# Patient Record
Sex: MALE | Race: WHITE | NOT HISPANIC OR LATINO | Employment: OTHER | URBAN - METROPOLITAN AREA
[De-identification: names, ages, dates, MRNs, and addresses within clinical notes are randomized per-mention and may not be internally consistent; named-entity substitution may affect disease eponyms.]

---

## 2017-02-22 ENCOUNTER — COMMUNICATION - HEALTHEAST (OUTPATIENT)
Dept: FAMILY MEDICINE | Facility: CLINIC | Age: 82
End: 2017-02-22

## 2017-02-22 DIAGNOSIS — L71.9 ROSACEA: ICD-10-CM

## 2017-03-06 ENCOUNTER — COMMUNICATION - HEALTHEAST (OUTPATIENT)
Dept: FAMILY MEDICINE | Facility: CLINIC | Age: 82
End: 2017-03-06

## 2017-03-06 DIAGNOSIS — L71.9 ROSACEA: ICD-10-CM

## 2017-03-09 ENCOUNTER — COMMUNICATION - HEALTHEAST (OUTPATIENT)
Dept: FAMILY MEDICINE | Facility: CLINIC | Age: 82
End: 2017-03-09

## 2017-03-10 ENCOUNTER — RECORDS - HEALTHEAST (OUTPATIENT)
Dept: ADMINISTRATIVE | Facility: OTHER | Age: 82
End: 2017-03-10

## 2017-04-25 ENCOUNTER — COMMUNICATION - HEALTHEAST (OUTPATIENT)
Dept: SCHEDULING | Facility: CLINIC | Age: 82
End: 2017-04-25

## 2017-04-25 DIAGNOSIS — I10 UNSPECIFIED ESSENTIAL HYPERTENSION: ICD-10-CM

## 2017-05-02 ENCOUNTER — AMBULATORY - HEALTHEAST (OUTPATIENT)
Dept: LAB | Facility: CLINIC | Age: 82
End: 2017-05-02

## 2017-05-02 DIAGNOSIS — R97.20 ELEVATED PROSTATE SPECIFIC ANTIGEN (PSA): ICD-10-CM

## 2017-05-02 LAB — PSA SERPL-MCNC: 7.7 NG/ML (ref 0–6.5)

## 2017-05-03 ENCOUNTER — COMMUNICATION - HEALTHEAST (OUTPATIENT)
Dept: FAMILY MEDICINE | Facility: CLINIC | Age: 82
End: 2017-05-03

## 2017-05-21 ENCOUNTER — COMMUNICATION - HEALTHEAST (OUTPATIENT)
Dept: FAMILY MEDICINE | Facility: CLINIC | Age: 82
End: 2017-05-21

## 2017-05-21 DIAGNOSIS — F52.8 PSYCHOSEXUAL DYSFUNCTION WITH INHIBITED SEXUAL EXCITEMENT: ICD-10-CM

## 2017-07-26 ENCOUNTER — COMMUNICATION - HEALTHEAST (OUTPATIENT)
Dept: FAMILY MEDICINE | Facility: CLINIC | Age: 82
End: 2017-07-26

## 2017-07-26 DIAGNOSIS — N40.0 BPH (BENIGN PROSTATIC HYPERPLASIA): ICD-10-CM

## 2017-08-04 ENCOUNTER — COMMUNICATION - HEALTHEAST (OUTPATIENT)
Dept: FAMILY MEDICINE | Facility: CLINIC | Age: 82
End: 2017-08-04

## 2017-08-04 DIAGNOSIS — L71.9 ROSACEA: ICD-10-CM

## 2017-10-24 ENCOUNTER — COMMUNICATION - HEALTHEAST (OUTPATIENT)
Dept: FAMILY MEDICINE | Facility: CLINIC | Age: 82
End: 2017-10-24

## 2017-10-24 DIAGNOSIS — N40.0 BPH (BENIGN PROSTATIC HYPERPLASIA): ICD-10-CM

## 2017-11-03 ENCOUNTER — COMMUNICATION - HEALTHEAST (OUTPATIENT)
Dept: FAMILY MEDICINE | Facility: CLINIC | Age: 82
End: 2017-11-03

## 2017-11-03 DIAGNOSIS — L71.9 ROSACEA: ICD-10-CM

## 2017-11-28 ENCOUNTER — OFFICE VISIT - HEALTHEAST (OUTPATIENT)
Dept: FAMILY MEDICINE | Facility: CLINIC | Age: 82
End: 2017-11-28

## 2017-11-28 DIAGNOSIS — L71.9 ROSACEA: ICD-10-CM

## 2017-11-28 DIAGNOSIS — C44.90 MALIGNANT NEOPLASM OF SKIN: ICD-10-CM

## 2017-11-28 DIAGNOSIS — Z00.00 PREVENTATIVE HEALTH CARE: ICD-10-CM

## 2017-11-28 DIAGNOSIS — H40.10X1 OPEN-ANGLE GLAUCOMA OF BOTH EYES, MILD STAGE, UNSPECIFIED OPEN-ANGLE GLAUCOMA TYPE: ICD-10-CM

## 2017-11-28 DIAGNOSIS — E78.00 PURE HYPERCHOLESTEROLEMIA: ICD-10-CM

## 2017-11-28 DIAGNOSIS — N40.1 BPH WITH URINARY OBSTRUCTION: ICD-10-CM

## 2017-11-28 DIAGNOSIS — K40.90 RIGHT INGUINAL HERNIA: ICD-10-CM

## 2017-11-28 DIAGNOSIS — I10 ESSENTIAL HYPERTENSION WITH GOAL BLOOD PRESSURE LESS THAN 130/80: ICD-10-CM

## 2017-11-28 DIAGNOSIS — Z00.01 ENCOUNTER FOR GENERAL ADULT MEDICAL EXAMINATION WITH ABNORMAL FINDINGS: ICD-10-CM

## 2017-11-28 DIAGNOSIS — F52.8 PSYCHOSEXUAL DYSFUNCTION WITH INHIBITED SEXUAL EXCITEMENT: ICD-10-CM

## 2017-11-28 DIAGNOSIS — R97.20 ELEVATED PROSTATE SPECIFIC ANTIGEN (PSA): ICD-10-CM

## 2017-11-28 DIAGNOSIS — H91.93 BILATERAL HEARING LOSS, UNSPECIFIED HEARING LOSS TYPE: ICD-10-CM

## 2017-11-28 DIAGNOSIS — R73.01 IMPAIRED FASTING GLUCOSE: ICD-10-CM

## 2017-11-28 DIAGNOSIS — N13.8 BPH WITH URINARY OBSTRUCTION: ICD-10-CM

## 2017-11-28 LAB
CHOLEST SERPL-MCNC: 169 MG/DL
FASTING STATUS PATIENT QL REPORTED: YES
HBA1C MFR BLD: 6.3 % (ref 3.5–6)
HDLC SERPL-MCNC: 36 MG/DL
LDLC SERPL CALC-MCNC: 121 MG/DL
PSA SERPL-MCNC: 7.5 NG/ML (ref 0–6.5)
TRIGL SERPL-MCNC: 62 MG/DL

## 2017-11-28 ASSESSMENT — MIFFLIN-ST. JEOR: SCORE: 1583.04

## 2017-11-29 ENCOUNTER — COMMUNICATION - HEALTHEAST (OUTPATIENT)
Dept: FAMILY MEDICINE | Facility: CLINIC | Age: 82
End: 2017-11-29

## 2017-12-04 ENCOUNTER — RECORDS - HEALTHEAST (OUTPATIENT)
Dept: ADMINISTRATIVE | Facility: OTHER | Age: 82
End: 2017-12-04

## 2018-01-22 ENCOUNTER — COMMUNICATION - HEALTHEAST (OUTPATIENT)
Dept: FAMILY MEDICINE | Facility: CLINIC | Age: 83
End: 2018-01-22

## 2018-01-22 DIAGNOSIS — L71.9 ROSACEA: ICD-10-CM

## 2018-01-22 DIAGNOSIS — N40.0 BPH (BENIGN PROSTATIC HYPERPLASIA): ICD-10-CM

## 2018-01-23 ENCOUNTER — COMMUNICATION - HEALTHEAST (OUTPATIENT)
Dept: FAMILY MEDICINE | Facility: CLINIC | Age: 83
End: 2018-01-23

## 2018-01-23 DIAGNOSIS — I10 ESSENTIAL HYPERTENSION: ICD-10-CM

## 2018-04-21 ENCOUNTER — COMMUNICATION - HEALTHEAST (OUTPATIENT)
Dept: FAMILY MEDICINE | Facility: CLINIC | Age: 83
End: 2018-04-21

## 2018-04-21 DIAGNOSIS — F52.8 PSYCHOSEXUAL DYSFUNCTION WITH INHIBITED SEXUAL EXCITEMENT: ICD-10-CM

## 2018-05-03 ENCOUNTER — COMMUNICATION - HEALTHEAST (OUTPATIENT)
Dept: FAMILY MEDICINE | Facility: CLINIC | Age: 83
End: 2018-05-03

## 2018-05-03 ENCOUNTER — AMBULATORY - HEALTHEAST (OUTPATIENT)
Dept: FAMILY MEDICINE | Facility: CLINIC | Age: 83
End: 2018-05-03

## 2018-05-03 DIAGNOSIS — L71.9 ROSACEA: ICD-10-CM

## 2018-05-29 ENCOUNTER — AMBULATORY - HEALTHEAST (OUTPATIENT)
Dept: LAB | Facility: CLINIC | Age: 83
End: 2018-05-29

## 2018-05-29 DIAGNOSIS — N13.8 BPH WITH URINARY OBSTRUCTION: ICD-10-CM

## 2018-05-29 DIAGNOSIS — R97.20 ELEVATED PROSTATE SPECIFIC ANTIGEN (PSA): ICD-10-CM

## 2018-05-29 DIAGNOSIS — N40.1 BPH WITH URINARY OBSTRUCTION: ICD-10-CM

## 2018-05-30 ENCOUNTER — AMBULATORY - HEALTHEAST (OUTPATIENT)
Dept: LAB | Facility: CLINIC | Age: 83
End: 2018-05-30

## 2018-05-30 DIAGNOSIS — R97.20 ELEVATED PROSTATE SPECIFIC ANTIGEN (PSA): ICD-10-CM

## 2018-05-30 DIAGNOSIS — N40.1 BPH WITH URINARY OBSTRUCTION: ICD-10-CM

## 2018-05-30 DIAGNOSIS — N13.8 BPH WITH URINARY OBSTRUCTION: ICD-10-CM

## 2018-05-30 LAB — PSA SERPL-MCNC: 9.3 NG/ML (ref 0–6.5)

## 2018-06-04 ENCOUNTER — COMMUNICATION - HEALTHEAST (OUTPATIENT)
Dept: FAMILY MEDICINE | Facility: CLINIC | Age: 83
End: 2018-06-04

## 2018-06-04 ENCOUNTER — RECORDS - HEALTHEAST (OUTPATIENT)
Dept: ADMINISTRATIVE | Facility: OTHER | Age: 83
End: 2018-06-04

## 2018-06-08 ENCOUNTER — AMBULATORY - HEALTHEAST (OUTPATIENT)
Dept: LAB | Facility: CLINIC | Age: 83
End: 2018-06-08

## 2018-06-08 DIAGNOSIS — F52.8 PSYCHOSEXUAL DYSFUNCTION WITH INHIBITED SEXUAL EXCITEMENT: ICD-10-CM

## 2018-06-08 DIAGNOSIS — R97.20 ELEVATED PROSTATE SPECIFIC ANTIGEN (PSA): ICD-10-CM

## 2018-06-08 DIAGNOSIS — Z00.00 ROUTINE HEALTH MAINTENANCE: ICD-10-CM

## 2018-06-08 DIAGNOSIS — N20.0 URIC ACID NEPHROLITHIASIS: ICD-10-CM

## 2018-07-20 ENCOUNTER — COMMUNICATION - HEALTHEAST (OUTPATIENT)
Dept: LAB | Facility: CLINIC | Age: 83
End: 2018-07-20

## 2018-07-20 DIAGNOSIS — N40.0 BPH (BENIGN PROSTATIC HYPERPLASIA): ICD-10-CM

## 2018-07-23 ENCOUNTER — COMMUNICATION - HEALTHEAST (OUTPATIENT)
Dept: LAB | Facility: CLINIC | Age: 83
End: 2018-07-23

## 2018-07-23 DIAGNOSIS — L71.9 ROSACEA: ICD-10-CM

## 2018-08-21 ENCOUNTER — RECORDS - HEALTHEAST (OUTPATIENT)
Dept: ADMINISTRATIVE | Facility: OTHER | Age: 83
End: 2018-08-21

## 2018-09-14 ENCOUNTER — COMMUNICATION - HEALTHEAST (OUTPATIENT)
Dept: LAB | Facility: CLINIC | Age: 83
End: 2018-09-14

## 2018-09-14 DIAGNOSIS — I10 ESSENTIAL HYPERTENSION: ICD-10-CM

## 2018-09-25 ENCOUNTER — AMBULATORY - HEALTHEAST (OUTPATIENT)
Dept: NURSING | Facility: CLINIC | Age: 83
End: 2018-09-25

## 2018-09-25 DIAGNOSIS — Z23 NEED FOR VACCINATION: ICD-10-CM

## 2018-12-04 ENCOUNTER — RECORDS - HEALTHEAST (OUTPATIENT)
Dept: ADMINISTRATIVE | Facility: OTHER | Age: 83
End: 2018-12-04

## 2018-12-12 ENCOUNTER — COMMUNICATION - HEALTHEAST (OUTPATIENT)
Dept: FAMILY MEDICINE | Facility: CLINIC | Age: 83
End: 2018-12-12

## 2018-12-12 DIAGNOSIS — F52.8 PSYCHOSEXUAL DYSFUNCTION WITH INHIBITED SEXUAL EXCITEMENT: ICD-10-CM

## 2018-12-14 ENCOUNTER — OFFICE VISIT - HEALTHEAST (OUTPATIENT)
Dept: FAMILY MEDICINE | Facility: CLINIC | Age: 83
End: 2018-12-14

## 2018-12-14 DIAGNOSIS — N13.8 BPH WITH URINARY OBSTRUCTION: ICD-10-CM

## 2018-12-14 DIAGNOSIS — K40.90 RIGHT INGUINAL HERNIA: ICD-10-CM

## 2018-12-14 DIAGNOSIS — N40.1 BPH WITH URINARY OBSTRUCTION: ICD-10-CM

## 2018-12-14 DIAGNOSIS — L71.9 ROSACEA: ICD-10-CM

## 2018-12-14 DIAGNOSIS — E78.00 PURE HYPERCHOLESTEROLEMIA: ICD-10-CM

## 2018-12-14 DIAGNOSIS — I10 ESSENTIAL HYPERTENSION WITH GOAL BLOOD PRESSURE LESS THAN 130/80: ICD-10-CM

## 2018-12-14 DIAGNOSIS — C44.90 MALIGNANT NEOPLASM OF SKIN: ICD-10-CM

## 2018-12-14 DIAGNOSIS — R73.01 IMPAIRED FASTING GLUCOSE: ICD-10-CM

## 2018-12-14 DIAGNOSIS — Z00.01 ENCOUNTER FOR GENERAL ADULT MEDICAL EXAMINATION WITH ABNORMAL FINDINGS: ICD-10-CM

## 2018-12-14 DIAGNOSIS — R97.20 ELEVATED PROSTATE SPECIFIC ANTIGEN (PSA): ICD-10-CM

## 2018-12-14 DIAGNOSIS — F52.8 PSYCHOSEXUAL DYSFUNCTION WITH INHIBITED SEXUAL EXCITEMENT: ICD-10-CM

## 2018-12-14 DIAGNOSIS — E66.3 OVERWEIGHT (BMI 25.0-29.9): ICD-10-CM

## 2018-12-14 DIAGNOSIS — H91.93 BILATERAL HEARING LOSS, UNSPECIFIED HEARING LOSS TYPE: ICD-10-CM

## 2018-12-14 LAB
ANION GAP SERPL CALCULATED.3IONS-SCNC: 7 MMOL/L (ref 5–18)
BUN SERPL-MCNC: 19 MG/DL (ref 8–28)
CALCIUM SERPL-MCNC: 9.5 MG/DL (ref 8.5–10.5)
CHLORIDE BLD-SCNC: 104 MMOL/L (ref 98–107)
CHOLEST SERPL-MCNC: 174 MG/DL
CO2 SERPL-SCNC: 31 MMOL/L (ref 22–31)
CREAT SERPL-MCNC: 0.84 MG/DL (ref 0.7–1.3)
FASTING STATUS PATIENT QL REPORTED: YES
GFR SERPL CREATININE-BSD FRML MDRD: >60 ML/MIN/1.73M2
GLUCOSE BLD-MCNC: 109 MG/DL (ref 70–125)
HBA1C MFR BLD: 6.1 % (ref 3.5–6)
HDLC SERPL-MCNC: 44 MG/DL
LDLC SERPL CALC-MCNC: 117 MG/DL
POTASSIUM BLD-SCNC: 4.4 MMOL/L (ref 3.5–5)
PSA SERPL-MCNC: 6.7 NG/ML (ref 0–6.5)
SODIUM SERPL-SCNC: 142 MMOL/L (ref 136–145)
TRIGL SERPL-MCNC: 63 MG/DL

## 2018-12-14 ASSESSMENT — MIFFLIN-ST. JEOR: SCORE: 1555.82

## 2018-12-17 ENCOUNTER — COMMUNICATION - HEALTHEAST (OUTPATIENT)
Dept: FAMILY MEDICINE | Facility: CLINIC | Age: 83
End: 2018-12-17

## 2019-01-01 ENCOUNTER — EXTERNAL RECORD (OUTPATIENT)
Dept: OTHER | Age: 84
End: 2019-01-01

## 2019-05-30 ENCOUNTER — COMMUNICATION - HEALTHEAST (OUTPATIENT)
Dept: FAMILY MEDICINE | Facility: CLINIC | Age: 84
End: 2019-05-30

## 2019-05-30 ENCOUNTER — AMBULATORY - HEALTHEAST (OUTPATIENT)
Dept: LAB | Facility: CLINIC | Age: 84
End: 2019-05-30

## 2019-05-30 DIAGNOSIS — F52.8 PSYCHOSEXUAL DYSFUNCTION WITH INHIBITED SEXUAL EXCITEMENT: ICD-10-CM

## 2019-05-30 DIAGNOSIS — Z00.00 ROUTINE HEALTH MAINTENANCE: ICD-10-CM

## 2019-05-30 DIAGNOSIS — N20.0 URIC ACID NEPHROLITHIASIS: ICD-10-CM

## 2019-05-30 DIAGNOSIS — R97.20 ELEVATED PROSTATE SPECIFIC ANTIGEN (PSA): ICD-10-CM

## 2019-05-30 LAB — PSA SERPL-MCNC: 9.3 NG/ML (ref 0–6.5)

## 2019-06-04 ENCOUNTER — RECORDS - HEALTHEAST (OUTPATIENT)
Dept: ADMINISTRATIVE | Facility: OTHER | Age: 84
End: 2019-06-04

## 2019-06-06 ENCOUNTER — RECORDS - HEALTHEAST (OUTPATIENT)
Dept: ADMINISTRATIVE | Facility: OTHER | Age: 84
End: 2019-06-06

## 2019-06-10 ENCOUNTER — RECORDS - HEALTHEAST (OUTPATIENT)
Dept: ADMINISTRATIVE | Facility: OTHER | Age: 84
End: 2019-06-10

## 2019-06-10 ENCOUNTER — AMBULATORY - HEALTHEAST (OUTPATIENT)
Dept: LAB | Facility: CLINIC | Age: 84
End: 2019-06-10

## 2019-06-20 ENCOUNTER — AMBULATORY - HEALTHEAST (OUTPATIENT)
Dept: LAB | Facility: CLINIC | Age: 84
End: 2019-06-20

## 2019-06-20 DIAGNOSIS — R97.20 ELEVATED PROSTATE SPECIFIC ANTIGEN (PSA): ICD-10-CM

## 2019-07-15 ENCOUNTER — COMMUNICATION - HEALTHEAST (OUTPATIENT)
Dept: FAMILY MEDICINE | Facility: CLINIC | Age: 84
End: 2019-07-15

## 2019-07-15 DIAGNOSIS — N40.0 BPH (BENIGN PROSTATIC HYPERPLASIA): ICD-10-CM

## 2019-07-18 ENCOUNTER — COMMUNICATION - HEALTHEAST (OUTPATIENT)
Dept: FAMILY MEDICINE | Facility: CLINIC | Age: 84
End: 2019-07-18

## 2019-07-18 DIAGNOSIS — L71.9 ROSACEA: ICD-10-CM

## 2019-08-26 ENCOUNTER — HOSPITAL ENCOUNTER (INPATIENT)
Age: 84
LOS: 2 days | Discharge: HOME | DRG: 244 | End: 2019-08-29
Attending: EMERGENCY MEDICINE | Admitting: HOSPITALIST

## 2019-08-26 ENCOUNTER — APPOINTMENT (OUTPATIENT)
Dept: GENERAL RADIOLOGY | Age: 84
DRG: 244 | End: 2019-08-26
Attending: EMERGENCY MEDICINE

## 2019-08-26 DIAGNOSIS — R55 SYNCOPE: ICD-10-CM

## 2019-08-26 DIAGNOSIS — R55 SYNCOPE, UNSPECIFIED SYNCOPE TYPE: Primary | ICD-10-CM

## 2019-08-26 DIAGNOSIS — I45.2 BIFASCICULAR BLOCK: ICD-10-CM

## 2019-08-26 DIAGNOSIS — I10 ESSENTIAL HYPERTENSION: ICD-10-CM

## 2019-08-26 LAB
ALBUMIN SERPL-MCNC: 3.3 G/DL (ref 3.6–5.1)
ALP SERPL-CCNC: 106 UNITS/L (ref 45–117)
ALT SERPL-CCNC: 18 UNITS/L
ANION GAP BLD CALC-SCNC: 17 MMOL/L
APPEARANCE UR: CLEAR
AST SERPL-CCNC: 21 UNITS/L
ATRIAL RATE (BPM): 75
BASOPHILS # BLD AUTO: 0.1 K/MCL (ref 0–0.3)
BASOPHILS NFR BLD AUTO: 1 %
BILIRUB CONJ SERPL-MCNC: <0.1 MG/DL (ref 0–0.2)
BILIRUB SERPL-MCNC: 0.3 MG/DL (ref 0.2–1)
BILIRUB UR QL STRIP: NEGATIVE
BUN BLD-MCNC: 15 MG/DL (ref 6–20)
CA-I BLD ISE-SCNC: 1.16 MMOL/L (ref 1.15–1.29)
CHLORIDE BLD-SCNC: 103 MMOL/L (ref 98–107)
CO2 BLD-SCNC: 25 MMOL/L (ref 19–24)
COLOR UR: YELLOW
CREAT BLD-MCNC: 0.8 MG/DL (ref 0.67–1.17)
CREAT BLD-MCNC: >90 MG/DL
DIASTOLIC BLOOD PRESSURE: 84
DIFFERENTIAL METHOD BLD: ABNORMAL
EOSINOPHIL # BLD AUTO: 0.2 K/MCL (ref 0.1–0.5)
EOSINOPHIL NFR SPEC: 2 %
ERYTHROCYTE [DISTWIDTH] IN BLOOD: 14.3 % (ref 11–15)
GLUCOSE BLD-MCNC: 114 MG/DL (ref 65–99)
GLUCOSE UR STRIP-MCNC: NEGATIVE MG/DL
HCT VFR BLD CALC: 37.3 % (ref 39–51)
HCT VFR BLD CALC: 39 % (ref 39–51)
HGB BLD-MCNC: 13.3 G/DL (ref 13–17)
HGB BLD-MCNC: 13.7 G/DL (ref 13–17)
HGB UR QL STRIP: NEGATIVE
IMM GRANULOCYTES # BLD AUTO: 0 K/MCL (ref 0–0.2)
IMM GRANULOCYTES NFR BLD: 0 %
KETONES UR STRIP-MCNC: NEGATIVE MG/DL
LEUKOCYTE ESTERASE UR QL STRIP: NEGATIVE
LYMPHOCYTES # BLD MANUAL: 1.9 K/MCL (ref 1–4)
LYMPHOCYTES NFR BLD MANUAL: 22 %
MCH RBC QN AUTO: 35.7 PG (ref 26–34)
MCHC RBC AUTO-ENTMCNC: 36.7 G/DL (ref 32–36.5)
MCV RBC AUTO: 97.1 FL (ref 78–100)
MONOCYTES # BLD MANUAL: 1.3 K/MCL (ref 0.3–0.9)
MONOCYTES NFR BLD MANUAL: 16 %
NEUTROPHILS # BLD: 5.1 K/MCL (ref 1.8–7.7)
NEUTROPHILS NFR BLD AUTO: 59 %
NITRITE UR QL STRIP: NEGATIVE
NRBC BLD MANUAL-RTO: 0 /100 WBC
P AXIS (DEGREES): 43
PH UR STRIP: 5 UNITS (ref 5–7)
PLATELET # BLD: 174 K/MCL (ref 140–450)
POTASSIUM BLD-SCNC: 3.4 MMOL/L (ref 3.4–5.1)
PR-INTERVAL (MSEC): 244
PROT SERPL-MCNC: 6.7 G/DL (ref 6.4–8.2)
PROT UR STRIP-MCNC: NEGATIVE MG/DL
QRS-INTERVAL (MSEC): 126
QT-INTERVAL (MSEC): 430
QTC: 480
R AXIS (DEGREES): -67
RBC # BLD: 3.84 MIL/MCL (ref 4.5–5.9)
REPORT TEXT: NORMAL
SODIUM BLD-SCNC: 142 MMOL/L (ref 135–145)
SP GR UR STRIP: 1.01 (ref 1–1.03)
SPECIMEN SOURCE: NORMAL
SYSTOLIC BLOOD PRESSURE: 173
T AXIS (DEGREES): 52
TROPONIN I SERPL-MCNC: <0.02 NG/ML
UROBILINOGEN UR STRIP-MCNC: 0.2 MG/DL (ref 0–1)
VENTRICULAR RATE EKG/MIN (BPM): 75
WBC # BLD: 8.5 K/MCL (ref 4.2–11)

## 2019-08-26 PROCEDURE — 96372 THER/PROPH/DIAG INJ SC/IM: CPT | Performed by: HOSPITALIST

## 2019-08-26 PROCEDURE — 84484 ASSAY OF TROPONIN QUANT: CPT

## 2019-08-26 PROCEDURE — G0378 HOSPITAL OBSERVATION PER HR: HCPCS

## 2019-08-26 PROCEDURE — 80076 HEPATIC FUNCTION PANEL: CPT

## 2019-08-26 PROCEDURE — 96361 HYDRATE IV INFUSION ADD-ON: CPT

## 2019-08-26 PROCEDURE — 99285 EMERGENCY DEPT VISIT HI MDM: CPT

## 2019-08-26 PROCEDURE — 10002807 HB RX 258: Performed by: EMERGENCY MEDICINE

## 2019-08-26 PROCEDURE — 10004651 HB RX, NO CHARGE ITEM: Performed by: HOSPITALIST

## 2019-08-26 PROCEDURE — 81003 URINALYSIS AUTO W/O SCOPE: CPT

## 2019-08-26 PROCEDURE — 96374 THER/PROPH/DIAG INJ IV PUSH: CPT

## 2019-08-26 PROCEDURE — 93005 ELECTROCARDIOGRAM TRACING: CPT | Performed by: EMERGENCY MEDICINE

## 2019-08-26 PROCEDURE — 99218 INITIAL OBSERVATION CARE,LEVL I: CPT | Performed by: HOSPITALIST

## 2019-08-26 PROCEDURE — 10002803 HB RX 637: Performed by: HOSPITALIST

## 2019-08-26 PROCEDURE — 85025 COMPLETE CBC W/AUTO DIFF WBC: CPT

## 2019-08-26 PROCEDURE — 10002800 HB RX 250 W HCPCS: Performed by: EMERGENCY MEDICINE

## 2019-08-26 PROCEDURE — 10004157 XR CHEST AP OR PA

## 2019-08-26 PROCEDURE — 10002807 HB RX 258: Performed by: HOSPITALIST

## 2019-08-26 PROCEDURE — 85014 HEMATOCRIT: CPT

## 2019-08-26 PROCEDURE — 10002800 HB RX 250 W HCPCS: Performed by: HOSPITALIST

## 2019-08-26 RX ORDER — VARDENAFIL HYDROCHLORIDE 20 MG/1
20 TABLET ORAL PRN
COMMUNITY

## 2019-08-26 RX ORDER — SODIUM CHLORIDE 9 MG/ML
INJECTION, SOLUTION INTRAVENOUS CONTINUOUS
Status: ACTIVE | OUTPATIENT
Start: 2019-08-26 | End: 2019-08-27

## 2019-08-26 RX ORDER — DOXYCYCLINE HYCLATE 100 MG/1
50 CAPSULE ORAL DAILY
Status: DISCONTINUED | OUTPATIENT
Start: 2019-08-27 | End: 2019-08-26 | Stop reason: RX

## 2019-08-26 RX ORDER — POTASSIUM CHLORIDE 20 MEQ/1
40 TABLET, EXTENDED RELEASE ORAL ONCE
Status: COMPLETED | OUTPATIENT
Start: 2019-08-26 | End: 2019-08-26

## 2019-08-26 RX ORDER — 0.9 % SODIUM CHLORIDE 0.9 %
2 VIAL (ML) INJECTION EVERY 12 HOURS SCHEDULED
Status: DISCONTINUED | OUTPATIENT
Start: 2019-08-26 | End: 2019-08-29 | Stop reason: HOSPADM

## 2019-08-26 RX ORDER — MULTIVITAMIN
1 TABLET ORAL DAILY
COMMUNITY

## 2019-08-26 RX ORDER — LATANOPROST 50 UG/ML
1 SOLUTION/ DROPS OPHTHALMIC NIGHTLY
COMMUNITY

## 2019-08-26 RX ORDER — HEPARIN SODIUM 5000 [USP'U]/ML
5000 INJECTION, SOLUTION INTRAVENOUS; SUBCUTANEOUS EVERY 8 HOURS SCHEDULED
Status: DISCONTINUED | OUTPATIENT
Start: 2019-08-26 | End: 2019-08-29 | Stop reason: HOSPADM

## 2019-08-26 RX ORDER — LISINOPRIL 10 MG/1
10 TABLET ORAL DAILY
COMMUNITY

## 2019-08-26 RX ORDER — LATANOPROST 50 UG/ML
1 SOLUTION/ DROPS OPHTHALMIC NIGHTLY
Status: DISCONTINUED | OUTPATIENT
Start: 2019-08-27 | End: 2019-08-29 | Stop reason: HOSPADM

## 2019-08-26 RX ORDER — TAMSULOSIN HYDROCHLORIDE 0.4 MG/1
0.4 CAPSULE ORAL
COMMUNITY

## 2019-08-26 RX ORDER — PROCHLORPERAZINE EDISYLATE 5 MG/ML
5 INJECTION INTRAMUSCULAR; INTRAVENOUS ONCE
Status: COMPLETED | OUTPATIENT
Start: 2019-08-26 | End: 2019-08-26

## 2019-08-26 RX ORDER — DOXYCYCLINE HYCLATE 50 MG/1
1 TABLET, FILM COATED ORAL DAILY
COMMUNITY

## 2019-08-26 RX ORDER — DOXYCYCLINE 25 MG/5ML
50 POWDER, FOR SUSPENSION ORAL DAILY
Status: DISCONTINUED | OUTPATIENT
Start: 2019-08-27 | End: 2019-08-29 | Stop reason: HOSPADM

## 2019-08-26 RX ADMIN — SODIUM CHLORIDE: 9 INJECTION, SOLUTION INTRAVENOUS at 23:06

## 2019-08-26 RX ADMIN — SODIUM CHLORIDE 500 ML: 9 INJECTION, SOLUTION INTRAVENOUS at 19:08

## 2019-08-26 RX ADMIN — SODIUM CHLORIDE, PRESERVATIVE FREE 2 ML: 5 INJECTION INTRAVENOUS at 23:09

## 2019-08-26 RX ADMIN — POTASSIUM CHLORIDE 40 MEQ: 20 TABLET, EXTENDED RELEASE ORAL at 23:04

## 2019-08-26 RX ADMIN — PROCHLORPERAZINE EDISYLATE 5 MG: 5 INJECTION INTRAMUSCULAR; INTRAVENOUS at 19:13

## 2019-08-26 RX ADMIN — HEPARIN SODIUM 5000 UNITS: 5000 INJECTION INTRAVENOUS; SUBCUTANEOUS at 23:07

## 2019-08-26 SDOH — HEALTH STABILITY: MENTAL HEALTH: HOW OFTEN DO YOU HAVE A DRINK CONTAINING ALCOHOL?: NEVER

## 2019-08-26 ASSESSMENT — LIFESTYLE VARIABLES
AUDIT-C TOTAL SCORE: 0
HOW OFTEN DO YOU HAVE A DRINK CONTAINING ALCOHOL: NEVER
HOW MANY STANDARD DRINKS CONTAINING ALCOHOL DO YOU HAVE ON A TYPICAL DAY: 0,1 OR 2
ALCOHOL_USE_STATUS: NO OR LOW RISK WITH VALIDATED TOOL
HOW OFTEN DO YOU HAVE 6 OR MORE DRINKS ON ONE OCCASION: NEVER

## 2019-08-26 ASSESSMENT — COGNITIVE AND FUNCTIONAL STATUS - GENERAL
ARE YOU BLIND OR DO YOU HAVE SERIOUS DIFFICULTY SEEING, EVEN WHEN WEARING GLASSES: NO
ARE YOU DEAF OR DO YOU HAVE SERIOUS DIFFICULTY  HEARING: NO

## 2019-08-26 ASSESSMENT — ACTIVITIES OF DAILY LIVING (ADL)
ADL_SHORT_OF_BREATH: NO
CHRONIC_PAIN_PRESENT: NO
ADL_BEFORE_ADMISSION: INDEPENDENT
ADL_SCORE: 12
RECENT_DECLINE_ADL: NO

## 2019-08-26 ASSESSMENT — PAIN SCALES - GENERAL
PAINLEVEL_OUTOF10: 5
PAIN_LEVEL_AT_REST: 0
PAINLEVEL_OUTOF10: 5

## 2019-08-27 ENCOUNTER — ANESTHESIA EVENT (OUTPATIENT)
Dept: CARDIOLOGY | Age: 84
DRG: 244 | End: 2019-08-27

## 2019-08-27 ENCOUNTER — APPOINTMENT (OUTPATIENT)
Dept: CV DIAGNOSTICS | Age: 84
DRG: 244 | End: 2019-08-27
Attending: HOSPITALIST

## 2019-08-27 ENCOUNTER — TELEPHONE (OUTPATIENT)
Dept: ELECTROPHYSIOLOGY | Age: 84
End: 2019-08-27

## 2019-08-27 DIAGNOSIS — R55 SYNCOPE: Primary | ICD-10-CM

## 2019-08-27 PROBLEM — I10 BENIGN ESSENTIAL HTN: Status: ACTIVE | Noted: 2019-08-27

## 2019-08-27 PROBLEM — N40.0 BPH (BENIGN PROSTATIC HYPERPLASIA): Status: ACTIVE | Noted: 2019-08-27

## 2019-08-27 PROBLEM — I44.1 MOBITZ TYPE 1 SECOND DEGREE ATRIOVENTRICULAR BLOCK: Status: ACTIVE | Noted: 2019-08-27

## 2019-08-27 LAB
ALBUMIN SERPL-MCNC: 3.1 G/DL (ref 3.6–5.1)
ALBUMIN/GLOB SERPL: 1 {RATIO} (ref 1–2.4)
ALP SERPL-CCNC: 87 UNITS/L (ref 45–117)
ALT SERPL-CCNC: 16 UNITS/L
ANION GAP SERPL CALC-SCNC: 7 MMOL/L (ref 10–20)
AST SERPL-CCNC: 17 UNITS/L
ATRIAL RATE (BPM): 69
AV PEAK GRADIENT (AVPG): 5.2 MMHG
AV PEAK VELOCITY (AVPV): 1.1 M/S
AVI LVOT PEAK GRADIENT (LVOTMG): 1.6 MMHG
BILIRUB SERPL-MCNC: 0.5 MG/DL (ref 0.2–1)
BUN SERPL-MCNC: 13 MG/DL (ref 6–20)
BUN/CREAT SERPL: 16 (ref 7–25)
CALCIUM SERPL-MCNC: 8.9 MG/DL (ref 8.4–10.2)
CHLORIDE SERPL-SCNC: 110 MMOL/L (ref 98–107)
CO2 SERPL-SCNC: 29 MMOL/L (ref 21–32)
CREAT SERPL-MCNC: 0.82 MG/DL (ref 0.67–1.17)
DOP CALC LVOT PEAK VEL (LVOTPV): 0.9 M/S
E WAVE DECELARATION TIME (MDT): 237.2 MS
EST RIGHT VENT SYSTOLIC PRESSURE BY TRICUSPID REGURGITATION JET (RVSP): 34.8 MMHG
GLOBULIN SER-MCNC: 3.1 G/DL (ref 2–4)
GLUCOSE SERPL-MCNC: 107 MG/DL (ref 65–99)
INTERVENTRICULAR SEPTUM IN END DIASTOLE (IVSD): 1.1 CM
LEFT VENTRICULAR INTERNAL DIMENSION IN DIASTOLE (LVDD): 4.7 CM
LEFT VENTRICULAR POSTERIOR WALL IN END DIASTOLE (LVPW): 1 CM
LV EF: 60 %
LVOT 2D (LVOTD): 2.1 CM
LVOT VTI (LVOTVTI): 18.8 CM
MV E TISSUE VEL LAT (MELV): 6.7 CM/S
MV E TISSUE VEL MED (MESV): 6.5 CM/S
MV E WAVE VEL/E TISSUE VEL MED(MSR): 11.3
MV PEAK A VELOCITY (MVPAV): 1 M/S
MV PEAK E VELOCITY (MVPEV): 0.7 M/S
P AXIS (DEGREES): 46
POTASSIUM SERPL-SCNC: 4.4 MMOL/L (ref 3.4–5.1)
PR-INTERVAL (MSEC): 302
PROT SERPL-MCNC: 6.2 G/DL (ref 6.4–8.2)
QRS-INTERVAL (MSEC): 126
QT-INTERVAL (MSEC): 424
QTC: 454
R AXIS (DEGREES): -69
REPORT TEXT: NORMAL
RV TISSUE DOPPLER FREE WALL HEART RATE (RVSTV): 0.1 M/S
SODIUM SERPL-SCNC: 142 MMOL/L (ref 135–145)
T AXIS (DEGREES): 46
TRICUSPID VALVE PEAK REGURGITATION VELOCITY (TRPV): 2.6 M/S
TROPONIN I SERPL-MCNC: <0.02 NG/ML
TSH SERPL-ACNC: 1.04 MCUNITS/ML (ref 0.35–5)
TV ESTIMATED RIGHT ARTERIAL PRESSURE (RAP): 8 MMHG
VENTRICULAR RATE EKG/MIN (BPM): 69

## 2019-08-27 PROCEDURE — 93005 ELECTROCARDIOGRAM TRACING: CPT | Performed by: HOSPITALIST

## 2019-08-27 PROCEDURE — G0378 HOSPITAL OBSERVATION PER HR: HCPCS

## 2019-08-27 PROCEDURE — 10004173 HB COUNTER-THERAPY VISIT PT

## 2019-08-27 PROCEDURE — 97535 SELF CARE MNGMENT TRAINING: CPT

## 2019-08-27 PROCEDURE — 10004172 HB COUNTER-THERAPY VISIT OT

## 2019-08-27 PROCEDURE — 10004651 HB RX, NO CHARGE ITEM: Performed by: HOSPITALIST

## 2019-08-27 PROCEDURE — 99226 SUBSEQUENT OBSERVATION CARE III: CPT | Performed by: INTERNAL MEDICINE

## 2019-08-27 PROCEDURE — 10000002 HB ROOM CHARGE MED SURG

## 2019-08-27 PROCEDURE — 93010 ELECTROCARDIOGRAM REPORT: CPT | Performed by: INTERNAL MEDICINE

## 2019-08-27 PROCEDURE — 10003445 HB TELEMETRY PER DAY

## 2019-08-27 PROCEDURE — 84443 ASSAY THYROID STIM HORMONE: CPT

## 2019-08-27 PROCEDURE — 36415 COLL VENOUS BLD VENIPUNCTURE: CPT

## 2019-08-27 PROCEDURE — 10002800 HB RX 250 W HCPCS: Performed by: HOSPITALIST

## 2019-08-27 PROCEDURE — 80053 COMPREHEN METABOLIC PANEL: CPT

## 2019-08-27 PROCEDURE — 84484 ASSAY OF TROPONIN QUANT: CPT

## 2019-08-27 PROCEDURE — 93306 TTE W/DOPPLER COMPLETE: CPT | Performed by: INTERNAL MEDICINE

## 2019-08-27 PROCEDURE — 97161 PT EVAL LOW COMPLEX 20 MIN: CPT

## 2019-08-27 PROCEDURE — 96372 THER/PROPH/DIAG INJ SC/IM: CPT | Performed by: HOSPITALIST

## 2019-08-27 PROCEDURE — 10002803 HB RX 637: Performed by: HOSPITALIST

## 2019-08-27 PROCEDURE — 97165 OT EVAL LOW COMPLEX 30 MIN: CPT

## 2019-08-27 PROCEDURE — 99223 1ST HOSP IP/OBS HIGH 75: CPT | Performed by: INTERNAL MEDICINE

## 2019-08-27 RX ORDER — CEFAZOLIN SODIUM/WATER 2 G/20 ML
2000 SYRINGE (ML) INTRAVENOUS
Status: DISCONTINUED | OUTPATIENT
Start: 2019-08-28 | End: 2019-08-28

## 2019-08-27 RX ORDER — TAMSULOSIN HYDROCHLORIDE 0.4 MG/1
0.4 CAPSULE ORAL
Status: DISCONTINUED | OUTPATIENT
Start: 2019-08-27 | End: 2019-08-27

## 2019-08-27 RX ADMIN — LATANOPROST 1 DROP: 50 SOLUTION OPHTHALMIC at 21:19

## 2019-08-27 RX ADMIN — SODIUM CHLORIDE, PRESERVATIVE FREE 2 ML: 5 INJECTION INTRAVENOUS at 08:54

## 2019-08-27 RX ADMIN — HEPARIN SODIUM 5000 UNITS: 5000 INJECTION INTRAVENOUS; SUBCUTANEOUS at 05:14

## 2019-08-27 RX ADMIN — SODIUM CHLORIDE, PRESERVATIVE FREE 2 ML: 5 INJECTION INTRAVENOUS at 21:08

## 2019-08-27 RX ADMIN — DOXYCYCLINE 50 MG: 25 POWDER, FOR SUSPENSION ORAL at 09:28

## 2019-08-27 RX ADMIN — HEPARIN SODIUM 5000 UNITS: 5000 INJECTION INTRAVENOUS; SUBCUTANEOUS at 13:39

## 2019-08-27 RX ADMIN — HEPARIN SODIUM 5000 UNITS: 5000 INJECTION INTRAVENOUS; SUBCUTANEOUS at 21:08

## 2019-08-27 RX ADMIN — ASPIRIN 81 MG: 81 TABLET ORAL at 08:48

## 2019-08-27 SDOH — HEALTH STABILITY: MENTAL HEALTH: HOW OFTEN DO YOU HAVE A DRINK CONTAINING ALCOHOL?: NEVER

## 2019-08-27 ASSESSMENT — PATIENT HEALTH QUESTIONNAIRE - PHQ9
IS PATIENT ABLE TO COMPLETE PHQ2 OR PHQ9: NO, DEFER TO LATER TIME

## 2019-08-27 ASSESSMENT — COGNITIVE AND FUNCTIONAL STATUS - GENERAL
BECAUSE OF A PHYSICAL, MENTAL, OR EMOTIONAL CONDITION, DO YOU HAVE DIFFICULTY DOING ERRANDS ALONE: NO
DO YOU HAVE SERIOUS DIFFICULTY WALKING OR CLIMBING STAIRS: NO
BECAUSE OF A PHYSICAL, MENTAL, OR EMOTIONAL CONDITION, DO YOU HAVE SERIOUS DIFFICULTY CONCENTRATING, REMEMBERING OR MAKING DECISIONS: NO
DAILY_ACTIVITY_CONVERTED_SCORE: 57.54
DO YOU HAVE DIFFICULTY DRESSING OR BATHING: NO
DAILY_ACTIVITY_RAW_SCORE: 24
BASIC_MOBILITY_RAW_SCORE: 24
BASIC_MOBILITY_CONVERTED_SCORE: 57.68

## 2019-08-27 ASSESSMENT — PAIN SCALES - GENERAL
PAIN_LEVEL_AT_REST: 0

## 2019-08-27 ASSESSMENT — ACTIVITIES OF DAILY LIVING (ADL): PRIOR_ADL: INDEPENDENT

## 2019-08-27 ASSESSMENT — LIFESTYLE VARIABLES: SMOKING_STATUS: FORMER

## 2019-08-28 ENCOUNTER — ANESTHESIA (OUTPATIENT)
Dept: CARDIOLOGY | Age: 84
DRG: 244 | End: 2019-08-28

## 2019-08-28 ENCOUNTER — APPOINTMENT (OUTPATIENT)
Dept: GENERAL RADIOLOGY | Age: 84
DRG: 244 | End: 2019-08-28
Attending: INTERNAL MEDICINE

## 2019-08-28 PROCEDURE — 10000002 HB ROOM CHARGE MED SURG

## 2019-08-28 PROCEDURE — 10002800 HB RX 250 W HCPCS: Performed by: INTERNAL MEDICINE

## 2019-08-28 PROCEDURE — 93010 ELECTROCARDIOGRAM REPORT: CPT | Performed by: INTERNAL MEDICINE

## 2019-08-28 PROCEDURE — C1785 PMKR, DUAL, RATE-RESP: HCPCS | Performed by: INTERNAL MEDICINE

## 2019-08-28 PROCEDURE — 92950 HEART/LUNG RESUSCITATION CPR: CPT

## 2019-08-28 PROCEDURE — 10002807 HB RX 258: Performed by: NURSE ANESTHETIST, CERTIFIED REGISTERED

## 2019-08-28 PROCEDURE — 10002800 HB RX 250 W HCPCS: Performed by: NURSE ANESTHETIST, CERTIFIED REGISTERED

## 2019-08-28 PROCEDURE — 10002801 HB RX 250 W/O HCPCS: Performed by: PHYSICIAN ASSISTANT

## 2019-08-28 PROCEDURE — 10002362 HB ANESTH MAC IV 1ST 1/2 HR: Performed by: INTERNAL MEDICINE

## 2019-08-28 PROCEDURE — 10002801 HB RX 250 W/O HCPCS: Performed by: INTERNAL MEDICINE

## 2019-08-28 PROCEDURE — 10002801 HB RX 250 W/O HCPCS: Performed by: NURSE ANESTHETIST, CERTIFIED REGISTERED

## 2019-08-28 PROCEDURE — 10004372 HB INJECTION VENOGRAM EXTREMITY: Performed by: INTERNAL MEDICINE

## 2019-08-28 PROCEDURE — 33208 INSRT HEART PM ATRIAL & VENT: CPT | Performed by: INTERNAL MEDICINE

## 2019-08-28 PROCEDURE — 10004390: Performed by: INTERNAL MEDICINE

## 2019-08-28 PROCEDURE — 0JH606Z INSERTION OF PACEMAKER, DUAL CHAMBER INTO CHEST SUBCUTANEOUS TISSUE AND FASCIA, OPEN APPROACH: ICD-10-PCS | Performed by: INTERNAL MEDICINE

## 2019-08-28 PROCEDURE — 10003445 HB TELEMETRY PER DAY

## 2019-08-28 PROCEDURE — C1898 LEAD, PMKR, OTHER THAN TRANS: HCPCS | Performed by: INTERNAL MEDICINE

## 2019-08-28 PROCEDURE — 10004651 HB RX, NO CHARGE ITEM: Performed by: HOSPITALIST

## 2019-08-28 PROCEDURE — 10002800 HB RX 250 W HCPCS: Performed by: PHYSICIAN ASSISTANT

## 2019-08-28 PROCEDURE — 10004316 HB COUNTER-PREP

## 2019-08-28 PROCEDURE — 93005 ELECTROCARDIOGRAM TRACING: CPT | Performed by: INTERNAL MEDICINE

## 2019-08-28 PROCEDURE — 10004157 XR CHEST AP OR PA

## 2019-08-28 PROCEDURE — 10002803 HB RX 637: Performed by: HOSPITALIST

## 2019-08-28 PROCEDURE — 10002807 HB RX 258: Performed by: PHYSICIAN ASSISTANT

## 2019-08-28 PROCEDURE — 10004160 HB COUNTER-PRE PROC ENCOUNTER: Performed by: INTERNAL MEDICINE

## 2019-08-28 PROCEDURE — 10004352 HB COUNTER-EP CASE: Performed by: INTERNAL MEDICINE

## 2019-08-28 PROCEDURE — 10001568 HB ANESTH MAC IV ADD'L 1/2 HR: Performed by: INTERNAL MEDICINE

## 2019-08-28 PROCEDURE — C1892 INTRO/SHEATH,FIXED,PEEL-AWAY: HCPCS | Performed by: INTERNAL MEDICINE

## 2019-08-28 PROCEDURE — 99232 SBSQ HOSP IP/OBS MODERATE 35: CPT | Performed by: INTERNAL MEDICINE

## 2019-08-28 PROCEDURE — 02HK3JZ INSERTION OF PACEMAKER LEAD INTO RIGHT VENTRICLE, PERCUTANEOUS APPROACH: ICD-10-PCS | Performed by: INTERNAL MEDICINE

## 2019-08-28 PROCEDURE — 33208 INSRT HEART PM ATRIAL & VENT: CPT | Performed by: NURSE ANESTHETIST, CERTIFIED REGISTERED

## 2019-08-28 PROCEDURE — 10004451 HB PACU RECOVERY 1ST 30 MINUTES

## 2019-08-28 PROCEDURE — 02H63JZ INSERTION OF PACEMAKER LEAD INTO RIGHT ATRIUM, PERCUTANEOUS APPROACH: ICD-10-PCS | Performed by: INTERNAL MEDICINE

## 2019-08-28 DEVICE — IPG W1DR01 AZURE XT DR MRI WL USA
Type: IMPLANTABLE DEVICE | Site: CHEST | Status: FUNCTIONAL
Brand: AZURE™ XT DR MRI SURESCAN™

## 2019-08-28 DEVICE — LEAD 5076-58 MRI US RCMCRD
Type: IMPLANTABLE DEVICE | Site: VENTRICLE | Status: FUNCTIONAL
Brand: CAPSUREFIX NOVUS MRI™ SURESCAN®

## 2019-08-28 DEVICE — LEAD 5076-52 MRI US RCMCRD
Type: IMPLANTABLE DEVICE | Site: ATRIUM | Status: FUNCTIONAL
Brand: CAPSUREFIX NOVUS MRI™ SURESCAN®

## 2019-08-28 RX ORDER — GLYCOPYRROLATE 0.2 MG/ML
INJECTION, SOLUTION INTRAMUSCULAR; INTRAVENOUS PRN
Status: DISCONTINUED | OUTPATIENT
Start: 2019-08-28 | End: 2019-08-28

## 2019-08-28 RX ORDER — SODIUM CHLORIDE, SODIUM LACTATE, POTASSIUM CHLORIDE, CALCIUM CHLORIDE 600; 310; 30; 20 MG/100ML; MG/100ML; MG/100ML; MG/100ML
INJECTION, SOLUTION INTRAVENOUS CONTINUOUS
Status: DISCONTINUED | OUTPATIENT
Start: 2019-08-28 | End: 2019-08-28

## 2019-08-28 RX ORDER — HUMAN INSULIN 100 [IU]/ML
INJECTION, SOLUTION SUBCUTANEOUS
Status: DISCONTINUED | OUTPATIENT
Start: 2019-08-28 | End: 2019-08-28

## 2019-08-28 RX ORDER — NALOXONE HCL 0.4 MG/ML
0.2 VIAL (ML) INJECTION EVERY 5 MIN PRN
Status: DISCONTINUED | OUTPATIENT
Start: 2019-08-28 | End: 2019-08-28

## 2019-08-28 RX ORDER — SODIUM CHLORIDE 9 MG/ML
INJECTION, SOLUTION INTRAVENOUS CONTINUOUS
Status: DISCONTINUED | OUTPATIENT
Start: 2019-08-28 | End: 2019-08-29 | Stop reason: HOSPADM

## 2019-08-28 RX ORDER — LIDOCAINE HYDROCHLORIDE 10 MG/ML
.5-1 INJECTION, SOLUTION INFILTRATION; PERINEURAL PRN
Status: DISCONTINUED | OUTPATIENT
Start: 2019-08-28 | End: 2019-08-28

## 2019-08-28 RX ORDER — LIDOCAINE AND PRILOCAINE 25; 25 MG/G; MG/G
1 CREAM TOPICAL PRN
Status: DISCONTINUED | OUTPATIENT
Start: 2019-08-28 | End: 2019-08-28

## 2019-08-28 RX ORDER — ACETAMINOPHEN 325 MG/1
650 TABLET ORAL EVERY 6 HOURS PRN
Status: DISCONTINUED | OUTPATIENT
Start: 2019-08-28 | End: 2019-08-29 | Stop reason: HOSPADM

## 2019-08-28 RX ORDER — PROCHLORPERAZINE EDISYLATE 5 MG/ML
5 INJECTION INTRAMUSCULAR; INTRAVENOUS
Status: DISCONTINUED | OUTPATIENT
Start: 2019-08-28 | End: 2019-08-28

## 2019-08-28 RX ORDER — SODIUM CHLORIDE 9 MG/ML
INJECTION, SOLUTION INTRAVENOUS CONTINUOUS
Status: DISCONTINUED | OUTPATIENT
Start: 2019-08-28 | End: 2019-08-28

## 2019-08-28 RX ORDER — DEXAMETHASONE SODIUM PHOSPHATE 4 MG/ML
INJECTION, SOLUTION INTRA-ARTICULAR; INTRALESIONAL; INTRAMUSCULAR; INTRAVENOUS; SOFT TISSUE PRN
Status: DISCONTINUED | OUTPATIENT
Start: 2019-08-28 | End: 2019-08-28

## 2019-08-28 RX ORDER — DIPHENHYDRAMINE HYDROCHLORIDE 50 MG/ML
6.25-25 INJECTION INTRAMUSCULAR; INTRAVENOUS
Status: DISCONTINUED | OUTPATIENT
Start: 2019-08-28 | End: 2019-08-28

## 2019-08-28 RX ORDER — PROCHLORPERAZINE MALEATE 5 MG/1
5 TABLET ORAL
Status: DISCONTINUED | OUTPATIENT
Start: 2019-08-28 | End: 2019-08-28

## 2019-08-28 RX ORDER — ACETAMINOPHEN 650 MG/1
650 SUPPOSITORY RECTAL EVERY 6 HOURS PRN
Status: DISCONTINUED | OUTPATIENT
Start: 2019-08-28 | End: 2019-08-29 | Stop reason: HOSPADM

## 2019-08-28 RX ORDER — SODIUM CHLORIDE 9 MG/ML
INJECTION, SOLUTION INTRAVENOUS CONTINUOUS PRN
Status: DISCONTINUED | OUTPATIENT
Start: 2019-08-28 | End: 2019-08-28

## 2019-08-28 RX ORDER — ONDANSETRON 2 MG/ML
INJECTION INTRAMUSCULAR; INTRAVENOUS PRN
Status: DISCONTINUED | OUTPATIENT
Start: 2019-08-28 | End: 2019-08-28

## 2019-08-28 RX ORDER — ONDANSETRON 2 MG/ML
4 INJECTION INTRAMUSCULAR; INTRAVENOUS 2 TIMES DAILY PRN
Status: DISCONTINUED | OUTPATIENT
Start: 2019-08-28 | End: 2019-08-28

## 2019-08-28 RX ORDER — LIDOCAINE HYDROCHLORIDE 10 MG/ML
INJECTION, SOLUTION EPIDURAL; INFILTRATION; INTRACAUDAL; PERINEURAL PRN
Status: DISCONTINUED | OUTPATIENT
Start: 2019-08-28 | End: 2019-08-28 | Stop reason: HOSPADM

## 2019-08-28 RX ORDER — CEFAZOLIN SODIUM 1 G/3ML
INJECTION, POWDER, FOR SOLUTION INTRAMUSCULAR; INTRAVENOUS PRN
Status: DISCONTINUED | OUTPATIENT
Start: 2019-08-28 | End: 2019-08-28 | Stop reason: HOSPADM

## 2019-08-28 RX ORDER — HYDRALAZINE HYDROCHLORIDE 20 MG/ML
5 INJECTION INTRAMUSCULAR; INTRAVENOUS EVERY 10 MIN PRN
Status: DISCONTINUED | OUTPATIENT
Start: 2019-08-28 | End: 2019-08-28

## 2019-08-28 RX ADMIN — SODIUM CHLORIDE: 9 INJECTION, SOLUTION INTRAVENOUS at 15:19

## 2019-08-28 RX ADMIN — SODIUM CHLORIDE, PRESERVATIVE FREE 2 ML: 5 INJECTION INTRAVENOUS at 20:11

## 2019-08-28 RX ADMIN — GLYCOPYRROLATE 0.2 MG: 0.2 INJECTION, SOLUTION INTRAMUSCULAR; INTRAVENOUS at 15:44

## 2019-08-28 RX ADMIN — PROPOFOL 50 MCG/KG/MIN: 10 INJECTION, EMULSION INTRAVENOUS at 15:44

## 2019-08-28 RX ADMIN — DEXAMETHASONE SODIUM PHOSPHATE 4 MG: 4 INJECTION, SOLUTION INTRAMUSCULAR; INTRAVENOUS at 16:21

## 2019-08-28 RX ADMIN — LATANOPROST 1 DROP: 50 SOLUTION OPHTHALMIC at 20:11

## 2019-08-28 RX ADMIN — ONDANSETRON 4 MG: 2 INJECTION INTRAMUSCULAR; INTRAVENOUS at 16:21

## 2019-08-28 RX ADMIN — SODIUM CHLORIDE: 9 INJECTION, SOLUTION INTRAVENOUS at 15:44

## 2019-08-28 RX ADMIN — VANCOMYCIN HYDROCHLORIDE 1500 MG: 10 INJECTION, POWDER, LYOPHILIZED, FOR SOLUTION INTRAVENOUS at 15:28

## 2019-08-28 RX ADMIN — DOXYCYCLINE 50 MG: 25 POWDER, FOR SUSPENSION ORAL at 08:48

## 2019-08-28 RX ADMIN — KETAMINE HYDROCHLORIDE 25.5 MG/HR: 50 INJECTION INTRAMUSCULAR; INTRAVENOUS at 15:44

## 2019-08-28 SDOH — HEALTH STABILITY: MENTAL HEALTH: HOW OFTEN DO YOU HAVE A DRINK CONTAINING ALCOHOL?: NEVER

## 2019-08-28 ASSESSMENT — PATIENT HEALTH QUESTIONNAIRE - PHQ9
IS PATIENT ABLE TO COMPLETE PHQ2 OR PHQ9: YES
SUM OF ALL RESPONSES TO PHQ9 QUESTIONS 1 AND 2: 0

## 2019-08-28 ASSESSMENT — PAIN SCALES - GENERAL
PAIN_LEVEL_AT_REST: 0

## 2019-08-28 ASSESSMENT — ACTIVITIES OF DAILY LIVING (ADL)
RECENT_DECLINE_ADL: NO
NEEDS_ASSIST: NO
ADL_SCORE: 12
SENSORY_SUPPORT_DEVICES: HEARING AIDS;EYEGLASSES
ADL_SHORT_OF_BREATH: NO
ADL_BEFORE_ADMISSION: INDEPENDENT
HISTORY OF FALLING IN THE LAST YEAR (PRIOR TO ADMISSION): YES
CHRONIC_PAIN_PRESENT: NO

## 2019-08-28 ASSESSMENT — COGNITIVE AND FUNCTIONAL STATUS - GENERAL
ARE YOU BLIND OR DO YOU HAVE SERIOUS DIFFICULTY SEEING, EVEN WHEN WEARING GLASSES: NO
ARE YOU DEAF OR DO YOU HAVE SERIOUS DIFFICULTY  HEARING: YES

## 2019-08-29 ENCOUNTER — APPOINTMENT (OUTPATIENT)
Dept: GENERAL RADIOLOGY | Age: 84
DRG: 244 | End: 2019-08-29
Attending: INTERNAL MEDICINE

## 2019-08-29 VITALS
HEART RATE: 67 BPM | BODY MASS INDEX: 26.2 KG/M2 | OXYGEN SATURATION: 95 % | WEIGHT: 187.17 LBS | SYSTOLIC BLOOD PRESSURE: 156 MMHG | RESPIRATION RATE: 16 BRPM | HEIGHT: 71 IN | DIASTOLIC BLOOD PRESSURE: 77 MMHG | TEMPERATURE: 98.1 F

## 2019-08-29 PROCEDURE — 10004651 HB RX, NO CHARGE ITEM: Performed by: HOSPITALIST

## 2019-08-29 PROCEDURE — 10002803 HB RX 637: Performed by: INTERNAL MEDICINE

## 2019-08-29 PROCEDURE — 71046 X-RAY EXAM CHEST 2 VIEWS: CPT

## 2019-08-29 PROCEDURE — 99024 POSTOP FOLLOW-UP VISIT: CPT | Performed by: INTERNAL MEDICINE

## 2019-08-29 PROCEDURE — 10002803 HB RX 637: Performed by: HOSPITALIST

## 2019-08-29 PROCEDURE — 10003445 HB TELEMETRY PER DAY

## 2019-08-29 PROCEDURE — 99239 HOSP IP/OBS DSCHRG MGMT >30: CPT | Performed by: INTERNAL MEDICINE

## 2019-08-29 RX ORDER — TAMSULOSIN HYDROCHLORIDE 0.4 MG/1
0.4 CAPSULE ORAL
Status: DISCONTINUED | OUTPATIENT
Start: 2019-08-29 | End: 2019-08-29 | Stop reason: HOSPADM

## 2019-08-29 RX ADMIN — TAMSULOSIN HYDROCHLORIDE 0.4 MG: 0.4 CAPSULE ORAL at 09:11

## 2019-08-29 RX ADMIN — DOXYCYCLINE 50 MG: 25 POWDER, FOR SUSPENSION ORAL at 09:11

## 2019-08-29 RX ADMIN — ASPIRIN 81 MG: 81 TABLET ORAL at 09:11

## 2019-08-29 RX ADMIN — SODIUM CHLORIDE, PRESERVATIVE FREE 2 ML: 5 INJECTION INTRAVENOUS at 09:11

## 2019-08-30 LAB
ATRIAL RATE (BPM): 73
P AXIS (DEGREES): 40
PR-INTERVAL (MSEC): 270
QRS-INTERVAL (MSEC): 120
QT-INTERVAL (MSEC): 420
QTC: 463
R AXIS (DEGREES): -71
REPORT TEXT: NORMAL
T AXIS (DEGREES): 41
VENTRICULAR RATE EKG/MIN (BPM): 73

## 2019-09-03 ENCOUNTER — OFFICE VISIT - HEALTHEAST (OUTPATIENT)
Dept: FAMILY MEDICINE | Facility: CLINIC | Age: 84
End: 2019-09-03

## 2019-09-03 DIAGNOSIS — Z95.0 HISTORY OF CARDIAC PACEMAKER: ICD-10-CM

## 2019-09-03 DIAGNOSIS — I49.5 SICK SINUS SYNDROME (H): ICD-10-CM

## 2019-09-03 DIAGNOSIS — I10 ESSENTIAL HYPERTENSION WITH GOAL BLOOD PRESSURE LESS THAN 130/80: ICD-10-CM

## 2019-09-03 DIAGNOSIS — J01.90 ACUTE SINUSITIS WITH SYMPTOMS > 10 DAYS: ICD-10-CM

## 2019-09-03 DIAGNOSIS — R91.1 LEFT UPPER LOBE PULMONARY NODULE: ICD-10-CM

## 2019-09-03 ASSESSMENT — MIFFLIN-ST. JEOR: SCORE: 1515

## 2019-09-04 ENCOUNTER — TELEPHONE (OUTPATIENT)
Dept: ELECTROPHYSIOLOGY | Age: 84
End: 2019-09-04

## 2019-09-12 ENCOUNTER — COMMUNICATION - HEALTHEAST (OUTPATIENT)
Dept: FAMILY MEDICINE | Facility: CLINIC | Age: 84
End: 2019-09-12

## 2019-09-12 DIAGNOSIS — I10 ESSENTIAL HYPERTENSION: ICD-10-CM

## 2019-10-02 ENCOUNTER — OFFICE VISIT - HEALTHEAST (OUTPATIENT)
Dept: CARDIOLOGY | Facility: CLINIC | Age: 84
End: 2019-10-02

## 2019-10-02 ENCOUNTER — AMBULATORY - HEALTHEAST (OUTPATIENT)
Dept: CARDIOLOGY | Facility: CLINIC | Age: 84
End: 2019-10-02

## 2019-10-02 DIAGNOSIS — Z95.0 CARDIAC PACEMAKER IN SITU: ICD-10-CM

## 2019-10-02 DIAGNOSIS — R55 SYNCOPE: ICD-10-CM

## 2019-10-02 LAB
HCC DEVICE COMMENTS: NORMAL
HCC DEVICE IMPLANTING PROVIDER: NORMAL
HCC DEVICE MANUFACTURE: NORMAL
HCC DEVICE MODEL: NORMAL
HCC DEVICE SERIAL NUMBER: NORMAL
HCC DEVICE TYPE: NORMAL

## 2019-10-02 ASSESSMENT — MIFFLIN-ST. JEOR: SCORE: 1537.68

## 2019-12-06 ENCOUNTER — APPOINTMENT (OUTPATIENT)
Dept: ELECTROPHYSIOLOGY | Age: 84
End: 2019-12-06

## 2019-12-10 ENCOUNTER — RECORDS - HEALTHEAST (OUTPATIENT)
Dept: ADMINISTRATIVE | Facility: OTHER | Age: 84
End: 2019-12-10

## 2019-12-11 ENCOUNTER — AMBULATORY - HEALTHEAST (OUTPATIENT)
Dept: CARDIOLOGY | Facility: CLINIC | Age: 84
End: 2019-12-11

## 2019-12-11 ENCOUNTER — COMMUNICATION - HEALTHEAST (OUTPATIENT)
Dept: FAMILY MEDICINE | Facility: CLINIC | Age: 84
End: 2019-12-11

## 2019-12-11 DIAGNOSIS — Z95.0 CARDIAC PACEMAKER IN SITU: ICD-10-CM

## 2019-12-11 DIAGNOSIS — R55 SYNCOPE, UNSPECIFIED SYNCOPE TYPE: ICD-10-CM

## 2019-12-11 DIAGNOSIS — I10 ESSENTIAL HYPERTENSION: ICD-10-CM

## 2019-12-11 ASSESSMENT — MIFFLIN-ST. JEOR: SCORE: 1537.68

## 2019-12-17 ENCOUNTER — COMMUNICATION - HEALTHEAST (OUTPATIENT)
Dept: FAMILY MEDICINE | Facility: CLINIC | Age: 84
End: 2019-12-17

## 2019-12-17 ENCOUNTER — RECORDS - HEALTHEAST (OUTPATIENT)
Dept: ADMINISTRATIVE | Facility: OTHER | Age: 84
End: 2019-12-17

## 2019-12-17 ENCOUNTER — OFFICE VISIT - HEALTHEAST (OUTPATIENT)
Dept: FAMILY MEDICINE | Facility: CLINIC | Age: 84
End: 2019-12-17

## 2019-12-17 DIAGNOSIS — R91.1 LEFT UPPER LOBE PULMONARY NODULE: ICD-10-CM

## 2019-12-17 DIAGNOSIS — Z95.0 HISTORY OF CARDIAC PACEMAKER: ICD-10-CM

## 2019-12-17 DIAGNOSIS — L71.9 ROSACEA: ICD-10-CM

## 2019-12-17 DIAGNOSIS — R73.01 IMPAIRED FASTING GLUCOSE: ICD-10-CM

## 2019-12-17 DIAGNOSIS — F52.8 PSYCHOSEXUAL DYSFUNCTION WITH INHIBITED SEXUAL EXCITEMENT: ICD-10-CM

## 2019-12-17 DIAGNOSIS — I10 ESSENTIAL HYPERTENSION: ICD-10-CM

## 2019-12-17 DIAGNOSIS — H91.93 BILATERAL HEARING LOSS, UNSPECIFIED HEARING LOSS TYPE: ICD-10-CM

## 2019-12-17 DIAGNOSIS — R97.20 ELEVATED PROSTATE SPECIFIC ANTIGEN (PSA): ICD-10-CM

## 2019-12-17 DIAGNOSIS — N40.0 BPH (BENIGN PROSTATIC HYPERPLASIA): ICD-10-CM

## 2019-12-17 DIAGNOSIS — C44.90 MALIGNANT NEOPLASM OF SKIN: ICD-10-CM

## 2019-12-17 DIAGNOSIS — E66.3 OVERWEIGHT (BMI 25.0-29.9): ICD-10-CM

## 2019-12-17 DIAGNOSIS — Z00.01 ENCOUNTER FOR GENERAL ADULT MEDICAL EXAMINATION WITH ABNORMAL FINDINGS: ICD-10-CM

## 2019-12-17 LAB
ANION GAP SERPL CALCULATED.3IONS-SCNC: 6 MMOL/L (ref 5–18)
BUN SERPL-MCNC: 15 MG/DL (ref 8–28)
CALCIUM SERPL-MCNC: 9.4 MG/DL (ref 8.5–10.5)
CHLORIDE BLD-SCNC: 105 MMOL/L (ref 98–107)
CHOLEST SERPL-MCNC: 152 MG/DL
CO2 SERPL-SCNC: 32 MMOL/L (ref 22–31)
CREAT SERPL-MCNC: 0.94 MG/DL (ref 0.7–1.3)
FASTING STATUS PATIENT QL REPORTED: YES
GFR SERPL CREATININE-BSD FRML MDRD: >60 ML/MIN/1.73M2
GLUCOSE BLD-MCNC: 104 MG/DL (ref 70–125)
HBA1C MFR BLD: 5.9 % (ref 3.5–6)
HDLC SERPL-MCNC: 37 MG/DL
LDLC SERPL CALC-MCNC: 103 MG/DL
POTASSIUM BLD-SCNC: 4.7 MMOL/L (ref 3.5–5)
PSA SERPL-MCNC: 7.5 NG/ML (ref 0–6.5)
SODIUM SERPL-SCNC: 143 MMOL/L (ref 136–145)
TRIGL SERPL-MCNC: 61 MG/DL

## 2019-12-17 ASSESSMENT — ANXIETY QUESTIONNAIRES
2. NOT BEING ABLE TO STOP OR CONTROL WORRYING: NOT AT ALL
1. FEELING NERVOUS, ANXIOUS, OR ON EDGE: NOT AT ALL

## 2019-12-17 ASSESSMENT — MIFFLIN-ST. JEOR: SCORE: 1515.56

## 2019-12-20 ENCOUNTER — AMBULATORY - HEALTHEAST (OUTPATIENT)
Dept: OTHER | Facility: CLINIC | Age: 84
End: 2019-12-20

## 2020-03-11 ENCOUNTER — AMBULATORY - HEALTHEAST (OUTPATIENT)
Dept: CARDIOLOGY | Facility: CLINIC | Age: 85
End: 2020-03-11

## 2020-03-11 DIAGNOSIS — Z95.0 CARDIAC PACEMAKER IN SITU: ICD-10-CM

## 2020-03-11 DIAGNOSIS — R55 SYNCOPE, UNSPECIFIED SYNCOPE TYPE: ICD-10-CM

## 2020-03-25 ENCOUNTER — AMBULATORY - HEALTHEAST (OUTPATIENT)
Dept: OTHER | Facility: CLINIC | Age: 85
End: 2020-03-25

## 2020-05-12 ENCOUNTER — COMMUNICATION - HEALTHEAST (OUTPATIENT)
Dept: FAMILY MEDICINE | Facility: CLINIC | Age: 85
End: 2020-05-12

## 2020-05-12 DIAGNOSIS — F52.8 PSYCHOSEXUAL DYSFUNCTION WITH INHIBITED SEXUAL EXCITEMENT: ICD-10-CM

## 2020-06-09 ENCOUNTER — AMBULATORY - HEALTHEAST (OUTPATIENT)
Dept: LAB | Facility: CLINIC | Age: 85
End: 2020-06-09

## 2020-06-09 DIAGNOSIS — R97.20 ELEVATED PSA: ICD-10-CM

## 2020-06-11 ENCOUNTER — AMBULATORY - HEALTHEAST (OUTPATIENT)
Dept: CARDIOLOGY | Facility: CLINIC | Age: 85
End: 2020-06-11

## 2020-06-11 ENCOUNTER — COMMUNICATION - HEALTHEAST (OUTPATIENT)
Dept: FAMILY MEDICINE | Facility: CLINIC | Age: 85
End: 2020-06-11

## 2020-06-11 ENCOUNTER — AMBULATORY - HEALTHEAST (OUTPATIENT)
Dept: LAB | Facility: CLINIC | Age: 85
End: 2020-06-11

## 2020-06-11 DIAGNOSIS — R55 SYNCOPE, UNSPECIFIED SYNCOPE TYPE: ICD-10-CM

## 2020-06-11 DIAGNOSIS — R97.20 ELEVATED PSA: ICD-10-CM

## 2020-06-11 DIAGNOSIS — Z95.0 CARDIAC PACEMAKER IN SITU: ICD-10-CM

## 2020-06-11 LAB
HCC DEVICE COMMENTS: NORMAL
HCC DEVICE IMPLANTING PROVIDER: NORMAL
HCC DEVICE MANUFACTURE: NORMAL
HCC DEVICE MODEL: NORMAL
HCC DEVICE SERIAL NUMBER: NORMAL
HCC DEVICE TYPE: NORMAL
PSA SERPL-MCNC: 7.9 NG/ML (ref 0–6.5)

## 2020-07-17 ENCOUNTER — RECORDS - HEALTHEAST (OUTPATIENT)
Dept: ADMINISTRATIVE | Facility: OTHER | Age: 85
End: 2020-07-17

## 2020-09-14 ENCOUNTER — AMBULATORY - HEALTHEAST (OUTPATIENT)
Dept: CARDIOLOGY | Facility: CLINIC | Age: 85
End: 2020-09-14

## 2020-09-14 DIAGNOSIS — R55 SYNCOPE, UNSPECIFIED SYNCOPE TYPE: ICD-10-CM

## 2020-09-14 DIAGNOSIS — Z95.0 CARDIAC PACEMAKER IN SITU: ICD-10-CM

## 2020-12-05 ENCOUNTER — COMMUNICATION - HEALTHEAST (OUTPATIENT)
Dept: FAMILY MEDICINE | Facility: CLINIC | Age: 85
End: 2020-12-05

## 2020-12-05 DIAGNOSIS — N40.0 BPH (BENIGN PROSTATIC HYPERPLASIA): ICD-10-CM

## 2020-12-11 ENCOUNTER — COMMUNICATION - HEALTHEAST (OUTPATIENT)
Dept: FAMILY MEDICINE | Facility: CLINIC | Age: 85
End: 2020-12-11

## 2020-12-11 DIAGNOSIS — N40.0 BPH (BENIGN PROSTATIC HYPERPLASIA): ICD-10-CM

## 2020-12-14 ENCOUNTER — AMBULATORY - HEALTHEAST (OUTPATIENT)
Dept: CARDIOLOGY | Facility: CLINIC | Age: 85
End: 2020-12-14

## 2020-12-14 DIAGNOSIS — R55 SYNCOPE, UNSPECIFIED SYNCOPE TYPE: ICD-10-CM

## 2020-12-14 DIAGNOSIS — Z95.0 CARDIAC PACEMAKER IN SITU: ICD-10-CM

## 2020-12-22 ENCOUNTER — OFFICE VISIT - HEALTHEAST (OUTPATIENT)
Dept: FAMILY MEDICINE | Facility: CLINIC | Age: 85
End: 2020-12-22

## 2020-12-22 DIAGNOSIS — Z95.0 CARDIAC PACEMAKER IN SITU: ICD-10-CM

## 2020-12-22 DIAGNOSIS — N40.1 BPH WITH URINARY OBSTRUCTION: ICD-10-CM

## 2020-12-22 DIAGNOSIS — R97.20 ELEVATED PROSTATE SPECIFIC ANTIGEN (PSA): ICD-10-CM

## 2020-12-22 DIAGNOSIS — I10 ESSENTIAL HYPERTENSION: ICD-10-CM

## 2020-12-22 DIAGNOSIS — K57.30 DIVERTICULOSIS OF LARGE INTESTINE: ICD-10-CM

## 2020-12-22 DIAGNOSIS — H40.10X1 OPEN-ANGLE GLAUCOMA OF BOTH EYES, MILD STAGE, UNSPECIFIED OPEN-ANGLE GLAUCOMA TYPE: ICD-10-CM

## 2020-12-22 DIAGNOSIS — C44.90 MALIGNANT NEOPLASM OF SKIN: ICD-10-CM

## 2020-12-22 DIAGNOSIS — E78.00 PURE HYPERCHOLESTEROLEMIA: ICD-10-CM

## 2020-12-22 DIAGNOSIS — Z23 ENCOUNTER FOR IMMUNIZATION: ICD-10-CM

## 2020-12-22 DIAGNOSIS — L71.9 ROSACEA: ICD-10-CM

## 2020-12-22 DIAGNOSIS — Z00.01 ENCOUNTER FOR GENERAL ADULT MEDICAL EXAMINATION WITH ABNORMAL FINDINGS: ICD-10-CM

## 2020-12-22 DIAGNOSIS — F52.8 PSYCHOSEXUAL DYSFUNCTION WITH INHIBITED SEXUAL EXCITEMENT: ICD-10-CM

## 2020-12-22 DIAGNOSIS — R73.01 IMPAIRED FASTING GLUCOSE: ICD-10-CM

## 2020-12-22 DIAGNOSIS — N13.8 BPH WITH URINARY OBSTRUCTION: ICD-10-CM

## 2020-12-22 DIAGNOSIS — R91.1 LEFT UPPER LOBE PULMONARY NODULE: ICD-10-CM

## 2020-12-22 LAB
ANION GAP SERPL CALCULATED.3IONS-SCNC: 8 MMOL/L (ref 5–18)
BUN SERPL-MCNC: 18 MG/DL (ref 8–28)
CALCIUM SERPL-MCNC: 9.4 MG/DL (ref 8.5–10.5)
CHLORIDE BLD-SCNC: 104 MMOL/L (ref 98–107)
CHOLEST SERPL-MCNC: 160 MG/DL
CO2 SERPL-SCNC: 29 MMOL/L (ref 22–31)
CREAT SERPL-MCNC: 1 MG/DL (ref 0.7–1.3)
FASTING STATUS PATIENT QL REPORTED: YES
GFR SERPL CREATININE-BSD FRML MDRD: >60 ML/MIN/1.73M2
GLUCOSE BLD-MCNC: 101 MG/DL (ref 70–125)
HBA1C MFR BLD: 5.9 %
HDLC SERPL-MCNC: 42 MG/DL
LDLC SERPL CALC-MCNC: 104 MG/DL
POTASSIUM BLD-SCNC: 4.2 MMOL/L (ref 3.5–5)
PSA SERPL-MCNC: 7.6 NG/ML (ref 0–6.5)
SODIUM SERPL-SCNC: 141 MMOL/L (ref 136–145)
TRIGL SERPL-MCNC: 70 MG/DL

## 2020-12-22 RX ORDER — DORZOLAMIDE HCL 20 MG/ML
1 SOLUTION/ DROPS OPHTHALMIC 2 TIMES DAILY
Status: SHIPPED | COMMUNITY
Start: 2020-12-22 | End: 2023-12-11

## 2020-12-22 ASSESSMENT — MIFFLIN-ST. JEOR: SCORE: 1524.64

## 2020-12-23 ENCOUNTER — COMMUNICATION - HEALTHEAST (OUTPATIENT)
Dept: FAMILY MEDICINE | Facility: CLINIC | Age: 85
End: 2020-12-23

## 2021-01-22 ENCOUNTER — COMMUNICATION - HEALTHEAST (OUTPATIENT)
Dept: FAMILY MEDICINE | Facility: CLINIC | Age: 86
End: 2021-01-22

## 2021-01-22 DIAGNOSIS — L71.9 ROSACEA: ICD-10-CM

## 2021-01-22 DIAGNOSIS — I10 ESSENTIAL HYPERTENSION: ICD-10-CM

## 2021-01-22 RX ORDER — LISINOPRIL 10 MG/1
TABLET ORAL
Qty: 90 TABLET | Refills: 3 | Status: SHIPPED | OUTPATIENT
Start: 2021-01-22 | End: 2022-01-19

## 2021-01-23 RX ORDER — METRONIDAZOLE 10 MG/G
GEL TOPICAL
Qty: 60 G | Refills: 11 | Status: SHIPPED | OUTPATIENT
Start: 2021-01-23 | End: 2022-05-16

## 2021-03-09 ENCOUNTER — COMMUNICATION - HEALTHEAST (OUTPATIENT)
Dept: FAMILY MEDICINE | Facility: CLINIC | Age: 86
End: 2021-03-09

## 2021-03-09 DIAGNOSIS — L71.9 ROSACEA: ICD-10-CM

## 2021-03-10 RX ORDER — DOXYCYCLINE HYCLATE 100 MG
TABLET ORAL
Qty: 90 TABLET | Refills: 3 | Status: SHIPPED | OUTPATIENT
Start: 2021-03-10 | End: 2022-01-27

## 2021-03-13 ENCOUNTER — COMMUNICATION - HEALTHEAST (OUTPATIENT)
Dept: FAMILY MEDICINE | Facility: CLINIC | Age: 86
End: 2021-03-13

## 2021-03-13 DIAGNOSIS — N40.0 BPH (BENIGN PROSTATIC HYPERPLASIA): ICD-10-CM

## 2021-03-13 RX ORDER — TAMSULOSIN HYDROCHLORIDE 0.4 MG/1
0.4 CAPSULE ORAL AT BEDTIME
Qty: 90 CAPSULE | Refills: 2 | Status: SHIPPED | OUTPATIENT
Start: 2021-03-13 | End: 2021-12-09

## 2021-03-15 ENCOUNTER — AMBULATORY - HEALTHEAST (OUTPATIENT)
Dept: CARDIOLOGY | Facility: CLINIC | Age: 86
End: 2021-03-15

## 2021-03-15 DIAGNOSIS — R55 SYNCOPE, UNSPECIFIED SYNCOPE TYPE: ICD-10-CM

## 2021-03-15 DIAGNOSIS — Z95.0 CARDIAC PACEMAKER IN SITU: ICD-10-CM

## 2021-03-16 ENCOUNTER — RECORDS - HEALTHEAST (OUTPATIENT)
Dept: ADMINISTRATIVE | Facility: OTHER | Age: 86
End: 2021-03-16

## 2021-05-24 ENCOUNTER — RECORDS - HEALTHEAST (OUTPATIENT)
Dept: SCHEDULING | Facility: CLINIC | Age: 86
End: 2021-05-24

## 2021-05-25 ENCOUNTER — OFFICE VISIT - HEALTHEAST (OUTPATIENT)
Dept: FAMILY MEDICINE | Facility: CLINIC | Age: 86
End: 2021-05-25

## 2021-05-25 DIAGNOSIS — I11.9 HYPERTENSIVE HEART DISEASE WITHOUT HEART FAILURE: ICD-10-CM

## 2021-05-25 DIAGNOSIS — R04.0 EPISTAXIS: ICD-10-CM

## 2021-05-30 NOTE — TELEPHONE ENCOUNTER
Refill Approved    Rx renewed per Medication Renewal Policy. Medication was last renewed on 7/23/2018.  Last OV 12/14/2018.    Virginie Aguilar, [facility] Triage/Med Refill 7/15/2019     Requested Prescriptions   Pending Prescriptions Disp Refills     tamsulosin (FLOMAX) 0.4 mg cap [Pharmacy Med Name: TAMSULOSIN HCL CAPS 0.4MG] 90 capsule 3     Sig: TAKE 1 CAPSULE AT BEDTIME       Alfuzosin/Tamsulosin/Silodosin Refill Protocol  Passed - 7/15/2019  2:42 AM        Passed - PCP or prescribing provider visit in past 12 months       Last office visit with prescriber/PCP: 11/29/2016 Derek Kumar MD OR same dept: Visit date not found OR same specialty: 11/29/2016 Derek Kumar MD  Last physical: 12/14/2018 Last MTM visit: Visit date not found   Next visit within 3 mo: Visit date not found  Next physical within 3 mo: Visit date not found  Prescriber OR PCP: Derek Kumar MD  Last diagnosis associated with med order: 1. BPH (benign prostatic hyperplasia)  - tamsulosin (FLOMAX) 0.4 mg cap [Pharmacy Med Name: TAMSULOSIN HCL CAPS 0.4MG]; TAKE 1 CAPSULE AT BEDTIME  Dispense: 90 capsule; Refill: 3    If protocol passes may refill for 12 months if within 3 months of last provider visit (or a total of 15 months).

## 2021-05-30 NOTE — TELEPHONE ENCOUNTER
Refill Approved    Rx renewed per Medication Renewal Policy. Medication was last renewed on 5/3/18.    Valorie Rojas, [facility] Triage/Med Refill 7/18/2019     Requested Prescriptions   Pending Prescriptions Disp Refills     metroNIDAZOLE (METROGEL) 1 % gel [Pharmacy Med Name: METRONIDAZOLE GEL 60GM 1%] 60 g 5     Sig: APPLY A SMALL AMOUNT TOPICALLY TO AFFECTED AREA(S) ONCE DAILY       Topical Dermatology Medications Refill Protocol Passed - 7/18/2019  3:29 PM        Passed - Patient has had office visit/physical in last 1 year     Last office visit with prescriber/PCP: 11/29/2016 Derek Kumar MD OR same dept: Visit date not found OR same specialty: 11/29/2016 Derek Kumar MD  Last physical: 12/14/2018 Last MTM visit: Visit date not found   Next visit within 3 mo: Visit date not found  Next physical within 3 mo: Visit date not found  Prescriber OR PCP: Derek Kumar MD  Last diagnosis associated with med order: 1. Rosacea  - metroNIDAZOLE (METROGEL) 1 % gel [Pharmacy Med Name: METRONIDAZOLE GEL 60GM 1%]; APPLY A SMALL AMOUNT TOPICALLY TO AFFECTED AREA(S) ONCE DAILY  Dispense: 60 g; Refill: 5    If protocol passes may refill for 12 months if within 3 months of last provider visit (or a total of 15 months).

## 2021-05-31 VITALS — BODY MASS INDEX: 29.47 KG/M2 | WEIGHT: 199 LBS | HEIGHT: 69 IN

## 2021-05-31 NOTE — PROGRESS NOTES
"Assessment/Plan:    1. Sick sinus syndrome (H)  Sick sinus syndrome with syncopal episode x2 described.  Pacemaker placement August 28, 2019 at [facility] in [facility].  Continues to do well.  Referral was placed to pacemaker clinic for ongoing management.  Lifting and activity restrictions reviewed.  Labs reviewed from August 26/August 27, 2019 otherwise appearing unremarkable.  - Ambulatory referral to Cardiology    2. Acute sinusitis with symptoms > 10 days  Sinusitis described.  Amoxicillin 875 mg twice daily x10 days.  - amoxicillin (AMOXIL) 875 MG tablet; Take 1 tablet (875 mg total) by mouth 2 (two) times a day for 10 days.  Dispense: 20 tablet; Refill: 0    3. History of cardiac pacemaker  As above.  - Ambulatory referral to Cardiology    4. Essential hypertension with goal blood pressure less than 130/80  Hypertension stable.  Continues lisinopril 10 mg daily.  Reassess at annual wellness visit in December 17, 2019.    5. Left upper lobe pulmonary nodule  Noted left upper lobe pulmonary nodule consistent with granuloma however recommendation for 3-month follow-up to ensure stability.  Will repeat chest x-ray at annual wellness visit December 17, 2019 to confirm stability.    40 minutes total time with patient, > 50% with counseling and coordination of cares.     Post Discharge Medication Reconciliation Status: discharge medications reconciled, continue medications without change           Subjective:    Mikaela HSU Henry is seen today for hospital follow-up.  Recent admission August 26, 2019 through August 29, 2019 through [facility] in [facility].  Noted fainting spell Sunday, August 25, 2019.  Unclear etiology.  No presyncopal symptoms described.  Travel to [place] the following day and was in a glider rocker chair when he \"flopped over\".  911 was called.  Taken to hospital for further evaluation.  Work-up appeared consistent with sick sinus " "syndrome with bradycardia.  Pacemaker placed August 28, 2019.  No complications.  Hospital discharge August 29, 2019.  Noted left upper lobe nodule with recommendation for repeat chest x-ray in 3 months.  Patient describes right sided nasal drainage from both nares as well as postnasal drainage.  Also had tooth #2 extraction around July 1, 2019 and wondering if this is correlated all to need for pacemaker.  No fevers.  Comprehensive review of systems as above otherwise all negative.    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 Quick, [facility])   TRUS with bx 10/7/11 biopsies negative (followed by Dr. Colindres)   Eye exam with Dr. Duarte in this building (monitoring for +/- glaucoma) - followed q year   Dr. Colindres, [facility] every year for elevated PSA, etc. (h/o TRUS with bx negative)  Dr. Beavers, dermatology for q 6 month checks re: \"precancerous\" lesions and rosacea  Bilateral hearing aids with h/o hearing loss (began using March, 2015); Mohs procedure left preauricular location 3/17/16  Attends [employer] in [place], [place]    Past Surgical History:   Procedure Laterality Date     KNEE ARTHROSCOPY Right         Family History   Problem Relation Age of Onset     Dementia Mother      Heart disease Father      Diabetes Father         Past Medical History:   Diagnosis Date     Hypertension         Social History     Tobacco Use     Smoking status: Former Smoker     Smokeless tobacco: Never Used   Substance Use Topics     Alcohol use: No     Drug use: No        Current Outpatient Medications   Medication Sig Dispense Refill     aspirin 81 mg chewable tablet Chew " "81 mg daily.       azelaic acid (FINACEA) 15 % gel Apply sparingly and massage in well to affected areas 50 g 5     doxycycline (VIBRA-TABS) 100 MG tablet TAKE 1 TABLET BY MOUTH DAILY. 90 tablet 3     GLUCOSAMINE/CHONDROITIN SULF A (GLUCOSAMINE-CHONDROITIN ORAL) 1500 complex capsules       latanoprost (XALATAN) 0.005 % ophthalmic solution 1 drop bedtime.       LEVITRA 20 mg tablet TAKE 1 TABLET DAILY AS NEEDED FOR ERECTILE DYSFUNCTION 10 tablet 5     lisinopril (PRINIVIL,ZESTRIL) 10 MG tablet TAKE 1 TABLET DAILY 90 tablet 3     metroNIDAZOLE (METROGEL) 1 % gel APPLY A SMALL AMOUNT TOPICALLY TO AFFECTED AREA(S) ONCE DAILY 60 g 1     MULTIVITAMIN ORAL        tamsulosin (FLOMAX) 0.4 mg cap TAKE 1 CAPSULE AT BEDTIME 90 capsule 1     amoxicillin (AMOXIL) 875 MG tablet Take 1 tablet (875 mg total) by mouth 2 (two) times a day for 10 days. 20 tablet 0     No current facility-administered medications for this visit.           Objective:    Vitals:    09/03/19 1225   BP: 122/70   Pulse: 75   Resp: 20   Temp: 97.9  F (36.6  C)   TempSrc: Oral   SpO2: 96%   Weight: 184 lb (83.5 kg)   Height: 5' 9\" (1.753 m)      Body mass index is 27.17 kg/m .    Alert.  No apparent distress.  Scant discharge right nasopharynx more so than left.  Oropharynx with moist mucous membranes.  Neck supple.  Chest clear.  Cardiac exam regular.  Pacemaker in place.  Incision site appears to be healing well without wound infection or wound dehiscence etc.  Left anterior chest ecchymoses resolving with yellowish coloration primarily.  Extremities warm and dry.        Pacemaker   Pacemaker Moi Xt Dr Colette Perry - [id number] -   Implanted  Chest   Inventory item: PACEMAKER MOI XT DR COLETTE PERRY   Model/Cat number: W1DR01   Serial number: [id number] : [employer]   Device identifier: [id number] Device identifier type: GS1   Implant Tracking Number: [id number] Site: Chest   Implanted by: Douglas Mensah" MD Time of implant:  4:57 PM   Date of implant: 8/28/2019     GUDID Information    Request status Successful     Brand name: Sandra GONZALEZ DR MRI SureScan  Version/Model: W1DR01   Company name: [employer] MRI safety info as of 8/28/19: MR   Conditional   Contains dry or latex rubber: No     GMDN P.T. name: Dual-chamber implantable pacemaker, rate-responsive           XR CHEST PA AND LATERAL        HISTORY: Pacemaker/Defibrillator Implanted        COMPARISON: 8/28/2019        FINDINGS:          The left chest cardiac pulse generator device is in stable position. Leads    are noted in the right atrium and right ventricle. No pneumothorax.        Stable appearance of the heart and mediastinal contour. The aorta is    tortuous. Pulmonary vessels are normally distributed, demonstrating    interval decrease compared to the prior study. No new consolidation or    focal infiltrate. The lungs are hyperinflated, suggesting underlying COPD.        There is minor blunting of the costophrenic sulci bilaterally, new from the    previous study. This may indicate the presence of trace pleural effusions.        The bones are osteopenic. There is degenerative osteophyte formation    throughout the thoracic vertebral levels. There is a probable age    indeterminate upper thoracic compression fracture, possibly T5 however    dedicated thoracic imaging would be needed for further evaluation.        Stable appearance of a probable calcified granuloma in the left upper lobe.    Follow-up recommended.          XR CHEST PA AND LATERAL    HISTORY: Pacemaker/Defibrillator Implanted    COMPARISON: 8/28/2019    FINDINGS:     The left chest cardiac pulse generator device is in stable position. Leads  are noted in the right atrium and right ventricle. No pneumothorax.    Stable appearance of the heart and mediastinal contour. The aorta is  tortuous. Pulmonary vessels are normally distributed, demonstrating  interval decrease compared to the  prior study. No new consolidation or  focal infiltrate. The lungs are hyperinflated, suggesting underlying COPD.    There is minor blunting of the costophrenic sulci bilaterally, new from the  previous study. This may indicate the presence of trace pleural effusions.    The bones are osteopenic. There is degenerative osteophyte formation  throughout the thoracic vertebral levels. There is a probable age  indeterminate upper thoracic compression fracture, possibly T5 however  dedicated thoracic imaging would be needed for further evaluation.    Stable appearance of a probable calcified granuloma in the left upper lobe.  Follow-up recommended.          IMPRESSION:   1. Left chest cardiac pulse generator device in stable position. Leads in  the right atrium and right ventricle.  2. Left upper lobe nodule, likely calcified granuloma. Follow-up chest  radiograph or CT scan recommended to document stability.   3. Improved vascular congestion.  4. Minor blunting of the costophrenic sulci, new from the prior study may  reflect trace pleural fluid.  5. Osteopenia with suspected 8 indeterminate upper thoracic compression  fracture. Dedicated thoracic spinal imaging could further evaluate.      This note has been dictated using voice recognition software and as a result may contain minor grammatical errors and unintended word substitutions.

## 2021-06-01 NOTE — TELEPHONE ENCOUNTER
Refill Approved    Rx renewed per Medication Renewal Policy. Medication was last renewed on 9/17/18.    Valorie Rojas, [facility] Triage/Med Refill 9/12/2019     Requested Prescriptions   Pending Prescriptions Disp Refills     lisinopril (PRINIVIL,ZESTRIL) 10 MG tablet [Pharmacy Med Name: LISINOPRIL TABS 10MG] 90 tablet 4     Sig: TAKE 1 TABLET DAILY       Ace Inhibitors Refill Protocol Passed - 9/12/2019  2:15 AM        Passed - PCP or prescribing provider visit in past 12 months       Last office visit with prescriber/PCP: 9/3/2019 Derek Kumar MD OR same dept: 9/3/2019 Derek Kumar MD OR same specialty: 9/3/2019 Derek Kumar MD  Last physical: 12/14/2018 Last MTM visit: Visit date not found   Next visit within 3 mo: Visit date not found  Next physical within 3 mo: Visit date not found  Prescriber OR PCP: Derek Kumar MD  Last diagnosis associated with med order: 1. Essential hypertension  - lisinopril (PRINIVIL,ZESTRIL) 10 MG tablet [Pharmacy Med Name: LISINOPRIL TABS 10MG]; TAKE 1 TABLET DAILY  Dispense: 90 tablet; Refill: 4    If protocol passes may refill for 12 months if within 3 months of last provider visit (or a total of 15 months).             Passed - Serum Potassium in past 12 months     Lab Results   Component Value Date    Potassium 4.4 12/14/2018             Passed - Blood pressure filed in past 12 months     BP Readings from Last 1 Encounters:   09/03/19 122/70             Passed - Serum Creatinine in past 12 months     Creatinine   Date Value Ref Range Status   12/14/2018 0.84 0.70 - 1.30 mg/dL Final

## 2021-06-01 NOTE — PROGRESS NOTES
In clinic device check with Device RN and appointment with Dr. Sorin Gaston to establish Device and Heart Care.  Please see link for full device report.  Patient was informed of results and next follow up via mail.     As Mr. Figueroa is >4 weeks from DOI our [facility]'s Partnership Agreement for Device Patients was presented and reviewed with Mr. Figueroa, his wife, Shanell and their daughter, Kalyani  at today's appointment.  The implant incision is clean, dry and well-healed.  There are no signs of infection present.  He is >4 weeks out from implant; therefore, he has no further arm and lifting restrictions.  We did discuss he is to wait a full 6 weeks post implant to have dental work or an MRI if ordered.  Our Device Specialists will enroll Mr. Figueroa in the [employer] Care[employer] web site and order a home monitor for him.  I reviewed with Mr. Figueroa and his family that a [employer] consultant will call them to discuss the set-up of his home monitor.  Contact information for our clinic and [employer] were provided to Mr. Figueroa at today's visit.  He is scheduled for his 3 month post-op check at our [facility] location on 12/11/2019 and will then be scheduled for q3 month remote checks and an annual in-clinic device check.  Patient verbalizes understanding of these instructions and is agreeable to the plan.

## 2021-06-02 VITALS — BODY MASS INDEX: 28.58 KG/M2 | WEIGHT: 193 LBS | HEIGHT: 69 IN

## 2021-06-03 VITALS
BODY MASS INDEX: 27.25 KG/M2 | WEIGHT: 184 LBS | HEIGHT: 69 IN | TEMPERATURE: 97.9 F | OXYGEN SATURATION: 96 % | HEART RATE: 75 BPM | RESPIRATION RATE: 20 BRPM | DIASTOLIC BLOOD PRESSURE: 70 MMHG | SYSTOLIC BLOOD PRESSURE: 122 MMHG

## 2021-06-03 VITALS
BODY MASS INDEX: 27.99 KG/M2 | RESPIRATION RATE: 16 BRPM | WEIGHT: 189 LBS | HEIGHT: 69 IN | DIASTOLIC BLOOD PRESSURE: 60 MMHG | SYSTOLIC BLOOD PRESSURE: 130 MMHG | HEART RATE: 80 BPM

## 2021-06-04 VITALS
BODY MASS INDEX: 27.99 KG/M2 | RESPIRATION RATE: 16 BRPM | HEART RATE: 76 BPM | SYSTOLIC BLOOD PRESSURE: 128 MMHG | DIASTOLIC BLOOD PRESSURE: 72 MMHG | HEIGHT: 69 IN | WEIGHT: 189 LBS

## 2021-06-04 VITALS
HEIGHT: 69 IN | HEART RATE: 89 BPM | OXYGEN SATURATION: 97 % | WEIGHT: 185 LBS | DIASTOLIC BLOOD PRESSURE: 60 MMHG | BODY MASS INDEX: 27.4 KG/M2 | SYSTOLIC BLOOD PRESSURE: 110 MMHG

## 2021-06-04 NOTE — TELEPHONE ENCOUNTER
Refill Approved    Rx renewed per Medication Renewal Policy. Medication was last renewed on 9/12/19.    Valorie Rojas, [facility] Triage/Med Refill 12/12/2019     Requested Prescriptions   Pending Prescriptions Disp Refills     lisinopril (PRINIVIL,ZESTRIL) 10 MG tablet [Pharmacy Med Name: LISINOPRIL TABS 10MG] 90 tablet 4     Sig: TAKE 1 TABLET DAILY       Ace Inhibitors Refill Protocol Passed - 12/11/2019  1:12 AM        Passed - PCP or prescribing provider visit in past 12 months       Last office visit with prescriber/PCP: 9/3/2019 Derek Kumar MD OR same dept: 9/3/2019 Derek Kumar MD OR same specialty: 9/3/2019 Derek Kumar MD  Last physical: 12/14/2018 Last MTM visit: Visit date not found   Next visit within 3 mo: Visit date not found  Next physical within 3 mo: Visit date not found  Prescriber OR PCP: Derek Kumar MD  Last diagnosis associated with med order: 1. Essential hypertension  - lisinopril (PRINIVIL,ZESTRIL) 10 MG tablet [Pharmacy Med Name: LISINOPRIL TABS 10MG]; TAKE 1 TABLET DAILY  Dispense: 90 tablet; Refill: 4    If protocol passes may refill for 12 months if within 3 months of last provider visit (or a total of 15 months).             Passed - Serum Potassium in past 12 months     Lab Results   Component Value Date    Potassium 4.4 12/14/2018             Passed - Blood pressure filed in past 12 months     BP Readings from Last 1 Encounters:   12/11/19 128/72             Passed - Serum Creatinine in past 12 months     Creatinine   Date Value Ref Range Status   12/14/2018 0.84 0.70 - 1.30 mg/dL Final

## 2021-06-04 NOTE — PROGRESS NOTES
Assessment and Plan:       1. Encounter for general adult medical examination with abnormal findings  Routine healthcare maintenance.  Preventative cares reviewed.  Risks associated with hearing loss and occasional urine leakage.  Prior fall secondary to syncopal episode associated with sick sinus syndrome reviewed without recurrent concerns following prior pacemaker placement.  Immunizations reviewed and up-to-date.  Annual wellness visits to continue.    2. Rosacea  History of acne rosacea.  Continues doxycycline 100 mg daily and metronidazole topically daily as directed.  - doxycycline (VIBRA-TABS) 100 MG tablet; TAKE 1 TABLET BY MOUTH DAILY  Dispense: 90 tablet; Refill: 3  - metroNIDAZOLE (METROGEL) 1 % gel; APPLY A SMALL AMOUNT TOPICALLY TO AFFECTED AREA(S) ONCE DAILY  Dispense: 60 g; Refill: 5    3. Essential hypertension  Hypertension stable on lisinopril 10 mg daily.  Continues aspirin 81 mg daily.  Base metabolic panel obtained for med monitoring as well as lipid cascade for cardiovascular risk assessment.  - lisinopril (PRINIVIL,ZESTRIL) 10 MG tablet; TAKE 1 TABLET BY MOUTH DAILY  Dispense: 90 tablet; Refill: 3  - Basic Metabolic Panel  - Lipid Cascade    4. BPH (benign prostatic hyperplasia)  BPH with urinary obstruction improved with tamsulosin 0.4 mg daily with nocturia 1-2 times per night described.  - tamsulosin (FLOMAX) 0.4 mg cap; Take 1 capsule (0.4 mg total) by mouth at bedtime.  Dispense: 90 capsule; Refill: 3    5. Male Erectile Disorder  Refill provided on Levitra for as needed use.  - vardenafil (LEVITRA) 20 MG tablet; TAKE 1 TABLET DAILY AS NEEDED FOR ERECTILE DYSFUNCTION  Dispense: 10 tablet; Refill: 5    6. Squamous Cell Carcinoma Of The Skin  Schedule Mohs procedure right cheek for squamous cell carcinoma described.  Procedure January 27, 2020 scheduled.    7. History of cardiac pacemaker  Status post pacemaker placement secondary to syncopal episode associated with described sick sinus  syndrome.  Pacemaker placed August 28, 2019.    8. Left upper lobe pulmonary nodule  Left upper lobe pulmonary nodule on prior x-ray at [facility] in [facility] August 2019 with recommendation for 3-month follow-up.  Will have radiologist for review.  - XR Chest 2 Views    9. Serology Prostate-specific Antigen (PSA) Elevated  History of PSA elevation noted.  Is scheduled to follow-up with Dr. Colindres June 2020 and needs PSA prior to this.  Check diagnostic PSA today as well.  - PSA, Diagnostic (Prostatic-Specific Antigen)    10. Bilateral hearing loss, unspecified hearing loss type  Bilateral hearing loss stable.    11. Overweight (BMI 25.0-29.9)  Overweight status with weight goal less than 180 pounds initially, less than 170 pounds ideally.    12. Impaired fasting glucose  Impaired fasting glucose history.  Check A1c and fasting glucose today.  Therapeutic lifestyle changes reviewed as noted above.  - Glycosylated Hemoglobin A1c        The patient's current medical problems were reviewed.    I have had an Advance Directives discussion with the patient.  The following high BMI interventions were performed this visit: encouragement to exercise, weight monitoring, weight loss from baseline weight and lifestyle education regarding diet  The following health maintenance schedule was reviewed with the patient and provided in printed form in the after visit summary:   Health Maintenance   Topic Date Due     ZOSTER VACCINES (2 of 3) 12/27/2008     MEDICARE ANNUAL WELLNESS VISIT  12/14/2019     TD 18+ HE  12/15/2020     COLONOSCOPY  01/17/2020 (Originally 6/12/2018)     FALL RISK ASSESSMENT  12/17/2020     ADVANCE CARE PLANNING  12/14/2023     PNEUMOCOCCAL IMMUNIZATION 65+ LOW/MEDIUM RISK  Completed     INFLUENZA VACCINE RULE BASED  Completed        Subjective:     Chief Complaint: Mikaela Figueroa is an 84 y.o. male here for an Annual Wellness visit.     HPI: In general doing well.  No further  "concerns since prior syncopal episode leading to hospital admission.  Recent admission August 26, 2019 through August 29, 2019 through [facility] in [facility].  Noted fainting spell Sunday, August 25, 2019.  Unclear etiology.  No presyncopal symptoms described.  Travel to [place] the following day and was in a glider rocker chair when he \"flopped over\".  911 was called.  Taken to hospital for further evaluation.  Work-up appeared consistent with sick sinus syndrome with bradycardia.  Pacemaker placed August 28, 2019.  No complications.  Hospital discharge August 29, 2019.  Noted left upper lobe nodule with recommendation for repeat chest x-ray in 3 months.  No cough.  No shortness of breath.  No hemoptysis.  No orthopnea or PND.  Noted squamous cell carcinoma described right cheek with scheduled Mohs procedure January 27, 2020 with dermatologist.  Follows up with Dr. Hernandez June 2020 and continues tamsulosin 0.4 mg daily for BPH management as well as history of PSA elevation.  Doxycycline and MetroGel for acne rosacea.  Levitra as needed for male erectile disorder.    Review of Systems:  Please see above.  The rest of the review of systems are negative for all systems.    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 Quick, [facility])   TRUS with bx 10/7/11 biopsies negative (followed by Dr. Colindres)   Eye exam with Dr. Duarte in this building (monitoring for +/- glaucoma) - followed q year   Dr. Colindres, [facility] every year for elevated PSA, etc. (h/o TRUS with bx negative)  Dr. Beavers, dermatology for q 6 month checks re: " "\"precancerous\" lesions and rosacea; Mohs procedure left preauricular location 3/17/16  Bilateral hearing aids with h/o hearing loss (began using March, 2015)  Pacemaker 8/28/19 (PACEMAKER MOI XT DR COLETTE PERRY)  Attends [employer] in [place], [place]    Patient Care Team:  Derek Kumar MD as PCP - General  Derek Kumar MD as Assigned PCP     Patient Active Problem List   Diagnosis     Male Erectile Disorder     Rosacea     Diverticulosis     BPH with urinary obstruction     Nephrolithiasis     Hypercholesterolemia     Hypertension     Impaired Fasting Glucose     Serology Prostate-specific Antigen (PSA) Elevated     Squamous Cell Carcinoma Of The Skin     Hearing loss     Glaucoma     Right inguinal hernia     Essential hypertension with goal blood pressure less than 130/80     Open-angle glaucoma of both eyes, mild stage, unspecified open-angle glaucoma type     Left upper lobe pulmonary nodule     Cardiac pacemaker in situ, dual chamber     Past Medical History:   Diagnosis Date     Hypertension       Past Surgical History:   Procedure Laterality Date     KNEE ARTHROSCOPY Right       Family History   Problem Relation Age of Onset     Dementia Mother      Heart disease Father      Diabetes Father       Social History     Socioeconomic History     Marital status:      Spouse name: Not on file     Number of children: Not on file     Years of education: Not on file     Highest education level: Not on file   Occupational History     Not on file   Social Needs     Financial resource strain: Not on file     Food insecurity:     Worry: Not on file     Inability: Not on file     Transportation needs:     Medical: Not on file     Non-medical: Not on file   Tobacco Use     Smoking status: Former Smoker     Smokeless tobacco: Never Used   Substance and Sexual Activity     Alcohol use: No     Drug use: No     Sexual activity: Not on file   Lifestyle     Physical activity:     Days per " "week: Not on file     Minutes per session: Not on file     Stress: Not on file   Relationships     Social connections:     Talks on phone: Not on file     Gets together: Not on file     Attends Yarsanism service: Not on file     Active member of club or organization: Not on file     Attends meetings of clubs or organizations: Not on file     Relationship status: Not on file     Intimate partner violence:     Fear of current or ex partner: Not on file     Emotionally abused: Not on file     Physically abused: Not on file     Forced sexual activity: Not on file   Other Topics Concern     Not on file   Social History Narrative     Not on file      Current Outpatient Medications   Medication Sig Dispense Refill     aspirin 81 mg chewable tablet Chew 81 mg daily.       doxycycline (VIBRA-TABS) 100 MG tablet TAKE 1 TABLET BY MOUTH DAILY 90 tablet 3     GLUCOSAMINE/CHONDROITIN SULF A (GLUCOSAMINE-CHONDROITIN ORAL) 1500 complex capsules       latanoprost (XALATAN) 0.005 % ophthalmic solution 1 drop bedtime.       lisinopril (PRINIVIL,ZESTRIL) 10 MG tablet TAKE 1 TABLET BY MOUTH DAILY 90 tablet 3     metroNIDAZOLE (METROGEL) 1 % gel APPLY A SMALL AMOUNT TOPICALLY TO AFFECTED AREA(S) ONCE DAILY 60 g 5     MULTIVITAMIN ORAL        tamsulosin (FLOMAX) 0.4 mg cap Take 1 capsule (0.4 mg total) by mouth at bedtime. 90 capsule 3     vardenafil (LEVITRA) 20 MG tablet TAKE 1 TABLET DAILY AS NEEDED FOR ERECTILE DYSFUNCTION 10 tablet 5     No current facility-administered medications for this visit.       Objective:   Vital Signs:   Visit Vitals  /60   Pulse 89   Ht 5' 8.75\" (1.746 m)   Wt 185 lb (83.9 kg)   SpO2 97%   BMI 27.52 kg/m         VisionScreening:  No exam data present     PHYSICAL EXAM    General Appearance:    Alert, cooperative, no distress, appears stated age.  Overweight.   Head:    Normocephalic, without obvious abnormality, atraumatic   Eyes:    PERRL, conjunctiva/corneas clear, EOM's intact, fundi     benign, " both eyes.  Glasses.        Ears:    Normal TM's and external ear canals, both ears   Nose:   Nares normal, septum midline, mucosa normal, no drainage    or sinus tenderness   Throat:   Lips, mucosa, and tongue normal; teeth and gums normal   Neck:   Supple, symmetrical, trachea midline, no adenopathy;        thyroid:  No enlargement/tenderness/nodules; no carotid    bruit or JVD   Back:     Symmetric, no curvature, ROM normal, no CVA tenderness   Lungs:     Clear to auscultation bilaterally, respirations unlabored   Chest wall:    No tenderness or deformity   Heart:    Regular rate and rhythm, S1 and S2 normal, no murmur, rub   or gallop   Abdomen:     Soft, non-tender, bowel sounds active all four quadrants,     no masses, no organomegaly.     Genitalia:    Normal male without lesion, discharge or tenderness.  No inguinal hernia noted.     Rectal:    Normal tone.  Prostate enlarged/symmetric, no masses or tenderness.   Extremities:   Extremities normal, atraumatic, no cyanosis or edema   Pulses:   2+ and symmetric all extremities   Skin:   Skin color, texture, turgor normal, no rashes.  Right cheek lesion measuring approximately 0.75 cm diameter with central ulceration.   Lymph nodes:   Cervical, supraclavicular, and axillary nodes normal   Neurologic:   CNII-XII intact. Normal strength, sensation and reflexes       throughout        Assessment Results 12/17/2019   Activities of Daily Living No help needed   Instrumental Activities of Daily Living No help needed   Get Up and Go Score Less than 12 seconds   Mini Cog Total Score 5   Some recent data might be hidden     A Mini-Cog score of 0-2 suggests the possibility of dementia, score of 3-5 suggests no dementia    Identified Health Risks:     The patient was provided with written information regarding signs of hearing loss.  Information on urinary incontinence and treatment options given to patient.  He is at risk for falling and has been provided with information  to reduce the risk of falling at home.  Patient's advanced directive was discussed and I am comfortable with the patient's wishes.

## 2021-06-05 VITALS
OXYGEN SATURATION: 96 % | SYSTOLIC BLOOD PRESSURE: 110 MMHG | BODY MASS INDEX: 27.7 KG/M2 | HEART RATE: 73 BPM | DIASTOLIC BLOOD PRESSURE: 60 MMHG | HEIGHT: 69 IN | WEIGHT: 187 LBS

## 2021-06-07 ENCOUNTER — COMMUNICATION - HEALTHEAST (OUTPATIENT)
Dept: FAMILY MEDICINE | Facility: CLINIC | Age: 86
End: 2021-06-07

## 2021-06-07 DIAGNOSIS — F52.8 PSYCHOSEXUAL DYSFUNCTION WITH INHIBITED SEXUAL EXCITEMENT: ICD-10-CM

## 2021-06-08 NOTE — TELEPHONE ENCOUNTER
Patient dropped off a note to dr barakat to look at on changing his rx to sildenafil that is covered by his insurance.

## 2021-06-13 ENCOUNTER — HOSPITAL ENCOUNTER (EMERGENCY)
Dept: EMERGENCY MEDICINE | Facility: CLINIC | Age: 86
Discharge: HOME | End: 2021-06-13
Payer: MEDICARE

## 2021-06-13 DIAGNOSIS — R04.0 EPISTAXIS: ICD-10-CM

## 2021-06-13 LAB
APTT PPP: 37 SECONDS (ref 24–37)
ERYTHROCYTE [DISTWIDTH] IN BLOOD BY AUTOMATED COUNT: 12.6 % (ref 11–14.5)
HCT VFR BLD AUTO: 39.2 % (ref 40–54)
HGB BLD-MCNC: 13.1 G/DL (ref 14–18)
INR PPP: 1.22 (ref 0.9–1.1)
MCH RBC QN AUTO: 31 PG (ref 27–34)
MCHC RBC AUTO-ENTMCNC: 33.4 G/DL (ref 32–36)
MCV RBC AUTO: 93 FL (ref 80–100)
PLATELET # BLD AUTO: 165 THOU/UL (ref 140–440)
PMV BLD AUTO: 10.2 FL (ref 8.5–12.5)
RBC # BLD AUTO: 4.23 MILL/UL (ref 4.4–6.2)
WBC: 8.3 THOU/UL (ref 4–11)

## 2021-06-13 NOTE — PROGRESS NOTES
Assessment and Plan:     Patient has been advised of split billing requirements and indicates understanding: No     1. Encounter for general adult medical examination with abnormal findings  Please see labs below. I would recommend that the patient follow up in 1 year for next well adult visit.   -Immunizations: The patient's influenza vaccination is up to date. He states that he would be interested in the COVID-19 vaccination when available.  He will have his TD 18+ updated today.     2. Hypertension  The patient's blood pressure is well-controlled on 10 mg lisinopril every day.   -Will check a BMP     3. Cardiac pacemaker in situ, dual chamber  The patient states that he had a syncopal episode (without prodromal symptoms) once in 2015 and again 08/2019 during which time he was seen by cardiology, diagnosed with heart arrhythmia, and had a pacemaker placement (monitored cardiac activity every 3 months).     4. Hypercholesterolemia  Stable. The patient maintains a healthy diet and regular exercise.   -Will check an FLP    5. Impaired fasting glucose  Stable. Last A1C was 5.9 12/17/2019.  Therapeutic lifestyle changes for weight goal remaining less than 180 pounds initially, less than 170 pounds ideally.  -Will check an A1C today    6. Serology Prostate-specific Antigen (PSA) Elevated  Monitoring elevated PSA. This has remained stable. He is followed by Urologist Dr. Colindres as an outpatient.  Scheduled follow-up Saniya 3, 2021 and will have diagnostic PSA completed May 27, 2021  -Will check a PSA today    7. BPH with urinary obstruction  On Tamsulosin. Denies any urinary complaints. Followed by Urologist Dr. Colindres as an outpatient. States he's up once per night with nocturia.     8. Rosacea  On doxycycline.     9. Male Erectile Disorder  On sildenafil.     10. Glaucoma (Open-angle glaucoma of both eyes, mild stage, unspecified open-angle glaucoma type)  He is seen regularly by ophthalmology who manages  medications.     11. Squamous Cell Carcinoma Of The Skin  Status-post MOHS procedure. He is seen by dermatology twice each year.     12. Diverticulosis  Stable    14. Right inguinal hernia  Stable, asymptomatic, reducible.     15. Left upper lobe pulmonary nodule  See XR below. No concerns. The patient denies cough, hemoptysis, shortness of breath, or other associated symptoms.     The patient's current medical problems were reviewed.    I have had an Advance Directives discussion with the patient.  Ensure ongoing efforts to achieve weight goal < 180 pounds initially, < 170 pounds ideally.     The following health maintenance schedule was reviewed with the patient and provided in printed form in the after visit summary:   Health Maintenance   Topic Date Due     ZOSTER VACCINES (2 of 3) 12/27/2008     MEDICARE ANNUAL WELLNESS VISIT  12/17/2020     FALL RISK ASSESSMENT  12/22/2021     COLORECTAL CANCER SCREENING  06/12/2023     ADVANCE CARE PLANNING  12/22/2025     TD 18+ HE  12/22/2030     Pneumococcal Vaccine: Pediatrics (0 to 5 Years) and At-Risk Patients (6 to 64 Years)  Completed     Pneumococcal Vaccine: 65+ Years  Completed     INFLUENZA VACCINE RULE BASED  Completed     HEPATITIS B VACCINES  Aged Out        Subjective:     Chief Complaint: Mikaela Figueroa is an 85 y.o. male here for an Annual Wellness visit.     HPI:  The patient is doing well overall. He has no health concerns today. He maintains a healthy lifestyle including healthy diet and regular exercise. He denies cough, shortness of breath, hemoptysis, or other new or concerning symptoms. He states that he has otherwise been in his usual state of good health.  Continues doxycycline and metronidazole gel for rosacea management.  Most recent Mohs procedure right facial region.  With prior left facial Mohs procedure as well with history of squamous cell carcinoma followed by dermatologist on a consistent basis.  Nocturia perhaps once per night continues  "tamsulosin 0.4 mg daily.  Will follow up with Dr. Colindres Saniya 3, 2020 with history of PSA elevation which is remained stable around 7 or 8.  Continues ophthalmic drops for glaucoma management.  Denies recent illness.  Needs tetanus booster.  Has received Zostavax in the past but not Shingrix immunization series.    Review of Systems:  Please see above.  The rest of the review of systems are negative for all systems.    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 Quick, [facility])   TRUS with bx 10/7/11 biopsies negative (followed by Dr. Colindres)   Eye exam with Dr. Duarte in this building (monitoring for +/- glaucoma) - followed q year   Dr. Colindres, [facility] every year for elevated PSA, etc. (h/o TRUS with bx negative)  Dr. Beavers, dermatology for q 6 month checks re: \"precancerous\" lesions and rosacea; Mohs procedure left preauricular location 3/17/16  Bilateral hearing aids with h/o hearing loss (began using March, 2015)  Pacemaker 8/28/19 (PACEMAKER MOI XT DR COLETTE PERRY)  Attends [employer] in [place], [place]    Patient Care Team:  Derek Kumar MD as PCP - General  Derek Kumar MD as Assigned PCP  Sorin Gaston MD as Assigned Heart and Vascular Provider     Patient Active Problem List   Diagnosis     Male Erectile Disorder     Rosacea     Diverticulosis     BPH with urinary obstruction     Nephrolithiasis     Hypercholesterolemia     Hypertension     Impaired Fasting Glucose     Serology Prostate-specific Antigen (PSA) Elevated     Squamous Cell Carcinoma Of The Skin     Hearing loss     Glaucoma     Right inguinal hernia     " Essential hypertension with goal blood pressure less than 130/80     Open-angle glaucoma of both eyes, mild stage, unspecified open-angle glaucoma type     Left upper lobe pulmonary nodule     Cardiac pacemaker in situ, dual chamber     Syncope, unspecified syncope type     Past Medical History:   Diagnosis Date     Hypertension       Past Surgical History:   Procedure Laterality Date     KNEE ARTHROSCOPY Right       Family History   Problem Relation Age of Onset     Dementia Mother      Heart disease Father      Diabetes Father       Social History     Socioeconomic History     Marital status:      Spouse name: Not on file     Number of children: Not on file     Years of education: Not on file     Highest education level: Not on file   Occupational History     Not on file   Social Needs     Financial resource strain: Not on file     Food insecurity     Worry: Not on file     Inability: Not on file     Transportation needs     Medical: Not on file     Non-medical: Not on file   Tobacco Use     Smoking status: Former Smoker     Smokeless tobacco: Never Used   Substance and Sexual Activity     Alcohol use: No     Drug use: No     Sexual activity: Not on file   Lifestyle     Physical activity     Days per week: Not on file     Minutes per session: Not on file     Stress: Not on file   Relationships     Social connections     Talks on phone: Not on file     Gets together: Not on file     Attends Caodaism service: Not on file     Active member of club or organization: Not on file     Attends meetings of clubs or organizations: Not on file     Relationship status: Not on file     Intimate partner violence     Fear of current or ex partner: Not on file     Emotionally abused: Not on file     Physically abused: Not on file     Forced sexual activity: Not on file   Other Topics Concern     Not on file   Social History Narrative     Not on file      Current Outpatient Medications   Medication Sig Dispense Refill      "aspirin 81 mg chewable tablet Chew 81 mg daily.       dorzolamide (TRUSOPT) 2 % ophthalmic solution 1 drop 3 (three) times a day.       doxycycline (VIBRA-TABS) 100 MG tablet TAKE 1 TABLET BY MOUTH DAILY 90 tablet 3     GLUCOSAMINE/CHONDROITIN SULF A (GLUCOSAMINE-CHONDROITIN ORAL) 1500 complex capsules       latanoprost (XALATAN) 0.005 % ophthalmic solution 1 drop bedtime.       lisinopril (PRINIVIL,ZESTRIL) 10 MG tablet TAKE 1 TABLET BY MOUTH DAILY 90 tablet 3     metroNIDAZOLE (METROGEL) 1 % gel APPLY A SMALL AMOUNT TOPICALLY TO AFFECTED AREA(S) ONCE DAILY 60 g 5     MULTIVITAMIN ORAL        sildenafil (REVATIO) 20 mg tablet Take 2-3 tablets (40-60 mg total) by mouth daily as needed. 30 tablet 5     tamsulosin (FLOMAX) 0.4 mg cap Take 1 capsule (0.4 mg total) by mouth at bedtime. 90 capsule 0     No current facility-administered medications for this visit.       Objective:   Vital Signs:   Visit Vitals  /60   Pulse 73   Ht 5' 8.75\" (1.746 m)   Wt 187 lb (84.8 kg)   SpO2 96%   BMI 27.82 kg/m           VisionScreening:  No exam data present     PHYSICAL EXAM    General Appearance:    Alert, cooperative, no distress, appears stated age.  BMI = 27.82.   Head:    Normocephalic, without obvious abnormality, atraumatic   Eyes:    PERRL, conjunctiva/corneas clear, EOM's intact, fundi     benign, both eyes.  Glasses.       Ears:    Normal TM's and external ear canals, both ears   Nose:   Nares normal, septum midline, mucosa normal, no drainage    or sinus tenderness   Throat:   Lips, mucosa, and tongue normal; teeth and gums normal   Neck:   Supple, symmetrical, trachea midline, no adenopathy;        thyroid:  No enlargement/tenderness/nodules; no carotid    bruit or JVD   Back:     Symmetric, no curvature, ROM normal, no CVA tenderness   Lungs:     Clear to auscultation bilaterally, respirations unlabored   Chest wall:    No tenderness or deformity   Heart:    Regular rate and rhythm, S1 and S2 normal, no murmur, " rub   or gallop. Pacemaker noted.    Abdomen:     Soft, non-tender, bowel sounds active all four quadrants,     no masses, no organomegaly.     Genitalia:    Normal male without lesion, discharge or tenderness.  Reducible small right inguinal hernia.    Rectal:    Normal tone.  Prostate enlarged and relatively symmetric, no masses or tenderness.   Extremities:   Extremities normal, atraumatic, no cyanosis or edema   Pulses:   2+ and symmetric all extremities   Skin:   Skin color, texture, turgor normal, no rashes or lesions   Lymph nodes:   Cervical, supraclavicular, and axillary nodes normal   Neurologic:   CNII-XII intact. Normal strength, sensation and reflexes       throughout         Assessment Results 12/22/2020   Activities of Daily Living No help needed   Instrumental Activities of Daily Living No help needed   Get Up and Go Score Less than 12 seconds   Mini Cog Total Score 5   Some recent data might be hidden     A Mini-Cog score of 0-2 suggests the possibility of dementia, score of 3-5 suggests no dementia    Identified Health Risks:       EXAM: XR CHEST 2 VIEWS  LOCATION: [facility]  DATE/TIME: 12/17/2019 8:54 AM     INDICATION: Solitary pulmonary nodule  COMPARISON: None.     IMPRESSION:   Mild fibrosis in both lung bases. There is a small dense nodule projected over the anterior left second rib, likely a granuloma. Dual-lead cardiac pacemaker.      The patient was provided with written information regarding signs of hearing loss.  Information on urinary incontinence and treatment options given to patient.  Patient's advanced directive was discussed and I am comfortable with the patient's wishes.

## 2021-06-13 NOTE — TELEPHONE ENCOUNTER
Refill Approved    Rx renewed per Medication Renewal Policy. Medication was last renewed on 12/7/20, last OV 12/17/19.    Sandra Azar, [facility] Triage/Med Refill 12/13/2020     Requested Prescriptions   Pending Prescriptions Disp Refills     tamsulosin (FLOMAX) 0.4 mg cap [Pharmacy Med Name: TAMSULOSIN HCL CAPS 0.4MG] 90 capsule 3     Sig: TAKE 1 CAPSULE AT BEDTIME       Alfuzosin/Tamsulosin/Silodosin Refill Protocol  Passed - 12/11/2020  1:48 AM        Passed - PCP or prescribing provider visit in past 12 months       Last office visit with prescriber/PCP: 9/3/2019 Derek Kumar MD OR same dept: Visit date not found OR same specialty: 9/3/2019 Derek Kumar MD  Last physical: 12/17/2019 Last MTM visit: Visit date not found   Next visit within 3 mo: Visit date not found  Next physical within 3 mo: Visit date not found  Prescriber OR PCP: Derek Kumar MD  Last diagnosis associated with med order: 1. BPH (benign prostatic hyperplasia)  - tamsulosin (FLOMAX) 0.4 mg cap [Pharmacy Med Name: TAMSULOSIN HCL CAPS 0.4MG]; TAKE 1 CAPSULE AT BEDTIME  Dispense: 90 capsule; Refill: 3    If protocol passes may refill for 12 months if within 3 months of last provider visit (or a total of 15 months).

## 2021-06-13 NOTE — TELEPHONE ENCOUNTER
Refill Approved    Rx renewed per Medication Renewal Policy. Medication was last renewed on 12/17/19.    Valorie Rojas, [facility] Triage/Med Refill 12/7/2020     Requested Prescriptions   Pending Prescriptions Disp Refills     tamsulosin (FLOMAX) 0.4 mg cap [Pharmacy Med Name: TAMSULOSIN HCL CAPS 0.4MG] 90 capsule 3     Sig: TAKE 1 CAPSULE AT BEDTIME       Alfuzosin/Tamsulosin/Silodosin Refill Protocol  Passed - 12/5/2020  1:52 PM        Passed - PCP or prescribing provider visit in past 12 months       Last office visit with prescriber/PCP: 9/3/2019 Derek Kumar MD OR same dept: Visit date not found OR same specialty: 9/3/2019 Derek Kumar MD  Last physical: 12/17/2019 Last MTM visit: Visit date not found   Next visit within 3 mo: Visit date not found  Next physical within 3 mo: Visit date not found  Prescriber OR PCP: Derek Kumar MD  Last diagnosis associated with med order: 1. BPH (benign prostatic hyperplasia)  - tamsulosin (FLOMAX) 0.4 mg cap [Pharmacy Med Name: TAMSULOSIN HCL CAPS 0.4MG]; TAKE 1 CAPSULE AT BEDTIME  Dispense: 90 capsule; Refill: 3    If protocol passes may refill for 12 months if within 3 months of last provider visit (or a total of 15 months).

## 2021-06-14 ENCOUNTER — COMMUNICATION - HEALTHEAST (OUTPATIENT)
Dept: ADMINISTRATIVE | Facility: CLINIC | Age: 86
End: 2021-06-14

## 2021-06-14 ENCOUNTER — AMBULATORY - HEALTHEAST (OUTPATIENT)
Dept: CARDIOLOGY | Facility: CLINIC | Age: 86
End: 2021-06-14

## 2021-06-14 DIAGNOSIS — Z95.0 CARDIAC PACEMAKER IN SITU: ICD-10-CM

## 2021-06-14 DIAGNOSIS — R55 SYNCOPE, UNSPECIFIED SYNCOPE TYPE: ICD-10-CM

## 2021-06-14 NOTE — PROGRESS NOTES
Assessment and Plan:     1. Encounter for general adult medical examination with abnormal findings  Annual wellness visit completed.  Immunizations reviewed and up-to-date.  Prior colonoscopy June 12, 2013 noted.  Risks associated with hearing loss for which patient uses bilateral hearing aids.    2. Preventative health care  As above.    3. Essential hypertension with goal blood pressure less than 130/80  Continue lisinopril 10 mg daily for hypertension management.  Check basic metabolic panel for medication monitoring.  - Basic Metabolic Panel    4. Hypercholesterolemia  Check lipid cascade today with history of hypercholesterolemia, diet controlled.  - Lipid [place]als    5. BPH with urinary obstruction  Continue tamsulosin 0.4 mg daily for BPH management.  Follow with Dr. Hernandez as noted.    6. Impaired fasting glucose  Check fasting glucose and A1c today regarding impaired fasting glucose.  Weight goal remains less than 190 pounds initially, less than 185 pounds ideally.  - Basic Metabolic Panel  - Glycosylated Hemoglobin A1c    7. Male Erectile Disorder  Continued use of Levitra as directed as needed.    8. Serology Prostate-specific Antigen (PSA) Elevated  Repeat diagnostic PSA.  Follow-up with Dr. Hernandez as scheduled with [facility] urology.  - PSA, Diagnostic (Prostatic-Specific Antigen)    9. Bilateral hearing loss, unspecified hearing loss type  Continued use of bilateral hearing aids noted.    10. Rosacea  Continued use of doxycycline and Finacea for rosacea management.    11. Right inguinal hernia  Small reducible right inguinal hernia, asymptomatic.  Will continue to monitor.    12. Squamous Cell Carcinoma Of The Skin  Follow with Dr. de la torre, dermatologist, with history of squamous cell carcinoma of skin.    13. Open-angle glaucoma of both eyes, mild stage, unspecified open-angle glaucoma type  Continue to follow with Dr. Fernandes, optometrist, with history of bilateral glaucoma, mild with ongoing use of  "Xalatan 0.005% ophthalmic solution 1 drop both eyes at bedtime.     The patient's current medical problems were reviewed.    I have had an Advance Directives discussion with the patient.  The following high BMI interventions were performed this visit: encouragement to exercise, weight monitoring, weight loss from baseline weight and lifestyle education regarding diet     The following health maintenance schedule was reviewed with the patient and provided in printed form in the after visit summary:   Health Maintenance   Topic Date Due     COLONOSCOPY  06/12/2018     FALL RISK ASSESSMENT  11/28/2018     TD 18+ HE  12/15/2020     ADVANCE DIRECTIVES DISCUSSED WITH PATIENT  05/27/2021     PNEUMOCOCCAL POLYSACCHARIDE VACCINE AGE 65 AND OVER  Completed     INFLUENZA VACCINE RULE BASED  Completed     PNEUMOCOCCAL CONJUGATE VACCINE FOR ADULTS (PCV13 OR PREVNAR)  Completed     ZOSTER VACCINE  Completed        Subjective:     Chief Complaint: Mikaela Figueroa is an 82 y.o. male here for an Annual Wellness visit.     HPI: Mr. Zhao is an 82-year-old gentleman seen today for annual wellness visit.  Doing well and feels good for his age relative to classmates from childhood etc.  Continues lisinopril 10 mg daily for hypertension.  Tamsulosin 0.4 mg daily for BPH with urinary obstruction with history of elevated PSA most recently 7.7 on May 2, 2017 followed by Dr. Hernandez with prior prostate biopsy described.  Small right inguinal hernia has remained asymptomatic.  History of mild cholesterol elevation only.  Impaired fasting glucose noted with prior A1c of 5.6% May 27, 2016.  No chest pain or shortness of breath.  He uses bilateral hearing aids.  Eyedrops for glaucoma management.  Continue rosacea management.  Comprehensive review of systems as above otherwise all negative.    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 Quick, . [facility])   TRUS with bx 10/7/11 biopsies negative (followed by Dr. Colindres)   Eye exam with Dr. Duarte in this building (monitoring for +/- glaucoma) - followed q year   Dr. Colindres, [facility] every year for elevated PSA, etc. (h/o TRUS with bx negative)  Dr. Beavers, dermatology for q 6 month checks re: \"precancerous\" lesions and rosacea  Bilateral hearing aids with h/o hearing loss (began using March, 2015); Mohs procedure left preauricular location 3/17/16  Attends [employer] in [place], [place]    Review of Systems:  Please see above.  The rest of the review of systems are negative for all systems.    Patient Care Team:  Derek Kumar MD as PCP - General     Patient Active Problem List   Diagnosis     Male Erectile Disorder     Rosacea     Diverticulosis     BPH with urinary obstruction     Nephrolithiasis     Hypercholesterolemia     Hypertension     Impaired Fasting Glucose     Serology Prostate-specific Antigen (PSA) Elevated     Squamous Cell Carcinoma Of The Skin     Hearing loss     Glaucoma     Right inguinal hernia     Essential hypertension with goal blood pressure less than 130/80     Open-angle glaucoma of both eyes, mild stage, unspecified open-angle glaucoma type     Past Medical History:   Diagnosis Date     Hypertension       Past Surgical History:   Procedure Laterality Date     KNEE ARTHROSCOPY Right       Family History   Problem Relation Age of Onset     Dementia Mother      Heart disease Father      Diabetes Father       Social History     Social History     Marital status:      Spouse name: N/A     Number of children: N/A     Years of education: N/A     Occupational History     Not on file.     Social History Main Topics     " "Smoking status: Former Smoker     Smokeless tobacco: Never Used     Alcohol use No     Drug use: No     Sexual activity: Not on file     Other Topics Concern     Not on file     Social History Narrative      Current Outpatient Prescriptions   Medication Sig Dispense Refill     aspirin 81 mg chewable tablet Chew 81 mg daily.       azelaic acid (FINACEA) 15 % gel Apply sparingly and massage in well to affected areas 50 g 5     doxycycline (VIBRA-TABS) 100 MG tablet TAKE 1 TABLET DAILY 90 tablet 1     FINACEA 15 % gel APPLY SPARINGLY AND MASSAGE IN WELL TO AFFECTED AREAS 50 g 0     GLUCOSAMINE/CHONDROITIN SULF A (GLUCOSAMINE-CHONDROITIN ORAL) 1500 complex capsules       latanoprost (XALATAN) 0.005 % ophthalmic solution 1 drop bedtime.       LEVITRA 20 mg tablet TAKE 1 TABLET DAILY AS NEEDED FOR ERECTILE DYSFUNCTION 10 tablet 2     lisinopril (PRINIVIL,ZESTRIL) 10 MG tablet TAKE 1 TABLET DAILY 90 tablet 2     MULTIVITAMIN ORAL        tamsulosin (FLOMAX) 0.4 mg Cp24 TAKE 1 CAPSULE AT BEDTIME 90 capsule 0     No current facility-administered medications for this visit.       Objective:   Vital Signs:   Visit Vitals     /70     Pulse 60     Ht 5' 9\" (1.753 m)     Wt 199 lb (90.3 kg)     BMI 29.39 kg/m2        VisionScreening:  No exam data present     PHYSICAL EXAM    General Appearance:    Alert, cooperative, no distress, appears stated age.  Overweight.   Head:    Normocephalic, without obvious abnormality, atraumatic   Eyes:    PERRL, conjunctiva/corneas clear, EOM's intact, fundi     benign, both eyes.  H/O glaucoma        Ears:    Normal TM's and external ear canals, both ears.  Bilateral hearing aids used.   Nose:   Nares normal, septum midline, mucosa normal, no drainage    or sinus tenderness   Throat:   Lips, mucosa, and tongue normal; teeth and gums normal   Neck:   Supple, symmetrical, trachea midline, no adenopathy;        thyroid:  No enlargement/tenderness/nodules; no carotid    bruit or JVD   Back:     " Symmetric, no curvature, ROM normal, no CVA tenderness   Lungs:     Clear to auscultation bilaterally, respirations unlabored   Chest wall:    No tenderness or deformity   Heart:    Regular rate and rhythm, S1 and S2 normal, no murmur, rub   or gallop   Abdomen:     Soft, non-tender, bowel sounds active all four quadrants,     no masses, no organomegaly.  Diastasis recti.   Genitalia:    Normal male without lesion, discharge or tenderness.  Small right inguinal hernia noted.     Rectal:    Normal tone.  Prostate enlarged/symmetric, no masses or tenderness.   Extremities:   Extremities normal, atraumatic, no cyanosis or edema   Pulses:   2+ and symmetric all extremities   Skin:   Skin color, texture, turgor normal, no rashes or lesions   Lymph nodes:   Cervical, supraclavicular, and axillary nodes normal   Neurologic:   CNII-XII intact. Normal strength, sensation and reflexes       throughout        Assessment Results 11/28/2017   Activities of Daily Living No help needed   Instrumental Activities of Daily Living No help needed   Get Up and Go Score Less than 12 seconds   Mini Cog Total Score 5   Some recent data might be hidden     A Mini-Cog score of 0-2 suggests the possibility of dementia, score of 3-5 suggests no dementia    Identified Health Risks:     The patient was provided with written information regarding signs of hearing loss.  Patient's advanced directive was discussed and I am comfortable with the patient's wishes.

## 2021-06-14 NOTE — TELEPHONE ENCOUNTER
Refill Approved    Rx renewed per Medication Renewal Policy. Medication was last renewed on 12/17/19, last OV 12/22/20.    Sandra Azar, [facility] Triage/Med Refill 1/23/2021     Requested Prescriptions   Pending Prescriptions Disp Refills     metroNIDAZOLE (METROGEL) 1 % gel [Pharmacy Med Name: METRONIDAZOLE GEL 60GM 1%] 60 g 11     Sig: APPLY A SMALL AMOUNT TO AFFECTED AREA(S) TOPICALLY ONCE DAILY       Topical Dermatology Medications Refill Protocol Passed - 1/22/2021  6:33 PM        Passed - Patient has had office visit/physical in last 1 year     Last office visit with prescriber/PCP: 9/3/2019 Derek Kumar MD OR same dept: Visit date not found OR same specialty: 9/3/2019 Derek Kumar MD  Last physical: 12/22/2020 Last MTM visit: Visit date not found   Next visit within 3 mo: Visit date not found  Next physical within 3 mo: Visit date not found  Prescriber OR PCP: Derek Kumar MD  Last diagnosis associated with med order: 1. Rosacea  - metroNIDAZOLE (METROGEL) 1 % gel [Pharmacy Med Name: METRONIDAZOLE GEL 60GM 1%]; APPLY A SMALL AMOUNT TO AFFECTED AREA(S) TOPICALLY ONCE DAILY  Dispense: 60 g; Refill: 11    If protocol passes may refill for 12 months if within 3 months of last provider visit (or a total of 15 months).

## 2021-06-14 NOTE — TELEPHONE ENCOUNTER
Refill Approved    Rx renewed per Medication Renewal Policy. Medication was last renewed on 12/17/19.    Valorie Rojas, [facility] Triage/Med Refill 1/22/2021     Requested Prescriptions   Pending Prescriptions Disp Refills     lisinopriL (PRINIVIL,ZESTRIL) 10 MG tablet [Pharmacy Med Name: LISINOPRIL TABS 10MG] 90 tablet 3     Sig: TAKE 1 TABLET DAILY       Ace Inhibitors Refill Protocol Passed - 1/22/2021  1:24 AM        Passed - PCP or prescribing provider visit in past 12 months       Last office visit with prescriber/PCP: 9/3/2019 Derek Kumar MD OR same dept: Visit date not found OR same specialty: 9/3/2019 Derek Kumar MD  Last physical: 12/22/2020 Last MTM visit: Visit date not found   Next visit within 3 mo: Visit date not found  Next physical within 3 mo: Visit date not found  Prescriber OR PCP: Derek Kmuar MD  Last diagnosis associated with med order: 1. Essential hypertension  - lisinopriL (PRINIVIL,ZESTRIL) 10 MG tablet [Pharmacy Med Name: LISINOPRIL TABS 10MG]; TAKE 1 TABLET DAILY  Dispense: 90 tablet; Refill: 3    If protocol passes may refill for 12 months if within 3 months of last provider visit (or a total of 15 months).             Passed - Serum Potassium in past 12 months     Lab Results   Component Value Date    Potassium 4.2 12/22/2020             Passed - Blood pressure filed in past 12 months     BP Readings from Last 1 Encounters:   12/22/20 110/60             Passed - Serum Creatinine in past 12 months     Creatinine   Date Value Ref Range Status   12/22/2020 1.00 0.70 - 1.30 mg/dL Final

## 2021-06-15 NOTE — TELEPHONE ENCOUNTER
RN cannot approve Refill Request    RN can NOT refill this medication med is not covered by policy/route to provider. Last office visit: 9/3/2019 Derek Kumar MD Last Physical: 12/22/2020 Last MTM visit: Visit date not found Last visit same specialty: 9/3/2019 Derek Kumar MD.  Next visit within 3 mo: Visit date not found  Next physical within 3 mo: Visit date not found      Nikia Briones, [facility] Triage/Med Refill 3/9/2021    Requested Prescriptions   Pending Prescriptions Disp Refills     doxycycline (VIBRA-TABS) 100 MG tablet [Pharmacy Med Name: DOXYCYCLINE HYCLATE TABS 100MG] 90 tablet 3     Sig: TAKE 1 TABLET DAILY       There is no refill protocol information for this order

## 2021-06-15 NOTE — TELEPHONE ENCOUNTER
Refill Approved    Rx renewed per Medication Renewal Policy. Medication was last renewed on 12/13/20, last OV 12/22/20.    Sandra Azar, [facility] Triage/Med Refill 3/13/2021     Requested Prescriptions   Pending Prescriptions Disp Refills     tamsulosin (FLOMAX) 0.4 mg cap [Pharmacy Med Name: TAMSULOSIN HCL CAPS 0.4MG] 90 capsule 3     Sig: TAKE 1 CAPSULE AT BEDTIME       Alfuzosin/Tamsulosin/Silodosin Refill Protocol  Passed - 3/13/2021  1:26 AM        Passed - PCP or prescribing provider visit in past 12 months       Last office visit with prescriber/PCP: 9/3/2019 Derek Kumar MD OR same dept: Visit date not found OR same specialty: 9/3/2019 Derek Kumar MD  Last physical: 12/22/2020 Last MTM visit: Visit date not found   Next visit within 3 mo: Visit date not found  Next physical within 3 mo: Visit date not found  Prescriber OR PCP: Derek Kumar MD  Last diagnosis associated with med order: 1. BPH (benign prostatic hyperplasia)  - tamsulosin (FLOMAX) 0.4 mg cap [Pharmacy Med Name: TAMSULOSIN HCL CAPS 0.4MG]; TAKE 1 CAPSULE AT BEDTIME  Dispense: 90 capsule; Refill: 3    If protocol passes may refill for 12 months if within 3 months of last provider visit (or a total of 15 months).

## 2021-06-16 ENCOUNTER — OFFICE VISIT - HEALTHEAST (OUTPATIENT)
Dept: OTOLARYNGOLOGY | Facility: CLINIC | Age: 86
End: 2021-06-16

## 2021-06-16 DIAGNOSIS — H61.23 BILATERAL IMPACTED CERUMEN: ICD-10-CM

## 2021-06-16 DIAGNOSIS — R04.0 EPISTAXIS: ICD-10-CM

## 2021-06-16 PROBLEM — H40.10X1 OPEN-ANGLE GLAUCOMA OF BOTH EYES, MILD STAGE, UNSPECIFIED OPEN-ANGLE GLAUCOMA TYPE: Status: ACTIVE | Noted: 2017-11-28

## 2021-06-16 PROBLEM — I10 ESSENTIAL HYPERTENSION WITH GOAL BLOOD PRESSURE LESS THAN 130/80: Status: ACTIVE | Noted: 2017-11-28

## 2021-06-16 PROBLEM — R91.1 LEFT UPPER LOBE PULMONARY NODULE: Status: ACTIVE | Noted: 2019-09-03

## 2021-06-16 PROBLEM — R55 SYNCOPE, UNSPECIFIED SYNCOPE TYPE: Status: ACTIVE | Noted: 2020-03-11

## 2021-06-17 NOTE — PROGRESS NOTES
"    Assessment & Plan     Epistaxis  No issues with recurrent nosebleeds since he has been home from the hospital.  Advised him to hold off on using Mason-Synephrine at this point.  He will notify us of any recurrent nosebleeds.    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  He is advised to continue current dose of lisinopril at 10 mg daily.     BMI:   Estimated body mass index is 28.14 kg/m  as calculated from the following:    Height as of 12/22/20: 5' 8.75\" (1.746 m).    Weight as of this encounter: 189 lb 3.2 oz (85.8 kg).       No follow-ups on file.    Taina Hernandez, CNP  [facility]   Mikaela Figueroa is 85 y.o. and presents today for the following health issues   HPI   Patient is here today for follow-up from recent ER visit on 5/22/2021.  He presented to the ER with concerns of nosebleed.  He developed nosebleed after blowing his nose before bed the night prior.  Bleed came from the left nare and he was able to stop the bleeding after plugging his nose with Kleenex.  The next morning he blew his nose again which restarted the bleeding.  This time, he was unable to stop it.  Per discharge note, no evidence of a posterior bleed.  Bleeding stopped with nasal clip and Mason-Synephrine.  He has been using this sparingly at home.  His medical history is significant for hypertension, is on lisinopril for management.  Blood pressure was elevated in the ER but is within adequate range today.  He is not on blood thinners other than baby aspirin daily.      Review of Systems  Review of Systems - pertinent positives noted in HPI, otherwise ROS is negative.        Objective    /60 (Patient Site: Right Arm, Patient Position: Sitting, Cuff Size: Adult Regular)   Pulse 80   Wt 189 lb 3.2 oz (85.8 kg)   BMI 28.14 kg/m    Body mass index is 28.14 kg/m .  Physical Exam    General Appearance:  Alert, cooperative, no " distress, appears stated age   HEENT:   Small amount of scabbing noted inside of anterior left nare.  No active bleeding or drainage noted.  HEENT exam is otherwise normal.   Heart:  Regular rate and rhythm, S1, S2 normal, no murmur, rub or gallop   Extremities: Extremities normal.  No edema   Skin: Warm, dry.  Skin color, texture, turgor normal.

## 2021-06-17 NOTE — TELEPHONE ENCOUNTER
New Appointment Needed  What is the reason for the visit:    Inpatient/ED Follow Up Appt Request  At what hospital or facility were you seen?: [facility]  What is the reason you were seen?: nose bleed  What date were you admitted?: date: 05/22  What date were you discharged?: date: 05/22  What was the recommended timeframe for your follow up appointment?: 3 days   Provider Preference: PCP only  How soon do you need to be seen?: tomorrow  Waitlist offered?: No  Okay to leave a detailed message:  Yes    Please call pt as soon as able to go over options.

## 2021-06-17 NOTE — PATIENT INSTRUCTIONS - HE
Patient Instructions by Derek Kumar MD at 12/17/2019  8:00 AM     Author: Derek Kumar MD Service: -- Author Type: Physician    Filed: 12/17/2019  8:25 AM Encounter Date: 12/17/2019 Status: Signed    : Derek Kumar MD (Physician)         Patient Education   Signs of Hearing Loss  Hearing loss is a problem shared by many people. In fact, it is one of the most common health conditions, particularly as people age. Most people over age 65 have some hearing loss, and by age 80, almost everyone does. Because hearing loss usually occurs slowly over the years, you may not realize your hearing ability has gotten worse.       Have your hearing checked  Contact your [facility] care provider if you:    Have to strain to hear normal conversation.    Have to watch other peoples faces very carefully to follow what theyre saying.    Need to ask people to repeat what theyve said.    Often misunderstand what people are saying.    Turn the volume of the television or radio up so high that others complain.    Feel that people are mumbling when theyre talking to you.    Find that the effort to hear leaves you feeling tired and irritated.    Notice, when using the phone, that you hear better with 1 ear than the other.    7744-6617 The [employer]. [address] [place] [address], [place] [address]. All rights reserved. This information is not intended as a substitute for professional medical care. Always follow your healthcare professional's instructions.         Patient Education   Urinary Incontinence (Male)    Urinary incontinence means not being able to control the release of urine from the bladder.  Causes  Common causes of urinary incontinence in men include:    Infection    Certain medicines    Aging    Poor pelvic muscle tone    Bladder spasms    Obesity    Urinary retention  Nervous system diseases, diabetes, sleep apnea, urinary tract infections, prostate surgery, and pelvic trauma can also cause incontinence.  Constipation and smoking have also been identified as risk factors.  Symptoms    Urge incontinence (also called overactive bladder) is a sudden urge to urinate even though there may not be much urine in the bladder. The need to urinate often during the night is common. It is due to bladder spasms.    Stress incontinence is involuntary urine leakage that can occur with sneezing, coughing, and other actions that put stress on the bladder.  Treatment  Treatment of urinary incontinence depends on the cause. Infections of the bladder are treated with antibiotics. Urinary retention is treated with a bladder catheter.  Home care  Follow these guidelines when caring for yourself at home:    Don't consume foods and drinks that may irritate the bladder. These include drinks containing alcohol, caffeinate, or carbonation; chocolate; and acidic fruits and juices.    Limit fluid intake to 6 to 8 cups a day.    Lose weight if you are overweight. This will reduce your symptoms.    If needed, wear absorbent pads to catch urine. Change pads frequently to maintain hygiene and prevent skin and bladder infections.    Bathe daily to maintain good hygiene.    If an antibiotic was prescribed to treat a bladder infection, be sure to take it until finished, even if you are feeling better before then. This is to make sure your infection has cleared.    If a catheter was left in place, it is important to keep bacteria from getting into the collection bag. Don't disconnect the catheter from the collection bag.    Use a leg band to secure the catheter drainage tube, so it does not pull on the catheter. Drain the collection bag when it becomes full using the drain spout at the bottom of the bag. Don't disconnect the bag from the catheter.    Don't pull on or try to remove a catheter. The catheter must be removed by a healthcare provider.  Follow-up care  Follow up with your healthcare provider, or as advised.  When to seek medical advice  Call  your healthcare provider right away if any of these occur:    Fever over 100.4 F (38 C), or as directed by your healthcare provider    Bladder pain or fullness    Abdominal swelling, nausea, or vomiting    Back pain    Weakness, dizziness, or fainting    If a catheter was left in place, return if:  ? Catheter falls out  ? Catheter stops draining for 6 hours  Date Last Reviewed: 10/1/2017    3873-5609 [employer]. [address] [place] [address]. All rights reserved. This information is not intended as a substitute for professional medical care. Always follow your healthcare professional's instructions.     Patient Education   Preventing Falls in the Home  As you get older, falls are more likely. Thats because your reaction time slows. Your muscles and joints may also get stiffer, making them less flexible. Illness, medications, and vision changes can also affect your balance. A fall could leave you unable to live on your own. To make your home safer, follow these tips:    Floors    Put nonskid pads under area rugs.    Remove throw rugs.    Replace worn floor coverings.    Tack carpets firmly to each step on carpeted stairs. Put nonskid strips on the edges of uncarpeted stairs.    Keep floors and stairs free of clutter and cords.    Arrange furniture so there are clear pathways.    Clean up any spills right away.    Bathrooms    Install grab bars in the tub or shower.    Apply nonskid strips or put a nonskid rubber mat in the tub or shower.    Sit on a bath chair to bathe.    Use bathmats with nonskid backing.    Lighting    Keep a flashlight in each room.    Put a nightlight along the pathway between the bedroom and the bathroom.    6905-8232 [employer]. [address] [place] [address]. All rights reserved. This information is not intended as a substitute for professional medical care. Always follow your healthcare professional's instructions.           Advance  Directive  Patients advance directive was discussed and I am comfortable with the patients wishes.  Patient Education   Personalized Prevention Plan  You are due for the preventive services outlined below.  Your care team is available to assist you in scheduling these services.  If you have already completed any of these items, please share that information with your care team to update in your medical record.  Health Maintenance   Topic Date Due   ? ZOSTER VACCINES (2 of 3) 12/27/2008   ? COLONOSCOPY  06/12/2018   ? MEDICARE ANNUAL WELLNESS VISIT  12/14/2019   ? TD 18+ HE  12/15/2020   ? FALL RISK ASSESSMENT  12/17/2020   ? ADVANCE CARE PLANNING  12/14/2023   ? PNEUMOCOCCAL IMMUNIZATION 65+ LOW/MEDIUM RISK  Completed   ? INFLUENZA VACCINE RULE BASED  Completed

## 2021-06-18 ENCOUNTER — COMMUNICATION - HEALTHEAST (OUTPATIENT)
Dept: OTOLARYNGOLOGY | Facility: CLINIC | Age: 86
End: 2021-06-18

## 2021-06-18 ENCOUNTER — OFFICE VISIT - HEALTHEAST (OUTPATIENT)
Dept: OTOLARYNGOLOGY | Facility: CLINIC | Age: 86
End: 2021-06-18

## 2021-06-18 DIAGNOSIS — R04.0 EPISTAXIS: ICD-10-CM

## 2021-06-18 NOTE — PATIENT INSTRUCTIONS - HE
Patient Instructions by Fredy Garza at 12/22/2020  7:00 AM     Author: Fredy Garza Service: -- Author Type: Medical Student    Filed: 12/22/2020  9:02 AM Encounter Date: 12/22/2020 Status: Addendum    : Derek Kumar MD (Physician)    Related Notes: Original Note by Fredy Garza (Medical Student) filed at 12/22/2020  7:27 AM         Patient Education   Signs of Hearing Loss  Hearing loss is a problem shared by many people. In fact, it is one of the most common health conditions, particularly as people age. Most people over age 65 have some hearing loss, and by age 80, almost everyone does. Because hearing loss usually occurs slowly over the years, you may not realize your hearing ability has gotten worse.       Have your hearing checked  Contact your [facility] care provider if you:    Have to strain to hear normal conversation.    Have to watch other peoples faces very carefully to follow what theyre saying.    Need to ask people to repeat what theyve said.    Often misunderstand what people are saying.    Turn the volume of the television or radio up so high that others complain.    Feel that people are mumbling when theyre talking to you.    Find that the effort to hear leaves you feeling tired and irritated.    Notice, when using the phone, that you hear better with 1 ear than the other.    4205-0323 [employer]. [address] [place] [address]. All rights reserved. This information is not intended as a substitute for professional medical care. Always follow your healthcare professional's instructions.         Patient Education   Urinary Incontinence (Male)    Urinary incontinence means not being able to control the release of urine from the bladder.  Causes  Common causes of urinary incontinence in men include:    Infection    Certain medicines    Aging    Poor pelvic muscle tone    Bladder spasms    Obesity    Urinary retention  Nervous system diseases, diabetes,  sleep apnea, urinary tract infections, prostate surgery, and pelvic trauma can also cause incontinence. Constipation and smoking have also been identified as risk factors.  Symptoms    Urge incontinence (also called overactive bladder) is a sudden urge to urinate even though there may not be much urine in the bladder. The need to urinate often during the night is common. It is due to bladder spasms.    Stress incontinence is involuntary urine leakage that can occur with sneezing, coughing, and other actions that put stress on the bladder.  Treatment  Treatment of urinary incontinence depends on the cause. Infections of the bladder are treated with antibiotics. Urinary retention is treated with a bladder catheter.  Home care  Follow these guidelines when caring for yourself at home:    Don't consume foods and drinks that may irritate the bladder. These include drinks containing alcohol, caffeinate, or carbonation; chocolate; and acidic fruits and juices.    Limit fluid intake to 6 to 8 cups a day.    Lose weight if you are overweight. This will reduce your symptoms.    If needed, wear absorbent pads to catch urine. Change pads frequently to maintain hygiene and prevent skin and bladder infections.    Bathe daily to maintain good hygiene.    If an antibiotic was prescribed to treat a bladder infection, be sure to take it until finished, even if you are feeling better before then. This is to make sure your infection has cleared.    If a catheter was left in place, it is important to keep bacteria from getting into the collection bag. Don't disconnect the catheter from the collection bag.    Use a leg band to secure the catheter drainage tube, so it does not pull on the catheter. Drain the collection bag when it becomes full using the drain spout at the bottom of the bag. Don't disconnect the bag from the catheter.    Don't pull on or try to remove a catheter. The catheter must be removed by a healthcare  provider.  Follow-up care  Follow up with your healthcare provider, or as advised.  When to seek medical advice  Call your healthcare provider right away if any of these occur:    Fever over 100.4 F (38 C), or as directed by your healthcare provider    Bladder pain or fullness    Abdominal swelling, nausea, or vomiting    Back pain    Weakness, dizziness, or fainting    If a catheter was left in place, return if:  ? Catheter falls out  ? Catheter stops draining for 6 hours  Date Last Reviewed: 10/1/2017    9065-4646 [employer]. [address] [place] [address]. All rights reserved. This information is not intended as a substitute for professional medical care. Always follow your healthcare professional's instructions.       Advance Directive  Patients advance directive was discussed and I am comfortable with the patients wishes.  Patient Education   Personalized Prevention Plan  You are due for the preventive services outlined below.  Your care team is available to assist you in scheduling these services.  If you have already completed any of these items, please share that information with your care team to update in your medical record.  Health Maintenance   Topic Date Due   ? ZOSTER VACCINES (2 of 3) 12/27/2008   ? INFLUENZA VACCINE RULE BASED (1) 08/01/2020   ? TD 18+ HE  12/15/2020   ? MEDICARE ANNUAL WELLNESS VISIT  12/17/2020   ? FALL RISK ASSESSMENT  12/22/2021   ? COLORECTAL CANCER SCREENING  06/12/2023   ? ADVANCE CARE PLANNING  12/22/2025   ? Pneumococcal Vaccine: 65+ Years  Completed   ? Pneumococcal Vaccine: Pediatrics (0 to 5 Years) and At-Risk Patients (6 to 64 Years)  Aged Out   ? HEPATITIS B VACCINES  Aged Out        Patient Education   Signs of Hearing Loss  Hearing loss is a problem shared by many people. In fact, it is one of the most common health conditions, particularly as people age. Most people over age 65 have some hearing loss, and by age 80, almost everyone does.  Because hearing loss usually occurs slowly over the years, you may not realize your hearing ability has gotten worse.       Have your hearing checked  Contact your [facility] care provider if you:    Have to strain to hear normal conversation.    Have to watch other peoples faces very carefully to follow what theyre saying.    Need to ask people to repeat what theyve said.    Often misunderstand what people are saying.    Turn the volume of the television or radio up so high that others complain.    Feel that people are mumbling when theyre talking to you.    Find that the effort to hear leaves you feeling tired and irritated.    Notice, when using the phone, that you hear better with 1 ear than the other.    9432-8780 [employer]. [address] [place] [address], [place] [address]. All rights reserved. This information is not intended as a substitute for professional medical care. Always follow your healthcare professional's instructions.         Patient Education   Urinary Incontinence (Male)    Urinary incontinence means not being able to control the release of urine from the bladder.  Causes  Common causes of urinary incontinence in men include:    Infection    Certain medicines    Aging    Poor pelvic muscle tone    Bladder spasms    Obesity    Urinary retention  Nervous system diseases, diabetes, sleep apnea, urinary tract infections, prostate surgery, and pelvic trauma can also cause incontinence. Constipation and smoking have also been identified as risk factors.  Symptoms    Urge incontinence (also called overactive bladder) is a sudden urge to urinate even though there may not be much urine in the bladder. The need to urinate often during the night is common. It is due to bladder spasms.    Stress incontinence is involuntary urine leakage that can occur with sneezing, coughing, and other actions that put stress on the bladder.  Treatment  Treatment of urinary incontinence depends on the cause. Infections  of the bladder are treated with antibiotics. Urinary retention is treated with a bladder catheter.  Home care  Follow these guidelines when caring for yourself at home:    Don't consume foods and drinks that may irritate the bladder. These include drinks containing alcohol, caffeinate, or carbonation; chocolate; and acidic fruits and juices.    Limit fluid intake to 6 to 8 cups a day.    Lose weight if you are overweight. This will reduce your symptoms.    If needed, wear absorbent pads to catch urine. Change pads frequently to maintain hygiene and prevent skin and bladder infections.    Bathe daily to maintain good hygiene.    If an antibiotic was prescribed to treat a bladder infection, be sure to take it until finished, even if you are feeling better before then. This is to make sure your infection has cleared.    If a catheter was left in place, it is important to keep bacteria from getting into the collection bag. Don't disconnect the catheter from the collection bag.    Use a leg band to secure the catheter drainage tube, so it does not pull on the catheter. Drain the collection bag when it becomes full using the drain spout at the bottom of the bag. Don't disconnect the bag from the catheter.    Don't pull on or try to remove a catheter. The catheter must be removed by a healthcare provider.  Follow-up care  Follow up with your healthcare provider, or as advised.  When to seek medical advice  Call your healthcare provider right away if any of these occur:    Fever over 100.4 F (38 C), or as directed by your healthcare provider    Bladder pain or fullness    Abdominal swelling, nausea, or vomiting    Back pain    Weakness, dizziness, or fainting    If a catheter was left in place, return if:  ? Catheter falls out  ? Catheter stops draining for 6 hours  Date Last Reviewed: 10/1/2017    3656-2749 [employer]. [address] [place] [address], [place] [address]. All rights reserved. This information is not  intended as a substitute for professional medical care. Always follow your healthcare professional's instructions.       Advance Directive  Patients advance directive was discussed and I am comfortable with the patients wishes.  Patient Education   Personalized Prevention Plan  You are due for the preventive services outlined below.  Your care team is available to assist you in scheduling these services.  If you have already completed any of these items, please share that information with your care team to update in your medical record.  Health Maintenance   Topic Date Due   ? ZOSTER VACCINES (2 of 3) 12/27/2008   ? MEDICARE ANNUAL WELLNESS VISIT  12/22/2021   ? FALL RISK ASSESSMENT  12/22/2021   ? COLORECTAL CANCER SCREENING  06/12/2023   ? ADVANCE CARE PLANNING  12/22/2025   ? TD 18+ HE  12/22/2030   ? Pneumococcal Vaccine: Pediatrics (0 to 5 Years) and At-Risk Patients (6 to 64 Years)  Completed   ? Pneumococcal Vaccine: 65+ Years  Completed   ? INFLUENZA VACCINE RULE BASED  Completed   ? HEPATITIS B VACCINES  Aged Out

## 2021-06-19 NOTE — LETTER
Letter by Derek Kumar MD at      Author: Derek Kumar MD Service: -- Author Type: --    Filed:  Encounter Date: 5/30/2019 Status: (Other)         Mikaela Figueroa  [address] [address]             May 30, 2019         Dear Mr. Figueroa,    Below are the results from your recent visit:    Resulted Orders   PSA, Diagnostic (Prostatic-Specific Antigen)   Result Value Ref Range    PSA 9.3 (H) 0.0 - 6.5 ng/mL    Narrative    Method is Abbott Prostate-Specific Antigen (PSA)  Standard-WHO 1st International (90:10)       Your prostate cancer screening test (i.e. PSA) remains elevated.  It has increased slightly.  Ensure follow-up with Dr. Colindres as scheduled.    Please call with questions or contact us using [employer].    Sincerely,        Electronically signed by Derek Kumar MD

## 2021-06-20 NOTE — LETTER
Letter by Derek Kumar MD at      Author: Derek Kumar MD Service: -- Author Type: --    Filed:  Encounter Date: 6/11/2020 Status: (Other)         Mikaela Figueroa  [address] [facility] [address]             June 11, 2020         Dear Mr. Figueroa,    Below are the results from your recent visit:    Resulted Orders   PSA, Diagnostic (Prostatic-Specific Antigen)   Result Value Ref Range    PSA 7.9 (H) 0.0 - 6.5 ng/mL    Narrative    Method is Abbott Prostate-Specific Antigen (PSA)  Standard-WHO 1st International (90:10)       Your prostate cancer screening test (i.e. PSA) remains mildly elevated.  It appears stable.  Please follow-up with your urologist as scheduled.    Please call with questions or contact us using [employer].    Sincerely,        Electronically signed by Derek Kumar MD

## 2021-06-20 NOTE — LETTER
"Letter by Derek Kumar MD at      Author: Derek Kumar MD Service: -- Author Type: --    Filed:  Encounter Date: 12/17/2019 Status: Signed         Mikaela Figueroa  [address] [place] [address]             December 17, 2019         Dear Mr. Figueroa,    Below are the results from your recent visit:    Resulted Orders   Basic Metabolic Panel   Result Value Ref Range    Sodium 143 136 - 145 mmol/L    Potassium 4.7 3.5 - 5.0 mmol/L    Chloride 105 98 - 107 mmol/L    CO2 32 (H) 22 - 31 mmol/L    Anion Gap, Calculation 6 5 - 18 mmol/L    Glucose 104 70 - 125 mg/dL    Calcium 9.4 8.5 - 10.5 mg/dL    BUN 15 8 - 28 mg/dL    Creatinine 0.94 0.70 - 1.30 mg/dL    GFR MDRD Af Amer >60 >60 mL/min/1.73m2    GFR MDRD Non Af Amer >60 >60 mL/min/1.73m2    Narrative    Fasting Glucose reference range is 70-99 mg/dL per  American Diabetes Association (ADA) guidelines.   Lipid Cascade   Result Value Ref Range    Cholesterol 152 <=199 mg/dL    Triglycerides 61 <=149 mg/dL    HDL Cholesterol 37 (L) >=40 mg/dL    LDL Calculated 103 <=129 mg/dL    Patient Fasting > 8hrs? Yes    PSA, Diagnostic (Prostatic-Specific Antigen)   Result Value Ref Range    PSA 7.5 (H) 0.0 - 6.5 ng/mL    Narrative    Method is Abbott Prostate-Specific Antigen (PSA)  Standard-WHO 1st International (90:10)   Glycosylated Hemoglobin A1c   Result Value Ref Range    Hemoglobin A1c 5.9 3.5 - 6.0 %       Your labs suggest \"pre-diabetes\".  Goal fasting glucose is < 100.  Your fasting glucose was 104.  Goal \"average blood sugar\" (i.e. A1c) is < 5.7%.  Your A1c was 5.9%. Ensure regular exercise, healthy diet, and weight loss modifications in order to further improve.  Weight goal < 180 pounds initially, < 170 pounds ideally.  We will continue to follow closely.      Your kidney function tests (i.e. Basic Metabolic Panel) were otherwise normal.     Your cholesterol results were near goal.  Ensure regular exercise, healthy diet, and weight loss modifications in " order to further improve.  Weight goal < 180 pounds initially, < 170 pounds ideally.  We will plan to recheck your labs while fasting in the next 6-12 months to ensure desired improvement.       Your prostate cancer screening test (i.e. PSA) remains mildly elevated.  It has decreased slightly since your prior test.  We will continue to follow closely.  Follow-up with your urologist as scheduled.    Please call with questions or contact us using [employer].    Sincerely,        Electronically signed by Derek Kumar MD

## 2021-06-20 NOTE — LETTER
Letter by Renetta Duarte RN at      Author: Renetta Duarte RN Service: -- Author Type: --    Filed:  Encounter Date: 12/20/2019 Status: Signed       [facility] [employer] [facility]  [facility] [place] [id number]        Mikaela [facility]  [address] [address]    Dear Mikaela    We have reviewed the Health Care Directive dated 7-23-19 which you presented for addition to   your medical record. Unfortunately, our review indicates the document is not legally valid for   the following reason(s): Invalid HCD Missing pages 1 through 12 is an addendum.  Do not have original document.  In order to create a legal document you will need to send us another copy of the entire document including the original that this is an addendum to. Our apologies if we made an error in copying the document.     We greatly value the opportunity to assist you in documenting your choices and to honor your   wishes. We apologize for any inconvenience. We have several options to assist you in updating   your document so it meets legal requirements.         COPIES of completed Health Care Directives can be brought or mailed to any of our   locations, including the address listed below. You can also email a copy to [email or web address] .       For additional assistance or questions you can email us at [email or web address] or call [phone]      Sincerely,     [facility] [employer] [facility]  [facility] and [address] [address]   Email us: [email or web address] Call us: [phone]  Visit at: [email or web address]

## 2021-06-20 NOTE — LETTER
Letter by Aisha Moore at      Author: Aisha Moore Service: -- Author Type: --    Filed:  Encounter Date: 3/11/2020 Status: (Other)         Mikaela Figueroa  [address] [facility] [address]      March 11, 2020      Dear Mr. Figueroa    RE: Remote Results    We are writing to you regarding your recent Remote Pacemaker check from home. Your transmission was received successfully. Battery status is satisfactory at this time.     Your results are showing no significant changes.    Your next device appointment will be a remote check on June 11, 2020; this will occur automatically.    To schedule or reschedule, please call [phone] and press 1.    NOTE: If you would like to do an extra transmission, please call [phone] and press 3 to speak to a nurse BEFORE transmitting. This ensures that the Device Clinic staff is aware of the reason you are sending a transmission, and can follow-up with you after it has been reviewed.    We will be checking your implanted device from home (remotely) every three months unless otherwise instructed. We will need to see you in the clinic at least once a year. You may need to be seen in the clinic sooner depending on the results of your check.    Please be aware:    The follow-up schedule is like a Physician prescription.    Your remote monitor is paired to your specific implanted device.      Sincerely,    [facility]

## 2021-06-20 NOTE — LETTER
Letter by Maria Del Carmen Olson RN at      Author: Maria Del Carmen Olson RN Service: -- Author Type: --    Filed:  Encounter Date: 6/11/2020 Status: (Other)         Mikaela Figueroa  [address] [facility] [address]      June 11, 2020      Dear Mr. Figueroa,    RE: Remote Results    We are writing to you regarding your recent Remote Pacemaker check from home. Your transmission was received successfully. Battery status is satisfactory at this time.     Your results are within normal limits.    Your next device appointment will be a remote check on 09/14/2020; this will occur automatically.    To schedule or reschedule, please call [phone] and press 1.    NOTE: If you would like to do an extra transmission, please call [phone] and press 3 to speak to a nurse BEFORE transmitting. This ensures that the Device Clinic staff is aware of the reason you are sending a transmission, and can follow-up with you after it has been reviewed.    We will be checking your implanted device from home (remotely) every three months unless otherwise instructed. We will need to see you in the clinic at least once a year. You may need to be seen in the clinic sooner depending on the results of your check.    Please be aware:    The follow-up schedule is like a Physician prescription.    Your remote monitor is paired to your specific implanted device.      Sincerely,    [facility]

## 2021-06-20 NOTE — LETTER
Letter by Aisha Moore at      Author: Aisha Moore Service: -- Author Type: --    Filed:  Encounter Date: 9/14/2020 Status: (Other)         Mikaela Figueroa  [address] [facility] [address]      September 14, 2020      Dear Mr. Figueroa    RE: Remote Results    We are writing to you regarding your recent Remote Pacemaker check from home. Your transmission was received successfully. Battery status is satisfactory at this time.     Your results are showing no significant changes.    Your next device appointment will be a remote check on December 14, 2020; this will occur automatically.    To schedule or reschedule, please call [phone] and press 1.    NOTE: If you would like to do an extra transmission, please call [phone] and press 3 to speak to a nurse BEFORE transmitting. This ensures that the Device Clinic staff is aware of the reason you are sending a transmission, and can follow-up with you after it has been reviewed.    We will be checking your implanted device from home (remotely) every three months unless otherwise instructed. We will need to see you in the clinic at least once a year. You may need to be seen in the clinic sooner depending on the results of your check.    Please be aware:    The follow-up schedule is like a Physician prescription.    Your remote monitor is paired to your specific implanted device.      Sincerely,    [facility]

## 2021-06-21 NOTE — LETTER
"Letter by Derek Kumar MD at      Author: Derek Kumar MD Service: -- Author Type: --    Filed:  Encounter Date: 12/23/2020 Status: (Other)         Mikaela Figueroa  [address] [place] [id number]             December 23, 2020         Dear Mr. Figueroa,    Below are the results from your recent visit:    Resulted Orders   Basic Metabolic Panel   Result Value Ref Range    Sodium 141 136 - 145 mmol/L    Potassium 4.2 3.5 - 5.0 mmol/L    Chloride 104 98 - 107 mmol/L    CO2 29 22 - 31 mmol/L    Anion Gap, Calculation 8 5 - 18 mmol/L    Glucose 101 70 - 125 mg/dL    Calcium 9.4 8.5 - 10.5 mg/dL    BUN 18 8 - 28 mg/dL    Creatinine 1.00 0.70 - 1.30 mg/dL    GFR MDRD Af Amer >60 >60 mL/min/1.73m2    GFR MDRD Non Af Amer >60 >60 mL/min/1.73m2    Narrative    Fasting Glucose reference range is 70-99 mg/dL per  American Diabetes Association (ADA) guidelines.   Lipid Profile   Result Value Ref Range    Triglycerides 70 <=149 mg/dL    Cholesterol 160 <=199 mg/dL    LDL Calculated 104 <=129 mg/dL    HDL Cholesterol 42 >=40 mg/dL    Patient Fasting > 8hrs? Yes    Glycosylated Hemoglobin A1c   Result Value Ref Range    Hemoglobin A1c 5.9 (H) <=5.6 %   PSA, Diagnostic (Prostatic-Specific Antigen)   Result Value Ref Range    PSA 7.6 (H) 0.0 - 6.5 ng/mL    Narrative    Method is Abbott Prostate-Specific Antigen (PSA)  Standard-WHO 1st International (90:10)       Your labs suggest mild \"pre-diabetes\".  Goal fasting glucose is < 100.  Your fasting glucose was 101.  Goal \"average blood sugar\" (i.e. A1c) is < 5.7%.  Your A1c was 5.9%. Ensure regular exercise, healthy diet, and weight loss modifications in order to further improve.  Weight goal < 185 pounds initially, < 180 pounds ideally.  We will continue to follow closely.      Your kidney function tests (i.e. Basic Metabolic Panel) were normal.     Your cholesterol results are near goal.  Ensure regular exercise, healthy diet, and weight loss modifications in order to " further improve.  Weight goal < 185 pounds initially, < 180 pounds ideally.     Your prostate cancer screening test (i.e. PSA) remains mildly elevated.  It remains stable.  Please ensure ongoing follow-up with your urologist in order to ensure ongoing monitoring as discussed.    Please call with questions or contact us using [employer].    Sincerely,        Electronically signed by Derek Kumar MD

## 2021-06-21 NOTE — LETTER
Letter by Honey Hinton RDCS at      Author: Honey Hinton RDCS Service: -- Author Type: --    Filed:  Encounter Date: 12/14/2020 Status: (Other)         Mikaela Figueroa  [address] [facility] [address]      December 14, 2020      Dear Mr. Figueroa,    RE: Remote Results    We are writing to you regarding your recent Remote Pacemaker check from home. Your transmission was received successfully. Battery status is satisfactory at this time.     Your results are showing no significant changes.    Your next device appointment will be a remote check on March 15, 2021; this will occur automatically.    To schedule or reschedule, please call [phone] and press 1.    NOTE: If you would like to do an extra transmission, please call [phone] and press 3 to speak to a nurse BEFORE transmitting. This ensures that the Device Clinic staff is aware of the reason you are sending a transmission, and can follow-up with you after it has been reviewed.    We will be checking your implanted device from home (remotely) every three months unless otherwise instructed.     Please be aware:    The follow-up schedule is like a Physician prescription.    Your remote monitor is paired to your specific implanted device.      Sincerely,    [facility]

## 2021-06-21 NOTE — LETTER
Letter by Aisha Moore at      Author: Aisha Moore Service: -- Author Type: --    Filed:  Encounter Date: 3/15/2021 Status: (Other)         Mikaela Figueroa  [address] [facility] [address]      March 15, 2021      Dear Mr. Figueroa    RE: Remote Results    We are writing to you regarding your recent Remote Pacemaker check from home. Your transmission was received successfully. Battery status is satisfactory at this time.     Your results are showing no significant changes.    Your next device appointment will be a remote check on Saniya 15, 2021; this will occur automatically.    To schedule or reschedule, please call [phone] and press 1.    NOTE: If you would like to do an extra transmission, please call [phone] and press 3 to speak to a nurse BEFORE transmitting. This ensures that the Device Clinic staff is aware of the reason you are sending a transmission, and can follow-up with you after it has been reviewed.    We will be checking your implanted device from home (remotely) every three months unless otherwise instructed. We will need to see you in the clinic at least once a year. You may need to be seen in the clinic sooner depending on the results of your check.    Please be aware:    The follow-up schedule is like a Physician prescription.    Your remote monitor is paired to your specific implanted device.      Sincerely,    [facility]

## 2021-06-22 NOTE — PROGRESS NOTES
Assessment and Plan:       1. Encounter for general adult medical examination with abnormal findings  Annual wellness visit completed.  Risks associated with known hearing loss requiring bilateral hearing aid use.  Occasional urine leakage otherwise dependent more on fluid intake.  Immunizations reviewed and up-to-date other than receiving Shingrix immunization through local pharmacy per recommendation.    2. Rosacea  Did provide refill on doxycycline 100 mg daily for rosacea management.  Followed by dermatologist every 6 months with history of squamous cell carcinoma.  - doxycycline (VIBRA-TABS) 100 MG tablet; TAKE 1 TABLET BY MOUTH DAILY.  Dispense: 90 tablet; Refill: 3    3. Overweight (BMI 25.0-29.9)  Therapeutic lifestyle changes for weight goal less than 185 pounds initially, less than 180 pounds ideally.    4. Essential hypertension with goal blood pressure less than 130/80  Continues lisinopril 10 mg daily for hypertension management.  Ensure stable renal function.  - Basic Metabolic Panel    5. Hypercholesterolemia  Hypercholesterolemia history.  Diet controlled.  Therapeutic lifestyle changes reviewed as noted above.  - Lipid Cascade    6. Impaired fasting glucose  Check A1c and fasting glucose today.  Weight goal less than 185 pounds initially, less than 180 pounds ideally.  - Glycosylated Hemoglobin A1c    7. BPH with urinary obstruction  Continues tamsulosin 0.4 mg daily for BPH.    8. Serology Prostate-specific Antigen (PSA) Elevated  Diagnostic PSA to be obtained with PSA elevation followed by Dr. Colindres.  Is scheduled to follow-up with Dr. Colindres June 6, 2019 with diagnostic PSA May 30, 2019 also anticipated.  - PSA, Diagnostic (Prostatic-Specific Antigen)    9. Bilateral hearing loss, unspecified hearing loss type  Bilateral hearing aids utilized.    10. Right inguinal hernia  Small reducible right inguinal hernia, stable.    11. 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  Followed by dermatologist every 6 months.    12. Male Erectile Disorder  Levitra as needed.        The patient's current medical problems were reviewed.    I have had an Advance Directives discussion with the patient.  The following high BMI interventions were performed this visit: encouragement to exercise, weight monitoring, weight loss from baseline weight and lifestyle education regarding diet.  Ensure ongoing efforts to achieve weight goal < 185 pounds initially, < 180 pounds ideally.     The following health maintenance schedule was reviewed with the patient and provided in printed form in the after visit summary:   Health Maintenance   Topic Date Due     ZOSTER VACCINES (2 of 3) 12/27/2008     COLONOSCOPY  06/12/2018     FALL RISK ASSESSMENT  12/14/2019     TD 18+ HE  12/15/2020     ADVANCE DIRECTIVES DISCUSSED WITH PATIENT  11/28/2022     PNEUMOCOCCAL POLYSACCHARIDE VACCINE AGE 65 AND OVER  Completed     INFLUENZA VACCINE RULE BASED  Completed     PNEUMOCOCCAL CONJUGATE VACCINE FOR ADULTS (PCV13 OR PREVNAR)  Completed        Subjective:     Chief Complaint: Mikaela Figueroa is an 83 y.o. male here for an Annual Wellness visit.     HPI: Patient seen today for annual wellness visit.  In general doing well.  Was down to about 185 pounds with activity change while visiting [place].  Mild weight gain since this time.  History of hypertension.  Lisinopril 10 mg daily.  Cluster elevation, diet controlled.  History of rosacea.  Continues doxycycline 100 mg daily [facility].  Follows by dermatologist every 6 months as well for squamous cell carcinoma left preauricular location.  Bilateral hearing aids utilized.  Tamsulosin 0.4 mg for BPH management.  Has had PSA elevations.  Followed by Dr. Colindres with follow-up June 6, 2019 scheduled.  Has not had Shingrix immunization.  Denies chest pain or shortness of breath.  No recent illness.  Prior  "fasting glucose 109 November 28, 2017 with A1c of 6.3% at that time.    Review of Systems:  Please see above.  The rest of the review of systems are negative for all systems.    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 Quick, [facility])   TRUS with bx 10/7/11 biopsies negative (followed by Dr. Colindres)   Eye exam with Dr. Duarte in this building (monitoring for +/- glaucoma) - followed q year   Dr. Colindres, [facility] every year for elevated PSA, etc. (h/o TRUS with bx negative)  Dr. Beavers, dermatology for q 6 month checks re: \"precancerous\" lesions and rosacea  Bilateral hearing aids with h/o hearing loss (began using March, 2015); Mohs procedure left preauricular location 3/17/16  Attends [employer] in [place], [place]    Patient Care Team:  Derek Kumar MD as PCP - General     Patient Active Problem List   Diagnosis     Male Erectile Disorder     Rosacea     Diverticulosis     BPH with urinary obstruction     Nephrolithiasis     Hypercholesterolemia     Hypertension     Impaired Fasting Glucose     Serology Prostate-specific Antigen (PSA) Elevated     Squamous Cell Carcinoma Of The Skin     Hearing loss     Glaucoma     Right inguinal hernia     Essential hypertension with goal blood pressure less than 130/80     Open-angle glaucoma of both eyes, mild stage, unspecified open-angle glaucoma type     Past Medical History:   Diagnosis Date     Hypertension       Past Surgical History:   Procedure Laterality Date     KNEE ARTHROSCOPY Right       Family History   Problem Relation Age of Onset     Dementia Mother      Heart disease Father      Diabetes Father  " "     Social History     Socioeconomic History     Marital status:      Spouse name: Not on file     Number of children: Not on file     Years of education: Not on file     Highest education level: Not on file   Social Needs     Financial resource strain: Not on file     Food insecurity - worry: Not on file     Food insecurity - inability: Not on file     Transportation needs - medical: Not on file     Transportation needs - non-medical: Not on file   Occupational History     Not on file   Tobacco Use     Smoking status: Former Smoker     Smokeless tobacco: Never Used   Substance and Sexual Activity     Alcohol use: No     Drug use: No     Sexual activity: Not on file   Other Topics Concern     Not on file   Social History Narrative     Not on file      Current Outpatient Medications   Medication Sig Dispense Refill     aspirin 81 mg chewable tablet Chew 81 mg daily.       doxycycline (VIBRA-TABS) 100 MG tablet TAKE 1 TABLET BY MOUTH DAILY. 90 tablet 3     GLUCOSAMINE/CHONDROITIN SULF A (GLUCOSAMINE-CHONDROITIN ORAL) 1500 complex capsules       latanoprost (XALATAN) 0.005 % ophthalmic solution 1 drop bedtime.       LEVITRA 20 mg tablet TAKE 1 TABLET DAILY AS NEEDED FOR ERECTILE DYSFUNCTION 10 tablet 5     lisinopril (PRINIVIL,ZESTRIL) 10 MG tablet TAKE 1 TABLET DAILY 90 tablet 3     metroNIDAZOLE (METROGEL) 1 % gel Apply a small amount to affected area(s) once daily - topical 60 g 5     MULTIVITAMIN ORAL        tamsulosin (FLOMAX) 0.4 mg cap TAKE 1 CAPSULE AT BEDTIME 90 capsule 3     azelaic acid (FINACEA) 15 % gel Apply sparingly and massage in well to affected areas 50 g 5     No current facility-administered medications for this visit.       Objective:   Vital Signs:   Visit Vitals  /62   Pulse 76   Ht 5' 9\" (1.753 m)   Wt 193 lb (87.5 kg)   SpO2 97%   BMI 28.50 kg/m         VisionScreening:  No exam data present     PHYSICAL EXAM    General Appearance:    Alert, cooperative, no distress, appears " stated age.  Overweight.   Head:    Normocephalic, without obvious abnormality, atraumatic   Eyes:    PERRL, conjunctiva/corneas clear, EOM's intact, fundi     benign, both eyes.  Glasses.        Ears:    Normal TM's and external ear canals, both ears.  Bilateral hearing aids utilized.   Nose:   Nares normal, septum midline, mucosa normal, no drainage    or sinus tenderness   Throat:   Lips, mucosa, and tongue normal; teeth and gums normal   Neck:   Supple, symmetrical, trachea midline, no adenopathy;        thyroid:  No enlargement/tenderness/nodules; no carotid    bruit or JVD   Back:     Symmetric, no curvature, ROM normal, no CVA tenderness   Lungs:     Clear to auscultation bilaterally, respirations unlabored   Chest wall:    No tenderness or deformity   Heart:    Regular rate and rhythm, S1 and S2 normal, no murmur, rub   or gallop   Abdomen:     Soft, non-tender, bowel sounds active all four quadrants,     no masses, no organomegaly.     Genitalia:    Normal male without lesion, discharge or tenderness.  No inguinal hernia noted.     Rectal:    Normal tone.  Prostate moderately enlarged right > left otherwise no tenderness.   Extremities:   Extremities normal, atraumatic, no cyanosis or edema   Pulses:   2+ and symmetric all extremities   Skin:   Skin color, texture, turgor normal, no rashes or lesions   Lymph nodes:   Cervical, supraclavicular, and axillary nodes normal   Neurologic:   CNII-XII intact. Normal strength, sensation and reflexes       throughout        Assessment Results 12/14/2018   Activities of Daily Living No help needed   Instrumental Activities of Daily Living No help needed   Get Up and Go Score Less than 12 seconds   Mini Cog Total Score 4   Some recent data might be hidden     A Mini-Cog score of 0-2 suggests the possibility of dementia, score of 3-5 suggests no dementia    Identified Health Risks:     The patient was provided with written information regarding signs of hearing  loss.  Information on urinary incontinence and treatment options given to patient.  Patient's advanced directive was discussed and I am comfortable with the patient's wishes.

## 2021-06-25 NOTE — TELEPHONE ENCOUNTER
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

## 2021-06-25 NOTE — ED TRIAGE NOTES
Pt states about 1 hour ago left nare started bleeding, denies trauma. Pt Hx of same he states. Pt denies blood thinners, nasal clamp placed in triage and bleeding controlled.

## 2021-06-25 NOTE — TELEPHONE ENCOUNTER
"Pt was at  ED on 6/13 for nosebleeds. He was told to f/u as soon as possible. He said that there is still drainage and some \"pink\" blood today. Please call pt to see if he can get in or if it's ok for him to wait. He can be reached at [phone].   "

## 2021-06-26 NOTE — ED PROVIDER NOTES
EMERGENCY DEPARTMENT ENCOUNTER      NAME: Mikaela Figueroa  AGE: 85 y.o. male  YOB: 1935  MRN: [id number]  EVALUATION DATE & TIME: 2021  7:36 PM    PCP: Derek Kumar MD    ED PROVIDER: Grace Boyer PA-C      Chief Complaint   Patient presents with     Epistaxis         FINAL IMPRESSION:  1. Epistaxis          MEDICAL DECISION MAKIN y.o. male presents to the Emergency Department for evaluation of epistaxis which began this afternoon.  Patient unable to stop with Mason-Synephrine.  Seen in this emergency department less than 1 month ago for the same.    Vital signs within normal range. On exam, patient appears well, no distress.  He does have moderate bleeding from the left nare.  No visible site of bleeding noted.  No septal hematoma.  No post nasal bleeding.  No intraoral lesions.  No bleeding from the right nare.  Exam is consistent with an anterior source of bleeding, no sign of posterior bleeding.  TXA soaked gauze and Mason-Synephrine instilled in the bilateral nares, nasal clamp applied.  Will leave in place for at least 30 minutes prior to reassessment.  Patient did not have any labs drawn when he was seen previously for epistaxis.  He is not anticoagulated.  Will check for thrombocytopenia, anemia, coag abnormalities here today.    Labs are notable for hemoglobin 13.1, normal platelets at 165, INR 1.22, normal APTT at 37.  After removing the gauze and nasal clamp, patient had no recurrence of epistaxis.  He was monitored for quite some time here.  No further emergent intervention is indicated.  Patient feels comfortable with discharge and ENT follow-up.  He will continue using Afrin or Mason-Synephrine at home for the recurrent bleeding.  I encouraged use of a humidifier and Vaseline to keep the nasal mucosa moist.  Patient will avoid blowing his nose as well.  I discussed strict return precautions, including recurrence of bleeding.  Patient is agreeable and discharged in a  comfortable, ambulatory state.    At the conclusion of the encounter I discussed the results of all of the tests and the disposition. The questions were answered. The patient or family acknowledged understanding and was agreeable with the care plan.     ED COURSE:  7:41 PM I met with the patient, obtained history, performed an initial exam, and discussed options and plan for diagnostics and treatment here in the ED. PPE: Provider wore surgical mask   8:02 PM Rechecked patient.   8:59 PM Reassessed the patient. He has had no further bleeding. We discussed plans for discharge including supportive cares, symptomatic treatment, outpatient follow up, and reasons to return to the emergency department.      MEDICATIONS GIVEN IN THE EMERGENCY:  Medications   phenylephrine 0.5 % nasal spray 2 spray (THU-SYNEPHRINE) (2 sprays Each Nare Given by Other 6/13/21 1940)   tranexamic acid intranasal solution 500 mg (LYSTEDA) (500 mg Nasal Given by Other 6/13/21 1940)       NEW PRESCRIPTIONS STARTED AT TODAY'S ER VISIT  Current Discharge Medication List      CONTINUE these medications which have NOT CHANGED    Details   aspirin 81 mg chewable tablet Chew 81 mg daily.      dorzolamide (TRUSOPT) 2 % ophthalmic solution 1 drop 3 (three) times a day.      doxycycline (VIBRA-TABS) 100 MG tablet TAKE 1 TABLET DAILY  Qty: 90 tablet, Refills: 3    Associated Diagnoses: Rosacea      GLUCOSAMINE/CHONDROITIN SULF A (GLUCOSAMINE-CHONDROITIN ORAL) 1500 complex capsules      latanoprost (XALATAN) 0.005 % ophthalmic solution 1 drop bedtime.      lisinopriL (PRINIVIL,ZESTRIL) 10 MG tablet TAKE 1 TABLET DAILY  Qty: 90 tablet, Refills: 3    Associated Diagnoses: Essential hypertension      metroNIDAZOLE (METROGEL) 1 % gel APPLY A SMALL AMOUNT TO AFFECTED AREA(S) TOPICALLY ONCE DAILY  Qty: 60 g, Refills: 11    Associated Diagnoses: Rosacea      MULTIVITAMIN ORAL       tamsulosin (FLOMAX) 0.4 mg cap Take 1 capsule (0.4 mg total) by mouth at  bedtime.  Qty: 90 capsule, Refills: 2    Associated Diagnoses: BPH (benign prostatic hyperplasia)                =================================================================    HPI    Patient information was obtained from: Patient     Use of : N/A         Mikaela iFgueroa is a 85 y.o. male with a pertinent history of hypertension who presents to this ED by walk in for evaluation of nosebleed.    Per chart review, patient was seen in this ED on 5/22/2021 for evaluation of nosebleed. Bleeding stopped with nasal slip, TXA and Mason-Synephrine.     Patient reports that since being seen in the ER on 5/22 he had some recurrent bleeding on 2 occasions, but he was able to stop the bleeding. Today around 1500 while sitting at his desk, the patient again developed a nosebleed from his left nostril and he has been unable to get it to stop. He states that when the blood accumulates it gushes out both nostrils. He is on baby aspirin but no other blood thinners. No other symptoms or complaints at this time.         REVIEW OF SYSTEMS   Review of Systems   HENT: Positive for nosebleeds.    All other systems reviewed and are negative.       PAST MEDICAL HISTORY:  Past Medical History:   Diagnosis Date     Hypertension        PAST SURGICAL HISTORY:  Past Surgical History:   Procedure Laterality Date     KNEE ARTHROSCOPY Right            CURRENT MEDICATIONS:    No current facility-administered medications on file prior to encounter.      Current Outpatient Medications on File Prior to Encounter   Medication Sig     aspirin 81 mg chewable tablet Chew 81 mg daily.     dorzolamide (TRUSOPT) 2 % ophthalmic solution 1 drop 3 (three) times a day.     doxycycline (VIBRA-TABS) 100 MG tablet TAKE 1 TABLET DAILY     GLUCOSAMINE/CHONDROITIN SULF A (GLUCOSAMINE-CHONDROITIN ORAL) 1500 complex capsules     latanoprost (XALATAN) 0.005 % ophthalmic solution 1 drop bedtime.     lisinopriL (PRINIVIL,ZESTRIL) 10 MG tablet TAKE 1 TABLET  DAILY     metroNIDAZOLE (METROGEL) 1 % gel APPLY A SMALL AMOUNT TO AFFECTED AREA(S) TOPICALLY ONCE DAILY     MULTIVITAMIN ORAL      tamsulosin (FLOMAX) 0.4 mg cap Take 1 capsule (0.4 mg total) by mouth at bedtime.       ALLERGIES:  No Known Allergies    FAMILY HISTORY:  Family History   Problem Relation Age of Onset     Dementia Mother      Heart disease Father      Diabetes Father        SOCIAL HISTORY:   Social History     Socioeconomic History     Marital status:      Spouse name: None     Number of children: None     Years of education: None     Highest education level: None   Occupational History     None   Social Needs     Financial resource strain: None     Food insecurity     Worry: None     Inability: None     Transportation needs     Medical: None     Non-medical: None   Tobacco Use     Smoking status: Former Smoker     Quit date:      Years since quittin.4     Smokeless tobacco: Never Used   Substance and Sexual Activity     Alcohol use: No     Drug use: No     Sexual activity: None   Lifestyle     Physical activity     Days per week: None     Minutes per session: None     Stress: None   Relationships     Social connections     Talks on phone: None     Gets together: None     Attends Sabianism service: None     Active member of club or organization: None     Attends meetings of clubs or organizations: None     Relationship status: None     Intimate partner violence     Fear of current or ex partner: None     Emotionally abused: None     Physically abused: None     Forced sexual activity: None   Other Topics Concern     None   Social History Narrative     None       VITALS:  Patient Vitals for the past 24 hrs:   BP Temp src Pulse Resp SpO2 Weight   21 2050 140/75 -- 64 16 98 % --   21 1827 171/82 Oral 77 14 98 % 188 lb (85.3 kg)       PHYSICAL EXAM    General Appearance:  Well developed, well nourished. Alert, cooperative, no acute distress, appears stated age.   HENT:  Normocephalic without obvious deformity, atraumatic. Moderate bleeding from the left nare.  No visible site of bleeding noted.  No septal hematoma.  No post nasal bleeding.  No intraoral lesions.  No bleeding from the right nare. Mucous membranes moist. Oropharynx without erythema, exudate, uvular deviation, or soft palate edema. Neck is supple, no cervical lymphadenopathy, no nuchal rigidity. Normal left and right TM.   Eyes: Conjunctiva clear. Lids normal. No discharge.   Respiratory: No distress. Lungs clear to ausculation bilaterally. No wheezes, rhonchi, rales, or stridor.  Cardiovascular: Regular rate and rhythm. No gallops, rubs, or murmurs.  Musculoskeletal: Moving all extremities. No swelling. No deformity. Able to ambulate independently.   Integument: Warm, dry, no rashes, petechiae, or lesions.   Neurologic: Alert and orientated x3. No focal deficits. Normal motor function. Normal sensory function. GCS 15.   Psych: Normal mood and affect. Judgement normal.      LAB:  All pertinent labs reviewed and interpreted.  Results for orders placed or performed during the hospital encounter of 06/13/21   HM2(CBC w/o Differential)   Result Value Ref Range    WBC 8.3 4.0 - 11.0 thou/uL    RBC 4.23 (L) 4.40 - 6.20 mill/uL    Hemoglobin 13.1 (L) 14.0 - 18.0 g/dL    Hematocrit 39.2 (L) 40.0 - 54.0 %    MCV 93 80 - 100 fL    MCH 31.0 27.0 - 34.0 pg    MCHC 33.4 32.0 - 36.0 g/dL    RDW 12.6 11.0 - 14.5 %    Platelets 165 140 - 440 thou/uL    MPV 10.2 8.5 - 12.5 fL   INR   Result Value Ref Range    INR 1.22 (H) 0.90 - 1.10   APTT(PTT)   Result Value Ref Range    PTT 37 24 - 37 seconds       RADIOLOGY:  Reviewed all pertinent imaging. Please see official radiology report.  No results found.      Carmina REEVES, am serving as a scribe to document services personally performed by Grace Boyer PA-C based on my observation and the provider's statements to me. IGrace PA-C, attest that Carmina Croft is acting in a  veronique whiting, has observed my performance of the services and has documented them in accordance with my direction.      This note has been dictated using voice recognition software. Any grammatical or context distortions are unintentional and inherent to the software.      Grace Boyer PA-C  Emergency Medicine  [facility] EMERGENCY ROOM  [facility] [id number]  Dept: [phone]  Loc: [phone]     Grace Dixon PA-C  06/13/21 5417

## 2021-06-26 NOTE — PROGRESS NOTES
Remote device check.  Please see link for full device report.      SERENA DarnellN, RN, CV-BC  Device Nurse  [facility]

## 2021-06-26 NOTE — PROGRESS NOTES
REASON FOR VISIT: Recheck      HISTORY OF PRESENT ILLNESS    Mikaela was seen in follow up for recheck for epistaxis.  Patient was seen earlier this week and had his left anterior septum cauterized.  He has some scant he felt like he had some scant discharge this morning and comes in with the nose packed with Kleenex.  No other ear nose or throat related concerns today         REVIEW OF SYSTEMS    Review of Systems: a 10-system review is reviewed at this encounter.  See scanned document.     Patient has no known allergies.     PHYSICAL EXAM:        HEAD: Normal appearance and symmetry:  No cutaneous lesions.      EARS:   Auricles normal         NOSE:    Dorsum:   Straight    Examination of the left nasal cavity shows evidence of prior silver nitrate cautery the septum is clean and dry there is no evidence of acute or chronic blood.  Per the patient's request the nasal cavity is packed with Gelfoam coated in bacitracin       ORAL CAVITY/OROPHARYNX:    Lips:  Normal.     NECK:  Trachea:  midline       NEURO:   Alert and Oriented    GAIT AND STATION:  normal     RESPIRATORY:   Symmetry and Respiratory effort    PSYCH:   normal mood and affect    SKIN:  warm and dry         IMPRESSION:    Encounter Diagnosis   Name Primary?     Epistaxis Yes          RECOMMENDATIONS:  Patient is status post placement of left-sided packing today.  He will be sent home on mupirocin ointment 3 times a day for the next 10 days return as previously scheduled    No orders of the defined types were placed in this encounter.

## 2021-06-26 NOTE — PROGRESS NOTES
CHIEF COMPLAINT:    Epistaxis        HISTORY OF PRESENT ILLNESS    Mikaela Figueroa   was seen at the behest of Derek Kumar MD  for Nosebleeds.     Patient says he has had recurrent nosebleeds on the left-hand side for the last several months.  They can recur spontaneously without any trigger.  Usually they can stop on their own although I think he has been to the ER twice for this in the past.  He is very anxious about getting another nosebleed.  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  He does use a bilateral hearing aids             REVIEW OF SYSTEMS    Review of Systems: a 10-system review is reviewed at this encounter.  See scanned document.         PHYSICAL EXAM:        HEAD: Normal appearance and symmetry:  No cutaneous lesions.      EARS:    Normal TM's bilaterally. Normal auditory canals and external ears. Non-tender.  Dry cerumen impactions in the anterior sulcus of each auditory canal that are removed with right angled hook under the binocular microscope         NOSE:    Dorsum:   straight  Septum: normal  Mucosa: Prominent vessels left anterior septum these are cauterized with silver nitrate with good effect today  Inferior turbinates:  normal       ORAL CAVITY/OROPHARYNX:    Lips:  Normal.  Tongue: normal, midline  Mucosa:   no lesions  Tonsils:  1+      NECK:  Trachea:  midline.   Thyroid:  normal   Adenopathy:  none       NEURO:   Alert and Oriented       RESPIRATORY:   Symmetry and Respiratory effort    PSYCH:   normal mood and affect    SKIN:  warm and dry         IMPRESSION:    Encounter Diagnoses   Name Primary?     Epistaxis Yes     Bilateral impacted cerumen      Patient status post cautery of the left nasal septum with silver nitrate and removal of cerumen impactions bilaterally.  Since he is a hearing aid ear I where I would review ask him to come back in every 6 months for ear cleaning.  A wire nasal saline gel to keep the nose moist.   All questions were answered he is agreeable this plan of care  RECOMMENDATIONS:    No orders of the defined types were placed in this encounter.     No medications were ordered this encounter     AYR nasal saline daily as needed for nasal dryness  Return 6 months for ear cleaning

## 2021-06-28 NOTE — PROGRESS NOTES
Progress Notes by Sorin Gaston MD at 10/2/2019  8:50 AM     Author: Sorin Gaston MD Service: -- Author Type: Physician    Filed: 10/2/2019  9:36 AM Encounter Date: 10/2/2019 Status: Signed    : Sorin Gaston MD (Physician)           Click to link to [facility] HEART CARE NOTE    Thank you, Derek Delgado MD, for asking the [facility] team to see Mr. Mikaela Figueroa for assessment of recently implanted dual-chamber pacemaker system.      Assessment/Recommendations   Assessment and recommendations:    1. Recurrent syncope, presumed secondary to advanced degree of heart block, managed with implantation of a dual-chamber pacemaker system.  No recurrence of loss of consciousness.  2. Normal LV systolic function by echocardiogram in August.      Agree with current management.    In the absence of recurrent syncope following pacemaker implantation, no further cardiac work-up is indicated at this time.      Continue current medications, interrogate pacemaker today, enroll in remote monitoring via our device clinic.    Left infraclavicular incision appears to be healing well, no signs of infection or complications.    Follow-up in device clinic, follow-up as needed in EP MD clinic.    Thank you for allowing us to participate in the care of Mr. Figueroa       History of Present Illness/Subjective    Mr. Mikaela Figueroa is a 84 y.o. male with past medical history significant for hypertension, who was visiting [facility] with his family when he asked sudden loss of consciousness while walking with his wife.  According to the patient, he had no significant prodromes, loss of conscious lasted for seconds with full recovery and no neurological deficit or bowel/bladder incontinence.  He did not seek medical attention immediately, but unfortunately experienced similar symptoms the following day while sitting on his recliner, prompted a visit to a local emergency  room.  A cardiac work-up that included ECG cardiac biomarkers, and echocardiogram was performed.  Decision to proceed with implantation of dual-chamber pacemaker based on his recurrent syncope and the underlying AV tanner dysfunction reflected by a significantly prolonged ME interval.   This was completed on August 29, dual-chamber pacemaker, [employer] system without complications.   Shortly after, he returned to [place], [place] and was referred by his primary MD Dr. Kumar for follow-up and cardiac EP clinic for device interrogation and follow-up.  Mr. Zhao tries to maintain active lifestyle he denies any recurrence of lightheadedness, dizziness near syncope or syncope.  He denies any chest pain, shortness of breath or palpitation associated with the moderate level of activities he performs.  He is currently on aspirin and lisinopril for hypertension.       Outside the cardiac data including ECG, echo, cardiac EP procedure notes reviewed and discussed with the patient and his family.     Physical Examination Review of Systems   Vitals:    10/02/19 0842   BP: 130/60   Pulse: 80   Resp: 16     Body mass index is 27.91 kg/m .  Wt Readings from Last 3 Encounters:   10/02/19 189 lb (85.7 kg)   09/03/19 184 lb (83.5 kg)   12/14/18 193 lb (87.5 kg)     [unfilled]    General     Appearance:   The patient is alert oriented to person place and situation.    The patient is in no acute distress at the time of my evaluation.   HEENT:  Pupils are equal, round, and reactive to light.  Conjunctiva and sclera are clear.  ENT: Oral mucosa is moist and without redness. No evident nasal discharge.   Neck: Without palpable thyroid or appreciable lymph nodes.   Chest: Symmetric, without deformity.   Lungs:   Clear bilaterally with no rales, rhonchi, or wheezes.     Cardiovascular:   Rhythm is regular. S1 and S2 are normal.No significant murmur is present. JVP is normal. Lower extremities demonstrate no significant edema. Distal  pulses are intact bilaterally.   Abdomen:  Abdomen is flat, soft, and nontender.   Extremities: Without deformity.   Skin: Skin is warm, dry, and otherwise intact.   Neurologic: Gait is normal.           A 12 point comprehensive review of systems was  negative except as noted.      Medical History  Surgical History Family History Social History   Past Medical History:   Diagnosis Date   ? Hypertension     Past Surgical History:   Procedure Laterality Date   ? KNEE ARTHROSCOPY Right     Family History   Problem Relation Age of Onset   ? Dementia Mother    ? Heart disease Father    ? Diabetes Father     Social History     Socioeconomic History   ? Marital status:      Spouse name: Not on file   ? Number of children: Not on file   ? Years of education: Not on file   ? Highest education level: Not on file   Occupational History   ? Not on file   Social Needs   ? Financial resource strain: Not on file   ? Food insecurity:     Worry: Not on file     Inability: Not on file   ? Transportation needs:     Medical: Not on file     Non-medical: Not on file   Tobacco Use   ? Smoking status: Former Smoker   ? Smokeless tobacco: Never Used   Substance and Sexual Activity   ? Alcohol use: No   ? Drug use: No   ? Sexual activity: Not on file   Lifestyle   ? Physical activity:     Days per week: Not on file     Minutes per session: Not on file   ? Stress: Not on file   Relationships   ? Social connections:     Talks on phone: Not on file     Gets together: Not on file     Attends Zoroastrianism service: Not on file     Active member of club or organization: Not on file     Attends meetings of clubs or organizations: Not on file     Relationship status: Not on file   ? Intimate partner violence:     Fear of current or ex partner: Not on file     Emotionally abused: Not on file     Physically abused: Not on file     Forced sexual activity: Not on file   Other Topics Concern   ? Not on file   Social History Narrative   ? Not on file           Medications  Allergies     Current Outpatient Medications:   ?  aspirin 81 mg chewable tablet, Chew 81 mg daily., Disp: , Rfl:   ?  azelaic acid (FINACEA) 15 % gel, Apply sparingly and massage in well to affected areas, Disp: 50 g, Rfl: 5  ?  doxycycline (VIBRA-TABS) 100 MG tablet, TAKE 1 TABLET BY MOUTH DAILY., Disp: 90 tablet, Rfl: 3  ?  GLUCOSAMINE/CHONDROITIN SULF A (GLUCOSAMINE-CHONDROITIN ORAL), 1500 complex capsules, Disp: , Rfl:   ?  latanoprost (XALATAN) 0.005 % ophthalmic solution, 1 drop bedtime., Disp: , Rfl:   ?  LEVITRA 20 mg tablet, TAKE 1 TABLET DAILY AS NEEDED FOR ERECTILE DYSFUNCTION, Disp: 10 tablet, Rfl: 5  ?  lisinopril (PRINIVIL,ZESTRIL) 10 MG tablet, TAKE 1 TABLET DAILY, Disp: 90 tablet, Rfl: 0  ?  metroNIDAZOLE (METROGEL) 1 % gel, APPLY A SMALL AMOUNT TOPICALLY TO AFFECTED AREA(S) ONCE DAILY, Disp: 60 g, Rfl: 1  ?  MULTIVITAMIN ORAL, , Disp: , Rfl:   ?  tamsulosin (FLOMAX) 0.4 mg cap, TAKE 1 CAPSULE AT BEDTIME, Disp: 90 capsule, Rfl: 1   No Known Allergies      Lab Results    Chemistry/lipid CBC Cardiac Enzymes/BNP/TSH/INR   Lab Results   Component Value Date    CHOL 174 12/14/2018    HDL 44 12/14/2018    LDLCALC 117 12/14/2018    TRIG 63 12/14/2018    CREATININE 0.84 12/14/2018    BUN 19 12/14/2018    K 4.4 12/14/2018     12/14/2018     12/14/2018    CO2 31 12/14/2018    Lab Results   Component Value Date    WBC 8.8 06/15/2012    HGB 13.9 (L) 07/17/2012    HCT 44.2 06/15/2012    MCV 92 06/15/2012     06/15/2012    No results found for: CKTOTAL, CKMB, CKMBINDEX, TROPONINI, BNP, TSH, INR       Sorin Gaston MD, [facility]  Cardiac Electrophysiology

## 2021-07-04 NOTE — LETTER
Letter by Kalyani Cook RN at      Author: Kalyani Cook RN Service: -- Author Type: --    Filed:  Encounter Date: 6/14/2021 Status: (Other)         Mikaela Figueroa  [address] [facility] [address]      Saniya 15, 2021      Dear Mr. Figueroa,    RE: Remote Results    We are writing to you regarding your recent Remote Pacemaker check from home. Your transmission was received successfully. Battery status is satisfactory at this time.     Your results are within normal limits.    Your next device appointment will be a remote check on 9/22/21; this will occur automatically.    To schedule or reschedule, please call [phone] and press 1.    NOTE: If you would like to do an extra transmission, please call [phone] and press 3 to speak to a nurse BEFORE transmitting. This ensures that the Device Clinic staff is aware of the reason you are sending a transmission, and can follow-up with you after it has been reviewed.    We will be checking your implanted device from home (remotely) every three months unless otherwise instructed. We will need to see you in the clinic at least once a year. You may need to be seen in the clinic sooner depending on the results of your check.    Please be aware:    The follow-up schedule is like a Physician prescription.    Your remote monitor is paired to your specific implanted device.      Sincerely,    [facility]

## 2021-07-06 ENCOUNTER — MEDICAL CORRESPONDENCE (OUTPATIENT)
Dept: HEALTH INFORMATION MANAGEMENT | Facility: CLINIC | Age: 86
End: 2021-07-06

## 2021-07-06 VITALS — WEIGHT: 188 LBS | BODY MASS INDEX: 27.97 KG/M2

## 2021-07-06 VITALS
HEART RATE: 80 BPM | DIASTOLIC BLOOD PRESSURE: 60 MMHG | SYSTOLIC BLOOD PRESSURE: 138 MMHG | WEIGHT: 189.2 LBS | BODY MASS INDEX: 28.14 KG/M2

## 2021-07-09 ENCOUNTER — COMMUNICATION - HEALTHEAST (OUTPATIENT)
Dept: LAB | Facility: CLINIC | Age: 86
End: 2021-07-09

## 2021-07-09 DIAGNOSIS — R97.20 ELEVATED PROSTATE SPECIFIC ANTIGEN (PSA): ICD-10-CM

## 2021-07-09 NOTE — TELEPHONE ENCOUNTER
Telephone Encounter by Joya Ferraro at 7/9/2021  9:01 AM     Author: Joya Ferraro Service: -- Author Type:     Filed: 7/9/2021  9:01 AM Encounter Date: 7/9/2021 Status: Signed    : Joya Ferraro ()       Dr. Kumar, your patient Mikaela comes in next week for a PSA test. Please place orders in the chart. Thank you.

## 2021-07-10 NOTE — ADDENDUM NOTE
Addendum Note by Jose De Jesus S[place] MD at 7/9/2021 10:19 PM     Author: Jose De Jesus [place] MD Service: -- Author Type: Physician    Filed: 7/9/2021 10:19 PM Encounter Date: 7/9/2021 Status: Signed    : Jose De Jesus [place] MD (Physician)    Addended by: JOSE DE JESUS SERNA on: 7/9/2021 10:19 PM        Modules accepted: Orders

## 2021-07-15 ENCOUNTER — LAB (OUTPATIENT)
Dept: LAB | Facility: CLINIC | Age: 86
End: 2021-07-15
Payer: MEDICARE

## 2021-07-15 DIAGNOSIS — R97.20 ELEVATED PROSTATE SPECIFIC ANTIGEN (PSA): ICD-10-CM

## 2021-07-15 LAB — PSA SERPL-MCNC: 6.86 UG/L (ref 0–6.5)

## 2021-07-15 PROCEDURE — 84153 ASSAY OF PSA TOTAL: CPT

## 2021-07-15 PROCEDURE — 36415 COLL VENOUS BLD VENIPUNCTURE: CPT

## 2021-07-28 ENCOUNTER — TRANSFERRED RECORDS (OUTPATIENT)
Dept: HEALTH INFORMATION MANAGEMENT | Facility: CLINIC | Age: 86
End: 2021-07-28

## 2021-08-11 PROBLEM — Z23 NEED FOR PROPHYLACTIC VACCINATION AND INOCULATION AGAINST SINGLE DISEASE: Status: ACTIVE | Noted: 2021-08-11

## 2021-08-11 PROBLEM — H52.10 MYOPIA: Status: ACTIVE | Noted: 2021-08-11

## 2021-08-11 PROBLEM — Z00.00 ROUTINE GENERAL MEDICAL EXAMINATION AT A HEALTH CARE FACILITY: Status: ACTIVE | Noted: 2021-08-11

## 2021-08-11 PROBLEM — H52.4 PRESBYOPIA: Status: ACTIVE | Noted: 2021-08-11

## 2021-08-11 PROBLEM — H47.099 DISORDER OF OPTIC NERVE: Status: ACTIVE | Noted: 2021-08-11

## 2021-08-11 PROBLEM — H44.20 PROGRESSIVE HIGH MYOPIA: Status: ACTIVE | Noted: 2021-08-11

## 2021-08-11 PROBLEM — I44.1 MOBITZ TYPE 1 SECOND DEGREE ATRIOVENTRICULAR BLOCK: Status: ACTIVE | Noted: 2019-08-27

## 2021-08-11 PROBLEM — L30.9 DERMATITIS: Status: ACTIVE | Noted: 2021-08-11

## 2021-08-11 PROBLEM — H16.229 KERATOCONJUNCTIVITIS SICCA: Status: ACTIVE | Noted: 2021-08-11

## 2021-08-11 PROBLEM — H18.599 DYSTROPHY OF ANTERIOR CORNEA: Status: ACTIVE | Noted: 2021-08-11

## 2021-08-11 PROBLEM — Z76.0 ENCOUNTER FOR MEDICATION REFILL: Status: ACTIVE | Noted: 2021-08-11

## 2021-09-22 ENCOUNTER — ANCILLARY PROCEDURE (OUTPATIENT)
Dept: CARDIOLOGY | Facility: CLINIC | Age: 86
End: 2021-09-22
Attending: INTERNAL MEDICINE
Payer: MEDICARE

## 2021-09-22 ENCOUNTER — TRANSFERRED RECORDS (OUTPATIENT)
Dept: HEALTH INFORMATION MANAGEMENT | Facility: CLINIC | Age: 86
End: 2021-09-22

## 2021-09-22 DIAGNOSIS — Z95.0 CARDIAC PACEMAKER IN SITU: ICD-10-CM

## 2021-09-22 LAB
MDC_IDC_EPISODE_DTM: NORMAL
MDC_IDC_EPISODE_DTM: NORMAL
MDC_IDC_EPISODE_DURATION: 1 S
MDC_IDC_EPISODE_DURATION: 3 S
MDC_IDC_EPISODE_ID: 5
MDC_IDC_EPISODE_ID: 6
MDC_IDC_EPISODE_TYPE: NORMAL
MDC_IDC_EPISODE_TYPE: NORMAL
MDC_IDC_LEAD_IMPLANT_DT: NORMAL
MDC_IDC_LEAD_IMPLANT_DT: NORMAL
MDC_IDC_LEAD_LOCATION: NORMAL
MDC_IDC_LEAD_LOCATION: NORMAL
MDC_IDC_LEAD_LOCATION_DETAIL_1: NORMAL
MDC_IDC_LEAD_LOCATION_DETAIL_1: NORMAL
MDC_IDC_LEAD_MFG: NORMAL
MDC_IDC_LEAD_MFG: NORMAL
MDC_IDC_LEAD_MODEL: NORMAL
MDC_IDC_LEAD_MODEL: NORMAL
MDC_IDC_LEAD_POLARITY_TYPE: NORMAL
MDC_IDC_LEAD_POLARITY_TYPE: NORMAL
MDC_IDC_LEAD_SERIAL: NORMAL
MDC_IDC_LEAD_SERIAL: NORMAL
MDC_IDC_LEAD_SPECIAL_FUNCTION: NORMAL
MDC_IDC_LEAD_SPECIAL_FUNCTION: NORMAL
MDC_IDC_MSMT_BATTERY_DTM: NORMAL
MDC_IDC_MSMT_BATTERY_REMAINING_LONGEVITY: 153 MO
MDC_IDC_MSMT_BATTERY_RRT_TRIGGER: 2.62
MDC_IDC_MSMT_BATTERY_STATUS: NORMAL
MDC_IDC_MSMT_BATTERY_VOLTAGE: 3.03 V
MDC_IDC_MSMT_LEADCHNL_RA_IMPEDANCE_VALUE: 342 OHM
MDC_IDC_MSMT_LEADCHNL_RA_IMPEDANCE_VALUE: 380 OHM
MDC_IDC_MSMT_LEADCHNL_RA_PACING_THRESHOLD_AMPLITUDE: 0.38 V
MDC_IDC_MSMT_LEADCHNL_RA_PACING_THRESHOLD_PULSEWIDTH: 0.4 MS
MDC_IDC_MSMT_LEADCHNL_RA_SENSING_INTR_AMPL: 0.88 MV
MDC_IDC_MSMT_LEADCHNL_RA_SENSING_INTR_AMPL: 0.88 MV
MDC_IDC_MSMT_LEADCHNL_RV_IMPEDANCE_VALUE: 532 OHM
MDC_IDC_MSMT_LEADCHNL_RV_IMPEDANCE_VALUE: 589 OHM
MDC_IDC_MSMT_LEADCHNL_RV_PACING_THRESHOLD_AMPLITUDE: 0.5 V
MDC_IDC_MSMT_LEADCHNL_RV_PACING_THRESHOLD_PULSEWIDTH: 0.4 MS
MDC_IDC_MSMT_LEADCHNL_RV_SENSING_INTR_AMPL: 9.5 MV
MDC_IDC_MSMT_LEADCHNL_RV_SENSING_INTR_AMPL: 9.5 MV
MDC_IDC_PG_IMPLANT_DTM: NORMAL
MDC_IDC_PG_MFG: NORMAL
MDC_IDC_PG_MODEL: NORMAL
MDC_IDC_PG_SERIAL: NORMAL
MDC_IDC_PG_TYPE: NORMAL
MDC_IDC_SESS_CLINIC_NAME: NORMAL
MDC_IDC_SESS_DTM: NORMAL
MDC_IDC_SESS_TYPE: NORMAL
MDC_IDC_SET_BRADY_AT_MODE_SWITCH_RATE: 171 {BEATS}/MIN
MDC_IDC_SET_BRADY_HYSTRATE: NORMAL
MDC_IDC_SET_BRADY_LOWRATE: 50 {BEATS}/MIN
MDC_IDC_SET_BRADY_MAX_SENSOR_RATE: 120 {BEATS}/MIN
MDC_IDC_SET_BRADY_MAX_TRACKING_RATE: 120 {BEATS}/MIN
MDC_IDC_SET_BRADY_MODE: NORMAL
MDC_IDC_SET_BRADY_PAV_DELAY_LOW: 180 MS
MDC_IDC_SET_BRADY_SAV_DELAY_LOW: 150 MS
MDC_IDC_SET_LEADCHNL_RA_PACING_AMPLITUDE: 1.5 V
MDC_IDC_SET_LEADCHNL_RA_PACING_ANODE_ELECTRODE_1: NORMAL
MDC_IDC_SET_LEADCHNL_RA_PACING_ANODE_LOCATION_1: NORMAL
MDC_IDC_SET_LEADCHNL_RA_PACING_CAPTURE_MODE: NORMAL
MDC_IDC_SET_LEADCHNL_RA_PACING_CATHODE_ELECTRODE_1: NORMAL
MDC_IDC_SET_LEADCHNL_RA_PACING_CATHODE_LOCATION_1: NORMAL
MDC_IDC_SET_LEADCHNL_RA_PACING_POLARITY: NORMAL
MDC_IDC_SET_LEADCHNL_RA_PACING_PULSEWIDTH: 0.4 MS
MDC_IDC_SET_LEADCHNL_RA_SENSING_ANODE_ELECTRODE_1: NORMAL
MDC_IDC_SET_LEADCHNL_RA_SENSING_ANODE_LOCATION_1: NORMAL
MDC_IDC_SET_LEADCHNL_RA_SENSING_CATHODE_ELECTRODE_1: NORMAL
MDC_IDC_SET_LEADCHNL_RA_SENSING_CATHODE_LOCATION_1: NORMAL
MDC_IDC_SET_LEADCHNL_RA_SENSING_POLARITY: NORMAL
MDC_IDC_SET_LEADCHNL_RA_SENSING_SENSITIVITY: 0.3 MV
MDC_IDC_SET_LEADCHNL_RV_PACING_AMPLITUDE: 1.5 V
MDC_IDC_SET_LEADCHNL_RV_PACING_ANODE_ELECTRODE_1: NORMAL
MDC_IDC_SET_LEADCHNL_RV_PACING_ANODE_LOCATION_1: NORMAL
MDC_IDC_SET_LEADCHNL_RV_PACING_CAPTURE_MODE: NORMAL
MDC_IDC_SET_LEADCHNL_RV_PACING_CATHODE_ELECTRODE_1: NORMAL
MDC_IDC_SET_LEADCHNL_RV_PACING_CATHODE_LOCATION_1: NORMAL
MDC_IDC_SET_LEADCHNL_RV_PACING_POLARITY: NORMAL
MDC_IDC_SET_LEADCHNL_RV_PACING_PULSEWIDTH: 0.4 MS
MDC_IDC_SET_LEADCHNL_RV_SENSING_ANODE_ELECTRODE_1: NORMAL
MDC_IDC_SET_LEADCHNL_RV_SENSING_ANODE_LOCATION_1: NORMAL
MDC_IDC_SET_LEADCHNL_RV_SENSING_CATHODE_ELECTRODE_1: NORMAL
MDC_IDC_SET_LEADCHNL_RV_SENSING_CATHODE_LOCATION_1: NORMAL
MDC_IDC_SET_LEADCHNL_RV_SENSING_POLARITY: NORMAL
MDC_IDC_SET_LEADCHNL_RV_SENSING_SENSITIVITY: 0.9 MV
MDC_IDC_SET_ZONE_DETECTION_INTERVAL: 350 MS
MDC_IDC_SET_ZONE_DETECTION_INTERVAL: 400 MS
MDC_IDC_SET_ZONE_TYPE: NORMAL
MDC_IDC_STAT_AT_BURDEN_PERCENT: 0 %
MDC_IDC_STAT_AT_DTM_END: NORMAL
MDC_IDC_STAT_AT_DTM_START: NORMAL
MDC_IDC_STAT_BRADY_AP_VP_PERCENT: 1.04 %
MDC_IDC_STAT_BRADY_AP_VS_PERCENT: 25.53 %
MDC_IDC_STAT_BRADY_AS_VP_PERCENT: 0.48 %
MDC_IDC_STAT_BRADY_AS_VS_PERCENT: 72.95 %
MDC_IDC_STAT_BRADY_DTM_END: NORMAL
MDC_IDC_STAT_BRADY_DTM_START: NORMAL
MDC_IDC_STAT_BRADY_RA_PERCENT_PACED: 26.67 %
MDC_IDC_STAT_BRADY_RV_PERCENT_PACED: 1.52 %
MDC_IDC_STAT_EPISODE_RECENT_COUNT: 0
MDC_IDC_STAT_EPISODE_RECENT_COUNT: 2
MDC_IDC_STAT_EPISODE_RECENT_COUNT_DTM_END: NORMAL
MDC_IDC_STAT_EPISODE_RECENT_COUNT_DTM_START: NORMAL
MDC_IDC_STAT_EPISODE_TOTAL_COUNT: 0
MDC_IDC_STAT_EPISODE_TOTAL_COUNT: 6
MDC_IDC_STAT_EPISODE_TOTAL_COUNT_DTM_END: NORMAL
MDC_IDC_STAT_EPISODE_TOTAL_COUNT_DTM_START: NORMAL
MDC_IDC_STAT_EPISODE_TYPE: NORMAL

## 2021-09-22 PROCEDURE — 93294 REM INTERROG EVL PM/LDLS PM: CPT | Performed by: INTERNAL MEDICINE

## 2021-09-22 PROCEDURE — 93296 REM INTERROG EVL PM/IDS: CPT | Performed by: INTERNAL MEDICINE

## 2021-10-20 ENCOUNTER — ANCILLARY PROCEDURE (OUTPATIENT)
Dept: CARDIOLOGY | Facility: CLINIC | Age: 86
End: 2021-10-20
Attending: INTERNAL MEDICINE
Payer: MEDICARE

## 2021-10-20 DIAGNOSIS — Z95.0 PACEMAKER: ICD-10-CM

## 2021-10-20 LAB
MDC_IDC_LEAD_IMPLANT_DT: NORMAL
MDC_IDC_LEAD_IMPLANT_DT: NORMAL
MDC_IDC_LEAD_LOCATION: NORMAL
MDC_IDC_LEAD_LOCATION: NORMAL
MDC_IDC_LEAD_LOCATION_DETAIL_1: NORMAL
MDC_IDC_LEAD_LOCATION_DETAIL_1: NORMAL
MDC_IDC_LEAD_MFG: NORMAL
MDC_IDC_LEAD_MFG: NORMAL
MDC_IDC_LEAD_MODEL: NORMAL
MDC_IDC_LEAD_MODEL: NORMAL
MDC_IDC_LEAD_POLARITY_TYPE: NORMAL
MDC_IDC_LEAD_POLARITY_TYPE: NORMAL
MDC_IDC_LEAD_SERIAL: NORMAL
MDC_IDC_LEAD_SERIAL: NORMAL
MDC_IDC_LEAD_SPECIAL_FUNCTION: NORMAL
MDC_IDC_LEAD_SPECIAL_FUNCTION: NORMAL
MDC_IDC_MSMT_BATTERY_DTM: NORMAL
MDC_IDC_MSMT_BATTERY_REMAINING_LONGEVITY: 153 MO
MDC_IDC_MSMT_BATTERY_RRT_TRIGGER: 2.62
MDC_IDC_MSMT_BATTERY_STATUS: NORMAL
MDC_IDC_MSMT_BATTERY_VOLTAGE: 3.03 V
MDC_IDC_MSMT_LEADCHNL_RA_IMPEDANCE_VALUE: 361 OHM
MDC_IDC_MSMT_LEADCHNL_RA_IMPEDANCE_VALUE: 399 OHM
MDC_IDC_MSMT_LEADCHNL_RA_PACING_THRESHOLD_AMPLITUDE: 0.38 V
MDC_IDC_MSMT_LEADCHNL_RA_PACING_THRESHOLD_AMPLITUDE: 0.5 V
MDC_IDC_MSMT_LEADCHNL_RA_PACING_THRESHOLD_PULSEWIDTH: 0.4 MS
MDC_IDC_MSMT_LEADCHNL_RA_PACING_THRESHOLD_PULSEWIDTH: 0.4 MS
MDC_IDC_MSMT_LEADCHNL_RA_SENSING_INTR_AMPL: 0.88 MV
MDC_IDC_MSMT_LEADCHNL_RA_SENSING_INTR_AMPL: 1.62 MV
MDC_IDC_MSMT_LEADCHNL_RV_IMPEDANCE_VALUE: 513 OHM
MDC_IDC_MSMT_LEADCHNL_RV_IMPEDANCE_VALUE: 589 OHM
MDC_IDC_MSMT_LEADCHNL_RV_PACING_THRESHOLD_AMPLITUDE: 0.5 V
MDC_IDC_MSMT_LEADCHNL_RV_PACING_THRESHOLD_AMPLITUDE: 0.75 V
MDC_IDC_MSMT_LEADCHNL_RV_PACING_THRESHOLD_PULSEWIDTH: 0.4 MS
MDC_IDC_MSMT_LEADCHNL_RV_PACING_THRESHOLD_PULSEWIDTH: 0.4 MS
MDC_IDC_MSMT_LEADCHNL_RV_SENSING_INTR_AMPL: 12.62 MV
MDC_IDC_MSMT_LEADCHNL_RV_SENSING_INTR_AMPL: 9.62 MV
MDC_IDC_PG_IMPLANT_DTM: NORMAL
MDC_IDC_PG_MFG: NORMAL
MDC_IDC_PG_MODEL: NORMAL
MDC_IDC_PG_SERIAL: NORMAL
MDC_IDC_PG_TYPE: NORMAL
MDC_IDC_SESS_CLINIC_NAME: NORMAL
MDC_IDC_SESS_DTM: NORMAL
MDC_IDC_SESS_TYPE: NORMAL
MDC_IDC_SET_BRADY_AT_MODE_SWITCH_RATE: 171 {BEATS}/MIN
MDC_IDC_SET_BRADY_HYSTRATE: NORMAL
MDC_IDC_SET_BRADY_LOWRATE: 50 {BEATS}/MIN
MDC_IDC_SET_BRADY_MAX_SENSOR_RATE: 120 {BEATS}/MIN
MDC_IDC_SET_BRADY_MAX_TRACKING_RATE: 120 {BEATS}/MIN
MDC_IDC_SET_BRADY_MODE: NORMAL
MDC_IDC_SET_BRADY_PAV_DELAY_LOW: 180 MS
MDC_IDC_SET_BRADY_SAV_DELAY_LOW: 150 MS
MDC_IDC_SET_LEADCHNL_RA_PACING_AMPLITUDE: 1.5 V
MDC_IDC_SET_LEADCHNL_RA_PACING_ANODE_ELECTRODE_1: NORMAL
MDC_IDC_SET_LEADCHNL_RA_PACING_ANODE_LOCATION_1: NORMAL
MDC_IDC_SET_LEADCHNL_RA_PACING_CAPTURE_MODE: NORMAL
MDC_IDC_SET_LEADCHNL_RA_PACING_CATHODE_ELECTRODE_1: NORMAL
MDC_IDC_SET_LEADCHNL_RA_PACING_CATHODE_LOCATION_1: NORMAL
MDC_IDC_SET_LEADCHNL_RA_PACING_POLARITY: NORMAL
MDC_IDC_SET_LEADCHNL_RA_PACING_PULSEWIDTH: 0.4 MS
MDC_IDC_SET_LEADCHNL_RA_SENSING_ANODE_ELECTRODE_1: NORMAL
MDC_IDC_SET_LEADCHNL_RA_SENSING_ANODE_LOCATION_1: NORMAL
MDC_IDC_SET_LEADCHNL_RA_SENSING_CATHODE_ELECTRODE_1: NORMAL
MDC_IDC_SET_LEADCHNL_RA_SENSING_CATHODE_LOCATION_1: NORMAL
MDC_IDC_SET_LEADCHNL_RA_SENSING_POLARITY: NORMAL
MDC_IDC_SET_LEADCHNL_RA_SENSING_SENSITIVITY: 0.3 MV
MDC_IDC_SET_LEADCHNL_RV_PACING_AMPLITUDE: 1.5 V
MDC_IDC_SET_LEADCHNL_RV_PACING_ANODE_ELECTRODE_1: NORMAL
MDC_IDC_SET_LEADCHNL_RV_PACING_ANODE_LOCATION_1: NORMAL
MDC_IDC_SET_LEADCHNL_RV_PACING_CAPTURE_MODE: NORMAL
MDC_IDC_SET_LEADCHNL_RV_PACING_CATHODE_ELECTRODE_1: NORMAL
MDC_IDC_SET_LEADCHNL_RV_PACING_CATHODE_LOCATION_1: NORMAL
MDC_IDC_SET_LEADCHNL_RV_PACING_POLARITY: NORMAL
MDC_IDC_SET_LEADCHNL_RV_PACING_PULSEWIDTH: 0.4 MS
MDC_IDC_SET_LEADCHNL_RV_SENSING_ANODE_ELECTRODE_1: NORMAL
MDC_IDC_SET_LEADCHNL_RV_SENSING_ANODE_LOCATION_1: NORMAL
MDC_IDC_SET_LEADCHNL_RV_SENSING_CATHODE_ELECTRODE_1: NORMAL
MDC_IDC_SET_LEADCHNL_RV_SENSING_CATHODE_LOCATION_1: NORMAL
MDC_IDC_SET_LEADCHNL_RV_SENSING_POLARITY: NORMAL
MDC_IDC_SET_LEADCHNL_RV_SENSING_SENSITIVITY: 0.9 MV
MDC_IDC_SET_ZONE_DETECTION_INTERVAL: 350 MS
MDC_IDC_SET_ZONE_DETECTION_INTERVAL: 400 MS
MDC_IDC_SET_ZONE_TYPE: NORMAL
MDC_IDC_STAT_AT_BURDEN_PERCENT: 0 %
MDC_IDC_STAT_AT_DTM_END: NORMAL
MDC_IDC_STAT_AT_DTM_START: NORMAL
MDC_IDC_STAT_BRADY_AP_VP_PERCENT: 0.5 %
MDC_IDC_STAT_BRADY_AP_VS_PERCENT: 21.59 %
MDC_IDC_STAT_BRADY_AS_VP_PERCENT: 0.45 %
MDC_IDC_STAT_BRADY_AS_VS_PERCENT: 77.46 %
MDC_IDC_STAT_BRADY_DTM_END: NORMAL
MDC_IDC_STAT_BRADY_DTM_START: NORMAL
MDC_IDC_STAT_BRADY_RA_PERCENT_PACED: 22.16 %
MDC_IDC_STAT_BRADY_RV_PERCENT_PACED: 0.95 %
MDC_IDC_STAT_EPISODE_RECENT_COUNT: 0
MDC_IDC_STAT_EPISODE_RECENT_COUNT: 0
MDC_IDC_STAT_EPISODE_RECENT_COUNT: 5
MDC_IDC_STAT_EPISODE_RECENT_COUNT_DTM_END: NORMAL
MDC_IDC_STAT_EPISODE_RECENT_COUNT_DTM_START: NORMAL
MDC_IDC_STAT_EPISODE_TOTAL_COUNT: 0
MDC_IDC_STAT_EPISODE_TOTAL_COUNT: 0
MDC_IDC_STAT_EPISODE_TOTAL_COUNT: 6
MDC_IDC_STAT_EPISODE_TOTAL_COUNT_DTM_END: NORMAL
MDC_IDC_STAT_EPISODE_TOTAL_COUNT_DTM_START: NORMAL
MDC_IDC_STAT_EPISODE_TYPE: NORMAL

## 2021-10-20 PROCEDURE — 93280 PM DEVICE PROGR EVAL DUAL: CPT | Performed by: INTERNAL MEDICINE

## 2021-12-08 DIAGNOSIS — N40.0 BPH (BENIGN PROSTATIC HYPERPLASIA): ICD-10-CM

## 2021-12-09 RX ORDER — TAMSULOSIN HYDROCHLORIDE 0.4 MG/1
CAPSULE ORAL
Qty: 90 CAPSULE | Refills: 3 | Status: SHIPPED | OUTPATIENT
Start: 2021-12-09 | End: 2023-03-06

## 2021-12-09 NOTE — TELEPHONE ENCOUNTER
"Routing refill request to provider for review/approval because:  Drug interaction warning    Last Written Prescription Date:  3/13/21  Last Fill Quantity: 90,  # refills: 2   Last office visit provider:  5/25/21     Requested Prescriptions   Pending Prescriptions Disp Refills     tamsulosin (FLOMAX) 0.4 MG capsule [Pharmacy Med Name: TAMSULOSIN HCL CAPS 0.4MG] 90 capsule 3     Sig: TAKE 1 CAPSULE AT BEDTIME       Alpha Blockers Failed - 12/8/2021  1:26 AM        Failed - Patient does not have Tadalafil, Vardenafil, or Sildenafil on their medication list        Passed - Blood pressure under 140/90 in past 12 months     BP Readings from Last 3 Encounters:   05/25/21 138/60   12/22/20 110/60   12/17/19 110/60                 Passed - Recent (12 mo) or future (30 days) visit within the authorizing provider's specialty     Patient has had an office visit with the authorizing provider or a provider within the authorizing providers department within the previous 12 mos or has a future within next 30 days. See \"Patient Info\" tab in inbasket, or \"Choose Columns\" in Meds & Orders section of the refill encounter.              Passed - Medication is active on med list        Passed - Patient is 18 years of age or older             Derrick Henry RN 12/09/21 9:08 AM  "

## 2021-12-15 ENCOUNTER — OFFICE VISIT (OUTPATIENT)
Dept: OTOLARYNGOLOGY | Facility: CLINIC | Age: 86
End: 2021-12-15
Payer: MEDICARE

## 2021-12-15 DIAGNOSIS — R04.0 EPISTAXIS: Primary | ICD-10-CM

## 2021-12-15 PROCEDURE — 99213 OFFICE O/P EST LOW 20 MIN: CPT | Performed by: OTOLARYNGOLOGY

## 2021-12-15 NOTE — LETTER
12/15/2021         RE: Mikaela Figueroa  [address] [facility] [address]        Dear Colleague,    Thank you for referring your patient, Mikaela Figueroa, to the [facility]. Please see a copy of my visit note below.    CHIEF COMPLAINT:  Recheck      HISTORY OF PRESENT ILLNESS    Mikaela was seen in follow up for recheck of nose.  Using AYR nasal saline get as needed.   No new concerns.  Hearing aids through [facility].  No dizziness or vertigo.  Hearing is stable.           REVIEW OF SYSTEMS    Review of Systems: a 10-system review is reviewed at this encounter.  See scanned document.     Patient has no known allergies.     PHYSICAL EXAM:        HEAD: Normal appearance and symmetry:  No cutaneous lesions.      EARS:   Auricles normal      NOSE:    Dorsum:   Straight  Septum: midline with no prominent vessels       ORAL CAVITY/OROPHARYNX:    Lips:  Normal.     NECK:  Trachea:  midline       NEURO:   Alert and Oriented    GAIT AND STATION:  normal     RESPIRATORY:   Symmetry and Respiratory effort    PSYCH:   normal mood and affect    SKIN:  warm and dry         IMPRESSION:   Encounter Diagnosis   Name Primary?     Epistaxis Yes       RECOMMENDATIONS:    AYR nasal gel as needed    All questions were answered.   The patient is agreeable with this plan of care.            Again, thank you for allowing me to participate in the care of your patient.        Sincerely,        Dominick Martinez MD

## 2021-12-15 NOTE — PROGRESS NOTES
CHIEF COMPLAINT:  Recheck      HISTORY OF PRESENT ILLNESS    Mikaela was seen in follow up for recheck of nose.  Using AYR nasal saline get as needed.   No new concerns.  Hearing aids through [facility].  No dizziness or vertigo.  Hearing is stable.           REVIEW OF SYSTEMS    Review of Systems: a 10-system review is reviewed at this encounter.  See scanned document.     Patient has no known allergies.     PHYSICAL EXAM:        HEAD: Normal appearance and symmetry:  No cutaneous lesions.      EARS:   Auricles normal      NOSE:    Dorsum:   Straight  Septum: midline with no prominent vessels       ORAL CAVITY/OROPHARYNX:    Lips:  Normal.     NECK:  Trachea:  midline       NEURO:   Alert and Oriented    GAIT AND STATION:  normal     RESPIRATORY:   Symmetry and Respiratory effort    PSYCH:   normal mood and affect    SKIN:  warm and dry         IMPRESSION:   Encounter Diagnosis   Name Primary?     Epistaxis Yes       RECOMMENDATIONS:    AYR nasal gel as needed    All questions were answered.   The patient is agreeable with this plan of care.

## 2022-01-14 ENCOUNTER — OFFICE VISIT (OUTPATIENT)
Dept: FAMILY MEDICINE | Facility: CLINIC | Age: 87
End: 2022-01-14
Payer: MEDICARE

## 2022-01-14 VITALS
WEIGHT: 183.6 LBS | OXYGEN SATURATION: 98 % | SYSTOLIC BLOOD PRESSURE: 142 MMHG | DIASTOLIC BLOOD PRESSURE: 62 MMHG | HEART RATE: 73 BPM | BODY MASS INDEX: 27.31 KG/M2

## 2022-01-14 DIAGNOSIS — R60.0 BILATERAL LOWER EXTREMITY EDEMA: Primary | ICD-10-CM

## 2022-01-14 LAB
ALBUMIN SERPL-MCNC: 3.7 G/DL (ref 3.5–5)
ALBUMIN UR-MCNC: NEGATIVE MG/DL
ALP SERPL-CCNC: 86 U/L (ref 45–120)
ALT SERPL W P-5'-P-CCNC: 11 U/L (ref 0–45)
ANION GAP SERPL CALCULATED.3IONS-SCNC: 12 MMOL/L (ref 5–18)
APPEARANCE UR: CLEAR
AST SERPL W P-5'-P-CCNC: 17 U/L (ref 0–40)
BILIRUB SERPL-MCNC: 0.5 MG/DL (ref 0–1)
BILIRUB UR QL STRIP: NEGATIVE
BUN SERPL-MCNC: 17 MG/DL (ref 8–28)
CALCIUM SERPL-MCNC: 9.1 MG/DL (ref 8.5–10.5)
CHLORIDE BLD-SCNC: 106 MMOL/L (ref 98–107)
CO2 SERPL-SCNC: 26 MMOL/L (ref 22–31)
COLOR UR AUTO: YELLOW
CREAT SERPL-MCNC: 1.14 MG/DL (ref 0.7–1.3)
GFR SERPL CREATININE-BSD FRML MDRD: 63 ML/MIN/1.73M2
GLUCOSE BLD-MCNC: 108 MG/DL (ref 70–125)
GLUCOSE UR STRIP-MCNC: NEGATIVE MG/DL
HGB UR QL STRIP: NEGATIVE
KETONES UR STRIP-MCNC: NEGATIVE MG/DL
LEUKOCYTE ESTERASE UR QL STRIP: NEGATIVE
NITRATE UR QL: NEGATIVE
PH UR STRIP: 7 [PH] (ref 5–8)
POTASSIUM BLD-SCNC: 4.1 MMOL/L (ref 3.5–5)
PROT SERPL-MCNC: 6.6 G/DL (ref 6–8)
SODIUM SERPL-SCNC: 144 MMOL/L (ref 136–145)
SP GR UR STRIP: 1.01 (ref 1–1.03)
UROBILINOGEN UR STRIP-ACNC: 0.2 E.U./DL

## 2022-01-14 PROCEDURE — 83880 ASSAY OF NATRIURETIC PEPTIDE: CPT | Performed by: STUDENT IN AN ORGANIZED HEALTH CARE EDUCATION/TRAINING PROGRAM

## 2022-01-14 PROCEDURE — 81003 URINALYSIS AUTO W/O SCOPE: CPT | Performed by: STUDENT IN AN ORGANIZED HEALTH CARE EDUCATION/TRAINING PROGRAM

## 2022-01-14 PROCEDURE — 99214 OFFICE O/P EST MOD 30 MIN: CPT | Performed by: STUDENT IN AN ORGANIZED HEALTH CARE EDUCATION/TRAINING PROGRAM

## 2022-01-14 PROCEDURE — 36415 COLL VENOUS BLD VENIPUNCTURE: CPT | Performed by: STUDENT IN AN ORGANIZED HEALTH CARE EDUCATION/TRAINING PROGRAM

## 2022-01-14 PROCEDURE — 80053 COMPREHEN METABOLIC PANEL: CPT | Performed by: STUDENT IN AN ORGANIZED HEALTH CARE EDUCATION/TRAINING PROGRAM

## 2022-01-14 RX ORDER — FUROSEMIDE 20 MG
20 TABLET ORAL DAILY
Qty: 5 TABLET | Refills: 0 | Status: SHIPPED | OUTPATIENT
Start: 2022-01-14 | End: 2022-01-27

## 2022-01-14 NOTE — PROGRESS NOTES
Assessment and Plan     86-year-old male who presents with bilateral lower extremity edema going on for the last 2 months.  History seems most consistent with venous insufficiency as the cause.  However potential etiologies include SUJATHA, heart failure, nephrotic syndrome.  We will test for these with blood test as below.  Given my low suspicion though I recommended starting compression stockings daily place order and also recommended a 5-day course of Lasix to immediately improve legs.  Has a follow-up with PCP in approximately 2 weeks.    1. Bilateral lower extremity edema  - B-Type Natriuretic Peptide ([facility] Only); Future  - Comprehensive metabolic panel (BMP + Alb, Alk Phos, ALT, AST, Total. Bili, TP); Future  - UA Macro with Reflex to Micro and Culture - lab collect; Future  - furosemide (LASIX) 20 MG tablet; Take 1 tablet (20 mg) by mouth daily for 5 days  Dispense: 5 tablet; Refill: 0  - Compression Sleeve/Stocking Order for DME - ONLY FOR DME    Follow up: PCP 2 weeks  Options for treatment and follow-up care were reviewed with the patient and/or guardian. Mikaela Figueroa and/or guardian engaged in the decision making process and verbalized understanding of the options discussed and agreed with the final plan.    Dr. Panda Granados         HPI:   Mikaela Figueroa is a 86 year old  male who presents for:    Chief Complaint   Patient presents with     Edema     left leg swelling started a few months ago. walks fine. lower leg/calf into thigh. had a fracture back in 2007 and swelling is where fracture was.     Patient tells me that for the last 2 months or so he has had swelling in both of his feet.  Seems to be worse on the left than the right with skin breakdown on the left.  He has not noticed the redness or pus.  No significant pain.  He denies ever having this problem before.  He does note a history of a spiral fracture in his lower leg in 2007.  On further questioning he does tell me he has a pacemaker  but has never had a heart attack.  No known heart failure.  He does not feel short of breath.          PMHX:     Patient Active Problem List   Diagnosis     Male Erectile Disorder     Rosacea     Diverticulosis     BPH with urinary obstruction     Nephrolithiasis     Hypercholesterolemia     Hypertension     Impaired Fasting Glucose     Serology Prostate-specific Antigen (PSA) Elevated     Squamous Cell Carcinoma Of The Skin     Hearing loss     Glaucoma     Right inguinal hernia     Essential hypertension with goal blood pressure less than 130/80     Open-angle glaucoma of both eyes, mild stage, unspecified open-angle glaucoma type     Left upper lobe pulmonary nodule     Cardiac pacemaker in situ, dual chamber     Syncope, unspecified syncope type     Myopia     Dermatitis     Disorder of optic nerve     Dystrophy of anterior cornea     Encounter for medication refill     Keratoconjunctivitis sicca (H)     Mobitz type 1 second degree atrioventricular block     Need for prophylactic vaccination and inoculation against single disease     Presbyopia     Progressive high myopia     Routine general medical examination at a health care facility     History of hepatitis B       Current Outpatient Medications   Medication Sig Dispense Refill     aspirin 81 mg chewable tablet [ASPIRIN 81 MG CHEWABLE TABLET] Chew 81 mg daily.       dorzolamide (TRUSOPT) 2 % ophthalmic solution [DORZOLAMIDE (TRUSOPT) 2 % OPHTHALMIC SOLUTION] 1 drop 3 (three) times a day.       doxycycline (VIBRA-TABS) 100 MG tablet [DOXYCYCLINE (VIBRA-TABS) 100 MG TABLET] TAKE 1 TABLET DAILY 90 tablet 3     GLUCOSAMINE/CHONDROITIN SULF A (GLUCOSAMINE-CHONDROITIN ORAL) [GLUCOSAMINE/CHONDROITIN SULF A (GLUCOSAMINE-CHONDROITIN ORAL)] 1500 complex capsules       latanoprost (XALATAN) 0.005 % ophthalmic solution [LATANOPROST (XALATAN) 0.005 % OPHTHALMIC SOLUTION] 1 drop bedtime.       lisinopriL (PRINIVIL,ZESTRIL) 10 MG tablet [LISINOPRIL (PRINIVIL,ZESTRIL) 10 MG  TABLET] TAKE 1 TABLET DAILY 90 tablet 3     metroNIDAZOLE (METROGEL) 1 % gel [METRONIDAZOLE (METROGEL) 1 % GEL] APPLY A SMALL AMOUNT TO AFFECTED AREA(S) TOPICALLY ONCE DAILY 60 g 11     Multiple Vitamin (MULTI-DAY VITAMIN  S) TABS Take 1 tablet by mouth        triamcinolone (KENALOG) 0.1 % external cream        Aspirin Buf,CaCarb-MgCarb-MgO, 81 MG TABS Take 81 mg by mouth (Patient not taking: Reported on 12/15/2021)       MULTIVITAMIN ORAL [MULTIVITAMIN ORAL]  (Patient not taking: Reported on 2022)       mupirocin (BACTROBAN) 2 % external ointment  (Patient not taking: Reported on 12/15/2021)       sildenafil (REVATIO) 20 MG tablet  (Patient not taking: Reported on 12/15/2021)       tamsulosin (FLOMAX) 0.4 MG capsule TAKE 1 CAPSULE AT BEDTIME (Patient not taking: Reported on 12/15/2021) 90 capsule 3       Social History     Tobacco Use     Smoking status: Former Smoker     Quit date: 1998     Years since quittin.0     Smokeless tobacco: Never Used   Substance Use Topics     Alcohol use: No     Drug use: No       Social History     Social History Narrative     Not on file       No Known Allergies    No results found for this or any previous visit (from the past 24 hour(s)).         Review of Systems:    ROS: 10 point ROS neg other than the symptoms noted above in the HPI.         Physical Exam:     Vitals:    22 1348   BP: (!) 142/62   BP Location: Right arm   Patient Position: Sitting   Cuff Size: Adult Regular   Pulse: 73   SpO2: 98%   Weight: 83.3 kg (183 lb 9.6 oz)     Body mass index is 27.31 kg/m .    General appearance: Alert, cooperative, no distress, appears stated age  Head: Normocephalic, atraumatic, without obvious abnormality  Eyes: Pupils equal round, reactive.  Conjunctiva clear.  Nose: Nares normal, no drainage.  Throat: Lips, mucosa, tongue normal mucosa pink and moist  Neck: Supple, symmetric, trachea midline  Lungs: mild wheezing, no crackles.  Respirations unlabored  Heart:  Regular rate and rhythm, normal S1 and S2, no murmur, rub or gallop.  Extremities: 2+ lower extremity edema to thighs bilaterally, skin breakdown over ankle on left

## 2022-01-17 DIAGNOSIS — I10 ESSENTIAL HYPERTENSION: ICD-10-CM

## 2022-01-17 LAB — BNP SERPL-MCNC: 35 PG/ML (ref 0–93)

## 2022-01-18 NOTE — RESULT ENCOUNTER NOTE
I called and spoke with the patient's wife about his recent clinic visit results. I answered any questions she had.      Dr. Panda Granados

## 2022-01-19 RX ORDER — LISINOPRIL 10 MG/1
TABLET ORAL
Qty: 90 TABLET | Refills: 3 | Status: SHIPPED | OUTPATIENT
Start: 2022-01-19 | End: 2023-01-13

## 2022-01-19 NOTE — TELEPHONE ENCOUNTER
"Routing refill request to provider for review/approval because:  BP not in range.    Last Written Prescription Date:  1/22/21  Last Fill Quantity: 90,  # refills: 3   Last office visit provider:  1/14/22     Requested Prescriptions   Pending Prescriptions Disp Refills     lisinopril (ZESTRIL) 10 MG tablet [Pharmacy Med Name: LISINOPRIL TABS 10MG] 90 tablet 3     Sig: TAKE 1 TABLET DAILY       ACE Inhibitors (Including Combos) Protocol Failed - 1/17/2022  1:32 AM        Failed - Blood pressure under 140/90 in past 12 months     BP Readings from Last 3 Encounters:   01/14/22 (!) 142/62   05/25/21 138/60   12/22/20 110/60                 Passed - Recent (12 mo) or future (30 days) visit within the authorizing provider's specialty     Patient has had an office visit with the authorizing provider or a provider within the authorizing providers department within the previous 12 mos or has a future within next 30 days. See \"Patient Info\" tab in inbasket, or \"Choose Columns\" in Meds & Orders section of the refill encounter.              Passed - Medication is active on med list        Passed - Patient is age 18 or older        Passed - Normal serum creatinine on file in past 12 months     Recent Labs   Lab Test 01/14/22  1423   CR 1.14       Ok to refill medication if creatinine is low          Passed - Normal serum potassium on file in past 12 months     Recent Labs   Lab Test 01/14/22  1423   POTASSIUM 4.1                  Derrick Henry RN 01/19/22 7:17 AM  "

## 2022-01-26 ENCOUNTER — ANCILLARY PROCEDURE (OUTPATIENT)
Dept: CARDIOLOGY | Facility: CLINIC | Age: 87
End: 2022-01-26
Attending: INTERNAL MEDICINE
Payer: MEDICARE

## 2022-01-26 DIAGNOSIS — Z95.0 PACEMAKER: ICD-10-CM

## 2022-01-26 DIAGNOSIS — I44.1 SECOND DEGREE MOBITZ I AV BLOCK: ICD-10-CM

## 2022-01-26 PROCEDURE — 93294 REM INTERROG EVL PM/LDLS PM: CPT | Performed by: INTERNAL MEDICINE

## 2022-01-26 PROCEDURE — 93296 REM INTERROG EVL PM/IDS: CPT | Performed by: INTERNAL MEDICINE

## 2022-01-27 ENCOUNTER — OFFICE VISIT (OUTPATIENT)
Dept: FAMILY MEDICINE | Facility: CLINIC | Age: 87
End: 2022-01-27
Payer: MEDICARE

## 2022-01-27 ENCOUNTER — MEDICAL CORRESPONDENCE (OUTPATIENT)
Dept: HEALTH INFORMATION MANAGEMENT | Facility: CLINIC | Age: 87
End: 2022-01-27

## 2022-01-27 VITALS
HEART RATE: 82 BPM | DIASTOLIC BLOOD PRESSURE: 60 MMHG | SYSTOLIC BLOOD PRESSURE: 138 MMHG | HEIGHT: 69 IN | WEIGHT: 173 LBS | BODY MASS INDEX: 25.62 KG/M2 | OXYGEN SATURATION: 97 %

## 2022-01-27 DIAGNOSIS — H16.229 KERATOCONJUNCTIVITIS SICCA: ICD-10-CM

## 2022-01-27 DIAGNOSIS — I10 ESSENTIAL HYPERTENSION: ICD-10-CM

## 2022-01-27 DIAGNOSIS — E78.00 PURE HYPERCHOLESTEROLEMIA: ICD-10-CM

## 2022-01-27 DIAGNOSIS — N13.8 BPH WITH URINARY OBSTRUCTION: ICD-10-CM

## 2022-01-27 DIAGNOSIS — R97.20 ELEVATED PROSTATE SPECIFIC ANTIGEN (PSA): ICD-10-CM

## 2022-01-27 DIAGNOSIS — D64.9 NORMOCHROMIC NORMOCYTIC ANEMIA: ICD-10-CM

## 2022-01-27 DIAGNOSIS — R73.01 IMPAIRED FASTING GLUCOSE: ICD-10-CM

## 2022-01-27 DIAGNOSIS — Z00.00 ENCOUNTER FOR MEDICARE ANNUAL WELLNESS EXAM: Primary | ICD-10-CM

## 2022-01-27 DIAGNOSIS — N40.1 BPH WITH URINARY OBSTRUCTION: ICD-10-CM

## 2022-01-27 DIAGNOSIS — Z95.0 CARDIAC PACEMAKER IN SITU: ICD-10-CM

## 2022-01-27 DIAGNOSIS — H40.10X1 OPEN-ANGLE GLAUCOMA OF BOTH EYES, MILD STAGE, UNSPECIFIED OPEN-ANGLE GLAUCOMA TYPE: ICD-10-CM

## 2022-01-27 DIAGNOSIS — L71.9 ROSACEA: ICD-10-CM

## 2022-01-27 LAB
ANION GAP SERPL CALCULATED.3IONS-SCNC: 12 MMOL/L (ref 5–18)
BUN SERPL-MCNC: 21 MG/DL (ref 8–28)
CALCIUM SERPL-MCNC: 9.6 MG/DL (ref 8.5–10.5)
CHLORIDE BLD-SCNC: 105 MMOL/L (ref 98–107)
CHOLEST SERPL-MCNC: 138 MG/DL
CO2 SERPL-SCNC: 27 MMOL/L (ref 22–31)
CREAT SERPL-MCNC: 1.19 MG/DL (ref 0.7–1.3)
ERYTHROCYTE [DISTWIDTH] IN BLOOD BY AUTOMATED COUNT: 12 % (ref 10–15)
FASTING STATUS PATIENT QL REPORTED: YES
GFR SERPL CREATININE-BSD FRML MDRD: 60 ML/MIN/1.73M2
GLUCOSE BLD-MCNC: 95 MG/DL (ref 70–125)
HBA1C MFR BLD: 5.8 % (ref 0–5.6)
HCT VFR BLD AUTO: 38.8 % (ref 40–53)
HDLC SERPL-MCNC: 41 MG/DL
HGB BLD-MCNC: 12.9 G/DL (ref 13.3–17.7)
LDLC SERPL CALC-MCNC: 86 MG/DL
MCH RBC QN AUTO: 31.3 PG (ref 26.5–33)
MCHC RBC AUTO-ENTMCNC: 33.2 G/DL (ref 31.5–36.5)
MCV RBC AUTO: 94 FL (ref 78–100)
MDC_IDC_LEAD_IMPLANT_DT: NORMAL
MDC_IDC_LEAD_IMPLANT_DT: NORMAL
MDC_IDC_LEAD_LOCATION: NORMAL
MDC_IDC_LEAD_LOCATION: NORMAL
MDC_IDC_LEAD_LOCATION_DETAIL_1: NORMAL
MDC_IDC_LEAD_LOCATION_DETAIL_1: NORMAL
MDC_IDC_LEAD_MFG: NORMAL
MDC_IDC_LEAD_MFG: NORMAL
MDC_IDC_LEAD_MODEL: NORMAL
MDC_IDC_LEAD_MODEL: NORMAL
MDC_IDC_LEAD_POLARITY_TYPE: NORMAL
MDC_IDC_LEAD_POLARITY_TYPE: NORMAL
MDC_IDC_LEAD_SERIAL: NORMAL
MDC_IDC_LEAD_SERIAL: NORMAL
MDC_IDC_LEAD_SPECIAL_FUNCTION: NORMAL
MDC_IDC_LEAD_SPECIAL_FUNCTION: NORMAL
MDC_IDC_MSMT_BATTERY_DTM: NORMAL
MDC_IDC_MSMT_BATTERY_REMAINING_LONGEVITY: 149 MO
MDC_IDC_MSMT_BATTERY_RRT_TRIGGER: 2.62
MDC_IDC_MSMT_BATTERY_STATUS: NORMAL
MDC_IDC_MSMT_BATTERY_VOLTAGE: 3.03 V
MDC_IDC_MSMT_LEADCHNL_RA_IMPEDANCE_VALUE: 342 OHM
MDC_IDC_MSMT_LEADCHNL_RA_IMPEDANCE_VALUE: 380 OHM
MDC_IDC_MSMT_LEADCHNL_RA_PACING_THRESHOLD_AMPLITUDE: 0.38 V
MDC_IDC_MSMT_LEADCHNL_RA_PACING_THRESHOLD_PULSEWIDTH: 0.4 MS
MDC_IDC_MSMT_LEADCHNL_RA_SENSING_INTR_AMPL: 1 MV
MDC_IDC_MSMT_LEADCHNL_RA_SENSING_INTR_AMPL: 1 MV
MDC_IDC_MSMT_LEADCHNL_RV_IMPEDANCE_VALUE: 456 OHM
MDC_IDC_MSMT_LEADCHNL_RV_IMPEDANCE_VALUE: 513 OHM
MDC_IDC_MSMT_LEADCHNL_RV_PACING_THRESHOLD_AMPLITUDE: 0.5 V
MDC_IDC_MSMT_LEADCHNL_RV_PACING_THRESHOLD_PULSEWIDTH: 0.4 MS
MDC_IDC_MSMT_LEADCHNL_RV_SENSING_INTR_AMPL: 9.38 MV
MDC_IDC_MSMT_LEADCHNL_RV_SENSING_INTR_AMPL: 9.38 MV
MDC_IDC_PG_IMPLANT_DTM: NORMAL
MDC_IDC_PG_MFG: NORMAL
MDC_IDC_PG_MODEL: NORMAL
MDC_IDC_PG_SERIAL: NORMAL
MDC_IDC_PG_TYPE: NORMAL
MDC_IDC_SESS_CLINIC_NAME: NORMAL
MDC_IDC_SESS_DTM: NORMAL
MDC_IDC_SESS_TYPE: NORMAL
MDC_IDC_SET_BRADY_AT_MODE_SWITCH_RATE: 171 {BEATS}/MIN
MDC_IDC_SET_BRADY_HYSTRATE: NORMAL
MDC_IDC_SET_BRADY_LOWRATE: 50 {BEATS}/MIN
MDC_IDC_SET_BRADY_MAX_SENSOR_RATE: 120 {BEATS}/MIN
MDC_IDC_SET_BRADY_MAX_TRACKING_RATE: 120 {BEATS}/MIN
MDC_IDC_SET_BRADY_MODE: NORMAL
MDC_IDC_SET_BRADY_PAV_DELAY_LOW: 180 MS
MDC_IDC_SET_BRADY_SAV_DELAY_LOW: 150 MS
MDC_IDC_SET_LEADCHNL_RA_PACING_AMPLITUDE: 1.5 V
MDC_IDC_SET_LEADCHNL_RA_PACING_ANODE_ELECTRODE_1: NORMAL
MDC_IDC_SET_LEADCHNL_RA_PACING_ANODE_LOCATION_1: NORMAL
MDC_IDC_SET_LEADCHNL_RA_PACING_CAPTURE_MODE: NORMAL
MDC_IDC_SET_LEADCHNL_RA_PACING_CATHODE_ELECTRODE_1: NORMAL
MDC_IDC_SET_LEADCHNL_RA_PACING_CATHODE_LOCATION_1: NORMAL
MDC_IDC_SET_LEADCHNL_RA_PACING_POLARITY: NORMAL
MDC_IDC_SET_LEADCHNL_RA_PACING_PULSEWIDTH: 0.4 MS
MDC_IDC_SET_LEADCHNL_RA_SENSING_ANODE_ELECTRODE_1: NORMAL
MDC_IDC_SET_LEADCHNL_RA_SENSING_ANODE_LOCATION_1: NORMAL
MDC_IDC_SET_LEADCHNL_RA_SENSING_CATHODE_ELECTRODE_1: NORMAL
MDC_IDC_SET_LEADCHNL_RA_SENSING_CATHODE_LOCATION_1: NORMAL
MDC_IDC_SET_LEADCHNL_RA_SENSING_POLARITY: NORMAL
MDC_IDC_SET_LEADCHNL_RA_SENSING_SENSITIVITY: 0.3 MV
MDC_IDC_SET_LEADCHNL_RV_PACING_AMPLITUDE: 1.5 V
MDC_IDC_SET_LEADCHNL_RV_PACING_ANODE_ELECTRODE_1: NORMAL
MDC_IDC_SET_LEADCHNL_RV_PACING_ANODE_LOCATION_1: NORMAL
MDC_IDC_SET_LEADCHNL_RV_PACING_CAPTURE_MODE: NORMAL
MDC_IDC_SET_LEADCHNL_RV_PACING_CATHODE_ELECTRODE_1: NORMAL
MDC_IDC_SET_LEADCHNL_RV_PACING_CATHODE_LOCATION_1: NORMAL
MDC_IDC_SET_LEADCHNL_RV_PACING_POLARITY: NORMAL
MDC_IDC_SET_LEADCHNL_RV_PACING_PULSEWIDTH: 0.4 MS
MDC_IDC_SET_LEADCHNL_RV_SENSING_ANODE_ELECTRODE_1: NORMAL
MDC_IDC_SET_LEADCHNL_RV_SENSING_ANODE_LOCATION_1: NORMAL
MDC_IDC_SET_LEADCHNL_RV_SENSING_CATHODE_ELECTRODE_1: NORMAL
MDC_IDC_SET_LEADCHNL_RV_SENSING_CATHODE_LOCATION_1: NORMAL
MDC_IDC_SET_LEADCHNL_RV_SENSING_POLARITY: NORMAL
MDC_IDC_SET_LEADCHNL_RV_SENSING_SENSITIVITY: 0.9 MV
MDC_IDC_SET_ZONE_DETECTION_INTERVAL: 350 MS
MDC_IDC_SET_ZONE_DETECTION_INTERVAL: 400 MS
MDC_IDC_SET_ZONE_TYPE: NORMAL
MDC_IDC_STAT_AT_BURDEN_PERCENT: 0 %
MDC_IDC_STAT_AT_DTM_END: NORMAL
MDC_IDC_STAT_AT_DTM_START: NORMAL
MDC_IDC_STAT_BRADY_AP_VP_PERCENT: 0.99 %
MDC_IDC_STAT_BRADY_AP_VS_PERCENT: 25.25 %
MDC_IDC_STAT_BRADY_AS_VP_PERCENT: 0.34 %
MDC_IDC_STAT_BRADY_AS_VS_PERCENT: 73.42 %
MDC_IDC_STAT_BRADY_DTM_END: NORMAL
MDC_IDC_STAT_BRADY_DTM_START: NORMAL
MDC_IDC_STAT_BRADY_RA_PERCENT_PACED: 26.57 %
MDC_IDC_STAT_BRADY_RV_PERCENT_PACED: 1.33 %
MDC_IDC_STAT_EPISODE_RECENT_COUNT: 0
MDC_IDC_STAT_EPISODE_RECENT_COUNT_DTM_END: NORMAL
MDC_IDC_STAT_EPISODE_RECENT_COUNT_DTM_START: NORMAL
MDC_IDC_STAT_EPISODE_TOTAL_COUNT: 0
MDC_IDC_STAT_EPISODE_TOTAL_COUNT: 6
MDC_IDC_STAT_EPISODE_TOTAL_COUNT_DTM_END: NORMAL
MDC_IDC_STAT_EPISODE_TOTAL_COUNT_DTM_START: NORMAL
MDC_IDC_STAT_EPISODE_TYPE: NORMAL
PLATELET # BLD AUTO: 145 10E3/UL (ref 150–450)
POTASSIUM BLD-SCNC: 4.1 MMOL/L (ref 3.5–5)
PSA SERPL-MCNC: 7.76 UG/L (ref 0–6.5)
RBC # BLD AUTO: 4.12 10E6/UL (ref 4.4–5.9)
SODIUM SERPL-SCNC: 144 MMOL/L (ref 136–145)
TRIGL SERPL-MCNC: 55 MG/DL
WBC # BLD AUTO: 6.4 10E3/UL (ref 4–11)

## 2022-01-27 PROCEDURE — 99214 OFFICE O/P EST MOD 30 MIN: CPT | Mod: 25 | Performed by: FAMILY MEDICINE

## 2022-01-27 PROCEDURE — G0439 PPPS, SUBSEQ VISIT: HCPCS | Performed by: FAMILY MEDICINE

## 2022-01-27 PROCEDURE — 85027 COMPLETE CBC AUTOMATED: CPT | Performed by: FAMILY MEDICINE

## 2022-01-27 PROCEDURE — 80048 BASIC METABOLIC PNL TOTAL CA: CPT | Performed by: FAMILY MEDICINE

## 2022-01-27 PROCEDURE — 36415 COLL VENOUS BLD VENIPUNCTURE: CPT | Performed by: FAMILY MEDICINE

## 2022-01-27 PROCEDURE — 80061 LIPID PANEL: CPT | Performed by: FAMILY MEDICINE

## 2022-01-27 PROCEDURE — 83036 HEMOGLOBIN GLYCOSYLATED A1C: CPT | Performed by: FAMILY MEDICINE

## 2022-01-27 PROCEDURE — 84153 ASSAY OF PSA TOTAL: CPT | Performed by: FAMILY MEDICINE

## 2022-01-27 RX ORDER — DOXYCYCLINE HYCLATE 100 MG
TABLET ORAL
Qty: 90 TABLET | Refills: 3 | Status: ON HOLD | OUTPATIENT
Start: 2022-01-27 | End: 2022-10-27

## 2022-01-27 ASSESSMENT — MIFFLIN-ST. JEOR: SCORE: 1452.16

## 2022-01-27 ASSESSMENT — ACTIVITIES OF DAILY LIVING (ADL): CURRENT_FUNCTION: NO ASSISTANCE NEEDED

## 2022-01-27 NOTE — PROGRESS NOTES
"SUBJECTIVE:     Mikaela Figueroa is a 86 year old male who presents for Preventive Visit.      Annual wellness visit completed.  Risk questionnaire reviewed.  Decreased hearing recurrent bilateral hearing aids.  Urine leakage history with BPH with urinary obstruction.  Continues lisinopril 10 mg daily for hypertension.  Dietary management for cholesterol elevation historically.  Has had mild normochromic normocytic anemia in the past as well.  Denies recent illness.  Immunizations reviewed and up-to-date.  Scheduled follow-up with his dermatologist in March Dr. Beavers.  Comprehensive review of systems as above otherwise all negative.      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 Quick, [facility])   TRUS with bx 10/7/11 biopsies negative (followed by Dr. Colindres)   Eye exam with Dr. Duarte in this building (monitoring for +/- glaucoma) - followed q year   Dr. Colindres, [facility] every year for elevated PSA, etc. (h/o TRUS with bx negative)   Dr. Beavers, dermatology for q 6 month checks re: \"precancerous\" lesions and rosacea; Mohs procedure left preauricular location 3/17/16   Bilateral hearing aids with h/o hearing loss (began using March, 2015)   Pacemaker 8/28/19 (PACEMAKER MOI XT DR COLETTE PERRY)   Attends [employer] in [place], [place]      Patient has been advised of split billing requirements and indicates understanding: Yes  Are you in the first 12 months of your Medicare coverage?  No    Healthy Habits:     In general, how would you rate your overall health?  Good    Frequency of exercise:  4-5 days/week    Duration of exercise:  15-30 " "minutes    Do you usually eat at least 4 servings of fruit and vegetables a day, include whole grains    & fiber and avoid regularly eating high fat or \"junk\" foods?  Yes    Taking medications regularly:  Yes    Medication side effects:  None    Ability to successfully perform activities of daily living:  No assistance needed    Home Safety:  No safety concerns identified    Hearing Impairment:  Difficulty following a conversation in a noisy restaurant or crowded room, feel that people are mumbling or not speaking clearly, difficult to understand a speaker at a public meeting or Holiness service, need to ask people to speak up or repeat themselves, difficulty understanding soft or whispered speech and difficulty understanding speech on the telephone    In the past 6 months, have you been bothered by leaking of urine? Yes    In general, how would you rate your overall mental or emotional health?  Excellent      PHQ-2 Total Score: 0    Additional concerns today:  Yes    Do you feel safe in your environment? Yes    Have you ever done Advance Care Planning? (For example, a Health Directive, POLST, or a discussion with a medical provider or your loved ones about your wishes): Yes, advance care planning is on file.       Fall risk  Fallen 2 or more times in the past year?: No  Any fall with injury in the past year?: No    Cognitive Screening   1) Repeat 3 items (Leader, Season, Table)    2) Clock draw: NORMAL  3) 3 item recall:  Recalls 2 objects   Results: NORMAL clock, 1-2 items recalled: COGNITIVE IMPAIRMENT LESS LIKELY    Mini-CogTM Copyright ALBINO Sandoval. Licensed by the author for use in [facility]; reprinted with permission (alanis@.[facility]). All rights reserved.      Do you have sleep apnea, excessive snoring or daytime drowsiness?: no    Reviewed and updated as needed this visit by clinical staff  Tobacco  Allergies  Meds  Problems            Reviewed and updated as needed this visit by Provider   " Allergies  Meds  Problems           Social History     Tobacco Use     Smoking status: Former Smoker     Quit date: 1998     Years since quittin.0     Smokeless tobacco: Never Used   Substance Use Topics     Alcohol use: No     If you drink alcohol do you typically have >3 drinks per day or >7 drinks per week? No    Alcohol Use 2022   Prescreen: >3 drinks/day or >7 drinks/week? No   Prescreen: >3 drinks/day or >7 drinks/week? -               Current providers sharing in care for this patient include:   Patient Care Team:  Derek Kumar MD as PCP - General  Derek Kumar MD as Assigned PCP  Dominick Martinez MD as Assigned Surgical Provider    The following health maintenance items are reviewed in Epic and correct as of today:  Health Maintenance Due   Topic Date Due     ANNUAL REVIEW OF HM ORDERS  Never done     ZOSTER IMMUNIZATION (2 of 3) 2008     Lab work is in process  Labs reviewed in EPIC  BP Readings from Last 3 Encounters:   22 138/60   22 (!) 142/62   21 138/60    Wt Readings from Last 3 Encounters:   22 78.5 kg (173 lb)   22 83.3 kg (183 lb 9.6 oz)   21 85.8 kg (189 lb 3.2 oz)                  Patient Active Problem List   Diagnosis     Male Erectile Disorder     Rosacea     Diverticulosis     BPH with urinary obstruction     Nephrolithiasis     Hypercholesterolemia     Hypertension     Impaired Fasting Glucose     Serology Prostate-specific Antigen (PSA) Elevated     Squamous Cell Carcinoma Of The Skin     Hearing loss     Glaucoma     Right inguinal hernia     Essential hypertension with goal blood pressure less than 130/80     Open-angle glaucoma of both eyes, mild stage, unspecified open-angle glaucoma type     Left upper lobe pulmonary nodule     Cardiac pacemaker in situ, dual chamber     Syncope, unspecified syncope type     Myopia     Dermatitis     Disorder of optic nerve     Dystrophy of anterior cornea     Encounter for medication  refill     Keratoconjunctivitis sicca (H)     Mobitz type 1 second degree atrioventricular block     Need for prophylactic vaccination and inoculation against single disease     Presbyopia     Progressive high myopia     Routine general medical examination at a health care facility     History of hepatitis B     Past Surgical History:   Procedure Laterality Date     ARTHROSCOPY KNEE Right        Social History     Tobacco Use     Smoking status: Former Smoker     Quit date: 1998     Years since quittin.0     Smokeless tobacco: Never Used   Substance Use Topics     Alcohol use: No     Family History   Problem Relation Age of Onset     Dementia Mother      Heart Disease Father      Diabetes Father          Current Outpatient Medications   Medication Sig Dispense Refill     aspirin 81 mg chewable tablet [ASPIRIN 81 MG CHEWABLE TABLET] Chew 81 mg daily.       Aspirin Buf,CaCarb-MgCarb-MgO, 81 MG TABS Take 81 mg by mouth        dorzolamide (TRUSOPT) 2 % ophthalmic solution [DORZOLAMIDE (TRUSOPT) 2 % OPHTHALMIC SOLUTION] 1 drop 3 (three) times a day.       doxycycline hyclate (VIBRA-TABS) 100 MG tablet [DOXYCYCLINE (VIBRA-TABS) 100 MG TABLET] TAKE 1 TABLET BY MOUTH DAILY 90 tablet 3     GLUCOSAMINE/CHONDROITIN SULF A (GLUCOSAMINE-CHONDROITIN ORAL) [GLUCOSAMINE/CHONDROITIN SULF A (GLUCOSAMINE-CHONDROITIN ORAL)] 1500 complex capsules       latanoprost (XALATAN) 0.005 % ophthalmic solution [LATANOPROST (XALATAN) 0.005 % OPHTHALMIC SOLUTION] 1 drop bedtime.       lisinopril (ZESTRIL) 10 MG tablet TAKE 1 TABLET DAILY 90 tablet 3     metroNIDAZOLE (METROGEL) 1 % gel [METRONIDAZOLE (METROGEL) 1 % GEL] APPLY A SMALL AMOUNT TO AFFECTED AREA(S) TOPICALLY ONCE DAILY 60 g 11     Multiple Vitamin (MULTI-DAY VITAMIN  S) TABS Take 1 tablet by mouth        MULTIVITAMIN ORAL [MULTIVITAMIN ORAL]        tamsulosin (FLOMAX) 0.4 MG capsule TAKE 1 CAPSULE AT BEDTIME 90 capsule 3     triamcinolone (KENALOG) 0.1 % external cream     "    No Known Allergies    Immunizations reviewed and up-to-date.        Review of Systems  Constitutional, HEENT, cardiovascular, pulmonary, GI, , musculoskeletal, neuro, skin, endocrine and psych systems are negative, except as otherwise noted.    OBJECTIVE:   /60   Pulse 82   Ht 1.74 m (5' 8.5\")   Wt 78.5 kg (173 lb)   SpO2 97%   BMI 25.92 kg/m   Estimated body mass index is 25.92 kg/m  as calculated from the following:    Height as of this encounter: 1.74 m (5' 8.5\").    Weight as of this encounter: 78.5 kg (173 lb).     Physical Exam  GENERAL: healthy, alert and no distress  EYES: Eyes grossly normal to inspection, PERRL and conjunctivae and sclerae normal  HENT: ear canals and TM's normal, nose and mouth without ulcers or lesions.  Bilateral hearing aids in place.  NECK: no adenopathy, no asymmetry, masses, or scars and thyroid normal to palpation  RESP: lungs clear to auscultation - no rales, rhonchi or wheezes  CV: regular rate and rhythm, normal S1 S2, no S3 or S4, no murmur, click or rub, no peripheral edema and peripheral pulses strong  ABDOMEN: soft, nontender, no hepatosplenomegaly, no masses and bowel sounds normal   (male): normal male genitalia without lesions or urethral discharge, no hernia  RECTAL: normal sphincter tone, no rectal masses, prostate significantly enlarged, smooth, nontender without nodules or masses  MS: no gross musculoskeletal defects noted, trace bilateral ankle edema  SKIN: no suspicious lesions or rashes  NEURO: Normal strength and tone, mentation intact and speech normal  PSYCH: mentation appears normal, affect normal/bright    Diagnostic Test Results:  Labs reviewed in Epic  Results for orders placed or performed in visit on 01/26/22 (from the past 24 hour(s))   Cardiac Device Check - Remote (Standing ORD 5 count)   Result Value Ref Range    Date Time Interrogation Session [id number]     Implantable Pulse Generator  Medtronic     Implantable " Pulse Generator Model [id number] [place] XT DR MRI     Implantable Pulse Generator Serial Number [id number]     Type Interrogation Session Remote      Clinic Name [facility]     Implantable Pulse Generator Type Pacemaker     Implantable Pulse Generator Implant Date 20190828     Implantable Lead  Medtronic     Implantable Lead Model 5076 CapSureFix Novus MRI SureScan     Implantable Lead Serial Number [id number]     Implantable Lead Implant Date 20190828     Implantable Lead Polarity Type Bipolar Lead     Implantable Lead Location Detail 1 UNKNOWN     Implantable Lead Special Function 52 cm     Implantable Lead Location Right Atrium     Implantable Lead  Medtronic     Implantable Lead Model 5076 CapSureFix Novus MRI SureScan     Implantable Lead Serial Number [id number]     Implantable Lead Implant Date 20190828     Implantable Lead Polarity Type Bipolar Lead     Implantable Lead Location Detail 1 UNKNOWN     Implantable Lead Special Function 58 cm     Implantable Lead Location Right Ventricle     Mamadou Setting Mode (NBG Code) MVP_AAIR_DDDR     Mamadou Setting Lower Rate Limit 50 [beats]/min    Mamadou Setting Maximum Tracking Rate 120 [beats]/min    Mamadou Setting Maximum Sensor Rate 120 [beats]/min    Mamadou Setting Hysterisis Rate DISABLED     Mamadou Setting AURA Delay Low 150 ms    Mamadou Setting PAV Delay Low 180 ms    Mamadou Setting AT Mode Switch Rate 171 [beats]/min    Lead Channel Setting Sensing Polarity Bipolar     Lead Channel Setting Sensing Anode Location Right Atrium     Lead Channel Setting Sensing Anode Terminal Ring     Lead Channel Setting Sensing Cathode Location Right Atrium     Lead Channel Setting Sensing Cathode Terminal Tip     Lead Channel Setting Sensing Sensitivity 0.3 mV    Lead Channel Setting Sensing Polarity Bipolar     Lead Channel Setting Sensing Anode Location Right Ventricle     Lead Channel Setting Sensing Anode Terminal Ring     Lead Channel Setting  Sensing Cathode Location Right Ventricle     Lead Channel Setting Sensing Cathode Terminal Tip     Lead Channel Setting Sensing Sensitivity 0.9 mV    Lead Channel Setting Pacing Polarity Bipolar     Lead Channel Setting Pacing Anode Location Right Atrium     Lead Channel Setting Pacing Anode Terminal Ring     Lead Channel Setting Sensing Cathode Location Right Atrium     Lead Channel Setting Sensing Cathode Terminal Tip     Lead Channel Setting Pacing Pulse Width 0.4 ms    Lead Channel Setting Pacing Amplitude 1.5 V    Lead Channel Setting Pacing Capture Mode Adaptive     Lead Channel Setting Pacing Polarity Bipolar     Lead Channel Setting Pacing Anode Location Right Ventricle     Lead Channel Setting Pacing Anode Terminal Ring     Lead Channel Setting Sensing Cathode Location Right Ventricle     Lead Channel Setting Sensing Cathode Terminal Tip     Lead Channel Setting Pacing Pulse Width 0.4 ms    Lead Channel Setting Pacing Amplitude 1.5 V    Lead Channel Setting Pacing Capture Mode Adaptive     Zone Setting Type Category VF     Zone Setting Type Category VT     Zone Setting Type Category VT     Zone Setting Type Category VT     Zone Setting Detection Interval 400 ms    Zone Setting Type Category ATRIAL_FIBRILLATION     Zone Setting Type Category AT/AF     Zone Setting Detection Interval 350 ms    Lead Channel Impedance Value 380 ohm    Lead Channel Impedance Value 342 ohm    Lead Channel Sensing Intrinsic Amplitude 1 mV    Lead Channel Sensing Intrinsic Amplitude 1 mV    Lead Channel Pacing Threshold Amplitude 0.375 V    Lead Channel Pacing Threshold Pulse Width 0.4 ms    Lead Channel Impedance Value 513 ohm    Lead Channel Impedance Value 456 ohm    Lead Channel Sensing Intrinsic Amplitude 9.375 mV    Lead Channel Sensing Intrinsic Amplitude 9.375 mV    Lead Channel Pacing Threshold Amplitude 0.5 V    Lead Channel Pacing Threshold Pulse Width 0.4 ms    Battery Date Time of Measurements 20220125231502      Battery Status OK     Battery RRT Trigger 2.625     Battery Remaining Longevity 149 mo    Battery Voltage 3.03 V    Mamadou Statistic Date Time Start [id number]     Mamadou Statistic Date Time End 20220126012619     Mamadou Statistic RA Percent Paced 26.57 %    Mamadou Statistic RV Percent Paced 1.33 %    Mamadou Statistic AP  Percent 0.99 %    Mamadou Statistic AS  Percent 0.34 %    Mamadou Statistic AP VS Percent 25.25 %    Mamadou Statistic AS VS Percent 73.42 %    Atrial Tachy Statistic Date Time Start [id number]     Atrial Tachy Statistic Date Time End 20220126012619     Atrial Tachy Statistic AT/AF [place] Percent 0 %    Episode Statistic Recent Count 0     Episode Statistic Type Category AT/AF     Episode Statistic Recent Count 0     Episode Statistic Type Category Patient Activated     Episode Statistic Recent Count 0     Episode Statistic Type Category SVT     Episode Statistic Recent Count 0     Episode Statistic Type Category VT     Episode Statistic Recent Count 0     Episode Statistic Type Category VT     Episode Statistic Recent Date Time Start [id number]     Episode Statistic Recent Date Time End [id number]     Episode Statistic Recent Date Time Start 18032480177262     Episode Statistic Recent Date Time End [id number]     Episode Statistic Recent Date Time Start [id number]     Episode Statistic Recent Date Time End 07571430481708     Episode Statistic Recent Date Time Start [id number]     Episode Statistic Recent Date Time End 17969820843895     Episode Statistic Recent Date Time Start 71573932053188     Episode Statistic Recent Date Time End 25891088241410     Episode Statistic Total Count 0     Episode Statistic Type Category AT/AF     Episode Statistic Total Count 0     Episode Statistic Type Category Patient Activated     Episode Statistic Total Count 0     Episode Statistic Type Category SVT     Episode Statistic Total Count 6     Episode Statistic Type Category VT     Episode  Statistic Total Count 0     Episode Statistic Type Category VT     Episode Statistic Total Date Time Start 20190828171631     Episode Statistic Total Date Time End 20220126012619     Episode Statistic Total Date Time Start 20190828171631     Episode Statistic Total Date Time End 20220126012619     Episode Statistic Total Date Time Start 20190828171631     Episode Statistic Total Date Time End 20220126012619     Episode Statistic Total Date Time Start 20190828171631     Episode Statistic Total Date Time End 20220126012619     Episode Statistic Total Date Time Start 20190828171631     Episode Statistic Total Date Time End 20220126012619     Narrative    Type: routine remote pacemaker transmission.   Presenting rhythm: sinus 55 bpm.  Battery/lead status: stable.  Arrhythmias: since 10/20/21; total of 15sec of AT/AF noted. No ventricular   arrhythmias detected.  Comments: normal pacemaker function. CONCEPCIÓN Moore, Device Specialist    I have reviewed and interpreted the device interrogation, settings,   programming, and encounter summary. The device is functioning within   normal device parameters. I agree with the current findings, assessment   and plan.       ASSESSMENT / PLAN:     Encounter for Medicare annual wellness exam  Annual wellness visit completed.  Risk associate with hearing loss and urine leakage, stable findings.  Annual wellness visits to continue as noted.    Keratoconjunctivitis sicca (H)  Follows with ophthalmology on a consistent basis with history of glaucoma as well.    Essential hypertension  Hypertension well controlled currently.  Remains on lisinopril 10 mg daily.  Med monitoring completed.  - Basic metabolic panel    Elevated prostate specific antigen (PSA)  PSA elevation historically and followed by Dr. Colindres on a consistent basis.  Check diagnostic PSA today to ensure stable.  - PSA tumor marker    BPH with urinary obstruction  Tamsulosin 0.4 mg daily continues.    Open-angle glaucoma of both  "eyes, mild stage, unspecified open-angle glaucoma type  Continues glaucoma management as noted with ophthalmologist.    Impaired fasting glucose  Impaired fasting glucose.  Check A1c and fasting glucose today.  Prior A1c of 5.9% December 22, 2020.  - Basic metabolic panel  - Hemoglobin A1c    Cardiac pacemaker in situ  Pacemaker in place functioning properly.    Pure hypercholesterolemia  Check lipid cascade today while fasting.  - Lipid panel reflex to direct LDL Fasting    Rosacea  Doxycycline 100 mg daily continues for rosacea management.  - doxycycline hyclate (VIBRA-TABS) 100 MG tablet  Dispense: 90 tablet; Refill: 3       Patient has been advised of split billing requirements and indicates understanding: No    COUNSELING:  Reviewed preventive health counseling, as reflected in patient instructions       Regular exercise       Healthy diet/nutrition       Vision screening       Hearing screening       Dental care       Bladder control       Aspirin prophylaxis     Estimated body mass index is 25.92 kg/m  as calculated from the following:    Height as of this encounter: 1.74 m (5' 8.5\").    Weight as of this encounter: 78.5 kg (173 lb).        He reports that he quit smoking about 24 years ago. He has never used smokeless tobacco.      Appropriate preventive services were discussed with this patient, including applicable screening as appropriate for cardiovascular disease, diabetes, osteopenia/osteoporosis, and glaucoma.  As appropriate for age/gender, discussed screening for colorectal cancer, prostate cancer, breast cancer, and cervical cancer. Checklist reviewing preventive services available has been given to the patient.    Reviewed patients plan of care and provided an AVS. The Intermediate Care Plan ( asthma action plan, low back pain action plan, and migraine action plan) for Mikaela meets the Care Plan requirement. This Care Plan has been established and reviewed with the Patient.    Counseling " Resources:  ATP IV Guidelines  Pooled Cohorts Equation Calculator  Breast Cancer Risk Calculator  Breast Cancer: Medication to Reduce Risk  FRAX Risk Assessment  ICSI Preventive Guidelines  Dietary Guidelines for Americans, 2010  USDA's MyPlate  ASA Prophylaxis  Lung CA Screening    Derek Kumar MD  [facility]    Identified Health Risks:    The patient was provided with written information regarding signs of hearing loss.  Information on urinary incontinence and treatment options given to patient.

## 2022-01-27 NOTE — PATIENT INSTRUCTIONS
Patient Education   Personalized Prevention Plan  You are due for the preventive services outlined below.  Your care team is available to assist you in scheduling these services.  If you have already completed any of these items, please share that information with your care team to update in your medical record.  Health Maintenance Due   Topic Date Due     ANNUAL REVIEW OF  ORDERS  Never done     Zoster (Shingles) Vaccine (2 of 3) 12/27/2008       Signs of Hearing Loss      Hearing much better with one ear can be a sign of hearing loss.   Hearing loss is a problem shared by many people. In fact, it is one of the most common health problems, particularly as people age. Most people age 65 and older have some hearing loss. By age 80, almost everyone does. Hearing loss often occurs slowly over the years. So you may not realize your hearing has gotten worse.  Have your hearing checked  Call your healthcare provider if you:    Have to strain to hear normal conversation    Have to watch other people s faces very carefully to follow what they re saying    Need to ask people to repeat what they ve said    Often misunderstand what people are saying    Turn the volume of the television or radio up so high that others complain    Feel that people are mumbling when they re talking to you    Find that the effort to hear leaves you feeling tired and irritated    Notice, when using the phone, that you hear better with one ear than the other  Dovo last reviewed this educational content on 1/1/2020 2000-2021 [employer]. All rights reserved. This information is not intended as a substitute for professional medical care. Always follow your healthcare professional's instructions.          Urinary Incontinence (Male)    Urinary incontinence means not being able to control the release of urine from the bladder.   Causes  Common causes of urinary incontinence in men include:    Infection    Certain  medicines    Aging    Poor pelvic muscle tone    Bladder spasms    Obesity    Trouble urinating and fully emptying the bladder (urinary retention)  Other things that can cause incontinence are:     Nervous system diseases    Diabetes    Sleep apnea    Urinary tract infections    Prostate surgery    Pelvic injury  Constipation and smoking have also been identified as risk factors.   Symptoms    Urge incontinence (overactive bladder). This is a sudden urge to urinate. It occurs even though there may not be much urine in the bladder. The need to urinate often during the night is common. It's due to bladder spasms.    Stress incontinence. This is urine leakage that you can't control. It can occur with sneezing, coughing, and other actions that put stress on the bladder.    Treatment  Treatment depends on what is causing the condition. Bladder infections are treated with antibiotics. Urinary retention is treated with a bladder catheter.   Home care  Follow these guidelines when caring for yourself at home:    Don't have any foods and drinks that may irritate the bladder. This includes:  ? Chocolate  ? Alcohol  ? Caffeine  ? Carbonated drinks  ? Acidic fruits and juices    Limit fluids to 6 to 8 cups a day.    Lose weight if you are overweight. This will reduce your symptoms.    If advised, do regular pelvic muscle-strengthening exercises such as Kegel exercises.    If needed, wear absorbent pads to catch urine. Change the pads often. This is for good hygiene and to prevent skin and bladder infections.    Bathe daily for good hygiene.    If an antibiotic was prescribed to treat a bladder infection, take it until it's finished. Keep taking it even if you are feeling better. This is to make sure your infection has cleared.    If a catheter was left in place, keep bacteria from getting into the collection bag. Don't disconnect the catheter from the collection bag.    Use a leg band to secure the catheter drainage tube, so it  does not pull on the catheter. Drain the collection bag when it becomes full. To do this, use the drain spout at the bottom of the bag. Don't disconnect the bag from the catheter.    Don't pull on or try to remove a catheter. The catheter must be removed by a healthcare provider.    If you smoke, stop. Ask your provider for help if you can't do this on your own.  Follow-up care  Follow up with your healthcare provider, or as advised.  When to get medical advice  Call your healthcare provider right away if any of these occur:    Fever over 100.4 F (38 C), or as directed by your provider    Bladder pain or fullness    Belly swelling, nausea, or vomiting    Back pain    Weakness, dizziness, or fainting    If a catheter was left in place, return if:  ? The catheter falls out  ? The catheter stops draining for 6 hours  ? Your urine gets cloudy or smells bad  Tushar last reviewed this educational content on 1/1/2020 2000-2021 [employer]. All rights reserved. This information is not intended as a substitute for professional medical care. Always follow your healthcare professional's instructions.

## 2022-03-16 ENCOUNTER — TRANSFERRED RECORDS (OUTPATIENT)
Dept: HEALTH INFORMATION MANAGEMENT | Facility: CLINIC | Age: 87
End: 2022-03-16
Payer: MEDICARE

## 2022-04-11 ENCOUNTER — TRANSFERRED RECORDS (OUTPATIENT)
Dept: HEALTH INFORMATION MANAGEMENT | Facility: CLINIC | Age: 87
End: 2022-04-11
Payer: MEDICARE

## 2022-05-06 ENCOUNTER — ANCILLARY PROCEDURE (OUTPATIENT)
Dept: CARDIOLOGY | Facility: CLINIC | Age: 87
End: 2022-05-06
Attending: INTERNAL MEDICINE
Payer: MEDICARE

## 2022-05-06 DIAGNOSIS — I44.1 SECOND DEGREE MOBITZ I AV BLOCK: ICD-10-CM

## 2022-05-06 DIAGNOSIS — Z95.0 PACEMAKER: ICD-10-CM

## 2022-05-06 LAB
MDC_IDC_LEAD_IMPLANT_DT: NORMAL
MDC_IDC_LEAD_IMPLANT_DT: NORMAL
MDC_IDC_LEAD_LOCATION: NORMAL
MDC_IDC_LEAD_LOCATION: NORMAL
MDC_IDC_LEAD_LOCATION_DETAIL_1: NORMAL
MDC_IDC_LEAD_LOCATION_DETAIL_1: NORMAL
MDC_IDC_LEAD_MFG: NORMAL
MDC_IDC_LEAD_MFG: NORMAL
MDC_IDC_LEAD_MODEL: NORMAL
MDC_IDC_LEAD_MODEL: NORMAL
MDC_IDC_LEAD_POLARITY_TYPE: NORMAL
MDC_IDC_LEAD_POLARITY_TYPE: NORMAL
MDC_IDC_LEAD_SERIAL: NORMAL
MDC_IDC_LEAD_SERIAL: NORMAL
MDC_IDC_LEAD_SPECIAL_FUNCTION: NORMAL
MDC_IDC_LEAD_SPECIAL_FUNCTION: NORMAL
MDC_IDC_MSMT_BATTERY_DTM: NORMAL
MDC_IDC_MSMT_BATTERY_REMAINING_LONGEVITY: 145 MO
MDC_IDC_MSMT_BATTERY_RRT_TRIGGER: 2.62
MDC_IDC_MSMT_BATTERY_STATUS: NORMAL
MDC_IDC_MSMT_BATTERY_VOLTAGE: 3.02 V
MDC_IDC_MSMT_LEADCHNL_RA_IMPEDANCE_VALUE: 342 OHM
MDC_IDC_MSMT_LEADCHNL_RA_IMPEDANCE_VALUE: 380 OHM
MDC_IDC_MSMT_LEADCHNL_RA_PACING_THRESHOLD_AMPLITUDE: 0.38 V
MDC_IDC_MSMT_LEADCHNL_RA_PACING_THRESHOLD_PULSEWIDTH: 0.4 MS
MDC_IDC_MSMT_LEADCHNL_RA_SENSING_INTR_AMPL: 1.62 MV
MDC_IDC_MSMT_LEADCHNL_RA_SENSING_INTR_AMPL: 1.62 MV
MDC_IDC_MSMT_LEADCHNL_RV_IMPEDANCE_VALUE: 418 OHM
MDC_IDC_MSMT_LEADCHNL_RV_IMPEDANCE_VALUE: 475 OHM
MDC_IDC_MSMT_LEADCHNL_RV_PACING_THRESHOLD_AMPLITUDE: 0.5 V
MDC_IDC_MSMT_LEADCHNL_RV_PACING_THRESHOLD_PULSEWIDTH: 0.4 MS
MDC_IDC_MSMT_LEADCHNL_RV_SENSING_INTR_AMPL: 8.75 MV
MDC_IDC_MSMT_LEADCHNL_RV_SENSING_INTR_AMPL: 8.75 MV
MDC_IDC_PG_IMPLANT_DTM: NORMAL
MDC_IDC_PG_MFG: NORMAL
MDC_IDC_PG_MODEL: NORMAL
MDC_IDC_PG_SERIAL: NORMAL
MDC_IDC_PG_TYPE: NORMAL
MDC_IDC_SESS_CLINIC_NAME: NORMAL
MDC_IDC_SESS_DTM: NORMAL
MDC_IDC_SESS_TYPE: NORMAL
MDC_IDC_SET_BRADY_AT_MODE_SWITCH_RATE: 171 {BEATS}/MIN
MDC_IDC_SET_BRADY_HYSTRATE: NORMAL
MDC_IDC_SET_BRADY_LOWRATE: 50 {BEATS}/MIN
MDC_IDC_SET_BRADY_MAX_SENSOR_RATE: 120 {BEATS}/MIN
MDC_IDC_SET_BRADY_MAX_TRACKING_RATE: 120 {BEATS}/MIN
MDC_IDC_SET_BRADY_MODE: NORMAL
MDC_IDC_SET_BRADY_PAV_DELAY_LOW: 180 MS
MDC_IDC_SET_BRADY_SAV_DELAY_LOW: 150 MS
MDC_IDC_SET_LEADCHNL_RA_PACING_AMPLITUDE: 1.5 V
MDC_IDC_SET_LEADCHNL_RA_PACING_ANODE_ELECTRODE_1: NORMAL
MDC_IDC_SET_LEADCHNL_RA_PACING_ANODE_LOCATION_1: NORMAL
MDC_IDC_SET_LEADCHNL_RA_PACING_CAPTURE_MODE: NORMAL
MDC_IDC_SET_LEADCHNL_RA_PACING_CATHODE_ELECTRODE_1: NORMAL
MDC_IDC_SET_LEADCHNL_RA_PACING_CATHODE_LOCATION_1: NORMAL
MDC_IDC_SET_LEADCHNL_RA_PACING_POLARITY: NORMAL
MDC_IDC_SET_LEADCHNL_RA_PACING_PULSEWIDTH: 0.4 MS
MDC_IDC_SET_LEADCHNL_RA_SENSING_ANODE_ELECTRODE_1: NORMAL
MDC_IDC_SET_LEADCHNL_RA_SENSING_ANODE_LOCATION_1: NORMAL
MDC_IDC_SET_LEADCHNL_RA_SENSING_CATHODE_ELECTRODE_1: NORMAL
MDC_IDC_SET_LEADCHNL_RA_SENSING_CATHODE_LOCATION_1: NORMAL
MDC_IDC_SET_LEADCHNL_RA_SENSING_POLARITY: NORMAL
MDC_IDC_SET_LEADCHNL_RA_SENSING_SENSITIVITY: 0.3 MV
MDC_IDC_SET_LEADCHNL_RV_PACING_AMPLITUDE: 1.5 V
MDC_IDC_SET_LEADCHNL_RV_PACING_ANODE_ELECTRODE_1: NORMAL
MDC_IDC_SET_LEADCHNL_RV_PACING_ANODE_LOCATION_1: NORMAL
MDC_IDC_SET_LEADCHNL_RV_PACING_CAPTURE_MODE: NORMAL
MDC_IDC_SET_LEADCHNL_RV_PACING_CATHODE_ELECTRODE_1: NORMAL
MDC_IDC_SET_LEADCHNL_RV_PACING_CATHODE_LOCATION_1: NORMAL
MDC_IDC_SET_LEADCHNL_RV_PACING_POLARITY: NORMAL
MDC_IDC_SET_LEADCHNL_RV_PACING_PULSEWIDTH: 0.4 MS
MDC_IDC_SET_LEADCHNL_RV_SENSING_ANODE_ELECTRODE_1: NORMAL
MDC_IDC_SET_LEADCHNL_RV_SENSING_ANODE_LOCATION_1: NORMAL
MDC_IDC_SET_LEADCHNL_RV_SENSING_CATHODE_ELECTRODE_1: NORMAL
MDC_IDC_SET_LEADCHNL_RV_SENSING_CATHODE_LOCATION_1: NORMAL
MDC_IDC_SET_LEADCHNL_RV_SENSING_POLARITY: NORMAL
MDC_IDC_SET_LEADCHNL_RV_SENSING_SENSITIVITY: 0.9 MV
MDC_IDC_SET_ZONE_DETECTION_INTERVAL: 350 MS
MDC_IDC_SET_ZONE_DETECTION_INTERVAL: 400 MS
MDC_IDC_SET_ZONE_TYPE: NORMAL
MDC_IDC_STAT_AT_BURDEN_PERCENT: 0 %
MDC_IDC_STAT_AT_DTM_END: NORMAL
MDC_IDC_STAT_AT_DTM_START: NORMAL
MDC_IDC_STAT_BRADY_AP_VP_PERCENT: 0.76 %
MDC_IDC_STAT_BRADY_AP_VS_PERCENT: 26.76 %
MDC_IDC_STAT_BRADY_AS_VP_PERCENT: 0.24 %
MDC_IDC_STAT_BRADY_AS_VS_PERCENT: 72.25 %
MDC_IDC_STAT_BRADY_DTM_END: NORMAL
MDC_IDC_STAT_BRADY_DTM_START: NORMAL
MDC_IDC_STAT_BRADY_RA_PERCENT_PACED: 27.97 %
MDC_IDC_STAT_BRADY_RV_PERCENT_PACED: 1 %
MDC_IDC_STAT_EPISODE_RECENT_COUNT: 0
MDC_IDC_STAT_EPISODE_RECENT_COUNT_DTM_END: NORMAL
MDC_IDC_STAT_EPISODE_RECENT_COUNT_DTM_START: NORMAL
MDC_IDC_STAT_EPISODE_TOTAL_COUNT: 0
MDC_IDC_STAT_EPISODE_TOTAL_COUNT: 6
MDC_IDC_STAT_EPISODE_TOTAL_COUNT_DTM_END: NORMAL
MDC_IDC_STAT_EPISODE_TOTAL_COUNT_DTM_START: NORMAL
MDC_IDC_STAT_EPISODE_TYPE: NORMAL

## 2022-05-06 PROCEDURE — 93296 REM INTERROG EVL PM/IDS: CPT | Performed by: INTERNAL MEDICINE

## 2022-05-06 PROCEDURE — 93294 REM INTERROG EVL PM/LDLS PM: CPT | Performed by: INTERNAL MEDICINE

## 2022-05-14 DIAGNOSIS — L71.9 ROSACEA: ICD-10-CM

## 2022-05-16 RX ORDER — METRONIDAZOLE 10 MG/G
GEL TOPICAL
Qty: 60 G | Refills: 11 | Status: SHIPPED | OUTPATIENT
Start: 2022-05-16 | End: 2023-10-31

## 2022-05-16 NOTE — TELEPHONE ENCOUNTER
"Routing refill request to provider for review/approval because:  PCP to review    Last Written Prescription Date:  1/23/2021  Last Fill Quantity: 60 g,  # refills: 11   Last office visit provider:  1/27/2022 Dr. Kumar     metroNIDAZOLE (METROGEL) 1 % gel 60 g 11 1/23/2021  No   Sig: APPLY A SMALL AMOUNT TO AFFECTED AREA(S) TOPICALLY ONCE DAILY   Sent to pharmacy as: metroNIDAZOLE 1 % topical gel (METROGEL)   E-Prescribing Status: Receipt confirmed by pharmacy (1/23/2021  6:09 AM CS         Requested Prescriptions   Pending Prescriptions Disp Refills     metroNIDAZOLE (METROGEL) 1 % external gel [Pharmacy Med Name: METRONIDAZOLE GEL 60GM 1%] 60 g 11     Sig: APPLY A SMALL AMOUNT TO AFFECTED AREA(S) TOPICALLY ONCE DAILY       Topical Acne Medications Protocol Passed - 5/14/2022 11:28 AM        Passed - Patient is 12 years of age or older        Passed - Recent (12 mo) or future (30 days) visit within the authorizing provider's specialty     Patient has had an office visit with the authorizing provider or a provider within the authorizing providers department within the previous 12 mos or has a future within next 30 days. See \"Patient Info\" tab in inbasket, or \"Choose Columns\" in Meds & Orders section of the refill encounter.              Passed - Medication is active on med list             Asia Valadez RN 05/16/22 12:13 PM  "

## 2022-07-06 ENCOUNTER — OFFICE VISIT (OUTPATIENT)
Dept: FAMILY MEDICINE | Facility: CLINIC | Age: 87
End: 2022-07-06
Payer: MEDICARE

## 2022-07-06 VITALS
WEIGHT: 173 LBS | SYSTOLIC BLOOD PRESSURE: 134 MMHG | BODY MASS INDEX: 25.92 KG/M2 | DIASTOLIC BLOOD PRESSURE: 62 MMHG | OXYGEN SATURATION: 95 % | HEART RATE: 90 BPM

## 2022-07-06 DIAGNOSIS — R97.20 ELEVATED PROSTATE SPECIFIC ANTIGEN (PSA): ICD-10-CM

## 2022-07-06 DIAGNOSIS — D64.9 NORMOCHROMIC NORMOCYTIC ANEMIA: ICD-10-CM

## 2022-07-06 DIAGNOSIS — R73.01 IMPAIRED FASTING GLUCOSE: ICD-10-CM

## 2022-07-06 DIAGNOSIS — E78.00 PURE HYPERCHOLESTEROLEMIA: ICD-10-CM

## 2022-07-06 DIAGNOSIS — I10 ESSENTIAL HYPERTENSION: Primary | ICD-10-CM

## 2022-07-06 DIAGNOSIS — Z23 HIGH PRIORITY FOR 2019-NCOV VACCINE: ICD-10-CM

## 2022-07-06 LAB
ANION GAP SERPL CALCULATED.3IONS-SCNC: 10 MMOL/L (ref 7–15)
BUN SERPL-MCNC: 24.4 MG/DL (ref 8–23)
CALCIUM SERPL-MCNC: 9.2 MG/DL (ref 8.8–10.2)
CHLORIDE SERPL-SCNC: 109 MMOL/L (ref 98–107)
CREAT SERPL-MCNC: 1.3 MG/DL (ref 0.67–1.17)
DEPRECATED HCO3 PLAS-SCNC: 27 MMOL/L (ref 22–29)
ERYTHROCYTE [DISTWIDTH] IN BLOOD BY AUTOMATED COUNT: 12.2 % (ref 10–15)
GFR SERPL CREATININE-BSD FRML MDRD: 54 ML/MIN/1.73M2
GLUCOSE SERPL-MCNC: 99 MG/DL (ref 70–99)
HBA1C MFR BLD: 5.7 % (ref 0–5.6)
HCT VFR BLD AUTO: 36.1 % (ref 40–53)
HGB BLD-MCNC: 12 G/DL (ref 13.3–17.7)
MCH RBC QN AUTO: 31 PG (ref 26.5–33)
MCHC RBC AUTO-ENTMCNC: 33.2 G/DL (ref 31.5–36.5)
MCV RBC AUTO: 93 FL (ref 78–100)
PLATELET # BLD AUTO: 129 10E3/UL (ref 150–450)
POTASSIUM SERPL-SCNC: 4.2 MMOL/L (ref 3.4–5.3)
PSA SERPL-MCNC: 8.3 NG/ML
RBC # BLD AUTO: 3.87 10E6/UL (ref 4.4–5.9)
SODIUM SERPL-SCNC: 146 MMOL/L (ref 136–145)
WBC # BLD AUTO: 6.5 10E3/UL (ref 4–11)

## 2022-07-06 PROCEDURE — 84153 ASSAY OF PSA TOTAL: CPT | Performed by: FAMILY MEDICINE

## 2022-07-06 PROCEDURE — 36415 COLL VENOUS BLD VENIPUNCTURE: CPT | Performed by: FAMILY MEDICINE

## 2022-07-06 PROCEDURE — 0064A COVID-19,PF,MODERNA (18+ YRS BOOSTER .25ML): CPT | Performed by: FAMILY MEDICINE

## 2022-07-06 PROCEDURE — 91306 COVID-19,PF,MODERNA (18+ YRS BOOSTER .25ML): CPT | Performed by: FAMILY MEDICINE

## 2022-07-06 PROCEDURE — 80048 BASIC METABOLIC PNL TOTAL CA: CPT | Performed by: FAMILY MEDICINE

## 2022-07-06 PROCEDURE — 99214 OFFICE O/P EST MOD 30 MIN: CPT | Mod: 25 | Performed by: FAMILY MEDICINE

## 2022-07-06 PROCEDURE — 85027 COMPLETE CBC AUTOMATED: CPT | Performed by: FAMILY MEDICINE

## 2022-07-06 PROCEDURE — 83036 HEMOGLOBIN GLYCOSYLATED A1C: CPT | Performed by: FAMILY MEDICINE

## 2022-07-06 ASSESSMENT — PAIN SCALES - GENERAL: PAINLEVEL: NO PAIN (0)

## 2022-07-06 NOTE — PROGRESS NOTES
"Assessment/Plan:    Essential hypertension  Hypertension, stable.  Continues lisinopril 10 mg daily with blood pressure 134/62 on recheck.  Patient will follow-up after January 27, 2023 for annual wellness visit and blood pressure recheck at that time.  - REVIEW OF HEALTH MAINTENANCE PROTOCOL ORDERS  - Basic metabolic panel    Elevated prostate specific antigen (PSA)  History of mild PSA elevation historically.  Has scheduled follow-up with Dr. Hernandez urology July 22, 2022 and needs diagnostic PSA updated prior.  - PSA tumor marker    Impaired fasting glucose  Impaired fasting glucose with fasting glucose and A1c obtained today.  Maintain weight less than 170 pounds ideally  - Basic metabolic panel  - Hemoglobin A1c    Pure hypercholesterolemia  Cholesterol elevation historically.  Most recent lipid cascade January 27, 2022 well-controlled with therapeutic lifestyle changes    Normochromic normocytic anemia  History normochromic normocytic anemia and will update CBC.  - CBC with platelets    High priority for 2019-nCoV vaccine  COVID-19 Moderna second booster provided today.  - COVID-19,PF,MODERNA (18+ Yrs BOOSTER .25mL)        Subjective:    Mikaela ARACELIS Figueroa is seen today for follow-up assessment.  Hypertension.  Treated with lisinopril 10 mg daily.  BPH managed with tamsulosin 0.4 mg daily.  Sees Dr. Beavers for dermatology.  History normochromic normocytic anemia.  Impaired fasting glucose with prior A1c improving to 5.8% January 27, 2022.  Therapeutic lifestyle changes in place for cholesterol management as well.  Needs second COVID-19 Moderna booster.  Has had mild PSA elevation historically follows with Dr. Colindres [facility] and has follow-up July 22, 2022 noted.  Comprehensive review of systems as above otherwise all negative.    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 Quick, [facility])   TRUS with bx 10/7/11 biopsies negative (followed by Dr. Colindres)   Eye exam with Dr. Duarte in this building (monitoring for +/- glaucoma) - followed q year   Dr. Colindres, [facility] every year for elevated PSA, etc. (h/o TRUS with bx negative)   Dr. Beavers, dermatology for q 6 month checks re: \"precancerous\" lesions and rosacea; Mohs procedure left preauricular location 3/17/16   Bilateral hearing aids with h/o hearing loss (began using )   Pacemaker 19 (PACEMAKER MOI XT DR COLETTE PERRY)   Attends [employer] in [place], [place]    Past Surgical History:   Procedure Laterality Date     ARTHROSCOPY KNEE Right         Family History   Problem Relation Age of Onset     Dementia Mother      Heart Disease Father      Diabetes Father         Past Medical History:   Diagnosis Date     Hypertension         Social History     Tobacco Use     Smoking status: Former Smoker     Quit date: 1998     Years since quittin.5     Smokeless tobacco: Never Used   Substance Use Topics     Alcohol use: No     Drug use: No        Current Outpatient Medications   Medication Sig Dispense Refill     aspirin 81 mg chewable tablet [ASPIRIN 81 MG CHEWABLE TABLET] Chew 81 mg daily.       Aspirin Buf,CaCarb-MgCarb-MgO, 81 MG TABS Take 81 mg by mouth        dorzolamide (TRUSOPT) 2 % ophthalmic solution [DORZOLAMIDE (TRUSOPT) 2 % OPHTHALMIC SOLUTION] 1 drop 3 (three) times a day.       doxycycline hyclate (VIBRA-TABS) 100 MG tablet [DOXYCYCLINE (VIBRA-TABS) 100 MG TABLET] TAKE 1 TABLET BY MOUTH DAILY 90 tablet 3     GLUCOSAMINE/CHONDROITIN SULF A (GLUCOSAMINE-CHONDROITIN ORAL) [GLUCOSAMINE/CHONDROITIN SULF " A (GLUCOSAMINE-CHONDROITIN ORAL)] 1500 complex capsules       latanoprost (XALATAN) 0.005 % ophthalmic solution [LATANOPROST (XALATAN) 0.005 % OPHTHALMIC SOLUTION] 1 drop bedtime.       lisinopril (ZESTRIL) 10 MG tablet TAKE 1 TABLET DAILY 90 tablet 3     metroNIDAZOLE (METROGEL) 1 % external gel APPLY A SMALL AMOUNT TO AFFECTED AREA(S) TOPICALLY ONCE DAILY 60 g 11     Multiple Vitamin (MULTI-DAY VITAMIN  S) TABS Take 1 tablet by mouth        MULTIVITAMIN ORAL [MULTIVITAMIN ORAL]        tamsulosin (FLOMAX) 0.4 MG capsule TAKE 1 CAPSULE AT BEDTIME 90 capsule 3     triamcinolone (KENALOG) 0.1 % external cream             Objective:    Vitals:    07/06/22 0716   BP: (!) 140/60   Pulse: 90   SpO2: 95%   Weight: 78.5 kg (173 lb)      Body mass index is 25.92 kg/m .    Alert.  No apparent distress.  Blood pressure 134/62 on recheck.  Pacemaker in place cardiac exam otherwise appearing regular.  No significant peripheral edema at this time.      This note has been dictated using voice recognition software and as a result may contain minor grammatical errors and unintended word substitutions.       Answers for HPI/ROS submitted by the patient on 7/6/2022  Do you check your blood pressure regularly outside of the clinic?: No  Are your blood pressures ever more than 140 on the top number (systolic) OR more than 90 on the bottom number (diastolic)? (For example, greater than 140/90): Yes  Are you following a low salt diet?: No

## 2022-07-28 ENCOUNTER — TRANSFERRED RECORDS (OUTPATIENT)
Dept: HEALTH INFORMATION MANAGEMENT | Facility: CLINIC | Age: 87
End: 2022-07-28

## 2022-08-26 ENCOUNTER — ANCILLARY PROCEDURE (OUTPATIENT)
Dept: CARDIOLOGY | Facility: CLINIC | Age: 87
End: 2022-08-26
Attending: INTERNAL MEDICINE
Payer: MEDICARE

## 2022-08-26 DIAGNOSIS — Z95.0 PACEMAKER: ICD-10-CM

## 2022-08-26 DIAGNOSIS — I44.1 SECOND DEGREE MOBITZ I AV BLOCK: ICD-10-CM

## 2022-08-26 PROCEDURE — 93294 REM INTERROG EVL PM/LDLS PM: CPT | Performed by: INTERNAL MEDICINE

## 2022-08-26 PROCEDURE — 93296 REM INTERROG EVL PM/IDS: CPT | Performed by: INTERNAL MEDICINE

## 2022-08-27 LAB
MDC_IDC_EPISODE_DTM: NORMAL
MDC_IDC_EPISODE_DURATION: 2 S
MDC_IDC_EPISODE_ID: 7
MDC_IDC_EPISODE_TYPE: NORMAL
MDC_IDC_LEAD_IMPLANT_DT: NORMAL
MDC_IDC_LEAD_IMPLANT_DT: NORMAL
MDC_IDC_LEAD_LOCATION: NORMAL
MDC_IDC_LEAD_LOCATION: NORMAL
MDC_IDC_LEAD_LOCATION_DETAIL_1: NORMAL
MDC_IDC_LEAD_LOCATION_DETAIL_1: NORMAL
MDC_IDC_LEAD_MFG: NORMAL
MDC_IDC_LEAD_MFG: NORMAL
MDC_IDC_LEAD_MODEL: NORMAL
MDC_IDC_LEAD_MODEL: NORMAL
MDC_IDC_LEAD_POLARITY_TYPE: NORMAL
MDC_IDC_LEAD_POLARITY_TYPE: NORMAL
MDC_IDC_LEAD_SERIAL: NORMAL
MDC_IDC_LEAD_SERIAL: NORMAL
MDC_IDC_LEAD_SPECIAL_FUNCTION: NORMAL
MDC_IDC_LEAD_SPECIAL_FUNCTION: NORMAL
MDC_IDC_MSMT_BATTERY_DTM: NORMAL
MDC_IDC_MSMT_BATTERY_REMAINING_LONGEVITY: 141 MO
MDC_IDC_MSMT_BATTERY_RRT_TRIGGER: 2.62
MDC_IDC_MSMT_BATTERY_STATUS: NORMAL
MDC_IDC_MSMT_BATTERY_VOLTAGE: 3.02 V
MDC_IDC_MSMT_LEADCHNL_RA_IMPEDANCE_VALUE: 323 OHM
MDC_IDC_MSMT_LEADCHNL_RA_IMPEDANCE_VALUE: 361 OHM
MDC_IDC_MSMT_LEADCHNL_RA_PACING_THRESHOLD_AMPLITUDE: 0.38 V
MDC_IDC_MSMT_LEADCHNL_RA_PACING_THRESHOLD_PULSEWIDTH: 0.4 MS
MDC_IDC_MSMT_LEADCHNL_RA_SENSING_INTR_AMPL: 2.25 MV
MDC_IDC_MSMT_LEADCHNL_RA_SENSING_INTR_AMPL: 2.25 MV
MDC_IDC_MSMT_LEADCHNL_RV_IMPEDANCE_VALUE: 437 OHM
MDC_IDC_MSMT_LEADCHNL_RV_IMPEDANCE_VALUE: 513 OHM
MDC_IDC_MSMT_LEADCHNL_RV_PACING_THRESHOLD_AMPLITUDE: 0.5 V
MDC_IDC_MSMT_LEADCHNL_RV_PACING_THRESHOLD_PULSEWIDTH: 0.4 MS
MDC_IDC_MSMT_LEADCHNL_RV_SENSING_INTR_AMPL: 9.5 MV
MDC_IDC_MSMT_LEADCHNL_RV_SENSING_INTR_AMPL: 9.5 MV
MDC_IDC_PG_IMPLANT_DTM: NORMAL
MDC_IDC_PG_MFG: NORMAL
MDC_IDC_PG_MODEL: NORMAL
MDC_IDC_PG_SERIAL: NORMAL
MDC_IDC_PG_TYPE: NORMAL
MDC_IDC_SESS_CLINIC_NAME: NORMAL
MDC_IDC_SESS_DTM: NORMAL
MDC_IDC_SESS_TYPE: NORMAL
MDC_IDC_SET_BRADY_AT_MODE_SWITCH_RATE: 171 {BEATS}/MIN
MDC_IDC_SET_BRADY_HYSTRATE: NORMAL
MDC_IDC_SET_BRADY_LOWRATE: 50 {BEATS}/MIN
MDC_IDC_SET_BRADY_MAX_SENSOR_RATE: 120 {BEATS}/MIN
MDC_IDC_SET_BRADY_MAX_TRACKING_RATE: 120 {BEATS}/MIN
MDC_IDC_SET_BRADY_MODE: NORMAL
MDC_IDC_SET_BRADY_PAV_DELAY_LOW: 180 MS
MDC_IDC_SET_BRADY_SAV_DELAY_LOW: 150 MS
MDC_IDC_SET_LEADCHNL_RA_PACING_AMPLITUDE: 1.5 V
MDC_IDC_SET_LEADCHNL_RA_PACING_ANODE_ELECTRODE_1: NORMAL
MDC_IDC_SET_LEADCHNL_RA_PACING_ANODE_LOCATION_1: NORMAL
MDC_IDC_SET_LEADCHNL_RA_PACING_CAPTURE_MODE: NORMAL
MDC_IDC_SET_LEADCHNL_RA_PACING_CATHODE_ELECTRODE_1: NORMAL
MDC_IDC_SET_LEADCHNL_RA_PACING_CATHODE_LOCATION_1: NORMAL
MDC_IDC_SET_LEADCHNL_RA_PACING_POLARITY: NORMAL
MDC_IDC_SET_LEADCHNL_RA_PACING_PULSEWIDTH: 0.4 MS
MDC_IDC_SET_LEADCHNL_RA_SENSING_ANODE_ELECTRODE_1: NORMAL
MDC_IDC_SET_LEADCHNL_RA_SENSING_ANODE_LOCATION_1: NORMAL
MDC_IDC_SET_LEADCHNL_RA_SENSING_CATHODE_ELECTRODE_1: NORMAL
MDC_IDC_SET_LEADCHNL_RA_SENSING_CATHODE_LOCATION_1: NORMAL
MDC_IDC_SET_LEADCHNL_RA_SENSING_POLARITY: NORMAL
MDC_IDC_SET_LEADCHNL_RA_SENSING_SENSITIVITY: 0.3 MV
MDC_IDC_SET_LEADCHNL_RV_PACING_AMPLITUDE: 1.5 V
MDC_IDC_SET_LEADCHNL_RV_PACING_ANODE_ELECTRODE_1: NORMAL
MDC_IDC_SET_LEADCHNL_RV_PACING_ANODE_LOCATION_1: NORMAL
MDC_IDC_SET_LEADCHNL_RV_PACING_CAPTURE_MODE: NORMAL
MDC_IDC_SET_LEADCHNL_RV_PACING_CATHODE_ELECTRODE_1: NORMAL
MDC_IDC_SET_LEADCHNL_RV_PACING_CATHODE_LOCATION_1: NORMAL
MDC_IDC_SET_LEADCHNL_RV_PACING_POLARITY: NORMAL
MDC_IDC_SET_LEADCHNL_RV_PACING_PULSEWIDTH: 0.4 MS
MDC_IDC_SET_LEADCHNL_RV_SENSING_ANODE_ELECTRODE_1: NORMAL
MDC_IDC_SET_LEADCHNL_RV_SENSING_ANODE_LOCATION_1: NORMAL
MDC_IDC_SET_LEADCHNL_RV_SENSING_CATHODE_ELECTRODE_1: NORMAL
MDC_IDC_SET_LEADCHNL_RV_SENSING_CATHODE_LOCATION_1: NORMAL
MDC_IDC_SET_LEADCHNL_RV_SENSING_POLARITY: NORMAL
MDC_IDC_SET_LEADCHNL_RV_SENSING_SENSITIVITY: 0.9 MV
MDC_IDC_SET_ZONE_DETECTION_INTERVAL: 350 MS
MDC_IDC_SET_ZONE_DETECTION_INTERVAL: 400 MS
MDC_IDC_SET_ZONE_TYPE: NORMAL
MDC_IDC_STAT_AT_BURDEN_PERCENT: 0 %
MDC_IDC_STAT_AT_DTM_END: NORMAL
MDC_IDC_STAT_AT_DTM_START: NORMAL
MDC_IDC_STAT_BRADY_AP_VP_PERCENT: 0.85 %
MDC_IDC_STAT_BRADY_AP_VS_PERCENT: 30.24 %
MDC_IDC_STAT_BRADY_AS_VP_PERCENT: 0.46 %
MDC_IDC_STAT_BRADY_AS_VS_PERCENT: 68.45 %
MDC_IDC_STAT_BRADY_DTM_END: NORMAL
MDC_IDC_STAT_BRADY_DTM_START: NORMAL
MDC_IDC_STAT_BRADY_RA_PERCENT_PACED: 32.04 %
MDC_IDC_STAT_BRADY_RV_PERCENT_PACED: 1.31 %
MDC_IDC_STAT_EPISODE_RECENT_COUNT: 0
MDC_IDC_STAT_EPISODE_RECENT_COUNT: 1
MDC_IDC_STAT_EPISODE_RECENT_COUNT_DTM_END: NORMAL
MDC_IDC_STAT_EPISODE_RECENT_COUNT_DTM_START: NORMAL
MDC_IDC_STAT_EPISODE_TOTAL_COUNT: 0
MDC_IDC_STAT_EPISODE_TOTAL_COUNT: 7
MDC_IDC_STAT_EPISODE_TOTAL_COUNT_DTM_END: NORMAL
MDC_IDC_STAT_EPISODE_TOTAL_COUNT_DTM_START: NORMAL
MDC_IDC_STAT_EPISODE_TYPE: NORMAL

## 2022-10-01 ENCOUNTER — HEALTH MAINTENANCE LETTER (OUTPATIENT)
Age: 87
End: 2022-10-01

## 2022-10-01 ENCOUNTER — HOSPITAL ENCOUNTER (INPATIENT)
Facility: CLINIC | Age: 87
LOS: 4 days | Discharge: HOME HEALTH | DRG: 551 | End: 2022-10-05
Attending: EMERGENCY MEDICINE | Admitting: HOSPITALIST
Payer: MEDICARE

## 2022-10-01 ENCOUNTER — APPOINTMENT (OUTPATIENT)
Dept: CT IMAGING | Facility: CLINIC | Age: 87
DRG: 551 | End: 2022-10-01
Attending: EMERGENCY MEDICINE
Payer: MEDICARE

## 2022-10-01 DIAGNOSIS — N20.0 CALCULUS OF KIDNEY: Primary | ICD-10-CM

## 2022-10-01 DIAGNOSIS — N40.0 BENIGN PROSTATIC HYPERPLASIA WITHOUT LOWER URINARY TRACT SYMPTOMS: ICD-10-CM

## 2022-10-01 DIAGNOSIS — R55 SYNCOPE, UNSPECIFIED SYNCOPE TYPE: ICD-10-CM

## 2022-10-01 DIAGNOSIS — S32.030A CLOSED COMPRESSION FRACTURE OF L3 LUMBAR VERTEBRA, INITIAL ENCOUNTER (H): ICD-10-CM

## 2022-10-01 DIAGNOSIS — N20.1 LEFT URETERAL STONE: ICD-10-CM

## 2022-10-01 DIAGNOSIS — R33.9 URINARY RETENTION: ICD-10-CM

## 2022-10-01 LAB
ALBUMIN SERPL-MCNC: 3.7 G/DL (ref 3.5–5)
ALBUMIN UR-MCNC: NEGATIVE MG/DL
ALP SERPL-CCNC: 105 U/L (ref 45–120)
ALT SERPL W P-5'-P-CCNC: 15 U/L (ref 0–45)
ANION GAP SERPL CALCULATED.3IONS-SCNC: 12 MMOL/L (ref 5–18)
ANION GAP SERPL CALCULATED.3IONS-SCNC: 8 MMOL/L (ref 5–18)
APPEARANCE UR: CLEAR
APTT PPP: 31 SECONDS (ref 22–38)
AST SERPL W P-5'-P-CCNC: 31 U/L (ref 0–40)
BASOPHILS # BLD AUTO: 0 10E3/UL (ref 0–0.2)
BASOPHILS NFR BLD AUTO: 0 %
BILIRUB SERPL-MCNC: 0.6 MG/DL (ref 0–1)
BILIRUB UR QL STRIP: NEGATIVE
BUN SERPL-MCNC: 25 MG/DL (ref 8–28)
BUN SERPL-MCNC: 25 MG/DL (ref 8–28)
CALCIUM SERPL-MCNC: 9.1 MG/DL (ref 8.5–10.5)
CALCIUM SERPL-MCNC: 9.9 MG/DL (ref 8.5–10.5)
CHLORIDE BLD-SCNC: 107 MMOL/L (ref 98–107)
CHLORIDE BLD-SCNC: 111 MMOL/L (ref 98–107)
CO2 SERPL-SCNC: 23 MMOL/L (ref 22–31)
CO2 SERPL-SCNC: 24 MMOL/L (ref 22–31)
COLOR UR AUTO: ABNORMAL
CREAT SERPL-MCNC: 1.34 MG/DL (ref 0.7–1.3)
CREAT SERPL-MCNC: 1.53 MG/DL (ref 0.7–1.3)
EOSINOPHIL # BLD AUTO: 0 10E3/UL (ref 0–0.7)
EOSINOPHIL NFR BLD AUTO: 0 %
ERYTHROCYTE [DISTWIDTH] IN BLOOD BY AUTOMATED COUNT: 13.7 % (ref 10–15)
GFR SERPL CREATININE-BSD FRML MDRD: 44 ML/MIN/1.73M2
GFR SERPL CREATININE-BSD FRML MDRD: 52 ML/MIN/1.73M2
GLUCOSE BLD-MCNC: 107 MG/DL (ref 70–125)
GLUCOSE BLD-MCNC: 111 MG/DL (ref 70–125)
GLUCOSE UR STRIP-MCNC: NEGATIVE MG/DL
HCT VFR BLD AUTO: 35.5 % (ref 40–53)
HGB BLD-MCNC: 12.6 G/DL (ref 13.3–17.7)
HGB UR QL STRIP: NEGATIVE
HOLD SPECIMEN: NORMAL
HOLD SPECIMEN: NORMAL
IMM GRANULOCYTES # BLD: 0.1 10E3/UL
IMM GRANULOCYTES NFR BLD: 1 %
INR PPP: 1.05 (ref 0.85–1.15)
KETONES UR STRIP-MCNC: NEGATIVE MG/DL
LACTATE SERPL-SCNC: 1.6 MMOL/L (ref 0.7–2)
LEUKOCYTE ESTERASE UR QL STRIP: NEGATIVE
LIPASE SERPL-CCNC: 24 U/L (ref 0–52)
LYMPHOCYTES # BLD AUTO: 0.3 10E3/UL (ref 0.8–5.3)
LYMPHOCYTES NFR BLD AUTO: 2 %
MAGNESIUM SERPL-MCNC: 2 MG/DL (ref 1.8–2.6)
MCH RBC QN AUTO: 34.1 PG (ref 26.5–33)
MCHC RBC AUTO-ENTMCNC: 35.5 G/DL (ref 31.5–36.5)
MCV RBC AUTO: 96 FL (ref 78–100)
MONOCYTES # BLD AUTO: 1.2 10E3/UL (ref 0–1.3)
MONOCYTES NFR BLD AUTO: 9 %
MUCOUS THREADS #/AREA URNS LPF: PRESENT /LPF
NEUTROPHILS # BLD AUTO: 11.7 10E3/UL (ref 1.6–8.3)
NEUTROPHILS NFR BLD AUTO: 88 %
NITRATE UR QL: NEGATIVE
NRBC # BLD AUTO: 0 10E3/UL
NRBC BLD AUTO-RTO: 0 /100
PH UR STRIP: 7 [PH] (ref 5–7)
PLATELET # BLD AUTO: 152 10E3/UL (ref 150–450)
POTASSIUM BLD-SCNC: 4 MMOL/L (ref 3.5–5)
POTASSIUM BLD-SCNC: 4.3 MMOL/L (ref 3.5–5)
PROT SERPL-MCNC: 7.4 G/DL (ref 6–8)
RBC # BLD AUTO: 3.7 10E6/UL (ref 4.4–5.9)
RBC URINE: 2 /HPF
SARS-COV-2 RNA RESP QL NAA+PROBE: POSITIVE
SODIUM SERPL-SCNC: 142 MMOL/L (ref 136–145)
SODIUM SERPL-SCNC: 143 MMOL/L (ref 136–145)
SP GR UR STRIP: 1.01 (ref 1–1.03)
TROPONIN I SERPL-MCNC: 0.1 NG/ML (ref 0–0.29)
UROBILINOGEN UR STRIP-MCNC: <2 MG/DL
WBC # BLD AUTO: 13.3 10E3/UL (ref 4–11)
WBC URINE: 3 /HPF

## 2022-10-01 PROCEDURE — 85610 PROTHROMBIN TIME: CPT | Performed by: NURSE PRACTITIONER

## 2022-10-01 PROCEDURE — 258N000003 HC RX IP 258 OP 636: Performed by: EMERGENCY MEDICINE

## 2022-10-01 PROCEDURE — 81001 URINALYSIS AUTO W/SCOPE: CPT | Performed by: EMERGENCY MEDICINE

## 2022-10-01 PROCEDURE — U0005 INFEC AGEN DETEC AMPLI PROBE: HCPCS | Performed by: EMERGENCY MEDICINE

## 2022-10-01 PROCEDURE — C9803 HOPD COVID-19 SPEC COLLECT: HCPCS

## 2022-10-01 PROCEDURE — 80053 COMPREHEN METABOLIC PANEL: CPT | Performed by: EMERGENCY MEDICINE

## 2022-10-01 PROCEDURE — 85018 HEMOGLOBIN: CPT | Performed by: EMERGENCY MEDICINE

## 2022-10-01 PROCEDURE — 83605 ASSAY OF LACTIC ACID: CPT | Performed by: EMERGENCY MEDICINE

## 2022-10-01 PROCEDURE — 36415 COLL VENOUS BLD VENIPUNCTURE: CPT | Performed by: NURSE PRACTITIONER

## 2022-10-01 PROCEDURE — 93005 ELECTROCARDIOGRAM TRACING: CPT | Performed by: EMERGENCY MEDICINE

## 2022-10-01 PROCEDURE — 83690 ASSAY OF LIPASE: CPT | Performed by: EMERGENCY MEDICINE

## 2022-10-01 PROCEDURE — 83735 ASSAY OF MAGNESIUM: CPT | Performed by: EMERGENCY MEDICINE

## 2022-10-01 PROCEDURE — 36415 COLL VENOUS BLD VENIPUNCTURE: CPT | Performed by: EMERGENCY MEDICINE

## 2022-10-01 PROCEDURE — 96360 HYDRATION IV INFUSION INIT: CPT

## 2022-10-01 PROCEDURE — 99285 EMERGENCY DEPT VISIT HI MDM: CPT | Mod: 25

## 2022-10-01 PROCEDURE — 51702 INSERT TEMP BLADDER CATH: CPT

## 2022-10-01 PROCEDURE — 85730 THROMBOPLASTIN TIME PARTIAL: CPT | Performed by: NURSE PRACTITIONER

## 2022-10-01 PROCEDURE — 120N000001 HC R&B MED SURG/OB

## 2022-10-01 PROCEDURE — 72131 CT LUMBAR SPINE W/O DYE: CPT | Mod: ME

## 2022-10-01 PROCEDURE — 96361 HYDRATE IV INFUSION ADD-ON: CPT

## 2022-10-01 PROCEDURE — 99223 1ST HOSP IP/OBS HIGH 75: CPT | Mod: AI | Performed by: HOSPITALIST

## 2022-10-01 PROCEDURE — 84484 ASSAY OF TROPONIN QUANT: CPT | Performed by: EMERGENCY MEDICINE

## 2022-10-01 RX ORDER — DORZOLAMIDE HCL 20 MG/ML
1 SOLUTION/ DROPS OPHTHALMIC 2 TIMES DAILY
Status: DISCONTINUED | OUTPATIENT
Start: 2022-10-01 | End: 2022-10-05 | Stop reason: HOSPADM

## 2022-10-01 RX ORDER — ONDANSETRON 2 MG/ML
4 INJECTION INTRAMUSCULAR; INTRAVENOUS EVERY 6 HOURS PRN
Status: DISCONTINUED | OUTPATIENT
Start: 2022-10-01 | End: 2022-10-05 | Stop reason: HOSPADM

## 2022-10-01 RX ORDER — ONDANSETRON 4 MG/1
4 TABLET, ORALLY DISINTEGRATING ORAL EVERY 6 HOURS PRN
Status: DISCONTINUED | OUTPATIENT
Start: 2022-10-01 | End: 2022-10-05 | Stop reason: HOSPADM

## 2022-10-01 RX ORDER — METRONIDAZOLE 10 MG/G
GEL TOPICAL DAILY
Status: DISCONTINUED | OUTPATIENT
Start: 2022-10-02 | End: 2022-10-05 | Stop reason: HOSPADM

## 2022-10-01 RX ORDER — LIDOCAINE 40 MG/G
CREAM TOPICAL
Status: DISCONTINUED | OUTPATIENT
Start: 2022-10-01 | End: 2022-10-05 | Stop reason: HOSPADM

## 2022-10-01 RX ORDER — PROCHLORPERAZINE 25 MG
12.5 SUPPOSITORY, RECTAL RECTAL EVERY 12 HOURS PRN
Status: DISCONTINUED | OUTPATIENT
Start: 2022-10-01 | End: 2022-10-05 | Stop reason: HOSPADM

## 2022-10-01 RX ORDER — MULTIVITAMIN,THERAPEUTIC
1 TABLET ORAL DAILY
COMMUNITY

## 2022-10-01 RX ORDER — ASPIRIN 81 MG/1
81 TABLET, CHEWABLE ORAL AT BEDTIME
Status: DISCONTINUED | OUTPATIENT
Start: 2022-10-01 | End: 2022-10-05 | Stop reason: HOSPADM

## 2022-10-01 RX ORDER — TAMSULOSIN HYDROCHLORIDE 0.4 MG/1
0.4 CAPSULE ORAL EVERY MORNING
Status: DISCONTINUED | OUTPATIENT
Start: 2022-10-02 | End: 2022-10-05 | Stop reason: HOSPADM

## 2022-10-01 RX ORDER — LATANOPROST 50 UG/ML
1 SOLUTION/ DROPS OPHTHALMIC AT BEDTIME
Status: DISCONTINUED | OUTPATIENT
Start: 2022-10-01 | End: 2022-10-05 | Stop reason: HOSPADM

## 2022-10-01 RX ORDER — ACETAMINOPHEN 325 MG/1
975 TABLET ORAL EVERY 8 HOURS
Status: DISCONTINUED | OUTPATIENT
Start: 2022-10-01 | End: 2022-10-05 | Stop reason: HOSPADM

## 2022-10-01 RX ORDER — LANOLIN ALCOHOL/MO/W.PET/CERES
3 CREAM (GRAM) TOPICAL
Status: DISCONTINUED | OUTPATIENT
Start: 2022-10-01 | End: 2022-10-05 | Stop reason: HOSPADM

## 2022-10-01 RX ORDER — DOCUSATE SODIUM 100 MG/1
100 CAPSULE, LIQUID FILLED ORAL 2 TIMES DAILY
Status: DISCONTINUED | OUTPATIENT
Start: 2022-10-01 | End: 2022-10-05 | Stop reason: HOSPADM

## 2022-10-01 RX ORDER — DOXYCYCLINE 100 MG/1
100 CAPSULE ORAL DAILY
Status: DISCONTINUED | OUTPATIENT
Start: 2022-10-02 | End: 2022-10-05 | Stop reason: HOSPADM

## 2022-10-01 RX ORDER — PROCHLORPERAZINE MALEATE 5 MG
5 TABLET ORAL EVERY 6 HOURS PRN
Status: DISCONTINUED | OUTPATIENT
Start: 2022-10-01 | End: 2022-10-05 | Stop reason: HOSPADM

## 2022-10-01 RX ADMIN — SODIUM CHLORIDE 500 ML: 9 INJECTION, SOLUTION INTRAVENOUS at 19:33

## 2022-10-01 ASSESSMENT — ENCOUNTER SYMPTOMS
NECK PAIN: 0
FEVER: 0
HEADACHES: 0
NAUSEA: 1
DYSURIA: 0
CHILLS: 0
BACK PAIN: 1
COUGH: 0
WEAKNESS: 0
ABDOMINAL PAIN: 0
SHORTNESS OF BREATH: 0

## 2022-10-01 ASSESSMENT — ACTIVITIES OF DAILY LIVING (ADL)
ADLS_ACUITY_SCORE: 35
ADLS_ACUITY_SCORE: 35
ADLS_ACUITY_SCORE: 33

## 2022-10-01 NOTE — ED TRIAGE NOTES
Pt goes by Ricardo. Pt was in his home and felt weak in the legs while urinating and slipped to the floor. Doesn't really remember how he got to the floor but remembers he couldn't get back up. Did have a fall 6 weeks ago and had some lower back pain at that time that resolved without any interventions.Does have nausea and still unstable on his feet per EMS. Pacemaker since 4 years ago. Lives with his wife.     Triage Assessment     Row Name 10/01/22 9698       Triage Assessment (Adult)    Airway WDL WDL       Respiratory WDL    Respiratory WDL WDL       Skin Circulation/Temperature WDL    Skin Circulation/Temperature WDL WDL       Cardiac WDL    Cardiac WDL WDL       Peripheral/Neurovascular WDL    Peripheral Neurovascular WDL WDL       Cognitive/Neuro/Behavioral WDL    Cognitive/Neuro/Behavioral WDL WDL

## 2022-10-02 ENCOUNTER — APPOINTMENT (OUTPATIENT)
Dept: CT IMAGING | Facility: CLINIC | Age: 87
DRG: 551 | End: 2022-10-02
Attending: UROLOGY
Payer: MEDICARE

## 2022-10-02 ENCOUNTER — APPOINTMENT (OUTPATIENT)
Dept: RADIOLOGY | Facility: CLINIC | Age: 87
DRG: 551 | End: 2022-10-02
Attending: NURSE PRACTITIONER
Payer: MEDICARE

## 2022-10-02 LAB
ATRIAL RATE - MUSE: 85 BPM
DIASTOLIC BLOOD PRESSURE - MUSE: NORMAL MMHG
ERYTHROCYTE [DISTWIDTH] IN BLOOD BY AUTOMATED COUNT: 12.6 % (ref 10–15)
HCT VFR BLD AUTO: 32 % (ref 40–53)
HGB BLD-MCNC: 10.6 G/DL (ref 13.3–17.7)
HOLD SPECIMEN: NORMAL
INTERPRETATION ECG - MUSE: NORMAL
MCH RBC QN AUTO: 30.4 PG (ref 26.5–33)
MCHC RBC AUTO-ENTMCNC: 33.1 G/DL (ref 31.5–36.5)
MCV RBC AUTO: 92 FL (ref 78–100)
P AXIS - MUSE: 42 DEGREES
PLATELET # BLD AUTO: 119 10E3/UL (ref 150–450)
PR INTERVAL - MUSE: 230 MS
QRS DURATION - MUSE: 114 MS
QT - MUSE: 386 MS
QTC - MUSE: 459 MS
R AXIS - MUSE: -71 DEGREES
RBC # BLD AUTO: 3.49 10E6/UL (ref 4.4–5.9)
SYSTOLIC BLOOD PRESSURE - MUSE: NORMAL MMHG
T AXIS - MUSE: 62 DEGREES
VENTRICULAR RATE- MUSE: 85 BPM
WBC # BLD AUTO: 9.3 10E3/UL (ref 4–11)

## 2022-10-02 PROCEDURE — 72100 X-RAY EXAM L-S SPINE 2/3 VWS: CPT

## 2022-10-02 PROCEDURE — 250N000013 HC RX MED GY IP 250 OP 250 PS 637: Performed by: HOSPITALIST

## 2022-10-02 PROCEDURE — 99233 SBSQ HOSP IP/OBS HIGH 50: CPT | Performed by: INTERNAL MEDICINE

## 2022-10-02 PROCEDURE — 87086 URINE CULTURE/COLONY COUNT: CPT | Performed by: INTERNAL MEDICINE

## 2022-10-02 PROCEDURE — 36415 COLL VENOUS BLD VENIPUNCTURE: CPT | Performed by: HOSPITALIST

## 2022-10-02 PROCEDURE — 99223 1ST HOSP IP/OBS HIGH 75: CPT | Performed by: NURSE PRACTITIONER

## 2022-10-02 PROCEDURE — 120N000001 HC R&B MED SURG/OB

## 2022-10-02 PROCEDURE — 85027 COMPLETE CBC AUTOMATED: CPT | Performed by: HOSPITALIST

## 2022-10-02 PROCEDURE — G1010 CDSM STANSON: HCPCS

## 2022-10-02 RX ADMIN — METRONIDAZOLE: 10 GEL TOPICAL at 10:45

## 2022-10-02 RX ADMIN — DORZOLAMIDE HCL 1 DROP: 20 SOLUTION/ DROPS OPHTHALMIC at 07:45

## 2022-10-02 RX ADMIN — ASPIRIN 81 MG CHEWABLE TABLET 81 MG: 81 TABLET CHEWABLE at 00:19

## 2022-10-02 RX ADMIN — ASPIRIN 81 MG CHEWABLE TABLET 81 MG: 81 TABLET CHEWABLE at 20:55

## 2022-10-02 RX ADMIN — ACETAMINOPHEN 975 MG: 325 TABLET, FILM COATED ORAL at 14:59

## 2022-10-02 RX ADMIN — ACETAMINOPHEN 975 MG: 325 TABLET, FILM COATED ORAL at 06:40

## 2022-10-02 RX ADMIN — TAMSULOSIN HYDROCHLORIDE 0.4 MG: 0.4 CAPSULE ORAL at 07:45

## 2022-10-02 RX ADMIN — DORZOLAMIDE HCL 1 DROP: 20 SOLUTION/ DROPS OPHTHALMIC at 20:55

## 2022-10-02 RX ADMIN — DOCUSATE SODIUM 100 MG: 100 CAPSULE, LIQUID FILLED ORAL at 01:09

## 2022-10-02 RX ADMIN — LATANOPROST 1 DROP: 50 SOLUTION/ DROPS OPHTHALMIC at 20:55

## 2022-10-02 RX ADMIN — DOCUSATE SODIUM 100 MG: 100 CAPSULE, LIQUID FILLED ORAL at 20:55

## 2022-10-02 RX ADMIN — DOXYCYCLINE 100 MG: 100 CAPSULE ORAL at 07:45

## 2022-10-02 RX ADMIN — DOCUSATE SODIUM 100 MG: 100 CAPSULE, LIQUID FILLED ORAL at 07:45

## 2022-10-02 RX ADMIN — LATANOPROST 1 DROP: 50 SOLUTION/ DROPS OPHTHALMIC at 00:32

## 2022-10-02 ASSESSMENT — ACTIVITIES OF DAILY LIVING (ADL)
ADLS_ACUITY_SCORE: 24

## 2022-10-02 NOTE — CONSULTS
[facility] Inpatient Consultation Note    Mikaela Figueroa MRN# [id number]   Age: 87 year old YOB: 1935     Date of Admission:  10/1/2022    Reason for consult:  Urinary retention, ureteral stone       Requesting physician:  Dr. Mason                History of Present Illness:   The patient is an 87-year-old male with a history of nephrolithiasis and BPH with urinary symptoms.  He suffered a fall approximately 1 week ago and had significant back pain.  He had been gradually improving, but yesterday had significant and profound weakness and was brought to the hospital.  He was positive for COVID-19.  CT scan revealed spinal stenosis and compression fracture.  In addition he had a severely distended bladder with right hydroureteronephrosis to the bladder and a 14 mm left UVJ stone.  He denies flank pain.  Since the Bloom catheter was placed he has had mild hematuria    He reports worsening urinary symptoms and difficulty voiding over the last month since his fall.  A Bloom catheter was placed yesterday upon admission and he had greater than 1 L residual volume.          Past Medical History:     Past Medical History:   Diagnosis Date     Hypertension              Past Surgical History:     Past Surgical History:   Procedure Laterality Date     ARTHROSCOPY KNEE Right              Social History:     Social History     Socioeconomic History     Marital status:      Spouse name: Not on file     Number of children: Not on file     Years of education: Not on file     Highest education level: Not on file   Occupational History     Not on file   Tobacco Use     Smoking status: Former Smoker     Quit date: 1998     Years since quittin.7     Smokeless tobacco: Never Used   Substance and Sexual Activity     Alcohol use: No     Drug use: No     Sexual activity: Not on file   Other Topics Concern     Not on file   Social History Narrative     Not on file     Social Determinants of Health      Financial Resource Strain: Not on file   Food Insecurity: Not on file   Transportation Needs: Not on file   Physical Activity: Not on file   Stress: Not on file   Social Connections: Not on file   Intimate Partner Violence: Not on file   Housing Stability: Not on file             Family History:     Family History   Problem Relation Age of Onset     Dementia Mother      Heart Disease Father      Diabetes Father              Immunizations:     Immunization History   Administered Date(s) Administered     COVID-19,PF,Moderna 02/28/2021, 03/26/2021, 11/17/2021     COVID-19,PF,Moderna Booster 07/06/2022     DT (PEDS <7y) 12/01/2000     Flu, Unspecified 10/17/2007, 11/01/2008, 10/01/2015, 10/01/2019, 10/06/2020     Influenza (High Dose) 3 valent vaccine 10/28/2016, 10/01/2017, 09/25/2018, 10/09/2019     Influenza Vaccine, 6+MO IM (QUADRIVALENT W/PRESERVATIVES) 10/02/2009, 09/21/2010     Influenza, Quad, High Dose, Pf, 65yr+ (Fluzone HD) 09/17/2020, 11/17/2021     Pneumo Conj 13-V (2010&after) 11/25/2014     Pneumococcal 23 valent 06/08/2004     Pneumococcal, Unspecified 01/10/2015     TD (ADULT, 7+) 12/22/2020     Td (Adult), Adsorbed 12/15/2010     Td,adult,historic,unspecified 12/15/2010     Zoster vaccine, live 10/01/2008, 11/01/2008             Allergies:   No Known Allergies          Medications:     Current Facility-Administered Medications   Medication     acetaminophen (TYLENOL) tablet 975 mg     aspirin (ASA) chewable tablet 81 mg     docusate sodium (COLACE) capsule 100 mg     dorzolamide (TRUSOPT) 2 % ophthalmic solution 1 drop     doxycycline hyclate (VIBRAMYCIN) capsule 100 mg     latanoprost (XALATAN) 0.005 % ophthalmic solution 1 drop     lidocaine (LMX4) cream     lidocaine 1 % 0.1-1 mL     melatonin tablet 3 mg     metroNIDAZOLE (METROGEL) 1 % gel     ondansetron (ZOFRAN ODT) ODT tab 4 mg    Or     ondansetron (ZOFRAN) injection 4 mg     oxyCODONE IR (ROXICODONE) half-tab 2.5-5 mg     prochlorperazine  (COMPAZINE) injection 5 mg    Or     prochlorperazine (COMPAZINE) tablet 5 mg    Or     prochlorperazine (COMPAZINE) suppository 12.5 mg     sodium chloride (PF) 0.9% PF flush 3 mL     sodium chloride (PF) 0.9% PF flush 3 mL     tamsulosin (FLOMAX) capsule 0.4 mg             Review of Systems:   A full 12 point review of symptoms is negative except for that noted in the HPI     Examination:  /60   Pulse 63   Temp 98.3  F (36.8  C) (Oral)   Resp 18   Wt 75.2 kg (165 lb 11.2 oz)   SpO2 98%   BMI 24.83 kg/m    General: Alert and oriented, no distress  HEENT: Face symmetric, mucous membranes moist and pink  Eyes: No scleral icterus  Neck: Symmetric  Chest wall: Symmetric  Respiratory: Breathing unlabored, no audible wheezing  Cardiac: Extremities warm and well perfused, no edema  Abdomen: soft, non tender, non distended; no rebound, guarding or peritoneal signs  Back: No CVA or flank tenderness  Extremities: No evidence of deformities or trauma  Neuro:Grossly non focal  Pysch: Normal mood and affect  Skin: No evident rashes or lesions            Data:     Lab Results   Component Value Date    WBC 9.3 10/02/2022     Lab Results   Component Value Date    RBC 3.49 10/02/2022     Lab Results   Component Value Date    HGB 10.6 10/02/2022     Lab Results   Component Value Date    HCT 32.0 10/02/2022     Lab Results   Component Value Date     10/02/2022     Creatinine   Date Value Ref Range Status   10/01/2022 1.34 (H) 0.70 - 1.30 mg/dL Final   ]  Lab Results   Component Value Date    BUN 25 10/01/2022       Imaging:  EXAM: CT LUMBAR SPINE W/O CONTRAST  LOCATION: [facility]  DATE/TIME: 10/1/2022 8:12 PM     INDICATION:  Low back pain fall 1 month prior  COMPARISON:  CT abdomen pelvis 10/29/2013.  TECHNIQUE: Routine CT Lumbar Spine without IV contrast. Multiplanar reformats. Dose reduction techniques were used.      FINDINGS:  VERTEBRA: Transitional anatomy with T12 nonrib-bearing.   Acute L3 superior endplate compression fracture with approximately 50% vertebral body height loss. Chronic L1 compression deformity with 4 mm retropulsion, similar in appearance compared to 2013.   Age-indeterminate, however favor chronic T12 superior and inferior endplate compression deformities with mild height loss.  Grade 1 degenerative anterolisthesis of L4 on L5. No posttraumatic subluxation.      CANAL/FORAMINA:  Mild canal stenosis at L1-L2. Severe canal stenosis at L2-L3. Moderate to severe canal stenosis at L3-L4. Severe canal stenosis at L4-L5. Moderate right L2-L3 and L3-L4 foraminal stenosis.     PARASPINAL:  Severe bilateral hydroureteronephrosis. 15 mm calculus at the left ureterovesicular junction. Presumed enlarged prostate partially imaged.                                                                      IMPRESSION:  1.  Transitional anatomy with T12 nonrib-bearing.   2.  Acute L3 superior endplate compression fracture with approximately 50% vertebral body height loss. No retropulsion. Additional chronic appearing fractures described above.  3.  Severe canal stenosis at L2-L3 and L4-L5. Moderate to severe canal stenosis at L3-L4.  4.  Severe bilateral hydroureteronephrosis with a 14 mm calculus at the left ureterovesicular junction. Correlate for urinary outlet obstruction as there is also suggestion of an enlarged prostate gland.    Impression:  Acute L3 compression fracture  Severe spinal stenosis  Bilateral hydroureteronephrosis with distended bladder and 14 mm left UVJ stone    Plan:  Creatinine yesterday was 1.34, which is about at his baseline.  I suspect that the right hydroureteronephrosis is due to chronic retention.  Whether or not the 14 mm left UVJ stone is obstructing his to be determined.    I would recommend a CT scan of the abdomen and pelvis to fully evaluate the urinary tract.  We may need to consider cystoscopy and left ureteral stent placement on this admission, although  there is no urgency at this time.      Chas Caraballo MD  [facility] ([facility])  Pager [phone]

## 2022-10-02 NOTE — H&P
[facility] MEDICINE ADMISSION HISTORY AND PHYSICAL     Assessment & Plan       Mikaela Figueroa is a 87 year old male retired  who presented with complaints of back pain.  Had a fall about a month prior to this presentation with some back pain that had slowly been improving.  On the day of this presentation complained of bilateral lower extremity weakness while trying to get up from the toilet resulting in another fall and acutely worsening low back pain.    Medical history is notable for essential hypertension, BPH, permanent pacemaker implant.    Initial evaluation revealed unremarkable vitals, elevated creatinine of 1.5, white count slightly elevated at 13.3, hemoglobin 12.6.  Urinalysis unremarkable, COVID incidentally positive but does not have any symptoms.  CT of the lumbar spine showed acute compression fracture of L3, severe canal stenosis at L2/L3 and L4/L5.  Again incidentally was noted severe bilateral hydroureteronephrosis with a 14 mm calculus at the left UVJ junction.  Bladder scan showed over a liter of urinary retention and Bloom catheter was placed.    Initial treatment included Bloom catheter, half liter normal saline.  Neurosurgery consulted via phone.    Assessment and plan:  Acute L3 compression fracture  Severe canal stenosis  Treat with scheduled Tylenol, oxycodone as needed  PT and OT consults  Neurosurgery consult appreciated  Orthotics consult    Bilateral severe hydroureteronephrosis with large ureteral stone  Acute kidney injury  Has seen [facility] in the past for kidney stones  Will consult urology  Bloom catheter placed in the emergency department  Follow renal function  Hold lisinopril  Continue tamsulosin    Positive COVID PCR  No symptoms  He is vaccinated, boosted  Daughter is quite concerned about remdesivir, okay to defer this for now    Chronic dermatitis  Continue doxycycline and MetroGel    Clinically Significant Risk Factors Present on Admission                  # Hypertension: home medication list includes antihypertensive(s)            DVTP: Mechanical Prophylaxis/ Sequential Compression Devices  Code Status: No Order  Disposition: Inpatient   Expected LOS: 2 days   Goals for the hospitalization: Improvement in back pain, ambulation, assessment in obstructive urologic disease  Diet: Regular  Fluids: None    Disposition Plan      Expected Discharge Date: 10/03/2022               Chief Complaint  back pain     HISTORY   Mikaela Figueroa is a 87 year old male who presented with complaints of back pain, fall.    Patient was getting up off the toilet when his legs gave out or got weak resulting in him falling on his bottom.  Had immediate onset of severe low back pain.    Denies any chest pain, shortness of breath, abdominal pain, nausea, vomiting, diarrhea, constipation.  Does have some chronic issues with urinary retention it seems.  Denies any significant lower extremity edema.  He does have some chronic skin changes which he sees dermatology for an regular basis.  He does not feel like his legs are weak now, denies any lower extremity numbness.  Past Medical History     Past Medical History:  No date: Hypertension  BPH  Kidney stones    Surgical History     Past Surgical History:   Procedure Laterality Date     ARTHROSCOPY KNEE Right      Family History      Family History   Problem Relation Age of Onset     Dementia Mother      Heart Disease Father      Diabetes Father       Social History      Social History     Tobacco Use     Smoking status: Former Smoker     Quit date: 1998     Years since quittin.7     Smokeless tobacco: Never Used   Substance Use Topics     Alcohol use: No     Drug use: No      Allergies   No Known Allergies  Prior to Admission Medications      Prior to Admission Medications   Prescriptions Last Dose Informant Patient Reported? Taking?   GLUCOSAMINE/CHONDROITIN SULF A (GLUCOSAMINE-CHONDROITIN ORAL) 10/1/2022 at AM  Yes Yes    Sig: Take 1 capsule by mouth daily   aspirin 81 mg chewable tablet 9/30/2022 at PM  Yes Yes   Sig: Take 81 mg by mouth At Bedtime   dorzolamide (TRUSOPT) 2 % ophthalmic solution 10/1/2022 at AM, will bring  Yes Yes   Sig: Place 1 drop into both eyes 2 times daily   doxycycline hyclate (VIBRA-TABS) 100 MG tablet 10/1/2022 at AM  No Yes   Sig: [DOXYCYCLINE (VIBRA-TABS) 100 MG TABLET] TAKE 1 TABLET BY MOUTH DAILY   latanoprost (XALATAN) 0.005 % ophthalmic solution 9/30/2022 at PM, will bring  Yes Yes   Sig: Place 1 drop into both eyes At Bedtime   lisinopril (ZESTRIL) 10 MG tablet 10/1/2022 at AM  No Yes   Sig: TAKE 1 TABLET DAILY   metroNIDAZOLE (METROGEL) 1 % external gel 10/1/2022 at AM, will bring  No Yes   Sig: APPLY A SMALL AMOUNT TO AFFECTED AREA(S) TOPICALLY ONCE DAILY   multivitamin, therapeutic (THERA-VIT) TABS tablet 10/1/2022 at AM  Yes Yes   Sig: Take 1 tablet by mouth daily   tamsulosin (FLOMAX) 0.4 MG capsule 10/1/2022 at AM  No Yes   Sig: TAKE 1 CAPSULE AT BEDTIME   Patient taking differently: Take 0.4 mg by mouth every morning   triamcinolone (KENALOG) 0.1 % external cream Unknown at Unknown time  Yes Yes   Sig: Apply topically 2 times daily as needed for irritation      Facility-Administered Medications: None      Review of Systems     A 12 point comprehensive review of systems was negative except as noted above in HPI.    PHYSICAL EXAMINATION     Vitals      Temp:  [97.5  F (36.4  C)] 97.5  F (36.4  C)  Pulse:  [75-91] 75  Resp:  [16] 16  BP: (136-150)/() 136/64  SpO2:  [94 %-97 %] 97 %    Examination   Physical Exam:    Gen: no acute distress, comfortable, alert, very pleasant, sharp mentally  ENT: no scleral icterus  Pulm: lungs are clear without wheezing, crackles, breathing comfortably at rest  CV: regular rate and rhythm, no significant lower extremity pitting edema  GI: abdomen is soft, non-tender, non-distended with active bowel sounds.  MSK: no obvious deformities of the  extremities  Derm: Not pale, no jaundice, does have some dry skin, slight abrasion on his nose  Psych: appropriate affect      Pertinent Radiology     Radiology Results:   Recent Results (from the past 24 hour(s))   Lumbar spine CT w/o contrast    Narrative    EXAM: CT LUMBAR SPINE W/O CONTRAST  LOCATION: [facility]  DATE/TIME: 10/1/2022 8:12 PM    INDICATION:  Low back pain fall 1 month prior  COMPARISON:  CT abdomen pelvis 10/29/2013.  TECHNIQUE: Routine CT Lumbar Spine without IV contrast. Multiplanar reformats. Dose reduction techniques were used.     FINDINGS:  VERTEBRA: Transitional anatomy with T12 nonrib-bearing.  Acute L3 superior endplate compression fracture with approximately 50% vertebral body height loss. Chronic L1 compression deformity with 4 mm retropulsion, similar in appearance compared to 2013.   Age-indeterminate, however favor chronic T12 superior and inferior endplate compression deformities with mild height loss.  Grade 1 degenerative anterolisthesis of L4 on L5. No posttraumatic subluxation.     CANAL/FORAMINA:  Mild canal stenosis at L1-L2. Severe canal stenosis at L2-L3. Moderate to severe canal stenosis at L3-L4. Severe canal stenosis at L4-L5. Moderate right L2-L3 and L3-L4 foraminal stenosis.    PARASPINAL:  Severe bilateral hydroureteronephrosis. 15 mm calculus at the left ureterovesicular junction. Presumed enlarged prostate partially imaged.      Impression    IMPRESSION:  1.  Transitional anatomy with T12 nonrib-bearing.   2.  Acute L3 superior endplate compression fracture with approximately 50% vertebral body height loss. No retropulsion. Additional chronic appearing fractures described above.  3.  Severe canal stenosis at L2-L3 and L4-L5. Moderate to severe canal stenosis at L3-L4.  4.  Severe bilateral hydroureteronephrosis with a 14 mm calculus at the left ureterovesicular junction. Correlate for urinary outlet obstruction as there is also suggestion  of an enlarged prostate gland.     EKG Results: personally reviewed.     Advance Care Planning        [facility]   Phone: #[phone]

## 2022-10-02 NOTE — PHARMACY-ADMISSION MEDICATION HISTORY
Pharmacy Note - Admission Medication History    Pertinent Provider Information: N/A     ______________________________________________________________________    Prior To Admission (PTA) med list completed and updated in EMR.       PTA Med List   Medication Sig Last Dose     aspirin 81 mg chewable tablet Take 81 mg by mouth At Bedtime 9/30/2022 at PM     dorzolamide (TRUSOPT) 2 % ophthalmic solution Place 1 drop into both eyes 2 times daily 10/1/2022 at AM, will bring     doxycycline hyclate (VIBRA-TABS) 100 MG tablet [DOXYCYCLINE (VIBRA-TABS) 100 MG TABLET] TAKE 1 TABLET BY MOUTH DAILY 10/1/2022 at AM     GLUCOSAMINE/CHONDROITIN SULF A (GLUCOSAMINE-CHONDROITIN ORAL) Take 1 capsule by mouth daily 10/1/2022 at AM     latanoprost (XALATAN) 0.005 % ophthalmic solution Place 1 drop into both eyes At Bedtime 9/30/2022 at PM, will bring     lisinopril (ZESTRIL) 10 MG tablet TAKE 1 TABLET DAILY 10/1/2022 at AM     metroNIDAZOLE (METROGEL) 1 % external gel APPLY A SMALL AMOUNT TO AFFECTED AREA(S) TOPICALLY ONCE DAILY 10/1/2022 at AM     multivitamin, therapeutic (THERA-VIT) TABS tablet Take 1 tablet by mouth daily 10/1/2022 at AM     tamsulosin (FLOMAX) 0.4 MG capsule TAKE 1 CAPSULE AT BEDTIME (Patient taking differently: Take 0.4 mg by mouth every morning) 10/1/2022 at AM     triamcinolone (KENALOG) 0.1 % external cream Apply topically 2 times daily as needed for irritation Unknown at Unknown time       Information source(s): Patient and CareEverywhere/SureScripts  Method of interview communication: in-person    Summary of Changes to PTA Med List  New: N/A  Discontinued: N/A  Changed: dorzolamide to bid, triamcinolone prn    Patient was asked about OTC/herbal products specifically.  PTA med list reflects this.    In the past week, patient estimated taking medication this percent of the time:  greater than 90%.    Allergies were reviewed, assessed, and updated with the patient.      Medications currently not available for  use during hospital stay. Family/Patient representative states they will bring eye drops, metrogel to [facility].    The information provided in this note is only as accurate as the sources available at the time of the update(s).    Thank you for the opportunity to participate in the care of this patient.    Alex Martinez [facility]  10/1/2022 8:28 PM

## 2022-10-02 NOTE — DISCHARGE INSTRUCTIONS
Brace when out of bed for comfort.   No bending/lifting/twisting > 5 pounds for up to 8-12 weeks  Neurosurgery will see you back this Friday to check in, if doing well - can be telephone call. If you have questions or concerns please call [phone].   Monitor for worsening back pain, leg pain or weakness, numbness or tingling and call to report.   If doing well, plan x-rays in 4 weeks with appointment to follow. We anticipate you will need the brace for 8-12 weeks total    Home with home care.  Agency: [facility]   Phone: [phone]  Services to be provided: skilled RN visits, Occupational and Physical therapy are quarantine days post COVID 19  Expected date of first visit: within 48 hours

## 2022-10-02 NOTE — UTILIZATION REVIEW
Admission Status; Secondary Review Determination   Under the authority of the Utilization Management Committee, the utilization review process indicated a secondary review on Mikaela Figueroa. The review outcome is based on review of the medical records, discussions with staff, and applying clinical experience noted on the date of the review.   (x) Inpatient Status Appropriate - This patient's medical care is consistent with medical management for inpatient care and reasonable inpatient medical practice.     RATIONALE FOR DETERMINATION   87 yr old male presented with back pain.  Noted fall 1 month ago with back pain that had been improving however on 10/1/22 had significant increase in LE weakness causing another fall.  Found to have compression fracture with underlying spinal stenosis but also bilateral hydronephrosis with obvious ureteral stone 14mm left UVJ and incidental COVID19+.  COVID could account for weakness.  Compression fracture with spinal stenosis could also account for weakness in addition the ureteral stone.  Urology consulting.  Neurosurgery consulting with plan for brace and following.  May need procedure for stone and may need MRI spine if weakness continues.  Working on pain control.  Watching for any decompensation with acute COVID.    At the time of admission with the information available to the attending physician more than 2 nights Hospital complex care was anticipated, based on patient risk of adverse outcome if treated as outpatient and complex care required. Inpatient admission is appropriate based on the Medicare guidelines.   The information on this document is developed by the utilization review team in order for the business office to ensure compliance. This only denotes the appropriateness of proper admission status and does not reflect the quality of care rendered.   The definitions of Inpatient Status and Observation Status used in making the determination above are those provided  in the CMS Coverage Manual, Chapter 1 and Chapter 6, section 70.4.   Sincerely,   Grisel Quintero MD  Utilization Review  Physician Advisor  [facility]

## 2022-10-02 NOTE — PROGRESS NOTES
[facility]    Medicine Progress Note - Hospitalist Service    Date of Admission:  10/1/2022    Assessment & Plan              87 year old male retired  who presented with complaints of back pain.  Had a fall about a month prior to this presentation with some back pain that had slowly been improving.  On the day of this presentation complained of bilateral lower extremity weakness while trying to get up from the toilet resulting in another fall and acutely worsening low back pain.    Medical history is notable for essential hypertension, BPH, permanent pacemaker implant.    Initial evaluation revealed unremarkable vitals, elevated creatinine of 1.5, white count slightly elevated at 13.3, hemoglobin 12.6.  Urinalysis unremarkable, COVID incidentally positive but does not have any symptoms.  CT of the lumbar spine showed acute compression fracture of L3, severe canal stenosis at L2/L3 and L4/L5.  Again incidentally was noted severe bilateral hydroureteronephrosis with a 14 mm calculus at the left UVJ junction.  Bladder scan showed over a liter of urinary retention and Bloom catheter was placed.    Initial treatment included Bloom catheter, half liter normal saline.  Neurosurgery consulted via phone.      10/2 :     No sob, respiratory status stable  Neuro intact, brace for comfort, no need for MRI as neuro intact  Urology following and plan for Ct of abdomen/pelvis to fully evaluate Rt. Hydroureteronephrosis due to chronic urinary retention  Pt/ot evaluation ordered    Discharge in 1-2 days      Assessment and plan:  Acute L3 compression fracture  Severe canal stenosis  Treat with scheduled Tylenol, oxycodone as needed  PT and OT consults  Neurosurgery consult appreciated  Orthotics consult     Bilateral severe hydroureteronephrosis with large ureteral stone  Acute kidney injury  Has seen [facility] in the past for kidney stones  Will consult urology  Bloom catheter placed in the emergency  department  Follow renal function  Hold lisinopril  Continue tamsulosin     Positive COVID PCR  No symptoms  He is vaccinated, boosted  Daughter is quite concerned about remdesivir, okay to defer this for now     Chronic dermatitis  Continue doxycycline and MetroGel     Clinically Significant Risk Factors Present on Admission                        # Hypertension: home medication list includes antihypertensive(s)               Diet: Combination Diet Regular Diet Adult    DVT Prophylaxis: Pneumatic Compression Devices  Bloom Catheter: PRESENT, indication:    Central Lines: None  Cardiac Monitoring: None  Code Status: Full Code      Disposition Plan      Expected Discharge Date: 10/04/2022        Discharge Comments: Therapy consults pending        The patient's care was discussed with the Bedside Nurse and Patient.    Pancho Marsh MD  Hospitalist Service  [facility]  Securely message with the Vocera Web Console (learn more here)  Text page via [employer] Paging/Directory         Clinically Significant Risk Factors Present on Admission                # Thrombocytopenia: Plts = 119 10e3/uL (Ref range: 150 - 450 10e3/uL) on admission, will monitor for bleeding  # Hypertension: home medication list includes antihypertensive(s)          ______________________________________________________________________        Data reviewed today: I reviewed all medications, new labs and imaging results over the last 24 hours. I personally reviewed no images or EKG's today.    Physical Exam   Vital Signs: Temp: 98.3  F (36.8  C) Temp src: Oral BP: 136/64 Pulse: 60   Resp: 16 SpO2: 97 % O2 Device: None (Room air)    Weight: 165 lbs 11.2 oz       GENERAL: The patient is not in any acute distressed. Awake and alert.  HEENT: Nonicteric sclerae, PERRLA, EOMI. Oropharynx clear. Moist mucous membranes. Conjunctivae appear well perfused.  HEART: Regular rate and rhythm without murmurs.  LUNGS: Clear to auscultation  bilaterally. No wheezing or crackles.  ABDOMEN: Soft, positive bowel sounds, nontender.  SKIN: No rash, no excessive bruising, petechiae, or purpura.  EXTREMITIES : no rashes, no swelling in legs.  NEUROLOGIC: conscious and oriented, follows commands, no obvious focal deficits.  ROS: All other systems negative       Data   Recent Labs   Lab 10/02/22  0726 10/01/22  2249 10/01/22  1932   WBC 9.3  --  13.3*   HGB 10.6*  --  12.6*   MCV 92  --  96   *  --  152   INR  --   --  1.05   NA  --  142 143   POTASSIUM  --  4.3 4.0   CHLORIDE  --  111* 107   CO2  --  23 24   BUN  --  25 25   CR  --  1.34* 1.53*   ANIONGAP  --  8 12   LIBERTAD  --  9.1 9.9   GLC  --  107 111   ALBUMIN  --   --  3.7   PROTTOTAL  --   --  7.4   BILITOTAL  --   --  0.6   ALKPHOS  --   --  105   ALT  --   --  15   AST  --   --  31   LIPASE  --   --  24

## 2022-10-02 NOTE — ED PROVIDER NOTES
EMERGENCY DEPARTMENT ENCOUNTER      NAME: Mikaela Figueroa  AGE: 87 year old male  YOB: 1935  MRN: [id number]  EVALUATION DATE & TIME: 10/1/2022  7:01 PM    PCP: Derek Kumar    ED PROVIDER: Doe Ngo MD    Chief Complaint   Patient presents with     Fall     Back Pain     FINAL IMPRESSION:  1. Closed compression fracture of L3 lumbar vertebra, initial encounter (H)    2. Urinary retention    3. Benign prostatic hyperplasia without lower urinary tract symptoms    4. Left ureteral stone        ED COURSE & MEDICAL DECISION MAKING:    Pertinent Labs & Imaging studies reviewed. (See chart for details)  87 year old male presents to the Emergency Department for evaluation of back pain after a fall.  Patient had a fall approximately 4 weeks ago per him and his wife's report where he was descending a hill with an incline slipped and landed on his back immediate onset of pain to his back did not seek evaluation as the pain was manageable.  There seem to be slow improvement to his symptoms over the last 4 weeks.  He had an episode today while he was on the toilet he was unable to get off of the toilet due to what he describes as just overall weakness with a focus in his legs he attempted get himself up and pulled off the towel rack and then went to the floor with a fall where he was on the ground for approximately 60 minutes before his wife found him.  Ultimately was unable to get up off of the floor and patient was subsequently transported to the emergency department for assessment.  Patient had very focal discomfort to palpation of the distal aspect of the lumbar spine.  His abdominal examination was completely benign.  He had good strength and sensation throughout the lower extremities with no appreciable weakness although it was reported via EMS that he was somewhat unsteady on his feet.  No focal deficits appreciated on clinical examination.  Imaging was obtained which demonstrated a 50%  compression fracture in the lumbar spine as well as some old compression fractures.  It is possible that this fracture was with his fall off the toilet today or potentially related to his fall 4 weeks prior.  Seems more likely to be related to his fall a month ago given overall he reports improvement to his symptoms.  Otherwise cervical canal stenosis that was reported as severe but as above his strength and sensation was actually intact to his lower extremities.  Incidentally on patient's CT scan of the lumbar spine there was severe bilateral ureteral hydronephrosis as well as an obstructing kidney stone at the UVJ.  Patient does not have hematuria.  No evidence of urinary tract infection.  I do not think these findings are related to the events that occurred on the toilet today also with his laboratory testing his troponin was 0.10.  ECG was nonischemic.  Patient does have a pacemaker and given the timing of the day and need for additional support to obtain we cannot proceed with MRI imaging at this time.  I have paged neurosurgery to discuss patient's CT lumbar spine results we will also need to consider additional work-up regarding patient's abnormal findings on the lumbar spine CT.  He is not requiring any interventions in the emergency department at this time.    10:24 PM  Case discussed with neurosurgery.  Plan of care for admission to the hospital for probable brace fitting.  Neurosurgery to see in consultation.  Relative to the bilateral hydroureteronephrosis, bladder scan was obtained post void demonstrating near 500 cc of retained urine.  I think the hydroureteronephrosis is secondary to urinary retention and prostate hypertrophy.  I did recommend catheter placement for urinary retention and the CT scan findings.  Relative to the urinary retention he does not have other neurological deficits that would support diagnosis of cauda equina his pain is controlled there is no motor or sensory deficits notable  "to the lower extremities and as above I cannot perform an MRI at this time.  We are proceeding with admission catheter has been placed updated patient and family on test results plan of care and discussed with admitting hospitalist service.    7:05 PM I met with the patient to gather history and perform an initial exam. PPE: gloves, procedure mask         At the conclusion of the encounter I discussed the results of all of the tests and the disposition. The questions were answered. The patient or family acknowledged understanding and was agreeable with the care plan.       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    Patient reports that earlier today he was on the toilet when he \"lost all his strength\" in his lower back and was unable to get up from the toilet. Patient ended up sliding to the floor and was on the ground for about 1-1.5 hours before being found by his wife. Patient reports he experienced vertigo, nausea, and recently difficulty balancing while walking. Patient denies significant back pain and just notes it felt like he had no strength. Patient reports a fall about a month ago while doing yard work where he hurt his lower back but did not feel the need to seek medical care or get xrays. He has been treating the back pain with aspercreme and sleeping in a rocking chair due to an inability to get comfortable in his bed. Patient denies any other injuries from his recent " fall and notes he was able to get around after, though with some difficulty. He reports his back pain had been improving. Patient reports he has a pacemaker and age related urine leakage. He denies chest pain, abdominal pain, fever, chills, current back pain, cough, shortness of breath, headache, neck pain, dysuria or difficulty urinating, extremity numbness or weakness, or any other complaints at this time.       REVIEW OF SYSTEMS   Review of Systems   Constitutional: Negative for chills and fever.   Respiratory: Negative for cough and shortness of breath.    Cardiovascular: Negative for chest pain.   Gastrointestinal: Positive for nausea. Negative for abdominal pain.   Genitourinary: Negative for dysuria.   Musculoskeletal: Positive for back pain (none currently). Negative for neck pain.   Neurological: Negative for weakness and headaches.        Negative for extremity numbness. Positive for vertigo, balance difficulties   All other systems reviewed and are negative.      PAST MEDICAL HISTORY:  Past Medical History:   Diagnosis Date     Hypertension        PAST SURGICAL HISTORY:  Past Surgical History:   Procedure Laterality Date     ARTHROSCOPY KNEE Right            CURRENT MEDICATIONS:    aspirin 81 mg chewable tablet  dorzolamide (TRUSOPT) 2 % ophthalmic solution  doxycycline hyclate (VIBRA-TABS) 100 MG tablet  GLUCOSAMINE/CHONDROITIN SULF A (GLUCOSAMINE-CHONDROITIN ORAL)  latanoprost (XALATAN) 0.005 % ophthalmic solution  lisinopril (ZESTRIL) 10 MG tablet  metroNIDAZOLE (METROGEL) 1 % external gel  multivitamin, therapeutic (THERA-VIT) TABS tablet  tamsulosin (FLOMAX) 0.4 MG capsule  triamcinolone (KENALOG) 0.1 % external cream        ALLERGIES:  No Known Allergies    FAMILY HISTORY:  Family History   Problem Relation Age of Onset     Dementia Mother      Heart Disease Father      Diabetes Father        SOCIAL HISTORY:   Social History     Socioeconomic History     Marital status:    Tobacco Use      Smoking status: Former Smoker     Quit date: 1998     Years since quittin.7     Smokeless tobacco: Never Used   Substance and Sexual Activity     Alcohol use: No     Drug use: No       VITALS:  /64   Pulse 75   Temp 97.5  F (36.4  C) (Oral)   Resp 16   Wt 75.2 kg (165 lb 11.2 oz)   SpO2 97%   BMI 24.83 kg/m      PHYSICAL EXAM    PHYSICAL EXAM    Constitutional: Well developed, Well nourished, NAD  HENT: Normocephalic, Atraumatic, Bilateral external ears normal, Oropharynx normal, mucous membranes moist, Nose normal. Neck-  Normal range of motion, No tenderness, Supple, No stridor.   Eyes: PERRL, EOMI, Conjunctiva normal, No discharge.   Respiratory: Normal breath sounds, No respiratory distress, No wheezing, Speaks full sentences easily. No cough.   Cardiovascular: Normal heart rate, Regular rhythm, No murmurs Chest wall nontender.    GI:  Soft, No tenderness, No masses, No flank tenderness. No rebound or guarding.  : No cva tenderness    Musculoskeletal: Focal tenderness to palpation of the distal aspect of the lumbar spine.  Integument: Warm, Dry, No erythema, No rash. No petechiae.   Neurologic: Alert & oriented x 3, patient has good flexion extension in the ankle was symmetrical and adequate strength bilaterally he is able to lift each leg off of the bed and hold it in the air without any significant strength deficits appreciated.  There was no sensory deficits appreciated.  The distal neurovascular examination appear to be intact.  Psychiatric: Affect normal, Judgment normal, Mood normal. Cooperative.     LAB:  All pertinent labs reviewed and interpreted.  Results for orders placed or performed during the hospital encounter of 10/01/22   Lumbar spine CT w/o contrast    Impression    IMPRESSION:  1.  Transitional anatomy with T12 nonrib-bearing.   2.  Acute L3 superior endplate compression fracture with approximately 50% vertebral body height loss. No retropulsion. Additional chronic  appearing fractures described above.  3.  Severe canal stenosis at L2-L3 and L4-L5. Moderate to severe canal stenosis at L3-L4.  4.  Severe bilateral hydroureteronephrosis with a 14 mm calculus at the left ureterovesicular junction. Correlate for urinary outlet obstruction as there is also suggestion of an enlarged prostate gland.   Comprehensive metabolic panel   Result Value Ref Range    Sodium 143 136 - 145 mmol/L    Potassium 4.0 3.5 - 5.0 mmol/L    Chloride 107 98 - 107 mmol/L    Carbon Dioxide (CO2) 24 22 - 31 mmol/L    Anion Gap 12 5 - 18 mmol/L    Urea Nitrogen 25 8 - 28 mg/dL    Creatinine 1.53 (H) 0.70 - 1.30 mg/dL    Calcium 9.9 8.5 - 10.5 mg/dL    Glucose 111 70 - 125 mg/dL    Alkaline Phosphatase 105 45 - 120 U/L    AST 31 0 - 40 U/L    ALT 15 0 - 45 U/L    Protein Total 7.4 6.0 - 8.0 g/dL    Albumin 3.7 3.5 - 5.0 g/dL    Bilirubin Total 0.6 0.0 - 1.0 mg/dL    GFR Estimate 44 (L) >60 mL/min/1.73m2   Result Value Ref Range    Lipase 24 0 - 52 U/L   Lactic acid whole blood   Result Value Ref Range    Lactic Acid 1.6 0.7 - 2.0 mmol/L   Result Value Ref Range    Troponin I 0.10 0.00 - 0.29 ng/mL   Result Value Ref Range    Magnesium 2.0 1.8 - 2.6 mg/dL   UA with Microscopic reflex to Culture    Specimen: Urine, Clean Catch   Result Value Ref Range    Color Urine Light Yellow Colorless, Straw, Light Yellow, Yellow    Appearance Urine Clear Clear    Glucose Urine Negative Negative mg/dL    Bilirubin Urine Negative Negative    Ketones Urine Negative Negative mg/dL    Specific Gravity Urine 1.014 1.001 - 1.030    Blood Urine Negative Negative    pH Urine 7.0 5.0 - 7.0    Protein Albumin Urine Negative Negative mg/dL    Urobilinogen Urine <2.0 <2.0 mg/dL    Nitrite Urine Negative Negative    Leukocyte Esterase Urine Negative Negative    Mucus Urine Present (A) None Seen /LPF    RBC Urine 2 <=2 /HPF    WBC Urine 3 <=5 /HPF   CBC with platelets and differential   Result Value Ref Range    WBC Count 13.3 (H) 4.0 -  11.0 10e3/uL    RBC Count 3.70 (L) 4.40 - 5.90 10e6/uL    Hemoglobin 12.6 (L) 13.3 - 17.7 g/dL    Hematocrit 35.5 (L) 40.0 - 53.0 %    MCV 96 78 - 100 fL    MCH 34.1 (H) 26.5 - 33.0 pg    MCHC 35.5 31.5 - 36.5 g/dL    RDW 13.7 10.0 - 15.0 %    Platelet Count 152 150 - 450 10e3/uL    % Neutrophils 88 %    % Lymphocytes 2 %    % Monocytes 9 %    % Eosinophils 0 %    % Basophils 0 %    % Immature Granulocytes 1 %    NRBCs per 100 WBC 0 <1 /100    Absolute Neutrophils 11.7 (H) 1.6 - 8.3 10e3/uL    Absolute Lymphocytes 0.3 (L) 0.8 - 5.3 10e3/uL    Absolute Monocytes 1.2 0.0 - 1.3 10e3/uL    Absolute Eosinophils 0.0 0.0 - 0.7 10e3/uL    Absolute Basophils 0.0 0.0 - 0.2 10e3/uL    Absolute Immature Granulocytes 0.1 <=0.4 10e3/uL    Absolute NRBCs 0.0 10e3/uL   Extra Blue Top Tube   Result Value Ref Range    Hold Specimen JIC    Extra Red Top Tube   Result Value Ref Range    Hold Specimen JIC    Asymptomatic COVID-19 Virus (Coronavirus) by PCR Nose    Specimen: Nose; Swab   Result Value Ref Range    SARS CoV2 PCR Positive (A) Negative   Result Value Ref Range    INR 1.05 0.85 - 1.15       RADIOLOGY:  Reviewed all pertinent imaging. Please see official radiology report.  Lumbar spine CT w/o contrast   Final Result   IMPRESSION:   1.  Transitional anatomy with T12 nonrib-bearing.    2.  Acute L3 superior endplate compression fracture with approximately 50% vertebral body height loss. No retropulsion. Additional chronic appearing fractures described above.   3.  Severe canal stenosis at L2-L3 and L4-L5. Moderate to severe canal stenosis at L3-L4.   4.  Severe bilateral hydroureteronephrosis with a 14 mm calculus at the left ureterovesicular junction. Correlate for urinary outlet obstruction as there is also suggestion of an enlarged prostate gland.          EKG:    Performed at: 1854  Sinus rhythm  First-degree AV block  Incomplete right bundle branch block  Left anterior fascicular block  No previous ECG for  comparison  Clinical impression: Sinus rhythm with first-degree block incomplete) about block and left anterior fascicular block.  Nonischemic appearing ECG      I, jaxson, am serving as a scribe to document services personally performed by Jaxson Campos based on my observation and the provider's statements to me. I, Doe Ngo MD, attest that jaxson is acting in a scribe capacity, has observed my performance of the services and has documented them in accordance with my direction.    Doe Ngo MD  [facility] EMERGENCY ROOM  [facility] [facility] [address] [phone]     Doe Ngo MD  10/01/22 2669

## 2022-10-02 NOTE — CONSULTS
NEUROSURGERY CONSULTATION NOTE    Mikaela Figueroa   [id number] [address]  87 year old male  Admission Date/Time: 10/1/2022  7:01 PM  Primary Care Provider: Derek Kumar  [facility] Attending Physician: David Mason DO    Neurosurgery was asked to see this patient by David Mason DO for evaluation of L3 fracture.     PROBLEM LIST:  Active Problems:    Closed compression fracture of L3 lumbar vertebra, initial encounter (H)       Neurosurgery Attending: The patient's clinical examination, laboratory data, and plan was discussed with Dr. Coats    CONSULTATION ASSESSMENT AND PLAN:  In brief, 87 year old male presents with 4-6 weeks of low back pain after a fall in the yard. He was getting by okay but yesterday developed diffuse lower extremity weakness and could not stand to get up off the toilet, severe pain in the back at that time. He presented to the ED for evaluation and was found to have L3 compression fracture in the setting of chronic lumbar stenosis (notes no real difficulties prior) as well as bilateral hydronephrosis as well as incidental covid. Unclear cause of weakness, feels okay in bed this morning. No neurologic deficit.     Imaging reviewed with Dr. Coats who recommends brace for comfort as patient is fused through the posterior elements above and below the fracture, attempt activity - if able to stand and ambulate can hold off on inpatient MRI. He was able to stand and ambulate to the bathroom with RN, no difficulties noted.     Hold off on MRI at this time. Can follow up outpatient this upcoming week to check his progress, if persistent issues can proceed with MRI. Neurosurgery will sign off, we will arrange follow up. Please call with questions.     HPI:  87 year old pleasant male presents with 4-6 weeks of low back pain after a fall while working in the yard. He is not sure if he landed on a rock but had severe pain in the back since that time that was gradually improving. He  "notes some difficulty ambulating since injury but he thought this related to fear of back pain rather than any sort of claudicating type pain. Yesterday, he was having difficulty urinating and when he yonatan to get up off the toilet, had severe back pain and somehow fell to the ground - legs were weak and could not hold him up at all. This has seemingly resolved. At our visit this morning, he notes his legs feel at their baseline. No numbness/tingling/discernable weakness. No new bowel or bladder issues. Prior to admission, notes no claudicating leg pain or shopping cart sign though he does report his legs feel better when bent rather than laying out flat. We discussed imaging. 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 His L3 fracture has 50% height loss however posterior column and posterior elements remain intact. He is maintaining his alignment since his injury leading to a degree of stability. He is otherwise without complaint. Also found to have bilateral hydronephrosis on CT imaging. History of chronic urinary retention - unclear of caused by lumbar stenosis or obstructive stone. Denies balance issues. In general, would like to avoid surgical intervention if at all possible. We discussed no surgery needed for the fracture at this time.   Past Medical History:   Diagnosis Date     Hypertension      Past Surgical History:   Procedure Laterality Date     ARTHROSCOPY KNEE Right        REVIEW OF SYSTEMS:  As above.     MEDICATIONS:  No current outpatient medications on file.         ALLERGIES/SENSITIVITIES:     No Known Allergies    PERTINENT SOCIAL HISTORY: reviewed, wife and children supportive.   Social History     Socioeconomic History     Marital status:      Spouse name: None     Number of children: None     Years of education: None     Highest education level: None   Tobacco Use     Smoking status: " Former Smoker     Quit date: 1998     Years since quittin.7     Smokeless tobacco: Never Used   Substance and Sexual Activity     Alcohol use: No     Drug use: No         FAMILY HISTORY:  Family History   Problem Relation Age of Onset     Dementia Mother      Heart Disease Father      Diabetes Father         PHYSICAL EXAM:   Constitutional: /60   Pulse 63   Temp 98.3  F (36.8  C) (Oral)   Resp 18   Wt 75.2 kg (165 lb 11.2 oz)   SpO2 98%   BMI 24.83 kg/m       Mental Status: A & O in no acute distress.  Affect is appropriate.  Speech is fluent.  Recent and remote memory are intact.  Attention span and concentration are normal.     Cranial Nerves: CN1: grossly intact per patient recall. CN2: No funduscopic exam performed. CN3,4 & 6: Pupillary light response, lateral and vertical gaze normal.  No nystagmus.  Visual fields are full to confrontation. CN5: Intact to touch CN7: No facial weakness, smile, facial symmetry intact. CN8: Intact to spoken voice. CN9&10: Gag reflex, uvula midline, palate rises with phonation. CN11: Shoulder shrug 5/5 intact bilaterally. CN12: Tongue midline and moves freely from side to side.     Motor: No pronator drift of upper extremity. Normal bulk and tone all muscle groups of upper and lower extremities.    Strength: 5/5 in lower extremities     Sensory: Sensation intact bilaterally to light touch and temp      Coordination; gait testing deferred       IMAGING:  I personally reviewed all radiographic images   CT lumbar   Transitional anatomy with T12 nonrib-bearing.  Acute L3 superior endplate compression fracture with approximately 50% vertebral body height loss. Chronic L1 compression deformity with 4 mm retropulsion, similar in appearance compared to 2013.   Age-indeterminate, however favor chronic T12 superior and inferior endplate compression deformities with mild height loss.  Grade 1 degenerative anterolisthesis of L4 on L5. No posttraumatic subluxation. Mild  canal stenosis at L1-L2. Severe canal stenosis at L2-L3. Moderate to severe canal stenosis at L3-L4. Severe canal stenosis at L4-L5. Moderate right L2-L3 and L3-L4 foraminal stenosis.    (non critical care) I spent more than 90 minutes in this apt, examining the pt, reviewing the scans, reviewing notes from chart, discussing treatment options with risks and benefits and coordinating care. >50 % clinic time was spent in face to face counseling and coordinating care    Angely Salinas NP      Cc:   No referring provider defined for this encounter.    Derek Kumar  [unfilled]

## 2022-10-02 NOTE — PLAN OF CARE
Problem: Plan of Care - These are the overarching goals to be used throughout the patient stay.    Goal: Optimal Comfort and Wellbeing  Outcome: Ongoing, Progressing  Intervention: Monitor Pain and Promote Comfort  Recent Flowsheet Documentation  Taken 10/2/2022 0749 by Yany Badillo RN  Pain Management Interventions: declines   Goal Outcome Evaluation:    Patient is A/Ox4, VSS on RA. SBA with walker and gait belt when ambulating, denying weakness in extremities. Bloom in place, blood noted in urine, urine culture ordered and collected, pending results. Full sensation per Pt. IV saline locked, patent. No new skin issues noted. Patient denying pain during shift.   Yany Badillo, RN

## 2022-10-02 NOTE — PLAN OF CARE
Goal Outcome Evaluation:      Patient admitted to the floor from ED around 2330. Pt alert and oriented X 4. Bedrest. Pt denies pain. Refused Tylenol. COVID positive. Special precaution maintained. Large bruise on IV insertion site. IV patent and flushing. Bloom in place. Blood noticed in catheter bag in the morning. Report given to morning nurse.

## 2022-10-02 NOTE — PROGRESS NOTES
Neurosurgery Note:    Called by [facility] to report 87 year old male with fall four weeks ago that resulted in back pain that had slowly been improving. Today, he described bilateral lower extremity weakness while trying to get up from the bathroom that resulted in a fall and acutely worsening low back pain. Found to have an acute L3 compression fracture without significant retropulsion however in the setting of degenerative change and congenitally small canal, severe canal stenosis at this level. He is reportedly neuro intact in the ED however he is unable to get an MRI tonight due to pacemaker. Would favor admission and attempt to get lumbar MRI tomorrow AM. If acute decompensation in neurological exam, would require urgent transfer for decompression. Keep bedrest until MRI obtained. I will see patient first thing tomorrow AM.     Angely Salinas CNP  [facility] Neurosurgery  O: [phone]  P: [phone]

## 2022-10-02 NOTE — ED NOTES
Hourly rounding completed on patient.  Updated on plan of care.  Will continue to monitor.  Call light in reach.    IV patent and infusing. Large insertion bruise noted but no signs of infiltration.

## 2022-10-03 ENCOUNTER — APPOINTMENT (OUTPATIENT)
Dept: OCCUPATIONAL THERAPY | Facility: CLINIC | Age: 87
DRG: 551 | End: 2022-10-03
Attending: INTERNAL MEDICINE
Payer: MEDICARE

## 2022-10-03 ENCOUNTER — APPOINTMENT (OUTPATIENT)
Dept: PHYSICAL THERAPY | Facility: CLINIC | Age: 87
DRG: 551 | End: 2022-10-03
Attending: HOSPITALIST
Payer: MEDICARE

## 2022-10-03 LAB
CREAT SERPL-MCNC: 1.23 MG/DL (ref 0.7–1.3)
ERYTHROCYTE [DISTWIDTH] IN BLOOD BY AUTOMATED COUNT: 14.4 % (ref 10–15)
GFR SERPL CREATININE-BSD FRML MDRD: 57 ML/MIN/1.73M2
HCT VFR BLD AUTO: 35.1 % (ref 40–53)
HGB BLD-MCNC: 12.6 G/DL (ref 13.3–17.7)
MCH RBC QN AUTO: 35.7 PG (ref 26.5–33)
MCHC RBC AUTO-ENTMCNC: 35.9 G/DL (ref 31.5–36.5)
MCV RBC AUTO: 99 FL (ref 78–100)
PLATELET # BLD AUTO: 117 10E3/UL (ref 150–450)
RBC # BLD AUTO: 3.53 10E6/UL (ref 4.4–5.9)
WBC # BLD AUTO: 8.1 10E3/UL (ref 4–11)

## 2022-10-03 PROCEDURE — 97161 PT EVAL LOW COMPLEX 20 MIN: CPT | Mod: GP

## 2022-10-03 PROCEDURE — 97166 OT EVAL MOD COMPLEX 45 MIN: CPT | Mod: GO

## 2022-10-03 PROCEDURE — 250N000013 HC RX MED GY IP 250 OP 250 PS 637: Performed by: HOSPITALIST

## 2022-10-03 PROCEDURE — 82565 ASSAY OF CREATININE: CPT | Performed by: NURSE PRACTITIONER

## 2022-10-03 PROCEDURE — 85014 HEMATOCRIT: CPT | Performed by: NURSE PRACTITIONER

## 2022-10-03 PROCEDURE — 97535 SELF CARE MNGMENT TRAINING: CPT | Mod: GO

## 2022-10-03 PROCEDURE — 120N000001 HC R&B MED SURG/OB

## 2022-10-03 PROCEDURE — 97116 GAIT TRAINING THERAPY: CPT | Mod: GP

## 2022-10-03 PROCEDURE — 36415 COLL VENOUS BLD VENIPUNCTURE: CPT | Performed by: NURSE PRACTITIONER

## 2022-10-03 PROCEDURE — 99233 SBSQ HOSP IP/OBS HIGH 50: CPT | Performed by: STUDENT IN AN ORGANIZED HEALTH CARE EDUCATION/TRAINING PROGRAM

## 2022-10-03 RX ADMIN — ACETAMINOPHEN 975 MG: 325 TABLET, FILM COATED ORAL at 06:33

## 2022-10-03 RX ADMIN — DORZOLAMIDE HCL 1 DROP: 20 SOLUTION/ DROPS OPHTHALMIC at 21:11

## 2022-10-03 RX ADMIN — DOXYCYCLINE 100 MG: 100 CAPSULE ORAL at 08:33

## 2022-10-03 RX ADMIN — METRONIDAZOLE: 10 GEL TOPICAL at 08:33

## 2022-10-03 RX ADMIN — ACETAMINOPHEN 975 MG: 325 TABLET, FILM COATED ORAL at 22:24

## 2022-10-03 RX ADMIN — TAMSULOSIN HYDROCHLORIDE 0.4 MG: 0.4 CAPSULE ORAL at 08:33

## 2022-10-03 RX ADMIN — ACETAMINOPHEN 975 MG: 325 TABLET, FILM COATED ORAL at 15:13

## 2022-10-03 RX ADMIN — ASPIRIN 81 MG CHEWABLE TABLET 81 MG: 81 TABLET CHEWABLE at 21:11

## 2022-10-03 RX ADMIN — DORZOLAMIDE HCL 1 DROP: 20 SOLUTION/ DROPS OPHTHALMIC at 08:34

## 2022-10-03 RX ADMIN — LATANOPROST 1 DROP: 50 SOLUTION/ DROPS OPHTHALMIC at 21:11

## 2022-10-03 RX ADMIN — ACETAMINOPHEN 975 MG: 325 TABLET, FILM COATED ORAL at 00:00

## 2022-10-03 ASSESSMENT — ACTIVITIES OF DAILY LIVING (ADL)
ADLS_ACUITY_SCORE: 30
ADLS_ACUITY_SCORE: 24
ADLS_ACUITY_SCORE: 24
ADLS_ACUITY_SCORE: 30

## 2022-10-03 NOTE — PLAN OF CARE
Goal Outcome Evaluation:  Pt denies pain, will continue to monitor. Bloom in place, draining urine, with small amount of blood. Vitally stable. Neuro's intact. He is tolerating his diet. Assist of one with ambulation.

## 2022-10-03 NOTE — PROGRESS NOTES
S: Pt. seen at [facility]. Male. Brad NP. RX: Assess for brace. DX: L3 compression Fx.   O:A: Pt. fell at his residence. Today I F/D a Aspen Quikdraw RAP LSO size Medium. Pt. stated that he liked the support. Donning doffing wear and care instructions given.   P: Pt. to be seen as needed.    Emmett MAIER,LO

## 2022-10-03 NOTE — PROGRESS NOTES
[facility]    Medicine Progress Note - Hospitalist Service    Date of Admission:  10/1/2022    Assessment & Plan          87 year old male retired  who presented with complaints of back pain.  Had a fall about a month prior to presentation with some back pain that had slowly been improving.  Day of admit developed bilateral lower extremity weakness while trying to get up from the toilet resulting in another fall and acutely worsening low back pain. Medical history notable for essential hypertension, BPH, permanent pacemaker implant.    Initial evaluation revealed unremarkable vitals, elevated creatinine of 1.5, white count slightly elevated at 13.3, hemoglobin 12.6.  Urinalysis unremarkable, COVID incidentally positive but does not have any symptoms.  CT of the lumbar spine showed acute compression fracture of L3, severe canal stenosis at L2/L3 and L4/L5. Incidentally also noted severe bilateral hydroureteronephrosis with a 14 mm calculus at the left UVJ junction.  Bladder scan showed over a liter of urinary retention and Smith catheter was placed.    Initial treatment included Smith catheter, half liter normal saline.  Neurosurgery consulted via phone.  Urology consulted and CT abdomen obtained for further visualization of the hydronephrosis.  Current plan is to continue the smith with manual irrigation and further monitoring and tamsulosin.  If clinically or Cr progresses, may require cystoscopy and stent.  Therapy was assessed and recommendation for home cares for dispo.    Assessment and plan:  Acute L3 compression fracture  Severe canal stenosis  -Treat with scheduled Tylenol, oxycodone as needed  -PT and OT consults, rec'd home cares  -Neurosurgery consult appreciated  -Orthotics consult appreciated    - I F/D a Aspen Quikdraw RAP LSO size Medium     Bilateral severe hydroureteronephrosis with large ureteral stone  Acute kidney injury  -Has seen [facility] in the past for kidney  stones  -Urology following, appreciate  -Continue Smith catheter with manual irrigation  -Reassess on 10/4  -Follow renal function  -Hold lisinopril  -Continue tamsulosin     Positive COVID PCR  -No symptoms  -He is vaccinated, boosted  -Daughter is quite concerned about remdesivir, okay to defer this for now  -Clinically stable on room air without shortness of breath     Chronic dermatitis  -Continue doxycycline and MetroGel     Clinically Significant Risk Factors Present on Admission         # Hypertension: home medication list includes antihypertensive(s)         Diet: Combination Diet Regular Diet Adult    DVT Prophylaxis: Pneumatic Compression Devices  Smith Catheter: PRESENT, indication: Retention  Central Lines: None  Cardiac Monitoring: None  Code Status: Full Code      Disposition Plan   - still has smith in place, and monitoring creatinine; if progresses would need surgical intervention     Expected Discharge Date: 10/04/2022        Discharge Comments: Therapy consults pending        The patient's care was discussed with the Bedside Nurse and Patient.    Jose Armando Doe MD  Hospitalist Service  [facility]  Securely message with the Vocera Web Console (learn more here)  Text page via M[employer]Way Paging/Directory     ______________________________________________________________________      Data reviewed today: I reviewed all medications, new labs and imaging results over the last 24 hours. I personally reviewed no images or EKG's today.    Physical Exam   Vital Signs: Temp: 98.4  F (36.9  C) Temp src: Oral BP: (!) 170/71 Pulse: 66   Resp: 16 SpO2: 99 % O2 Device: None (Room air)    Weight: 165 lbs 11.2 oz     GENERAL: The patient is not in any acute distressed. Awake and alert. Conversational.  HEENT: Nonicteric sclerae, PERRLA, EOMI. Oropharynx clear. Moist mucous membranes. Conjunctivae appear well perfused.   HEART: Regular rate and rhythm without murmurs.   LUNGS: Clear to  auscultation bilaterally. No wheezing or crackles.   ABDOMEN: Soft, positive bowel sounds, nontender.  No CVA tenderness bilaterally.  SKIN: No rash, no excessive bruising, petechiae, or purpura.   EXTREMITIES : no rashes, no swelling in legs.   NEUROLOGIC: conscious and oriented, follows commands, no obvious focal deficits.   ROS: All other systems negative       Data   Recent Labs   Lab 10/03/22  1018 10/02/22  0726 10/01/22  2249 10/01/22  1932   WBC 8.1 9.3  --  13.3*   HGB 12.6* 10.6*  --  12.6*   MCV 99 92  --  96   * 119*  --  152   INR  --   --   --  1.05   NA  --   --  142 143   POTASSIUM  --   --  4.3 4.0   CHLORIDE  --   --  111* 107   CO2  --   --  23 24   BUN  --   --  25 25   CR 1.23  --  1.34* 1.53*   ANIONGAP  --   --  8 12   LIBERTAD  --   --  9.1 9.9   GLC  --   --  107 111   ALBUMIN  --   --   --  3.7   PROTTOTAL  --   --   --  7.4   BILITOTAL  --   --   --  0.6   ALKPHOS  --   --   --  105   ALT  --   --   --  15   AST  --   --   --  31   LIPASE  --   --   --  24

## 2022-10-03 NOTE — PLAN OF CARE
Problem: Plan of Care - These are the overarching goals to be used throughout the patient stay.    Goal: Optimal Comfort and Wellbeing  Outcome: Ongoing, Progressing   Goal Outcome Evaluation:  Patient is alert and oriented. Able to verbalized needs. Bloom catheter intact and draining, blood noted in urine. Urine cultures. Denied pain or SOB. On RA. Up with A1. IV saline locked. Bed alarm activated for safety.

## 2022-10-03 NOTE — PROGRESS NOTES
10/03/22 1404   Quick Adds   Type of Visit Initial Occupational Therapy Evaluation   Living Environment   People in Home spouse   Current Living Arrangements house   Home Accessibility stairs to enter home;stairs within home   Number of Stairs, Main Entrance 2   Stair Railings, Main Entrance railing on right side (ascending)   Number of Stairs, Within Home, Primary greater than 10 stairs   Stair Railings, Within Home, Primary railing on right side (ascending)   Living Environment Comments tub/shower, grab bars near shower, standard height toilet   Self-Care   Equipment Currently Used at Home none   Fall history within last six months yes   Number of times patient has fallen within last six months 1   Activity/Exercise/Self-Care Comment Pt reports he is ind with all ADLs and most IADLs, split with wife   General Information   Onset of Illness/Injury or Date of Surgery 10/01/22   Referring Physician Pancho Marsh MD   Patient/Family Therapy Goal Statement (OT) To return home, to heal   Additional Occupational Profile Info/Pertinent History of Current Problem Mikaela Figueroa is a 87 year old male retired  who presented with complaints of back pain.  Had a fall about a month prior to this presentation with some back pain that had slowly been improving.  On the day of this presentation complained of bilateral lower extremity weakness while trying to get up from the toilet resulting in another fall and acutely worsening low back pain.    Medical history is notable for essential hypertension, BPH, permanent pacemaker implant.Mikaela Figueroa is a 87 year old male retired  who presented with complaints of back pain.  Had a fall about a month prior to this presentation with some back pain that had slowly been improving.  On the day of this presentation complained of bilateral lower extremity weakness while trying to get up from the toilet resulting in another fall and acutely worsening low back pain.  Medical history is notable for essential hypertension, BPH, permanent pacemaker implant.   Existing Precautions/Restrictions brace worn when out of bed;spinal;fall   Cognitive Status Examination   Orientation Status orientation to person, place and time   Affect/Mental Status (Cognitive) WNL   Visual Perception   Visual Impairment/Limitations WFL;corrective lenses full-time   Sensory   Sensory Quick Adds No deficits were identified   Integumentary/Edema   Integumentary/Edema no deficits were identifed   Posture   Posture not impaired   Range of Motion Comprehensive   General Range of Motion no range of motion deficits identified   Strength Comprehensive (MMT)   General Manual Muscle Testing (MMT) Assessment no strength deficits identified   Muscle Tone Assessment   Muscle Tone Quick Adds No deficits were identified   Coordination   Upper Extremity Coordination No deficits were identified   Transfers   Transfers sit-stand transfer;toilet transfer   Sit-Stand Transfer   Sit-Stand Marion (Transfers) contact guard   Assistive Device (Sit-Stand Transfers) walker, standard   Sit/Stand Transfer Comments From bedside chair, min cueing for hand placement   Toilet Transfer   Type (Toilet Transfer) sit-stand   Marion Level (Toilet Transfer) contact guard   Assistive Device (Toilet Transfer) grab bars/safety frame   Clinical Impression   Criteria for Skilled Therapeutic Interventions Met (OT) Yes, treatment indicated   OT Diagnosis Decreased ind with ADLs and safety   Influenced by the following impairments Lumbar compression fx, s/p fall   OT Problem List-Impairments impacting ADL strength;pain   Assessment of Occupational Performance 3-5 Performance Deficits   Identified Performance Deficits Dressing, bed mobility, bathing   Planned Therapy Interventions (OT) ADL retraining;bed mobility training;strengthening   Clinical Decision Making Complexity (OT) moderate complexity   Anticipated Equipment Needs Upon  Discharge (OT) shower chair   Risk & Benefits of therapy have been explained evaluation/treatment results reviewed;care plan/treatment goals reviewed;risks/benefits reviewed;patient   OT Discharge Planning   OT Discharge Recommendation (DC Rec) (S)  home with assist   OT Rationale for DC Rec Pt has assist from spouse at home as needed, pt mobilizing well and meeting OT goals   Total Evaluation Time (Minutes)   Total Evaluation Time (Minutes) 8   OT Goals   Therapy Frequency (OT) Daily   OT Predicted Duration/Target Date for Goal Attainment 10/10/22   OT Goals Bed Mobility;Toilet Transfer/Toileting;Lower Body Dressing   OT: Lower Body Dressing Modified independent;using adaptive equipment;within precautions   OT: Bed Mobility Modified independent;supine to/from sitting;rolling;within precautions   OT: Toilet Transfer/Toileting Modified independent;toilet transfer;cleaning and garment management;within precautions

## 2022-10-03 NOTE — PROGRESS NOTES
10/03/22 1130   Quick Adds   Type of Visit Initial PT Evaluation   Living Environment   People in Home spouse   Current Living Arrangements house   Home Accessibility stairs to enter home;stairs within home   Number of Stairs, Main Entrance 2   Stair Railings, Main Entrance railing on right side (ascending)   Number of Stairs, Within Home, Primary greater than 10 stairs  (down to basement)   Stair Railings, Within Home, Primary railing on right side (ascending)   Living Environment Comments Pt able to live on one level if needed.   Self-Care   Usual Activity Tolerance good   Current Activity Tolerance good   Equipment Currently Used at Home none   Fall history within last six months yes   Number of times patient has fallen within last six months 1   Activity/Exercise/Self-Care Comment Reports independent with all functional mobility without AD at baseline.   General Information   Onset of Illness/Injury or Date of Surgery 10/01/22   Referring Physician Dr Jose Armando Doe   Patient/Family Therapy Goals Statement (PT) None stated   Pertinent History of Current Problem (include personal factors and/or comorbidities that impact the POC) Admit for L3 compression fracture after fall in yard at home about one month ago. Tested postive for covid once here, MD note states pt was asymptomatic.   Existing Precautions/Restrictions other (see comments)  (Covid+)   Pain Assessment   Patient Currently in Pain No   Strength (Manual Muscle Testing)   Strength Comments BLE WFL   Transfers   Impairments Contributing to Impaired Transfers decreased strength   Comment, (Transfers) Sit<>stand recliner to FWW SBA.   Gait/Stairs (Locomotion)   Pedro Level (Gait) supervision   Assistive Device (Gait) walker, front-wheeled   Distance in Feet (Required for LE Total Joints) 15'x1, 100'x1   Pattern (Gait) step-through   Clinical Impression   Criteria for Skilled Therapeutic Intervention Yes, treatment indicated   PT Diagnosis (PT) Impaired  functional mobility   Influenced by the following impairments weakness, pain   Functional limitations due to impairments transfers, gait, stairs   Clinical Presentation (PT Evaluation Complexity) Stable/Uncomplicated   Clinical Presentation Rationale Presents as medically diagnosed.   Clinical Decision Making (Complexity) low complexity   Planned Therapy Interventions (PT) gait training;home exercise program;patient/family education;stair training;transfer training   Anticipated Equipment Needs at Discharge (PT) walker, rolling   Risk & Benefits of therapy have been explained evaluation/treatment results reviewed;care plan/treatment goals reviewed;risks/benefits reviewed;participants voiced agreement with care plan;participants included;patient   PT Discharge Planning   PT Discharge Recommendation (DC Rec) (S)  home with assist   PT Rationale for DC Rec Home with assist from spouse as needed.   Total Evaluation Time   Total Evaluation Time (Minutes) 10

## 2022-10-03 NOTE — PROGRESS NOTES
Place of Service:  [facility]     Reason for follow up: Bilateral hydroureteronephrosis,11 x 10 x 11 mm left UVJ stone, BPH with urinary retention, mild GH since smith placement    +COVID-19    SUBJECTIVE:  Events: no acute events overnight.   Patient reports he is feeling well today. Notes a little more energy. Denies fever, chills, abdominal/flank pain, nausea, vomiting. No problems with smith catheter overnight.     Pt reports he tried taking finasteride after prescribed by Dr. Colindres recently, noted increased difficulty voiding after first couple of doses so quit taking. Continued to take tamuslosin 0.4 mg po daily.     OBJECTIVE:  PHYSICAL EXAM:  Temp: 97.9  F (36.6  C) Temp src: Oral BP: (!) 153/67 Pulse: 59   Resp: 16 SpO2: 99 % O2 Device: None (Room air)    General: NAD, alert, cooperative, sitting up in chair.   Head: normocephalic, without abnormality / atraumatic  Abdomen: soft, non- tender, non- distended. No suprapubic fullness, no suprapubic tenderness. No bilateral CVA tenderness.   Genitourinary: Penis and scrotum without erythema or edema. Smith draining camila urine with fine red sediment. 1 small burgundy clot noted in tubing.   Skin: No rashes or lesions  Musculoskeletal: moves all four extremities equally  Psychological: alert and oriented, answers questions appropriately    LABS:  Creatinine   Date Value Ref Range Status   10/01/2022 1.34 (H) 0.70 - 1.30 mg/dL Final     WBC Count   Date Value Ref Range Status   10/02/2022 9.3 4.0 - 11.0 10e3/uL Final     Hemoglobin   Date Value Ref Range Status   10/02/2022 10.6 (L) 13.3 - 17.7 g/dL Final     Platelet Count   Date Value Ref Range Status   10/02/2022 119 (L) 150 - 450 10e3/uL Final       UA:  UA RESULTS:  Recent Labs   Lab Test 10/01/22  1953 01/14/22  1423   COLOR Light Yellow Yellow   APPEARANCE Clear Clear   URINEGLC Negative Negative   URINEBILI Negative Negative   URINEKETONE Negative Negative   SG 1.014 1.015   UBLD Negative  Negative   URINEPH 7.0 7.0   PROTEIN Negative Negative   UROBILINOGEN  --  0.2   NITRITE Negative Negative   LEUKEST Negative Negative   RBCU 2  --    WBCU 3  --      Cultures:  Urine 10/2 -  Pending     Lab Results: personally reviewed.     IMAGING:  EXAM: CT ABDOMEN AND PELVIS WITHOUT CONTRAST  LOCATION: [facility]  DATE/TIME: 10/02/2022, 2:19 PM     INDICATION: Right hydronephrosis, left ureteral stone.  COMPARISON: 10/29/2013.  TECHNIQUE: CT scan of the abdomen and pelvis was performed without IV contrast. Multiplanar reformats were obtained. Dose reduction techniques were used.  CONTRAST: None.     FINDINGS:   LOWER CHEST: Pacing leads. Trace bilateral pleural effusions.     HEPATOBILIARY: Normal.     PANCREAS: Normal.     SPLEEN: Normal.     ADRENAL GLANDS: Normal.     KIDNEYS/BLADDER: Marked bilateral hydroureteronephrosis. 11 x 10 x 11 mm left UVJ stone measuring 1063 Hounsfield units. No right-sided stone appreciated. Bloom catheter with inflated balloon within bladder. Marked bladder wall thickening. Several   punctate dependent bladder stones.     BOWEL: Diverticulosis. No diverticulitis, colitis, obstruction, or appendicitis.     LYMPH NODES: Normal.     VASCULATURE: Moderate atherosclerotic vascular calcifications.     PELVIC ORGANS: Markedly enlarged prostate.     MUSCULOSKELETAL: Osteopenia. Multilevel discogenic degenerative change. T12, L1, and L3 chronic appearing compression deformities.                                                                      IMPRESSION:   1.  11 x 10 x 11 mm left UVJ stone resulting in severe left-sided hydroureteronephrosis.  2.  Severe right-sided hydroureteronephrosis without evidence of obstructing ureteral stone.  3.  Markedly enlarged prostate.  4.  Bladder wall thickening, correlate to exclude cystitis. Punctate dependent bladder stones.  5.  Atherosclerotic vascular disease.  6.  Diverticulosis. No diverticulitis, colitis, or  obstruction.    ASSESSMENT/PLAN:  Mikaela Figueroa is being seen by [facility] for bilateral hydroureteronephrosis,11 x 10 x 11 mm left UVJ stone, BPH with urinary retention, mild GH since smith placement    *COVID-19+    - Weaker than normal. No flank/abdominal pain.   - Creatinine - ordered, pending. 1.53 on admission 10/1, 1.34 later on 10/1. Baseline a 1.30. Avoid nephrotoxins. Monitor.   - UA 10/1 benign for infection. UC 10/2 - pending. WBC today is pending, 9.3 on 10/2 and 13.3 on 10/1. Afebrile and VSS.  - 1.1 cm left UVJ stone, bilateral hydroureteronephrosis, enlarged prostate, thick bladder wall and punctate dependent bladder stones on CT 10/2.   - Maintain smith catheter. If worsening creatinine or patient develops S&S infection or flank/side abdominal pain then may need to proceed with cysto/ureteral stent(s). Will need cysto/URS in near future for UVJ stone removal.   - Urine camila with fine red sediment. Manually irrigate smith with 30-60 ml sterile water or saline (may repeat as needed) if urine becomes thick cherry red/significant clots/catheter not draining well and then and notify [facility] [phone].   - Continue tamsulosin 0.4 mg po daily.     Will update:  Dr Chas Hernandez, APRN, CNP  [facility]   [phone]

## 2022-10-04 ENCOUNTER — APPOINTMENT (OUTPATIENT)
Dept: PHYSICAL THERAPY | Facility: CLINIC | Age: 87
DRG: 551 | End: 2022-10-04
Payer: MEDICARE

## 2022-10-04 ENCOUNTER — TELEPHONE (OUTPATIENT)
Dept: NEUROSURGERY | Facility: CLINIC | Age: 87
End: 2022-10-04

## 2022-10-04 ENCOUNTER — APPOINTMENT (OUTPATIENT)
Dept: ULTRASOUND IMAGING | Facility: CLINIC | Age: 87
DRG: 551 | End: 2022-10-04
Attending: NURSE PRACTITIONER
Payer: MEDICARE

## 2022-10-04 LAB
BACTERIA UR CULT: NO GROWTH
CREAT SERPL-MCNC: 1 MG/DL (ref 0.7–1.3)
GFR SERPL CREATININE-BSD FRML MDRD: 73 ML/MIN/1.73M2
HGB BLD-MCNC: 12.5 G/DL (ref 13.3–17.7)
PLATELET # BLD AUTO: 124 10E3/UL (ref 150–450)

## 2022-10-04 PROCEDURE — 97116 GAIT TRAINING THERAPY: CPT | Mod: GP

## 2022-10-04 PROCEDURE — 85049 AUTOMATED PLATELET COUNT: CPT | Performed by: STUDENT IN AN ORGANIZED HEALTH CARE EDUCATION/TRAINING PROGRAM

## 2022-10-04 PROCEDURE — 97110 THERAPEUTIC EXERCISES: CPT | Mod: GP

## 2022-10-04 PROCEDURE — 82565 ASSAY OF CREATININE: CPT | Performed by: STUDENT IN AN ORGANIZED HEALTH CARE EDUCATION/TRAINING PROGRAM

## 2022-10-04 PROCEDURE — 85018 HEMOGLOBIN: CPT | Performed by: STUDENT IN AN ORGANIZED HEALTH CARE EDUCATION/TRAINING PROGRAM

## 2022-10-04 PROCEDURE — 250N000013 HC RX MED GY IP 250 OP 250 PS 637: Performed by: HOSPITALIST

## 2022-10-04 PROCEDURE — 99232 SBSQ HOSP IP/OBS MODERATE 35: CPT | Performed by: STUDENT IN AN ORGANIZED HEALTH CARE EDUCATION/TRAINING PROGRAM

## 2022-10-04 PROCEDURE — 76770 US EXAM ABDO BACK WALL COMP: CPT

## 2022-10-04 PROCEDURE — 120N000001 HC R&B MED SURG/OB

## 2022-10-04 PROCEDURE — 36415 COLL VENOUS BLD VENIPUNCTURE: CPT | Performed by: STUDENT IN AN ORGANIZED HEALTH CARE EDUCATION/TRAINING PROGRAM

## 2022-10-04 RX ADMIN — ASPIRIN 81 MG CHEWABLE TABLET 81 MG: 81 TABLET CHEWABLE at 22:01

## 2022-10-04 RX ADMIN — DOXYCYCLINE 100 MG: 100 CAPSULE ORAL at 09:01

## 2022-10-04 RX ADMIN — DOCUSATE SODIUM 100 MG: 100 CAPSULE, LIQUID FILLED ORAL at 09:01

## 2022-10-04 RX ADMIN — METRONIDAZOLE: 10 GEL TOPICAL at 09:01

## 2022-10-04 RX ADMIN — LATANOPROST 1 DROP: 50 SOLUTION/ DROPS OPHTHALMIC at 22:00

## 2022-10-04 RX ADMIN — OXYCODONE HYDROCHLORIDE 2.5 MG: 5 TABLET ORAL at 01:29

## 2022-10-04 RX ADMIN — ACETAMINOPHEN 975 MG: 325 TABLET, FILM COATED ORAL at 16:01

## 2022-10-04 RX ADMIN — DOCUSATE SODIUM 100 MG: 100 CAPSULE, LIQUID FILLED ORAL at 21:59

## 2022-10-04 RX ADMIN — ACETAMINOPHEN 975 MG: 325 TABLET, FILM COATED ORAL at 07:43

## 2022-10-04 RX ADMIN — DORZOLAMIDE HCL 1 DROP: 20 SOLUTION/ DROPS OPHTHALMIC at 21:59

## 2022-10-04 RX ADMIN — TAMSULOSIN HYDROCHLORIDE 0.4 MG: 0.4 CAPSULE ORAL at 09:00

## 2022-10-04 RX ADMIN — DORZOLAMIDE HCL 1 DROP: 20 SOLUTION/ DROPS OPHTHALMIC at 09:02

## 2022-10-04 ASSESSMENT — ACTIVITIES OF DAILY LIVING (ADL)
ADLS_ACUITY_SCORE: 32
ADLS_ACUITY_SCORE: 30
ADLS_ACUITY_SCORE: 30
ADLS_ACUITY_SCORE: 32
ADLS_ACUITY_SCORE: 30
ADLS_ACUITY_SCORE: 32
DEPENDENT_IADLS:: INDEPENDENT
ADLS_ACUITY_SCORE: 30
ADLS_ACUITY_SCORE: 30
ADLS_ACUITY_SCORE: 32

## 2022-10-04 NOTE — TELEPHONE ENCOUNTER
Per review patient is currently inpt.  Will follow up to carolyn an FRANCESCO appt at a later date .  TG

## 2022-10-04 NOTE — PLAN OF CARE
Goal Outcome Evaluation:          Problem: Plan of Care - These are the overarching goals to be used throughout the patient stay.    Goal: Optimal Comfort and Wellbeing  Outcome: Ongoing, Progressing  Intervention: Monitor Pain and Promote Comfort  Recent Flowsheet Documentation  Taken 10/3/2022 4235 by Kathy Harrell RN  Pain Management Interventions: rest     Pt denies any pain. Administering scheduled tylenol.  Pt calling appropriately.  Back brace placed today. Ambulating 1PA to chair and bathroom. Spoke with daughter, Kalyani Tavarez [phone], and is asking to be updated with plan of care.  Social work consult placed.  Bloom catheter patent and draining camila colored urine with blood clots.  Some short term memory issues noted today.

## 2022-10-04 NOTE — PLAN OF CARE
Goal Outcome Evaluation:           Pt reports pain to back and woke from sleep d/t pain, gave PRN half tab oxy with good relief. LSO brace on. Bloom in place with blood and clots noted. Asymptomatic of COVID.

## 2022-10-04 NOTE — PLAN OF CARE
Pt eating, drinking, smith in place and draining camila to red urine w/one small clot - irrigated 20 cc NS, and audible bowel sounds.     Pt states he isn't having pain but has been getting his scheduled acetaminophen.       Problem: Plan of Care - These are the overarching goals to be used throughout the patient stay.    Goal: Plan of Care Review/Shift Note  Description: The Plan of Care Review/Shift note should be completed every shift.  The Outcome Evaluation is a brief statement about your assessment that the patient is improving, declining, or no change.  This information will be displayed automatically on your shift note.  Outcome: Ongoing, Progressing     Continue to monitor VS, labs, pain level, urine appearence/output and activity tolerance.       Goal Outcome Evaluation:

## 2022-10-04 NOTE — PROGRESS NOTES
[facility]    Medicine Progress Note - Hospitalist Service    Date of Admission:  10/1/2022    Assessment & Plan          87 year old male retired  who presented with complaints of back pain.  Had a fall about a month prior to presentation with some back pain that had slowly been improving.  Day of admit developed bilateral lower extremity weakness while trying to get up from the toilet resulting in another fall and acutely worsening low back pain. Medical history notable for essential hypertension, BPH, permanent pacemaker implant.    Initial evaluation revealed unremarkable vitals, elevated creatinine of 1.5, white count slightly elevated at 13.3, hemoglobin 12.6.  Urinalysis unremarkable, COVID incidentally positive but does not have any symptoms.  CT of the lumbar spine showed acute compression fracture of L3, severe canal stenosis at L2/L3 and L4/L5. Incidentally also noted severe bilateral hydroureteronephrosis with a 14 mm calculus at the left UVJ junction.  Bladder scan showed over a liter of urinary retention and Smith catheter was placed.    Initial treatment included Smith catheter, half liter normal saline.  Neurosurgery consulted via phone.  Urology consulted and CT abdomen obtained for further visualization of the hydronephrosis.  Current plan is to continue the smith with manual irrigation and further monitoring and tamsulosin.  If clinically or Cr progresses, may require cystoscopy and stent.  Therapy was assessed and recommendation for home cares for dispo.    Plan for discharge home w/ home cares; if remains stable and urine unchanged then likely discharge on 10/5 w/ plan for cysto/URS/lithotripsy/stent in 2-3 weeks with Dr. Caraballo for left UVJ stone.    Assessment and plan:  Acute L3 compression fracture  Severe canal stenosis  -Treat with scheduled Tylenol, oxycodone as needed  -PT and OT consults, rec'd home cares  -Neurosurgery consult appreciated  -Orthotics  consult appreciated    - I F/D a Aspen Quikdraw RAP LSO size Medium     Bilateral severe hydroureteronephrosis with large ureteral stone  Acute kidney injury  -Has seen [facility] in the past for kidney stones  -Urology following, appreciate  -Continue Smith catheter with manual irrigation  - plan to continue on discharge  --discussed w/ urology: cysto/URS/lithotripsy/stent in 2-3 weeks with Dr. Caraballo for left UVJ stone  -Follow renal function  -Hold lisinopril  -Continue tamsulosin     Positive COVID PCR  -now with sore throat   - monitor potential 02 need  -He is vaccinated, boosted  -Daughter is quite concerned about remdesivir, okay to defer unless clinically changing/worsening  -Clinically stable on room air without shortness of breath     Chronic dermatitis  -Continue doxycycline and MetroGel     Clinically Significant Risk Factors Present on Admission         # Hypertension: home medication list includes antihypertensive(s)         Diet: Combination Diet Regular Diet Adult    DVT Prophylaxis: Pneumatic Compression Devices  Smith Catheter: PRESENT, indication: Retention  Central Lines: None  Cardiac Monitoring: None  Code Status: Full Code      Disposition Plan   - still has smith in place, and monitoring creatinine; if progresses would need surgical intervention     Expected Discharge Date: 10/05/2022      Destination: home with family  Discharge Comments: if remains stable and urine unchanged, then f/u procedure within a couple weeks        The patient's care was discussed with the Bedside Nurse and Patient.  I also discussed with urology.    Jose Armando Doe MD  Hospitalist Service  [facility]  Securely message with the Vocera Web Console (learn more here)  Text page via [employer] Paging/Directory     ______________________________________________________________________    Interval history  Subjective:  -He has developed a sore throat  -Tolerating room air, no shortness of breath  -No  changes with his urination  -no new low back pain or CVA tenderness    Data reviewed today: I reviewed all medications, new labs and imaging results over the last 24 hours. I personally reviewed no images or EKG's today.    Physical Exam   Vital Signs: Temp: 97.6  F (36.4  C) Temp src: Oral BP: (!) 163/77 Pulse: 62   Resp: 20 SpO2: 96 % O2 Device: None (Room air)    Weight: 165 lbs 11.2 oz     GENERAL: The patient is not in any acute distressed. Awake and alert. Conversational.  HEENT: Nonicteric sclerae, PERRLA, EOMI. Oropharynx clear. Moist mucous membranes. Conjunctivae appear well perfused.   ABDOMEN: Soft, positive bowel sounds, nontender.  Still no CVA tenderness bilaterally.  SKIN: No rash, no excessive bruising, petechiae, or purpura.   : smith in place, c/d/i w/ maroon-tinged urine, no sediment  EXTREMITIES : no rashes, no swelling in legs.   NEUROLOGIC: conscious and oriented, follows commands, no obvious focal deficits.   ROS: All other systems negative       Data   Recent Labs   Lab 10/04/22  0838 10/03/22  1018 10/02/22  0726 10/01/22  2249 10/01/22  1932   WBC  --  8.1 9.3  --  13.3*   HGB 12.5* 12.6* 10.6*  --  12.6*   MCV  --  99 92  --  96   * 117* 119*  --  152   INR  --   --   --   --  1.05   NA  --   --   --  142 143   POTASSIUM  --   --   --  4.3 4.0   CHLORIDE  --   --   --  111* 107   CO2  --   --   --  23 24   BUN  --   --   --  25 25   CR 1.00 1.23  --  1.34* 1.53*   ANIONGAP  --   --   --  8 12   LIBERTAD  --   --   --  9.1 9.9   GLC  --   --   --  107 111   ALBUMIN  --   --   --   --  3.7   PROTTOTAL  --   --   --   --  7.4   BILITOTAL  --   --   --   --  0.6   ALKPHOS  --   --   --   --  105   ALT  --   --   --   --  15   AST  --   --   --   --  31   LIPASE  --   --   --   --  24

## 2022-10-04 NOTE — PROGRESS NOTES
Place of Service:  [facility]     Reason for follow up: Bilateral hydroureteronephrosis,11 x 10 x 11 mm left UVJ stone, BPH with urinary retention, mild GH since smith placement    +COVID-19    SUBJECTIVE:  Events: no acute events overnight.   Patient reports he is feeling ok today but notes new onset sore throat. Denies fever, chills, abdominal/flank pain, nausea, vomiting. No problems with smith catheter overnight.     OBJECTIVE:  PHYSICAL EXAM:  Temp: 97.6  F (36.4  C) Temp src: Oral BP: (!) 163/77 Pulse: 62   Resp: 20 SpO2: 96 % O2 Device: None (Room air)    General: NAD, alert, cooperative, sitting up in chair.   Head: normocephalic, without abnormality / atraumatic  Abdomen: soft, non- tender, non- distended. No suprapubic fullness, no suprapubic tenderness. No bilateral CVA tenderness.   Genitourinary: Penis and scrotum without erythema or edema. Smith draining camila urine with minimal fine red sediment. 1 tiny burgundy clot noted in tubing.   Skin: No rashes or lesions.   Musculoskeletal: moves all four extremities equally  Psychological: alert and oriented, answers questions appropriately    LABS:  Creatinine   Date Value Ref Range Status   10/04/2022 1.00 0.70 - 1.30 mg/dL Final     WBC Count   Date Value Ref Range Status   10/03/2022 8.1 4.0 - 11.0 10e3/uL Final     Hemoglobin   Date Value Ref Range Status   10/04/2022 12.5 (L) 13.3 - 17.7 g/dL Final     Platelet Count   Date Value Ref Range Status   10/04/2022 124 (L) 150 - 450 10e3/uL Final       UA:  UA RESULTS:  Recent Labs   Lab Test 10/01/22  1953 01/14/22  1423   COLOR Light Yellow Yellow   APPEARANCE Clear Clear   URINEGLC Negative Negative   URINEBILI Negative Negative   URINEKETONE Negative Negative   SG 1.014 1.015   UBLD Negative Negative   URINEPH 7.0 7.0   PROTEIN Negative Negative   UROBILINOGEN  --  0.2   NITRITE Negative Negative   LEUKEST Negative Negative   RBCU 2  --    WBCU 3  --      Cultures:  Urine 10/2 -  No growth      Lab Results: personally reviewed.     IMAGING:  EXAM: CT ABDOMEN AND PELVIS WITHOUT CONTRAST  LOCATION: [facility]  DATE/TIME: 10/02/2022, 2:19 PM     INDICATION: Right hydronephrosis, left ureteral stone.  COMPARISON: 10/29/2013.  TECHNIQUE: CT scan of the abdomen and pelvis was performed without IV contrast. Multiplanar reformats were obtained. Dose reduction techniques were used.  CONTRAST: None.     FINDINGS:   LOWER CHEST: Pacing leads. Trace bilateral pleural effusions.     HEPATOBILIARY: Normal.     PANCREAS: Normal.     SPLEEN: Normal.     ADRENAL GLANDS: Normal.     KIDNEYS/BLADDER: Marked bilateral hydroureteronephrosis. 11 x 10 x 11 mm left UVJ stone measuring 1063 Hounsfield units. No right-sided stone appreciated. Smith catheter with inflated balloon within bladder. Marked bladder wall thickening. Several   punctate dependent bladder stones.     BOWEL: Diverticulosis. No diverticulitis, colitis, obstruction, or appendicitis.     LYMPH NODES: Normal.     VASCULATURE: Moderate atherosclerotic vascular calcifications.     PELVIC ORGANS: Markedly enlarged prostate.     MUSCULOSKELETAL: Osteopenia. Multilevel discogenic degenerative change. T12, L1, and L3 chronic appearing compression deformities.                                                                      IMPRESSION:   1.  11 x 10 x 11 mm left UVJ stone resulting in severe left-sided hydroureteronephrosis.  2.  Severe right-sided hydroureteronephrosis without evidence of obstructing ureteral stone.  3.  Markedly enlarged prostate.  4.  Bladder wall thickening, correlate to exclude cystitis. Punctate dependent bladder stones.  5.  Atherosclerotic vascular disease.  6.  Diverticulosis. No diverticulitis, colitis, or obstruction.    ASSESSMENT/PLAN:  Mikaela Figueroa is being seen by [facility] for bilateral hydroureteronephrosis,11 x 10 x 11 mm left UVJ stone, BPH with urinary retention, mild GH since smith  placement    *COVID-19+    - Weaker than normal. No flank/abdominal pain.   - Creatinine normalized, 1.00 today. 1.53 on admission 10/1. Baseline a 1.30. Avoid nephrotoxins. Monitor.   - UA 10/1 benign for infection. UC 10/2 - no growth. WBC 8.1 on 10/3. Afebrile and VSS.  - Smith catheter was placed 10/1 with >1 liter residual. Maintain smith at discharge. RNs to teach smith cares, including exchanging leg/night bags.   - 1.1 cm left UVJ stone, bilateral hydroureteronephrosis, enlarged prostate, thick bladder wall and punctate dependent bladder stones on CT 10/2. Will check renal US today to evaluate for residual hydronephrosis.   - If worsening creatinine or patient develops S&S infection or flank/side abdominal pain then may need to proceed with cysto/ureteral stent(s).  - Email sent 10/3 to [facility]  to arrange for cysto/URS/lithotripsy/stent in 2-3 weeks with Dr. Caraballo for left UVJ stone. [facility] contact info placed in discharge orders.   - Urine camila with minimal fine red sediment. Manually irrigate smith with 30-60 ml sterile water or saline (may repeat as needed) if urine becomes thick cherry red/significant clots/catheter not draining well and then and notify [facility] [phone].   - Continue tamsulosin 0.4 mg po daily.     Will update:  Dr Chas Hernandez, APRN, CNP  [facility]   [phone]

## 2022-10-04 NOTE — CONSULTS
Care Management Initial Consult    General Information  Assessment completed with: Kalyani David (daughter)  Type of CM/SW Visit: Initial Assessment    Primary Care Provider verified and updated as needed: Yes   Readmission within the last 30 days: no previous admission in last 30 days      Reason for Consult: discharge planning  Advance Care Planning:            Communication Assessment  Patient's communication style: spoken language (English or Bilingual)    Hearing Difficulty or Deaf: yes   Wear Glasses or Blind: yes    Cognitive  Cognitive/Neuro/Behavioral: WDL                      Living Environment:   People in home: spouse     Current living Arrangements: house      Able to return to prior arrangements:         Family/Social Support:  Care provided by: self  Provides care for: no one  Marital Status:   Wife, Children          Description of Support System: Supportive, Involved    Support Assessment: Adequate family and caregiver support    Current Resources:   Patient receiving home care services: No     Community Resources: None  Equipment currently used at home: none  Supplies currently used at home: None    Employment/Financial:  Employment Status: retired        Financial Concerns: No concerns identified   Referral to Financial Worker: No       Lifestyle & Psychosocial Needs:  Social Determinants of Health     Tobacco Use: Medium Risk     Smoking Tobacco Use: Former Smoker     Smokeless Tobacco Use: Never Used   Alcohol Use: Not on file   Financial Resource Strain: Not on file   Food Insecurity: Not on file   Transportation Needs: Not on file   Physical Activity: Not on file   Stress: Not on file   Social Connections: Not on file   Intimate Partner Violence: Not on file   Depression: Not at risk     PHQ-2 Score: 0   Housing Stability: Not on file       Functional Status:  Prior to admission patient needed assistance:   Dependent ADLs:: Independent  Dependent IADLs:: Independent       Mental  Health Status: no current concerns          Chemical Dependency Status: no current concerns                Values/Beliefs:  Spiritual, Cultural Beliefs, Synagogue Practices, Values that affect care: no               Additional Information:  1:39 PM  SW called and spoke with Pt's daughter Kalyani [phone] per consult. Kalyani reports that Pt's spouse Shanell has been having some issues with memory and is reporting that family would like Kalyani to be the main contact for Pt's care. Kalyani states that Pt and Shanell have been living in their home independently. Kalyani states that the two have been doing well taking care of themselves and their home. Kalyani lives close to Pt as well as Pt's son Roel lives 20 minutes away. Both Kalyani and Roel are very involved with Pt's care and provide support for their parents as needed. Pt does not use any services or equipment. Pt drives during the day but has not been driving at night for some time. Kalyani states that she does not believe Pt has a HCD. SW offered materials for the family to look into creating one for Pt. Kalyani declined stating that she will discuss with Roel and Pt after hospital admission. LUTHER explained therapy recommendations for home with assist and Kalyani believes Pt returning home would likely be appropriate as Shanell is supportive as well as Kalyani is very close to offer help. Family to transport. CM to continue to monitor progression of Pt's care for any CM needs.    SMOOTH Ford

## 2022-10-05 ENCOUNTER — APPOINTMENT (OUTPATIENT)
Dept: OCCUPATIONAL THERAPY | Facility: CLINIC | Age: 87
DRG: 551 | End: 2022-10-05
Payer: MEDICARE

## 2022-10-05 VITALS
OXYGEN SATURATION: 96 % | SYSTOLIC BLOOD PRESSURE: 148 MMHG | BODY MASS INDEX: 24.83 KG/M2 | DIASTOLIC BLOOD PRESSURE: 70 MMHG | HEART RATE: 59 BPM | RESPIRATION RATE: 18 BRPM | WEIGHT: 165.7 LBS | TEMPERATURE: 98.3 F

## 2022-10-05 LAB
ANION GAP SERPL CALCULATED.3IONS-SCNC: 8 MMOL/L (ref 5–18)
BUN SERPL-MCNC: 24 MG/DL (ref 8–28)
CALCIUM SERPL-MCNC: 8.6 MG/DL (ref 8.5–10.5)
CHLORIDE BLD-SCNC: 108 MMOL/L (ref 98–107)
CO2 SERPL-SCNC: 25 MMOL/L (ref 22–31)
CREAT SERPL-MCNC: 1.09 MG/DL (ref 0.7–1.3)
GFR SERPL CREATININE-BSD FRML MDRD: 66 ML/MIN/1.73M2
GLUCOSE BLD-MCNC: 110 MG/DL (ref 70–125)
POTASSIUM BLD-SCNC: 3.7 MMOL/L (ref 3.5–5)
SODIUM SERPL-SCNC: 141 MMOL/L (ref 136–145)

## 2022-10-05 PROCEDURE — 250N000013 HC RX MED GY IP 250 OP 250 PS 637: Performed by: HOSPITALIST

## 2022-10-05 PROCEDURE — 36415 COLL VENOUS BLD VENIPUNCTURE: CPT | Performed by: STUDENT IN AN ORGANIZED HEALTH CARE EDUCATION/TRAINING PROGRAM

## 2022-10-05 PROCEDURE — 99239 HOSP IP/OBS DSCHRG MGMT >30: CPT | Performed by: STUDENT IN AN ORGANIZED HEALTH CARE EDUCATION/TRAINING PROGRAM

## 2022-10-05 PROCEDURE — 80048 BASIC METABOLIC PNL TOTAL CA: CPT | Performed by: STUDENT IN AN ORGANIZED HEALTH CARE EDUCATION/TRAINING PROGRAM

## 2022-10-05 PROCEDURE — 97535 SELF CARE MNGMENT TRAINING: CPT | Mod: GO

## 2022-10-05 RX ORDER — GINSENG 100 MG
CAPSULE ORAL 3 TIMES DAILY
COMMUNITY
Start: 2022-10-05 | End: 2023-01-04

## 2022-10-05 RX ORDER — GINSENG 100 MG
CAPSULE ORAL 3 TIMES DAILY
Status: DISCONTINUED | OUTPATIENT
Start: 2022-10-05 | End: 2022-10-05 | Stop reason: HOSPADM

## 2022-10-05 RX ADMIN — ACETAMINOPHEN 975 MG: 325 TABLET, FILM COATED ORAL at 06:40

## 2022-10-05 RX ADMIN — TAMSULOSIN HYDROCHLORIDE 0.4 MG: 0.4 CAPSULE ORAL at 07:58

## 2022-10-05 RX ADMIN — DOXYCYCLINE 100 MG: 100 CAPSULE ORAL at 07:58

## 2022-10-05 RX ADMIN — METRONIDAZOLE: 10 GEL TOPICAL at 08:00

## 2022-10-05 RX ADMIN — DOCUSATE SODIUM 100 MG: 100 CAPSULE, LIQUID FILLED ORAL at 07:58

## 2022-10-05 RX ADMIN — DORZOLAMIDE HCL 1 DROP: 20 SOLUTION/ DROPS OPHTHALMIC at 07:59

## 2022-10-05 RX ADMIN — ACETAMINOPHEN 975 MG: 325 TABLET, FILM COATED ORAL at 14:27

## 2022-10-05 ASSESSMENT — ACTIVITIES OF DAILY LIVING (ADL)
ADLS_ACUITY_SCORE: 32
ADLS_ACUITY_SCORE: 26
ADLS_ACUITY_SCORE: 32
ADLS_ACUITY_SCORE: 29

## 2022-10-05 NOTE — PROGRESS NOTES
Physical Therapy Discharge Summary    Reason for therapy discharge:    Discharged to home with home therapy.    Progress towards therapy goal(s). See goals on Care Plan in [facility] electronic health record for goal details.  Goals not met.  Barriers to achieving goals:   discharge from facility.    Therapy recommendation(s):    Continue home exercise program.   Continue home amb program

## 2022-10-05 NOTE — PROGRESS NOTES
Patient discharged home, all paperwork explained to pt and daughter. Patient had teaching on catheter  care while home. Leg bag and night bag given. Nil other questions. All belongings taken home with pt

## 2022-10-05 NOTE — PLAN OF CARE
Problem: Plan of Care - These are the overarching goals to be used throughout the patient stay.    Goal: Absence of Hospital-Acquired Illness or Injury  Outcome: Ongoing, Progressing  Intervention: Identify and Manage Fall Risk  Recent Flowsheet Documentation  Taken 10/5/2022 0805 by Candi Mendez RN  Safety Promotion/Fall Prevention:    activity supervised    bed alarm on    clutter free environment maintained    nonskid shoes/slippers when out of bed    room near nurse's station     Problem: Plan of Care - These are the overarching goals to be used throughout the patient stay.    Goal: Optimal Comfort and Wellbeing  Outcome: Ongoing, Progressing   Goal Outcome Evaluation:  Pt denies pain. Calls appropriately, near nurses' station with blinds open for observation while in COVID isolation. Pt to be discharged with smith catheter. Needs discharge education with daughter.

## 2022-10-05 NOTE — PLAN OF CARE
Goal Outcome Evaluation:    Plan of Care Reviewed With: patient     Overall Patient Progress: improving    VSS on RA, alert and oriented x4, able to make needs known, [place], denies pain and SOB, refused scheduled tylenol x 1,  smith in place, adequate output, urine was cherry red with significant amount of clots, irrigated x 1, then urine was camila with no clots, pt pulled out IV, LSO brace on, plan is discharge home.

## 2022-10-05 NOTE — PROGRESS NOTES
Care Management Follow Up    Length of Stay (days): 4    Expected Discharge Date: 10/05/2022     Concerns to be Addressed: OT/PT recommendations home with assist, no anticipated care management needs    Patient plan of care discussed at interdisciplinary rounds: Yes    Anticipated Discharge Disposition: Home     Anticipated Discharge Services: None  Anticipated Discharge DME: None    Patient/family educated on Medicare website which has current facility and service quality ratings:  N/A  Education Provided on the Discharge Plan: per team  Patient/Family in Agreement with the Plan: yes     Referrals Placed by CM/SW:  Not at this time  Private pay costs discussed: N/A  Additional Information:  Chart reviewed. No anticipated care management needs. Charge update, plan is to discharge to home. Family to transport.    1445 Bedside RN update, discharge orders in for home OT/PT. RNCM spoke with daughterKalyani (main contact) to discuss discharge orders, option. Daughter agrees with home care therapies, agency with opening. Referrals made to [facility] and [facility]. Daughter requesting medical update from provider. Provider updated. Daughter will transport.    1500 Dr. Doe updated daughter and answered pt's questions.     1600  [facility] RN,OT/PT orders faxed. Daughter updated.      Tena Rodrigez, RN

## 2022-10-05 NOTE — PROGRESS NOTES
Place of Service:  [facility]     Reason for follow up: Bilateral hydroureteronephrosis,11 x 10 x 11 mm left UVJ stone, BPH with urinary retention, mild GH since smith placement    +COVID-19    SUBJECTIVE:  Events: no acute events overnight.   Patient reports he is feeling well, sore throat gone, energy level improving. Denies fever, chills, abdominal/flank pain, nausea, vomiting. No problems with smith catheter overnight.     OBJECTIVE:  PHYSICAL EXAM:  Temp: 98.3  F (36.8  C) Temp src: Oral BP: (!) 148/70 (Nurse Notified) Pulse: 59   Resp: 18 SpO2: 96 % O2 Device: None (Room air)    General: NAD, alert, cooperative, sitting up in chair.   Head: normocephalic, without abnormality / atraumatic  Abdomen: soft, non- tender, non- distended. No suprapubic fullness, no suprapubic tenderness. No bilateral CVA tenderness. Wearing back brace.   Genitourinary: Penis and scrotum without erythema or edema. Smith draining camila urine with minimal fine red sediment and no clots.   Skin: No rashes or lesions.   Musculoskeletal: moves all four extremities equally  Psychological: alert and oriented, answers questions appropriately    LABS:  Creatinine   Date Value Ref Range Status   10/05/2022 1.09 0.70 - 1.30 mg/dL Final     WBC Count   Date Value Ref Range Status   10/03/2022 8.1 4.0 - 11.0 10e3/uL Final     Hemoglobin   Date Value Ref Range Status   10/04/2022 12.5 (L) 13.3 - 17.7 g/dL Final     Platelet Count   Date Value Ref Range Status   10/04/2022 124 (L) 150 - 450 10e3/uL Final       UA:  UA RESULTS:  Recent Labs   Lab Test 10/01/22  1953 01/14/22  1423   COLOR Light Yellow Yellow   APPEARANCE Clear Clear   URINEGLC Negative Negative   URINEBILI Negative Negative   URINEKETONE Negative Negative   SG 1.014 1.015   UBLD Negative Negative   URINEPH 7.0 7.0   PROTEIN Negative Negative   UROBILINOGEN  --  0.2   NITRITE Negative Negative   LEUKEST Negative Negative   RBCU 2  --    WBCU 3  --      Cultures:  Urine 10/2  -  No growth     Lab Results: personally reviewed.     IMAGING:  EXAM: CT ABDOMEN AND PELVIS WITHOUT CONTRAST  LOCATION: [facility]  DATE/TIME: 10/02/2022, 2:19 PM     INDICATION: Right hydronephrosis, left ureteral stone.  COMPARISON: 10/29/2013.  TECHNIQUE: CT scan of the abdomen and pelvis was performed without IV contrast. Multiplanar reformats were obtained. Dose reduction techniques were used.  CONTRAST: None.     FINDINGS:   LOWER CHEST: Pacing leads. Trace bilateral pleural effusions.     HEPATOBILIARY: Normal.     PANCREAS: Normal.     SPLEEN: Normal.     ADRENAL GLANDS: Normal.     KIDNEYS/BLADDER: Marked bilateral hydroureteronephrosis. 11 x 10 x 11 mm left UVJ stone measuring 1063 Hounsfield units. No right-sided stone appreciated. Bloom catheter with inflated balloon within bladder. Marked bladder wall thickening. Several   punctate dependent bladder stones.     BOWEL: Diverticulosis. No diverticulitis, colitis, obstruction, or appendicitis.     LYMPH NODES: Normal.     VASCULATURE: Moderate atherosclerotic vascular calcifications.     PELVIC ORGANS: Markedly enlarged prostate.     MUSCULOSKELETAL: Osteopenia. Multilevel discogenic degenerative change. T12, L1, and L3 chronic appearing compression deformities.                                                                      IMPRESSION:   1.  11 x 10 x 11 mm left UVJ stone resulting in severe left-sided hydroureteronephrosis.  2.  Severe right-sided hydroureteronephrosis without evidence of obstructing ureteral stone.  3.  Markedly enlarged prostate.  4.  Bladder wall thickening, correlate to exclude cystitis. Punctate dependent bladder stones.  5.  Atherosclerotic vascular disease.  6.  Diverticulosis. No diverticulitis, colitis, or obstruction.    ASSESSMENT/PLAN:  Hampton W Henry is being seen by [facility] for bilateral hydroureteronephrosis,11 x 10 x 11 mm left UVJ stone, BPH with urinary retention, mild GH  since smith placement    *COVID-19+    - Feeling well, improving energy. No flank/abdominal pain.   - Creatinine 1.09 today, 1.0 on 10/4, 1.53 on admission 10/1. Baseline a 1.30. Avoid nephrotoxins.   - UA 10/1 benign for infection. UC 10/2 - no growth. WBC 8.1 on 10/3. Afebrile and VSS.  - Smith catheter was placed 10/1 with >1 liter residual. Maintain smith at discharge and until stone surgery. RNs to teach smith cares, including exchanging leg/night bags.   - 1.1 cm left UVJ stone, bilateral hydroureteronephrosis, enlarged prostate, thick bladder wall and punctate dependent bladder stones on CT 10/2. Renal US 10/4 showed persistent bilateral hydronephrosis and diffuse bladder wall thickening, likely residual of chronic urinary retention.   - Discussed with patient needs to return to ER if develops S&S infection or new flank/side abdominal pain as may then may need to proceed with cysto/ureteral stent(s).  - Email sent 10/3 to [facility]  to arrange for cysto/URS/lithotripsy/stent/right retrograde pyelogram and possible right ureteral stent placement in 2-3 weeks with Dr. Caraballo for left UVJ stone. [facility] contact info placed in discharge orders.   - Continue tamsulosin 0.4 mg po daily.   -OK from Urology standpoint for discharge whenever deemed appropriate per Medicine.   - [facility] will sign off. Please re-consult with any new or worsening urologic concerns.    Case discussed with:  Dr Chas Hernandez, APRN, CNP  [facility]   [phone]

## 2022-10-05 NOTE — PROGRESS NOTES
Occupational Therapy Discharge Summary    Reason for therapy discharge:    Discharged to home.    Progress towards therapy goal(s). See goals on Care Plan in [facility] electronic health record for goal details.  Goals met    Therapy recommendation(s):    No further therapy is recommended.

## 2022-10-05 NOTE — DISCHARGE SUMMARY
[facility]  Hospitalist Discharge Summary      Date of Admission:  10/1/2022  Date of Discharge:  10/5/2022  Discharging Provider: Jose Armando Doe MD  Discharge Service: Hospitalist Service    Discharge Diagnoses   Bilateral severe hydroureteronephrosis with large ureteral stone  Acute kidney injury  Positive COVID PCR  Acute L3 compression fracture  Severe canal stenosis     Follow-ups Needed After Discharge   Follow-up Appointments     Follow-up and recommended labs and tests      [facility] will call you to arrange cystoscopy, lithotripsy for ureteral   stone, possible ureteral stent placements in approx 2-3 weeks. Maintain   your smith catheter until then unless called by [facility] for attempted   removal prior. You may call to confirm appointment as needed. [facility] [phone]         Follow-up and recommended labs and tests       Follow up with primary care provider, Derek Kumar, within 3-5 days,   for hospital follow- up.   - Ensure follow up with [facility]    ([facility] to arrange for cysto/URS/lithotripsy/stent/right retrograde   pyelogram and possible right ureteral stent placement in 2-3 weeks with   Dr. Carabalol for left UVJ stone.)               Unresulted Labs Ordered in the Past 30 Days of this Admission     No orders found from 9/1/2022 to 10/2/2022.        Discharge Disposition   Discharged to home  Condition at discharge: Stable    Hospital Course     87 year old male retired  who presented with complaints of back pain.  Had a fall about a month prior to presentation with some back pain that had slowly been improving.  Day of admit developed bilateral lower extremity weakness while trying to get up from the toilet resulting in another fall and acutely worsening low back pain. Medical history notable for essential hypertension, BPH, permanent pacemaker implant.    Initial evaluation revealed unremarkable vitals, elevated creatinine  of 1.5, white count slightly elevated at 13.3, hemoglobin 12.6.  Urinalysis was unremarkable, COVID was incidentally positive without symptoms initially until he developed a sore throat on 10/04 but no other symptoms and tolerated room air including with physical therapy/exertion.  CT of the lumbar spine showed acute compression fracture of L3, severe canal stenosis at L2/L3 and L4/L5. Incidentally also noted severe bilateral hydroureteronephrosis with a 14 mm calculus at the left UVJ junction.  Bladder scan showed over a liter of urinary retention and Bloom catheter was placed.    Initial treatment included Bloom catheter and fluids.  Neurosurgery was consulted via phone.  Urology consulted and CT abdomen obtained for further visualization of the hydronephrosis.  Urology guided cares, and with bladder irrigation, his renal function was followed and stabilized.  A repeat renal ultrasound did demonstrate persistent severe bilateral hydronephrosis and bladder wall thickening, but patient remained clinically stable.  As such, decision was made for follow-up with [facility], which arranged  for cysto/URS/lithotripsy/stent/right retrograde pyelogram and possible right ureteral stent placement in 2-3 weeks with Dr. Caraballo for left UVJ stone.    Patient was instructed to monitor for symptoms and to return to ER if he develops S&S of infection or new flank/side abdominal pain which might indicate emergent intervention with cysto/ureteral stent(s).     Therapies evaluated and recommendations were made for home cares.  Home cares should not visit him until at least after 10/10, given COVID-19 quarantine. The patient was discharged back home in stable medical condition, with home cares ordered and arranged.  Skilled nursing and therapy should evaluate him after the quarantine for his COVID-19 infection.  Patient should follow-up with his PCP within 3-5 days and also with urology as noted and also with  neurology.    Consultations This Hospital Stay   PHYSICAL THERAPY ADULT IP CONSULT  OCCUPATIONAL THERAPY ADULT IP CONSULT  UROLOGY IP CONSULT  ORTHOSIS BRACE IP CONSULT  PEDIATRIC UROLOGY IP CONSULT  ORTHOSIS BRACE IP CONSULT  PHYSICAL THERAPY ADULT IP CONSULT  OCCUPATIONAL THERAPY ADULT IP CONSULT  SOCIAL WORK IP CONSULT    Code Status   Full Code    Time Spent on this Encounter   I, Jose Armando Doe MD, personally saw the patient today and spent greater than 30 minutes discharging this patient.       Jose Armando Doe MD  [address] [address]  Phone: [phone]  Fax: [phone]  ______________________________________________________________________    Physical Exam   Vital Signs: Temp: 98.3  F (36.8  C) Temp src: Oral BP: (!) 148/70 (Nurse Notified) Pulse: 59   Resp: 18 SpO2: 96 % O2 Device: None (Room air)    Weight: 165 lbs 11.2 oz    GENERAL: The patient is not in any acute distressed. Awake and alert. Conversational. Pleasant.  HEENT: Nonicteric sclerae, PERRLA, EOMI. Oropharynx clear. Moist mucous membranes. Conjunctivae appear well perfused.   ABDOMEN: Soft, positive bowel sounds, nontender.  Still without any CVA tenderness bilaterally.  SKIN: No rash, no excessive bruising, petechiae, or purpura.   : smith in place, c/d/i w/ dark colored urine, no sediment  EXTREMITIES : no rashes, no swelling in legs.   NEUROLOGIC: conscious and oriented, follows commands, no obvious focal deficits.   ROS: All other systems negative        Primary Care Physician   Derek Kumar    Discharge Orders      Home Care Referral      Home Care Referral      Follow-up and recommended labs and tests    [facility] will call you to arrange cystoscopy, lithotripsy for ureteral stone, possible ureteral stent placements in approx 2-3 weeks. Maintain your smith catheter until then unless called by [facility] for attempted removal prior. You may call to confirm  appointment as needed. [facility], [facility], [phone]     Reason for your hospital stay    While you were in the hospital, you had a smith catheter placed for urinary retention and hydronephrosis.     Discharge Instructions    ou are being discharged with a catheter.  The nurse should have reviewed with you how to take care of the catheter while at home, hold on to any written instructions if you received them.   You may where the leg bag during the day, empty the bag when it is 2/3 full to avoid overfilling   You should switch to the bigger bag at night as it holds more urine, when sleeping make sure that this bag is lower then you to ensure that it continues to drain well (example: hang on waist paper basket).    Clean around where the tube enters your body with soap and water, pat dry.     Men: You may apply bacitracin to the tip of the penis up to 3 times per day to help with discomfort    Please seek emergent medical evaluation (ED visit) if you develop flank/abdominal pain, fever, chills, nausea/vomiting, worsening hematuria (blood in urine).     Reason for your hospital stay    You were hospitalized for: Back pain; kidney stones with kidney injury; also found incidentally with covid19     Follow-up and recommended labs and tests     Follow up with primary care provider, Derek Kumar, within 3-5 days, for hospital follow- up.   - Ensure follow up with [facility]    ([facility] to arrange for cysto/URS/lithotripsy/stent/right retrograde pyelogram and possible right ureteral stent placement in 2-3 weeks with Dr. Caraballo for left UVJ stone.)     Activity    Your activity upon discharge: activity as tolerated     Diet    Follow this diet upon discharge: Orders Placed This Encounter      Combination Diet Regular Diet Adult       Significant Results and Procedures   Results for orders placed or performed during the hospital encounter of 10/01/22   Lumbar spine CT w/o contrast    Narrative     EXAM: CT LUMBAR SPINE W/O CONTRAST  LOCATION: [facility]  DATE/TIME: 10/1/2022 8:12 PM    INDICATION:  Low back pain fall 1 month prior  COMPARISON:  CT abdomen pelvis 10/29/2013.  TECHNIQUE: Routine CT Lumbar Spine without IV contrast. Multiplanar reformats. Dose reduction techniques were used.     FINDINGS:  VERTEBRA: Transitional anatomy with T12 nonrib-bearing.  Acute L3 superior endplate compression fracture with approximately 50% vertebral body height loss. Chronic L1 compression deformity with 4 mm retropulsion, similar in appearance compared to 2013.   Age-indeterminate, however favor chronic T12 superior and inferior endplate compression deformities with mild height loss.  Grade 1 degenerative anterolisthesis of L4 on L5. No posttraumatic subluxation.     CANAL/FORAMINA:  Mild canal stenosis at L1-L2. Severe canal stenosis at L2-L3. Moderate to severe canal stenosis at L3-L4. Severe canal stenosis at L4-L5. Moderate right L2-L3 and L3-L4 foraminal stenosis.    PARASPINAL:  Severe bilateral hydroureteronephrosis. 15 mm calculus at the left ureterovesicular junction. Presumed enlarged prostate partially imaged.      Impression    IMPRESSION:  1.  Transitional anatomy with T12 nonrib-bearing.   2.  Acute L3 superior endplate compression fracture with approximately 50% vertebral body height loss. No retropulsion. Additional chronic appearing fractures described above.  3.  Severe canal stenosis at L2-L3 and L4-L5. Moderate to severe canal stenosis at L3-L4.  4.  Severe bilateral hydroureteronephrosis with a 14 mm calculus at the left ureterovesicular junction. Correlate for urinary outlet obstruction as there is also suggestion of an enlarged prostate gland.   XR Lumbar Spine 2/3 Views    Narrative    EXAM: XR LUMBAR SPINE 2/3 VIEWS  LOCATION: [facility]  DATE/TIME: 10/2/2022 2:17 PM    INDICATION: Baseline upright XR, lateral AP. L3 fracture.  COMPARISON:  10/01/2022.  TECHNIQUE: CR Lumbar Spine.      Impression    IMPRESSION: Diffuse bony demineralization. Grade I anterolisthesis of L4 on L5. Stable compression deformities at T12, L1, and L3. No new fracture identified. There is diffuse degenerative disc height loss with marginal osteophytes. There is diffuse   facet arthropathy throughout the lumbar spine. Scattered atherosclerotic calcification. Degenerative changes of the sacroiliac joints. Redemonstrated calcification at the left ureterovesicular junction.   CT Abdomen Pelvis w/o Contrast    Narrative    EXAM: CT ABDOMEN AND PELVIS WITHOUT CONTRAST  LOCATION: [facility]  DATE/TIME: 10/02/2022, 2:19 PM    INDICATION: Right hydronephrosis, left ureteral stone.  COMPARISON: 10/29/2013.  TECHNIQUE: CT scan of the abdomen and pelvis was performed without IV contrast. Multiplanar reformats were obtained. Dose reduction techniques were used.  CONTRAST: None.    FINDINGS:   LOWER CHEST: Pacing leads. Trace bilateral pleural effusions.    HEPATOBILIARY: Normal.    PANCREAS: Normal.    SPLEEN: Normal.    ADRENAL GLANDS: Normal.    KIDNEYS/BLADDER: Marked bilateral hydroureteronephrosis. 11 x 10 x 11 mm left UVJ stone measuring 1063 Hounsfield units. No right-sided stone appreciated. Bloom catheter with inflated balloon within bladder. Marked bladder wall thickening. Several   punctate dependent bladder stones.    BOWEL: Diverticulosis. No diverticulitis, colitis, obstruction, or appendicitis.    LYMPH NODES: Normal.    VASCULATURE: Moderate atherosclerotic vascular calcifications.    PELVIC ORGANS: Markedly enlarged prostate.    MUSCULOSKELETAL: Osteopenia. Multilevel discogenic degenerative change. T12, L1, and L3 chronic appearing compression deformities.      Impression    IMPRESSION:   1.  11 x 10 x 11 mm left UVJ stone resulting in severe left-sided hydroureteronephrosis.  2.  Severe right-sided hydroureteronephrosis without evidence of  obstructing ureteral stone.  3.  Markedly enlarged prostate.  4.  Bladder wall thickening, correlate to exclude cystitis. Punctate dependent bladder stones.  5.  Atherosclerotic vascular disease.  6.  Diverticulosis. No diverticulitis, colitis, or obstruction.       US Renal Complete    Narrative    EXAM: US RENAL COMPLETE  LOCATION: [facility]  DATE/TIME: 10/4/2022 1:00 PM    INDICATION: bilateral hydroureteronephrosis on CT 10/2, evaluate for residual s p smith placement for 1 liter retention 10/1. Also has left UVJ stone.  COMPARISON: CT 10/02/2022  TECHNIQUE: Routine Bilateral Renal and Bladder Ultrasound.    FINDINGS:    RIGHT KIDNEY: 11.5 x 6.3 x 6.3 cm. Severe hydronephrosis. Mid renal 4.6 cm simple cyst which does not require further evaluation.    LEFT KIDNEY: 12.3 x 6 x 5.9 cm. Severe hydronephrosis.     BLADDER: It contains a Smith. There is wall thickening up to 1 cm.      Impression    IMPRESSION:  1.  Persistent severe bilateral hydronephrosis.  2.  Diffuse bladder wall thickening.       Discharge Medications   Current Discharge Medication List      START taking these medications    Details   bacitracin 500 UNIT/GM OINT Apply topically 3 times daily Apply to tip of penis while smith catheter is present.    Associated Diagnoses: Urinary retention         CONTINUE these medications which have NOT CHANGED    Details   aspirin 81 mg chewable tablet Take 81 mg by mouth At Bedtime      dorzolamide (TRUSOPT) 2 % ophthalmic solution Place 1 drop into both eyes 2 times daily      doxycycline hyclate (VIBRA-TABS) 100 MG tablet [DOXYCYCLINE (VIBRA-TABS) 100 MG TABLET] TAKE 1 TABLET BY MOUTH DAILY  Qty: 90 tablet, Refills: 3    Associated Diagnoses: Rosacea      GLUCOSAMINE/CHONDROITIN SULF A (GLUCOSAMINE-CHONDROITIN ORAL) Take 1 capsule by mouth daily      latanoprost (XALATAN) 0.005 % ophthalmic solution Place 1 drop into both eyes At Bedtime      lisinopril (ZESTRIL) 10 MG tablet TAKE  1 TABLET DAILY  Qty: 90 tablet, Refills: 3    Associated Diagnoses: Essential hypertension      metroNIDAZOLE (METROGEL) 1 % external gel APPLY A SMALL AMOUNT TO AFFECTED AREA(S) TOPICALLY ONCE DAILY  Qty: 60 g, Refills: 11    Associated Diagnoses: Rosacea      multivitamin, therapeutic (THERA-VIT) TABS tablet Take 1 tablet by mouth daily      tamsulosin (FLOMAX) 0.4 MG capsule TAKE 1 CAPSULE AT BEDTIME  Qty: 90 capsule, Refills: 3    Associated Diagnoses: BPH (benign prostatic hyperplasia)      triamcinolone (KENALOG) 0.1 % external cream Apply topically 2 times daily as needed for irritation           Allergies   No Known Allergies

## 2022-10-07 ENCOUNTER — PATIENT OUTREACH (OUTPATIENT)
Dept: CARE COORDINATION | Facility: CLINIC | Age: 87
End: 2022-10-07

## 2022-10-07 ENCOUNTER — TELEPHONE (OUTPATIENT)
Dept: FAMILY MEDICINE | Facility: CLINIC | Age: 87
End: 2022-10-07

## 2022-10-07 NOTE — PROGRESS NOTES
Clinic Care Coordination Contact  [facility]/Voicemail       Clinical Data: Care Coordinator Outreach  Outreach attempted x 2.  Left message on patient's daughter's voicemail with call back information and requested return call.  Plan:Care Coordinator will try to reach patient again in 1-2 business days.    Migdalia Ernst, [facility]  [phone]  [facility]

## 2022-10-07 NOTE — TELEPHONE ENCOUNTER
[facility] called for the following orders:    Delay in start of care for skilled nursing, PT, OT, home health aide until 10/9. Plan of care will be written on 10/9 and faxed to the clinic.    Verbal order given.

## 2022-10-09 ENCOUNTER — MEDICAL CORRESPONDENCE (OUTPATIENT)
Dept: HEALTH INFORMATION MANAGEMENT | Facility: CLINIC | Age: 87
End: 2022-10-09

## 2022-10-10 ENCOUNTER — TELEPHONE (OUTPATIENT)
Dept: FAMILY MEDICINE | Facility: CLINIC | Age: 87
End: 2022-10-10

## 2022-10-10 NOTE — TELEPHONE ENCOUNTER
Reason for Call: Request for an order or referral:    Order or referral being requested: SKILLED NURSING FOR DISEASE MGMT 1 TIME WEEK FOR 4 WEEKS, PT AND OT EVALS.      Date needed: as soon as possible    Has the patient been seen by the PCP for this problem? YES    Additional comments: NONE    Phone number Patient can be reached at:  Other phone number:  [phone]    Best Time:  ANYTIME    Can we leave a detailed message on this number?  YES    Call taken on 10/10/2022 at 12:11 PM by Annabelle Hardin

## 2022-10-11 ENCOUNTER — HOSPITAL ENCOUNTER (EMERGENCY)
Facility: CLINIC | Age: 87
Discharge: HOME | End: 2022-10-11
Admitting: PHYSICIAN ASSISTANT
Payer: MEDICARE

## 2022-10-11 VITALS
RESPIRATION RATE: 18 BRPM | TEMPERATURE: 97.6 F | OXYGEN SATURATION: 97 % | HEART RATE: 89 BPM | BODY MASS INDEX: 25.05 KG/M2 | HEIGHT: 70 IN | DIASTOLIC BLOOD PRESSURE: 79 MMHG | WEIGHT: 175 LBS | SYSTOLIC BLOOD PRESSURE: 152 MMHG

## 2022-10-11 DIAGNOSIS — R31.9 HEMATURIA: ICD-10-CM

## 2022-10-11 DIAGNOSIS — N20.0 KIDNEY STONE ON LEFT SIDE: ICD-10-CM

## 2022-10-11 LAB
ANION GAP SERPL CALCULATED.3IONS-SCNC: 7 MMOL/L (ref 5–18)
BASOPHILS # BLD AUTO: 0 10E3/UL (ref 0–0.2)
BASOPHILS NFR BLD AUTO: 1 %
BUN SERPL-MCNC: 28 MG/DL (ref 8–28)
CALCIUM SERPL-MCNC: 9.1 MG/DL (ref 8.5–10.5)
CHLORIDE BLD-SCNC: 104 MMOL/L (ref 98–107)
CO2 SERPL-SCNC: 27 MMOL/L (ref 22–31)
CREAT SERPL-MCNC: 1.23 MG/DL (ref 0.7–1.3)
EOSINOPHIL # BLD AUTO: 0.2 10E3/UL (ref 0–0.7)
EOSINOPHIL NFR BLD AUTO: 3 %
ERYTHROCYTE [DISTWIDTH] IN BLOOD BY AUTOMATED COUNT: 12.4 % (ref 10–15)
GFR SERPL CREATININE-BSD FRML MDRD: 57 ML/MIN/1.73M2
GLUCOSE BLD-MCNC: 106 MG/DL (ref 70–125)
HCT VFR BLD AUTO: 36.1 % (ref 40–53)
HGB BLD-MCNC: 12 G/DL (ref 13.3–17.7)
IMM GRANULOCYTES # BLD: 0.2 10E3/UL
IMM GRANULOCYTES NFR BLD: 2 %
LYMPHOCYTES # BLD AUTO: 1.3 10E3/UL (ref 0.8–5.3)
LYMPHOCYTES NFR BLD AUTO: 17 %
MCH RBC QN AUTO: 30.5 PG (ref 26.5–33)
MCHC RBC AUTO-ENTMCNC: 33.2 G/DL (ref 31.5–36.5)
MCV RBC AUTO: 92 FL (ref 78–100)
MONOCYTES # BLD AUTO: 1.1 10E3/UL (ref 0–1.3)
MONOCYTES NFR BLD AUTO: 15 %
NEUTROPHILS # BLD AUTO: 4.8 10E3/UL (ref 1.6–8.3)
NEUTROPHILS NFR BLD AUTO: 62 %
NRBC # BLD AUTO: 0 10E3/UL
NRBC BLD AUTO-RTO: 0 /100
PLATELET # BLD AUTO: 215 10E3/UL (ref 150–450)
POTASSIUM BLD-SCNC: 4.5 MMOL/L (ref 3.5–5)
RBC # BLD AUTO: 3.94 10E6/UL (ref 4.4–5.9)
SODIUM SERPL-SCNC: 138 MMOL/L (ref 136–145)
WBC # BLD AUTO: 7.7 10E3/UL (ref 4–11)

## 2022-10-11 PROCEDURE — 99283 EMERGENCY DEPT VISIT LOW MDM: CPT

## 2022-10-11 PROCEDURE — 85025 COMPLETE CBC W/AUTO DIFF WBC: CPT | Performed by: PHYSICIAN ASSISTANT

## 2022-10-11 PROCEDURE — 80048 BASIC METABOLIC PNL TOTAL CA: CPT | Performed by: PHYSICIAN ASSISTANT

## 2022-10-11 PROCEDURE — 36415 COLL VENOUS BLD VENIPUNCTURE: CPT | Performed by: PHYSICIAN ASSISTANT

## 2022-10-11 ASSESSMENT — ENCOUNTER SYMPTOMS
FEVER: 0
FLANK PAIN: 0
DIARRHEA: 0
CHEST TIGHTNESS: 0
CHILLS: 0
NAUSEA: 0
HEMATURIA: 1
DYSURIA: 0
VOMITING: 0
SHORTNESS OF BREATH: 0
FATIGUE: 0
ABDOMINAL PAIN: 0

## 2022-10-11 ASSESSMENT — ACTIVITIES OF DAILY LIVING (ADL)
ADLS_ACUITY_SCORE: 33
ADLS_ACUITY_SCORE: 33

## 2022-10-11 NOTE — DISCHARGE INSTRUCTIONS
You are seen here in the emergency department for evaluation of hematuria.  I did discuss her case with on-call urology, Dr. Caraballo, and he will follow-up with you for clinic.  I have placed his contact information above, if you not hear from them in the next 2 to 3 days, please call the clinic for further follow-up.  Your laboratory studies here are unremarkable, nothing change from before.  I suspect the bleeding that you experienced earlier this afternoon is likely secondary to the kidney stone that is in there.  The best thing for this will be getting it removed, they should be following up with you as an outpatient for surgery scheduling, and stone removal.    For pain or fever you may use:  -Tylenol 650 mg every 6 hours.  Max 4000 mg in 24 hours  Do not use thismedication with alcohol as it can cause liver problems.  -Ibuprofen 600 mg every 6 hours.  Max 3500 mg in 24 hours  Do not take this medication if you have a history of a GI bleed or have kidney problems.  You may use both of these medications at the same time or you can alternate them every 3 hours.  For example, Tylenol at 6 AM, ibuprofen at 9 AM, Tylenol at noon, etc.

## 2022-10-11 NOTE — ED TRIAGE NOTES
Pt arrives to ED with c/o blood with clots in his urine since mid day today. Pt has a smith catheter due to kidney stone in his ureteter. Pt was hospitalized on 10/1 and discharge 10/5. Denies any pain unless moving. Smith bag is bright red, urine draining seems more camila colored now. Pt tested positive for Covid on 10/1 accidentally pt has no Covid symptoms.      Triage Assessment     Row Name 10/11/22 0563       Triage Assessment (Adult)    Airway WDL WDL       Respiratory WDL    Respiratory WDL WDL       Skin Circulation/Temperature WDL    Skin Circulation/Temperature WDL WDL       Cardiac WDL    Cardiac WDL WDL       Peripheral/Neurovascular WDL    Peripheral Neurovascular WDL WDL       Cognitive/Neuro/Behavioral WDL    Cognitive/Neuro/Behavioral WDL WDL

## 2022-10-11 NOTE — ED PROVIDER NOTES
EMERGENCY DEPARTMENT ENCOUNTER      NAME: Mikaela Figueroa  AGE: 87 year old male  YOB: 1935  MRN: [id number]  EVALUATION DATE & TIME: No admission date for patient encounter.    PCP: Derek Kumar    ED PROVIDER: Keven Tom PA-C      Chief Complaint   Patient presents with     Hematuria     FINAL IMPRESSION:  1. Hematuria    2. Kidney stone on left side      ED COURSE & MEDICAL DECISION MAKING:    Pertinent Labs & Imaging studies reviewed. (See chart for details)  5:59 PM I met the patient and performed my initial interview and exam.   7:21 PM Plan for discharge.     87 year old male presents to the Emergency Department for evaluation of hematuria.     ED Course as of 10/11/22 1921   Tue Oct 11, 2022   1800 Patient is a 87-year-old male presents emergency department with indwelling catheter, complaints of blood in his catheter.  Patient has a known left-sided kidney stone, impacting his ureter.  Patient was hospitalized from 10 1-10 5 here at [facility], was seen by urology at that time.  Has a known significant stone to the left side which had caused significant hydronephrosis, as well as  impacted his kidney function.  Catheter was placed in place, recommended outpatient follow-up with urology to schedule possible stone removal, lithotripsy procedure.  Patient called the clinic today, they were unaware of phleboliths, this is concerning to him, and the blood in the bag was also concerning to him, and they present to the emergency department for further evaluation.  He has no increase in abdominal pain, back pain, no fevers.  No other acute symptoms here today.  No chest pain, dizziness, lightheadedness, numbness or tingling.  No nausea or vomiting.  Nothing to suggest acute infection.  This is mostly a increase in his previous pathology and sequela.  I suspect this is all likely secondary to the stone that is located on the left side.  Patient likely needs to follow-up with urology.   Will page urology for further recommendations at this time.   1807 Discussed case with Dr. Caraballo, urology.  Given that the patient is stable here, no acute changes, no real indication for further treatment or admission.  Known stone to the left side, with some significant hydronephrosis.  Dr. Garzon does know about the patient, he is plan to get the patient on the schedule for surgery in the upcoming days.  Clinic will reach out to him  in the next couple of days.  Hemoglobin here is stable when compared to previous.  We will obtain kidney function testing here as well to ensure that there is no worsening in his creatinine, kidney function.  If this is all within normal limits, the patient can likely discharge home.   1810 Kidney function here mildly elevated from previous, however not outside normal limits.  GFR decreased, creatinine Mildly increased from 1.09-1.23, venous symptoms likely secondary to kidney stone, some improvement of the kidney stone, and kidney stone causing bleeding.  As stated above, I did discuss case with Dr. Caraballo, will plan for discharge, urology will follow up with him for surgical procedure.        At the conclusion of the encounter I discussed the results of all of the tests and the disposition. The questions were answered. The patient or family acknowledged understanding and was agreeable with the care plan.       0 minutes of critical care time     MEDICATIONS GIVEN IN THE EMERGENCY:  Medications - No data to display    NEW PRESCRIPTIONS STARTED AT TODAY'S ER VISIT  New Prescriptions    No medications on file          =================================================================    HPI    Patient information was obtained from: Patient    Use of : N/A       Mikaela Figueroa is a 87 year old male with a pertinent history of indwelling Bloom catheter, left-sided kidney stone, prostatic hypertrophy, for lithiasis who presents to this ED for evaluation of hematuria, blood  in his catheter bag.  Of note patient was admitted from 10 1-10 5 for kidney stone, has not followed up with urology.  Tested positive for COVID 10/1 to incidentally.  He is not having any increased pain, difficulty with urination, abdominal pain, back pain, dizziness, lightheadedness, chest pain, shortness of breath, fevers, cough, cold, chills.  Was primarily worried because there was some blood in his bag, this had not been happening previously.  No other acute complaints here today.    Per Chart Review from Admission to [facility] 10/1-10/5:87 year old male retired  who presented with complaints of back pain.  Had a fall about a month prior to presentation with some back pain that had slowly been improving.  Day of admit developed bilateral lower extremity weakness while trying to get up from the toilet resulting in another fall and acutely worsening low back pain. Medical history notable for essential hypertension, BPH, permanent pacemaker implant.    Initial evaluation revealed unremarkable vitals, elevated creatinine of 1.5, white count slightly elevated at 13.3, hemoglobin 12.6.  Urinalysis was unremarkable, COVID was incidentally positive without symptoms initially until he developed a sore throat on 10/04 but no other symptoms and tolerated room air including with physical therapy/exertion.  CT of the lumbar spine showed acute compression fracture of L3, severe canal stenosis at L2/L3 and L4/L5. Incidentally also noted severe bilateral hydroureteronephrosis with a 14 mm calculus at the left UVJ junction.  Bladder scan showed over a liter of urinary retention and Bloom catheter was placed.  Initial treatment included Bloom catheter and fluids.  Neurosurgery was consulted via phone.  Urology consulted and CT abdomen obtained for further visualization of the hydronephrosis.  Urology guided cares, and with bladder irrigation, his renal function was followed and stabilized.  A repeat renal ultrasound did  demonstrate persistent severe bilateral hydronephrosis and bladder wall thickening, but patient remained clinically stable.  As such, decision was made for follow-up with [facility], which arranged  for cysto/URS/lithotripsy/stent/right retrograde pyelogram and possible right ureteral stent placement in 2-3 weeks with Dr. Caraballo for left UVJ stone.     Patient was instructed to monitor for symptoms and to return to ER if he develops S&S of infection or new flank/side abdominal pain which might indicate emergent intervention with cysto/ureteral stent(s).      REVIEW OF SYSTEMS   Review of Systems   Constitutional: Negative for chills, fatigue and fever.   Respiratory: Negative for chest tightness and shortness of breath.    Gastrointestinal: Negative for abdominal pain, diarrhea, nausea and vomiting.   Genitourinary: Positive for hematuria. Negative for dysuria and flank pain.   All other systems reviewed and are negative.       PAST MEDICAL HISTORY:  Past Medical History:   Diagnosis Date     Hypertension        PAST SURGICAL HISTORY:  Past Surgical History:   Procedure Laterality Date     ARTHROSCOPY KNEE Right            CURRENT MEDICATIONS:    aspirin 81 mg chewable tablet  bacitracin 500 UNIT/GM OINT  dorzolamide (TRUSOPT) 2 % ophthalmic solution  doxycycline hyclate (VIBRA-TABS) 100 MG tablet  GLUCOSAMINE/CHONDROITIN SULF A (GLUCOSAMINE-CHONDROITIN ORAL)  latanoprost (XALATAN) 0.005 % ophthalmic solution  lisinopril (ZESTRIL) 10 MG tablet  metroNIDAZOLE (METROGEL) 1 % external gel  multivitamin, therapeutic (THERA-VIT) TABS tablet  tamsulosin (FLOMAX) 0.4 MG capsule  triamcinolone (KENALOG) 0.1 % external cream         ALLERGIES:  No Known Allergies    FAMILY HISTORY:  Family History   Problem Relation Age of Onset     Dementia Mother      Heart Disease Father      Diabetes Father        SOCIAL HISTORY:   Social History     Socioeconomic History     Marital status:    Tobacco Use     Smoking  "status: Former     Types: Cigarettes     Quit date: 1998     Years since quittin.7     Smokeless tobacco: Never   Substance and Sexual Activity     Alcohol use: No     Drug use: No       VITALS:  BP (!) 152/79   Pulse 89   Temp 97.6  F (36.4  C) (Temporal)   Resp 18   Ht 1.778 m (5' 10\")   Wt 79.4 kg (175 lb)   SpO2 97%   BMI 25.11 kg/m      PHYSICAL EXAM    Physical Exam  Vitals and nursing note reviewed.   Constitutional:       General: He is not in acute distress.     Appearance: Normal appearance. He is normal weight. He is not toxic-appearing or diaphoretic.   HENT:      Head: Normocephalic.      Right Ear: External ear normal.      Left Ear: External ear normal.   Cardiovascular:      Rate and Rhythm: Normal rate and regular rhythm.      Heart sounds: Normal heart sounds. No murmur heard.    No friction rub. No gallop.   Pulmonary:      Effort: Pulmonary effort is normal. No respiratory distress.      Breath sounds: No wheezing.   Abdominal:      General: Abdomen is flat. Bowel sounds are normal. There is no distension.      Palpations: Abdomen is soft.      Tenderness: There is no abdominal tenderness. There is no right CVA tenderness, left CVA tenderness, guarding or rebound.   Skin:     General: Skin is warm.   Neurological:      Mental Status: He is alert. Mental status is at baseline.      Motor: No weakness.           LAB:  All pertinent labs reviewed and interpreted.  Labs Ordered and Resulted from Time of ED Arrival to Time of ED Departure   BASIC METABOLIC PANEL - Abnormal       Result Value    Sodium 138      Potassium 4.5      Chloride 104      Carbon Dioxide (CO2) 27      Anion Gap 7      Urea Nitrogen 28      Creatinine 1.23      Calcium 9.1      Glucose 106      GFR Estimate 57 (*)    CBC WITH PLATELETS AND DIFFERENTIAL - Abnormal    WBC Count 7.7      RBC Count 3.94 (*)     Hemoglobin 12.0 (*)     Hematocrit 36.1 (*)     MCV 92      MCH 30.5      MCHC 33.2      RDW 12.4      " Platelet Count 215      % Neutrophils 62      % Lymphocytes 17      % Monocytes 15      % Eosinophils 3      % Basophils 1      % Immature Granulocytes 2      NRBCs per 100 WBC 0      Absolute Neutrophils 4.8      Absolute Lymphocytes 1.3      Absolute Monocytes 1.1      Absolute Eosinophils 0.2      Absolute Basophils 0.0      Absolute Immature Granulocytes 0.2      Absolute NRBCs 0.0         RADIOLOGY:  Reviewed all pertinent imaging. Please see official radiology report.  No orders to display       PROCEDURES:   None.     Keven Tom PA-C  Emergency Medicine  [facility] EMERGENCY ROOM  [address] [facility] [facility] [address]  [phone]  Dept: [phone]     Keven Tom PA-C  10/11/22 1926

## 2022-10-12 ENCOUNTER — TELEPHONE (OUTPATIENT)
Dept: NURSING | Facility: CLINIC | Age: 87
End: 2022-10-12

## 2022-10-12 NOTE — TELEPHONE ENCOUNTER
Nurse Triage SBAR    Background: Pt states he has a message on his HZO to call if he is unable to use HZO for communication - states he is calling to let us know that the [facility] communication no longer works for him.      Pt notes he called the [facility]ZO message gave him a number to call ([phone]) and was speaking to someone and then he was transferred to me.      Writer clarified he is having no sx and was not calling for anything.  Does note he is just waiting for Dr to call to schedule a procedure to take out kidney stone  -  But states he's not expecting that call at this time.      Zoila Jauregui RN [facility] 10/12/2022 12:24 PM

## 2022-10-14 ENCOUNTER — VIRTUAL VISIT (OUTPATIENT)
Dept: NEUROSURGERY | Facility: CLINIC | Age: 87
End: 2022-10-14
Payer: MEDICARE

## 2022-10-14 DIAGNOSIS — S32.030A CLOSED COMPRESSION FRACTURE OF L3 LUMBAR VERTEBRA, INITIAL ENCOUNTER (H): Primary | ICD-10-CM

## 2022-10-14 PROCEDURE — 99441 PR PHYSICIAN TELEPHONE EVALUATION 5-10 MIN: CPT | Mod: 95 | Performed by: NURSE PRACTITIONER

## 2022-10-14 NOTE — PROGRESS NOTES
Video-Visit Details    Type of service:  telephone visit     Video Start Time (time video started): 1051    Video End Time (time video stopped): 1058    Originating Location (pt. Location): Home    Distant Location (provider location):  On-site    Mode of Communication:  telephone  Conference via [facility]    Physician has received verbal consent for a Video Visit from the patient? Yes      Neurosurgery Note:    In brief- Hospitalized 10/1 for an episode of weakness, legs giving out while in the bathroom associated with severe back pain.  He suffered a fall 4-6 weeks prior, that resulted in back pain that had improved. CT revealed L3 compression fracture though fused through the posterior elements, also noted severe lumbar stenosis but he was unable to have an MRI due to pacer. His weakness resolved during hospitalization. Telephone visit today to check progress. Since discharge, difficulty with kidney stones though has had no issues with ambulation, legs feel strong, no radiating symptoms. I think we can hold off on MRI at this time as he is doing well from possible lumbar stenosis perspective.    Plan x-rays in four weeks, if stable and no back pain - likely can wean from brace.   Call sooner if issues.   Follow up likely after kidney stone surgery.           Angely Salinas, NP

## 2022-10-14 NOTE — LETTER
10/14/2022         RE: Mikaela Figueroa  [address] [facility] [address]        Dear Colleague,    Thank you for referring your patient, Mikaela Figueroa, to the [facility]. Please see a copy of my visit note below.          Video-Visit Details    Type of service:  telephone visit     Video Start Time (time video started): 1051    Video End Time (time video stopped): 1058    Originating Location (pt. Location): Home    Distant Location (provider location):  On-site    Mode of Communication:  telephone  Conference via [facility]    Physician has received verbal consent for a Video Visit from the patient? Yes      Neurosurgery Note:    In brief- Hospitalized 10/1 for an episode of weakness, legs giving out while in the bathroom associated with severe back pain.  He suffered a fall 4-6 weeks prior, that resulted in back pain that had improved. CT revealed L3 compression fracture though fused through the posterior elements, also noted severe lumbar stenosis but he was unable to have an MRI due to pacer. His weakness resolved during hospitalization. Telephone visit today to check progress. Since discharge, difficulty with kidney stones though has had no issues with ambulation, legs feel strong, no radiating symptoms. I think we can hold off on MRI at this time as he is doing well from possible lumbar stenosis perspective.    Plan x-rays in four weeks, if stable and no back pain - likely can wean from brace.   Call sooner if issues.   Follow up likely after kidney stone surgery.           Angely Salinas NP          Again, thank you for allowing me to participate in the care of your patient.        Sincerely,        Angely Salinas NP

## 2022-10-14 NOTE — PATIENT INSTRUCTIONS
Continue brace when out of bed  X-rays in four weeks, sooner if issues  No mri at this time for lumbar stenosis

## 2022-10-18 ENCOUNTER — TELEPHONE (OUTPATIENT)
Dept: FAMILY MEDICINE | Facility: CLINIC | Age: 87
End: 2022-10-18

## 2022-10-18 NOTE — TELEPHONE ENCOUNTER
Reason for Call:  Home Health Care    Marisel with [facility] called regarding (reason for call): verbal orders     Orders are needed for this patient. OT     OT: Effective today 1x a week for 1 week for lower body dressing    Message to Provider: Weight today was 173.6. 9 days ago it was 165, that is a 8.6 increase. Pt was 3+ edema in R lower extremity and R foot. Pt is also trying to pass a kidney stone and is having a difficult time urinating. Pt is seeing specialist for this but OT wanted to inform Dr. Kumar so he is aware. Please reach out if there are any MD recommendations.     Pt Provider: Dr. Kumar     Phone Number Homecare Nurse can be reached at: [phone]    Can we leave a detailed message on this number? YES      Call taken on 10/18/2022 at 11:04 AM by Davida Beltre

## 2022-10-18 NOTE — TELEPHONE ENCOUNTER
FYI: Verbal orders given per  for requested OT orders. Please notify us if any adjustments need to be made.      no

## 2022-10-20 ENCOUNTER — OFFICE VISIT (OUTPATIENT)
Dept: FAMILY MEDICINE | Facility: CLINIC | Age: 87
End: 2022-10-20
Payer: MEDICARE

## 2022-10-20 VITALS
WEIGHT: 168 LBS | BODY MASS INDEX: 25.46 KG/M2 | DIASTOLIC BLOOD PRESSURE: 60 MMHG | HEIGHT: 68 IN | SYSTOLIC BLOOD PRESSURE: 126 MMHG | OXYGEN SATURATION: 97 % | HEART RATE: 86 BPM

## 2022-10-20 DIAGNOSIS — U07.1 INFECTION DUE TO 2019 NOVEL CORONAVIRUS: ICD-10-CM

## 2022-10-20 DIAGNOSIS — Z01.818 PREOP GENERAL PHYSICAL EXAM: Primary | ICD-10-CM

## 2022-10-20 DIAGNOSIS — N40.1 BPH WITH URINARY OBSTRUCTION: ICD-10-CM

## 2022-10-20 DIAGNOSIS — S32.030D CLOSED COMPRESSION FRACTURE OF L3 LUMBAR VERTEBRA, WITH ROUTINE HEALING, SUBSEQUENT ENCOUNTER: ICD-10-CM

## 2022-10-20 DIAGNOSIS — Z95.0 CARDIAC PACEMAKER IN SITU: ICD-10-CM

## 2022-10-20 DIAGNOSIS — N20.0 NEPHROLITHIASIS: ICD-10-CM

## 2022-10-20 DIAGNOSIS — R73.01 IMPAIRED FASTING GLUCOSE: ICD-10-CM

## 2022-10-20 DIAGNOSIS — D64.9 NORMOCHROMIC NORMOCYTIC ANEMIA: ICD-10-CM

## 2022-10-20 DIAGNOSIS — I10 ESSENTIAL HYPERTENSION: ICD-10-CM

## 2022-10-20 DIAGNOSIS — N13.8 BPH WITH URINARY OBSTRUCTION: ICD-10-CM

## 2022-10-20 DIAGNOSIS — E78.00 PURE HYPERCHOLESTEROLEMIA: ICD-10-CM

## 2022-10-20 LAB
ANION GAP SERPL CALCULATED.3IONS-SCNC: 12 MMOL/L (ref 7–15)
BUN SERPL-MCNC: 24.3 MG/DL (ref 8–23)
CALCIUM SERPL-MCNC: 9.2 MG/DL (ref 8.8–10.2)
CHLORIDE SERPL-SCNC: 108 MMOL/L (ref 98–107)
CHOLEST SERPL-MCNC: 114 MG/DL
CREAT SERPL-MCNC: 1.33 MG/DL (ref 0.67–1.17)
DEPRECATED HCO3 PLAS-SCNC: 25 MMOL/L (ref 22–29)
ERYTHROCYTE [DISTWIDTH] IN BLOOD BY AUTOMATED COUNT: 12.2 % (ref 10–15)
GFR SERPL CREATININE-BSD FRML MDRD: 52 ML/MIN/1.73M2
GLUCOSE SERPL-MCNC: 102 MG/DL (ref 70–99)
HBA1C MFR BLD: 5.7 % (ref 0–5.6)
HCT VFR BLD AUTO: 32.7 % (ref 40–53)
HDLC SERPL-MCNC: 37 MG/DL
HGB BLD-MCNC: 10.8 G/DL (ref 13.3–17.7)
LDLC SERPL CALC-MCNC: 67 MG/DL
MCH RBC QN AUTO: 30.8 PG (ref 26.5–33)
MCHC RBC AUTO-ENTMCNC: 33 G/DL (ref 31.5–36.5)
MCV RBC AUTO: 93 FL (ref 78–100)
NONHDLC SERPL-MCNC: 77 MG/DL
PLATELET # BLD AUTO: 131 10E3/UL (ref 150–450)
POTASSIUM SERPL-SCNC: 4.2 MMOL/L (ref 3.4–5.3)
RBC # BLD AUTO: 3.51 10E6/UL (ref 4.4–5.9)
SODIUM SERPL-SCNC: 145 MMOL/L (ref 136–145)
TRIGL SERPL-MCNC: 51 MG/DL
WBC # BLD AUTO: 10.3 10E3/UL (ref 4–11)

## 2022-10-20 PROCEDURE — 99215 OFFICE O/P EST HI 40 MIN: CPT | Mod: 25 | Performed by: FAMILY MEDICINE

## 2022-10-20 PROCEDURE — 85027 COMPLETE CBC AUTOMATED: CPT | Performed by: FAMILY MEDICINE

## 2022-10-20 PROCEDURE — 83036 HEMOGLOBIN GLYCOSYLATED A1C: CPT | Performed by: FAMILY MEDICINE

## 2022-10-20 PROCEDURE — 80061 LIPID PANEL: CPT | Performed by: FAMILY MEDICINE

## 2022-10-20 PROCEDURE — G0008 ADMIN INFLUENZA VIRUS VAC: HCPCS | Performed by: FAMILY MEDICINE

## 2022-10-20 PROCEDURE — 36415 COLL VENOUS BLD VENIPUNCTURE: CPT | Performed by: FAMILY MEDICINE

## 2022-10-20 PROCEDURE — 80048 BASIC METABOLIC PNL TOTAL CA: CPT | Performed by: FAMILY MEDICINE

## 2022-10-20 PROCEDURE — 90662 IIV NO PRSV INCREASED AG IM: CPT | Performed by: FAMILY MEDICINE

## 2022-10-20 ASSESSMENT — PAIN SCALES - GENERAL: PAINLEVEL: NO PAIN (0)

## 2022-10-20 NOTE — H&P (VIEW-ONLY)
[facility]  [address]  [facility] [address]  Phone: [phone]  Fax: [phone]  Primary Provider: Jose De Jesus Serna  Pre-op Performing Provider: JOSE DE JESUS SERNA      PREOPERATIVE EVALUATION:  Today's date: 10/20/2022    Mikaela Figueroa is a 87 year old male who presents for a preoperative evaluation.    Surgical Information:  Surgery/Procedure: right ureteroscopy  Surgery Location: [facility]  Surgeon: Dr. Dozier  Surgery Date: 10/25/22  Time of Surgery: TBD  Where patient plans to recover: At home with family  Fax number for surgical facility: Note does not need to be faxed, will be available electronically in Epic.    Type of Anesthesia Anticipated: to be determined    Preop general physical exam  Preoperative examination completed today.  COVID PCR testing prior to surgery and we will arrange for lab only visit tomorrow so that it is completed within 4 days of scheduled procedure Tuesday, October 25, 2022.  - Asymptomatic COVID-19 Virus (Coronavirus) by PCR    Nephrolithiasis  Nephrolithiasis with recent bilateral severe hydroureteronephrosis with large ureteral stone present.  Scheduled procedure as noted.  No contraindication to scheduled surgery.  Holding aspirin 7 days prior to scheduled procedure.    Essential hypertension  Blood pressure 126/60 on recheck.  Med monitoring while on lisinopril 10 mg daily.  - Basic metabolic panel    Closed compression fracture of L3 lumbar vertebra, with routine healing, subsequent encounter  Recent acute compression fracture L3 following recent fall September 2022.  Using back brace currently with benefits described.  Routine healing demonstrated.    Infection due to 2019 novel coronavirus  Recent COVID-19 infection October 1, 2022 documented with described weakness initially.  Complete resolution.  Will defer COVID booster bivalent x90 days following recent infection.    BPH with urinary obstruction  BPH with urinary obstruction.   Continues tamsulosin 0.4 mg daily.  Recent removal of Bloom catheter about 1 week ago.  Making small amount of urine however less than he would normally expect.    Cardiac pacemaker in situ, dual chamber  Pacemaker in place.    Pure hypercholesterolemia  Check lipid cascade today while fasting.  Diet and exercise management for weight goal less than 165 pounds ideally.  - Lipid panel reflex to direct LDL Fasting    Impaired fasting glucose  Impaired fasting glucose historically with A1c and fasting glucose obtained today.  - Basic metabolic panel  - Hemoglobin A1c    Normochromic normocytic anemia  CBC with platelet count with history of mild normochromic normocytic anemia  - CBC with platelets     40 minutes total time spent on the date of encounter including patient chart review, counseling and coordination of cares, and documentation.         Subjective     HPI related to upcoming procedure: Patient seen for preoperative examination.  Has scheduled right ureteroscopy procedure October 25, 2022 with Dr. Dozier [facility].  Has recent bilateral severe hydroureteronephrosis with large ureteral stone presents noted.  Was to arrange cystoscopy, lithotripsy for ureteral stone, possible ureteral stent placements in approx 2-3 weeks following hospital discharge October 5, 2022..Had acute kidney injury.  Was positive for COVID-19 at time of admission October 1, 2022 without residual issues.  Noted severe spinal canal stenosis on lumbar CT showing acute L3 compression fracture.  Back brace utilized.  History of BPH with urinary obstruction with benefits of tamsulosin 0.4 mg daily.  Recent removal of Bloom catheter a week ago.  Small amount of urine frequently.  No dysuria or urgency.  Lisinopril 10 mg daily for hypertension.  Has had elevated cholesterol diet controlled historically.  Annual wellness visit scheduled January 31, 2023.  Denies recent illness.  No cough or shortness of breath.  No fever.  Comprehensive  "review of systems as above otherwise all negative.         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 Zavaleta) then subsequent procedure 2 weeks later removing remaining stone in ureter \"with great difficulty\"  Dad - dec DM, CAD   Mom - dec Alzheimers   6-siblings Yazidi (retired clergyman)   No smoke   Occ EtOH   Left fibula fracture (spiral) 5/07 (Dr. Quick, [facility])   TRUS with bx 10/7/11 biopsies negative (followed by Dr. Colindres)   Eye exam with Dr. Duarte in this building (monitoring for +/- glaucoma) - followed q year   Dr. Colindres, [facility] every year for elevated PSA, etc. (h/o TRUS with bx negative)  Dr. Beavers, dermatology for q 6 month checks re: \"precancerous\" lesions and rosacea; Mohs procedure left preauricular location 3/17/16  Bilateral hearing aids with h/o hearing loss (began using March, 2015)  Pacemaker 8/28/19 (PACEMAKER MOI XT DR COLETTE PERRY)  Attends [employer] in [place], [place]          Preop Questions 10/20/2022   1. Have you ever had a heart attack or stroke? No   2. Have you ever had surgery on your heart or blood vessels, such as a stent placement, a coronary artery bypass, or surgery on an artery in your head, neck, heart, or legs? No   3. Do you have chest pain with activity? UNKNOWN -   4. Do you have a history of  heart failure? No   5. Do you currently have a cold, bronchitis or symptoms of other infection? No   6. Do you have a cough, shortness of breath, or wheezing? No   7. Do you or anyone in your family have previous history of blood clots? UNKNOWN -   8. Do you or does anyone in your family have a serious bleeding problem such as prolonged " bleeding following surgeries or cuts? No   9. Have you ever had problems with anemia or been told to take iron pills? No   10. Have you had any abnormal blood loss such as black, tarry or bloody stools? No   11. Have you ever had a blood transfusion? UNKNOWN -   12. Are you willing to have a blood transfusion if it is medically needed before, during, or after your surgery? Yes   13. Have you or any of your relatives ever had problems with anesthesia? No   14. Do you have sleep apnea, excessive snoring or daytime drowsiness? No   15. Do you have any artifical heart valves or other implanted medical devices like a pacemaker, defibrillator, or continuous glucose monitor? YES - Medtronic pacemaker 8/28/19 (PACEMAKER MOI XT DR COLETTE PERRY)    15a. What type of device do you have? Medtronic   15b. Name of the clinic that manages your device:  [facility]   16. Do you have artificial joints? No   17. Are you allergic to latex? No     Health Care Directive:  Patient does not have a Health Care Directive or Living Will: Patient states has Advance Directive and will bring in a copy to clinic.    Preoperative Review of :   reviewed - no record of controlled substances prescribed.      Status of Chronic Conditions:  See problem list for active medical problems.  Problems all longstanding and stable, except as noted/documented.  See ROS for pertinent symptoms related to these conditions.      Review of Systems  CONSTITUTIONAL: NEGATIVE for fever, chills, change in weight  INTEGUMENTARY/SKIN: NEGATIVE for worrisome rashes, moles or lesions  EYES: NEGATIVE for vision changes or irritation  ENT/MOUTH: NEGATIVE for ear, mouth and throat problems  RESP: NEGATIVE for significant cough or SOB  CV: NEGATIVE for chest pain, palpitations or peripheral edema  GI: NEGATIVE for nausea, abdominal pain, heartburn, or change in bowel habits  : NEGATIVE for frequency, dysuria, or hematuria  MUSCULOSKELETAL:  NEGATIVE for significant arthralgias or myalgia  NEURO: NEGATIVE for weakness, dizziness or paresthesias  ENDOCRINE: NEGATIVE for temperature intolerance, skin/hair changes  HEME: NEGATIVE for bleeding problems  PSYCHIATRIC: NEGATIVE for changes in mood or affect    Patient Active Problem List    Diagnosis Date Noted     Closed compression fracture of L3 lumbar vertebra, with routine healing, subsequent encounter 10/01/2022     Priority: Medium     Myopia 08/11/2021     Priority: Medium     Dermatitis 08/11/2021     Priority: Medium     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       Need for prophylactic vaccination and inoculation against single disease 08/11/2021     Priority: Medium     Presbyopia 08/11/2021     Priority: Medium     Progressive high myopia 08/11/2021     Priority: Medium     Stable OD, OS.       Routine general medical examination at a health care facility 08/11/2021     Priority: Medium     h/o fx last yr--check DXA       Syncope, unspecified syncope type 03/11/2020     Priority: Medium     Left upper lobe pulmonary nodule 09/03/2019     Priority: Medium     Cardiac pacemaker in situ, dual chamber 09/03/2019     Priority: Medium     Medtronic [id number] Sandra XT  MRI, RA and RV Leads: Medtronic [id number]   CapSureFix Novus MRI SureScan, DOI 08/28/2019 by Dr. Douglas Mensah, [facility], [address], [place] [address].         Mobitz type 1 second degree atrioventricular block 08/27/2019     Priority: Medium     Essential hypertension with goal blood pressure less than 130/80 11/28/2017     Priority: Medium     Open-angle glaucoma of both eyes, mild stage, unspecified open-angle  glaucoma type 11/28/2017     Priority: Medium     BPH with urinary obstruction      Priority: Medium     Created by Conversion         Diverticulosis      Priority: Medium     Created by Conversion  [employer] [facility] Annotation: Jun 21 2012  8:27AM Derek Le: sigmoid   colon   noted on abdominal/pelvis CT  Replacement Utility updated for latest IMO load         Hypertension      Priority: Medium     Created by Conversion  Replacement Utility updated for latest IMO load         Right inguinal hernia 05/27/2016     Priority: Medium     small, reducible, asymptomatic         Hearing loss 11/24/2015     Priority: Medium     Glaucoma 11/24/2015     Priority: Medium     Rosacea      Priority: Medium     Created by Conversion         Nephrolithiasis      Priority: Medium     Created by Conversion         Hypercholesterolemia      Priority: Medium     Created by Conversion         Impaired Fasting Glucose      Priority: Medium     Created by Conversion         Serology Prostate-specific Antigen (PSA) Elevated      Priority: Medium     Created by Conversion  [employer] [facility] Annotation: Jan 2 2009  1:27PM Derek Le: 3.94 (12/06)   ->   3.38 (9/07) -> 4.83 (9/10/08)         Male Erectile Disorder      Priority: Medium     Created by Conversion         Squamous Cell Carcinoma Of The Skin      Priority: Medium     Created by Conversion         History of hepatitis B 01/01/1990     Priority: Medium      Past Medical History:   Diagnosis Date     Hypertension      Past Surgical History:   Procedure Laterality Date     ARTHROSCOPY KNEE Right      Current Outpatient Medications   Medication Sig Dispense Refill     aspirin 81 mg chewable tablet Take 81 mg by mouth At Bedtime       bacitracin 500 UNIT/GM OINT Apply topically 3 times daily Apply to tip of penis while smith catheter is present.       dorzolamide (TRUSOPT) 2 % ophthalmic solution Place 1 drop into both eyes 2 times daily       doxycycline hyclate (VIBRA-TABS) 100 MG  "tablet [DOXYCYCLINE (VIBRA-TABS) 100 MG TABLET] TAKE 1 TABLET BY MOUTH DAILY 90 tablet 3     GLUCOSAMINE/CHONDROITIN SULF A (GLUCOSAMINE-CHONDROITIN ORAL) Take 1 capsule by mouth daily       latanoprost (XALATAN) 0.005 % ophthalmic solution Place 1 drop into both eyes At Bedtime       lisinopril (ZESTRIL) 10 MG tablet TAKE 1 TABLET DAILY 90 tablet 3     metroNIDAZOLE (METROGEL) 1 % external gel APPLY A SMALL AMOUNT TO AFFECTED AREA(S) TOPICALLY ONCE DAILY 60 g 11     multivitamin, therapeutic (THERA-VIT) TABS tablet Take 1 tablet by mouth daily       tamsulosin (FLOMAX) 0.4 MG capsule TAKE 1 CAPSULE AT BEDTIME (Patient taking differently: Take 0.4 mg by mouth every morning) 90 capsule 3     triamcinolone (KENALOG) 0.1 % external cream Apply topically 2 times daily as needed for irritation         No Known Allergies     Social History     Tobacco Use     Smoking status: Former     Types: Cigarettes     Quit date: 1998     Years since quittin.8     Smokeless tobacco: Never   Substance Use Topics     Alcohol use: No     Family History   Problem Relation Age of Onset     Dementia Mother      Heart Disease Father      Diabetes Father      History   Drug Use No         Objective     /60   Pulse 86   Ht 1.734 m (5' 8.25\")   Wt 76.2 kg (168 lb)   SpO2 97%   BMI 25.36 kg/m      Physical Exam    GENERAL APPEARANCE: healthy, alert and no distress     EYES: EOMI,  PERRL     HENT: ear canals and TM's normal and nose and mouth without ulcers or lesions     NECK: no adenopathy, no asymmetry, masses, or scars and thyroid normal to palpation     RESP: lungs clear to auscultation - no rales, rhonchi or wheezes     CV: regular rates and rhythm, normal S1 S2, no S3 or S4 and no murmur, click or rub     ABDOMEN:  soft, nontender, no HSM or masses and bowel sounds normal     MS: extremities normal- no gross deformities noted, no evidence of inflammation in joints, FROM in all extremities.     SKIN: no suspicious " lesions or rashes     NEURO: Normal strength and tone, sensory exam grossly normal, mentation intact and speech normal     PSYCH: mentation appears normal. and affect normal/bright     LYMPHATICS: No cervical adenopathy    Recent Labs   Lab Test 10/11/22  1748 10/05/22  0939 10/04/22  0838 10/01/22  2249 10/01/22  1932 07/06/22  0758 01/27/22  1146 06/13/21 2020 06/13/21 2020   HGB 12.0*  --  12.5*   < > 12.6* 12.0* 12.9*   < >  --      --  124*   < > 152 129* 145*   < >  --    INR  --   --   --   --  1.05  --   --   --  1.22*    141  --    < > 143 146* 144   < >  --    POTASSIUM 4.5 3.7  --    < > 4.0 4.2 4.1   < >  --    CR 1.23 1.09 1.00   < > 1.53* 1.30* 1.19   < >  --    A1C  --   --   --   --   --  5.7* 5.8*  --   --     < > = values in this interval not displayed.        Diagnostics:  Labs pending at this time.  Results will be reviewed when available.  No results found for this or any previous visit (from the past 24 hour(s)).   No EKG this visit, completed in the last 90 days.  Sinus rhythm with 1st degree A-V block Incomplete right bundle branch block Left anterior fascicular block Abnormal ECG No previous ECGs available Confirmed by SEE ED PROVIDER NOTE FOR, ECG INTERPRETATION ([id number]),  Tammy Gentile ([id number]) on 10/2/2022 6:02:5        EXAM: CT LUMBAR SPINE W/O CONTRAST  LOCATION: [facility]  DATE/TIME: 10/1/2022 8:12 PM     INDICATION:  Low back pain fall 1 month prior  COMPARISON:  CT abdomen pelvis 10/29/2013.  TECHNIQUE: Routine CT Lumbar Spine without IV contrast. Multiplanar reformats. Dose reduction techniques were used.      FINDINGS:  VERTEBRA: Transitional anatomy with T12 nonrib-bearing.  Acute L3 superior endplate compression fracture with approximately 50% vertebral body height loss. Chronic L1 compression deformity with 4 mm retropulsion, similar in appearance compared to 2013.   Age-indeterminate, however favor chronic T12 superior and inferior  endplate compression deformities with mild height loss.  Grade 1 degenerative anterolisthesis of L4 on L5. No posttraumatic subluxation.      CANAL/FORAMINA:  Mild canal stenosis at L1-L2. Severe canal stenosis at L2-L3. Moderate to severe canal stenosis at L3-L4. Severe canal stenosis at L4-L5. Moderate right L2-L3 and L3-L4 foraminal stenosis.     PARASPINAL:  Severe bilateral hydroureteronephrosis. 15 mm calculus at the left ureterovesicular junction. Presumed enlarged prostate partially imaged.         Impression     IMPRESSION:  1.  Transitional anatomy with T12 nonrib-bearing.   2.  Acute L3 superior endplate compression fracture with approximately 50% vertebral body height loss. No retropulsion. Additional chronic appearing fractures described above.  3.  Severe canal stenosis at L2-L3 and L4-L5. Moderate to severe canal stenosis at L3-L4.  4.  Severe bilateral hydroureteronephrosis with a 14 mm calculus at the left ureterovesicular junction. Correlate for urinary outlet obstruction as there is also suggestion of an enlarged prostate gland.   XR Lumbar Spine 2/3 Views     Narrative     EXAM: XR LUMBAR SPINE 2/3 VIEWS  LOCATION: [facility]  DATE/TIME: 10/2/2022 2:17 PM     INDICATION: Baseline upright XR, lateral AP. L3 fracture.  COMPARISON: 10/01/2022.  TECHNIQUE: CR Lumbar Spine.        Impression     IMPRESSION: Diffuse bony demineralization. Grade I anterolisthesis of L4 on L5. Stable compression deformities at T12, L1, and L3. No new fracture identified. There is diffuse degenerative disc height loss with marginal osteophytes. There is diffuse   facet arthropathy throughout the lumbar spine. Scattered atherosclerotic calcification. Degenerative changes of the sacroiliac joints. Redemonstrated calcification at the left ureterovesicular junction.   CT Abdomen Pelvis w/o Contrast     Narrative     EXAM: CT ABDOMEN AND PELVIS WITHOUT CONTRAST  LOCATION: [facility]  DATE/TIME: 10/02/2022, 2:19 PM     INDICATION: Right hydronephrosis, left ureteral stone.  COMPARISON: 10/29/2013.  TECHNIQUE: CT scan of the abdomen and pelvis was performed without IV contrast. Multiplanar reformats were obtained. Dose reduction techniques were used.  CONTRAST: None.     FINDINGS:   LOWER CHEST: Pacing leads. Trace bilateral pleural effusions.     HEPATOBILIARY: Normal.     PANCREAS: Normal.     SPLEEN: Normal.     ADRENAL GLANDS: Normal.     KIDNEYS/BLADDER: Marked bilateral hydroureteronephrosis. 11 x 10 x 11 mm left UVJ stone measuring 1063 Hounsfield units. No right-sided stone appreciated. Smith catheter with inflated balloon within bladder. Marked bladder wall thickening. Several   punctate dependent bladder stones.     BOWEL: Diverticulosis. No diverticulitis, colitis, obstruction, or appendicitis.     LYMPH NODES: Normal.     VASCULATURE: Moderate atherosclerotic vascular calcifications.     PELVIC ORGANS: Markedly enlarged prostate.     MUSCULOSKELETAL: Osteopenia. Multilevel discogenic degenerative change. T12, L1, and L3 chronic appearing compression deformities.        Impression     IMPRESSION:   1.  11 x 10 x 11 mm left UVJ stone resulting in severe left-sided hydroureteronephrosis.  2.  Severe right-sided hydroureteronephrosis without evidence of obstructing ureteral stone.  3.  Markedly enlarged prostate.  4.  Bladder wall thickening, correlate to exclude cystitis. Punctate dependent bladder stones.  5.  Atherosclerotic vascular disease.  6.  Diverticulosis. No diverticulitis, colitis, or obstruction.         US Renal Complete     Narrative     EXAM: US RENAL COMPLETE  LOCATION: [facility]  DATE/TIME: 10/4/2022 1:00 PM     INDICATION: bilateral hydroureteronephrosis on CT 10/2, evaluate for residual s p smith placement for 1 liter retention 10/1. Also has left UVJ stone.  COMPARISON: CT 10/02/2022  TECHNIQUE: Routine Bilateral Renal and Bladder  Ultrasound.     FINDINGS:     RIGHT KIDNEY: 11.5 x 6.3 x 6.3 cm. Severe hydronephrosis. Mid renal 4.6 cm simple cyst which does not require further evaluation.     LEFT KIDNEY: 12.3 x 6 x 5.9 cm. Severe hydronephrosis.      BLADDER: It contains a Bloom. There is wall thickening up to 1 cm.        Impression     IMPRESSION:  1.  Persistent severe bilateral hydronephrosis.  2.  Diffuse bladder wall thickening.             Revised Cardiac Risk Index (RCRI):  The patient has the following serious cardiovascular risks for perioperative complications:   - No serious cardiac risks = 0 points     RCRI Interpretation: 0 points: Class I (very low risk - 0.4% complication rate)           Signed Electronically by: Derek Kumar MD  Copy of this evaluation report is provided to requesting physician.

## 2022-10-20 NOTE — PROGRESS NOTES
[facility]  [address]  [place] [address]  Phone: [phone]  Fax: [phone]  Primary Provider: Jose De Jesus Serna  Pre-op Performing Provider: JOSE DE JESUS SERNA      PREOPERATIVE EVALUATION:  Today's date: 10/20/2022    Mikaela Figueroa is a 87 year old male who presents for a preoperative evaluation.    Surgical Information:  Surgery/Procedure: right ureteroscopy  Surgery Location: [facility]  Surgeon: Dr. Dozier  Surgery Date: 10/25/22  Time of Surgery: TBD  Where patient plans to recover: At home with family  Fax number for surgical facility: Note does not need to be faxed, will be available electronically in Epic.    Type of Anesthesia Anticipated: to be determined    Preop general physical exam  Preoperative examination completed today.  COVID PCR testing prior to surgery and we will arrange for lab only visit tomorrow so that it is completed within 4 days of scheduled procedure Tuesday, October 25, 2022.  - Asymptomatic COVID-19 Virus (Coronavirus) by PCR    Nephrolithiasis  Nephrolithiasis with recent bilateral severe hydroureteronephrosis with large ureteral stone present.  Scheduled procedure as noted.  No contraindication to scheduled surgery.  Holding aspirin 7 days prior to scheduled procedure.    Essential hypertension  Blood pressure 126/60 on recheck.  Med monitoring while on lisinopril 10 mg daily.  - Basic metabolic panel    Closed compression fracture of L3 lumbar vertebra, with routine healing, subsequent encounter  Recent acute compression fracture L3 following recent fall September 2022.  Using back brace currently with benefits described.  Routine healing demonstrated.    Infection due to 2019 novel coronavirus  Recent COVID-19 infection October 1, 2022 documented with described weakness initially.  Complete resolution.  Will defer COVID booster bivalent x90 days following recent infection.    BPH with urinary obstruction  BPH with urinary obstruction.   Continues tamsulosin 0.4 mg daily.  Recent removal of Bloom catheter about 1 week ago.  Making small amount of urine however less than he would normally expect.    Cardiac pacemaker in situ, dual chamber  Pacemaker in place.    Pure hypercholesterolemia  Check lipid cascade today while fasting.  Diet and exercise management for weight goal less than 165 pounds ideally.  - Lipid panel reflex to direct LDL Fasting    Impaired fasting glucose  Impaired fasting glucose historically with A1c and fasting glucose obtained today.  - Basic metabolic panel  - Hemoglobin A1c    Normochromic normocytic anemia  CBC with platelet count with history of mild normochromic normocytic anemia  - CBC with platelets     40 minutes total time spent on the date of encounter including patient chart review, counseling and coordination of cares, and documentation.         Subjective     HPI related to upcoming procedure: Patient seen for preoperative examination.  Has scheduled right ureteroscopy procedure October 25, 2022 with Dr. Dozier [facility].  Has recent bilateral severe hydroureteronephrosis with large ureteral stone presents noted.  Was to arrange cystoscopy, lithotripsy for ureteral stone, possible ureteral stent placements in approx 2-3 weeks following hospital discharge October 5, 2022..Had acute kidney injury.  Was positive for COVID-19 at time of admission October 1, 2022 without residual issues.  Noted severe spinal canal stenosis on lumbar CT showing acute L3 compression fracture.  Back brace utilized.  History of BPH with urinary obstruction with benefits of tamsulosin 0.4 mg daily.  Recent removal of Bloom catheter a week ago.  Small amount of urine frequently.  No dysuria or urgency.  Lisinopril 10 mg daily for hypertension.  Has had elevated cholesterol diet controlled historically.  Annual wellness visit scheduled January 31, 2023.  Denies recent illness.  No cough or shortness of breath.  No fever.  Comprehensive  "review of systems as above otherwise all negative.         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 Zavaleta) then subsequent procedure 2 weeks later removing remaining stone in ureter \"with great difficulty\"  Dad - dec DM, CAD   Mom - dec Alzheimers   6-siblings Taoist (retired clergyman)   No smoke   Occ EtOH   Left fibula fracture (spiral) 5/07 (Dr. Quick, [facility])   TRUS with bx 10/7/11 biopsies negative (followed by Dr. Colindres)   Eye exam with Dr. Duarte in this building (monitoring for +/- glaucoma) - followed q year   Dr. Colindres, [facility] every year for elevated PSA, etc. (h/o TRUS with bx negative)  Dr. Beavers, dermatology for q 6 month checks re: \"precancerous\" lesions and rosacea; Mohs procedure left preauricular location 3/17/16  Bilateral hearing aids with h/o hearing loss (began using March, 2015)  Pacemaker 8/28/19 (PACEMAKER MOI XT DR COLETTE PERRY)  Attends [employer] in [place], [place]          Preop Questions 10/20/2022   1. Have you ever had a heart attack or stroke? No   2. Have you ever had surgery on your heart or blood vessels, such as a stent placement, a coronary artery bypass, or surgery on an artery in your head, neck, heart, or legs? No   3. Do you have chest pain with activity? UNKNOWN -   4. Do you have a history of  heart failure? No   5. Do you currently have a cold, bronchitis or symptoms of other infection? No   6. Do you have a cough, shortness of breath, or wheezing? No   7. Do you or anyone in your family have previous history of blood clots? UNKNOWN -   8. Do you or does anyone in your family have a serious bleeding problem such as prolonged " bleeding following surgeries or cuts? No   9. Have you ever had problems with anemia or been told to take iron pills? No   10. Have you had any abnormal blood loss such as black, tarry or bloody stools? No   11. Have you ever had a blood transfusion? UNKNOWN -   12. Are you willing to have a blood transfusion if it is medically needed before, during, or after your surgery? Yes   13. Have you or any of your relatives ever had problems with anesthesia? No   14. Do you have sleep apnea, excessive snoring or daytime drowsiness? No   15. Do you have any artifical heart valves or other implanted medical devices like a pacemaker, defibrillator, or continuous glucose monitor? YES - Medtronic pacemaker 8/28/19 (PACEMAKER MOI XT DR COLETTE PERRY)    15a. What type of device do you have? Medtronic   15b. Name of the clinic that manages your device:  [facility]   16. Do you have artificial joints? No   17. Are you allergic to latex? No     Health Care Directive:  Patient does not have a Health Care Directive or Living Will: Patient states has Advance Directive and will bring in a copy to clinic.    Preoperative Review of :   reviewed - no record of controlled substances prescribed.      Status of Chronic Conditions:  See problem list for active medical problems.  Problems all longstanding and stable, except as noted/documented.  See ROS for pertinent symptoms related to these conditions.      Review of Systems  CONSTITUTIONAL: NEGATIVE for fever, chills, change in weight  INTEGUMENTARY/SKIN: NEGATIVE for worrisome rashes, moles or lesions  EYES: NEGATIVE for vision changes or irritation  ENT/MOUTH: NEGATIVE for ear, mouth and throat problems  RESP: NEGATIVE for significant cough or SOB  CV: NEGATIVE for chest pain, palpitations or peripheral edema  GI: NEGATIVE for nausea, abdominal pain, heartburn, or change in bowel habits  : NEGATIVE for frequency, dysuria, or hematuria  MUSCULOSKELETAL:  NEGATIVE for significant arthralgias or myalgia  NEURO: NEGATIVE for weakness, dizziness or paresthesias  ENDOCRINE: NEGATIVE for temperature intolerance, skin/hair changes  HEME: NEGATIVE for bleeding problems  PSYCHIATRIC: NEGATIVE for changes in mood or affect    Patient Active Problem List    Diagnosis Date Noted     Closed compression fracture of L3 lumbar vertebra, with routine healing, subsequent encounter 10/01/2022     Priority: Medium     Myopia 08/11/2021     Priority: Medium     Dermatitis 08/11/2021     Priority: Medium     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       Need for prophylactic vaccination and inoculation against single disease 08/11/2021     Priority: Medium     Presbyopia 08/11/2021     Priority: Medium     Progressive high myopia 08/11/2021     Priority: Medium     Stable OD, OS.       Routine general medical examination at a health care facility 08/11/2021     Priority: Medium     h/o fx last yr--check DXA       Syncope, unspecified syncope type 03/11/2020     Priority: Medium     Left upper lobe pulmonary nodule 09/03/2019     Priority: Medium     Cardiac pacemaker in situ, dual chamber 09/03/2019     Priority: Medium     Medtronic [id number] Sandra XT  MRI, RA and RV Leads: Medtronic [id number]   CapSureFix Novus MRI SureScan, DOI 08/28/2019 by Dr. Douglas Mensah, [facility][note 3], [address], [place] [address].         Mobitz type 1 second degree atrioventricular block 08/27/2019     Priority: Medium     Essential hypertension with goal blood pressure less than 130/80 11/28/2017     Priority: Medium     Open-angle glaucoma of both eyes, mild stage, unspecified open-angle  glaucoma type 11/28/2017     Priority: Medium     BPH with urinary obstruction      Priority: Medium     Created by Conversion         Diverticulosis      Priority: Medium     Created by Conversion  [facility] Annotation: Jun 21 2012  8:27AM Derek Le: sigmoid   colon   noted on abdominal/pelvis CT  Replacement Utility updated for latest IMO load         Hypertension      Priority: Medium     Created by Conversion  Replacement Utility updated for latest IMO load         Right inguinal hernia 05/27/2016     Priority: Medium     small, reducible, asymptomatic         Hearing loss 11/24/2015     Priority: Medium     Glaucoma 11/24/2015     Priority: Medium     Rosacea      Priority: Medium     Created by Conversion         Nephrolithiasis      Priority: Medium     Created by Conversion         Hypercholesterolemia      Priority: Medium     Created by Conversion         Impaired Fasting Glucose      Priority: Medium     Created by Conversion         Serology Prostate-specific Antigen (PSA) Elevated      Priority: Medium     Created by Conversion  [facility] Annotation: Jan 2 2009  1:27PM Derek Le: 3.94 (12/06)   ->   3.38 (9/07) -> 4.83 (9/10/08)         Male Erectile Disorder      Priority: Medium     Created by Conversion         Squamous Cell Carcinoma Of The Skin      Priority: Medium     Created by Conversion         History of hepatitis B 01/01/1990     Priority: Medium      Past Medical History:   Diagnosis Date     Hypertension      Past Surgical History:   Procedure Laterality Date     ARTHROSCOPY KNEE Right      Current Outpatient Medications   Medication Sig Dispense Refill     aspirin 81 mg chewable tablet Take 81 mg by mouth At Bedtime       bacitracin 500 UNIT/GM OINT Apply topically 3 times daily Apply to tip of penis while smith catheter is present.       dorzolamide (TRUSOPT) 2 % ophthalmic solution Place 1 drop into both eyes 2 times daily       doxycycline hyclate (VIBRA-TABS) 100 MG  "tablet [DOXYCYCLINE (VIBRA-TABS) 100 MG TABLET] TAKE 1 TABLET BY MOUTH DAILY 90 tablet 3     GLUCOSAMINE/CHONDROITIN SULF A (GLUCOSAMINE-CHONDROITIN ORAL) Take 1 capsule by mouth daily       latanoprost (XALATAN) 0.005 % ophthalmic solution Place 1 drop into both eyes At Bedtime       lisinopril (ZESTRIL) 10 MG tablet TAKE 1 TABLET DAILY 90 tablet 3     metroNIDAZOLE (METROGEL) 1 % external gel APPLY A SMALL AMOUNT TO AFFECTED AREA(S) TOPICALLY ONCE DAILY 60 g 11     multivitamin, therapeutic (THERA-VIT) TABS tablet Take 1 tablet by mouth daily       tamsulosin (FLOMAX) 0.4 MG capsule TAKE 1 CAPSULE AT BEDTIME (Patient taking differently: Take 0.4 mg by mouth every morning) 90 capsule 3     triamcinolone (KENALOG) 0.1 % external cream Apply topically 2 times daily as needed for irritation         No Known Allergies     Social History     Tobacco Use     Smoking status: Former     Types: Cigarettes     Quit date: 1998     Years since quittin.8     Smokeless tobacco: Never   Substance Use Topics     Alcohol use: No     Family History   Problem Relation Age of Onset     Dementia Mother      Heart Disease Father      Diabetes Father      History   Drug Use No         Objective     /60   Pulse 86   Ht 1.734 m (5' 8.25\")   Wt 76.2 kg (168 lb)   SpO2 97%   BMI 25.36 kg/m      Physical Exam    GENERAL APPEARANCE: healthy, alert and no distress     EYES: EOMI,  PERRL     HENT: ear canals and TM's normal and nose and mouth without ulcers or lesions     NECK: no adenopathy, no asymmetry, masses, or scars and thyroid normal to palpation     RESP: lungs clear to auscultation - no rales, rhonchi or wheezes     CV: regular rates and rhythm, normal S1 S2, no S3 or S4 and no murmur, click or rub     ABDOMEN:  soft, nontender, no HSM or masses and bowel sounds normal     MS: extremities normal- no gross deformities noted, no evidence of inflammation in joints, FROM in all extremities.     SKIN: no suspicious " lesions or rashes     NEURO: Normal strength and tone, sensory exam grossly normal, mentation intact and speech normal     PSYCH: mentation appears normal. and affect normal/bright     LYMPHATICS: No cervical adenopathy    Recent Labs   Lab Test 10/11/22  1748 10/05/22  0939 10/04/22  0838 10/01/22  2249 10/01/22  1932 07/06/22  0758 01/27/22  1146 06/13/21 2020 06/13/21 2020   HGB 12.0*  --  12.5*   < > 12.6* 12.0* 12.9*   < >  --      --  124*   < > 152 129* 145*   < >  --    INR  --   --   --   --  1.05  --   --   --  1.22*    141  --    < > 143 146* 144   < >  --    POTASSIUM 4.5 3.7  --    < > 4.0 4.2 4.1   < >  --    CR 1.23 1.09 1.00   < > 1.53* 1.30* 1.19   < >  --    A1C  --   --   --   --   --  5.7* 5.8*  --   --     < > = values in this interval not displayed.        Diagnostics:  Labs pending at this time.  Results will be reviewed when available.  No results found for this or any previous visit (from the past 24 hour(s)).   No EKG this visit, completed in the last 90 days.  Sinus rhythm with 1st degree A-V block Incomplete right bundle branch block Left anterior fascicular block Abnormal ECG No previous ECGs available Confirmed by SEE ED PROVIDER NOTE FOR, ECG INTERPRETATION ([id number]),  Tammy Gentile ([id number]) on 10/2/2022 6:02:5        EXAM: CT LUMBAR SPINE W/O CONTRAST  LOCATION: [facility]  DATE/TIME: 10/1/2022 8:12 PM     INDICATION:  Low back pain fall 1 month prior  COMPARISON:  CT abdomen pelvis 10/29/2013.  TECHNIQUE: Routine CT Lumbar Spine without IV contrast. Multiplanar reformats. Dose reduction techniques were used.      FINDINGS:  VERTEBRA: Transitional anatomy with T12 nonrib-bearing.  Acute L3 superior endplate compression fracture with approximately 50% vertebral body height loss. Chronic L1 compression deformity with 4 mm retropulsion, similar in appearance compared to 2013.   Age-indeterminate, however favor chronic T12 superior and inferior  endplate compression deformities with mild height loss.  Grade 1 degenerative anterolisthesis of L4 on L5. No posttraumatic subluxation.      CANAL/FORAMINA:  Mild canal stenosis at L1-L2. Severe canal stenosis at L2-L3. Moderate to severe canal stenosis at L3-L4. Severe canal stenosis at L4-L5. Moderate right L2-L3 and L3-L4 foraminal stenosis.     PARASPINAL:  Severe bilateral hydroureteronephrosis. 15 mm calculus at the left ureterovesicular junction. Presumed enlarged prostate partially imaged.         Impression     IMPRESSION:  1.  Transitional anatomy with T12 nonrib-bearing.   2.  Acute L3 superior endplate compression fracture with approximately 50% vertebral body height loss. No retropulsion. Additional chronic appearing fractures described above.  3.  Severe canal stenosis at L2-L3 and L4-L5. Moderate to severe canal stenosis at L3-L4.  4.  Severe bilateral hydroureteronephrosis with a 14 mm calculus at the left ureterovesicular junction. Correlate for urinary outlet obstruction as there is also suggestion of an enlarged prostate gland.   XR Lumbar Spine 2/3 Views     Narrative     EXAM: XR LUMBAR SPINE 2/3 VIEWS  LOCATION: [facility]  DATE/TIME: 10/2/2022 2:17 PM     INDICATION: Baseline upright XR, lateral AP. L3 fracture.  COMPARISON: 10/01/2022.  TECHNIQUE: CR Lumbar Spine.        Impression     IMPRESSION: Diffuse bony demineralization. Grade I anterolisthesis of L4 on L5. Stable compression deformities at T12, L1, and L3. No new fracture identified. There is diffuse degenerative disc height loss with marginal osteophytes. There is diffuse   facet arthropathy throughout the lumbar spine. Scattered atherosclerotic calcification. Degenerative changes of the sacroiliac joints. Redemonstrated calcification at the left ureterovesicular junction.   CT Abdomen Pelvis w/o Contrast     Narrative     EXAM: CT ABDOMEN AND PELVIS WITHOUT CONTRAST  LOCATION: [facility]  DATE/TIME: 10/02/2022, 2:19 PM     INDICATION: Right hydronephrosis, left ureteral stone.  COMPARISON: 10/29/2013.  TECHNIQUE: CT scan of the abdomen and pelvis was performed without IV contrast. Multiplanar reformats were obtained. Dose reduction techniques were used.  CONTRAST: None.     FINDINGS:   LOWER CHEST: Pacing leads. Trace bilateral pleural effusions.     HEPATOBILIARY: Normal.     PANCREAS: Normal.     SPLEEN: Normal.     ADRENAL GLANDS: Normal.     KIDNEYS/BLADDER: Marked bilateral hydroureteronephrosis. 11 x 10 x 11 mm left UVJ stone measuring 1063 Hounsfield units. No right-sided stone appreciated. Smith catheter with inflated balloon within bladder. Marked bladder wall thickening. Several   punctate dependent bladder stones.     BOWEL: Diverticulosis. No diverticulitis, colitis, obstruction, or appendicitis.     LYMPH NODES: Normal.     VASCULATURE: Moderate atherosclerotic vascular calcifications.     PELVIC ORGANS: Markedly enlarged prostate.     MUSCULOSKELETAL: Osteopenia. Multilevel discogenic degenerative change. T12, L1, and L3 chronic appearing compression deformities.        Impression     IMPRESSION:   1.  11 x 10 x 11 mm left UVJ stone resulting in severe left-sided hydroureteronephrosis.  2.  Severe right-sided hydroureteronephrosis without evidence of obstructing ureteral stone.  3.  Markedly enlarged prostate.  4.  Bladder wall thickening, correlate to exclude cystitis. Punctate dependent bladder stones.  5.  Atherosclerotic vascular disease.  6.  Diverticulosis. No diverticulitis, colitis, or obstruction.         US Renal Complete     Narrative     EXAM: US RENAL COMPLETE  LOCATION: [facility]  DATE/TIME: 10/4/2022 1:00 PM     INDICATION: bilateral hydroureteronephrosis on CT 10/2, evaluate for residual s p smith placement for 1 liter retention 10/1. Also has left UVJ stone.  COMPARISON: CT 10/02/2022  TECHNIQUE: Routine Bilateral Renal and Bladder  Ultrasound.     FINDINGS:     RIGHT KIDNEY: 11.5 x 6.3 x 6.3 cm. Severe hydronephrosis. Mid renal 4.6 cm simple cyst which does not require further evaluation.     LEFT KIDNEY: 12.3 x 6 x 5.9 cm. Severe hydronephrosis.      BLADDER: It contains a Bloom. There is wall thickening up to 1 cm.        Impression     IMPRESSION:  1.  Persistent severe bilateral hydronephrosis.  2.  Diffuse bladder wall thickening.             Revised Cardiac Risk Index (RCRI):  The patient has the following serious cardiovascular risks for perioperative complications:   - No serious cardiac risks = 0 points     RCRI Interpretation: 0 points: Class I (very low risk - 0.4% complication rate)           Signed Electronically by: Derek Kumar MD  Copy of this evaluation report is provided to requesting physician.

## 2022-10-20 NOTE — PATIENT INSTRUCTIONS
Preparing for Your Surgery  Getting started  A nurse will call you to review your health history and instructions. They will give you an arrival time based on your scheduled surgery time. Please be ready to share:    Your doctor's clinic name and phone number    Your medical, surgical and anesthesia history    A list of allergies and sensitivities    A list of medicines, including herbal treatments and over-the-counter drugs    Whether the patient has a legal guardian (ask how to send us the papers in advance)  Please tell us if you're pregnant--or if there's any chance you might be pregnant. Some surgeries may injure a fetus (unborn baby), so they require a pregnancy test. Surgeries that are safe for a fetus don't always need a test, and you can choose whether to have one.   If you have a child who's having surgery, please ask for a copy of Preparing for Your Child's Surgery.    Preparing for surgery    Within 10 to 30 days of surgery: Have a pre-op exam (sometimes called an H&P, or History and Physical). This can be done at a clinic or pre-operative center.  ? If you're having a , you may not need this exam. Talk to your care team.    At your pre-op exam, talk to your care team about all medicines you take. If you need to stop any medicines before surgery, ask when to start taking them again.  ? We do this for your safety. Many medicines can make you bleed too much during surgery. Some change how well surgery (anesthesia) drugs work.    Call your insurance company to let them know you're having surgery. (If you don't have insurance, call [phone].)    Call your clinic if there's any change in your health. This includes signs of a cold or flu (sore throat, runny nose, cough, rash, fever). It also includes a scrape or scratch near the surgery site.    If you have questions on the day of surgery, call your hospital or surgery center.  COVID testing  You may need to be tested for COVID-19 before having  surgery. If so, we will give you instructions (or click here).  Eating and drinking guidelines  For your safety: Unless your surgeon tells you otherwise, follow the guidelines below.    Eat and drink as usual until 8 hours before surgery. After that, no food or milk.    Drink clear liquids until 2 hours before surgery. These are liquids you can see through, like water, Gatorade and Propel Water. You may also have black coffee and tea (no cream or milk).    Nothing by mouth within 2 hours of surgery. This includes gum, candy and breath mints.    If you drink alcohol: Stop drinking it the night before surgery.    If your care team tells you to take medicine on the morning of surgery, it's okay to take it with a sip of water.  Preventing infection    Shower or bathe the night before and morning of your surgery. Follow the instructions your clinic gave you. (If no instructions, use regular soap.)    Don't shave or clip hair near your surgery site. We'll remove the hair if needed.    Don't smoke or vape the morning of surgery. You may chew nicotine gum up to 2 hours before surgery. A nicotine patch is okay.  ? Note: Some surgeries require you to completely quit smoking and nicotine. Check with your surgeon.    Your care team will make every effort to keep you safe from infection. We will:  ? Clean our hands often with soap and water (or an alcohol-based hand rub).  ? Clean the skin at your surgery site with a special soap that kills germs.  ? Give you a special gown to keep you warm. (Cold raises the risk of infection.)  ? Wear special hair covers, masks, gowns and gloves during surgery.  ? Give antibiotic medicine, if prescribed. Not all surgeries need antibiotics.  What to bring on the day of surgery    Photo ID and insurance card    Copy of your health care directive, if you have one    Glasses and hearing aides (bring cases)  ? You can't wear contacts during surgery    Inhaler and eye drops, if you use them (tell us  about these when you arrive)    CPAP machine or breathing device, if you use them    A few personal items, if spending the night    If you have . . .  ? A pacemaker, ICD (cardiac defibrillator) or other implant: Bring the ID card.  ? An implanted stimulator: Bring the remote control.  ? A legal guardian: Bring a copy of the certified (court-stamped) guardianship papers.  Please remove any jewelry, including body piercings. Leave jewelry and other valuables at home.  If you're going home the day of surgery    You must have a responsible adult drive you home. They should stay with you overnight as well.    If you don't have someone to stay with you, and you aren't safe to go home alone, we may keep you overnight. Insurance often won't pay for this.  After surgery  If it's hard to control your pain or you need more pain medicine, please call your surgeon's office.  Questions?   If you have any questions for your care team, list them here: _________________________________________________________________________________________________________________________________________________________________________ ____________________________________ ____________________________________ ____________________________________  For informational purposes only. Not to replace the advice of your health care provider. Copyright   2003, 2019 [facility]. All rights reserved. Clinically reviewed by Kristin Porter MD. [employer] [id number] - REV 07/22.

## 2022-10-21 ENCOUNTER — TELEPHONE (OUTPATIENT)
Dept: FAMILY MEDICINE | Facility: CLINIC | Age: 87
End: 2022-10-21

## 2022-10-21 ENCOUNTER — LAB (OUTPATIENT)
Dept: LAB | Facility: CLINIC | Age: 87
End: 2022-10-21
Payer: MEDICARE

## 2022-10-21 DIAGNOSIS — Z01.818 PREOP GENERAL PHYSICAL EXAM: ICD-10-CM

## 2022-10-21 LAB — SARS-COV-2 RNA RESP QL NAA+PROBE: NEGATIVE

## 2022-10-21 PROCEDURE — U0003 INFECTIOUS AGENT DETECTION BY NUCLEIC ACID (DNA OR RNA); SEVERE ACUTE RESPIRATORY SYNDROME CORONAVIRUS 2 (SARS-COV-2) (CORONAVIRUS DISEASE [COVID-19]), AMPLIFIED PROBE TECHNIQUE, MAKING USE OF HIGH THROUGHPUT TECHNOLOGIES AS DESCRIBED BY CMS-2020-01-R: HCPCS

## 2022-10-21 PROCEDURE — U0005 INFEC AGEN DETEC AMPLI PROBE: HCPCS

## 2022-10-21 NOTE — TELEPHONE ENCOUNTER
Reason for Call:  Other FYI    Detailed comments: Marisel from [facility] is calling wanting to inform Dr. Kumar that patient has discharged today with goals met.     Phone Number NURSE can be reached at: Other phone number:  [phone]      Call taken on 10/21/2022 at 9:54 AM by Davida Beltre

## 2022-10-25 ENCOUNTER — ANESTHESIA EVENT (OUTPATIENT)
Dept: SURGERY | Facility: HOSPITAL | Age: 87
DRG: 690 | End: 2022-10-25
Payer: MEDICARE

## 2022-10-25 ENCOUNTER — APPOINTMENT (OUTPATIENT)
Dept: RADIOLOGY | Facility: HOSPITAL | Age: 87
DRG: 690 | End: 2022-10-25
Attending: UROLOGY
Payer: MEDICARE

## 2022-10-25 ENCOUNTER — HOSPITAL ENCOUNTER (INPATIENT)
Facility: HOSPITAL | Age: 87
LOS: 2 days | Discharge: HOME HEALTH | DRG: 690 | End: 2022-10-27
Attending: UROLOGY | Admitting: UROLOGY
Payer: MEDICARE

## 2022-10-25 ENCOUNTER — ANESTHESIA (OUTPATIENT)
Dept: SURGERY | Facility: HOSPITAL | Age: 87
DRG: 690 | End: 2022-10-25
Payer: MEDICARE

## 2022-10-25 DIAGNOSIS — L71.9 ROSACEA: ICD-10-CM

## 2022-10-25 DIAGNOSIS — Q62.11 HYDRONEPHROSIS WITH URETEROPELVIC JUNCTION (UPJ) OBSTRUCTION: Primary | ICD-10-CM

## 2022-10-25 DIAGNOSIS — N20.1 URETERAL CALCULUS: ICD-10-CM

## 2022-10-25 DIAGNOSIS — N39.0 URINARY TRACT INFECTION WITHOUT HEMATURIA, SITE UNSPECIFIED: ICD-10-CM

## 2022-10-25 PROCEDURE — 0TF78ZZ FRAGMENTATION IN LEFT URETER, VIA NATURAL OR ARTIFICIAL OPENING ENDOSCOPIC: ICD-10-PCS | Performed by: UROLOGY

## 2022-10-25 PROCEDURE — 250N000025 HC SEVOFLURANE, PER MIN: Performed by: UROLOGY

## 2022-10-25 PROCEDURE — C1769 GUIDE WIRE: HCPCS | Performed by: UROLOGY

## 2022-10-25 PROCEDURE — 120N000001 HC R&B MED SURG/OB

## 2022-10-25 PROCEDURE — 250N000013 HC RX MED GY IP 250 OP 250 PS 637: Performed by: UROLOGY

## 2022-10-25 PROCEDURE — 250N000011 HC RX IP 250 OP 636: Performed by: NURSE PRACTITIONER

## 2022-10-25 PROCEDURE — 999N000180 XR SURGERY CARM FLUORO LESS THAN 5 MIN

## 2022-10-25 PROCEDURE — 999N000141 HC STATISTIC PRE-PROCEDURE NURSING ASSESSMENT: Performed by: UROLOGY

## 2022-10-25 PROCEDURE — 250N000013 HC RX MED GY IP 250 OP 250 PS 637: Performed by: NURSE PRACTITIONER

## 2022-10-25 PROCEDURE — 710N000009 HC RECOVERY PHASE 1, LEVEL 1, PER MIN: Performed by: UROLOGY

## 2022-10-25 PROCEDURE — 370N000017 HC ANESTHESIA TECHNICAL FEE, PER MIN: Performed by: UROLOGY

## 2022-10-25 PROCEDURE — 258N000003 HC RX IP 258 OP 636: Performed by: ANESTHESIOLOGY

## 2022-10-25 PROCEDURE — 258N000003 HC RX IP 258 OP 636: Performed by: UROLOGY

## 2022-10-25 PROCEDURE — 272N000001 HC OR GENERAL SUPPLY STERILE: Performed by: UROLOGY

## 2022-10-25 PROCEDURE — 250N000011 HC RX IP 250 OP 636: Performed by: NURSE ANESTHETIST, CERTIFIED REGISTERED

## 2022-10-25 PROCEDURE — 250N000009 HC RX 250: Performed by: UROLOGY

## 2022-10-25 PROCEDURE — 250N000009 HC RX 250: Performed by: NURSE ANESTHETIST, CERTIFIED REGISTERED

## 2022-10-25 PROCEDURE — 360N000082 HC SURGERY LEVEL 2 W/ FLUORO, PER MIN: Performed by: UROLOGY

## 2022-10-25 RX ORDER — ACETAMINOPHEN 325 MG/1
650 TABLET ORAL EVERY 4 HOURS PRN
Status: DISCONTINUED | OUTPATIENT
Start: 2022-10-28 | End: 2022-10-27 | Stop reason: HOSPADM

## 2022-10-25 RX ORDER — HYDROMORPHONE HYDROCHLORIDE 1 MG/ML
0.2 INJECTION, SOLUTION INTRAMUSCULAR; INTRAVENOUS; SUBCUTANEOUS EVERY 5 MIN PRN
Status: DISCONTINUED | OUTPATIENT
Start: 2022-10-25 | End: 2022-10-25 | Stop reason: HOSPADM

## 2022-10-25 RX ORDER — LATANOPROST 50 UG/ML
1 SOLUTION/ DROPS OPHTHALMIC AT BEDTIME
Status: DISCONTINUED | OUTPATIENT
Start: 2022-10-25 | End: 2022-10-27 | Stop reason: HOSPADM

## 2022-10-25 RX ORDER — OXYCODONE HYDROCHLORIDE 5 MG/1
5 TABLET ORAL EVERY 4 HOURS PRN
Status: DISCONTINUED | OUTPATIENT
Start: 2022-10-25 | End: 2022-10-27 | Stop reason: HOSPADM

## 2022-10-25 RX ORDER — SODIUM CHLORIDE, SODIUM LACTATE, POTASSIUM CHLORIDE, CALCIUM CHLORIDE 600; 310; 30; 20 MG/100ML; MG/100ML; MG/100ML; MG/100ML
INJECTION, SOLUTION INTRAVENOUS CONTINUOUS
Status: DISCONTINUED | OUTPATIENT
Start: 2022-10-25 | End: 2022-10-25 | Stop reason: HOSPADM

## 2022-10-25 RX ORDER — TAMSULOSIN HYDROCHLORIDE 0.4 MG/1
0.4 CAPSULE ORAL AT BEDTIME
Status: DISCONTINUED | OUTPATIENT
Start: 2022-10-25 | End: 2022-10-25

## 2022-10-25 RX ORDER — PROCHLORPERAZINE MALEATE 5 MG
5 TABLET ORAL EVERY 6 HOURS PRN
Status: DISCONTINUED | OUTPATIENT
Start: 2022-10-25 | End: 2022-10-27 | Stop reason: HOSPADM

## 2022-10-25 RX ORDER — BISACODYL 10 MG
10 SUPPOSITORY, RECTAL RECTAL DAILY PRN
Status: DISCONTINUED | OUTPATIENT
Start: 2022-10-25 | End: 2022-10-27 | Stop reason: HOSPADM

## 2022-10-25 RX ORDER — NALOXONE HYDROCHLORIDE 1 MG/ML
0.4 INJECTION INTRAMUSCULAR; INTRAVENOUS; SUBCUTANEOUS
Status: DISCONTINUED | OUTPATIENT
Start: 2022-10-25 | End: 2022-10-27 | Stop reason: HOSPADM

## 2022-10-25 RX ORDER — DEXAMETHASONE SODIUM PHOSPHATE 10 MG/ML
INJECTION, SOLUTION INTRAMUSCULAR; INTRAVENOUS PRN
Status: DISCONTINUED | OUTPATIENT
Start: 2022-10-25 | End: 2022-10-25

## 2022-10-25 RX ORDER — LIDOCAINE HYDROCHLORIDE 10 MG/ML
INJECTION, SOLUTION INFILTRATION; PERINEURAL PRN
Status: DISCONTINUED | OUTPATIENT
Start: 2022-10-25 | End: 2022-10-25

## 2022-10-25 RX ORDER — ONDANSETRON 2 MG/ML
INJECTION INTRAMUSCULAR; INTRAVENOUS PRN
Status: DISCONTINUED | OUTPATIENT
Start: 2022-10-25 | End: 2022-10-25

## 2022-10-25 RX ORDER — SODIUM CHLORIDE, SODIUM LACTATE, POTASSIUM CHLORIDE, CALCIUM CHLORIDE 600; 310; 30; 20 MG/100ML; MG/100ML; MG/100ML; MG/100ML
INJECTION, SOLUTION INTRAVENOUS CONTINUOUS
Status: DISCONTINUED | OUTPATIENT
Start: 2022-10-25 | End: 2022-10-27 | Stop reason: HOSPADM

## 2022-10-25 RX ORDER — TAMSULOSIN HYDROCHLORIDE 0.4 MG/1
0.4 CAPSULE ORAL DAILY
Status: DISCONTINUED | OUTPATIENT
Start: 2022-10-26 | End: 2022-10-27 | Stop reason: HOSPADM

## 2022-10-25 RX ORDER — AMOXICILLIN 250 MG
1 CAPSULE ORAL 2 TIMES DAILY
Status: DISCONTINUED | OUTPATIENT
Start: 2022-10-25 | End: 2022-10-27 | Stop reason: HOSPADM

## 2022-10-25 RX ORDER — NALOXONE HYDROCHLORIDE 1 MG/ML
0.2 INJECTION INTRAMUSCULAR; INTRAVENOUS; SUBCUTANEOUS
Status: DISCONTINUED | OUTPATIENT
Start: 2022-10-25 | End: 2022-10-27 | Stop reason: HOSPADM

## 2022-10-25 RX ORDER — LIDOCAINE 40 MG/G
CREAM TOPICAL
Status: DISCONTINUED | OUTPATIENT
Start: 2022-10-25 | End: 2022-10-25 | Stop reason: HOSPADM

## 2022-10-25 RX ORDER — MEPERIDINE HYDROCHLORIDE 25 MG/ML
12.5 INJECTION INTRAMUSCULAR; INTRAVENOUS; SUBCUTANEOUS
Status: DISCONTINUED | OUTPATIENT
Start: 2022-10-25 | End: 2022-10-25 | Stop reason: HOSPADM

## 2022-10-25 RX ORDER — FENTANYL CITRATE 50 UG/ML
INJECTION, SOLUTION INTRAMUSCULAR; INTRAVENOUS PRN
Status: DISCONTINUED | OUTPATIENT
Start: 2022-10-25 | End: 2022-10-25

## 2022-10-25 RX ORDER — LISINOPRIL 5 MG/1
10 TABLET ORAL DAILY
Status: DISCONTINUED | OUTPATIENT
Start: 2022-10-25 | End: 2022-10-27 | Stop reason: HOSPADM

## 2022-10-25 RX ORDER — ONDANSETRON 4 MG/1
4 TABLET, ORALLY DISINTEGRATING ORAL EVERY 6 HOURS PRN
Status: DISCONTINUED | OUTPATIENT
Start: 2022-10-25 | End: 2022-10-27 | Stop reason: HOSPADM

## 2022-10-25 RX ORDER — ACETAMINOPHEN 325 MG/1
975 TABLET ORAL EVERY 8 HOURS
Status: DISCONTINUED | OUTPATIENT
Start: 2022-10-25 | End: 2022-10-27 | Stop reason: HOSPADM

## 2022-10-25 RX ORDER — FENTANYL CITRATE 50 UG/ML
25 INJECTION, SOLUTION INTRAMUSCULAR; INTRAVENOUS
Status: DISCONTINUED | OUTPATIENT
Start: 2022-10-25 | End: 2022-10-25 | Stop reason: HOSPADM

## 2022-10-25 RX ORDER — ACETAMINOPHEN 325 MG/1
975 TABLET ORAL ONCE
Status: COMPLETED | OUTPATIENT
Start: 2022-10-25 | End: 2022-10-25

## 2022-10-25 RX ORDER — HYDROMORPHONE HCL IN WATER/PF 6 MG/30 ML
0.2 PATIENT CONTROLLED ANALGESIA SYRINGE INTRAVENOUS
Status: DISCONTINUED | OUTPATIENT
Start: 2022-10-25 | End: 2022-10-27 | Stop reason: HOSPADM

## 2022-10-25 RX ORDER — PROPOFOL 10 MG/ML
INJECTION, EMULSION INTRAVENOUS PRN
Status: DISCONTINUED | OUTPATIENT
Start: 2022-10-25 | End: 2022-10-25

## 2022-10-25 RX ORDER — OXYCODONE HYDROCHLORIDE 5 MG/1
5 TABLET ORAL EVERY 4 HOURS PRN
Status: DISCONTINUED | OUTPATIENT
Start: 2022-10-25 | End: 2022-10-25 | Stop reason: HOSPADM

## 2022-10-25 RX ORDER — ONDANSETRON 2 MG/ML
4 INJECTION INTRAMUSCULAR; INTRAVENOUS EVERY 6 HOURS PRN
Status: DISCONTINUED | OUTPATIENT
Start: 2022-10-25 | End: 2022-10-27 | Stop reason: HOSPADM

## 2022-10-25 RX ORDER — ONDANSETRON 2 MG/ML
4 INJECTION INTRAMUSCULAR; INTRAVENOUS EVERY 30 MIN PRN
Status: DISCONTINUED | OUTPATIENT
Start: 2022-10-25 | End: 2022-10-25 | Stop reason: HOSPADM

## 2022-10-25 RX ORDER — ONDANSETRON 4 MG/1
4 TABLET, ORALLY DISINTEGRATING ORAL EVERY 30 MIN PRN
Status: DISCONTINUED | OUTPATIENT
Start: 2022-10-25 | End: 2022-10-25 | Stop reason: HOSPADM

## 2022-10-25 RX ORDER — CEFAZOLIN SODIUM/WATER 2 G/20 ML
2 SYRINGE (ML) INTRAVENOUS
Status: COMPLETED | OUTPATIENT
Start: 2022-10-25 | End: 2022-10-25

## 2022-10-25 RX ORDER — POLYETHYLENE GLYCOL 3350 17 G/17G
17 POWDER, FOR SOLUTION ORAL DAILY
Status: DISCONTINUED | OUTPATIENT
Start: 2022-10-26 | End: 2022-10-27 | Stop reason: HOSPADM

## 2022-10-25 RX ORDER — FENTANYL CITRATE 50 UG/ML
25 INJECTION, SOLUTION INTRAMUSCULAR; INTRAVENOUS EVERY 5 MIN PRN
Status: DISCONTINUED | OUTPATIENT
Start: 2022-10-25 | End: 2022-10-25 | Stop reason: HOSPADM

## 2022-10-25 RX ORDER — LIDOCAINE 40 MG/G
CREAM TOPICAL
Status: DISCONTINUED | OUTPATIENT
Start: 2022-10-25 | End: 2022-10-27 | Stop reason: HOSPADM

## 2022-10-25 RX ORDER — CEFAZOLIN SODIUM/WATER 2 G/20 ML
2 SYRINGE (ML) INTRAVENOUS SEE ADMIN INSTRUCTIONS
Status: DISCONTINUED | OUTPATIENT
Start: 2022-10-25 | End: 2022-10-25 | Stop reason: HOSPADM

## 2022-10-25 RX ADMIN — ONDANSETRON 4 MG: 2 INJECTION INTRAMUSCULAR; INTRAVENOUS at 13:49

## 2022-10-25 RX ADMIN — ACETAMINOPHEN 975 MG: 325 TABLET, FILM COATED ORAL at 12:11

## 2022-10-25 RX ADMIN — LIDOCAINE HYDROCHLORIDE 30 MG: 10 INJECTION, SOLUTION INFILTRATION; PERINEURAL at 13:49

## 2022-10-25 RX ADMIN — SODIUM CHLORIDE, POTASSIUM CHLORIDE, SODIUM LACTATE AND CALCIUM CHLORIDE: 600; 310; 30; 20 INJECTION, SOLUTION INTRAVENOUS at 12:07

## 2022-10-25 RX ADMIN — FENTANYL CITRATE 50 MCG: 50 INJECTION, SOLUTION INTRAMUSCULAR; INTRAVENOUS at 14:53

## 2022-10-25 RX ADMIN — DEXAMETHASONE SODIUM PHOSPHATE 10 MG: 10 INJECTION, SOLUTION INTRAMUSCULAR; INTRAVENOUS at 13:49

## 2022-10-25 RX ADMIN — FENTANYL CITRATE 50 MCG: 50 INJECTION, SOLUTION INTRAMUSCULAR; INTRAVENOUS at 14:01

## 2022-10-25 RX ADMIN — FENTANYL CITRATE 50 MCG: 50 INJECTION, SOLUTION INTRAMUSCULAR; INTRAVENOUS at 14:06

## 2022-10-25 RX ADMIN — PROPOFOL 40 MG: 10 INJECTION, EMULSION INTRAVENOUS at 14:04

## 2022-10-25 RX ADMIN — SODIUM CHLORIDE, POTASSIUM CHLORIDE, SODIUM LACTATE AND CALCIUM CHLORIDE: 600; 310; 30; 20 INJECTION, SOLUTION INTRAVENOUS at 17:24

## 2022-10-25 RX ADMIN — LISINOPRIL 10 MG: 5 TABLET ORAL at 18:08

## 2022-10-25 RX ADMIN — PROPOFOL 130 MG: 10 INJECTION, EMULSION INTRAVENOUS at 13:49

## 2022-10-25 RX ADMIN — ACETAMINOPHEN 975 MG: 325 TABLET, FILM COATED ORAL at 18:08

## 2022-10-25 RX ADMIN — SENNOSIDES AND DOCUSATE SODIUM 1 TABLET: 50; 8.6 TABLET ORAL at 20:27

## 2022-10-25 RX ADMIN — Medication 2 G: at 13:41

## 2022-10-25 RX ADMIN — FENTANYL CITRATE 50 MCG: 50 INJECTION, SOLUTION INTRAMUSCULAR; INTRAVENOUS at 13:49

## 2022-10-25 RX ADMIN — LATANOPROST 1 DROP: 50 SOLUTION OPHTHALMIC at 20:28

## 2022-10-25 ASSESSMENT — LIFESTYLE VARIABLES: TOBACCO_USE: 1

## 2022-10-25 ASSESSMENT — ACTIVITIES OF DAILY LIVING (ADL)
ADLS_ACUITY_SCORE: 22
ADLS_ACUITY_SCORE: 35
ADLS_ACUITY_SCORE: 22

## 2022-10-25 NOTE — OP NOTE
Date of Surgery: 10/25/22    Location of Service: [facility]    Pre-operative Diagnosis:  1. Left ureteral calculus    Post-operative Diagnosis:   1. Left ureteral calculus    Procedure:   1. Cystoscopy  2. Left ureteroscopy with laser lithotripsy    Surgeon: Bob Dozier MD    Anesthesia: General    Estimated Blood Loss: 20 mL    Complications: None    Drain: Bloom    Findings:   Normal urethra.  Significant trilobar hyperplasia.  Diffusely trabeculated bladder.  Severely impacted left ureteral stone at the level of the ureterovesical junction.  Unable to gain ureteral access.    Indication for Procedure:  This is an 87 year old male with a 1.1 cm left ureterovesical junction calculus. He presents today for definitive stone management.    Summary of Procedure:  After informed consent was obtained the patient was brought back to the operating room. They were laid in the supine position and anesthesia was induced. The patient was then positioned in the dorsal lithotomy position and prepared and draped in standard sterile fashion. Adriana-operative antibiotics were administered. A time out was performed.    The rigid cystoscope was introduced through the urethral meatus. The urethra was normal. I advanced down through the prostatic urethra and there was significant trilobar hyperplasia with intravesical protrusion. I entered the bladder. Panendoscopy revealed diffuse trabeculations. I set about trying to identify the ureteral orifices. I was never able to identify the right side, but the left was eventually visible with the assistance of the 70 degree lens. There was stone material  here. Fluoroscopy imaging revealed a large collection of stones in this location consistent with his known stone.    I then spent the next hour attempting to gain ureteral access. I attempted multiple times with a series wires ranging from a Bentson guidewire, to a Glidewire, and a hybrid wire. This was fraught with  failure. I then inserted a semi-rigid ureteroscope and attempted to place a wire in this fashion, but this also failed. I then attempted to fragment some of the stone to gain access. I did this for some time, but after the initial stone was unroofed, I could see the stone was completely and circumferentially impacted. I couldn't gain a safe enough angle given his massive prostatomegaly to continue fragmenting without wire access as I was fearful of injuring the ureter and causing a perforation. I continued to try to gain wire access through multiple views using a flexible cystoscopy, a rigid cystoscopy, and a semi-rigid ureteroscopy but this was not fruitful.    At this point I felt further instrumentation would not be effective. I made the decision to terminate the procedure at this point.    The bladder was drained. A 20 Khmer Coude catheter was inserted and 20 mL of sterile water placed in the balloon. This concluded the procedure.    They tolerated the procedure well. They were brought to the recovery room in stable condition.    He will be admitted to the hospital and a percutaneous nephrostomy tube will be placed by Interventional Radiology.    Bob Dozier MD

## 2022-10-25 NOTE — PHARMACY-ADMISSION MEDICATION HISTORY
Pharmacy Note - Admission Medication History    Pertinent Provider Information: None     ______________________________________________________________________    Prior To Admission (PTA) med list completed and updated in EMR.       PTA Med List   Medication Sig Last Dose     aspirin 81 mg chewable tablet Take 81 mg by mouth At Bedtime Past Week     bacitracin 500 UNIT/GM OINT Apply topically 3 times daily Apply to tip of penis while smith catheter is present. Unknown     dorzolamide (TRUSOPT) 2 % ophthalmic solution Place 1 drop into both eyes 2 times daily 10/24/2022     doxycycline hyclate (VIBRA-TABS) 100 MG tablet [DOXYCYCLINE (VIBRA-TABS) 100 MG TABLET] TAKE 1 TABLET BY MOUTH DAILY 10/24/2022 at am     GLUCOSAMINE/CHONDROITIN SULF A (GLUCOSAMINE-CHONDROITIN ORAL) Take 1 capsule by mouth daily Unknown     latanoprost (XALATAN) 0.005 % ophthalmic solution Place 1 drop into both eyes At Bedtime 10/24/2022 at pm     lisinopril (ZESTRIL) 10 MG tablet TAKE 1 TABLET DAILY 10/24/2022     metroNIDAZOLE (METROGEL) 1 % external gel APPLY A SMALL AMOUNT TO AFFECTED AREA(S) TOPICALLY ONCE DAILY Unknown     multivitamin, therapeutic (THERA-VIT) TABS tablet Take 1 tablet by mouth daily Past Week     tamsulosin (FLOMAX) 0.4 MG capsule TAKE 1 CAPSULE AT BEDTIME (Patient taking differently: Take 0.4 mg by mouth every morning) 10/24/2022 at am     triamcinolone (KENALOG) 0.1 % external cream Apply topically 2 times daily as needed for irritation Past Week       Information source(s): Patient and CareEverywhere/[facility]  Method of interview communication: in-person    Summary of Changes to PTA Med List  New: None  Discontinued: None  Changed: None    Patient was asked about OTC/herbal products specifically.  PTA med list reflects this.    In the past week, patient estimated taking medication this percent of the time:  greater than 90%.    Allergies were reviewed, assessed, and updated with the patient.      Patient did not  bring any medications to the hospital and can't retrieve from home. No multi-dose medications are available for use during hospital stay.     The information provided in this note is only as accurate as the sources available at the time of the update(s).    Thank you for the opportunity to participate in the care of this patient.    GLENNY ALEXANDRA [facility]  10/25/2022 5:46 PM

## 2022-10-25 NOTE — INTERVAL H&P NOTE
"I have reviewed the surgical (or preoperative) H&P that is linked to this encounter, and examined the patient. There are no significant changes    Clinical Conditions Present on Arrival:  Clinically Significant Risk Factors Present on Admission                   # Thrombocytopenia: Lowest platelets = 117 (Ref range: 150-450) in last 30 days, will monitor for bleeding  # Overweight: Estimated body mass index is 25.55 kg/m  as calculated from the following:    Height as of 10/20/22: 1.734 m (5' 8.25\").    Weight as of this encounter: 76.8 kg (169 lb 4.8 oz).       "

## 2022-10-25 NOTE — ANESTHESIA CARE TRANSFER NOTE
Patient: Mikaela Figueroa    Procedure: Procedure(s):  CYSTOSCOPY, LEFT URETEROSCOPY, LASER LITHOTRIPSY       Diagnosis: Calculus of kidney [N20.0]  Diagnosis Additional Information: No value filed.    Anesthesia Type:   General     Note:    Oropharynx: oropharynx clear of all foreign objects and spontaneously breathing  Level of Consciousness: awake and drowsy  Oxygen Supplementation: face mask  Level of Supplemental Oxygen (L/min / FiO2): 6  Independent Airway: airway patency satisfactory and stable  Dentition: dentition unchanged  Vital Signs Stable: post-procedure vital signs reviewed and stable  Report to RN Given: handoff report given  Patient transferred to: PACU    Handoff Report: Identifed the Patient, Identified the Reponsible Provider, Reviewed the pertinent medical history, Discussed the surgical course, Reviewed Intra-OP anesthesia mangement and issues during anesthesia, Set expectations for post-procedure period and Allowed opportunity for questions and acknowledgement of understanding      Vitals:  Vitals Value Taken Time   /74 10/25/22 1510   Temp 36.6  C (97.8  F) 10/25/22 1510   Pulse 60 10/25/22 1510   Resp 16 10/25/22 1510   SpO2 100 % 10/25/22 1510       Electronically Signed By: NAHUN Abbott CRNA  October 25, 2022  3:10 PM

## 2022-10-25 NOTE — ANESTHESIA PROCEDURE NOTES
Airway       Patient location during procedure: OR  Staff -        Anesthesiologist:  Juan Alberto Constantino MD       CRNA: Heather Zavala APRN CRNA       Performed By: CRNA  Consent for Airway        Urgency: elective  Indications and Patient Condition       Indications for airway management: arturo-procedural       Induction type:intravenous       Mask difficulty assessment: 0 - not attempted    Final Airway Details       Final airway type: supraglottic airway    Supraglottic Airway Details        Type: LMA       Brand: Ambu AuraGain       LMA size: 5    Post intubation assessment        Placement verified by: capnometry and chest rise        Number of attempts at approach: 1       Secured with: silk tape       Ease of procedure: easy       Dentition: Intact and Unchanged

## 2022-10-25 NOTE — ANESTHESIA POSTPROCEDURE EVALUATION
Patient: Mikaela Figueroa    Procedure: Procedure(s):  CYSTOSCOPY, LEFT URETEROSCOPY, LASER LITHOTRIPSY       Anesthesia Type:  General    Note:  Disposition: Inpatient   Postop Pain Control: Uneventful            Sign Out: Well controlled pain   PONV: No   Neuro/Psych: Uneventful            Sign Out: Acceptable/Baseline neuro status   Airway/Respiratory: Uneventful            Sign Out: Acceptable/Baseline resp. status   CV/Hemodynamics: Uneventful            Sign Out: Acceptable CV status; No obvious hypovolemia; No obvious fluid overload   Other NRE: NONE   DID A NON-ROUTINE EVENT OCCUR? No           Last vitals:  Vitals Value Taken Time   /65 10/25/22 1630   Temp 36.6  C (97.8  F) 10/25/22 1510   Pulse 63 10/25/22 1644   Resp 26 10/25/22 1639   SpO2 97 % 10/25/22 1644   Vitals shown include unvalidated device data.    Electronically Signed By: Juan Alberto Auguste MD  October 25, 2022  6:06 PM

## 2022-10-25 NOTE — ANESTHESIA PREPROCEDURE EVALUATION
Anesthesia Pre-Procedure Evaluation    Patient: Mikaela Figueroa   MRN: [id number] : 1935        Procedure : Procedure(s):  CYSTOSCOPY, LEFT URETEROSCOPY, LASER LITHOTRIPSY, LEFT URETERAL STENT EXCHANGE, LEFT RETROGRADE PYELOGRAM          Past Medical History:   Diagnosis Date     Hypertension      Pacemaker       Past Surgical History:   Procedure Laterality Date     APPENDECTOMY       ARTHROSCOPY KNEE Right      GENITOURINARY SURGERY        No Known Allergies   Social History     Tobacco Use     Smoking status: Former     Types: Pipe     Smokeless tobacco: Never   Substance Use Topics     Alcohol use: No      Wt Readings from Last 1 Encounters:   10/25/22 76.8 kg (169 lb 4.8 oz)        Anesthesia Evaluation   Pt has had prior anesthetic. Type: General.    History of anesthetic complications  - PONV.      ROS/MED HX  ENT/Pulmonary:     (+) tobacco use, Past use,     Neurologic:  - neg neurologic ROS     Cardiovascular:     (+) Dyslipidemia hypertension-----pacemaker,  (-) murmur   METS/Exercise Tolerance: >4 METS    Hematologic:  - neg hematologic  ROS     Musculoskeletal:  - neg musculoskeletal ROS     GI/Hepatic:     (+) hepatitis type B,     Renal/Genitourinary:     (+) renal disease,     Endo:  - neg endo ROS     Psychiatric/Substance Use:  - neg psychiatric ROS     Infectious Disease:  - neg infectious disease ROS     Malignancy:  - neg malignancy ROS     Other:  - neg other ROS          Physical Exam    Airway        Mallampati: II   TM distance: > 3 FB   Neck ROM: full   Mouth opening: > 3 cm    Respiratory Devices and Support         Dental  no notable dental history         Cardiovascular          Rhythm and rate: regular and normal (-) no murmur    Pulmonary           breath sounds clear to auscultation           OUTSIDE LABS:  CBC:   Lab Results   Component Value Date    WBC 10.3 10/20/2022    WBC 7.7 10/11/2022    HGB 10.8 (L) 10/20/2022    HGB 12.0 (L) 10/11/2022    HCT 32.7 (L) 10/20/2022     HCT 36.1 (L) 10/11/2022     (L) 10/20/2022     10/11/2022     BMP:   Lab Results   Component Value Date     10/20/2022     10/11/2022    POTASSIUM 4.2 10/20/2022    POTASSIUM 4.5 10/11/2022    CHLORIDE 108 (H) 10/20/2022    CHLORIDE 104 10/11/2022    CO2 25 10/20/2022    CO2 27 10/11/2022    BUN 24.3 (H) 10/20/2022    BUN 28 10/11/2022    CR 1.33 (H) 10/20/2022    CR 1.23 10/11/2022     (H) 10/20/2022     10/11/2022     COAGS:   Lab Results   Component Value Date    PTT 31 10/01/2022    INR 1.05 10/01/2022     POC: No results found for: BGM, HCG, HCGS  HEPATIC:   Lab Results   Component Value Date    ALBUMIN 3.7 10/01/2022    PROTTOTAL 7.4 10/01/2022    ALT 15 10/01/2022    AST 31 10/01/2022    ALKPHOS 105 10/01/2022    BILITOTAL 0.6 10/01/2022     OTHER:   Lab Results   Component Value Date    LACT 1.6 10/01/2022    A1C 5.7 (H) 10/20/2022    LIBERTAD 9.2 10/20/2022    MAG 2.0 10/01/2022    LIPASE 24 10/01/2022       Anesthesia Plan    ASA Status:  3   NPO Status:  NPO Appropriate    Anesthesia Type: General.     - Airway: LMA   Induction: Intravenous, Propofol.   Maintenance: Balanced.        Consents    Anesthesia Plan(s) and associated risks, benefits, and realistic alternatives discussed. Questions answered and patient/representative(s) expressed understanding.     - Discussed: Risks, Benefits and Alternatives for BOTH SEDATION and the PROCEDURE were discussed     - Discussed with:  Patient         Postoperative Care    Pain management: IV analgesics, Oral pain medications, Multi-modal analgesia.   PONV prophylaxis: Ondansetron (or other 5HT-3), Dexamethasone or Solumedrol     Comments:    Other Comments: Avoid midazolam 2/2 age and increased risk of postop delirium.  GA LMA.  Decadron and zofran for PONV ppx.            Juan Alberto Constantino MD

## 2022-10-26 ENCOUNTER — APPOINTMENT (OUTPATIENT)
Dept: INTERVENTIONAL RADIOLOGY/VASCULAR | Facility: HOSPITAL | Age: 87
DRG: 690 | End: 2022-10-26
Attending: NURSE PRACTITIONER
Payer: MEDICARE

## 2022-10-26 LAB
ALBUMIN UR-MCNC: 10 MG/DL
ANION GAP SERPL CALCULATED.3IONS-SCNC: 8 MMOL/L (ref 7–15)
APPEARANCE UR: ABNORMAL
BASOPHILS # BLD AUTO: 0 10E3/UL (ref 0–0.2)
BASOPHILS NFR BLD AUTO: 0 %
BILIRUB UR QL STRIP: NEGATIVE
BUN SERPL-MCNC: 32.6 MG/DL (ref 8–23)
CALCIUM SERPL-MCNC: 8.5 MG/DL (ref 8.8–10.2)
CHLORIDE SERPL-SCNC: 110 MMOL/L (ref 98–107)
COLOR UR AUTO: ABNORMAL
CREAT SERPL-MCNC: 1.41 MG/DL (ref 0.67–1.17)
DEPRECATED HCO3 PLAS-SCNC: 28 MMOL/L (ref 22–29)
EOSINOPHIL # BLD AUTO: 0 10E3/UL (ref 0–0.7)
EOSINOPHIL NFR BLD AUTO: 0 %
ERYTHROCYTE [DISTWIDTH] IN BLOOD BY AUTOMATED COUNT: 12.1 % (ref 10–15)
GFR SERPL CREATININE-BSD FRML MDRD: 49 ML/MIN/1.73M2
GLUCOSE SERPL-MCNC: 118 MG/DL (ref 70–99)
GLUCOSE UR STRIP-MCNC: NEGATIVE MG/DL
HCT VFR BLD AUTO: 28.9 % (ref 40–53)
HGB BLD-MCNC: 9.8 G/DL (ref 13.3–17.7)
HGB UR QL STRIP: ABNORMAL
IMM GRANULOCYTES # BLD: 0.1 10E3/UL
IMM GRANULOCYTES NFR BLD: 1 %
INR PPP: 1.33 (ref 0.85–1.15)
KETONES UR STRIP-MCNC: NEGATIVE MG/DL
LEUKOCYTE ESTERASE UR QL STRIP: ABNORMAL
LYMPHOCYTES # BLD AUTO: 0.7 10E3/UL (ref 0.8–5.3)
LYMPHOCYTES NFR BLD AUTO: 6 %
MCH RBC QN AUTO: 33.4 PG (ref 26.5–33)
MCHC RBC AUTO-ENTMCNC: 33.9 G/DL (ref 31.5–36.5)
MCV RBC AUTO: 99 FL (ref 78–100)
MONOCYTES # BLD AUTO: 0.8 10E3/UL (ref 0–1.3)
MONOCYTES NFR BLD AUTO: 7 %
MUCOUS THREADS #/AREA URNS LPF: PRESENT /LPF
NEUTROPHILS # BLD AUTO: 9.6 10E3/UL (ref 1.6–8.3)
NEUTROPHILS NFR BLD AUTO: 86 %
NITRATE UR QL: NEGATIVE
NRBC # BLD AUTO: 0 10E3/UL
NRBC BLD AUTO-RTO: 0 /100
PH UR STRIP: 5.5 [PH] (ref 5–7)
PLATELET # BLD AUTO: 153 10E3/UL (ref 150–450)
POTASSIUM SERPL-SCNC: 4.1 MMOL/L (ref 3.4–5.3)
RBC # BLD AUTO: 2.93 10E6/UL (ref 4.4–5.9)
RBC URINE: 50 /HPF
SODIUM SERPL-SCNC: 146 MMOL/L (ref 136–145)
SP GR UR STRIP: 1.01 (ref 1–1.03)
UROBILINOGEN UR STRIP-MCNC: <2 MG/DL
WBC # BLD AUTO: 11 10E3/UL (ref 4–11)
WBC URINE: 63 /HPF

## 2022-10-26 PROCEDURE — 250N000009 HC RX 250: Performed by: NURSE PRACTITIONER

## 2022-10-26 PROCEDURE — 80048 BASIC METABOLIC PNL TOTAL CA: CPT | Performed by: UROLOGY

## 2022-10-26 PROCEDURE — 81001 URINALYSIS AUTO W/SCOPE: CPT | Performed by: RADIOLOGY

## 2022-10-26 PROCEDURE — 36415 COLL VENOUS BLD VENIPUNCTURE: CPT | Performed by: UROLOGY

## 2022-10-26 PROCEDURE — 272N000492 HC NEEDLE CR1

## 2022-10-26 PROCEDURE — 50432 PLMT NEPHROSTOMY CATHETER: CPT

## 2022-10-26 PROCEDURE — 250N000011 HC RX IP 250 OP 636: Performed by: NURSE PRACTITIONER

## 2022-10-26 PROCEDURE — C1729 CATH, DRAINAGE: HCPCS

## 2022-10-26 PROCEDURE — 87086 URINE CULTURE/COLONY COUNT: CPT | Performed by: RADIOLOGY

## 2022-10-26 PROCEDURE — 250N000013 HC RX MED GY IP 250 OP 250 PS 637: Performed by: UROLOGY

## 2022-10-26 PROCEDURE — 120N000001 HC R&B MED SURG/OB

## 2022-10-26 PROCEDURE — C1769 GUIDE WIRE: HCPCS

## 2022-10-26 PROCEDURE — 999N000038 IR NEPHROSTOMY TUBE PLACEMENT LEFT

## 2022-10-26 PROCEDURE — 258N000003 HC RX IP 258 OP 636: Performed by: UROLOGY

## 2022-10-26 PROCEDURE — 85610 PROTHROMBIN TIME: CPT | Performed by: UROLOGY

## 2022-10-26 PROCEDURE — 0T9030Z DRAINAGE OF RIGHT KIDNEY WITH DRAINAGE DEVICE, PERCUTANEOUS APPROACH: ICD-10-PCS | Performed by: RADIOLOGY

## 2022-10-26 PROCEDURE — 255N000002 HC RX 255 OP 636: Performed by: UROLOGY

## 2022-10-26 PROCEDURE — 85025 COMPLETE CBC W/AUTO DIFF WBC: CPT | Performed by: UROLOGY

## 2022-10-26 RX ORDER — NALOXONE HYDROCHLORIDE 0.4 MG/ML
0.2 INJECTION, SOLUTION INTRAMUSCULAR; INTRAVENOUS; SUBCUTANEOUS
Status: DISCONTINUED | OUTPATIENT
Start: 2022-10-26 | End: 2022-10-26

## 2022-10-26 RX ORDER — FLUMAZENIL 0.1 MG/ML
0.2 INJECTION, SOLUTION INTRAVENOUS
Status: DISCONTINUED | OUTPATIENT
Start: 2022-10-26 | End: 2022-10-26

## 2022-10-26 RX ORDER — NALOXONE HYDROCHLORIDE 0.4 MG/ML
0.4 INJECTION, SOLUTION INTRAMUSCULAR; INTRAVENOUS; SUBCUTANEOUS
Status: DISCONTINUED | OUTPATIENT
Start: 2022-10-26 | End: 2022-10-26

## 2022-10-26 RX ORDER — CEFTRIAXONE 2 G/1
2 INJECTION, POWDER, FOR SOLUTION INTRAMUSCULAR; INTRAVENOUS EVERY 24 HOURS
Status: DISCONTINUED | OUTPATIENT
Start: 2022-10-26 | End: 2022-10-27 | Stop reason: HOSPADM

## 2022-10-26 RX ORDER — FENTANYL CITRATE 50 UG/ML
25-50 INJECTION, SOLUTION INTRAMUSCULAR; INTRAVENOUS EVERY 5 MIN PRN
Status: DISCONTINUED | OUTPATIENT
Start: 2022-10-26 | End: 2022-10-26

## 2022-10-26 RX ADMIN — TAMSULOSIN HYDROCHLORIDE 0.4 MG: 0.4 CAPSULE ORAL at 17:20

## 2022-10-26 RX ADMIN — LISINOPRIL 10 MG: 5 TABLET ORAL at 09:24

## 2022-10-26 RX ADMIN — SODIUM CHLORIDE, POTASSIUM CHLORIDE, SODIUM LACTATE AND CALCIUM CHLORIDE: 600; 310; 30; 20 INJECTION, SOLUTION INTRAVENOUS at 07:34

## 2022-10-26 RX ADMIN — SODIUM CHLORIDE, POTASSIUM CHLORIDE, SODIUM LACTATE AND CALCIUM CHLORIDE: 600; 310; 30; 20 INJECTION, SOLUTION INTRAVENOUS at 22:47

## 2022-10-26 RX ADMIN — ACETAMINOPHEN 975 MG: 325 TABLET, FILM COATED ORAL at 19:13

## 2022-10-26 RX ADMIN — CEFTRIAXONE SODIUM 2 G: 2 INJECTION, POWDER, FOR SOLUTION INTRAMUSCULAR; INTRAVENOUS at 15:21

## 2022-10-26 RX ADMIN — LATANOPROST 1 DROP: 50 SOLUTION OPHTHALMIC at 21:47

## 2022-10-26 RX ADMIN — FENTANYL CITRATE 25 MCG: 50 INJECTION, SOLUTION INTRAMUSCULAR; INTRAVENOUS at 15:13

## 2022-10-26 RX ADMIN — FENTANYL CITRATE 25 MCG: 50 INJECTION, SOLUTION INTRAMUSCULAR; INTRAVENOUS at 15:16

## 2022-10-26 RX ADMIN — MIDAZOLAM 0.5 MG: 1 INJECTION INTRAMUSCULAR; INTRAVENOUS at 15:10

## 2022-10-26 RX ADMIN — IOHEXOL 10 ML: 350 INJECTION, SOLUTION INTRAVENOUS at 15:29

## 2022-10-26 RX ADMIN — LIDOCAINE HYDROCHLORIDE 15 ML: 10 INJECTION, SOLUTION INFILTRATION; PERINEURAL at 15:24

## 2022-10-26 RX ADMIN — SENNOSIDES AND DOCUSATE SODIUM 1 TABLET: 50; 8.6 TABLET ORAL at 21:47

## 2022-10-26 ASSESSMENT — ACTIVITIES OF DAILY LIVING (ADL)
ADLS_ACUITY_SCORE: 22
ADLS_ACUITY_SCORE: 22
DEPENDENT_IADLS:: INDEPENDENT
ADLS_ACUITY_SCORE: 22

## 2022-10-26 NOTE — PLAN OF CARE
Problem: Plan of Care - These are the overarching goals to be used throughout the patient stay.    Goal: Plan of Care Review  Description: The Plan of Care Review/Shift note should be completed every shift.  The Outcome Evaluation is a brief statement about your assessment that the patient is improving, declining, or no change.  This information will be displayed automatically on your shift note.  Outcome: Progressing   Goal Outcome Evaluation:  Pt remained npo all shift. Left for IR at 1430 to have a nephrostomy tube placed. Bloom catheter draining watermelon color urine. Pt denies pain or discomfort. Plan to stay in the hospital tonight and discharge tomorrow.

## 2022-10-26 NOTE — CONSULTS
Care Management Initial Consult    General Information  Assessment completed with: Patient, Patient  Type of CM/SW Visit: Initial Assessment    Primary Care Provider verified and updated as needed: Yes   Readmission within the last 30 days: no previous admission in last 30 days         Advance Care Planning:            Communication Assessment  Patient's communication style: spoken language (English or Bilingual)    Hearing Difficulty or Deaf: yes   Wear Glasses or Blind: yes    Cognitive  Cognitive/Neuro/Behavioral: WDL                      Living Environment:   People in home: spouse  Shanell  Current living Arrangements: house      Able to return to prior arrangements: yes       Family/Social Support:  Care provided by: self  Provides care for: no one  Marital Status:   Wife, Children  Shanell       Description of Support System: Supportive         Current Resources:   Patient receiving home care services: No     Community Resources:    Equipment currently used at home: none  Supplies currently used at home:      Employment/Financial:  Employment Status: retired, , previous service     Employment/ Comments: Served as active duty for 33 yrs, [employer] [place]  Financial Concerns:             Lifestyle & Psychosocial Needs:  Social Determinants of Health     Tobacco Use: Medium Risk     Smoking Tobacco Use: Former     Smokeless Tobacco Use: Never     Passive Exposure: Not on file   Alcohol Use: Not on file   Financial Resource Strain: Not on file   Food Insecurity: Not on file   Transportation Needs: Not on file   Physical Activity: Not on file   Stress: Not on file   Social Connections: Not on file   Intimate Partner Violence: Not on file   Depression: Not at risk     PHQ-2 Score: 0   Housing Stability: Not on file       Functional Status:  Prior to admission patient needed assistance:   Dependent ADLs:: Independent  Dependent IADLs:: Independent       Mental Health Status:          Chemical  Dependency Status:                Values/Beliefs:  Spiritual, Cultural Beliefs, Mu-ism Practices, Values that affect care:                 Additional Information:  Assessed. Lives at home with wife Alexey Britt at baseline. No svcs prior. No mobility aides. Both children are supportive,and live near by. Patient is still driving. 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 Family will transport.     Awaiting therapy recs.     CM will continue to follow plan of care, review recommendations, and assist with any discharge needs anticipated.     Orin Harrell RN

## 2022-10-26 NOTE — PROGRESS NOTES
Pt returned to room 228.  Pt awake and able to transfer with SBA to bed.  Denies any pain.  Handoff given to Esme LEMUS.

## 2022-10-26 NOTE — PLAN OF CARE
Goal Outcome Evaluation:  1700.... Pt came from PACU and was admitted on the unit after an unsuccessful procedure, he was scheduled to have had Cystoscopy, Left Ureteroscopy with Laser Lithotripsy. Pt will have percutaneous nephrostomy tube place by IR tomorrow, NPO maintained except for meds. Pt is alert and oriented x4, smith cath is patent, output has been charted. Will continue to monitor.

## 2022-10-26 NOTE — CONSULTS
Interventional Radiology - Pre-Procedure Note:  10/26/2022    Procedure Requested: left nephrostomy tube placement  Requested by: Dr Bob Dozier    History and Physical Reviewed: H&P documented within 30 days (by Derek Kumar MD on 10/20/2022).  I have personally reviewed the patient's medical history and have updated the medical record as necessary.    Past Medical History:   Diagnosis Date     Hypertension      Pacemaker      Past Surgical History:   Procedure Laterality Date     APPENDECTOMY       ARTHROSCOPY KNEE Right      GENITOURINARY SURGERY         Brief HPI: Mikaela Figueroa is a 87 year old male HTN, nephrolithias with hydronephrosis, recent covid, BPH with urinary obstruction, cardiac pacemaker, anemia, and impaired fasting glucose presented to [place] for Cystoscopy, Left ureteroscopy with laser lithotripsy with Dr. Dozier. He was unable to gain ureteral access due impacted stone. IR was consulted for left nephrostomy tube placement.         IMAGING:  EXAM: US RENAL COMPLETE  LOCATION: [facility]  DATE/TIME: 10/4/2022 1:00 PM     INDICATION: bilateral hydroureteronephrosis on CT 10/2, evaluate for residual s p smith placement for 1 liter retention 10/1. Also has left UVJ stone.  COMPARISON: CT 10/02/2022  TECHNIQUE: Routine Bilateral Renal and Bladder Ultrasound.     FINDINGS:     RIGHT KIDNEY: 11.5 x 6.3 x 6.3 cm. Severe hydronephrosis. Mid renal 4.6 cm simple cyst which does not require further evaluation.     LEFT KIDNEY: 12.3 x 6 x 5.9 cm. Severe hydronephrosis.      BLADDER: It contains a Smith. There is wall thickening up to 1 cm.                                                                      IMPRESSION:  1.  Persistent severe bilateral hydronephrosis.  2.  Diffuse bladder wall thickening.    NPO: yes since [place]  ANTICOAGULANTS: None  ANTIBIOTICS: ceftriaxone 2gm pre procedure    ALLERGIES  No Known Allergies      LABS:  INR   Date Value Ref Range Status    10/26/2022 1.33 (H) 0.85 - 1.15 Final      Hemoglobin   Date Value Ref Range Status   10/26/2022 9.8 (L) 13.3 - 17.7 g/dL Final   ]  Platelet Count   Date Value Ref Range Status   10/26/2022 153 150 - 450 10e3/uL Final     Creatinine   Date Value Ref Range Status   10/26/2022 1.41 (H) 0.67 - 1.17 mg/dL Final     Potassium   Date Value Ref Range Status   10/26/2022 4.1 3.4 - 5.3 mmol/L Final   10/11/2022 4.5 3.5 - 5.0 mmol/L Final         EXAM:  /60 (BP Location: Left arm)   Pulse 75   Temp 97.8  F (36.6  C) (Oral)   Resp 20   Wt 76.8 kg (169 lb 4.8 oz)   SpO2 95%   BMI 25.55 kg/m    General:  Stable.  In no acute distress.    Neuro:  A&O x 3. Moves all extremities equally.  Resp:  Lungs clear to auscultation bilaterally.  Cardio:  S1S2 and reg, without murmur, clicks or rubs  Abdomen:  Soft, non-distended, non-tender, positive bowel sounds.  Skin:  Without excoriations, ecchymosis, erythema, lesions or open sores on left abdomen, back.  MSK:  No gross motor weakness.  Sensation intact.  Proprioception intact.    Pre-Sedation Assessment:  Mallampati Airway Classification:  II - Faucial pillars and soft palate may be seen, but uvula is masked by the base of the tongue  Previous reaction to anesthesia/sedation:  No  Sedation plan based on assessment: Moderate (conscious) sedation  ASA Classification: Class 3 - SEVERE SYSTEMIC DISEASE, DEFINITE FUNCTIONAL LIMITATIONS.   Code Status: Full Code intra procedure, per discussion with patient.       ASSESSMENT/PLAN:   This is an 87 year old male with a 1.1 cm left ureterovesical junction calculus, hydronephrosis. Urology was unable to gain ureteral acess and Ir was consulted for Left nephrostomy tube placement    NPO  Consented  Plan for nephrostomy tube placement    Procedure, risks/benefits, details, alternatives, and sedation reviewed with patient and he verbalized understanding. All questions answered. OK to proceed with above radiology procedure.     KIRSTEN  NAHUN BARR CNP  Interventional Radiology

## 2022-10-26 NOTE — PRE-PROCEDURE
GENERAL PRE-PROCEDURE:   Procedure:  Left nephrostomy tube placement  Date/Time:  10/26/2022 8:42 AM    Written consent obtained?: Yes    Risks and benefits: Risks, benefits and alternatives were discussed    Consent given by:  Patient  Patient states understanding of procedure being performed: Yes    Patient's understanding of procedure matches consent: Yes    Procedure consent matches procedure scheduled: Yes    Expected level of sedation:  Moderate  Appropriately NPO:  Yes  ASA Class:  3  Mallampati  :  Grade 2- soft palate, base of uvula, tonsillar pillars, and portion of posterior pharyngeal wall visible  Lungs:  Lungs clear with good breath sounds bilaterally  Heart:  Normal heart sounds and rate  History & Physical reviewed:  History and physical reviewed and no updates needed  Statement of review:  I have reviewed the lab findings, diagnostic data, medications, and the plan for sedation

## 2022-10-26 NOTE — PROGRESS NOTES
"  [facility] - POSTOP PROGRESS NOTE    PLACE OF SERVICE:  [facility]     SURGERY: POD #1 after cystoscopy, failed left ureteroscopy attempt by Dr Bob Dozier for left ureteral calculus     SUBJECTIVE:   Events: no acute events overnight     Pt reports he is feeling fine today. Denies abdominal, bilateral flank and penile/scrotal pain today. No problems with smith catheter. Denies fever, chills, N/V, SOB/chest pain. NPO since [place] for left PNT placement today.     Pt reports prior to admission and beginning right after outpatient TOV, he experienced weak urine stream and difficulty fully emptying his bladder. States he had urinary urgency and had to void every 2 hours, even though the night. Also had to strain to empty urine. Pt was taking tamsulosin 0.4 mg po daily prior to admission. Dr. Dozier's OP note reported significant trilobar hyperplasia with intravesical protrusion.     OBJECTIVE:  PHYSICAL EXAM  Vital signs:  Temp: 97.8  F (36.6  C) Temp src: Oral BP: 135/60 Pulse: 75   Resp: 20 SpO2: 95 % O2 Device: None (Room air) Oxygen Delivery: 8 LPM   Weight: 76.8 kg (169 lb 4.8 oz)  Estimated body mass index is 25.55 kg/m  as calculated from the following:    Height as of 10/20/22: 1.734 m (5' 8.25\").    Weight as of this encounter: 76.8 kg (169 lb 4.8 oz).    General: NAD, alert, cooperative  Abdomen: soft, non-tender, non-distended, no bilateral flank pain.  : smith draining camila urine with faint pink tinge, adequate output.     LABS:  INR   Date Value Ref Range Status   10/26/2022 1.33 (H) 0.85 - 1.15 Final      Lab Results   Component Value Date    WBC 11.0 10/26/2022     Lab Results   Component Value Date    HGB 9.8 10/26/2022     Lab Results   Component Value Date     10/26/2022     Creatinine   Date Value Ref Range Status   10/26/2022 1.41 (H) 0.67 - 1.17 mg/dL Final     Sodium   Date Value Ref Range Status   10/26/2022 146 (H) 136 - 145 mmol/L Final     Potassium   Date Value Ref Range " Status   10/26/2022 4.1 3.4 - 5.3 mmol/L Final   10/11/2022 4.5 3.5 - 5.0 mmol/L Final     Magnesium   Date Value Ref Range Status   10/01/2022 2.0 1.8 - 2.6 mg/dL Final     Lab Results: personally reviewed.     ASSESSMENT/PLAN: POD #1 after cystoscopy, failed left ureteroscopy attempt by Dr Bob Dozier for left ureteral calculus.   Significant trilobar prostate hyperplasia with intravesical protrusion noted on cysto 10/25, likely etiology for recent UR/difficulty voiding.     - Diet - NPO for now. Following PNT placement, resume regular diet as tolerated.   - Catheter - Maintain at discharge, will need follow up with Dr. Dozier to discuss possible surgical options vs chronic catheter. Continue tamsulosin 0.4 mg po daily. RNs to teach smith catheter cares, including exchanging night and leg bags.   - Labs ordered - Hgb and BMP in AM.   - Activity - encourage ambulation and incentive spirometer   - DVT prophylaxis: ambulation, SCDs  - Anticipated discharge: 10/27/22.       Discussed patient with Dr Bob Hernandez, APRN, CNP  [facility]   [phone]

## 2022-10-26 NOTE — PLAN OF CARE
Problem: Pain Acute  Goal: Optimal Pain Control and Function  Intervention: Prevent or Manage Pain  Recent Flowsheet Documentation  Taken 10/26/2022 0110 by José Luis Green, RN  Medication Review/Management: medications reviewed     Patient denies pain overnight. NPO since midnight prior to IR procedure. Slept well throughout the night. Bloom outputting red urine.    José Luis Green, RN

## 2022-10-27 ENCOUNTER — HOSPITAL ENCOUNTER (EMERGENCY)
Facility: CLINIC | Age: 87
Discharge: HOME | End: 2022-10-28
Attending: EMERGENCY MEDICINE | Admitting: EMERGENCY MEDICINE
Payer: MEDICARE

## 2022-10-27 VITALS
OXYGEN SATURATION: 97 % | SYSTOLIC BLOOD PRESSURE: 175 MMHG | TEMPERATURE: 98.3 F | RESPIRATION RATE: 18 BRPM | WEIGHT: 169.3 LBS | BODY MASS INDEX: 25.55 KG/M2 | HEART RATE: 64 BPM | DIASTOLIC BLOOD PRESSURE: 82 MMHG

## 2022-10-27 DIAGNOSIS — R31.9 HEMATURIA, UNSPECIFIED TYPE: ICD-10-CM

## 2022-10-27 DIAGNOSIS — Q62.11 HYDRONEPHROSIS WITH URETEROPELVIC JUNCTION (UPJ) OBSTRUCTION: Primary | ICD-10-CM

## 2022-10-27 PROBLEM — N13.30 HYDRONEPHROSIS: Status: ACTIVE | Noted: 2022-10-27

## 2022-10-27 LAB
ANION GAP SERPL CALCULATED.3IONS-SCNC: 10 MMOL/L (ref 7–15)
BUN SERPL-MCNC: 34.2 MG/DL (ref 8–23)
CALCIUM SERPL-MCNC: 8.5 MG/DL (ref 8.8–10.2)
CHLORIDE SERPL-SCNC: 107 MMOL/L (ref 98–107)
CREAT SERPL-MCNC: 1.3 MG/DL (ref 0.67–1.17)
DEPRECATED HCO3 PLAS-SCNC: 26 MMOL/L (ref 22–29)
GFR SERPL CREATININE-BSD FRML MDRD: 54 ML/MIN/1.73M2
GLUCOSE BLDC GLUCOMTR-MCNC: 88 MG/DL (ref 70–99)
GLUCOSE SERPL-MCNC: 93 MG/DL (ref 70–99)
HGB BLD-MCNC: 10.6 G/DL (ref 13.3–17.7)
POTASSIUM SERPL-SCNC: 3.5 MMOL/L (ref 3.4–5.3)
SODIUM SERPL-SCNC: 143 MMOL/L (ref 136–145)

## 2022-10-27 PROCEDURE — 36415 COLL VENOUS BLD VENIPUNCTURE: CPT | Performed by: NURSE PRACTITIONER

## 2022-10-27 PROCEDURE — 85018 HEMOGLOBIN: CPT | Performed by: NURSE PRACTITIONER

## 2022-10-27 PROCEDURE — 81001 URINALYSIS AUTO W/SCOPE: CPT | Performed by: EMERGENCY MEDICINE

## 2022-10-27 PROCEDURE — 99283 EMERGENCY DEPT VISIT LOW MDM: CPT

## 2022-10-27 PROCEDURE — 80048 BASIC METABOLIC PNL TOTAL CA: CPT | Performed by: NURSE PRACTITIONER

## 2022-10-27 PROCEDURE — 87086 URINE CULTURE/COLONY COUNT: CPT | Performed by: EMERGENCY MEDICINE

## 2022-10-27 PROCEDURE — 250N000013 HC RX MED GY IP 250 OP 250 PS 637: Performed by: UROLOGY

## 2022-10-27 RX ORDER — ASPIRIN 81 MG/1
81 TABLET, CHEWABLE ORAL AT BEDTIME
Status: ON HOLD | COMMUNITY
Start: 2022-11-10 | End: 2023-08-28

## 2022-10-27 RX ORDER — CEFDINIR 300 MG/1
300 CAPSULE ORAL 2 TIMES DAILY
Qty: 20 CAPSULE | Refills: 0 | Status: SHIPPED | OUTPATIENT
Start: 2022-10-27 | End: 2022-11-06

## 2022-10-27 RX ORDER — MULTIVIT-MIN/IRON/FOLIC/HRB186 3.3 MG-25
1 TABLET ORAL DAILY
COMMUNITY
Start: 2022-11-03 | End: 2024-05-19

## 2022-10-27 RX ORDER — ACETAMINOPHEN 325 MG/1
325-650 TABLET ORAL EVERY 4 HOURS PRN
COMMUNITY
Start: 2022-10-28

## 2022-10-27 RX ORDER — DOXYCYCLINE HYCLATE 100 MG
TABLET ORAL
Qty: 90 TABLET | Refills: 3 | Status: SHIPPED | OUTPATIENT
Start: 2022-11-06 | End: 2023-07-02

## 2022-10-27 RX ADMIN — SENNOSIDES AND DOCUSATE SODIUM 1 TABLET: 50; 8.6 TABLET ORAL at 08:33

## 2022-10-27 RX ADMIN — ACETAMINOPHEN 975 MG: 325 TABLET, FILM COATED ORAL at 09:27

## 2022-10-27 RX ADMIN — LISINOPRIL 10 MG: 5 TABLET ORAL at 08:32

## 2022-10-27 RX ADMIN — TAMSULOSIN HYDROCHLORIDE 0.4 MG: 0.4 CAPSULE ORAL at 08:33

## 2022-10-27 RX ADMIN — POLYETHYLENE GLYCOL 3350 17 G: 17 POWDER, FOR SOLUTION ORAL at 08:30

## 2022-10-27 RX ADMIN — ACETAMINOPHEN 975 MG: 325 TABLET, FILM COATED ORAL at 02:39

## 2022-10-27 ASSESSMENT — ACTIVITIES OF DAILY LIVING (ADL)
ADLS_ACUITY_SCORE: 22

## 2022-10-27 NOTE — PLAN OF CARE
Problem: Pain Acute  Goal: Optimal Pain Control and Function  Intervention: Prevent or Manage Pain  Recent Flowsheet Documentation  Taken 10/27/2022 0837 by Liam Lee RN  Medication Review/Management: medications reviewed     Problem: Plan of Care - These are the overarching goals to be used throughout the patient stay.    Goal: Plan of Care Review  Description: The Plan of Care Review/Shift note should be completed every shift.  The Outcome Evaluation is a brief statement about your assessment that the patient is improving, declining, or no change.  This information will be displayed automatically on your shift note.  Outcome: Progressing  Flowsheets (Taken 10/27/2022 1029)  Plan of Care Reviewed With: patient   Goal Outcome Evaluation:      Plan of Care Reviewed With: patient     Patient is A & O x 4, pleasant, calm, controlled and bright affect.  Denied pain.  Denied nausea/vomiting.  Bloom catheter is patent and draining.  Denies SOB.  Denies lightheadedness.    BP (!) 175/82 (BP Location: Right arm)   Pulse 64   Temp 98.3  F (36.8  C) (Oral)   Resp 18   Wt 76.8 kg (169 lb 4.8 oz)   SpO2 97%   BMI 25.55 kg/m

## 2022-10-27 NOTE — PROGRESS NOTES
SW received call from [facility]. Pt has been open to home RN services. Will need resumption orders at time of discharge. MD paged.

## 2022-10-27 NOTE — DISCHARGE SUMMARY
[facility] DISCHARGE SUMMARY    Name: Mikaela Figueroa  : 1935  MRN: [id number]  PCP: Derek Kumar    Place of service: [facility]    Admission date: 10/25/2022   Discharge date: 10/27/2022     Surgeon: Dr Bob Dozier     Surgical Procedure:  Cystoscopy, failed left ureteroscopy attempt by Dr Bob Dozier for left ureteral calculus     Date of surgery: 10/25/22    Principal Diagnosis at Discharge:   Calculus of kidney [N20.0]  Ureteral calculus [N20.1]     Other diagnosis addressed during hospitalization:  UTI  Left hydronephrosis     Consults:  Interventional Radiology    Diagnostic Studies/Procedures :  [facility]  LOCATION: [facility]  DATE: 10/26/2022     PROCEDURE: PERCUTANEOUS ANTEGRADE PYELOGRAM AND PERCUTANEOUS NEPHROSTOMY PLACEMENT     ATTENDING: Morro Dawn MD.     INDICATION: 87-year-old male with a distal left ureteral calculus and hydronephrosis. Unsuccessful retrograde stent placement. A nephrostomy is requested.     CONSENT: The risks, benefits, and alternatives of antegrade pyelography and percutaneous nephrostomy tube placement  were discussed with the patient in detail. All questions were answered. Informed consent was given to proceed with the procedure.     MODERATE SEDATION: Versed 0.5 mg IV; Fentanyl 50 mcg IV.  During the timeout, immediately prior to the administration of medications, the patient was reassessed for adequacy to receive conscious sedation. Under physician supervision, Versed and fentanyl   were administered for moderate sedation. Pulse oximetry, heart rate and blood pressure were continuously monitored by an independent trained observer. The physician spent 15 minutes of face-to-face sedation time with the patient.     ADDITIONAL MEDICATIONS: Rocephin 2 grams  IV.     FLUOROSCOPIC TIME: 0.4 minutes.     AIR KERMA:  60 mGy.     CONTRAST: 10 mL Omnipaque into the collecting system.     UNIVERSAL PRECAUTIONS: The  procedure was performed utilizing maximum sterile barrier technique. Prior to the start of the procedure, a standard pause for patient safety was performed with site marking as indicated.     COMPLICATIONS: No immediate complications.     PROCEDURE:    Using real-time sonographic guidance, an 18 gauge trocar needle was inserted into the left renal collecting system from a subcostal approach.  An antegrade pyelogram was performed. The tract was dilated over a wire and exchange was made for a  10 Prydeinig   nephrostomy tube.  The look was formed within the renal pelvis. A post placement nephrostogram was performed. The catheter was sutured to the skin and placed to gravity bag drainage.     FINDINGS:  Ultrasound demonstrates marked left renal hydronephrosis. A permanent image was stored.     The antegrade nephrostogram confirms severe left renal hydronephrosis.     The completion image shows the nephrostomy with loop in the renal pelvis. This controls the urinary system well.                                                                      IMPRESSION:    1.  Successful right percutaneous nephrostomy placement, as detailed above.  2.  A urine specimen was sent for microbiology analysis.    Complications while in the Hospital:  None     Physical Exam:  Temp: 98.3  F (36.8  C) Temp src: Oral BP: (!) 175/82 Pulse: 64   Resp: 18 SpO2: 97 % O2 Device: None (Room air)      Gen: nad, alert  Neuro: A&O x 3. Moving all extremities equally.   Resp: Reg resp rate/depth.   Abdomen: Soft, non-tender, non-distended. No bilateral CVA tenderness. Left PNT exit site is dry, without erythema or edema, PNT suture intact, PNT draining adequate amount of clear light orange tinged urine.   : Penis and scrotum without erythema or edema. Bloom draining adequate amount of clear yellow urine with small amount of dark red sediment in tubing.   LE: CWMS intact. No bilateral LE edema.     Brief history of hospital course:  This is a 87 year  old male admitted for Calculus of kidney [N20.0]  Ureteral calculus [N20.1]. Patient underwent above procedures. Patient tolerated the procedures well. Post operative course was unremarkable. By the day of discharge, the patient was ambulatory, tolerating diet, pain was controlled with oral analgesics, labs and vitals stable, passing flatus. Discharged home with smith and left PNT.    Labs:  Hemoglobin   Date Value Ref Range Status   10/27/2022 10.6 (L) 13.3 - 17.7 g/dL Final     Platelet Count   Date Value Ref Range Status   10/26/2022 153 150 - 450 10e3/uL Final     WBC Count   Date Value Ref Range Status   10/26/2022 11.0 4.0 - 11.0 10e3/uL Final     Creatinine   Date Value Ref Range Status   10/27/2022 1.30 (H) 0.67 - 1.17 mg/dL Final     Potassium   Date Value Ref Range Status   10/27/2022 3.5 3.4 - 5.3 mmol/L Final   10/11/2022 4.5 3.5 - 5.0 mmol/L Final     INR   Date Value Ref Range Status   10/26/2022 1.33 (H) 0.85 - 1.15 Final        Pending Labs:  Surgical Pathology    DISPOSITION: Home    DISCHARGE CONDITION: Good/Stable    DISCHARGE MEDICATIONS:      Review of your medicines      START taking      Dose / Directions   acetaminophen 325 MG tablet  Commonly known as: TYLENOL      Dose: 325-650 mg  Start taking on: October 28, 2022  Take 1-2 tablets (325-650 mg) by mouth every 4 hours as needed for other or mild pain (For optimal non-opioid multimodal pain management to improve pain control.)  Refills: 0     cefdinir 300 MG capsule  Commonly known as: OMNICEF  Used for: Urinary tract infection without hematuria, site unspecified      Dose: 300 mg  Take 1 capsule (300 mg) by mouth 2 times daily for 10 days  Quantity: 20 capsule  Refills: 0        CONTINUE these medicines which may have CHANGED, or have new prescriptions. If we are uncertain of the size of tablets/capsules you have at home, strength may be listed as something that might have changed.      Dose / Directions   aspirin 81 MG chewable  tablet  Commonly known as: ASA  This may have changed: These instructions start on November 10, 2022. If you are unsure what to do until then, ask your doctor or other care provider.      Dose: 81 mg  Start taking on: November 10, 2022  Take 1 tablet (81 mg) by mouth At Bedtime  Refills: 0     doxycycline hyclate 100 MG tablet  Commonly known as: VIBRA-TABS  This may have changed: These instructions start on November 6, 2022. If you are unsure what to do until then, ask your doctor or other care provider.  Used for: Rosacea      Start taking on: November 6, 2022  [DOXYCYCLINE (VIBRA-TABS) 100 MG TABLET] TAKE 1 TABLET BY MOUTH DAILY  Quantity: 90 tablet  Refills: 3     Glucosamine-Chondroitin 250-200 MG Caps  This may have changed:     medication strength    These instructions start on November 3, 2022. If you are unsure what to do until then, ask your doctor or other care provider.      Dose: 1 capsule  Start taking on: November 3, 2022  Take 1 capsule by mouth daily  Refills: 0     tamsulosin 0.4 MG capsule  Commonly known as: FLOMAX  This may have changed: when to take this  Used for: BPH (benign prostatic hyperplasia)      TAKE 1 CAPSULE AT BEDTIME  Quantity: 90 capsule  Refills: 3        CONTINUE these medicines which have NOT CHANGED      Dose / Directions   bacitracin 500 UNIT/GM Oint  Used for: Urinary retention      Apply topically 3 times daily Apply to tip of penis while smith catheter is present.  Refills: 0     dorzolamide 2 % ophthalmic solution  Commonly known as: TRUSOPT      Dose: 1 drop  Place 1 drop into both eyes 2 times daily  Refills: 0     latanoprost 0.005 % ophthalmic solution  Commonly known as: XALATAN      Dose: 1 drop  Place 1 drop into both eyes At Bedtime  Refills: 0     lisinopril 10 MG tablet  Commonly known as: ZESTRIL  Used for: Essential hypertension      TAKE 1 TABLET DAILY  Quantity: 90 tablet  Refills: 3     metroNIDAZOLE 1 % external gel  Commonly known as: METROGEL  Used for:  Rosacea      APPLY A SMALL AMOUNT TO AFFECTED AREA(S) TOPICALLY ONCE DAILY  Quantity: 60 g  Refills: 11     multivitamin, therapeutic Tabs tablet      Dose: 1 tablet  Take 1 tablet by mouth daily  Refills: 0     triamcinolone 0.1 % external cream  Commonly known as: KENALOG      Apply topically 2 times daily as needed for irritation  Refills: 0           Where to get your medicines      These medications were sent to [place] [id number] [facility]  [address] [place] [address], [facility] [address]    Phone: [phone]     cefdinir 300 MG capsule    doxycycline hyclate 100 MG tablet     Some of these will need a paper prescription and others can be bought over the counter. Ask your nurse if you have questions.    You don't need a prescription for these medications    acetaminophen 325 MG tablet    aspirin 81 MG chewable tablet    Glucosamine-Chondroitin 250-200 MG Caps         DISCHARGE PLAN:   - [facility] will call you to arrange nephrostogram with antegrade ureteral stent placement next week. The nephrostomy tube should be maintained after stent is placed (it can be capped). After your IR procedure, please call [facility] to update Dr. Dozier and he will then arrange for stone removal surgery ([phone]).  - Follow up with Derek Kumar as needed   - Take medication as prescribed, avoid anticoagulants per surgeon and PCP recommendations.   - Wound / drain care: As directed prior to discharge  - Physical activity: As tolerated, no heavy lifting. No driving while taking narcotics.   - Diet: Regular diet   - Medication: please review discharge Med req/AVS  - Warning signs discussed with patient about when to call the clinic/hospital  - All questions and concerns were answered for the patient prior to discharge  - Patient verbalized understanding.     Discussed patient with Dr Bob Dozier on day of discharge.     Rigo Hernandez, APRN, CNP  [facility]    [phone]     I saw the patient on the date of discharge  Total time spent for discharge on date of discharge: 20 minutes    Physician(s) in addition to primary physician who should receive a copy:  CC1: Derek Kumar           ?

## 2022-10-27 NOTE — PLAN OF CARE
Problem: Plan of Care - These are the overarching goals to be used throughout the patient stay.    Goal: Plan of Care Review  Outcome: Progressing   Goal Outcome Evaluation:    [id number].... Pt had a Left Percutaneous Nephrostomy Tube placed today in IR, both smith cath and nephr tube are patent, outputs have been charted. Pt denied pain, vitals are stable. Will continue to monitor.

## 2022-10-27 NOTE — PROGRESS NOTES
Interventional Radiology - Progress Note  Inpatient - [facility]: Interventional Radiology   [phone]  10/27/2022     S:  Patient sitting up in bed. He reports he is feeling well. Drain is functioning well.     Culture:  No growth td  Antibiotic: on cefdiner  Flushing: NA    O:  BP (!) 175/82 (BP Location: Right arm)   Pulse 64   Temp 98.3  F (36.8  C) (Oral)   Resp 18   Wt 76.8 kg (169 lb 4.8 oz)   SpO2 97%   BMI 25.55 kg/m    General:  Stable.  In no acute distress.    Neuro:  A&O x 3. Moves all extremities equally.  Abdomen:  Soft, non-distended, non-tender, positive bowel sounds.  Skin:  Without excoriations, ecchymosis, erythema, lesions or open sores on L PNT tube.  MSK:  No gross motor weakness.  Sensation intact.  Proprioception intact.    IMAGING:  [facility]  LOCATION: [facility]  DATE: 10/26/2022     PROCEDURE: PERCUTANEOUS ANTEGRADE PYELOGRAM AND PERCUTANEOUS NEPHROSTOMY PLACEMENT     ATTENDING: Morro Dawn MD.     INDICATION: 87-year-old male with a distal left ureteral calculus and hydronephrosis. Unsuccessful retrograde stent placement. A nephrostomy is requested.     CONSENT: The risks, benefits, and alternatives of antegrade pyelography and percutaneous nephrostomy tube placement  were discussed with the patient in detail. All questions were answered. Informed consent was given to proceed with the procedure.     MODERATE SEDATION: Versed 0.5 mg IV; Fentanyl 50 mcg IV.  During the timeout, immediately prior to the administration of medications, the patient was reassessed for adequacy to receive conscious sedation. Under physician supervision, Versed and fentanyl   were administered for moderate sedation. Pulse oximetry, heart rate and blood pressure were continuously monitored by an independent trained observer. The physician spent 15 minutes of face-to-face sedation time with the patient.     ADDITIONAL MEDICATIONS: Rocephin 2 grams   IV.     FLUOROSCOPIC TIME: 0.4 minutes.     AIR KERMA:  60 mGy.     CONTRAST: 10 mL Omnipaque into the collecting system.     UNIVERSAL PRECAUTIONS: The procedure was performed utilizing maximum sterile barrier technique. Prior to the start of the procedure, a standard pause for patient safety was performed with site marking as indicated.     COMPLICATIONS: No immediate complications.     PROCEDURE:    Using real-time sonographic guidance, an 18 gauge trocar needle was inserted into the left renal collecting system from a subcostal approach.  An antegrade pyelogram was performed. The tract was dilated over a wire and exchange was made for a  10 Togolese   nephrostomy tube.  The look was formed within the renal pelvis. A post placement nephrostogram was performed. The catheter was sutured to the skin and placed to gravity bag drainage.     FINDINGS:  Ultrasound demonstrates marked left renal hydronephrosis. A permanent image was stored.     The antegrade nephrostogram confirms severe left renal hydronephrosis.     The completion image shows the nephrostomy with loop in the renal pelvis. This controls the urinary system well.                                                                      IMPRESSION:    1.  Successful right percutaneous nephrostomy placement, as detailed above.  2.  A urine specimen was sent for microbiology analysis.    LABS:  Recent Labs   Lab 10/27/22  0641 10/26/22  0614   WBC  --  11.0   HGB 10.6* 9.8*   PLT  --  153   INR  --  1.33*   CR 1.30* 1.41*       Nephrostomy tube   Drain Outputs (in mL):  10/26 850   10/27 500                   A:  This is an 87 year old male with a 1.1 cm left ureterovesical junction calculus, hydronephrosis. Urology was unable to gain ureteral acess and s/p Left nephrostomy tube placement on 10/26. Doing well and plan for discontinue.    P:    Cares per urology team  Plan for discontinue today.  Cefdinir per Urology   Will return to IR on Tuesday for ureteral stent  placement, pNT tube check. [facility] will call patient to schedule.    NAHUN SLAUGHTER CNP  Interventional Radiology  [phone]

## 2022-10-27 NOTE — PLAN OF CARE
"Goal Outcome Evaluation:             VSS.  Denies pain.  Tylenol scheduled. LFT Neph drainage returns pink.  Bloom dark pink/camila. IV fluids. Anticipated discharge today.  Care plan reviewed.   Problem: Risk for Delirium  Goal: Optimal Coping  Outcome: Progressing  Goal: Improved Behavioral Control  Outcome: Progressing  Goal: Improved Attention and Thought Clarity  Outcome: Progressing  Goal: Improved Sleep  Outcome: Progressing     Problem: Plan of Care - These are the overarching goals to be used throughout the patient stay.    Goal: Plan of Care Review  Description: The Plan of Care Review/Shift note should be completed every shift.  The Outcome Evaluation is a brief statement about your assessment that the patient is improving, declining, or no change.  This information will be displayed automatically on your shift note.  Outcome: Progressing  Goal: Patient-Specific Goal (Individualized)  Description: You can add care plan individualizations to a care plan. Examples of Individualization might be:  \"Parent requests to be called daily at 9am for status\", \"I have a hard time hearing out of my right ear\", or \"Do not touch me to wake me up as it startles me\".  Outcome: Progressing  Goal: Absence of Hospital-Acquired Illness or Injury  Outcome: Progressing  Intervention: Identify and Manage Fall Risk  Recent Flowsheet Documentation  Taken 10/27/2022 0000 by Orin Strauss RN  Safety Promotion/Fall Prevention: nonskid shoes/slippers when out of bed  Intervention: Prevent Skin Injury  Recent Flowsheet Documentation  Taken 10/27/2022 0000 by Orin Strauss RN  Body Position: position changed independently  Goal: Optimal Comfort and Wellbeing  Outcome: Progressing  Goal: Readiness for Transition of Care  Outcome: Progressing              "

## 2022-10-27 NOTE — PROGRESS NOTES
Care Management Discharge Note    Discharge Date: 10/27/2022       Discharge Disposition: Home, Home Care    Discharge Services:  (Pt has been open to home RN with [facility].)    Discharge DME:      Discharge Transportation: family or friend will provide    Private pay costs discussed: Not applicable    Education Provided on the Discharge Plan:    Persons Notified of Discharge Plans: MD has placed orders  Patient/Family in Agreement with the Plan: yes    Handoff Referral Completed: Yes    Additional Information:    Pt returning home with resumption of [facility] for home RN ([phone]). Family will transport. CM following.     Home care agency has some questions about evelyn Aguilar message sent to bedside RN.     BIJAL KeatingSW

## 2022-10-28 ENCOUNTER — TELEPHONE (OUTPATIENT)
Dept: FAMILY MEDICINE | Facility: CLINIC | Age: 87
End: 2022-10-28

## 2022-10-28 ENCOUNTER — TELEPHONE (OUTPATIENT)
Dept: INTERVENTIONAL RADIOLOGY/VASCULAR | Facility: HOSPITAL | Age: 87
End: 2022-10-28

## 2022-10-28 ENCOUNTER — PATIENT OUTREACH (OUTPATIENT)
Dept: CARE COORDINATION | Facility: CLINIC | Age: 87
End: 2022-10-28

## 2022-10-28 VITALS
OXYGEN SATURATION: 98 % | WEIGHT: 169 LBS | HEART RATE: 73 BPM | BODY MASS INDEX: 25.51 KG/M2 | SYSTOLIC BLOOD PRESSURE: 163 MMHG | RESPIRATION RATE: 16 BRPM | DIASTOLIC BLOOD PRESSURE: 74 MMHG | TEMPERATURE: 97.3 F

## 2022-10-28 LAB
ALBUMIN UR-MCNC: 200 MG/DL
APPEARANCE UR: ABNORMAL
BACTERIA #/AREA URNS HPF: ABNORMAL /HPF
BILIRUB UR QL STRIP: NEGATIVE
COLOR UR AUTO: ABNORMAL
GLUCOSE UR STRIP-MCNC: NEGATIVE MG/DL
HGB UR QL STRIP: ABNORMAL
KETONES UR STRIP-MCNC: NEGATIVE MG/DL
LEUKOCYTE ESTERASE UR QL STRIP: ABNORMAL
MUCOUS THREADS #/AREA URNS LPF: PRESENT /LPF
NITRATE UR QL: NEGATIVE
PH UR STRIP: 6 [PH] (ref 5–7)
RBC URINE: >182 /HPF
SP GR UR STRIP: 1.02 (ref 1–1.03)
SQUAMOUS EPITHELIAL: 1 /HPF
UROBILINOGEN UR STRIP-MCNC: <2 MG/DL
WBC URINE: >182 /HPF

## 2022-10-28 ASSESSMENT — ACTIVITIES OF DAILY LIVING (ADL): ADLS_ACUITY_SCORE: 35

## 2022-10-28 NOTE — ED PROVIDER NOTES
EMERGENCY DEPARTMENT ENCOUNTER      NAME: Mikaela Figueroa  AGE: 87 year old male  YOB: 1935  MRN: [id number]  EVALUATION DATE & TIME: 10/27/2022 11:32 PM    PCP: Derek Kumar    ED PROVIDER: David Elkins D.O.      Chief Complaint   Patient presents with     Urinary Retention       FINAL IMPRESSION:  1. Hematuria, unspecified type        ED COURSE & MEDICAL DECISION MAKIN:45 PM I met with the patient to gather history and to perform my initial exam. I discussed the plan for care while in the Emergency Department. PPE includes procedure mask, gloves.  12:08 AM I discussed patient with Dr. Quinteros, Urology         Medical Decision Making    Supplemental history from: N/A    External Record(s) Reviewed: Inpatient Record    Differential Diagnosis: See MDM charting for differential considered.     I performed an independent interpretation of the: N/A    Discussed with radiology regarding test interpretation: N/A    Discussion of management with another provider: Urology    The following testing was considered but ultimately not selected: None     I considered prescription management with: N/A    The patient's care impacted: None    Consideration of Admission/Observation: N/A - Patient admitted or discharged without consideration for admission    Care significantly affected by Social Determinants of Health including: N/A      Pertinent Labs & Imaging studies reviewed. (See chart for details)  87 year old male presents to the Emergency Department for evaluation of hematuria in the Bloom catheter.  There was no evidence of obstruction, and the Bloom had flushed easily.  We we were able to flush the Bloom to the point of urine returning without significant blood.  His nephrostomy tube does not have any blood in it.  Urology did not believe further intervention would be indicated if we could get the blood to clear.  At this time, I do not believe the patient requires admission and believe that he can  be safely discharged home for follow-up with urology tomorrow.  Patient is already on antibiotics as prescribed after his procedure and that he just was released from the hospital from.  Therefore I do not believe additional or new antibiotics are indicated.  There is suggestion of UTI in his UA tonight.  Patient otherwise stable.  Return precautions were discussed.    At the conclusion of the encounter I discussed the results of all of the tests and the disposition. The questions were answered. The patient or family acknowledged understanding and was agreeable with the care plan.      HPI    Patient information was obtained from: Patient    Use of : N/A      Mikaela Figueroa is a 87 year old male with a history of nephrolithiasis, hydronephrosis, s/p cystoscopy, left ureteroscopy, nephrostomy tube placement left (10/25/2022), BPH with urinary obstruction, hypertension, SUJATHA, and pure hypercholesterolemia who presents to the ED walk in for evaluation of urinary retention.    Chart review:  10/25/2022-10/27/2022: Patient had a cystoscopy and left ureteroscopy procedure for nephrolithiasis with recent bilateral severe hydroureteronephrosis with large ureteral stone present. Ureteroscopy was unsuccessful. Nephrostomy tube placed. Indwelling smith catheter placed. Patient discharged.    Patient's indwelling smith catheter that was placed 2 days ago (10/25) is not producing urine. Patient additionally had a nephrostomy tube placed on 10/25 after an unsuccessful surgery for a kidney stone. Patient reports diarrhea. Patient reports hematuria that began this evening. He denies abdominal pain, fever, nausea, vomiting, chest pain, congestion, shortness of breath, cough, or any other complaints at this time.       REVIEW OF SYSTEMS  Constitutional:  Denies fever, chills, weight loss or weakness  Eyes:  No pain, discharge, redness   HENT:  Denies sore throat, ear pain, congestion   Respiratory: No SOB, wheeze or  cough  Cardiovascular:  No CP, palpitations  GI:  Denies abdominal pain, nausea, vomiting. Positive for diarrhea  : Positive for hematuria from indwelling smith  Musculoskeletal:  Denies any new muscle/joint pain, swelling or loss of function.   Skin:  Denies rash, pallor   Neurologic:  Denies headache, focal weakness or sensory changes  Lymph: Denies swollen nodes    All other systems negative unless noted in HPI.      PAST MEDICAL HISTORY:  Past Medical History:   Diagnosis Date     Hypertension      Pacemaker        PAST SURGICAL HISTORY:  Past Surgical History:   Procedure Laterality Date     APPENDECTOMY       ARTHROSCOPY KNEE Right      CYSTOURETEROSCOPY, WITH LITHOTRIPSY USING ZABRINA 120P LASER AND URETERAL STENT INSERTION Left 10/25/2022    Procedure: CYSTOSCOPY, LEFT URETEROSCOPY, LASER LITHOTRIPSY;  Surgeon: Bob Dozier MD;  Location: [facility] OR     GENITOURINARY SURGERY       IR NEPHROSTOMY TUBE PLACEMENT LEFT  10/26/2022         CURRENT MEDICATIONS:    No current facility-administered medications for this encounter.     Current Outpatient Medications   Medication     acetaminophen (TYLENOL) 325 MG tablet     [START ON 11/10/2022] aspirin (ASA) 81 MG chewable tablet     bacitracin 500 UNIT/GM OINT     cefdinir (OMNICEF) 300 MG capsule     dorzolamide (TRUSOPT) 2 % ophthalmic solution     [START ON 11/6/2022] doxycycline hyclate (VIBRA-TABS) 100 MG tablet     [START ON 11/3/2022] Glucosamine-Chondroitin 250-200 MG CAPS     latanoprost (XALATAN) 0.005 % ophthalmic solution     lisinopril (ZESTRIL) 10 MG tablet     metroNIDAZOLE (METROGEL) 1 % external gel     multivitamin, therapeutic (THERA-VIT) TABS tablet     tamsulosin (FLOMAX) 0.4 MG capsule     triamcinolone (KENALOG) 0.1 % external cream         ALLERGIES:  No Known Allergies    FAMILY HISTORY:  Family History   Problem Relation Age of Onset     Dementia Mother      Heart Disease Father      Diabetes Father        SOCIAL HISTORY:  Social  History     Socioeconomic History     Marital status:    Tobacco Use     Smoking status: Former     Types: Pipe     Smokeless tobacco: Never   Substance and Sexual Activity     Alcohol use: No     Drug use: No       VITALS:  Patient Vitals for the past 24 hrs:   BP Temp Temp src Pulse Resp SpO2 Weight   10/28/22 0124 (!) 163/74 -- -- 73 -- 98 % --   10/27/22 2003 (!) 164/82 97.3  F (36.3  C) Temporal 87 16 96 % 76.7 kg (169 lb)       PHYSICAL EXAM    VITAL SIGNS: BP (!) 163/74   Pulse 73   Temp 97.3  F (36.3  C) (Temporal)   Resp 16   Wt 76.7 kg (169 lb)   SpO2 98%   BMI 25.51 kg/m      General Appearance: Well-appearing, well-nourished, no acute distress   Head:  Normocephalic, without obvious abnormality, atraumatic  Eyes:  PERRL, conjunctiva/corneas clear, EOM's intact,  ENT:  Lips, mucosa, and tongue normal, membranes are moist without pallor  Neck:  Normal ROM, symmetrical, trachea midline    Cardio:  Regular rate and rhythm, no murmur, rub or gallop, 2+ pulses symmetric in all extremities  Pulm:  Clear to auscultation bilaterally, respirations unlabored,  Abdomen:  Soft, non-tender, no rebound or guarding. Left flank nephrostomy tube. Indwelling smith catheter placed.   Musculoskeletal: Full ROM, no edema, no cyanosis, good ROM of major joints  Integument:  Warm, Dry, No erythema, No rash.    Neurologic:  Alert & oriented.  No focal deficits appreciated.  Ambulatory.  Psychiatric:  Affect normal, Judgment normal, Mood normal.      LABS  Results for orders placed or performed during the hospital encounter of 10/27/22 (from the past 24 hour(s))   UA with Microscopic reflex to Culture    Specimen: Urine, Midstream   Result Value Ref Range    Color Urine Light Orange (A) Colorless, Straw, Light Yellow, Yellow    Appearance Urine Turbid (A) Clear    Glucose Urine Negative Negative mg/dL    Bilirubin Urine Negative Negative    Ketones Urine Negative Negative mg/dL    Specific Gravity Urine 1.017 1.001 -  1.030    Blood Urine 1.0 mg/dL (A) Negative    pH Urine 6.0 5.0 - 7.0    Protein Albumin Urine 200 (A) Negative mg/dL    Urobilinogen Urine <2.0 <2.0 mg/dL    Nitrite Urine Negative Negative    Leukocyte Esterase Urine 500 Rober/uL (A) Negative    Bacteria Urine Few (A) None Seen /HPF    Mucus Urine Present (A) None Seen /LPF    RBC Urine >182 (H) <=2 /HPF    WBC Urine >182 (H) <=5 /HPF    Squamous Epithelials Urine 1 <=1 /HPF    Narrative    Urine Culture ordered based on laboratory criteria         RADIOLOGY  No orders to display            MEDICATIONS GIVEN IN THE EMERGENCY:  Medications - No data to display    NEW PRESCRIPTIONS STARTED AT TODAY'S ER VISIT  Discharge Medication List as of 10/28/2022  1:19 AM           I, Carline Campos, am serving as a scribe to document services personally performed by David Elkins DO, based on my observations and the provider's statements to me.  I, David Elkins DO, attest that Carline Campos is acting in a scribe capacity, has observed my performance of the services and has documented them in accordance with my direction.       David Elkins D.O.  Emergency Medicine  [facility] EMERGENCY ROOM  [address] [address] [phone]  Dept: [phone]       David Elkins DO  10/28/22 0150

## 2022-10-28 NOTE — ED TRIAGE NOTES
Pt arrives to ED with c/o urinary retention. Pt has a catheter in place but states it is not draining. Pt discharged from [facility] a couple hours ago with catheter and only about 200mL in smith bag.      Triage Assessment     Row Name 10/27/22 2004       Triage Assessment (Adult)    Airway WDL WDL       Respiratory WDL    Respiratory WDL WDL       Skin Circulation/Temperature WDL    Skin Circulation/Temperature WDL WDL       Cardiac WDL    Cardiac WDL WDL       Peripheral/Neurovascular WDL    Peripheral Neurovascular WDL WDL       Cognitive/Neuro/Behavioral WDL    Cognitive/Neuro/Behavioral WDL WDL       Calrk Coma Scale    Best Eye Response 4-->(E4) spontaneous    Best Motor Response 6-->(M6) obeys commands    Best Verbal Response 5-->(V5) oriented    Clark Coma Scale Score 15

## 2022-10-28 NOTE — PROGRESS NOTES
Clinic Care Coordination Contact  [facility]: Post-Discharge Note  SITUATION                                                      Admission:    Admission Date: 10/27/22   Reason for Admission: Hematuria, unspecified type  Discharge:   Discharge Date: 10/28/22  Discharge Diagnosis: Hematuria, unspecified type    BACKGROUND                                                      Per hospital discharge summary and inpatient provider notes:  ED COURSE & MEDICAL DECISION MAKIN:45 PM I met with the patient to gather history and to perform my initial exam. I discussed the plan for care while in the Emergency Department. PPE includes procedure mask, gloves.  12:08 AM I discussed patient with Dr. Quinteros, Urology        Medical Decision Making    Supplemental history from: N/A    External Record(s) Reviewed: Inpatient Record    Differential Diagnosis: See MDM charting for differential considered.     I performed an independent interpretation of the: N/A    Discussed with radiology regarding test interpretation: N/A    Discussion of management with another provider: Urology    The following testing was considered but ultimately not selected: None     I considered prescription management with: N/A    The patient's care impacted: None    Consideration of Admission/Observation: N/A - Patient admitted or discharged without consideration for admission    Care significantly affected by Social Determinants of Health including: N/A        Pertinent Labs & Imaging studies reviewed. (See chart for details)  87 year old male presents to the Emergency Department for evaluation of hematuria in the Bloom catheter.  There was no evidence of obstruction, and the Bloom had flushed easily.  We we were able to flush the Bloom to the point of urine returning without significant blood.  His nephrostomy tube does not have any blood in it.  Urology did not believe further intervention would be indicated if we could get the blood to  clear.  At this time, I do not believe the patient requires admission and believe that he can be safely discharged home for follow-up with urology tomorrow.  Patient is already on antibiotics as prescribed after his procedure and that he just was released from the hospital from.  Therefore I do not believe additional or new antibiotics are indicated.  There is suggestion of UTI in his UA tonight.  Patient otherwise stable.  Return precautions were discussed.     At the conclusion of the encounter I discussed the results of all of the tests and the disposition. The questions were answered. The patient or family acknowledged understanding and was agreeable with the care plan.        HPI     Patient information was obtained from: Patient     Use of : N/A      Mikaela Figueroa is a 87 year old male with a history of nephrolithiasis, hydronephrosis, s/p cystoscopy, left ureteroscopy, nephrostomy tube placement left (10/25/2022), BPH with urinary obstruction, hypertension, SUJATHA, and pure hypercholesterolemia who presents to the ED walk in for evaluation of urinary retention.     Chart review:  10/25/2022-10/27/2022: Patient had a cystoscopy and left ureteroscopy procedure for nephrolithiasis with recent bilateral severe hydroureteronephrosis with large ureteral stone present. Ureteroscopy was unsuccessful. Nephrostomy tube placed. Indwelling smith catheter placed. Patient discharged.     Patient's indwelling smith catheter that was placed 2 days ago (10/25) is not producing urine. Patient additionally had a nephrostomy tube placed on 10/25 after an unsuccessful surgery for a kidney stone. Patient reports diarrhea. Patient reports hematuria that began this evening. He denies abdominal pain, fever, nausea, vomiting, chest pain, congestion, shortness of breath, cough, or any other complaints at this time.     ASSESSMENT           Discharge Assessment  How are you doing now that you are home?: Pt reports he is well.  No questions about discharge.  How are your symptoms? (Red Flag symptoms escalate to triage hotline per guidelines): Improved  Do you feel your condition is stable enough to be safe at home until your provider visit?: Yes  Does the patient have their discharge instructions? : Yes  Does the patient have questions regarding their discharge instructions? : No  Were you started on any new medications or were there changes to any of your previous medications? : Yes  Does the patient have all of their medications?: No (see comment)  Do you have questions regarding any of your medications? : No  Do you have all of your needed medical supplies or equipment (DME)?  (i.e. oxygen tank, CPAP, cane, etc.): Yes  Discharge follow-up appointment scheduled within 14 calendar days? : Yes  Discharge Follow Up Appointment Date: 11/04/22  Discharge Follow Up Appointment Scheduled with?: Specialty Care Provider              PLAN                                                      1 Outpatient Plan:  Call Bob Dozier MD (Urology) on 10/28/2022  2 Follow up with [facility] (EMERGENCY MEDICINE); If symptoms worsen      Future Appointments   Date Time Provider Department Center   11/4/2022  9:30 AM [facility]   1/31/2023  7:40 AM Derek Kumar MD [facility]         For any urgent concerns, please contact our 24 hour nurse triage line: [phone] ([phone])         SMOOTH Tamayo  Connected Care Resource Center  [facility]     *Connected Care Resource Team does NOT follow patient ongoing. Referrals are identified based on internal discharge reports and the outreach is to ensure patient has an understanding of their discharge instructions.

## 2022-10-28 NOTE — ED TRIAGE NOTES
Pt called triage nurse to look at his urine. While waiting the pt noticed that his urine was turning into a red color.     Triage Assessment     Row Name 10/27/22 2004       Triage Assessment (Adult)    Airway WDL WDL       Respiratory WDL    Respiratory WDL WDL       Skin Circulation/Temperature WDL    Skin Circulation/Temperature WDL WDL       Cardiac WDL    Cardiac WDL WDL       Peripheral/Neurovascular WDL    Peripheral Neurovascular WDL WDL       Cognitive/Neuro/Behavioral WDL    Cognitive/Neuro/Behavioral WDL WDL       Clark Coma Scale    Best Eye Response 4-->(E4) spontaneous    Best Motor Response 6-->(M6) obeys commands    Best Verbal Response 5-->(V5) oriented    Clark Coma Scale Score 15

## 2022-10-28 NOTE — TELEPHONE ENCOUNTER
Reason for Call:  Home Health Care    Grisel with [facility] called regarding (reason for call): verbal orders    Orders are needed for this patient. SN    Skilled Nursinx a week for 2 weeks, 1x a week for 4 weeks with 3 PRN visits    Pt Provider:      Phone Number Homecare Nurse can be reached at: [phone]    Can we leave a detailed message on this number? YES    FYI: Verbal orders given per  . Please notify us if any adjustments need to be made.       Call taken on 10/28/2022 at 1:43 PM by Davida Beltre

## 2022-10-28 NOTE — ED NOTES
Irrigated 2 500 mls in smith catheter. What was put in came out with no issues. No clots noted. Bladder scanned multiple times, estimated 5-25 mls at most.

## 2022-10-29 LAB — BACTERIA UR CULT: NO GROWTH

## 2022-10-30 LAB — BACTERIA UR CULT: ABNORMAL

## 2022-11-01 ENCOUNTER — HOSPITAL ENCOUNTER (OUTPATIENT)
Dept: INTERVENTIONAL RADIOLOGY/VASCULAR | Facility: HOSPITAL | Age: 87
Discharge: HOME | End: 2022-11-01
Attending: NURSE PRACTITIONER | Admitting: RADIOLOGY
Payer: MEDICARE

## 2022-11-01 VITALS
HEART RATE: 72 BPM | OXYGEN SATURATION: 98 % | SYSTOLIC BLOOD PRESSURE: 163 MMHG | DIASTOLIC BLOOD PRESSURE: 80 MMHG | RESPIRATION RATE: 16 BRPM | TEMPERATURE: 98.7 F

## 2022-11-01 DIAGNOSIS — Q62.11 HYDRONEPHROSIS WITH URETEROPELVIC JUNCTION (UPJ) OBSTRUCTION: ICD-10-CM

## 2022-11-01 PROCEDURE — 250N000011 HC RX IP 250 OP 636: Performed by: PHYSICIAN ASSISTANT

## 2022-11-01 PROCEDURE — 250N000009 HC RX 250: Performed by: PHYSICIAN ASSISTANT

## 2022-11-01 PROCEDURE — 99153 MOD SED SAME PHYS/QHP EA: CPT

## 2022-11-01 PROCEDURE — 50695 PLMT URETERAL STENT PRQ: CPT

## 2022-11-01 PROCEDURE — 250N000011 HC RX IP 250 OP 636: Performed by: NURSE PRACTITIONER

## 2022-11-01 PROCEDURE — 99152 MOD SED SAME PHYS/QHP 5/>YRS: CPT

## 2022-11-01 PROCEDURE — C1769 GUIDE WIRE: HCPCS

## 2022-11-01 PROCEDURE — C1729 CATH, DRAINAGE: HCPCS

## 2022-11-01 PROCEDURE — 50435 EXCHANGE NEPHROSTOMY CATH: CPT

## 2022-11-01 PROCEDURE — C1758 CATHETER, URETERAL: HCPCS

## 2022-11-01 PROCEDURE — 255N000002 HC RX 255 OP 636: Performed by: NURSE PRACTITIONER

## 2022-11-01 PROCEDURE — 272N000117 HC CATH CR2

## 2022-11-01 RX ORDER — FLUMAZENIL 0.1 MG/ML
0.2 INJECTION, SOLUTION INTRAVENOUS
Status: DISCONTINUED | OUTPATIENT
Start: 2022-11-01 | End: 2022-11-02 | Stop reason: HOSPADM

## 2022-11-01 RX ORDER — NALOXONE HYDROCHLORIDE 0.4 MG/ML
0.2 INJECTION, SOLUTION INTRAMUSCULAR; INTRAVENOUS; SUBCUTANEOUS
Status: DISCONTINUED | OUTPATIENT
Start: 2022-11-01 | End: 2022-11-02 | Stop reason: HOSPADM

## 2022-11-01 RX ORDER — CEFTRIAXONE 1 G/1
1 INJECTION, POWDER, FOR SOLUTION INTRAMUSCULAR; INTRAVENOUS
Status: COMPLETED | OUTPATIENT
Start: 2022-11-01 | End: 2022-11-01

## 2022-11-01 RX ORDER — NALOXONE HYDROCHLORIDE 0.4 MG/ML
0.4 INJECTION, SOLUTION INTRAMUSCULAR; INTRAVENOUS; SUBCUTANEOUS
Status: DISCONTINUED | OUTPATIENT
Start: 2022-11-01 | End: 2022-11-02 | Stop reason: HOSPADM

## 2022-11-01 RX ORDER — LIDOCAINE 40 MG/G
CREAM TOPICAL
Status: DISCONTINUED | OUTPATIENT
Start: 2022-11-01 | End: 2022-11-02 | Stop reason: HOSPADM

## 2022-11-01 RX ORDER — SODIUM CHLORIDE 9 MG/ML
INJECTION, SOLUTION INTRAVENOUS CONTINUOUS
Status: DISCONTINUED | OUTPATIENT
Start: 2022-11-01 | End: 2022-11-02 | Stop reason: HOSPADM

## 2022-11-01 RX ORDER — FENTANYL CITRATE 50 UG/ML
25-50 INJECTION, SOLUTION INTRAMUSCULAR; INTRAVENOUS EVERY 5 MIN PRN
Status: DISCONTINUED | OUTPATIENT
Start: 2022-11-01 | End: 2022-11-02 | Stop reason: HOSPADM

## 2022-11-01 RX ADMIN — FENTANYL CITRATE 50 MCG: 50 INJECTION, SOLUTION INTRAMUSCULAR; INTRAVENOUS at 10:39

## 2022-11-01 RX ADMIN — MIDAZOLAM HYDROCHLORIDE 1 MG: 1 INJECTION, SOLUTION INTRAMUSCULAR; INTRAVENOUS at 10:37

## 2022-11-01 RX ADMIN — CEFTRIAXONE SODIUM 1 G: 1 INJECTION, POWDER, FOR SOLUTION INTRAMUSCULAR; INTRAVENOUS at 10:00

## 2022-11-01 RX ADMIN — MIDAZOLAM HYDROCHLORIDE 1 MG: 1 INJECTION, SOLUTION INTRAMUSCULAR; INTRAVENOUS at 10:25

## 2022-11-01 RX ADMIN — FENTANYL CITRATE 50 MCG: 50 INJECTION, SOLUTION INTRAMUSCULAR; INTRAVENOUS at 10:27

## 2022-11-01 RX ADMIN — IOHEXOL 40 ML: 350 INJECTION, SOLUTION INTRAVENOUS at 10:56

## 2022-11-01 RX ADMIN — LIDOCAINE HYDROCHLORIDE 8 ML: 10 INJECTION, SOLUTION EPIDURAL; INFILTRATION; INTRACAUDAL; PERINEURAL at 10:28

## 2022-11-01 NOTE — PROCEDURES
[facility]    Procedure: IR Procedure Note    Date/Time: 11/1/2022 10:59 AM  Performed by: Henrry Aguilar MD  Authorized by: Henrry Aguilar MD       UNIVERSAL PROTOCOL   Site Marked: NA  Prior Images Obtained and Reviewed:  Yes  Required items: Required blood products, implants, devices and special equipment available    Patient identity confirmed:  Verbally with patient, arm band, provided demographic data and hospital-assigned identification number  Patient was reevaluated immediately before administering moderate or deep sedation or anesthesia  Confirmation Checklist:  Patient's identity using two indicators, relevant allergies, procedure was appropriate and matched the consent or emergent situation and correct equipment/implants were available  Time out: Immediately prior to the procedure a time out was called    Universal Protocol: the Joint Commission Universal Protocol was followed    Preparation: Patient was prepped and draped in usual sterile fashion    ESBL (mL):  0     ANESTHESIA    Anesthesia: Local infiltration  Local Anesthetic:  Lidocaine 1% without epinephrine  Anesthetic Total (mL):  10      SEDATION  Patient Sedated: Yes    Sedation Type:  Moderate (conscious) sedation  Vital signs: Vital signs monitored during sedation    Fluoroscopy Time: 5 minute(s)  See dictated procedure note for full details.  Findings: Successful placement of left ureteral stent.  The left nephrostomy tube was left in place per request of urology.  Patient should return in 1 week for capping trial.  OK to discharge in 1 hour.    Specimens: none    Complications: None    Condition: Stable    Plan: Successful placement of left ureteral stent.  The left nephrostomy tube was left in place per request of urology.  Patient should return in 1 week for capping trial.  OK to discharge in 1 hour.      PROCEDURE  Describe Procedure: Successful placement of left ureteral stent.  The left nephrostomy tube was  left in place per request of urology.  Patient should return in 1 week for capping trial.  OK to discharge in 1 hour.  Patient Tolerance:  Patient tolerated the procedure well with no immediate complications  Length of time physician/provider present for 1:1 monitoring during sedation: 30

## 2022-11-01 NOTE — IP AVS SNAPSHOT
[facility]ventional [facility]  [address] [place] [address]  Phone: [phone]  Fax: [phone]                                    After Visit Summary   11/1/2022    Mikaela Figueroa   MRN: [id number]           After Visit Summary Signature Page    I have received my discharge instructions, and my questions have been answered. I have discussed any challenges I see with this plan with the nurse or doctor.    ..........................................................................................................................................  Patient/Patient Representative Signature      ..........................................................................................................................................  Patient Representative Print Name and Relationship to Patient    ..................................................               ................................................  Date                                   Time    ..........................................................................................................................................  Reviewed by Signature/Title    ...................................................              ..............................................  Date                                               Time          22EPIC Rev 08/18

## 2022-11-01 NOTE — DISCHARGE INSTRUCTIONS
Having a Ureteral Stent  A ureteral stent is a soft plastic tube with holes in it. It s temporarily inserted into a ureter to help drain urine into the bladder. One end goes in the kidney. The other end goes in the bladder. A coil on each end holds the stent in place. The stent can t be seen from outside the body. It shouldn t interfere with your normal routine. Your stent will be put in by a doctor trained in treating the urinary tract (a urologist) or another specialist. The procedure is done in a hospital or surgery center. You ll likely go home the same day.   When is a ureteral stent used?  A ureteral stent may be used:  To bypass a blockage in a kidney or ureter.  During kidney stone removal.  To let a ureter heal after surgery.    Before the procedure  Your healthcare provider will give you instructions to prepare for the procedure. X-rays or other imaging tests of your kidneys and ureters may be done beforehand.   During the procedure  You receive medicine to prevent pain and help you relax or sleep during the procedure. Once this takes effect, the procedure starts.  The doctor inserts a cystoscope (lighted instrument) through the urethra and into the bladder. This shows the opening to the ureter.  A thin wire is carefully threaded through the cystoscope, up the ureter, and into the kidney. The stent is inserted over the wire.  A fluoroscope (special X-ray machine) is used to help position the stent. When the stent is in place, the wire and cystoscope are removed.    While you have a stent  Some discomfort is normal. Certain movements may trigger pain or a feeling that you need to urinate. You may also feel mild soreness or pressure before or during urination. These symptoms will go away a few days after the stent is removed.  Medicine to control pain or bladder spasms or to prevent infection may be prescribed. Take this as directed.  Drink plenty of fluids to help flush out your urinary tract.  Your urine  may be slightly pink or red. This is due to bleeding caused by minor irritation from the stent. This may happen on and off while you have the stent.  As with any synthetic device placed in the body, there is a risk of infection. The stent may have to be removed if this happens.     How long will you need a stent?  The stent is often taken out after the blockage in the ureter is treated or the ureter has healed. This may take 1 week to 2 weeks, or longer. If a stent is needed for a long time, it may need to be changed every few months.   When to call your healthcare provider  Contact your healthcare provider right away if:  Your urine contains blood clots or you see a large amount of blood-tinged urine  You have symptoms similar to those you had before the stent was placed  You constantly leak urine  You have a fever over 100.4 F (38 C), or as advised by your health care provider  You have chills  You experience nausea or vomiting  Your pain is not relieved with medicine  The end of the stent comes out of the urethra  You experience new or worsening symptoms  [employer] last reviewed this educational content on 4/1/2020 2000-2021 [employer]. All rights reserved. This information is not intended as a substitute for professional medical care. Always follow your healthcare professional's instructions.

## 2022-11-01 NOTE — H&P
Interventional Radiology - Pre-Procedure Note:  11/1/2022    Procedure Requested: LEFT ureteral stent placement  Requested by: Kyra Toledo APRN CNP    History and Physical Reviewed: H&P documented within 30 days (by Rigo Hernandez CNP on 10/27/22).  I have personally reviewed the patient's medical history and have updated the medical record as necessary.    Brief HPI: Mikaela Figueroa is a 87 year old male with a 1.1 cm left ureterovesical junction calculus, hydronephrosis. Urology was unable to gain ureteral acess and s/p Left nephrostomy tube placement 10/26/22. Urolgy requesting left ureteral stent placement while still maintaining LEFT PNT (capped).     Patient to contact Dr. Dozier following stent placement to arrange for stone removal.     IMAGING:  IR PNT placement 10/26/22:  FINDINGS:  Ultrasound demonstrates marked left renal hydronephrosis. A permanent image was stored.     The antegrade nephrostogram confirms severe left renal hydronephrosis.     The completion image shows the nephrostomy with loop in the renal pelvis. This controls the urinary system well.                                                                      IMPRESSION:    1.  Successful right percutaneous nephrostomy placement, as detailed above.  2.  A urine specimen was sent for microbiology analysis.    NPO: midnight.  ANTICOAGULANTS: aspirin.  ANTIBIOTICS: on cefdinir. Rocephin ordered for procedure.    ALLERGIES  No Known Allergies      LABS:  INR   Date Value Ref Range Status   10/26/2022 1.33 (H) 0.85 - 1.15 Final      Hemoglobin   Date Value Ref Range Status   10/27/2022 10.6 (L) 13.3 - 17.7 g/dL Final   ]  Platelet Count   Date Value Ref Range Status   10/26/2022 153 150 - 450 10e3/uL Final     Creatinine   Date Value Ref Range Status   10/27/2022 1.30 (H) 0.67 - 1.17 mg/dL Final     Potassium   Date Value Ref Range Status   10/27/2022 3.5 3.4 - 5.3 mmol/L Final   10/11/2022 4.5 3.5 - 5.0 mmol/L Final          EXAM:  BP (!) 175/78   Pulse 68   Temp 98.7  F (37.1  C)   SpO2 98%   General:  Stable.  In no acute distress.    Neuro:  A&O x 3. Moves all extremities equally.  Resp:  Lungs clear to auscultation bilaterally.  Cardio:  S1S2 and reg, without murmur, clicks or rubs      Pre-Sedation Assessment:  Mallampati Airway Classification:  II - Faucial pillars and soft palate may be seen, but uvula is masked by the base of the tongue  Previous reaction to anesthesia/sedation:  No  Sedation plan based on assessment: Moderate (conscious) sedation  ASA Classification: Class 2 - MILD SYSTEMIC DISEASE, NO ACUTE PROBLEMS, NO FUNCTIONAL LIMITATIONS.   Code Status: FULL CODE      ASSESSMENT/PLAN:   88 yo male with 1.1 cm left ureterovesical junction calculus, hydronephrosis s/p Left nephrostomy tube placement 10/26/22    LEFT ureteral stent placement with sedation.    Will plan on maintaining L PNT in place per urology request - capped if possible.    Procedure, risks/benefits, details, alternatives, and sedation reviewed with patient and patient verbalized understanding. All questions answered. OK to proceed with above radiology procedure.     Contact [facility]/Dr. Dozier to arrange for stone removal surgery ([phone]).    Jenise Cruz PA-C  Interventional Radiology

## 2022-11-01 NOTE — PRE-PROCEDURE
GENERAL PRE-PROCEDURE:   Procedure:  L ureteral stent  Date/Time:  11/1/2022 9:37 AM    Written consent obtained?: Yes    Risks and benefits: Risks, benefits and alternatives were discussed    Consent given by:  Patient  Patient states understanding of procedure being performed: Yes    Patient's understanding of procedure matches consent: Yes    Procedure consent matches procedure scheduled: Yes    Expected level of sedation:  Moderate  Appropriately NPO:  Yes  ASA Class:  3  Mallampati  :  Grade 3- soft palate visible, posterior pharyngeal wall not visible  Lungs:  Lungs clear with good breath sounds bilaterally  Heart:  Normal heart sounds and rate  History & Physical reviewed:  History and physical reviewed and no updates needed  Statement of review:  I have reviewed the lab findings, diagnostic data, medications, and the plan for sedation

## 2022-11-03 ENCOUNTER — TELEPHONE (OUTPATIENT)
Dept: CARDIOLOGY | Facility: CLINIC | Age: 87
End: 2022-11-03

## 2022-11-03 NOTE — TELEPHONE ENCOUNTER
Phone call back to pt to discuss, very confusing to try and figure out what is needed for the pt.    They thought someone from cardiology had called, but we do not have anything in the chart. Or future follow up appts scheduled, he was a pt of Dr. Avendaño's before he retired.    After further discussion pt had stent placed in ureter on Tuesday 11/1/22, and per the notes should be seen in follow up next week with Dr. Aguilar's team.  Pt is still confused as to what is needed, thinks possibly cardiac clearance is needed but does not know.    I let him know that unfortunately I am not able to help as I work in [facility] Cardiology.  But I can forward over to Dr. Aguilar and see if some one from his team is able to call and help clear things up for him.    Pt states he has follow up scheduled on 11/11 at 8:15 am at  but writer cannot see this appt in the pts appt tab.    Dr. Aguilar, can you please have your nurse, or someone from your team call pt to discuss follow up?    Thank you,    Armida Duarte,  RN, BSN, CCRN  Electrophysiology Nurse Clinician  [facility]  Direct: [phone]  Fax: [phone]

## 2022-11-03 NOTE — TELEPHONE ENCOUNTER
[facility] Call Center    Phone Message    May a detailed message be left on voicemail: yes     Reason for Call: Other: patient called in and stated they got a call today from Mikaela's Cardio team and the call was disconected.  She believes it is in regards to the surgery tomorrow.  Please reach back out.     Action Taken: Other: Cardio    Travel Screening: Not Applicable

## 2022-11-04 NOTE — TELEPHONE ENCOUNTER
Henrry Aguilar MD Holen, Megan, RN  Caller: Unspecified (Yesterday,  4:04 PM)  Yes we have a scheduling department with [facility].  The scheduling department should get a hold of the patient to set up the appointment.  Here is their number [phone] just in case he gets lost in the shuffle.  Patient can call that number.     Henrry Aguilar MD  You 2 hours ago (11:08 AM)     OCTAVIANO Huffman,     We want the patient to come back in 1 week to check for ureter stent patency so that we can do a capping trial of his nephrostomy tube.  If he tolerates his capping trial and the ureter is patent on follow up then potentially we can consider removing the nephrostomy tube for him with urology blessing of course.

## 2022-11-07 ENCOUNTER — TELEPHONE (OUTPATIENT)
Dept: INTERVENTIONAL RADIOLOGY/VASCULAR | Facility: CLINIC | Age: 87
End: 2022-11-07

## 2022-11-07 DIAGNOSIS — N13.9 URINARY OBSTRUCTION: Primary | ICD-10-CM

## 2022-11-07 NOTE — DISCHARGE SUMMARY
[facility] DISCHARGE SUMMARY    Name: Mikaela Figueroa  : 1935  MRN: [id number]  PCP: Derek Kumar    Place of service: [facility]    Admission date: 10/25/2022   Discharge date: 10/27/2022     Surgeon: Dr Bob Dozier     Surgical Procedure:  Cystoscopy, failed left ureteroscopy attempt by Dr Bob Dozier for left ureteral calculus     Date of surgery: 10/25/22    Principal Diagnosis at Discharge:   Calculus of kidney [N20.0]  Ureteral calculus [N20.1]     Other diagnosis addressed during hospitalization:  UTI likely due to obstructing left ureteral calculus and hydronephrosis  Left hydronephrosis     Consults:  Interventional Radiology    Diagnostic Studies/Procedures :  [facility]  LOCATION: [facility]  DATE: 10/26/2022     PROCEDURE: PERCUTANEOUS ANTEGRADE PYELOGRAM AND PERCUTANEOUS NEPHROSTOMY PLACEMENT     ATTENDING: Morro Dawn MD.     INDICATION: 87-year-old male with a distal left ureteral calculus and hydronephrosis. Unsuccessful retrograde stent placement. A nephrostomy is requested.     CONSENT: The risks, benefits, and alternatives of antegrade pyelography and percutaneous nephrostomy tube placement  were discussed with the patient in detail. All questions were answered. Informed consent was given to proceed with the procedure.     MODERATE SEDATION: Versed 0.5 mg IV; Fentanyl 50 mcg IV.  During the timeout, immediately prior to the administration of medications, the patient was reassessed for adequacy to receive conscious sedation. Under physician supervision, Versed and fentanyl   were administered for moderate sedation. Pulse oximetry, heart rate and blood pressure were continuously monitored by an independent trained observer. The physician spent 15 minutes of face-to-face sedation time with the patient.     ADDITIONAL MEDICATIONS: Rocephin 2 grams  IV.     FLUOROSCOPIC TIME: 0.4 minutes.     AIR KERMA:  60 mGy.     CONTRAST: 10 mL  Omnipaque into the collecting system.     UNIVERSAL PRECAUTIONS: The procedure was performed utilizing maximum sterile barrier technique. Prior to the start of the procedure, a standard pause for patient safety was performed with site marking as indicated.     COMPLICATIONS: No immediate complications.     PROCEDURE:    Using real-time sonographic guidance, an 18 gauge trocar needle was inserted into the left renal collecting system from a subcostal approach.  An antegrade pyelogram was performed. The tract was dilated over a wire and exchange was made for a  10 Arabic   nephrostomy tube.  The look was formed within the renal pelvis. A post placement nephrostogram was performed. The catheter was sutured to the skin and placed to gravity bag drainage.     FINDINGS:  Ultrasound demonstrates marked left renal hydronephrosis. A permanent image was stored.     The antegrade nephrostogram confirms severe left renal hydronephrosis.     The completion image shows the nephrostomy with loop in the renal pelvis. This controls the urinary system well.                                                                      IMPRESSION:    1.  Successful right percutaneous nephrostomy placement, as detailed above.  2.  A urine specimen was sent for microbiology analysis.    Complications while in the Hospital:  None     Physical Exam:                      Gen: nad, alert  Neuro: A&O x 3. Moving all extremities equally.   Resp: Reg resp rate/depth.   Abdomen: Soft, non-tender, non-distended. No bilateral CVA tenderness. Left PNT exit site is dry, without erythema or edema, PNT suture intact, PNT draining adequate amount of clear light orange tinged urine.   : Penis and scrotum without erythema or edema. Bloom draining adequate amount of clear yellow urine with small amount of dark red sediment in tubing.   LE: CWMS intact. No bilateral LE edema.     Brief history of hospital course:  This is a 87 year old male admitted for Calculus  of kidney [N20.0]  Ureteral calculus [N20.1]. Patient underwent above procedures. Patient tolerated the procedures well. Post operative course was unremarkable. By the day of discharge, the patient was ambulatory, tolerating diet, pain was controlled with oral analgesics, labs and vitals stable, passing flatus. Discharged home with smith and left PNT.    Labs:  Hemoglobin   Date Value Ref Range Status   10/27/2022 10.6 (L) 13.3 - 17.7 g/dL Final     Platelet Count   Date Value Ref Range Status   10/26/2022 153 150 - 450 10e3/uL Final     WBC Count   Date Value Ref Range Status   10/26/2022 11.0 4.0 - 11.0 10e3/uL Final     Creatinine   Date Value Ref Range Status   10/27/2022 1.30 (H) 0.67 - 1.17 mg/dL Final     Potassium   Date Value Ref Range Status   10/27/2022 3.5 3.4 - 5.3 mmol/L Final   10/11/2022 4.5 3.5 - 5.0 mmol/L Final     INR   Date Value Ref Range Status   10/26/2022 1.33 (H) 0.85 - 1.15 Final        Pending Labs:  Surgical Pathology    DISPOSITION: Home    DISCHARGE CONDITION: Good/Stable    DISCHARGE MEDICATIONS:      Review of your medicines      START taking      Dose / Directions   acetaminophen 325 MG tablet  Commonly known as: TYLENOL  Used for: Ureteral calculus      Dose: 325-650 mg  Take 1-2 tablets (325-650 mg) by mouth every 4 hours as needed for other or mild pain (For optimal non-opioid multimodal pain management to improve pain control.)  Refills: 0     cefdinir 300 MG capsule  Commonly known as: OMNICEF  Used for: Urinary tract infection without hematuria, site unspecified      Dose: 300 mg  Take 1 capsule (300 mg) by mouth 2 times daily for 10 days  Quantity: 20 capsule  Refills: 0        CONTINUE these medicines which may have CHANGED, or have new prescriptions. If we are uncertain of the size of tablets/capsules you have at home, strength may be listed as something that might have changed.      Dose / Directions   aspirin 81 MG chewable tablet  Commonly known as: ASA  This may have  changed: These instructions start on November 10, 2022. If you are unsure what to do until then, ask your doctor or other care provider.  Used for: Ureteral calculus      Dose: 81 mg  Start taking on: November 10, 2022  Take 1 tablet (81 mg) by mouth At Bedtime  Refills: 0     Glucosamine-Chondroitin 250-200 MG Caps  This may have changed: medication strength  Used for: Ureteral calculus      Dose: 1 capsule  Take 1 capsule by mouth daily  Refills: 0     tamsulosin 0.4 MG capsule  Commonly known as: FLOMAX  This may have changed: when to take this  Used for: BPH (benign prostatic hyperplasia)      TAKE 1 CAPSULE AT BEDTIME  Quantity: 90 capsule  Refills: 3        CONTINUE these medicines which have NOT CHANGED      Dose / Directions   bacitracin 500 UNIT/GM Oint  Used for: Urinary retention      Apply topically 3 times daily Apply to tip of penis while smith catheter is present.  Refills: 0     dorzolamide 2 % ophthalmic solution  Commonly known as: TRUSOPT      Dose: 1 drop  Place 1 drop into both eyes 2 times daily  Refills: 0     doxycycline hyclate 100 MG tablet  Commonly known as: VIBRA-TABS  Used for: Rosacea      [DOXYCYCLINE (VIBRA-TABS) 100 MG TABLET] TAKE 1 TABLET BY MOUTH DAILY  Quantity: 90 tablet  Refills: 3     latanoprost 0.005 % ophthalmic solution  Commonly known as: XALATAN      Dose: 1 drop  Place 1 drop into both eyes At Bedtime  Refills: 0     lisinopril 10 MG tablet  Commonly known as: ZESTRIL  Used for: Essential hypertension      TAKE 1 TABLET DAILY  Quantity: 90 tablet  Refills: 3     metroNIDAZOLE 1 % external gel  Commonly known as: METROGEL  Used for: Rosacea      APPLY A SMALL AMOUNT TO AFFECTED AREA(S) TOPICALLY ONCE DAILY  Quantity: 60 g  Refills: 11     multivitamin, therapeutic Tabs tablet      Dose: 1 tablet  Take 1 tablet by mouth daily  Refills: 0     triamcinolone 0.1 % external cream  Commonly known as: KENALOG      Apply topically 2 times daily as needed for  irritation  Refills: 0           Where to get your medicines      These medications were sent to [facility] [place], [place] - [address]  [address], [facility] [place] [facility] [address]    Phone: [phone]     cefdinir 300 MG capsule    doxycycline hyclate 100 MG tablet     Some of these will need a paper prescription and others can be bought over the counter. Ask your nurse if you have questions.    You don't need a prescription for these medications    acetaminophen 325 MG tablet    aspirin 81 MG chewable tablet    Glucosamine-Chondroitin 250-200 MG Caps         DISCHARGE PLAN:   - [facility] will call you to arrange nephrostogram with antegrade ureteral stent placement next week. The nephrostomy tube should be maintained after stent is placed (it can be capped). After your IR procedure, please call [facility] to update Dr. Dozier and he will then arrange for stone removal surgery ([phone]).  - Follow up with Derek Kumar as needed   - Take medication as prescribed, avoid anticoagulants per surgeon and PCP recommendations.   - Wound / drain care: As directed prior to discharge  - Physical activity: As tolerated, no heavy lifting. No driving while taking narcotics.   - Diet: Regular diet   - Medication: please review discharge Med req/AVS  - Warning signs discussed with patient about when to call the clinic/hospital  - All questions and concerns were answered for the patient prior to discharge  - Patient verbalized understanding.     Discussed patient with Dr Bob Dozier on day of discharge.     NAHUN Junior, CNP  [facility]   [phone]     I saw the patient on the date of discharge  Total time spent for discharge on date of discharge: 20 minutes    Physician(s) in addition to primary physician who should receive a copy:  CC1: Derek Kumar           ?

## 2022-11-08 ENCOUNTER — HOSPITAL ENCOUNTER (OUTPATIENT)
Dept: INTERVENTIONAL RADIOLOGY/VASCULAR | Facility: HOSPITAL | Age: 87
Discharge: HOME | End: 2022-11-08
Admitting: RADIOLOGY
Payer: MEDICARE

## 2022-11-08 VITALS
HEIGHT: 68 IN | RESPIRATION RATE: 16 BRPM | WEIGHT: 169 LBS | OXYGEN SATURATION: 98 % | DIASTOLIC BLOOD PRESSURE: 76 MMHG | SYSTOLIC BLOOD PRESSURE: 165 MMHG | TEMPERATURE: 98 F | BODY MASS INDEX: 25.61 KG/M2 | HEART RATE: 79 BPM

## 2022-11-08 DIAGNOSIS — N13.9 URINARY OBSTRUCTION: Primary | ICD-10-CM

## 2022-11-08 DIAGNOSIS — N13.9 URINARY OBSTRUCTION: ICD-10-CM

## 2022-11-08 PROCEDURE — 50431 NJX PX NFROSGRM &/URTRGRM: CPT

## 2022-11-08 PROCEDURE — 250N000011 HC RX IP 250 OP 636: Performed by: RADIOLOGY

## 2022-11-08 RX ORDER — NALOXONE HYDROCHLORIDE 0.4 MG/ML
0.2 INJECTION, SOLUTION INTRAMUSCULAR; INTRAVENOUS; SUBCUTANEOUS
Status: DISCONTINUED | OUTPATIENT
Start: 2022-11-08 | End: 2022-11-09 | Stop reason: HOSPADM

## 2022-11-08 RX ORDER — IOPAMIDOL 755 MG/ML
75 INJECTION, SOLUTION INTRAVASCULAR ONCE
Status: COMPLETED | OUTPATIENT
Start: 2022-11-08 | End: 2022-11-08

## 2022-11-08 RX ORDER — NALOXONE HYDROCHLORIDE 0.4 MG/ML
0.4 INJECTION, SOLUTION INTRAMUSCULAR; INTRAVENOUS; SUBCUTANEOUS
Status: DISCONTINUED | OUTPATIENT
Start: 2022-11-08 | End: 2022-11-09 | Stop reason: HOSPADM

## 2022-11-08 RX ORDER — FENTANYL CITRATE 50 UG/ML
25-50 INJECTION, SOLUTION INTRAMUSCULAR; INTRAVENOUS EVERY 5 MIN PRN
Status: DISCONTINUED | OUTPATIENT
Start: 2022-11-08 | End: 2022-11-09 | Stop reason: HOSPADM

## 2022-11-08 RX ORDER — LIDOCAINE 40 MG/G
CREAM TOPICAL
Status: DISCONTINUED | OUTPATIENT
Start: 2022-11-08 | End: 2022-11-09 | Stop reason: HOSPADM

## 2022-11-08 RX ORDER — FLUMAZENIL 0.1 MG/ML
0.2 INJECTION, SOLUTION INTRAVENOUS
Status: DISCONTINUED | OUTPATIENT
Start: 2022-11-08 | End: 2022-11-09 | Stop reason: HOSPADM

## 2022-11-08 RX ADMIN — IOPAMIDOL 50 ML: 755 INJECTION, SOLUTION INTRAVENOUS at 12:45

## 2022-11-08 NOTE — DISCHARGE INSTRUCTIONS
Capped Percutaneous Nephrostomy tube (PNT) Discharge Instructions:  You are going home with your nephrostomy tube (PNT) capped (not connected to a drainage bag) for trial. This trial will help determine if it is safe to remove your nephrostomy tube at a future appointment.    Care Instructions:  - If you received sedation for your procedure, do not drive or operate heavy machinery for the rest of the day.  - Rest after your PNT was capped. Avoid strenuous activity and heavy lifting for the next 2 days. Return to your normal activities as you tolerate after the 2 day restriction.  - Keep the nephrostomy tube site clean and dry.   - You may shower, but do not submerge the nephrostomy tube site in water (avoid tub baths, Jacuzzis, hot tubs and pools). Cover your PNT exit site with plastic wrap while showering.  - If you have a gauze dressing, change the gauze and tape dressing as needed to keep site clean and dry. If you have a securement device, leave in place until your next exchange.  - Clean around nephrostomy tubes exit sites with soap and water, pat and apply new split gauze around the tube at the exit site.    Follow-Up:  - [facility] will contact you to arrange for follow up nephrostogram and likely PNT removal for next week. ***    Attach your PNT to the gravity bag provided to you if you experience any of the following:  - Nephrostomy tube(s) sites are leaking urine.  - Extreme pain at the nephrostomy tube(s) sites or extreme flank pain.  - No output through your smith catheter.    Contact [facility] at [phone]:  - If you continue to experience any of the above symptoms despite attaching your gravity bag.  - If your nephrostomy tube(s) appears to be falling out or has fallen out or breaks.    Seek Medical Evaluation for the following:  - Fever (greater than 101 F (38.3C)).  - Purulent (yellow/green/foul smelling) drainage from nephrostomy tube exit sites.  - Significant bleeding from  nephrostomy tube site(s).  - Significant or worsening pain at nephrostomy tube exit site(s) or back.

## 2022-11-08 NOTE — PROGRESS NOTES
Interventional Radiology - Pre-Procedure Note:  11/8/2022    Procedure Requested: LEFT Nephrostomy Tube Check  Requested by: NAHUN SANDOVAL CNP    History and Physical Reviewed: H&P documented within 30 days (by David Elkins DO on 10/27/2022). I have personally reviewed the patient's medical history and have updated the medical record as necessary.    Brief HPI: Mikaela Figueroa is a 87 year old male with a PMH of HTN, pacemaker placement, left ureterovesical junction calculus s/p two failed attempts at left ureteroscopy and laser lithotripsy, and hydronephrosis who presents for a LEFT nephrostomy tube check. He had a ureteral stent successfully placed 11/01/2022, at which time LEFT PCN was left in place, see imaging below. LEFT PCN left in place for another attempted stone removal surgery per discussion with family, which has not been scheduled yet. He follows with Urology, unsure of when next appointment is scheduled. He had had the majority of his output come out of his nephrostomy tube the last few days, whereas it was originally coming out of his smith catheter prior. Presents today for nephrostogram with possible intervention including possible initiation of capping trial.     IMAGING:  IR Ureteral Stent Placement Left 11/01/2022  [facility]  LOCATION: [facility]  DATE: 11/1/2022     PROCEDURE: LEFT URETERAL STENT PLACEMENT VIA EXISTING NEPHROSTOMY TUBE ACCESS     INTERVENTIONAL RADIOLOGIST: Henrry Aguilar MD.     INDICATION: Left ureteral obstruction with indwelling left nephrostomy tube here for placement of left ureteral stent. Urology requests to keep the left nephrostomy tube in place after ureteral stent placement..     CONSENT: The risks, benefits and alternatives of left ureteral stent placement were discussed with the patient  in detail. All questions were answered. Informed consent was given to proceed with the procedure.     MODERATE SEDATION: Versed 2  mg IV; Fentanyl 100 mcg IV.  Under physician supervision, Versed and fentanyl were administered for moderate sedation. Pulse oximetry, heart rate and blood pressure were continuously monitored by an independent trained   observer. The physician spent 30 minutes of face-to-face sedation time with the patient. During the timeout, immediately prior to administration of medications, the patient was reassessed for adequacy to receive conscious sedation.      CONTRAST: 40 cc of Visipaque  ANTIBIOTICS: 1 g of Rocephin IV  ADDITIONAL MEDICATIONS: None.     FLUOROSCOPIC TIME: 12.2 minutes.  RADIATION DOSE: Air Kerma: 254 mGy.     COMPLICATIONS: No immediate complications.     STERILE BARRIER TECHNIQUE: Maximum sterile barrier technique was used. Cutaneous antisepsis was performed at the operative site with application of 2% chlorhexidine and large sterile drape. Prior to the procedure, the  and assistant performed   hand hygiene and wore hat, mask, sterile gown, and sterile gloves during the entire procedure.     PROCEDURE:    Contrast was injected through the indwelling left nephrostomy tube demonstrating the tube to be in good position. Over a stiff Glidewire the existing left 10 Gambian nephrostomy tube was removed. Using a combination of the Glidewire and a 5 Gambian KMP   catheter the wire was manipulated through the ureter and eventually into the bladder. The Glidewire was then exchanged for a stiff Amplatz wire. Over the stiff Amplatz wire an 8 Gambian by 26 cm ureteral stent was placed. Over the wire a left 10 Gambian   nephrostomy tube was placed. Contrast injected through the nephrostomy tube to confirm good position within the left renal pelvis.                                                                         IMPRESSION:    Successful placement of left ureteral stent. The indwelling left nephrostomy tube was replaced and left in place per request of urology. Patient is to return in one week for  "potential capping trial.    NPO: Midnight  ANTICOAGULANTS: ASA 81 mg  ANTIBIOTICS: Not needed    ALLERGIES  No Known Allergies    LABS:  INR   Date Value Ref Range Status   10/26/2022 1.33 (H) 0.85 - 1.15 Final      Hemoglobin   Date Value Ref Range Status   10/27/2022 10.6 (L) 13.3 - 17.7 g/dL Final   ]  Platelet Count   Date Value Ref Range Status   10/26/2022 153 150 - 450 10e3/uL Final     Creatinine   Date Value Ref Range Status   10/27/2022 1.30 (H) 0.67 - 1.17 mg/dL Final     Potassium   Date Value Ref Range Status   10/27/2022 3.5 3.4 - 5.3 mmol/L Final   10/11/2022 4.5 3.5 - 5.0 mmol/L Final     EXAM:  BP (!) 165/76 (BP Location: Left arm, Cuff Size: Adult Regular)   Pulse 79   Temp 98  F (36.7  C)   Resp 16   Ht 1.734 m (5' 8.27\")   Wt 76.7 kg (169 lb)   SpO2 98%   BMI 25.50 kg/m    General:  Stable.  In no acute distress.    Neuro:  A&O x 3. Moves all extremities equally.  Resp:  Lungs clear to auscultation bilaterally.  Cardio:  S1S2 and reg, without murmur, clicks or rubs  Skin:  Without excoriations, ecchymosis, erythema, lesions or open sores. LEFT PCN CDI, clear yellow urine noted in bag.    Pre-Sedation Assessment:  Mallampati Airway Classification:  II - Faucial pillars and soft palate may be seen, but uvula is masked by the base of the tongue  Previous reaction to anesthesia/sedation:  No  Sedation plan based on assessment: Moderate (conscious) sedation as needed  ASA Classification: Class 3 - SEVERE SYSTEMIC DISEASE, DEFINITE FUNCTIONAL LIMITATIONS.   Code Status: FULL CODE    ASSESSMENT/PLAN:   LEFT nephrostogram with possible intervention including initiation of capping trial with sedation as needed.    Procedure, risks/benefits, details, alternatives, and sedation reviewed with patient/family and both verbalized understanding. All questions answered. OK to proceed with above radiology procedure.     NAHUN SANDOVAL CNP  Interventional Radiology    "

## 2022-11-08 NOTE — PRE-PROCEDURE
GENERAL PRE-PROCEDURE:   Procedure:  Left nephrostogram  Date/Time:  11/8/2022 12:35 PM    Written consent obtained?: Yes    Risks and benefits: Risks, benefits and alternatives were discussed    Consent given by:  Patient  Patient states understanding of procedure being performed: Yes    Patient's understanding of procedure matches consent: Yes    Procedure consent matches procedure scheduled: Yes    Expected level of sedation:  Moderate (as needed)  Appropriately NPO:  Yes  ASA Class:  3  Mallampati  :  Grade 2- soft palate, base of uvula, tonsillar pillars, and portion of posterior pharyngeal wall visible  Lungs:  Lungs clear with good breath sounds bilaterally  Heart:  Normal heart sounds and rate  History & Physical reviewed:  History and physical reviewed and no updates needed  Statement of review:  I have reviewed the lab findings, diagnostic data, medications, and the plan for sedation

## 2022-11-08 NOTE — IP AVS SNAPSHOT
[facility] Interventional Radiology  [address] [place] [id number]  Phone: [phone]  Fax: [phone]                                    After Visit Summary   11/8/2022    Mikaela Figueroa   MRN: [id number]           After Visit Summary Signature Page    I have received my discharge instructions, and my questions have been answered. I have discussed any challenges I see with this plan with the nurse or doctor.    ..........................................................................................................................................  Patient/Patient Representative Signature      ..........................................................................................................................................  Patient Representative Print Name and Relationship to Patient    ..................................................               ................................................  Date                                   Time    ..........................................................................................................................................  Reviewed by Signature/Title    ...................................................              ..............................................  Date                                               Time          22EPIC Rev 08/18

## 2022-11-08 NOTE — PROVIDER NOTIFICATION
Pt given discharge instructions without questions or concerns and pt ambulates from IR without difficulty with daughter.

## 2022-11-14 ENCOUNTER — TELEPHONE (OUTPATIENT)
Dept: INTERVENTIONAL RADIOLOGY/VASCULAR | Facility: HOSPITAL | Age: 87
End: 2022-11-14

## 2022-11-14 PROBLEM — H44.23 BILATERAL DEGENERATIVE PROGRESSIVE HIGH MYOPIA: Status: ACTIVE | Noted: 2022-11-14

## 2022-11-14 PROBLEM — N52.9 MALE ERECTILE DISORDER: Status: ACTIVE | Noted: 2022-11-14

## 2022-11-14 RX ORDER — ASPIRIN 81 MG/1
TABLET, CHEWABLE ORAL
COMMUNITY
Start: 2022-11-10 | End: 2022-12-15

## 2022-11-14 RX ORDER — CEFDINIR 300 MG/1
CAPSULE ORAL
COMMUNITY
Start: 2022-10-28 | End: 2022-11-15

## 2022-11-14 RX ORDER — DOXYCYCLINE HYCLATE 100 MG
TABLET ORAL
COMMUNITY
Start: 2022-11-06 | End: 2022-11-15

## 2022-11-15 ENCOUNTER — OFFICE VISIT (OUTPATIENT)
Dept: FAMILY MEDICINE | Facility: CLINIC | Age: 87
End: 2022-11-15
Payer: MEDICARE

## 2022-11-15 VITALS
SYSTOLIC BLOOD PRESSURE: 132 MMHG | DIASTOLIC BLOOD PRESSURE: 80 MMHG | HEIGHT: 68 IN | BODY MASS INDEX: 24.67 KG/M2 | RESPIRATION RATE: 18 BRPM | WEIGHT: 162.8 LBS | OXYGEN SATURATION: 98 % | HEART RATE: 81 BPM

## 2022-11-15 DIAGNOSIS — I10 ESSENTIAL HYPERTENSION: ICD-10-CM

## 2022-11-15 DIAGNOSIS — N20.0 NEPHROLITHIASIS: ICD-10-CM

## 2022-11-15 DIAGNOSIS — R73.01 IMPAIRED FASTING GLUCOSE: ICD-10-CM

## 2022-11-15 DIAGNOSIS — N13.8 BPH WITH URINARY OBSTRUCTION: ICD-10-CM

## 2022-11-15 DIAGNOSIS — D64.9 NORMOCHROMIC NORMOCYTIC ANEMIA: ICD-10-CM

## 2022-11-15 DIAGNOSIS — N40.1 BPH WITH URINARY OBSTRUCTION: ICD-10-CM

## 2022-11-15 DIAGNOSIS — E78.00 PURE HYPERCHOLESTEROLEMIA: ICD-10-CM

## 2022-11-15 DIAGNOSIS — Z01.818 PREOP GENERAL PHYSICAL EXAM: Primary | ICD-10-CM

## 2022-11-15 DIAGNOSIS — Z95.0 CARDIAC PACEMAKER IN SITU: ICD-10-CM

## 2022-11-15 PROBLEM — Z86.16 PERSONAL HISTORY OF COVID-19: Status: ACTIVE | Noted: 2022-10-09

## 2022-11-15 PROBLEM — U07.1 COVID-19: Status: ACTIVE | Noted: 2022-10-09

## 2022-11-15 PROCEDURE — 99214 OFFICE O/P EST MOD 30 MIN: CPT | Performed by: FAMILY MEDICINE

## 2022-11-15 RX ORDER — LIDOCAINE 40 MG/G
CREAM TOPICAL
Status: CANCELLED | OUTPATIENT
Start: 2022-11-15

## 2022-11-15 ASSESSMENT — PAIN SCALES - GENERAL: PAINLEVEL: NO PAIN (0)

## 2022-11-15 NOTE — PROGRESS NOTES
[facility]  [address] [place] [address] [address] [facility] [address]  [facility] [address]  Phone: [phone]  Fax: [phone]  Primary Provider: Derek Kumar  Pre-op Performing Provider: OWEN TRINH      PREOPERATIVE EVALUATION:  Today's date: 11/15/2022    Mikaela Figueroa is a 87 year old male who presents for a preoperative evaluation.    Surgical Information:  Surgery/Procedure: kidney stone procedure  Surgery Location: [facility]   Surgeon: Dr. Dozier  Surgery Date: 11/21/22  Time of Surgery: tbd  Where patient plans to recover: At home with family  Fax number for surgical facility:    Type of Anesthesia Anticipated: to be determined    Assessment & Plan     The proposed surgical procedure is considered INTERMEDIATE risk.    Mikaela was seen today for pre-op exam and recheck medication.    Diagnoses and all orders for this visit:    Preop general physical exam    Nephrolithiasis    Essential hypertension    BPH with urinary obstruction    Cardiac pacemaker in situ, dual chamber    Pure hypercholesterolemia    Impaired fasting glucose    Normochromic normocytic anemia       Implanted Device:   - Type of device: Medtronic pacemaker 8/28/19 (PACEMAKER MOI XT DR COLETTE PERRY) Patient advised to bring device information on day of surgery.      Risks and Recommendations:  The patient has the following additional risks and recommendations for perioperative complications:   - No identified additional risk factors other than previously addressed    Medication Instructions:  Patient is to take all scheduled medications on the day of surgery EXCEPT for modifications listed below:    RECOMMENDATION:  APPROVAL GIVEN to proceed with proposed procedure, without further diagnostic evaluation.      Subjective     HPI related to upcoming procedure:     He has a recent complicated history of nephrolithiasis extreme causing hydronephrosis requiring ureteral stents and percutaneous nephrostomy tube  placement.    His medical history is noted to include hypertension, benign prostatic hypertrophy, pacemaker placement due to history of syncope believed to be caused by heart block, elevated PSA, recent L3 vertebral compression fracture with severe spinal stenosis.  He had incidentally found COVID in early October 2022.    As of October 27 he had a urine culture that had no bacterial growth.  Kidney function that had improved from a GFR of 49 up to 54 at the time.  Hemoglobin that had improved from 9.8 up to 10.6.    At this time he has a percutaneous nephrostomy tube, a ureteral stent in place and a Bloom catheter that is draining clear yellow urine    He has no prior history of significant problems related to anesthesia.    He has no signs or symptoms of an acute infectious process.      Preop Questions 11/15/2022   1. Have you ever had a heart attack or stroke? No   2. Have you ever had surgery on your heart or blood vessels, such as a stent placement, a coronary artery bypass, or surgery on an artery in your head, neck, heart, or legs? No   3. Do you have chest pain with activity? No   4. Do you have a history of  heart failure? No   5. Do you currently have a cold, bronchitis or symptoms of other infection? No   6. Do you have a cough, shortness of breath, or wheezing? No   7. Do you or anyone in your family have previous history of blood clots? No   8. Do you or does anyone in your family have a serious bleeding problem such as prolonged bleeding following surgeries or cuts? No   9. Have you ever had problems with anemia or been told to take iron pills? No   10. Have you had any abnormal blood loss such as black, tarry or bloody stools? No   11. Have you ever had a blood transfusion? No   12. Are you willing to have a blood transfusion if it is medically needed before, during, or after your surgery? Yes   13. Have you or any of your relatives ever had problems with anesthesia? No   14. Do you have sleep apnea,  excessive snoring or daytime drowsiness? Snores - no other symptoms of apnea   14a. Do you have a CPAP machine? No   15. Do you have any artifical heart valves or other implanted medical devices like a pacemaker, defibrillator, or continuous glucose monitor? YES - Medtronic pacemaker 8/28/19 (PACEMAKER MOI XT DR COLETTE PERRY)   15a. What type of device do you have? pacemaker   15b. Name of the clinic that manages your device:  [facility]   16. Do you have artificial joints? No   17. Are you allergic to latex? No       Health Care Directive:  Patient does not have a Health Care Directive or Living Will:   [id number]}    Status of Chronic Conditions:  See problem list for active medical problems.  Problems all longstanding and stable, except as noted/documented.  See ROS for pertinent symptoms related to these conditions.      Review of Systems  Complete review of systems is obtained.  Other than the specific considerations noted above complete review of systems is negative.      Patient Active Problem List    Diagnosis Date Noted     Bilateral degenerative progressive high myopia 11/14/2022     Priority: Medium     Stable OD, OS.       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       Dystrophy of anterior cornea 08/11/2021     Priority: Medium     Encounter for medication refill 08/11/2021     Priority: Medium     Keratoconjunctivitis sicca (H)  08/11/2021     Priority: Medium     Minimal OD, OS.       Need for prophylactic vaccination and inoculation against single disease 08/11/2021     Priority: Medium     Presbyopia 08/11/2021     Priority: Medium     Progressive high myopia 08/11/2021     Priority: Medium     Stable OD, OS.       Routine general medical examination at a health care facility 08/11/2021     Priority: Medium     h/o fx last yr--check DXA       Syncope, unspecified syncope type 03/11/2020     Priority: Medium     Left upper lobe pulmonary nodule 09/03/2019     Priority: Medium     Cardiac pacemaker in situ, dual chamber 09/03/2019     Priority: Medium     Medtronic [id number] Sandra XT  MRI, RA and RV Leads: Medtronic [id number]   CapSureFix Novus MRI SureScan, DOI 08/28/2019 by Dr. Douglas Mensah, [place], [address] [facility], [place] [address].         Mobitz type 1 second degree atrioventricular block 08/27/2019     Priority: Medium     Essential hypertension with goal blood pressure less than 130/80 11/28/2017     Priority: Medium     Open-angle glaucoma of both eyes, mild stage, unspecified open-angle glaucoma type 11/28/2017     Priority: Medium     BPH with urinary obstruction      Priority: Medium     Created by Conversion         Diverticulosis      Priority: Medium     Created by Conversion  [facility] Annotation: Jun 21 2012  8:27Derek Momni: sigmoid   colon   noted on abdominal/pelvis CT  Replacement Utility updated for latest IMO load         Hypertension      Priority: Medium     Created by Conversion  Replacement Utility updated for latest IMO load         Right inguinal hernia 05/27/2016     Priority: Medium     small, reducible, asymptomatic         Hearing loss 11/24/2015     Priority: Medium     Glaucoma 11/24/2015     Priority: Medium     Rosacea      Priority: Medium     Created by Conversion         Nephrolithiasis      Priority: Medium     Created by Conversion         Hypercholesterolemia       Priority: Medium     Created by Conversion         Impaired Fasting Glucose      Priority: Medium     Created by Conversion         Serology Prostate-specific Antigen (PSA) Elevated      Priority: Medium     Created by Conversion  [facility] Annotation: Jan 2 2009  1:27PM - Derek Kumar: 3.94 (12/06)   ->   3.38 (9/07) -> 4.83 (9/10/08)         Male Erectile Disorder      Priority: Medium     Created by Conversion         Squamous Cell Carcinoma Of The Skin      Priority: Medium     Created by Conversion         History of hepatitis B 01/01/1990     Priority: Medium      Past Medical History:   Diagnosis Date     Hypertension      Pacemaker      Past Surgical History:   Procedure Laterality Date     APPENDECTOMY       ARTHROSCOPY KNEE Right      CYSTOURETEROSCOPY, WITH LITHOTRIPSY USING ZABRINA 120P LASER AND URETERAL STENT INSERTION Left 10/25/2022    Procedure: CYSTOSCOPY, LEFT URETEROSCOPY, LASER LITHOTRIPSY;  Surgeon: Bob Dozier MD;  [facility]     GENITOURINARY SURGERY       IR NEPHROSTOMY TUBE PLACEMENT LEFT  10/26/2022     IR URETERAL STENT PLACEMENT LEFT  11/1/2022     Current Outpatient Medications   Medication Sig Dispense Refill     acetaminophen (TYLENOL) 325 MG tablet Take 1-2 tablets (325-650 mg) by mouth every 4 hours as needed for other or mild pain (For optimal non-opioid multimodal pain management to improve pain control.)       aspirin (ASA) 81 MG chewable tablet        aspirin (ASA) 81 MG chewable tablet Take 1 tablet (81 mg) by mouth At Bedtime       bacitracin 500 UNIT/GM OINT Apply topically 3 times daily Apply to tip of penis while smith catheter is present.       dorzolamide (TRUSOPT) 2 % ophthalmic solution Place 1 drop into both eyes 2 times daily       doxycycline hyclate (VIBRA-TABS) 100 MG tablet [DOXYCYCLINE (VIBRA-TABS) 100 MG TABLET] TAKE 1 TABLET BY MOUTH DAILY 90 tablet 3     Glucosamine-Chondroitin 250-200 MG CAPS Take 1 capsule by mouth daily        latanoprost (XALATAN) 0.005 % ophthalmic solution Place 1 drop into both eyes At Bedtime       lisinopril (ZESTRIL) 10 MG tablet TAKE 1 TABLET DAILY 90 tablet 3     metroNIDAZOLE (METROGEL) 1 % external gel APPLY A SMALL AMOUNT TO AFFECTED AREA(S) TOPICALLY ONCE DAILY 60 g 11     multivitamin, therapeutic (THERA-VIT) TABS tablet Take 1 tablet by mouth daily       tamsulosin (FLOMAX) 0.4 MG capsule TAKE 1 CAPSULE AT BEDTIME (Patient taking differently: Take 0.4 mg by mouth every morning) 90 capsule 3     triamcinolone (KENALOG) 0.1 % external cream Apply topically 2 times daily as needed for irritation       cefdinir (OMNICEF) 300 MG capsule  (Patient not taking: Reported on 11/15/2022)       doxycycline hyclate (VIBRA-TABS) 100 MG tablet  (Patient not taking: Reported on 11/15/2022)         No Known Allergies     Social History     Tobacco Use     Smoking status: Former     Types: Pipe     Smokeless tobacco: Never   Substance Use Topics     Alcohol use: No     Family History   Problem Relation Age of Onset     Dementia Mother      Heart Disease Father      Diabetes Father      History   Drug Use No         Objective     There were no vitals taken for this visit.    Physical Exam        General Appearance:    Alert, cooperative, no distress   Eyes:   No scleral icterus or conjunctival irritation       Ears:    Normal TM's and external ear canals, both ears   Throat:   Lips, mucosa, and tongue normal; teeth and gums normal   Neck:   Supple, symmetrical, trachea midline, no adenopathy;        thyroid:  No enlargement/tenderness/nodules   Lungs:     Clear to auscultation bilaterally, respirations unlabored, no wheezes or crackles   Heart:    Regular rate and rhythm,  No murmur   Abdomen:    Soft, no distention, no tenderness on palpation, no masses, no organomegaly     Extremities:  No edema, no joint swelling or redness, no evidence of any injuries   Skin:  No concerning skin findings, no suspicious moles, no rashes    Neurologic:  On gross examination there is no motor or sensory deficit.                   Recent Labs   Lab Test 10/27/22  0641 10/26/22  0614 10/20/22  1036 10/01/22  2249 10/01/22  1932 07/06/22  0758   HGB 10.6* 9.8* 10.8*   < > 12.6* 12.0*   PLT  --  153 131*   < > 152 129*   INR  --  1.33*  --   --  1.05  --     146* 145   < > 143 146*   POTASSIUM 3.5 4.1 4.2   < > 4.0 4.2   CR 1.30* 1.41* 1.33*   < > 1.53* 1.30*   A1C  --   --  5.7*  --   --  5.7*    < > = values in this interval not displayed.        Diagnostics:  No labs were ordered during this visit.  Previous labs obtained within 30 days reviewed and determined that they did not need to be repeated.  No EKG this visit, completed in the last 90 days.  EKG obtained on October 1, 2022 reviewed no indication for a repeat at this time.    Revised Cardiac Risk Index (RCRI):  The patient has the following serious cardiovascular risks for perioperative complications:   - No serious cardiac risks = 0 points     RCRI Interpretation: 0 points: Class I (very low risk - 0.4% complication rate)           Signed Electronically by: Daniel Garcia MD, MD  Copy of this evaluation report is provided to requesting physician.

## 2022-11-16 ENCOUNTER — HOSPITAL ENCOUNTER (OUTPATIENT)
Dept: INTERVENTIONAL RADIOLOGY/VASCULAR | Facility: HOSPITAL | Age: 87
Discharge: HOME | End: 2022-11-16
Admitting: RADIOLOGY
Payer: MEDICARE

## 2022-11-16 VITALS
OXYGEN SATURATION: 100 % | RESPIRATION RATE: 16 BRPM | TEMPERATURE: 98.2 F | HEART RATE: 76 BPM | DIASTOLIC BLOOD PRESSURE: 75 MMHG | SYSTOLIC BLOOD PRESSURE: 166 MMHG

## 2022-11-16 DIAGNOSIS — N13.9 URINARY OBSTRUCTION: ICD-10-CM

## 2022-11-16 PROCEDURE — 50431 NJX PX NFROSGRM &/URTRGRM: CPT

## 2022-11-16 PROCEDURE — 255N000002 HC RX 255 OP 636

## 2022-11-16 RX ORDER — NALOXONE HYDROCHLORIDE 0.4 MG/ML
0.4 INJECTION, SOLUTION INTRAMUSCULAR; INTRAVENOUS; SUBCUTANEOUS
Status: DISCONTINUED | OUTPATIENT
Start: 2022-11-16 | End: 2022-11-17 | Stop reason: HOSPADM

## 2022-11-16 RX ORDER — FENTANYL CITRATE 50 UG/ML
25-50 INJECTION, SOLUTION INTRAMUSCULAR; INTRAVENOUS EVERY 5 MIN PRN
Status: DISCONTINUED | OUTPATIENT
Start: 2022-11-16 | End: 2022-11-17 | Stop reason: HOSPADM

## 2022-11-16 RX ORDER — NALOXONE HYDROCHLORIDE 0.4 MG/ML
0.2 INJECTION, SOLUTION INTRAMUSCULAR; INTRAVENOUS; SUBCUTANEOUS
Status: DISCONTINUED | OUTPATIENT
Start: 2022-11-16 | End: 2022-11-17 | Stop reason: HOSPADM

## 2022-11-16 RX ADMIN — IOHEXOL 10 ML: 350 INJECTION, SOLUTION INTRAVENOUS at 12:13

## 2022-11-16 NOTE — PROGRESS NOTES
Interventional Radiology - Pre-Procedure Note:  11/15/2022    Procedure Requested: LEFT nephrostomy tube check  Requested by: NAHUN SANDOVAL CNP    History and Physical Reviewed: H&P documented within 30 days (by Daniel Garcia MD on 11/15/2022). I have personally reviewed the patient's medical history and have updated the medical record as necessary.    Brief HPI: Mikaela Figueroa is a 87 year old male with a PMH of HTN, pacemaker placement, left ureterovesical junction calculus s/p two failed attempts at left ureteroscopy and laser lithotripsy, and hydronephrosis who presents for a LEFT nephrostomy tube check. He had a ureteral stent successfully placed 11/01/2022, at which time LEFT PCN was left in place per urology request. Per patient's daughter, this was done for another anticipated stone removal surgery. He has a stone removal surgery scheduled for 11/21. Last nephrostogram on 11/08/2022 demonstrated patent double-J stents, capping trial initiated, see imaging below. Presents today for nephrostogram with possible intervention.    Spoke with CNP with [facility] for clarification regarding PNT. Left PNT is to be left in place for possible access for stone removal procedure.    IMAGING:  IR Nephrostomy Tube Check Left 11/08/2022  LOCATION: [facility]  DATE: 11/8/2022     PROCEDURE: LEFT NEPHROSTOGRAM     ATTENDING: Rigoberto Salinas MD.     INDICATION: Distal left ureteral calculus. Indwelling double-J ureteral stent, placed on 11/1/2022. Patient has been to gravity drainage. Patient returns for nephrostogram with initiation of a capping trial.     CONSENT: The procedure, risks and benefits of nephrostomy tube injection with possible exchange were discussed with the patient  in detail. All questions were answered. Informed consent was given to proceed with the procedure.     MODERATE SEDATION: None  CONTRAST: Omnipaque 350: 50 mL.  ADDITIONAL MEDICATIONS: none     FLUOROSCOPIC  TIME: 1.7 minutes.  RADIATION DOSE: Air Kerma: 28 mGy     COMPLICATIONS: No immediate complications.     PROCEDURE: Contrast was injected through the existing nephrostomy tube and nephrostogram obtained.     FINDINGS:  NEPHROSTOGRAM: Indwelling nephrostomy tube with a loop formed within the renal pelvis. Adequate position indwelling double-J ureteral stent with the superior loop within the left renal pelvis and inferior loop within the bladder. Stent is patent with   adequate flow of contrast into the bladder. Trabeculated, superiorly displaced bladder likely due to bladder outlet obstruction with the underlying enlarged prostatic gland.                                                                      IMPRESSION:  1.  Patent left double-J stents. Initiation of a capping trial.     PLAN:  Capping trial. Patient was sent home with a gravity bag in case he fails a capping trial. Repeat nephrostogram in one week.    NPO: Ate breakfast, any intervention to be done with FENTANYL ONLY  ANTICOAGULANTS: ASA 81 mg  ANTIBIOTICS: Not needed    ALLERGIES  No Known Allergies    LABS:  INR   Date Value Ref Range Status   10/26/2022 1.33 (H) 0.85 - 1.15 Final      Hemoglobin   Date Value Ref Range Status   10/27/2022 10.6 (L) 13.3 - 17.7 g/dL Final     Platelet Count   Date Value Ref Range Status   10/26/2022 153 150 - 450 10e3/uL Final     Creatinine   Date Value Ref Range Status   10/27/2022 1.30 (H) 0.67 - 1.17 mg/dL Final     Potassium   Date Value Ref Range Status   10/27/2022 3.5 3.4 - 5.3 mmol/L Final   10/11/2022 4.5 3.5 - 5.0 mmol/L Final     EXAM:  BP (!) 166/75 (BP Location: Left arm)   Pulse 76   Temp 98.2  F (36.8  C) (Oral)   Resp 16   SpO2 100%   General:  Stable.  In no acute distress.    Neuro:  A&O x 3. Moves all extremities equally.  Resp:  Lungs clear to auscultation bilaterally.  Cardio:  S1S2 and reg, without murmur, clicks or rubs  Skin:  Without excoriations, ecchymosis, erythema, lesions or open  sores. LEFT PNT CDI.    Pre-Sedation Assessment:  Previous reaction to anesthesia/sedation:  No  Sedation plan based on assessment: Fentanyl only as needed  ASA Classification: Class 3 - SEVERE SYSTEMIC DISEASE, DEFINITE FUNCTIONAL LIMITATIONS.   Code Status: FULL CODE     ASSESSMENT/PLAN:   LEFT nephrostogram with possible intervention with fentanyl only as needed. PNT MUST BE LEFT IN PLACE FOR STONE REMOVAL SURGERY.    Procedure, risks/benefits, details, alternatives, and sedation reviewed with patient/family and both verbalized understanding. All questions answered. OK to proceed with above radiology procedure.     NAHUN SANDOVAL CNP  Interventional Radiology

## 2022-11-16 NOTE — DISCHARGE INSTRUCTIONS
Capped Percutaneous Nephrostomy tube (PNT) Discharge Instructions:  You are going home with your nephrostomy tube (PNT) capped (not connected to a drainage bag) for trial. This trial will help determine if it is safe to remove your nephrostomy tube at a future appointment.    Care Instructions:  - If you received sedation for your procedure, do not drive or operate heavy machinery for the rest of the day.  - Rest after your PNT was capped. Avoid strenuous activity and heavy lifting for the next 2 days. Return to your normal activities as you tolerate after the 2 day restriction.  - Keep the nephrostomy tube site clean and dry.   - You may shower, but do not submerge the nephrostomy tube site in water (avoid tub baths, Jacuzzis, hot tubs and pools). Cover your PNT exit site with plastic wrap while showering.  - If you have a gauze dressing, change the gauze and tape dressing as needed to keep site clean and dry. If you have a securement device, leave in place until your next exchange.  - Clean around nephrostomy tubes exit sites with soap and water, pat and apply new split gauze around the tube at the exit site.    Follow-Up:  - [facility] will contact you to arrange for follow up nephrostogram and likely PNT removal for next week. ***    Attach your PNT to the gravity bag provided to you if you experience any of the following:  - Nephrostomy tube(s) sites are leaking urine.  - Extreme pain at the nephrostomy tube(s) sites or extreme flank pain.    Contact [facility] at [phone]***:  - If you continue to experience any of the above symptoms despite attaching your gravity bag.  - If your nephrostomy tube(s) appears to be falling out or has fallen out or breaks.    Seek Medical Evaluation for the following:  - Fever (greater than 101 F (38.3C)).  - Purulent (yellow/green/foul smelling) drainage from nephrostomy tube exit sites.  - Significant bleeding from nephrostomy tube site(s).  - Significant or  worsening pain at nephrostomy tube exit site(s) or back.

## 2022-11-16 NOTE — PRE-PROCEDURE
GENERAL PRE-PROCEDURE:   Procedure:  Left nephrostogram  Date/Time:  11/16/2022 11:51 AM    Written consent obtained?: Yes    Risks and benefits: Risks, benefits and alternatives were discussed    Consent given by:  Patient  Patient states understanding of procedure being performed: Yes    Patient's understanding of procedure matches consent: Yes    Procedure consent matches procedure scheduled: Yes    : fentanyl only if needed.  Appropriately NPO:  Yes  ASA Class:  3  Lungs:  Lungs clear with good breath sounds bilaterally  Heart:  Normal heart sounds and rate  History & Physical reviewed:  History and physical reviewed and no updates needed  Statement of review:  I have reviewed the lab findings, diagnostic data, medications, and the plan for sedation

## 2022-11-16 NOTE — IP AVS SNAPSHOT
[facility] Interventional Radiology  [address] [place] [id number]  Phone: [phone]  Fax: [phone]                                    After Visit Summary   11/16/2022    Mikaela Figueroa   MRN: [id number]           After Visit Summary Signature Page    I have received my discharge instructions, and my questions have been answered. I have discussed any challenges I see with this plan with the nurse or doctor.    ..........................................................................................................................................  Patient/Patient Representative Signature      ..........................................................................................................................................  Patient Representative Print Name and Relationship to Patient    ..................................................               ................................................  Date                                   Time    ..........................................................................................................................................  Reviewed by Signature/Title    ...................................................              ..............................................  Date                                               Time          22EPIC Rev 08/18

## 2022-11-16 NOTE — PROGRESS NOTES
Pt discharged to home accompanied by daughter.  Instructions reviewed and acknowledged.  Pt ambulatory and independent.

## 2022-11-28 ENCOUNTER — TRANSFERRED RECORDS (OUTPATIENT)
Dept: HEALTH INFORMATION MANAGEMENT | Facility: CLINIC | Age: 87
End: 2022-11-28

## 2022-12-07 ENCOUNTER — TELEPHONE (OUTPATIENT)
Dept: FAMILY MEDICINE | Facility: CLINIC | Age: 87
End: 2022-12-07

## 2022-12-15 ENCOUNTER — OFFICE VISIT (OUTPATIENT)
Dept: FAMILY MEDICINE | Facility: CLINIC | Age: 87
End: 2022-12-15
Payer: MEDICARE

## 2022-12-15 VITALS
DIASTOLIC BLOOD PRESSURE: 60 MMHG | SYSTOLIC BLOOD PRESSURE: 138 MMHG | HEART RATE: 88 BPM | RESPIRATION RATE: 18 BRPM | WEIGHT: 158 LBS | BODY MASS INDEX: 23.83 KG/M2 | OXYGEN SATURATION: 88 %

## 2022-12-15 DIAGNOSIS — Z95.0 CARDIAC PACEMAKER IN SITU: ICD-10-CM

## 2022-12-15 DIAGNOSIS — D64.9 NORMOCHROMIC NORMOCYTIC ANEMIA: ICD-10-CM

## 2022-12-15 DIAGNOSIS — I10 ESSENTIAL HYPERTENSION: ICD-10-CM

## 2022-12-15 DIAGNOSIS — N20.0 NEPHROLITHIASIS: ICD-10-CM

## 2022-12-15 DIAGNOSIS — R73.01 IMPAIRED FASTING GLUCOSE: ICD-10-CM

## 2022-12-15 DIAGNOSIS — N40.1 BPH WITH URINARY OBSTRUCTION: ICD-10-CM

## 2022-12-15 DIAGNOSIS — N18.30 STAGE 3 CHRONIC KIDNEY DISEASE, UNSPECIFIED WHETHER STAGE 3A OR 3B CKD (H): ICD-10-CM

## 2022-12-15 DIAGNOSIS — Z01.818 PREOP GENERAL PHYSICAL EXAM: Primary | ICD-10-CM

## 2022-12-15 DIAGNOSIS — N13.8 BPH WITH URINARY OBSTRUCTION: ICD-10-CM

## 2022-12-15 LAB
ANION GAP SERPL CALCULATED.3IONS-SCNC: 10 MMOL/L (ref 7–15)
BUN SERPL-MCNC: 27.3 MG/DL (ref 8–23)
CALCIUM SERPL-MCNC: 9.3 MG/DL (ref 8.8–10.2)
CHLORIDE SERPL-SCNC: 110 MMOL/L (ref 98–107)
CREAT SERPL-MCNC: 1.32 MG/DL (ref 0.67–1.17)
DEPRECATED HCO3 PLAS-SCNC: 25 MMOL/L (ref 22–29)
ERYTHROCYTE [DISTWIDTH] IN BLOOD BY AUTOMATED COUNT: 13.4 % (ref 10–15)
GFR SERPL CREATININE-BSD FRML MDRD: 53 ML/MIN/1.73M2
GLUCOSE SERPL-MCNC: 98 MG/DL (ref 70–99)
HCT VFR BLD AUTO: 34 % (ref 40–53)
HGB BLD-MCNC: 10.8 G/DL (ref 13.3–17.7)
MCH RBC QN AUTO: 30.4 PG (ref 26.5–33)
MCHC RBC AUTO-ENTMCNC: 31.8 G/DL (ref 31.5–36.5)
MCV RBC AUTO: 96 FL (ref 78–100)
PLATELET # BLD AUTO: 278 10E3/UL (ref 150–450)
POTASSIUM SERPL-SCNC: 4.5 MMOL/L (ref 3.4–5.3)
RBC # BLD AUTO: 3.55 10E6/UL (ref 4.4–5.9)
SODIUM SERPL-SCNC: 145 MMOL/L (ref 136–145)
WBC # BLD AUTO: 12.1 10E3/UL (ref 4–11)

## 2022-12-15 PROCEDURE — 85027 COMPLETE CBC AUTOMATED: CPT | Performed by: FAMILY MEDICINE

## 2022-12-15 PROCEDURE — 36415 COLL VENOUS BLD VENIPUNCTURE: CPT | Performed by: FAMILY MEDICINE

## 2022-12-15 PROCEDURE — 99214 OFFICE O/P EST MOD 30 MIN: CPT | Performed by: FAMILY MEDICINE

## 2022-12-15 PROCEDURE — 80048 BASIC METABOLIC PNL TOTAL CA: CPT | Performed by: FAMILY MEDICINE

## 2022-12-15 NOTE — PROGRESS NOTES
[facility]  [address] [facility] [address]  [facility] [address]  Phone: [phone]  Fax: [phone]  Primary Provider: Jose De Jesus Serna  Pre-op Performing Provider: JOSE DE JESUS SERNA      PREOPERATIVE EVALUATION:  Today's date: 12/15/2022    Mikaela Figueroa is a 87 year old male who presents for a preoperative evaluation.    Surgical Information:  Surgery/Procedure: ureter stint removal  Surgery Location: [facility]  Surgeon: Dr. Dozier  Surgery Date: 12/16/22  Time of Surgery: TBD  Where patient plans to recover: At home with family  Fax number for surgical facility: [facility]    Type of Anesthesia Anticipated: General    Preop general physical exam  Preoperative examination completed.  No contraindication to upcoming procedure for ureteral stent removal.    Nephrolithiasis  History of nephrolithiasis reviewed.  History of prior hydronephrosis noted.  Had ureteral stent placement with subsequent removal.  Residual stent fragment described requiring subsequent procedure as scheduled for definitive removal.    Essential hypertension  Hypertension appears well controlled currently.  Patient remains on lisinopril 10 mg daily.    Stage 3 chronic kidney disease, unspecified whether stage 3a or 3b CKD (H)  CKD stage III with recent creatinine 1.30 and GFR 54 on October 27, 2022 through this office.  Will ensure adequate hydration.  We will continue to follow closely  - Basic metabolic panel    BPH with urinary obstruction  History of BPH with urinary obstruction reviewed.    Normochromic normocytic anemia  Normochromic normocytic anemia reviewed.  Update CBC to ensure stable findings.  - CBC with platelets    Impaired fasting glucose  Impaired fasting glucose historically.  Dietary and exercise modification to continue.    Cardiac pacemaker in situ, dual chamber  Pacemaker in place.         Subjective     HPI related to upcoming procedure: Patient seen today for preoperative examination.  Had prior right  "uteroscopy procedure October 25, 2022 with Dr. Dozier with [facility].  Had recent bilateral severe hydroureteronephrosis with large ureteral stone presents noted.  Was to arrange cystoscopy, lithotripsy for ureteral stone, possible ureteral stent placements in approx 2-3 weeks following hospital discharge October 5, 2022.  Had acute kidney injury.  Was positive for COVID-19 at time of admission October 1, 2022 without residual issues.    Subsequent to stent placement as noted.  Attempts to remove stent with residual fragment described requiring subsequent procedure for definitive removal unfortunately.    Noted severe spinal canal stenosis on lumbar CT showing acute L3 compression fracture.  Back brace utilized previously however now not requiring.  History of BPH with urinary obstruction with benefits of tamsulosin 0.4 mg daily.  Recent removal of Bloom catheter a week ago.  Small amount of urine frequently.  No dysuria or urgency.  Lisinopril 10 mg daily for hypertension.  Has had elevated cholesterol diet controlled historically.  Annual wellness visit scheduled January 31, 2023.  Denies recent illness.  No cough or shortness of breath.  No fever.  Comprehensive review of systems as above otherwise all negative.      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 Quick, [facility])   TRUS with bx 10/7/11 biopsies negative (followed by Dr. Colindres)   Eye exam " "with Dr. Duarte in this building (monitoring for +/- glaucoma) - followed q year   Dr. Colindres, [facility] every year for elevated PSA, etc. (h/o TRUS with bx negative)  Dr. Beavers, dermatology for q 6 month checks re: \"precancerous\" lesions and rosacea; Mohs procedure left preauricular location 3/17/16  Bilateral hearing aids with h/o hearing loss (began using March, 2015)  Pacemaker 8/28/19 (PACEMAKER MOI XT DR COLETTE PERRY)  Attends [employer] in [place], [place]      Preop Questions 12/15/2022   1. Have you ever had a heart attack or stroke? No   2. Have you ever had surgery on your heart or blood vessels, such as a stent placement, a coronary artery bypass, or surgery on an artery in your head, neck, heart, or legs? No   3. Do you have chest pain with activity? No   4. Do you have a history of  heart failure? No   5. Do you currently have a cold, bronchitis or symptoms of other infection? No   6. Do you have a cough, shortness of breath, or wheezing? No   7. Do you or anyone in your family have previous history of blood clots? No   8. Do you or does anyone in your family have a serious bleeding problem such as prolonged bleeding following surgeries or cuts? No   9. Have you ever had problems with anemia or been told to take iron pills? No   10. Have you had any abnormal blood loss such as black, tarry or bloody stools? No   11. Have you ever had a blood transfusion? No   12. Are you willing to have a blood transfusion if it is medically needed before, during, or after your surgery? Yes   13. Have you or any of your relatives ever had problems with anesthesia? No   14. Do you have sleep apnea, excessive snoring or daytime drowsiness? No   14a. Do you have a CPAP machine? -   15. Do you have any artifical heart valves or other implanted medical devices like a pacemaker, defibrillator, or continuous glucose monitor? YES - Medtronic pacemaker 8/28/19 (PACEMAKER MOI XT DR COLETTE HSU " RENATO PERRY)    15a. What type of device do you have? pacemaker   15b. Name of the clinic that manages your device:  [facility]   16. Do you have artificial joints? No   17. Are you allergic to latex? No     Health Care Directive:  Patient does not have a Health Care Directive or Living Will: Patient states has Advance Directive and will bring in a copy to clinic.    Preoperative Review of :   reviewed - no record of controlled substances prescribed.      Status of Chronic Conditions:  See problem list for active medical problems.  Problems all longstanding and stable, except as noted/documented.  See ROS for pertinent symptoms related to these conditions.      Review of Systems  CONSTITUTIONAL: NEGATIVE for fever, chills, change in weight  INTEGUMENTARY/SKIN: NEGATIVE for worrisome rashes, moles or lesions  EYES: NEGATIVE for vision changes or irritation  ENT/MOUTH: NEGATIVE for ear, mouth and throat problems  RESP: NEGATIVE for significant cough or SOB  CV: NEGATIVE for chest pain, palpitations or peripheral edema  GI: NEGATIVE for nausea, abdominal pain, heartburn, or change in bowel habits  : NEGATIVE for frequency, dysuria, or hematuria  MUSCULOSKELETAL: NEGATIVE for significant arthralgias or myalgia  NEURO: NEGATIVE for weakness, dizziness or paresthesias  ENDOCRINE: NEGATIVE for temperature intolerance, skin/hair changes  HEME: NEGATIVE for bleeding problems  PSYCHIATRIC: NEGATIVE for changes in mood or affect    Patient Active Problem List    Diagnosis Date Noted     Bilateral degenerative progressive high myopia 11/14/2022     Priority: Medium     Stable OD, OS.       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       Need for prophylactic vaccination and inoculation against single disease 08/11/2021     Priority: Medium     Presbyopia 08/11/2021     Priority: Medium     Progressive high myopia 08/11/2021     Priority: Medium     Stable OD, OS.       Routine general medical examination at a health care facility 08/11/2021     Priority: Medium     h/o fx last yr--check DXA       Syncope, unspecified syncope type 03/11/2020     Priority: Medium     Left upper lobe pulmonary nodule 09/03/2019     Priority: Medium     Cardiac pacemaker in situ, dual chamber 09/03/2019     Priority: Medium     Medtronic [id number] [place] XT  MRI, RA and RV Leads: Medtronic [id number]   CapSureFix Novus MRI SureScan, DOI 08/28/2019 by Dr. Douglas Mensah, [facility], [facility], [place] [address].         Mobitz type 1 second degree atrioventricular block 08/27/2019     Priority: Medium     Essential hypertension with goal blood pressure less than 130/80 11/28/2017     Priority: Medium     Open-angle glaucoma of both eyes, mild stage, unspecified open-angle glaucoma type 11/28/2017     Priority: Medium     BPH with urinary obstruction      Priority: Medium     Created by Conversion         Diverticulosis      Priority: Medium     Created by Conversion  [facility] Annotation: Jun 21 2012  8:27Derek Momin: sigmoid   colon   noted on abdominal/pelvis CT  Replacement Utility updated for latest IMO load         Hypertension       Priority: Medium     Created by Conversion  Replacement Utility updated for latest IMO load         Right inguinal hernia 05/27/2016     Priority: Medium     small, reducible, asymptomatic         Hearing loss 11/24/2015     Priority: Medium     Glaucoma 11/24/2015     Priority: Medium     Rosacea      Priority: Medium     Created by Conversion         Nephrolithiasis      Priority: Medium     Created by Conversion         Hypercholesterolemia      Priority: Medium     Created by Conversion         Impaired Fasting Glucose      Priority: Medium     Created by Conversion         Serology Prostate-specific Antigen (PSA) Elevated      Priority: Medium     Created by Conversion  [facility] Annotation: Jan 2 2009  1:27PM - KumarDerek: 3.94 (12/06)   ->   3.38 (9/07) -> 4.83 (9/10/08)         Male Erectile Disorder      Priority: Medium     Created by Conversion         Squamous Cell Carcinoma Of The Skin      Priority: Medium     Created by Conversion         History of hepatitis B 01/01/1990     Priority: Medium      Past Medical History:   Diagnosis Date     Hypertension      Pacemaker      Past Surgical History:   Procedure Laterality Date     APPENDECTOMY       ARTHROSCOPY KNEE Right      CYSTOURETEROSCOPY, WITH LITHOTRIPSY USING ZABRINA 120P LASER AND URETERAL STENT INSERTION Left 10/25/2022    Procedure: CYSTOSCOPY, LEFT URETEROSCOPY, LASER LITHOTRIPSY;  Surgeon: Bob Dozire MD;  Location: [facility] OR     GENITOURINARY SURGERY       IR NEPHROSTOMY TUBE PLACEMENT LEFT  10/26/2022     IR URETERAL STENT PLACEMENT LEFT  11/1/2022     Current Outpatient Medications   Medication Sig Dispense Refill     acetaminophen (TYLENOL) 325 MG tablet Take 1-2 tablets (325-650 mg) by mouth every 4 hours as needed for other or mild pain (For optimal non-opioid multimodal pain management to improve pain control.)       aspirin (ASA) 81 MG chewable tablet Take 1 tablet (81 mg) by mouth At Bedtime       bacitracin 500  UNIT/GM OINT Apply topically 3 times daily Apply to tip of penis while smith catheter is present.       dorzolamide (TRUSOPT) 2 % ophthalmic solution Place 1 drop into both eyes 2 times daily       doxycycline hyclate (VIBRA-TABS) 100 MG tablet [DOXYCYCLINE (VIBRA-TABS) 100 MG TABLET] TAKE 1 TABLET BY MOUTH DAILY 90 tablet 3     Glucosamine-Chondroitin 250-200 MG CAPS Take 1 capsule by mouth daily       latanoprost (XALATAN) 0.005 % ophthalmic solution Place 1 drop into both eyes At Bedtime       lisinopril (ZESTRIL) 10 MG tablet TAKE 1 TABLET DAILY 90 tablet 3     metroNIDAZOLE (METROGEL) 1 % external gel APPLY A SMALL AMOUNT TO AFFECTED AREA(S) TOPICALLY ONCE DAILY 60 g 11     multivitamin, therapeutic (THERA-VIT) TABS tablet Take 1 tablet by mouth daily       tamsulosin (FLOMAX) 0.4 MG capsule TAKE 1 CAPSULE AT BEDTIME (Patient taking differently: Take 0.4 mg by mouth every morning) 90 capsule 3     triamcinolone (KENALOG) 0.1 % external cream Apply topically 2 times daily as needed for irritation       aspirin (ASA) 81 MG chewable tablet          No Known Allergies     Social History     Tobacco Use     Smoking status: Former     Types: Pipe     Smokeless tobacco: Never   Substance Use Topics     Alcohol use: No     Family History   Problem Relation Age of Onset     Dementia Mother      Heart Disease Father      Diabetes Father      History   Drug Use No         Objective     /60   Pulse 88   Resp 18   Wt 71.7 kg (158 lb)   SpO2 (!) 88%   BMI 23.83 kg/m      Physical Exam    GENERAL APPEARANCE: healthy, alert and no distress     EYES: EOMI,  PERRL     HENT: ear canals and TM's normal and nose and mouth without ulcers or lesions     NECK: no adenopathy, no asymmetry, masses, or scars and thyroid normal to palpation     RESP: lungs clear to auscultation - no rales, rhonchi or wheezes     CV: regular rates and rhythm, normal S1 S2, no S3 or S4 and no murmur, click or rub     ABDOMEN:  soft, nontender, no  HSM or masses and bowel sounds normal     MS: extremities normal- no gross deformities noted, no evidence of inflammation in joints, FROM in all extremities.     SKIN: no suspicious lesions or rashes     NEURO: Normal strength and tone, sensory exam grossly normal, mentation intact and speech normal     PSYCH: mentation appears normal. and affect normal/bright     LYMPHATICS: No cervical adenopathy    Recent Labs   Lab Test 10/27/22  0641 10/26/22  0614 10/20/22  1036 10/01/22  2249 10/01/22  1932 07/06/22  0758   HGB 10.6* 9.8* 10.8*   < > 12.6* 12.0*   PLT  --  153 131*   < > 152 129*   INR  --  1.33*  --   --  1.05  --     146* 145   < > 143 146*   POTASSIUM 3.5 4.1 4.2   < > 4.0 4.2   CR 1.30* 1.41* 1.33*   < > 1.53* 1.30*   A1C  --   --  5.7*  --   --  5.7*    < > = values in this interval not displayed.        Diagnostics:  Labs pending at this time.  Results will be reviewed when available.  No results found for this or any previous visit (from the past 24 hour(s)).     No EKG this visit, completed in the last 90 days.  Sinus rhythm with 1st degree A-V block Incomplete right bundle branch block Left anterior fascicular block Abnormal ECG No previous ECGs available Confirmed by SEE ED PROVIDER NOTE FOR, ECG INTERPRETATION (4000),  Tammy Gentile ([id number]) on 10/2/2022 6:02:5    Revised Cardiac Risk Index (RCRI):  The patient has the following serious cardiovascular risks for perioperative complications:   - No serious cardiac risks = 0 points     RCRI Interpretation: 0 points: Class I (very low risk - 0.4% complication rate)           Signed Electronically by: Derek Kumar MD  Copy of this evaluation report is provided to requesting physician.

## 2022-12-15 NOTE — PATIENT INSTRUCTIONS
For informational purposes only. Not to replace the advice of your health care provider. Copyright   2003,  [place] [facility]. All rights reserved. Clinically reviewed by Kristin Porter MD. [employer] [id number] - REV .  Preparing for Your Surgery  Getting started  A nurse will call you to review your health history and instructions. They will give you an arrival time based on your scheduled surgery time. Please be ready to share:    Your doctor's clinic name and phone number    Your medical, surgical, and anesthesia history    A list of allergies and sensitivities    A list of medicines, including herbal treatments and over-the-counter drugs    Whether the patient has a legal guardian (ask how to send us the papers in advance)  Please tell us if you're pregnant--or if there's any chance you might be pregnant. Some surgeries may injure a fetus (unborn baby), so they require a pregnancy test. Surgeries that are safe for a fetus don't always need a test, and you can choose whether to have one.   If you have a child who's having surgery, please ask for a copy of Preparing for Your Child's Surgery.    Preparing for surgery    Within 10 to 30 days of surgery: Have a pre-op exam (sometimes called an H&P, or History and Physical). This can be done at a clinic or pre-operative center.  ? If you're having a , you may not need this exam. Talk to your care team.    At your pre-op exam, talk to your care team about all medicines you take. If you need to stop any medicines before surgery, ask when to start taking them again.  ? We do this for your safety. Many medicines can make you bleed too much during surgery. Some change how well surgery (anesthesia) drugs work.    Call your insurance company to let them know you're having surgery. (If you don't have insurance, call [phone].)    Call your clinic if there's any change in your health. This includes signs of a cold or flu (sore throat, runny nose,  cough, rash, fever). It also includes a scrape or scratch near the surgery site.    If you have questions on the day of surgery, call your hospital or surgery center.  Eating and drinking guidelines  For your safety: Unless your surgeon tells you otherwise, follow the guidelines below.    Eat and drink as usual until 8 hours before you arrive for surgery. After that, no food or milk.    Drink clear liquids until 2 hours before you arrive. These are liquids you can see through, like water, Gatorade, and Propel Water. They also include plain black coffee and tea (no cream or milk), candy, and breath mints. You can spit out gum when you arrive.    If you drink alcohol: Stop drinking it the night before surgery.    If your care team tells you to take medicine on the morning of surgery, it's okay to take it with a sip of water.  Preventing infection    Shower or bathe the night before and morning of your surgery. Follow the instructions your clinic gave you. (If no instructions, use regular soap.)    Don't shave or clip hair near your surgery site. We'll remove the hair if needed.    Don't smoke or vape the morning of surgery. You may chew nicotine gum up to 2 hours before surgery. A nicotine patch is okay.  ? Note: Some surgeries require you to completely quit smoking and nicotine. Check with your surgeon.    Your care team will make every effort to keep you safe from infection. We will:  ? Clean our hands often with soap and water (or an alcohol-based hand rub).  ? Clean the skin at your surgery site with a special soap that kills germs.  ? Give you a special gown to keep you warm. (Cold raises the risk of infection.)  ? Wear special hair covers, masks, gowns and gloves during surgery.  ? Give antibiotic medicine, if prescribed. Not all surgeries need antibiotics.  What to bring on the day of surgery    Photo ID and insurance card    Copy of your health care directive, if you have one    Glasses and hearing aids (bring  cases)  ? You can't wear contacts during surgery    Inhaler and eye drops, if you use them (tell us about these when you arrive)    CPAP machine or breathing device, if you use them    A few personal items, if spending the night    If you have . . .  ? A pacemaker, ICD (cardiac defibrillator) or other implant: Bring the ID card.  ? An implanted stimulator: Bring the remote control.  ? A legal guardian: Bring a copy of the certified (court-stamped) guardianship papers.  Please remove any jewelry, including body piercings. Leave jewelry and other valuables at home.  If you're going home the day of surgery    You must have a responsible adult drive you home. They should stay with you overnight as well.    If you don't have someone to stay with you, and you aren't safe to go home alone, we may keep you overnight. Insurance often won't pay for this.  After surgery  If it's hard to control your pain or you need more pain medicine, please call your surgeon's office.  Questions?   If you have any questions for your care team, list them here: _________________________________________________________________________________________________________________________________________________________________________ ____________________________________ ____________________________________ ____________________________________

## 2023-01-04 ENCOUNTER — ANCILLARY PROCEDURE (OUTPATIENT)
Dept: CARDIOLOGY | Facility: CLINIC | Age: 88
End: 2023-01-04
Attending: INTERNAL MEDICINE
Payer: MEDICARE

## 2023-01-04 VITALS
RESPIRATION RATE: 16 BRPM | SYSTOLIC BLOOD PRESSURE: 122 MMHG | OXYGEN SATURATION: 98 % | DIASTOLIC BLOOD PRESSURE: 48 MMHG | BODY MASS INDEX: 23.68 KG/M2 | WEIGHT: 157 LBS | HEART RATE: 77 BPM

## 2023-01-04 DIAGNOSIS — I49.5 SSS (SICK SINUS SYNDROME) (H): Primary | ICD-10-CM

## 2023-01-04 DIAGNOSIS — R55 SYNCOPE, UNSPECIFIED SYNCOPE TYPE: ICD-10-CM

## 2023-01-04 DIAGNOSIS — Z95.0 PACEMAKER: ICD-10-CM

## 2023-01-04 DIAGNOSIS — I44.1 SECOND DEGREE MOBITZ I AV BLOCK: Primary | ICD-10-CM

## 2023-01-04 DIAGNOSIS — I47.29 NSVT (NONSUSTAINED VENTRICULAR TACHYCARDIA) (H): ICD-10-CM

## 2023-01-04 LAB
MDC_IDC_EPISODE_DTM: NORMAL
MDC_IDC_EPISODE_DURATION: 102 S
MDC_IDC_EPISODE_DURATION: 2 S
MDC_IDC_EPISODE_DURATION: 38 S
MDC_IDC_EPISODE_DURATION: 4 S
MDC_IDC_EPISODE_DURATION: 57 S
MDC_IDC_EPISODE_DURATION: 88 S
MDC_IDC_EPISODE_ID: 10
MDC_IDC_EPISODE_ID: 11
MDC_IDC_EPISODE_ID: 12
MDC_IDC_EPISODE_ID: 13
MDC_IDC_EPISODE_ID: 8
MDC_IDC_EPISODE_ID: 9
MDC_IDC_EPISODE_TYPE: NORMAL
MDC_IDC_LEAD_IMPLANT_DT: NORMAL
MDC_IDC_LEAD_IMPLANT_DT: NORMAL
MDC_IDC_LEAD_LOCATION: NORMAL
MDC_IDC_LEAD_LOCATION: NORMAL
MDC_IDC_LEAD_LOCATION_DETAIL_1: NORMAL
MDC_IDC_LEAD_LOCATION_DETAIL_1: NORMAL
MDC_IDC_LEAD_MFG: NORMAL
MDC_IDC_LEAD_MFG: NORMAL
MDC_IDC_LEAD_MODEL: NORMAL
MDC_IDC_LEAD_MODEL: NORMAL
MDC_IDC_LEAD_POLARITY_TYPE: NORMAL
MDC_IDC_LEAD_POLARITY_TYPE: NORMAL
MDC_IDC_LEAD_SERIAL: NORMAL
MDC_IDC_LEAD_SERIAL: NORMAL
MDC_IDC_LEAD_SPECIAL_FUNCTION: NORMAL
MDC_IDC_LEAD_SPECIAL_FUNCTION: NORMAL
MDC_IDC_MSMT_BATTERY_DTM: NORMAL
MDC_IDC_MSMT_BATTERY_REMAINING_LONGEVITY: 137 MO
MDC_IDC_MSMT_BATTERY_RRT_TRIGGER: 2.62
MDC_IDC_MSMT_BATTERY_STATUS: NORMAL
MDC_IDC_MSMT_BATTERY_VOLTAGE: 3.02 V
MDC_IDC_MSMT_LEADCHNL_RA_IMPEDANCE_VALUE: 342 OHM
MDC_IDC_MSMT_LEADCHNL_RA_IMPEDANCE_VALUE: 380 OHM
MDC_IDC_MSMT_LEADCHNL_RA_PACING_THRESHOLD_AMPLITUDE: 0.5 V
MDC_IDC_MSMT_LEADCHNL_RA_PACING_THRESHOLD_PULSEWIDTH: 0.4 MS
MDC_IDC_MSMT_LEADCHNL_RA_SENSING_INTR_AMPL: 1.3 MV
MDC_IDC_MSMT_LEADCHNL_RV_IMPEDANCE_VALUE: 418 OHM
MDC_IDC_MSMT_LEADCHNL_RV_IMPEDANCE_VALUE: 494 OHM
MDC_IDC_MSMT_LEADCHNL_RV_PACING_THRESHOLD_AMPLITUDE: 0.75 V
MDC_IDC_MSMT_LEADCHNL_RV_PACING_THRESHOLD_PULSEWIDTH: 0.4 MS
MDC_IDC_MSMT_LEADCHNL_RV_SENSING_INTR_AMPL: 8.4 MV
MDC_IDC_PG_IMPLANT_DTM: NORMAL
MDC_IDC_PG_MFG: NORMAL
MDC_IDC_PG_MODEL: NORMAL
MDC_IDC_PG_SERIAL: NORMAL
MDC_IDC_PG_TYPE: NORMAL
MDC_IDC_SESS_CLINIC_NAME: NORMAL
MDC_IDC_SESS_DTM: NORMAL
MDC_IDC_SESS_TYPE: NORMAL
MDC_IDC_SET_BRADY_AT_MODE_SWITCH_RATE: 171 {BEATS}/MIN
MDC_IDC_SET_BRADY_HYSTRATE: NORMAL
MDC_IDC_SET_BRADY_LOWRATE: 50 {BEATS}/MIN
MDC_IDC_SET_BRADY_MAX_SENSOR_RATE: 120 {BEATS}/MIN
MDC_IDC_SET_BRADY_MAX_TRACKING_RATE: 120 {BEATS}/MIN
MDC_IDC_SET_BRADY_MODE: NORMAL
MDC_IDC_SET_BRADY_PAV_DELAY_LOW: 180 MS
MDC_IDC_SET_BRADY_SAV_DELAY_LOW: 150 MS
MDC_IDC_SET_LEADCHNL_RA_PACING_AMPLITUDE: NORMAL
MDC_IDC_SET_LEADCHNL_RA_PACING_ANODE_ELECTRODE_1: NORMAL
MDC_IDC_SET_LEADCHNL_RA_PACING_ANODE_LOCATION_1: NORMAL
MDC_IDC_SET_LEADCHNL_RA_PACING_CAPTURE_MODE: NORMAL
MDC_IDC_SET_LEADCHNL_RA_PACING_CATHODE_ELECTRODE_1: NORMAL
MDC_IDC_SET_LEADCHNL_RA_PACING_CATHODE_LOCATION_1: NORMAL
MDC_IDC_SET_LEADCHNL_RA_PACING_POLARITY: NORMAL
MDC_IDC_SET_LEADCHNL_RA_PACING_PULSEWIDTH: 0.4 MS
MDC_IDC_SET_LEADCHNL_RA_SENSING_ANODE_ELECTRODE_1: NORMAL
MDC_IDC_SET_LEADCHNL_RA_SENSING_ANODE_LOCATION_1: NORMAL
MDC_IDC_SET_LEADCHNL_RA_SENSING_CATHODE_ELECTRODE_1: NORMAL
MDC_IDC_SET_LEADCHNL_RA_SENSING_CATHODE_LOCATION_1: NORMAL
MDC_IDC_SET_LEADCHNL_RA_SENSING_POLARITY: NORMAL
MDC_IDC_SET_LEADCHNL_RA_SENSING_SENSITIVITY: 0.3 MV
MDC_IDC_SET_LEADCHNL_RV_PACING_AMPLITUDE: NORMAL
MDC_IDC_SET_LEADCHNL_RV_PACING_ANODE_ELECTRODE_1: NORMAL
MDC_IDC_SET_LEADCHNL_RV_PACING_ANODE_LOCATION_1: NORMAL
MDC_IDC_SET_LEADCHNL_RV_PACING_CAPTURE_MODE: NORMAL
MDC_IDC_SET_LEADCHNL_RV_PACING_CATHODE_ELECTRODE_1: NORMAL
MDC_IDC_SET_LEADCHNL_RV_PACING_CATHODE_LOCATION_1: NORMAL
MDC_IDC_SET_LEADCHNL_RV_PACING_POLARITY: NORMAL
MDC_IDC_SET_LEADCHNL_RV_PACING_PULSEWIDTH: 0.4 MS
MDC_IDC_SET_LEADCHNL_RV_SENSING_ANODE_ELECTRODE_1: NORMAL
MDC_IDC_SET_LEADCHNL_RV_SENSING_ANODE_LOCATION_1: NORMAL
MDC_IDC_SET_LEADCHNL_RV_SENSING_CATHODE_ELECTRODE_1: NORMAL
MDC_IDC_SET_LEADCHNL_RV_SENSING_CATHODE_LOCATION_1: NORMAL
MDC_IDC_SET_LEADCHNL_RV_SENSING_POLARITY: NORMAL
MDC_IDC_SET_LEADCHNL_RV_SENSING_SENSITIVITY: 0.9 MV
MDC_IDC_SET_ZONE_DETECTION_INTERVAL: 350 MS
MDC_IDC_SET_ZONE_DETECTION_INTERVAL: 400 MS
MDC_IDC_SET_ZONE_TYPE: NORMAL
MDC_IDC_STAT_AT_BURDEN_PERCENT: 0 %
MDC_IDC_STAT_AT_DTM_END: NORMAL
MDC_IDC_STAT_AT_DTM_START: NORMAL
MDC_IDC_STAT_AT_MODE_SW_COUNT: 0
MDC_IDC_STAT_BRADY_AP_VP_PERCENT: 0.68 %
MDC_IDC_STAT_BRADY_AP_VS_PERCENT: 26.89 %
MDC_IDC_STAT_BRADY_AS_VP_PERCENT: 0.28 %
MDC_IDC_STAT_BRADY_AS_VS_PERCENT: 72.14 %
MDC_IDC_STAT_BRADY_DTM_END: NORMAL
MDC_IDC_STAT_BRADY_DTM_START: NORMAL
MDC_IDC_STAT_BRADY_RA_PERCENT_PACED: 28.21 %
MDC_IDC_STAT_BRADY_RV_PERCENT_PACED: 0.96 %
MDC_IDC_STAT_EPISODE_RECENT_COUNT: 0
MDC_IDC_STAT_EPISODE_RECENT_COUNT: 0
MDC_IDC_STAT_EPISODE_RECENT_COUNT: 3
MDC_IDC_STAT_EPISODE_RECENT_COUNT_DTM_END: NORMAL
MDC_IDC_STAT_EPISODE_RECENT_COUNT_DTM_START: NORMAL
MDC_IDC_STAT_EPISODE_TOTAL_COUNT: 0
MDC_IDC_STAT_EPISODE_TOTAL_COUNT: 0
MDC_IDC_STAT_EPISODE_TOTAL_COUNT: 9
MDC_IDC_STAT_EPISODE_TOTAL_COUNT_DTM_END: NORMAL
MDC_IDC_STAT_EPISODE_TOTAL_COUNT_DTM_START: NORMAL
MDC_IDC_STAT_EPISODE_TYPE: NORMAL

## 2023-01-04 PROCEDURE — 99204 OFFICE O/P NEW MOD 45 MIN: CPT | Performed by: INTERNAL MEDICINE

## 2023-01-04 PROCEDURE — 93280 PM DEVICE PROGR EVAL DUAL: CPT | Performed by: INTERNAL MEDICINE

## 2023-01-04 NOTE — PROGRESS NOTES
HEART CARE ENCOUNTER CONSULTATON NOTE      [facility]  [phone]      Assessment/Recommendations   Assessment/Plan:  1.  New patient to me today.  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  He has had his pacemaker followed here in the interim but has not been seen by cardiology since October 2019.  He has no specific cardiovascular complaints.  Pacemaker interrogation today demonstrates atrial paced rhythm 28% of the time with very limited pacing in the ventricle.  Heart rates ranged from 50 to 100 bpm.  49 seconds of atrial fibrillation.  There is however some reports of short episodes of nonsustained ventricular tachycardia as well as SVT.  He has not had any awareness of these events.  I have requested that we plan an echocardiogram to evaluate left ventricular systolic function in the setting of ventricular dysrhythmia.  I will comment once it is available for review.    2.  Hypertension.  Blood pressure appears to be under good control.  He is on low-dose lisinopril.  He does have renal insufficiency with the most recent laboratory studies from December 15 were creatinine was mildly elevated at 1.32.    3.  Dyslipidemia.  His lipids are from October 2022 at which time total cholesterol is 114 LDL 67, triglycerides 51 and HDL 37.  He is on aspirin daily.  I do not see any strong reason why he needs to be maintained on aspirin and will comment once the echocardiogram is available for review.    Plan: Echocardiogram  Further recommendations pending the results of the echocardiogram  Continue pacemaker monitoring and follow-up.  Follow-up scheduled in 6 months.         History of Present Illness/Subjective    HPI: Mikaela Figueroa is a 87 year old male who is a new patient to me today.  He was last seen in EP clinic October 2019.  Patient had recurrent syncope felt to be secondary to advanced degree heart block with  subsequent pacemaker implantation.  The pacemaker was completed August 2019 in [place]..  Patient had a Medtronic system placed.  In the chart record there is an echocardiogram reported from August 2019 from [facility] but the full report is not present in the chart record.  Pacemaker interrogation today is completed.  He does have occasional episode of nonsustained ventricular tachycardia as well as an episode of SVT.  He has not been aware of any complaints of palpitations, dizziness, lightheadedness, syncope or near syncope.  He denies chest pain or shortness of breath.  He has had some issues over the past number of months with nephrolithiasis with ureteral stent placement and subsequent removal of ureteral stent in December.  Chronic renal insufficiency.  Hypertension which appears to be well controlled on the current dose of lisinopril.  He has a history of spinal stenosis with a prior L3 compression fracture.  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  He denies any knowledge of prior cardiovascular history.  He tells me that his father had a pacemaker but there was no clear premature coronary artery disease in the chart history.  EF was reported to be normal at that time.        Recent Echocardiogram Results:  Prior echocardiogram completed at [facility] in [place] but the results are not located in the chart record.    Recent Coronary Angiogram Results:    8/26/22  1:37 AM 8/26/22 12:12 PM [id number] [facility]      Narrative & Impression    Type: routine remote pacemaker transmission.  Device: Medtronic Sandra  Pacing%/Programmed: AP 32%,  1.3%, in AAIR<=>DDDR  ppm mode.  Lead(s): stable  Battery longevity: estimated 11yrs 9mos.  Presenting: normal sinus rhythm, rate 67 bpm.  Atrial arrhythmias: since 5/5/22, total time in AT/AF 3 seconds.   Ventricular arrhythmias: since 5/5/22, one nonsustained ventricular high rate episode  on 5/28/22; EGM suggests 15 bts (6 seconds) VT rate 155 bpm.  Device/Lead alerts:   Comments: normal pacemaker function. Routed to device RN.  Plan: due for annual device clinic visit in October 2022. Letter sent asking pt to schedule.  ALIDA Hinton, Device Specialist  Add: continue to monitor. Amairani León, Device RN     I have reviewed and interpreted the device interrogation, settings, programming, and encounter summary. The device is functioning within normal device parameters. I agree with the current findings, assessment and plan.          Physical Examination  Review of Systems   Vitals: 122/48, weight 157 pounds, heart rate 77 and regular, O2 sat 98%.  Wt Readings from Last 3 Encounters:   12/15/22 71.7 kg (158 lb)   11/15/22 73.8 kg (162 lb 12.8 oz)   11/08/22 76.7 kg (169 lb)       General Appearance:   no distress, normal body habitus   ENT/Mouth:  Wearing a facemask      EYES:  no scleral icterus, normal conjunctivae   Neck: no carotid bruits or thyromegaly   Chest/Lungs:   lungs are clear to auscultation, no rales or wheezing, pacemaker site sternal scar intact, equal chest wall expansion    Cardiovascular:   Regular. Normal first and second heart sounds with soft systolic murmur , rubs, or gallops; the carotid, radial and posterior tibial pulses are intact, Jugular venous pressure within normal limits, no significant edema bilaterally    Abdomen:  No bruits, or tenderness; bowel sounds are present   Extremities: no cyanosis or clubbing   Skin: no xanthelasma, warm.    Neurologic: , no tremors     Psychiatric: alert and oriented x3, calm        Please refer above for cardiac ROS details.        Medical History  Surgical History Family History Social History   Past Medical History:   Diagnosis Date     Hypertension      Pacemaker      Past Surgical History:   Procedure Laterality Date     APPENDECTOMY       ARTHROSCOPY KNEE Right      CYSTOURETEROSCOPY, WITH LITHOTRIPSY USING ZABRINA 120P LASER AND URETERAL  STENT INSERTION Left 10/25/2022    Procedure: CYSTOSCOPY, LEFT URETEROSCOPY, LASER LITHOTRIPSY;  Surgeon: Bob Dozier MD;  Location: [facility] OR     GENITOURINARY SURGERY       IR NEPHROSTOMY TUBE PLACEMENT LEFT  10/26/2022     IR URETERAL STENT PLACEMENT LEFT  11/1/2022     Family History   Problem Relation Age of Onset     Dementia Mother      Heart Disease Father      Diabetes Father         Social History     Socioeconomic History     Marital status:      Spouse name: Not on file     Number of children: Not on file     Years of education: Not on file     Highest education level: Not on file   Occupational History     Not on file   Tobacco Use     Smoking status: Former     Types: Pipe     Smokeless tobacco: Never   Substance and Sexual Activity     Alcohol use: No     Drug use: No     Sexual activity: Not on file   Other Topics Concern     Not on file   Social History Narrative     Not on file     Social Determinants of Health     Financial Resource Strain: Not on file   Food Insecurity: Not on file   Transportation Needs: Not on file   Physical Activity: Not on file   Stress: Not on file   Social Connections: Not on file   Intimate Partner Violence: Not on file   Housing Stability: Not on file           Medications  Allergies   Current Outpatient Medications   Medication Sig Dispense Refill     acetaminophen (TYLENOL) 325 MG tablet Take 1-2 tablets (325-650 mg) by mouth every 4 hours as needed for other or mild pain (For optimal non-opioid multimodal pain management to improve pain control.)       aspirin (ASA) 81 MG chewable tablet Take 1 tablet (81 mg) by mouth At Bedtime       bacitracin 500 UNIT/GM OINT Apply topically 3 times daily Apply to tip of penis while smith catheter is present.       dorzolamide (TRUSOPT) 2 % ophthalmic solution Place 1 drop into both eyes 2 times daily       doxycycline hyclate (VIBRA-TABS) 100 MG tablet [DOXYCYCLINE (VIBRA-TABS) 100 MG TABLET] TAKE 1 TABLET BY  MOUTH DAILY 90 tablet 3     Glucosamine-Chondroitin 250-200 MG CAPS Take 1 capsule by mouth daily       latanoprost (XALATAN) 0.005 % ophthalmic solution Place 1 drop into both eyes At Bedtime       lisinopril (ZESTRIL) 10 MG tablet TAKE 1 TABLET DAILY 90 tablet 3     metroNIDAZOLE (METROGEL) 1 % external gel APPLY A SMALL AMOUNT TO AFFECTED AREA(S) TOPICALLY ONCE DAILY 60 g 11     multivitamin, therapeutic (THERA-VIT) TABS tablet Take 1 tablet by mouth daily       tamsulosin (FLOMAX) 0.4 MG capsule TAKE 1 CAPSULE AT BEDTIME (Patient taking differently: Take 0.4 mg by mouth every morning) 90 capsule 3     triamcinolone (KENALOG) 0.1 % external cream Apply topically 2 times daily as needed for irritation       No Known Allergies       Lab Results    Chemistry/lipid CBC Cardiac Enzymes/BNP/TSH/INR   Recent Labs   Lab Test 10/20/22  1036   CHOL 114   HDL 37*   LDL 67   TRIG 51     Recent Labs   Lab Test 10/20/22  1036 01/27/22  1146 12/22/20  0838   LDL 67 86 104     Recent Labs   Lab Test 12/15/22  1219      POTASSIUM 4.5   CHLORIDE 110*   CO2 25   GLC 98   BUN 27.3*   CR 1.32*   GFRESTIMATED 53*   LIBERTAD 9.3     Recent Labs   Lab Test 12/15/22  1219 10/27/22  0641 10/26/22  0614   CR 1.32* 1.30* 1.41*     Recent Labs   Lab Test 10/20/22  1036 07/06/22  0758 01/27/22  1146   A1C 5.7* 5.7* 5.8*          Recent Labs   Lab Test 12/15/22  1219   WBC 12.1*   HGB 10.8*   HCT 34.0*   MCV 96        Recent Labs   Lab Test 12/15/22  1219 10/27/22  0641 10/26/22  0614   HGB 10.8* 10.6* 9.8*    Recent Labs   Lab Test 10/01/22  1932   TROPONINI 0.10     Recent Labs   Lab Test 01/14/22  1423   BNP 35     No results for input(s): TSH in the last 41059 hours.  Recent Labs   Lab Test 10/26/22  0614 10/01/22  1932 06/13/21 2020   INR 1.33* 1.05 1.22*        Daniel Garcia MD

## 2023-01-04 NOTE — LETTER
1/4/2023    Derek Kumar MD  [address]  [facility] [address]    RE: Mikaela Figueroa       Dear Colleague,     I had the pleasure of seeing Mikaela Figueroa in the [facility].    HEART CARE ENCOUNTER CONSULTATON NOTE      [facility]  [phone]      Assessment/Recommendations   Assessment/Plan:  1.  New patient to me today.  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  He has had his pacemaker followed here in the interim but has not been seen by cardiology since October 2019.  He has no specific cardiovascular complaints.  Pacemaker interrogation today demonstrates atrial paced rhythm 28% of the time with very limited pacing in the ventricle.  Heart rates ranged from 50 to 100 bpm.  49 seconds of atrial fibrillation.  There is however some reports of short episodes of nonsustained ventricular tachycardia as well as SVT.  He has not had any awareness of these events.  I have requested that we plan an echocardiogram to evaluate left ventricular systolic function in the setting of ventricular dysrhythmia.  I will comment once it is available for review.    2.  Hypertension.  Blood pressure appears to be under good control.  He is on low-dose lisinopril.  He does have renal insufficiency with the most recent laboratory studies from December 15 were creatinine was mildly elevated at 1.32.    3.  Dyslipidemia.  His lipids are from October 2022 at which time total cholesterol is 114 LDL 67, triglycerides 51 and HDL 37.  He is on aspirin daily.  I do not see any strong reason why he needs to be maintained on aspirin and will comment once the echocardiogram is available for review.    Plan: Echocardiogram  Further recommendations pending the results of the echocardiogram  Continue pacemaker monitoring and follow-up.  Follow-up scheduled in 6 months.         History of Present Illness/Subjective     HPI: Miakela Figueroa is a 87 year old male who is a new patient to me today.  He was last seen in EP clinic October 2019.  Patient had recurrent syncope felt to be secondary to advanced degree heart block with subsequent pacemaker implantation.  The pacemaker was completed August 2019 in [place]..  Patient had a Medtronic system placed.  In the chart record there is an echocardiogram reported from August 2019 from [facility] but the full report is not present in the chart record.  Pacemaker interrogation today is completed.  He does have occasional episode of nonsustained ventricular tachycardia as well as an episode of SVT.  He has not been aware of any complaints of palpitations, dizziness, lightheadedness, syncope or near syncope.  He denies chest pain or shortness of breath.  He has had some issues over the past number of months with nephrolithiasis with ureteral stent placement and subsequent removal of ureteral stent in December.  Chronic renal insufficiency.  Hypertension which appears to be well controlled on the current dose of lisinopril.  He has a history of spinal stenosis with a prior L3 compression fracture.  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  He denies any knowledge of prior cardiovascular history.  He tells me that his father had a pacemaker but there was no clear premature coronary artery disease in the chart history.  EF was reported to be normal at that time.        Recent Echocardiogram Results:  Prior echocardiogram completed at [facility] in [place] but the results are not located in the chart record.    Recent Coronary Angiogram Results:    8/26/22  1:37 AM 8/26/22 12:12 PM [id number] [facility]      Narrative & Impression    Type: routine remote pacemaker transmission.  Device: Medtronic Thrall  Pacing%/Programmed: AP 32%,  1.3%, in AAIR<=>DDDR  ppm mode.  Lead(s): stable  Battery longevity: estimated  11yrs 9mos.  Presenting: normal sinus rhythm, rate 67 bpm.  Atrial arrhythmias: since 5/5/22, total time in AT/AF 3 seconds.   Ventricular arrhythmias: since 5/5/22, one nonsustained ventricular high rate episode on 5/28/22; EGM suggests 15 bts (6 seconds) VT rate 155 bpm.  Device/Lead alerts:   Comments: normal pacemaker function. Routed to device RN.  Plan: due for annual device clinic visit in October 2022. Letter sent asking pt to schedule.  ALIDA Hinton, Device Specialist  Add: continue to monitor. Amairani León, Device RN     I have reviewed and interpreted the device interrogation, settings, programming, and encounter summary. The device is functioning within normal device parameters. I agree with the current findings, assessment and plan.          Physical Examination  Review of Systems   Vitals: 122/48, weight 157 pounds, heart rate 77 and regular, O2 sat 98%.  Wt Readings from Last 3 Encounters:   12/15/22 71.7 kg (158 lb)   11/15/22 73.8 kg (162 lb 12.8 oz)   11/08/22 76.7 kg (169 lb)       General Appearance:   no distress, normal body habitus   ENT/Mouth:  Wearing a facemask      EYES:  no scleral icterus, normal conjunctivae   Neck: no carotid bruits or thyromegaly   Chest/Lungs:   lungs are clear to auscultation, no rales or wheezing, pacemaker site sternal scar intact, equal chest wall expansion    Cardiovascular:   Regular. Normal first and second heart sounds with soft systolic murmur , rubs, or gallops; the carotid, radial and posterior tibial pulses are intact, Jugular venous pressure within normal limits, no significant edema bilaterally    Abdomen:  No bruits, or tenderness; bowel sounds are present   Extremities: no cyanosis or clubbing   Skin: no xanthelasma, warm.    Neurologic: , no tremors     Psychiatric: alert and oriented x3, calm        Please refer above for cardiac ROS details.        Medical History  Surgical History Family History Social History   Past Medical History:   Diagnosis Date      Hypertension      Pacemaker      Past Surgical History:   Procedure Laterality Date     APPENDECTOMY       ARTHROSCOPY KNEE Right      CYSTOURETEROSCOPY, WITH LITHOTRIPSY USING ZABRINA 120P LASER AND URETERAL STENT INSERTION Left 10/25/2022    Procedure: CYSTOSCOPY, LEFT URETEROSCOPY, LASER LITHOTRIPSY;  Surgeon: Bob Dozier MD;  Location: [facility] [place] [place] OR     GENITOURINARY SURGERY       IR NEPHROSTOMY TUBE PLACEMENT LEFT  10/26/2022     IR URETERAL STENT PLACEMENT LEFT  11/1/2022     Family History   Problem Relation Age of Onset     Dementia Mother      Heart Disease Father      Diabetes Father         Social History     Socioeconomic History     Marital status:      Spouse name: Not on file     Number of children: Not on file     Years of education: Not on file     Highest education level: Not on file   Occupational History     Not on file   Tobacco Use     Smoking status: Former     Types: Pipe     Smokeless tobacco: Never   Substance and Sexual Activity     Alcohol use: No     Drug use: No     Sexual activity: Not on file   Other Topics Concern     Not on file   Social History Narrative     Not on file     Social Determinants of Health     Financial Resource Strain: Not on file   Food Insecurity: Not on file   Transportation Needs: Not on file   Physical Activity: Not on file   Stress: Not on file   Social Connections: Not on file   Intimate Partner Violence: Not on file   Housing Stability: Not on file           Medications  Allergies   Current Outpatient Medications   Medication Sig Dispense Refill     acetaminophen (TYLENOL) 325 MG tablet Take 1-2 tablets (325-650 mg) by mouth every 4 hours as needed for other or mild pain (For optimal non-opioid multimodal pain management to improve pain control.)       aspirin (ASA) 81 MG chewable tablet Take 1 tablet (81 mg) by mouth At Bedtime       bacitracin 500 UNIT/GM OINT Apply topically 3 times daily Apply to tip of penis while smith catheter is  present.       dorzolamide (TRUSOPT) 2 % ophthalmic solution Place 1 drop into both eyes 2 times daily       doxycycline hyclate (VIBRA-TABS) 100 MG tablet [DOXYCYCLINE (VIBRA-TABS) 100 MG TABLET] TAKE 1 TABLET BY MOUTH DAILY 90 tablet 3     Glucosamine-Chondroitin 250-200 MG CAPS Take 1 capsule by mouth daily       latanoprost (XALATAN) 0.005 % ophthalmic solution Place 1 drop into both eyes At Bedtime       lisinopril (ZESTRIL) 10 MG tablet TAKE 1 TABLET DAILY 90 tablet 3     metroNIDAZOLE (METROGEL) 1 % external gel APPLY A SMALL AMOUNT TO AFFECTED AREA(S) TOPICALLY ONCE DAILY 60 g 11     multivitamin, therapeutic (THERA-VIT) TABS tablet Take 1 tablet by mouth daily       tamsulosin (FLOMAX) 0.4 MG capsule TAKE 1 CAPSULE AT BEDTIME (Patient taking differently: Take 0.4 mg by mouth every morning) 90 capsule 3     triamcinolone (KENALOG) 0.1 % external cream Apply topically 2 times daily as needed for irritation       No Known Allergies       Lab Results    Chemistry/lipid CBC Cardiac Enzymes/BNP/TSH/INR   Recent Labs   Lab Test 10/20/22  1036   CHOL 114   HDL 37*   LDL 67   TRIG 51     Recent Labs   Lab Test 10/20/22  1036 01/27/22  1146 12/22/20  0838   LDL 67 86 104     Recent Labs   Lab Test 12/15/22  1219      POTASSIUM 4.5   CHLORIDE 110*   CO2 25   GLC 98   BUN 27.3*   CR 1.32*   GFRESTIMATED 53*   LIBERTAD 9.3     Recent Labs   Lab Test 12/15/22  1219 10/27/22  0641 10/26/22  0614   CR 1.32* 1.30* 1.41*     Recent Labs   Lab Test 10/20/22  1036 07/06/22  0758 01/27/22  1146   A1C 5.7* 5.7* 5.8*          Recent Labs   Lab Test 12/15/22  1219   WBC 12.1*   HGB 10.8*   HCT 34.0*   MCV 96        Recent Labs   Lab Test 12/15/22  1219 10/27/22  0641 10/26/22  0614   HGB 10.8* 10.6* 9.8*    Recent Labs   Lab Test 10/01/22  1932   TROPONINI 0.10     Recent Labs   Lab Test 01/14/22  1423   BNP 35     No results for input(s): TSH in the last 06886 hours.  Recent Labs   Lab Test 10/26/22  0614 10/01/22  1932  06/13/21 2020   INR 1.33* 1.05 1.22*        Daniel Garcia MD                Thank you for allowing me to participate in the care of your patient.      Sincerely,     Daniel Garcia MD     [facility]  cc:   Lukas Calzada MD  [address] [address]

## 2023-01-04 NOTE — PATIENT INSTRUCTIONS
We are going to plan a heart ultrasound and I will be in touch with results.Please write on my chart or call my nurse Wilma [phone].

## 2023-01-09 ENCOUNTER — HOSPITAL ENCOUNTER (OUTPATIENT)
Dept: CARDIOLOGY | Facility: CLINIC | Age: 88
Discharge: HOME | End: 2023-01-09
Attending: INTERNAL MEDICINE | Admitting: INTERNAL MEDICINE
Payer: MEDICARE

## 2023-01-09 DIAGNOSIS — I49.5 SSS (SICK SINUS SYNDROME) (H): ICD-10-CM

## 2023-01-09 DIAGNOSIS — I47.29 NSVT (NONSUSTAINED VENTRICULAR TACHYCARDIA) (H): ICD-10-CM

## 2023-01-09 LAB — LVEF ECHO: NORMAL

## 2023-01-09 PROCEDURE — 93306 TTE W/DOPPLER COMPLETE: CPT | Mod: 26 | Performed by: INTERNAL MEDICINE

## 2023-01-09 PROCEDURE — 93306 TTE W/DOPPLER COMPLETE: CPT

## 2023-01-12 DIAGNOSIS — I10 ESSENTIAL HYPERTENSION: ICD-10-CM

## 2023-01-13 RX ORDER — LISINOPRIL 10 MG/1
10 TABLET ORAL DAILY
Qty: 90 TABLET | Refills: 3 | Status: SHIPPED | OUTPATIENT
Start: 2023-01-13 | End: 2024-01-16

## 2023-01-13 NOTE — TELEPHONE ENCOUNTER
"Routing refill request to provider for review/approval because:  Labs out of range:  Creatinine    Last Written Prescription Date:  1/19/22  Last Fill Quantity: 90,  # refills: 3   Last office visit provider:  12/15/22     Requested Prescriptions   Pending Prescriptions Disp Refills     lisinopril (ZESTRIL) 10 MG tablet [Pharmacy Med Name: LISINOPRIL TABS 10MG] 90 tablet 3     Sig: TAKE 1 TABLET DAILY       ACE Inhibitors (Including Combos) Protocol Failed - 1/13/2023 10:23 AM        Failed - Normal serum creatinine on file in past 12 months     Recent Labs   Lab Test 12/15/22  1219   CR 1.32*       Ok to refill medication if creatinine is low          Passed - Blood pressure under 140/90 in past 12 months     BP Readings from Last 3 Encounters:   01/04/23 122/48   12/15/22 138/60   11/16/22 (!) 166/75                 Passed - Recent (12 mo) or future (30 days) visit within the authorizing provider's specialty     Patient has had an office visit with the authorizing provider or a provider within the authorizing providers department within the previous 12 mos or has a future within next 30 days. See \"Patient Info\" tab in inbasket, or \"Choose Columns\" in Meds & Orders section of the refill encounter.              Passed - Medication is active on med list        Passed - Patient is age 18 or older        Passed - Normal serum potassium on file in past 12 months     Recent Labs   Lab Test 12/15/22  1219   POTASSIUM 4.5                  Derrick Henry RN 01/13/23 10:24 AM  "

## 2023-01-23 ENCOUNTER — TRANSFERRED RECORDS (OUTPATIENT)
Dept: HEALTH INFORMATION MANAGEMENT | Facility: CLINIC | Age: 88
End: 2023-01-23

## 2023-01-24 ASSESSMENT — ENCOUNTER SYMPTOMS
ARTHRALGIAS: 0
MYALGIAS: 0
DIZZINESS: 0
COUGH: 0
ABDOMINAL PAIN: 0
JOINT SWELLING: 0
NAUSEA: 0
HEMATURIA: 0
SORE THROAT: 0
WEAKNESS: 1
NERVOUS/ANXIOUS: 0
HEMATOCHEZIA: 0
DIARRHEA: 0
CONSTIPATION: 1
HEADACHES: 0
HEARTBURN: 0
PARESTHESIAS: 0
PALPITATIONS: 0
FREQUENCY: 1
DYSURIA: 0
FEVER: 0
CHILLS: 0
EYE PAIN: 0
SHORTNESS OF BREATH: 0

## 2023-01-24 ASSESSMENT — ACTIVITIES OF DAILY LIVING (ADL): CURRENT_FUNCTION: NO ASSISTANCE NEEDED

## 2023-01-31 ENCOUNTER — OFFICE VISIT (OUTPATIENT)
Dept: FAMILY MEDICINE | Facility: CLINIC | Age: 88
End: 2023-01-31
Payer: MEDICARE

## 2023-01-31 VITALS
OXYGEN SATURATION: 98 % | HEART RATE: 85 BPM | BODY MASS INDEX: 23.04 KG/M2 | WEIGHT: 152 LBS | DIASTOLIC BLOOD PRESSURE: 60 MMHG | RESPIRATION RATE: 19 BRPM | SYSTOLIC BLOOD PRESSURE: 130 MMHG | HEIGHT: 68 IN

## 2023-01-31 DIAGNOSIS — R97.20 ELEVATED PROSTATE SPECIFIC ANTIGEN (PSA): ICD-10-CM

## 2023-01-31 DIAGNOSIS — N40.1 BPH WITH URINARY OBSTRUCTION: ICD-10-CM

## 2023-01-31 DIAGNOSIS — Z23 HIGH PRIORITY FOR 2019-NCOV VACCINE: ICD-10-CM

## 2023-01-31 DIAGNOSIS — E78.00 PURE HYPERCHOLESTEROLEMIA: ICD-10-CM

## 2023-01-31 DIAGNOSIS — H40.10X1 OPEN-ANGLE GLAUCOMA OF BOTH EYES, MILD STAGE, UNSPECIFIED OPEN-ANGLE GLAUCOMA TYPE: ICD-10-CM

## 2023-01-31 DIAGNOSIS — Z95.0 CARDIAC PACEMAKER IN SITU: ICD-10-CM

## 2023-01-31 DIAGNOSIS — N13.8 BPH WITH URINARY OBSTRUCTION: ICD-10-CM

## 2023-01-31 DIAGNOSIS — D64.9 NORMOCHROMIC NORMOCYTIC ANEMIA: ICD-10-CM

## 2023-01-31 DIAGNOSIS — N20.0 NEPHROLITHIASIS: ICD-10-CM

## 2023-01-31 DIAGNOSIS — N18.30 STAGE 3 CHRONIC KIDNEY DISEASE, UNSPECIFIED WHETHER STAGE 3A OR 3B CKD (H): ICD-10-CM

## 2023-01-31 DIAGNOSIS — L84 CALLUS OF FOOT: ICD-10-CM

## 2023-01-31 DIAGNOSIS — Z00.00 ENCOUNTER FOR MEDICARE ANNUAL WELLNESS EXAM: Primary | ICD-10-CM

## 2023-01-31 DIAGNOSIS — I10 ESSENTIAL HYPERTENSION: ICD-10-CM

## 2023-01-31 DIAGNOSIS — I49.5 SSS (SICK SINUS SYNDROME) (H): ICD-10-CM

## 2023-01-31 DIAGNOSIS — R73.01 IMPAIRED FASTING GLUCOSE: ICD-10-CM

## 2023-01-31 PROBLEM — H16.229 KERATOCONJUNCTIVITIS SICCA: Status: RESOLVED | Noted: 2021-08-11 | Resolved: 2023-01-31

## 2023-01-31 LAB
ANION GAP SERPL CALCULATED.3IONS-SCNC: 13 MMOL/L (ref 7–15)
BUN SERPL-MCNC: 28 MG/DL (ref 8–23)
CALCIUM SERPL-MCNC: 9.7 MG/DL (ref 8.8–10.2)
CHLORIDE SERPL-SCNC: 109 MMOL/L (ref 98–107)
CHOLEST SERPL-MCNC: 126 MG/DL
CREAT SERPL-MCNC: 1.43 MG/DL (ref 0.67–1.17)
DEPRECATED HCO3 PLAS-SCNC: 24 MMOL/L (ref 22–29)
ERYTHROCYTE [DISTWIDTH] IN BLOOD BY AUTOMATED COUNT: 13.2 % (ref 10–15)
GFR SERPL CREATININE-BSD FRML MDRD: 48 ML/MIN/1.73M2
GLUCOSE SERPL-MCNC: 106 MG/DL (ref 70–99)
HCT VFR BLD AUTO: 35.3 % (ref 40–53)
HDLC SERPL-MCNC: 40 MG/DL
HGB BLD-MCNC: 11.3 G/DL (ref 13.3–17.7)
LDLC SERPL CALC-MCNC: 75 MG/DL
MCH RBC QN AUTO: 30.3 PG (ref 26.5–33)
MCHC RBC AUTO-ENTMCNC: 32 G/DL (ref 31.5–36.5)
MCV RBC AUTO: 95 FL (ref 78–100)
NONHDLC SERPL-MCNC: 86 MG/DL
PLATELET # BLD AUTO: 214 10E3/UL (ref 150–450)
POTASSIUM SERPL-SCNC: 4.6 MMOL/L (ref 3.4–5.3)
PSA SERPL-MCNC: 6.81 NG/ML
RBC # BLD AUTO: 3.73 10E6/UL (ref 4.4–5.9)
SODIUM SERPL-SCNC: 146 MMOL/L (ref 136–145)
TRIGL SERPL-MCNC: 53 MG/DL
WBC # BLD AUTO: 8.9 10E3/UL (ref 4–11)

## 2023-01-31 PROCEDURE — 85027 COMPLETE CBC AUTOMATED: CPT | Performed by: FAMILY MEDICINE

## 2023-01-31 PROCEDURE — 84153 ASSAY OF PSA TOTAL: CPT | Performed by: FAMILY MEDICINE

## 2023-01-31 PROCEDURE — G0439 PPPS, SUBSEQ VISIT: HCPCS | Performed by: FAMILY MEDICINE

## 2023-01-31 PROCEDURE — 99214 OFFICE O/P EST MOD 30 MIN: CPT | Mod: 25 | Performed by: FAMILY MEDICINE

## 2023-01-31 PROCEDURE — 0134A COVID-19 VACCINE BIVALENT BOOSTER 18+ (MODERNA): CPT | Performed by: FAMILY MEDICINE

## 2023-01-31 PROCEDURE — 80061 LIPID PANEL: CPT | Performed by: FAMILY MEDICINE

## 2023-01-31 PROCEDURE — 91313 COVID-19 VACCINE BIVALENT BOOSTER 18+ (MODERNA): CPT | Performed by: FAMILY MEDICINE

## 2023-01-31 PROCEDURE — 80048 BASIC METABOLIC PNL TOTAL CA: CPT | Performed by: FAMILY MEDICINE

## 2023-01-31 PROCEDURE — 36415 COLL VENOUS BLD VENIPUNCTURE: CPT | Performed by: FAMILY MEDICINE

## 2023-01-31 ASSESSMENT — ENCOUNTER SYMPTOMS
SORE THROAT: 0
ABDOMINAL PAIN: 0
CHILLS: 0
WEAKNESS: 1
HEADACHES: 0
NAUSEA: 0
JOINT SWELLING: 0
CONSTIPATION: 1
HEARTBURN: 0
DYSURIA: 0
SHORTNESS OF BREATH: 0
EYE PAIN: 0
FREQUENCY: 1
MYALGIAS: 0
ARTHRALGIAS: 0
COUGH: 0
PALPITATIONS: 0
PARESTHESIAS: 0
HEMATURIA: 0
DIZZINESS: 0
HEMATOCHEZIA: 0
DIARRHEA: 0
NERVOUS/ANXIOUS: 0
FEVER: 0

## 2023-01-31 ASSESSMENT — ACTIVITIES OF DAILY LIVING (ADL): CURRENT_FUNCTION: NO ASSISTANCE NEEDED

## 2023-01-31 ASSESSMENT — PAIN SCALES - GENERAL: PAINLEVEL: NO PAIN (0)

## 2023-01-31 NOTE — PROGRESS NOTES
The patient was provided with suggestions to help him develop a healthy physical lifestyle.  He is at risk for lack of exercise and has been provided with information to increase physical activity for the benefit of his well-being.  The patient was provided with written information regarding signs of hearing loss.  Information on urinary incontinence and treatment options given to patient.

## 2023-01-31 NOTE — PROGRESS NOTES
"SUBJECTIVE:     Ricardo is a 87 year old who presents for Preventive Visit.    Patient has been advised of split billing requirements and indicates understanding: Yes  Are you in the first 12 months of your Medicare coverage?  No      Annual wellness visit completed.  Risk questionnaire reviewed in detail with fair health described.  Lack of consistent exercise.  Hearing loss requiring bilateral hearing aids.  Urinary incontinence concerns of BPH history.  Utilizes lisinopril 10 mg daily for hypertension.  Has had PSA elevation historically along with BPH follows with Dr. Colindres.  Wants diagnostic PSA updated today before his upcoming visit with Dr. Colindres urology.  Has had nephrolithiasis with 90% calcium oxalate monohydrate stone identified.  Had complications requiring stent fragment removal etc.  Now doing better.  Had COVID around October 1.  No residual issues.  Needs bivalent COVID shot.  Normochromic normocytic anemia historically.  MetroGel for acne rosacea.  Tamsulosin 0.4 mg daily for BPH with urinary obstruction however still gets up about once an hour at night.  Has pacemaker in place with history of sick sinus syndrome.  Doing well in this regard.  No palpitations.  Would like to see Dr. Bernal podiatry regarding foot calluses.  Comprehensive review of systems as above otherwise all negative.      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 Zavaleta) then subsequent procedure 2 weeks later removing remaining stone in ureter \"with great difficulty\"   Dad - dec DM, CAD   Mom - dec Alzheimers   6-siblings Hinduism (retired clergyman)   No smoke   Occ EtOH   Left fibula fracture (spiral) 5/07 (Dr. Quick, [facility])   TRUS with bx 10/7/11 biopsies negative (followed by Dr. Colindres)   Eye exam with Dr. Duarte in this building (monitoring for +/- glaucoma) - followed q year   Dr. Colindres, [facility] every year for elevated PSA, etc. (h/o TRUS with bx negative)   Dr. Beavers, dermatology for q 6 month checks re: \"precancerous\" lesions and rosacea; Mohs procedure left preauricular location 3/17/16   Bilateral hearing aids with h/o hearing loss (began using March, 2015)   Pacemaker 8/28/19 (PACEMAKER MOI XT DR COLETTE PERRY)   Attends [employer] in [place], [place]        Healthy Habits:     In general, how would you rate your overall health?  Fair    Frequency of exercise:  1 day/week    Duration of exercise:  Less than 15 minutes    Do you usually eat at least 4 servings of fruit and vegetables a day, include whole grains    & fiber and avoid regularly eating high fat or \"junk\" foods?  Yes    Taking medications regularly:  Yes    Medication side effects:  None    Ability to successfully perform activities of daily living:  No assistance needed    Home Safety:  No safety concerns identified    Hearing Impairment:  Difficulty following a conversation in a noisy restaurant or crowded room, feel that people are mumbling or not speaking clearly, difficult to understand a speaker at a public meeting or Yazdanism service, difficulty understanding soft or whispered speech and difficulty understanding speech on the telephone    In the past 6 months, have you been bothered by leaking of urine? Yes    In general, how would you rate your overall mental or emotional health?  Excellent      PHQ-2 Total Score: 0    Additional concerns today:  No      Have you ever done Advance Care Planning? (For example, a Health Directive, POLST, or a discussion with a medical provider or your loved ones about your wishes): Yes, advance care planning is on file.       Fall risk  Fallen 2 or more times in the past year?: " No  Any fall with injury in the past year?: Yes    Cognitive Screening   1) Repeat 3 items (Leader, Season, Table)    2) Clock draw: NORMAL  3) 3 item recall: Recalls 2 objects   Results: NORMAL clock, 1-2 items recalled: COGNITIVE IMPAIRMENT LESS LIKELY    Mini-CogTM Copyright S Jaime. Licensed by the author for use in [facility]; reprinted with permission (alanis@[facility]). All rights reserved.      Do you have sleep apnea, excessive snoring or daytime drowsiness?: yes - daytime drowsiness    Reviewed and updated as needed this visit by clinical staff   Tobacco  Allergies  Meds  Problems  Med Hx  Surg Hx  Fam Hx          Reviewed and updated as needed this visit by Provider   Tobacco  Allergies  Meds  Problems  Med Hx  Surg Hx  Fam Hx         Social History     Tobacco Use     Smoking status: Former     Types: Pipe     Smokeless tobacco: Never   Substance Use Topics     Alcohol use: No     If you drink alcohol do you typically have >3 drinks per day or >7 drinks per week? No    Alcohol Use 1/31/2023   Prescreen: >3 drinks/day or >7 drinks/week? -   Prescreen: >3 drinks/day or >7 drinks/week? No               Current providers sharing in care for this patient include:   Patient Care Team:  Derek Kumar MD as PCP - General  Derek Kumar MD as Assigned PCP  Dominick Martinez MD as Assigned Surgical Provider  Angely Salinas NP as Assigned Neuroscience Provider  Daniel Garcia MD as Assigned Heart and Vascular Provider    The following health maintenance items are reviewed in Epic and correct as of today:  Health Maintenance   Topic Date Due     MICROALBUMIN  Never done     COVID-19 Vaccine (5 - Booster for Moderna series) 08/31/2022     ZOSTER IMMUNIZATION (3 of 3) 09/26/2022     COLORECTAL CANCER SCREENING  06/12/2023     ANNUAL REVIEW OF HM ORDERS  07/06/2023     LIPID  10/20/2023     BMP  12/15/2023     HEMOGLOBIN  12/15/2023     MEDICARE ANNUAL WELLNESS VISIT  01/31/2024      FALL RISK ASSESSMENT  01/31/2024     ADVANCE CARE PLANNING  01/31/2028     DTAP/TDAP/TD IMMUNIZATION (2 - Td or Tdap) 12/22/2030     PHQ-2 (once per calendar year)  Completed     INFLUENZA VACCINE  Completed     Pneumococcal Vaccine: 65+ Years  Completed     URINALYSIS  Completed     IPV IMMUNIZATION  Aged Out     MENINGITIS IMMUNIZATION  Aged Out     Lab work is in process  Labs reviewed in EPIC  BP Readings from Last 3 Encounters:   01/31/23 130/60   01/04/23 122/48   12/15/22 138/60    Wt Readings from Last 3 Encounters:   01/31/23 68.9 kg (152 lb)   01/04/23 71.2 kg (157 lb)   12/15/22 71.7 kg (158 lb)                  Patient Active Problem List   Diagnosis     Male Erectile Disorder     Rosacea     Diverticulosis     BPH with urinary obstruction     Nephrolithiasis     Hypercholesterolemia     Hypertension     Impaired Fasting Glucose     Serology Prostate-specific Antigen (PSA) Elevated     Squamous Cell Carcinoma Of The Skin     Hearing loss     Glaucoma     Right inguinal hernia     Essential hypertension with goal blood pressure less than 130/80     Open-angle glaucoma of both eyes, mild stage, unspecified open-angle glaucoma type     Left upper lobe pulmonary nodule     Cardiac pacemaker in situ, dual chamber     Syncope, unspecified syncope type     Myopia     Dermatitis     Disorder of optic nerve     Dystrophy of anterior cornea     Encounter for medication refill     Mobitz type 1 second degree atrioventricular block     Need for prophylactic vaccination and inoculation against single disease     Presbyopia     Progressive high myopia     Routine general medical examination at a health care facility     History of hepatitis B     Closed compression fracture of L3 lumbar vertebra, with routine healing, subsequent encounter     Ureteral calculus     Hydronephrosis     Bilateral degenerative progressive high myopia     Male erectile disorder     Personal history of COVID-19     COVID-19     SSS (sick  sinus syndrome) (H)     Stage 3 chronic kidney disease, unspecified whether stage 3a or 3b CKD (H)     Past Surgical History:   Procedure Laterality Date     APPENDECTOMY       ARTHROSCOPY KNEE Right      CYSTOURETEROSCOPY, WITH LITHOTRIPSY USING ZABRINA 120P LASER AND URETERAL STENT INSERTION Left 10/25/2022    Procedure: CYSTOSCOPY, LEFT URETEROSCOPY, LASER LITHOTRIPSY;  Surgeon: Bob Dozier MD;  Location: [facility] OR     GENITOURINARY SURGERY       IR NEPHROSTOMY TUBE PLACEMENT LEFT  10/26/2022     IR URETERAL STENT PLACEMENT LEFT  11/1/2022       Social History     Tobacco Use     Smoking status: Former     Types: Pipe     Smokeless tobacco: Never   Substance Use Topics     Alcohol use: No     Family History   Problem Relation Age of Onset     Dementia Mother      Heart Disease Father      Diabetes Father          Current Outpatient Medications   Medication Sig Dispense Refill     acetaminophen (TYLENOL) 325 MG tablet Take 1-2 tablets (325-650 mg) by mouth every 4 hours as needed for other or mild pain (For optimal non-opioid multimodal pain management to improve pain control.)       aspirin (ASA) 81 MG chewable tablet Take 1 tablet (81 mg) by mouth At Bedtime       dorzolamide (TRUSOPT) 2 % ophthalmic solution Place 1 drop into both eyes 2 times daily       doxycycline hyclate (VIBRA-TABS) 100 MG tablet [DOXYCYCLINE (VIBRA-TABS) 100 MG TABLET] TAKE 1 TABLET BY MOUTH DAILY 90 tablet 3     Glucosamine-Chondroitin 250-200 MG CAPS Take 1 capsule by mouth daily       latanoprost (XALATAN) 0.005 % ophthalmic solution Place 1 drop into both eyes At Bedtime       lisinopril (ZESTRIL) 10 MG tablet Take 1 tablet (10 mg) by mouth daily 90 tablet 3     metroNIDAZOLE (METROGEL) 1 % external gel APPLY A SMALL AMOUNT TO AFFECTED AREA(S) TOPICALLY ONCE DAILY 60 g 11     multivitamin, therapeutic (THERA-VIT) TABS tablet Take 1 tablet by mouth daily       tamsulosin (FLOMAX) 0.4 MG capsule TAKE 1 CAPSULE AT BEDTIME  "(Patient taking differently: Take 0.4 mg by mouth every morning) 90 capsule 3     triamcinolone (KENALOG) 0.1 % external cream Apply topically 2 times daily as needed for irritation       No Known Allergies     Needs COVID-19 Moderna bivalent booster        Review of Systems   Constitutional: Negative for chills and fever.   HENT: Positive for hearing loss. Negative for congestion, ear pain and sore throat.    Eyes: Negative for pain and visual disturbance.   Respiratory: Negative for cough and shortness of breath.    Cardiovascular: Negative for chest pain, palpitations and peripheral edema.   Gastrointestinal: Positive for constipation. Negative for abdominal pain, diarrhea, heartburn, hematochezia and nausea.   Genitourinary: Positive for frequency, impotence and urgency. Negative for dysuria, genital sores, hematuria and penile discharge.   Musculoskeletal: Negative for arthralgias, joint swelling and myalgias.   Skin: Negative for rash.   Neurological: Positive for weakness. Negative for dizziness, headaches and paresthesias.   Psychiatric/Behavioral: Negative for mood changes. The patient is not nervous/anxious.      Constitutional, HEENT, cardiovascular, pulmonary, GI, , musculoskeletal, neuro, skin, endocrine and psych systems are negative, except as otherwise noted.    OBJECTIVE:   /60   Pulse 85   Resp 19   Ht 1.734 m (5' 8.25\")   Wt 68.9 kg (152 lb)   SpO2 98%   BMI 22.94 kg/m   Estimated body mass index is 22.94 kg/m  as calculated from the following:    Height as of this encounter: 1.734 m (5' 8.25\").    Weight as of this encounter: 68.9 kg (152 lb).     Physical Exam  GENERAL: healthy, alert and no distress  EYES: Eyes grossly normal to inspection, PERRL and conjunctivae and sclerae normal  HENT: ear canals and TM's normal, nose and mouth without ulcers or lesions  NECK: no adenopathy, no asymmetry, masses, or scars and thyroid normal to palpation  RESP: lungs clear to auscultation - no " rales, rhonchi or wheezes  CV: regular rate and rhythm, normal S1 S2, no S3 or S4, no murmur, click or rub, no peripheral edema and peripheral pulses strong  ABDOMEN: soft, nontender, no hepatosplenomegaly, no masses and bowel sounds normal   (male): normal male genitalia without lesions or urethral discharge, no hernia  RECTAL: normal sphincter tone, no rectal masses, prostate normal size, smooth, nontender without nodules or masses  MS: no gross musculoskeletal defects noted, no edema  SKIN: no suspicious lesions or rashes  NEURO: Normal strength and tone, mentation intact and speech normal  PSYCH: mentation appears normal, affect normal/bright    Diagnostic Test Results:  Labs reviewed in Epic  No results found for this or any previous visit (from the past 24 hour(s)).    ASSESSMENT / PLAN:     Encounter for Medicare annual wellness exam  Annual wellness visit completed.  Risk questionnaire reviewed in detail.  Risks associate with her health, lack of consistent exercise and hearing loss.  Bilateral hearing aids utilized.  Urinary incontinence concerns with BPH history.  Annual wellness visits to continue.    Essential hypertension  Remains on lisinopril 10 mg daily for hypertension management.  Med monitoring completed today  - Basic metabolic panel    Stage 3 chronic kidney disease, unspecified whether stage 3a or 3b CKD (H)  History of CKD stage IIIa with prior creatinine 1.32 and GFR 53 and will ensure adequate hydration.  Update basic metabolic panel to ensure stable renal function.  - Basic metabolic panel    Nephrolithiasis  Nephrolithiasis history as noted.  Asymptomatic currently.  We will follow-up with Dr. Colindres as noted.    BPH with urinary obstruction  BPH with urinary obstruction historically and remains on tamsulosin 0.4 mg daily.  Still has frequent nocturia up to once per hour at night and will review with urologist at upcoming office visit.    Normochromic normocytic anemia  History  normochromic normocytic anemia with recent hemoglobin 10.8 and MCV 96.  - CBC with platelets    Impaired fasting glucose  Impaired fasting glucose historically.  Fasting glucose obtained today while fasting.  - Basic metabolic panel    Pure hypercholesterolemia  Continue dietary and exercise modifications for cholesterol management.  - Lipid panel reflex to direct LDL Fasting    SSS (sick sinus syndrome) (H)  History of sick sinus syndrome status post pacemaker placement currently doing well.    Cardiac pacemaker in situ, dual chamber  As above.    Open-angle glaucoma of both eyes, mild stage, unspecified open-angle glaucoma type  Continues eyedrops as noted for glaucoma management.  Follows with ophthalmologist consistently.    High priority for 2019-nCoV vaccine  COVID-19 Moderna bivalent booster with prior COVID-19 infection greater than 90 days ago and last booster greater than 6 months ago  - COVID-19,PF,MODERNA BIVALENT 18+Yrs    Callus of foot  Referred to Dr. Shashank Bernal podiatry regarding foot calluses  - Orthopedic  Referral    Elevated prostate specific antigen (PSA)  PSA, diagnostic with history of PSA elevation.  We will follow-up with Dr. Colindres with these results.  - PSA tumor marker         Patient has been advised of split billing requirements and indicates understanding: Yes      COUNSELING:  Reviewed preventive health counseling, as reflected in patient instructions       Consider AAA screening for ages 65-75 and smoking history       Regular exercise       Healthy diet/nutrition       Vision screening       Hearing screening       Dental care       Bladder control       Fall risk prevention       Aspirin prophylaxis        Prostate cancer screening        He reports that he has quit smoking. His smoking use included pipe. He has never used smokeless tobacco.      Appropriate preventive services were discussed with this patient, including applicable screening as appropriate for  cardiovascular disease, diabetes, osteopenia/osteoporosis, and glaucoma.  As appropriate for age/gender, discussed screening for colorectal cancer, prostate cancer, breast cancer, and cervical cancer. Checklist reviewing preventive services available has been given to the patient.    Reviewed patients plan of care and provided an AVS. The Basic Care Plan (routine screening as documented in Health Maintenance) for Mikaela meets the Care Plan requirement. This Care Plan has been established and reviewed with the Patient.          Derek Kumar MD  [facility]    Identified Health Risks:

## 2023-01-31 NOTE — PATIENT INSTRUCTIONS
Patient Education   Personalized Prevention Plan  You are due for the preventive services outlined below.  Your care team is available to assist you in scheduling these services.  If you have already completed any of these items, please share that information with your care team to update in your medical record.  Health Maintenance Due   Topic Date Due     COVID-19 Vaccine (5 - Booster for Moderna series) 08/31/2022     Zoster (Shingles) Vaccine (3 of 3) 09/26/2022     Your Health Risk Assessment indicates you feel you are not in good health    A healthy lifestyle helps keep the body fit and the mind alert. It helps protect you from disease, helps you fight disease, and helps prevent chronic disease (disease that doesn't go away) from getting worse. This is important as you get older and begin to notice twinges in muscles and joints and a decline in the strength and stamina you once took for granted. A healthy lifestyle includes good healthcare, good nutrition, weight control, recreation, and regular exercise. Avoid harmful substances and do what you can to keep safe. Another part of a healthy lifestyle is stay mentally active and socially involved.    Good healthcare     Have a wellness visit every year.     If you have new symptoms, let us know right away. Don't wait until the next checkup.     Take medicines exactly as prescribed and keep your medicines in a safe place. Tell us if your medicine causes problems.   Healthy diet and weight control     Eat 3 or 4 small, nutritious, low-fat, high-fiber meals a day. Include a variety of fruits, vegetables, and whole-grain foods.     Make sure you get enough calcium in your diet. Calcium, vitamin D, and exercise help prevent osteoporosis (bone thinning).     If you live alone, try eating with others when you can. That way you get a good meal and have company while you eat it.     Try to keep a healthy weight. If you eat more calories than your body uses for energy, it  will be stored as fat and you will gain weight.     Recreation   Recreation is not limited to sports and team events. It includes any activity that provides relaxation, interest, enjoyment, and exercise. Recreation provides an outlet for physical, mental, and social energy. It can give a sense of worth and achievement. It can help you stay healthy.    Mental Exercise and Social Involvement  Mental and emotional health is as important as physical health. Keep in touch with friends and family. Stay as active as possible. Continue to learn and challenge yourself.   Things you can do to stay mentally active are:    Learn something new, like a foreign language or musical instrument.     Play SCRABBLE or do crossword puzzles. If you cannot find people to play these games with you at home, you can play them with others on your computer through the Internet.     Join a games club--anything from card games to chess or checkers or lawn bowling.     Start a new hobby.     Go back to school.     Volunteer.     Read.   Keep up with world events.    Exercise for a Healthier Heart  You may wonder how you can improve the health of your heart. If you re thinking about exercise, you re on the right track. You don t need to become an athlete. But you do need a certain amount of brisk exercise to help strengthen your heart. If you have been diagnosed with a heart condition, your healthcare provider may advise exercise to help stabilize your condition. To help make exercise a habit, choose safe, fun activities.      Exercise with a friend. When activity is fun, you're more likely to stick with it.   Before you start  Check with your healthcare provider before starting an exercise program. This is especially important if you have not been active for a while. It's also important if you have a long-term (chronic) health problem such as heart disease, diabetes, or obesity. Or if you are at high risk for having these problems.   Why  exercise?  Exercising regularly offers many healthy rewards. It can help you do all of the following:     Improve your blood cholesterol level to help prevent further heart trouble    Lower your blood pressure to help prevent a stroke or heart attack    Control diabetes, or reduce your risk of getting this disease    Improve your heart and lung function    Reach and stay at a healthy weight    Make your muscles stronger so you can stay active    Prevent falls and fractures by slowing the loss of bone mass (osteoporosis)    Manage stress better    Reduce your blood pressure    Improve your sense of self and your body image  Exercise tips      Ease into your routine. Set small goals. Then build on them. If you are not sure what your activity level should be, talk with your healthcare provider first before starting an exercise routine.    Exercise on most days. Aim for a total of 150 minutes (2 hours and 30 minutes) or more of moderate-intensity aerobic activity each week. Or 75 minutes (1 hour and 15 minutes) or more of vigorous-intensity aerobic activity each week. Or try for a combination of both. Moderate activity means that you breathe heavier and your heart rate increases but you can still talk. Think about doing 40 minutes of moderate exercise, 3 to 4 times a week. For best results, activity should last for about 40 minutes to lower blood pressure and cholesterol. It's OK to work up to the 40-minute period over time. Examples of moderate-intensity activity are walking 1 mile in 15 minutes. Or doing 30 to 45 minutes of yard work.    Step up your daily activity level.  Along with your exercise program, try being more active the whole day. Walk instead of drive. Or park further away so that you take more steps each day. Do more household tasks or yard work. You may not be able to meet the advised mount of physical activity. But doing some moderate- or vigorous-intensity aerobic activity can help reduce your risk  for heart disease. Your healthcare provider can help you figure out what is best for you.    Choose 1 or more activities you enjoy.  Walking is one of the easiest things you can do. You can also try swimming, riding a bike, dancing, or taking an exercise class.    When to call your healthcare provider  Call your healthcare provider if you have any of these:     Chest pain or feel dizzy or lightheaded    Burning, tightness, pressure, or heaviness in your chest, neck, shoulders, back, or arms    Abnormal shortness of breath    More joint or muscle pain    A very fast or irregular heartbeat (palpitations)  [employer] last reviewed this educational content on 7/1/2019 2000-2021 [employer]. All rights reserved. This information is not intended as a substitute for professional medical care. Always follow your healthcare professional's instructions.          Signs of Hearing Loss      Hearing much better with one ear can be a sign of hearing loss.   Hearing loss is a problem shared by many people. In fact, it is one of the most common health problems, particularly as people age. Most people age 65 and older have some hearing loss. By age 80, almost everyone does. Hearing loss often occurs slowly over the years. So you may not realize your hearing has gotten worse.  Have your hearing checked  Call your healthcare provider if you:    Have to strain to hear normal conversation    Have to watch other people s faces very carefully to follow what they re saying    Need to ask people to repeat what they ve said    Often misunderstand what people are saying    Turn the volume of the television or radio up so high that others complain    Feel that people are mumbling when they re talking to you    Find that the effort to hear leaves you feeling tired and irritated    Notice, when using the phone, that you hear better with one ear than the other  [employer] last reviewed this educational content on 1/1/2020     7080-5965 [employer]. All rights reserved. This information is not intended as a substitute for professional medical care. Always follow your healthcare professional's instructions.          Urinary Incontinence (Male)    Urinary incontinence means not being able to control the release of urine from the bladder.   Causes  Common causes of urinary incontinence in men include:    Infection    Certain medicines    Aging    Poor pelvic muscle tone    Bladder spasms    Obesity    Trouble urinating and fully emptying the bladder (urinary retention)  Other things that can cause incontinence are:     Nervous system diseases    Diabetes    Sleep apnea    Urinary tract infections    Prostate surgery    Pelvic injury  Constipation and smoking have also been identified as risk factors.   Symptoms    Urge incontinence (overactive bladder). This is a sudden urge to urinate. It occurs even though there may not be much urine in the bladder. The need to urinate often during the night is common. It's due to bladder spasms.    Stress incontinence. This is urine leakage that you can't control. It can occur with sneezing, coughing, and other actions that put stress on the bladder.    Treatment  Treatment depends on what is causing the condition. Bladder infections are treated with antibiotics. Urinary retention is treated with a bladder catheter.   Home care  Follow these guidelines when caring for yourself at home:    Don't have any foods and drinks that may irritate the bladder. This includes:  ? Chocolate  ? Alcohol  ? Caffeine  ? Carbonated drinks  ? Acidic fruits and juices    Limit fluids to 6 to 8 cups a day.    Lose weight if you are overweight. This will reduce your symptoms.    If advised, do regular pelvic muscle-strengthening exercises such as Kegel exercises.    If needed, wear absorbent pads to catch urine. Change the pads often. This is for good hygiene and to prevent skin and bladder infections.    Bathe  daily for good hygiene.    If an antibiotic was prescribed to treat a bladder infection, take it until it's finished. Keep taking it even if you are feeling better. This is to make sure your infection has cleared.    If a catheter was left in place, keep bacteria from getting into the collection bag. Don't disconnect the catheter from the collection bag.    Use a leg band to secure the catheter drainage tube, so it does not pull on the catheter. Drain the collection bag when it becomes full. To do this, use the drain spout at the bottom of the bag. Don't disconnect the bag from the catheter.    Don't pull on or try to remove a catheter. The catheter must be removed by a healthcare provider.    If you smoke, stop. Ask your provider for help if you can't do this on your own.  Follow-up care  Follow up with your healthcare provider, or as advised.  When to get medical advice  Call your healthcare provider right away if any of these occur:    Fever over 100.4 F (38 C), or as directed by your provider    Bladder pain or fullness    Belly swelling, nausea, or vomiting    Back pain    Weakness, dizziness, or fainting    If a catheter was left in place, return if:  ? The catheter falls out  ? The catheter stops draining for 6 hours  ? Your urine gets cloudy or smells bad  [employer] last reviewed this educational content on 1/1/2020 2000-2021 [employer]. All rights reserved. This information is not intended as a substitute for professional medical care. Always follow your healthcare professional's instructions.

## 2023-02-07 ENCOUNTER — OFFICE VISIT (OUTPATIENT)
Dept: PODIATRY | Facility: CLINIC | Age: 88
End: 2023-02-07
Attending: FAMILY MEDICINE
Payer: MEDICARE

## 2023-02-07 VITALS — WEIGHT: 152 LBS | BODY MASS INDEX: 23.04 KG/M2 | HEIGHT: 68 IN | OXYGEN SATURATION: 90 % | HEART RATE: 87 BPM

## 2023-02-07 DIAGNOSIS — L84 CALLUS OF FOOT: ICD-10-CM

## 2023-02-07 DIAGNOSIS — L84 TYLOMA: Primary | ICD-10-CM

## 2023-02-07 PROCEDURE — 99203 OFFICE O/P NEW LOW 30 MIN: CPT | Mod: 25 | Performed by: PODIATRIST

## 2023-02-07 PROCEDURE — 11056 PARNG/CUTG B9 HYPRKR LES 2-4: CPT | Performed by: PODIATRIST

## 2023-02-07 ASSESSMENT — PAIN SCALES - GENERAL: PAINLEVEL: MILD PAIN (3)

## 2023-02-07 NOTE — PROGRESS NOTES
FOOT AND ANKLE SURGERY/PODIATRY CONSULT NOTE         ASSESSMENT:   Tyloma bilateral feet      TREATMENT:  Debrided hyperkeratotic lesions on the plantar aspect of both feet today.  I recommend the patient return to the clinic as needed.        HPI: I was asked to see Mikaela Figueroa today.  To evaluate and treat bilateral foot pain the patient indicated that he has a sharp pain on the ball of both feet underneath the first metatarsal head and the fifth metatarsal head.  The pain is aggravated with weightbearing primarily without shoes.  As long as he is wearing shoes his pain is mild.  He also complained about thick callus on the bottom of both feet underneath the small toe.  This can be painful as well.  He has not had any redness or swelling.  He has no pain while resting.  He denies any trauma to his feet.  And he denies any other previous treatment.  The patient was seen in consultation at the request of Derek Mi MD for evaluation and treatment of painful calluses both feet.     Past Medical History:   Diagnosis Date     Hypertension      Pacemaker        Social History     Socioeconomic History     Marital status:      Spouse name: Not on file     Number of children: Not on file     Years of education: Not on file     Highest education level: Not on file   Occupational History     Not on file   Tobacco Use     Smoking status: Former     Types: Pipe     Smokeless tobacco: Never   Substance and Sexual Activity     Alcohol use: No     Drug use: No     Sexual activity: Not on file   Other Topics Concern     Not on file   Social History Narrative     Not on file     Social Determinants of Health     Financial Resource Strain: Not on file   Food Insecurity: Not on file   Transportation Needs: Not on file   Physical Activity: Not on file   Stress: Not on file   Social Connections: Not on file   Intimate Partner Violence: Not on file   Housing Stability: Not on file        No Known Allergies       Current  Outpatient Medications:      acetaminophen (TYLENOL) 325 MG tablet, Take 1-2 tablets (325-650 mg) by mouth every 4 hours as needed for other or mild pain (For optimal non-opioid multimodal pain management to improve pain control.), Disp: , Rfl:      aspirin (ASA) 81 MG chewable tablet, Take 1 tablet (81 mg) by mouth At Bedtime, Disp: , Rfl:      dorzolamide (TRUSOPT) 2 % ophthalmic solution, Place 1 drop into both eyes 2 times daily, Disp: , Rfl:      doxycycline hyclate (VIBRA-TABS) 100 MG tablet, [DOXYCYCLINE (VIBRA-TABS) 100 MG TABLET] TAKE 1 TABLET BY MOUTH DAILY, Disp: 90 tablet, Rfl: 3     Glucosamine-Chondroitin 250-200 MG CAPS, Take 1 capsule by mouth daily, Disp: , Rfl:      latanoprost (XALATAN) 0.005 % ophthalmic solution, Place 1 drop into both eyes At Bedtime, Disp: , Rfl:      lisinopril (ZESTRIL) 10 MG tablet, Take 1 tablet (10 mg) by mouth daily, Disp: 90 tablet, Rfl: 3     metroNIDAZOLE (METROGEL) 1 % external gel, APPLY A SMALL AMOUNT TO AFFECTED AREA(S) TOPICALLY ONCE DAILY, Disp: 60 g, Rfl: 11     multivitamin, therapeutic (THERA-VIT) TABS tablet, Take 1 tablet by mouth daily, Disp: , Rfl:      tamsulosin (FLOMAX) 0.4 MG capsule, TAKE 1 CAPSULE AT BEDTIME (Patient taking differently: Take 0.4 mg by mouth every morning), Disp: 90 capsule, Rfl: 3     triamcinolone (KENALOG) 0.1 % external cream, Apply topically 2 times daily as needed for irritation, Disp: , Rfl:      Family History   Problem Relation Age of Onset     Dementia Mother      Heart Disease Father      Diabetes Father         Social History     Socioeconomic History     Marital status:      Spouse name: Not on file     Number of children: Not on file     Years of education: Not on file     Highest education level: Not on file   Occupational History     Not on file   Tobacco Use     Smoking status: Former     Types: Pipe     Smokeless tobacco: Never   Substance and Sexual Activity     Alcohol use: No     Drug use: No     Sexual  "activity: Not on file   Other Topics Concern     Not on file   Social History Narrative     Not on file     Social Determinants of Health     Financial Resource Strain: Not on file   Food Insecurity: Not on file   Transportation Needs: Not on file   Physical Activity: Not on file   Stress: Not on file   Social Connections: Not on file   Intimate Partner Violence: Not on file   Housing Stability: Not on file        Review of Systems - Patient denies fever, chills, rash, wound, stiffness, limping, numbness, weakness, heart burn, blood in stool, chest pain with activity, calf pain when walking, shortness of breath with activity, chronic cough, easy bleeding/bruising, swelling of ankles, excessive thirst, fatigue, depression, anxiety.  Patient admits to painful calluses both feet.      OBJECTIVE:  Appearance: alert, well appearing, and in no distress.    Pulse 87   Ht 1.734 m (5' 8.25\")   Wt 68.9 kg (152 lb)   SpO2 90%   BMI 22.94 kg/m       Body mass index is 22.94 kg/m .     General appearance: Patient is alert and fully cooperative with history & exam.  No sign of distress is noted during the visit.  Psychiatric: Affect is pleasant & appropriate.  Patient appears motivated to improve health.  Respiratory: Breathing is regular & unlabored while sitting.  HEENT: Hearing is intact to spoken word.  Speech is clear.  No gross evidence of visual impairment that would impact ambulation.    Vascular: Dorsalis pedis and posterior tibial pulses are palpable. There is no pedal hair growth bilaterally.  CFT < 3 sec from anterior tibial surface to distal digits bilaterally. There is no appreciable edema noted.  Dermatologic: Round, thick, hyperkeratotic nucleated lesion subsecond metatarsal head both feet.  Turgor and texture are within normal limits. No coloration or temperature changes. No primary or secondary lesions noted.  Neurologic: All epicritic and proprioceptive sensations are grossly intact " bilaterally.  Musculoskeletal: All active and passive ankle, subtalar, midtarsal, and 1st MPJ range of motion are grossly intact without pain or crepitus, with the exception of none bilaterally. Manual muscle strength is within normal limits bilaterally. All dorsiflexors, plantarflexors, invertors, evertors are intact bilaterally. Tenderness present to subfifth metatarsal head bilateral feet on palpation.  No tenderness to bilateral feet or ankles with range of motion. Calf is soft/non-tender without warmth/induration    Imaging:       No images are attached to the encounter or orders placed in the encounter.     Echocardiogram Complete    Result Date: 2023  [id number]^ROBERTA^OWEN^[place] [address]  Name: NEAL WEST MRN: [id number] : 1935 Study Date: 2023 09:51 AM Age: 87 yrs Gender: Male Patient Location: [facility] Reason For Study: SSS (sick sinus syndrome) (H), NSVT (nonsustained ventricular tachycardia) Ordering Physician: OWEN GRANADOS Referring Physician: OWEN GRANADOS Performed By: WILNER  BSA: 1.8 m2 Height: 68 in Weight: 157 lb HR: 85 BP: 157/65 mmHg ______________________________________________________________________________ Procedure Complete Echo Adult. ______________________________________________________________________________ Interpretation Summary  1. The left ventricle is normal in size. Left ventricular function is normal.The ejection fraction is 60-65%. No regional wall motion abnormalities noted. 2. Normal right ventricle size and systolic function. There is a pacemaker lead in the right ventricle. 3. No hemodynamically significant valvular abnormalities on 2D or color flow imaging. ______________________________________________________________________________ Left Ventricle The left ventricle is normal in size. Left ventricular function is normal.The ejection fraction is 60-65%. There is normal left  ventricular wall thickness. No regional wall motion abnormalities noted.  Right Ventricle Normal right ventricle size and systolic function. There is a pacemaker lead in the right ventricle.  Atria The left atrium is moderately dilated. The right atrium is mildly dilated. There is no color Doppler evidence of an atrial shunt.  Mitral Valve Mitral valve leaflets appear normal. There is no evidence of mitral stenosis or clinically significant mitral regurgitation. There is mild (1+) mitral regurgitation. There is no mitral valve stenosis.  Tricuspid Valve Tricuspid valve leaflets appear normal. There is no evidence of tricuspid stenosis or clinically significant tricuspid regurgitation. Right ventricular systolic pressure could not be approximated due to inadequate tricuspid regurgitation. No tricuspid regurgitation.  Aortic Valve There is mild trileaflet aortic sclerosis. There is trace aortic regurgitation. No aortic stenosis is present.  Pulmonic Valve The pulmonic valve is not well seen, but is grossly normal. This degree of valvular regurgitation is within normal limits. There is trace pulmonic valvular regurgitation.  Vessels The aorta root is normal. Normal size ascending aorta. IVC diameter <2.1 cm collapsing >50% with sniff suggests a normal RA pressure of 3 mmHg.  Pericardium There is no pericardial effusion.  ______________________________________________________________________________ MMode/2D Measurements & Calculations IVSd: 1.1 cm LVIDd: 3.7 cm LVIDs: 2.7 cm LVPWd: 1.0 cm FS: 26.4 % LV mass(C)d: 124.8 grams LV mass(C)dI: 67.7 grams/m2  Ao root diam: 3.1 cm LA dimension: 4.2 cm LA/Ao: 1.3 LVOT diam: 2.0 cm LVOT area: 3.0 cm2 LA Volume Indexed (AL/bp): 39.1 ml/m2 RWT: 0.56  Time Measurements Aortic HR: 75.0 BPM MM HR: 77.0 BPM  Doppler Measurements & Calculations MV E max jessie: 89.5 cm/sec MV A max jessie: 124.6 cm/sec MV E/A: 0.72 MV dec time: 0.29 sec Ao V2 max: 149.3 cm/sec Ao max P.0 mmHg Ao V2 mean:  110.7 cm/sec Ao mean P.4 mmHg Ao V2 VTI: 30.1 cm MANE(I,D): 3.0 cm2 MANE(V,D): 3.0 cm2 LV V1 max P.0 mmHg LV V1 max: 149.9 cm/sec LV V1 VTI: 30.0 cm CO(LVOT): 6.8 l/min CI(LVOT): 3.7 l/min/m2 SV(LVOT): 90.1 ml SI(LVOT): 48.8 ml/m2 PA acc time: 0.15 sec AV Itz Ratio (DI): 1.0 MANE Index (cm2/m2): 1.6 E/E': 12.4 E/E' av.4 Lateral E/e': 12.6 Medial E/e': 12.2 Peak E' Itz: 7.2 cm/sec  ______________________________________________________________________________ Report approved by: Valeria Anna 2023 12:01 PM        Echocardiogram Complete    Result Date: 2023  [id number]^ROBERTA^OWEN^[place] [address]  Name: NEAL WEST MRN: [id number] : 1935 Study Date: 2023 09:51 AM Age: 87 yrs Gender: Male Patient Location: [facility] Reason For Study: SSS (sick sinus syndrome) (H), NSVT (nonsustained ventricular tachycardia) Ordering Physician: OWEN GRANADOS Referring Physician: OWEN GRANADOS Performed By: WILNER  BSA: 1.8 m2 Height: 68 in Weight: 157 lb HR: 85 BP: 157/65 mmHg ______________________________________________________________________________ Procedure Complete Echo Adult. ______________________________________________________________________________ Interpretation Summary  1. The left ventricle is normal in size. Left ventricular function is normal.The ejection fraction is 60-65%. No regional wall motion abnormalities noted. 2. Normal right ventricle size and systolic function. There is a pacemaker lead in the right ventricle. 3. No hemodynamically significant valvular abnormalities on 2D or color flow imaging. ______________________________________________________________________________ Left Ventricle The left ventricle is normal in size. Left ventricular function is normal.The ejection fraction is 60-65%. There is normal left ventricular wall thickness. No regional wall motion abnormalities noted.  Right  Ventricle Normal right ventricle size and systolic function. There is a pacemaker lead in the right ventricle.  Atria The left atrium is moderately dilated. The right atrium is mildly dilated. There is no color Doppler evidence of an atrial shunt.  Mitral Valve Mitral valve leaflets appear normal. There is no evidence of mitral stenosis or clinically significant mitral regurgitation. There is mild (1+) mitral regurgitation. There is no mitral valve stenosis.  Tricuspid Valve Tricuspid valve leaflets appear normal. There is no evidence of tricuspid stenosis or clinically significant tricuspid regurgitation. Right ventricular systolic pressure could not be approximated due to inadequate tricuspid regurgitation. No tricuspid regurgitation.  Aortic Valve There is mild trileaflet aortic sclerosis. There is trace aortic regurgitation. No aortic stenosis is present.  Pulmonic Valve The pulmonic valve is not well seen, but is grossly normal. This degree of valvular regurgitation is within normal limits. There is trace pulmonic valvular regurgitation.  Vessels The aorta root is normal. Normal size ascending aorta. IVC diameter <2.1 cm collapsing >50% with sniff suggests a normal RA pressure of 3 mmHg.  Pericardium There is no pericardial effusion.  ______________________________________________________________________________ MMode/2D Measurements & Calculations IVSd: 1.1 cm LVIDd: 3.7 cm LVIDs: 2.7 cm LVPWd: 1.0 cm FS: 26.4 % LV mass(C)d: 124.8 grams LV mass(C)dI: 67.7 grams/m2  Ao root diam: 3.1 cm LA dimension: 4.2 cm LA/Ao: 1.3 LVOT diam: 2.0 cm LVOT area: 3.0 cm2 LA Volume Indexed (AL/bp): 39.1 ml/m2 RWT: 0.56  Time Measurements Aortic HR: 75.0 BPM MM HR: 77.0 BPM  Doppler Measurements & Calculations MV E max jessie: 89.5 cm/sec MV A max jessie: 124.6 cm/sec MV E/A: 0.72 MV dec time: 0.29 sec Ao V2 max: 149.3 cm/sec Ao max P.0 mmHg Ao V2 mean: 110.7 cm/sec Ao mean P.4 mmHg Ao V2 VTI: 30.1 cm MANE(I,D): 3.0 cm2  MANE(V,D): 3.0 cm2 LV V1 max P.0 mmHg LV V1 max: 149.9 cm/sec LV V1 VTI: 30.0 cm CO(LVOT): 6.8 l/min CI(LVOT): 3.7 l/min/m2 SV(LVOT): 90.1 ml SI(LVOT): 48.8 ml/m2 PA acc time: 0.15 sec AV Itz Ratio (DI): 1.0 MANE Index (cm2/m2): 1.6 E/E': 12.4 E/E' av.4 Lateral E/e': 12.6 Medial E/e': 12.2 Peak E' Itz: 7.2 cm/sec  ______________________________________________________________________________ Report approved by: Valeria Anna 2023 12:01 PM                Yuan Bernal; VANESSA  [facility] Foot & Ankle Surgery/Podiatry

## 2023-02-07 NOTE — PATIENT INSTRUCTIONS
Podiatry Clinics that offer foot and toenail care  [facility]  [facility]  [phone]  Foot and Ankle Clinics, [facility]  [phone]  [facility]  [place] - Dr. Raines on Tuesdays  [phone]  [facility]  [place]  [phone]  [facility]  [facility]  [phone]  [facility]  [phone]  [facility]  [phone]  [facility]  *Nurse comes to your home for nail care.  Orin Abad RN Foot Specialist  [phone]

## 2023-02-07 NOTE — LETTER
2/7/2023         RE: Mikaela Figueroa  [address] [facility] [address]        Dear Colleague,    Thank you for referring your patient, Mikaela Figueroa, to the [facility]. Please see a copy of my visit note below.    FOOT AND ANKLE SURGERY/PODIATRY CONSULT NOTE         ASSESSMENT:   Tyloma bilateral feet      TREATMENT:  Debrided hyperkeratotic lesions on the plantar aspect of both feet today.  I recommend the patient return to the clinic as needed.        HPI: I was asked to see Mikaela Figueroa today.  To evaluate and treat bilateral foot pain the patient indicated that he has a sharp pain on the ball of both feet underneath the first metatarsal head and the fifth metatarsal head.  The pain is aggravated with weightbearing primarily without shoes.  As long as he is wearing shoes his pain is mild.  He also complained about thick callus on the bottom of both feet underneath the small toe.  This can be painful as well.  He has not had any redness or swelling.  He has no pain while resting.  He denies any trauma to his feet.  And he denies any other previous treatment.  The patient was seen in consultation at the request of Derek Mi MD for evaluation and treatment of painful calluses both feet.     Past Medical History:   Diagnosis Date     Hypertension      Pacemaker        Social History     Socioeconomic History     Marital status:      Spouse name: Not on file     Number of children: Not on file     Years of education: Not on file     Highest education level: Not on file   Occupational History     Not on file   Tobacco Use     Smoking status: Former     Types: Pipe     Smokeless tobacco: Never   Substance and Sexual Activity     Alcohol use: No     Drug use: No     Sexual activity: Not on file   Other Topics Concern     Not on file   Social History Narrative     Not on file     Social Determinants of Health     Financial Resource Strain: Not on file   Food Insecurity: Not  on file   Transportation Needs: Not on file   Physical Activity: Not on file   Stress: Not on file   Social Connections: Not on file   Intimate Partner Violence: Not on file   Housing Stability: Not on file        No Known Allergies       Current Outpatient Medications:      acetaminophen (TYLENOL) 325 MG tablet, Take 1-2 tablets (325-650 mg) by mouth every 4 hours as needed for other or mild pain (For optimal non-opioid multimodal pain management to improve pain control.), Disp: , Rfl:      aspirin (ASA) 81 MG chewable tablet, Take 1 tablet (81 mg) by mouth At Bedtime, Disp: , Rfl:      dorzolamide (TRUSOPT) 2 % ophthalmic solution, Place 1 drop into both eyes 2 times daily, Disp: , Rfl:      doxycycline hyclate (VIBRA-TABS) 100 MG tablet, [DOXYCYCLINE (VIBRA-TABS) 100 MG TABLET] TAKE 1 TABLET BY MOUTH DAILY, Disp: 90 tablet, Rfl: 3     Glucosamine-Chondroitin 250-200 MG CAPS, Take 1 capsule by mouth daily, Disp: , Rfl:      latanoprost (XALATAN) 0.005 % ophthalmic solution, Place 1 drop into both eyes At Bedtime, Disp: , Rfl:      lisinopril (ZESTRIL) 10 MG tablet, Take 1 tablet (10 mg) by mouth daily, Disp: 90 tablet, Rfl: 3     metroNIDAZOLE (METROGEL) 1 % external gel, APPLY A SMALL AMOUNT TO AFFECTED AREA(S) TOPICALLY ONCE DAILY, Disp: 60 g, Rfl: 11     multivitamin, therapeutic (THERA-VIT) TABS tablet, Take 1 tablet by mouth daily, Disp: , Rfl:      tamsulosin (FLOMAX) 0.4 MG capsule, TAKE 1 CAPSULE AT BEDTIME (Patient taking differently: Take 0.4 mg by mouth every morning), Disp: 90 capsule, Rfl: 3     triamcinolone (KENALOG) 0.1 % external cream, Apply topically 2 times daily as needed for irritation, Disp: , Rfl:      Family History   Problem Relation Age of Onset     Dementia Mother      Heart Disease Father      Diabetes Father         Social History     Socioeconomic History     Marital status:      Spouse name: Not on file     Number of children: Not on file     Years of education: Not on file  "    Highest education level: Not on file   Occupational History     Not on file   Tobacco Use     Smoking status: Former     Types: Pipe     Smokeless tobacco: Never   Substance and Sexual Activity     Alcohol use: No     Drug use: No     Sexual activity: Not on file   Other Topics Concern     Not on file   Social History Narrative     Not on file     Social Determinants of Health     Financial Resource Strain: Not on file   Food Insecurity: Not on file   Transportation Needs: Not on file   Physical Activity: Not on file   Stress: Not on file   Social Connections: Not on file   Intimate Partner Violence: Not on file   Housing Stability: Not on file        Review of Systems - Patient denies fever, chills, rash, wound, stiffness, limping, numbness, weakness, heart burn, blood in stool, chest pain with activity, calf pain when walking, shortness of breath with activity, chronic cough, easy bleeding/bruising, swelling of ankles, excessive thirst, fatigue, depression, anxiety.  Patient admits to painful calluses both feet.      OBJECTIVE:  Appearance: alert, well appearing, and in no distress.    Pulse 87   Ht 1.734 m (5' 8.25\")   Wt 68.9 kg (152 lb)   SpO2 90%   BMI 22.94 kg/m       Body mass index is 22.94 kg/m .     General appearance: Patient is alert and fully cooperative with history & exam.  No sign of distress is noted during the visit.  Psychiatric: Affect is pleasant & appropriate.  Patient appears motivated to improve health.  Respiratory: Breathing is regular & unlabored while sitting.  HEENT: Hearing is intact to spoken word.  Speech is clear.  No gross evidence of visual impairment that would impact ambulation.    Vascular: Dorsalis pedis and posterior tibial pulses are palpable. There is no pedal hair growth bilaterally.  CFT < 3 sec from anterior tibial surface to distal digits bilaterally. There is no appreciable edema noted.  Dermatologic: Round, thick, hyperkeratotic nucleated lesion subsecond " metatarsal head both feet.  Turgor and texture are within normal limits. No coloration or temperature changes. No primary or secondary lesions noted.  Neurologic: All epicritic and proprioceptive sensations are grossly intact bilaterally.  Musculoskeletal: All active and passive ankle, subtalar, midtarsal, and 1st MPJ range of motion are grossly intact without pain or crepitus, with the exception of none bilaterally. Manual muscle strength is within normal limits bilaterally. All dorsiflexors, plantarflexors, invertors, evertors are intact bilaterally. Tenderness present to subfifth metatarsal head bilateral feet on palpation.  No tenderness to bilateral feet or ankles with range of motion. Calf is soft/non-tender without warmth/induration    Imaging:       No images are attached to the encounter or orders placed in the encounter.     Echocardiogram Complete    Result Date: 2023  [id number]^ROBERTA^OWEN^[place] [address]  Name: NEAL WEST MRN: [id number] : 1935 Study Date: 2023 09:51 AM Age: 87 yrs Gender: Male Patient Location: [facility] Reason For Study: SSS (sick sinus syndrome) (H), NSVT (nonsustained ventricular tachycardia) Ordering Physician: OWEN GRANADOS Referring Physician: OWEN GRANADOS Performed By: WILNER  BSA: 1.8 m2 Height: 68 in Weight: 157 lb HR: 85 BP: 157/65 mmHg ______________________________________________________________________________ Procedure Complete Echo Adult. ______________________________________________________________________________ Interpretation Summary  1. The left ventricle is normal in size. Left ventricular function is normal.The ejection fraction is 60-65%. No regional wall motion abnormalities noted. 2. Normal right ventricle size and systolic function. There is a pacemaker lead in the right ventricle. 3. No hemodynamically significant valvular abnormalities on 2D or color flow imaging.  ______________________________________________________________________________ Left Ventricle The left ventricle is normal in size. Left ventricular function is normal.The ejection fraction is 60-65%. There is normal left ventricular wall thickness. No regional wall motion abnormalities noted.  Right Ventricle Normal right ventricle size and systolic function. There is a pacemaker lead in the right ventricle.  Atria The left atrium is moderately dilated. The right atrium is mildly dilated. There is no color Doppler evidence of an atrial shunt.  Mitral Valve Mitral valve leaflets appear normal. There is no evidence of mitral stenosis or clinically significant mitral regurgitation. There is mild (1+) mitral regurgitation. There is no mitral valve stenosis.  Tricuspid Valve Tricuspid valve leaflets appear normal. There is no evidence of tricuspid stenosis or clinically significant tricuspid regurgitation. Right ventricular systolic pressure could not be approximated due to inadequate tricuspid regurgitation. No tricuspid regurgitation.  Aortic Valve There is mild trileaflet aortic sclerosis. There is trace aortic regurgitation. No aortic stenosis is present.  Pulmonic Valve The pulmonic valve is not well seen, but is grossly normal. This degree of valvular regurgitation is within normal limits. There is trace pulmonic valvular regurgitation.  Vessels The aorta root is normal. Normal size ascending aorta. IVC diameter <2.1 cm collapsing >50% with sniff suggests a normal RA pressure of 3 mmHg.  Pericardium There is no pericardial effusion.  ______________________________________________________________________________ MMode/2D Measurements & Calculations IVSd: 1.1 cm LVIDd: 3.7 cm LVIDs: 2.7 cm LVPWd: 1.0 cm FS: 26.4 % LV mass(C)d: 124.8 grams LV mass(C)dI: 67.7 grams/m2  Ao root diam: 3.1 cm LA dimension: 4.2 cm LA/Ao: 1.3 LVOT diam: 2.0 cm LVOT area: 3.0 cm2 LA Volume Indexed (AL/bp): 39.1 ml/m2 RWT: 0.56  Time  Measurements Aortic HR: 75.0 BPM MM HR: 77.0 BPM  Doppler Measurements & Calculations MV E max itz: 89.5 cm/sec MV A max itz: 124.6 cm/sec MV E/A: 0.72 MV dec time: 0.29 sec Ao V2 max: 149.3 cm/sec Ao max P.0 mmHg Ao V2 mean: 110.7 cm/sec Ao mean P.4 mmHg Ao V2 VTI: 30.1 cm MANE(I,D): 3.0 cm2 MANE(V,D): 3.0 cm2 LV V1 max P.0 mmHg LV V1 max: 149.9 cm/sec LV V1 VTI: 30.0 cm CO(LVOT): 6.8 l/min CI(LVOT): 3.7 l/min/m2 SV(LVOT): 90.1 ml SI(LVOT): 48.8 ml/m2 PA acc time: 0.15 sec AV Itz Ratio (DI): 1.0 MANE Index (cm2/m2): 1.6 E/E': 12.4 E/E' av.4 Lateral E/e': 12.6 Medial E/e': 12.2 Peak E' Itz: 7.2 cm/sec  ______________________________________________________________________________ Report approved by: Valeria Anna 2023 12:01 PM        Echocardiogram Complete    Result Date: 2023  [id number]^ROBERTA^[place] [address]  Name: NEAL WEST MRN: [id number] : 1935 Study Date: 2023 09:51 AM Age: 87 yrs Gender: Male Patient Location: Health system Reason For Study: SSS (sick sinus syndrome) (H), NSVT (nonsustained ventricular tachycardia) Ordering Physician: OWEN GRANADOS Referring Physician: OWEN GRANADOS Performed By: WILNER  BSA: 1.8 m2 Height: 68 in Weight: 157 lb HR: 85 BP: 157/65 mmHg ______________________________________________________________________________ Procedure Complete Echo Adult. ______________________________________________________________________________ Interpretation Summary  1. The left ventricle is normal in size. Left ventricular function is normal.The ejection fraction is 60-65%. No regional wall motion abnormalities noted. 2. Normal right ventricle size and systolic function. There is a pacemaker lead in the right ventricle. 3. No hemodynamically significant valvular abnormalities on 2D or color flow imaging.  Measurements Aortic HR: 75.0 BPM MM HR: 77.0 BPM  Doppler Measurements & Calculations MV E max itz: 89.5 cm/sec MV A max itz: 124.6 cm/sec MV E/A: 0.72 MV dec time: 0.29 sec Ao V2 max: 149.3 cm/sec Ao max P.0 mmHg Ao V2 mean: 110.7 cm/sec Ao mean P.4 mmHg Ao V2 VTI: 30.1 cm MANE(I,D): 3.0 cm2 MANE(V,D): 3.0 cm2 LV V1 max P.0 mmHg LV V1 max: 149.9 cm/sec LV V1 VTI: 30.0 cm CO(LVOT): 6.8 l/min CI(LVOT): 3.7 l/min/m2 SV(LVOT): 90.1 ml SI(LVOT): 48.8 ml/m2 PA acc time: 0.15 sec AV Itz Ratio (DI): 1.0 MANE Index (cm2/m2): 1.6 E/E': 12.4 E/E' av.4 Lateral E/e': 12.6 Medial E/e': 12.2 Peak E' Itz: 7.2 cm/sec  ______________________________________________________________________________ Report approved by: Valeria Anna 2023 12:01 PM                Yuan Simental DPM  [facility] Foot & Ankle Surgery/Podiatry         Again, thank you for allowing me to participate in the care of your patient.        Sincerely,        Yuan Bernal DPM

## 2023-02-13 ENCOUNTER — OFFICE VISIT (OUTPATIENT)
Dept: FAMILY MEDICINE | Facility: CLINIC | Age: 88
End: 2023-02-13
Payer: MEDICARE

## 2023-02-13 VITALS
DIASTOLIC BLOOD PRESSURE: 70 MMHG | WEIGHT: 145.9 LBS | TEMPERATURE: 98 F | RESPIRATION RATE: 20 BRPM | BODY MASS INDEX: 22.11 KG/M2 | SYSTOLIC BLOOD PRESSURE: 130 MMHG | HEART RATE: 71 BPM | OXYGEN SATURATION: 99 % | HEIGHT: 68 IN

## 2023-02-13 DIAGNOSIS — H26.9 CATARACT OF BOTH EYES, UNSPECIFIED CATARACT TYPE: ICD-10-CM

## 2023-02-13 DIAGNOSIS — I10 ESSENTIAL HYPERTENSION: ICD-10-CM

## 2023-02-13 DIAGNOSIS — Z95.0 CARDIAC PACEMAKER IN SITU: ICD-10-CM

## 2023-02-13 DIAGNOSIS — I49.5 SSS (SICK SINUS SYNDROME) (H): ICD-10-CM

## 2023-02-13 DIAGNOSIS — N40.1 BPH WITH URINARY OBSTRUCTION: ICD-10-CM

## 2023-02-13 DIAGNOSIS — Z01.818 PREOP GENERAL PHYSICAL EXAM: Primary | ICD-10-CM

## 2023-02-13 DIAGNOSIS — N18.30 STAGE 3 CHRONIC KIDNEY DISEASE, UNSPECIFIED WHETHER STAGE 3A OR 3B CKD (H): ICD-10-CM

## 2023-02-13 DIAGNOSIS — N20.0 NEPHROLITHIASIS: ICD-10-CM

## 2023-02-13 DIAGNOSIS — D64.9 NORMOCHROMIC NORMOCYTIC ANEMIA: ICD-10-CM

## 2023-02-13 DIAGNOSIS — N13.8 BPH WITH URINARY OBSTRUCTION: ICD-10-CM

## 2023-02-13 PROCEDURE — 99214 OFFICE O/P EST MOD 30 MIN: CPT | Performed by: NURSE PRACTITIONER

## 2023-02-13 ASSESSMENT — PAIN SCALES - GENERAL: PAINLEVEL: NO PAIN (0)

## 2023-02-13 NOTE — PROGRESS NOTES
[facility]  [address]  [facility] [address]  Phone: [phone]  Fax: [phone]  Primary Provider: Derek Kumar  Pre-op Performing Provider: BAY DIXON      PREOPERATIVE EVALUATION:  Today's date: 2/13/2023    Mikaela Figueroa is a 87 year old male who presents for a preoperative evaluation.    Surgical Information:  Surgery/Procedure: Cataract Surgery  Surgery Location: [facility]  Surgeon: Dr. Magda Echavarria  Surgery Date: 2/20/23  Time of Surgery: TBD  Where patient plans to recover: At home with family  Fax number for surgical facility: [phone]    Type of Anesthesia Anticipated: to be determined    Assessment & Plan     The proposed surgical procedure is considered LOW risk.    Preop general physical exam    Cataract of both eyes, unspecified cataract type  Preoperative examination completed today.  Exam is overall unremarkable without any significant findings.  I reviewed medications and allergies and no contraindications to the procedure identified today.  He may proceed with cataract removal procedure with choice of anesthesia.    Essential hypertension  Reviewed history of hypertension.  Blood pressure remains at goal today.    Stage 3 chronic kidney disease, unspecified whether stage 3a or 3b CKD (H)  History of CKD reviewed.  Results from most recent BMP remain stable.    BPH with urinary obstruction  Continues tamsulosin and follows with urology for management.    Normochromic normocytic anemia  History of anemia noted.  Most recent hemoglobin A1c in January 2023 was stable at 11.3.    SSS (sick sinus syndrome) (H)    Cardiac pacemaker in situ, dual chamber  Patient has history of sick sinus syndrome with cardiac pacemaker in place.  No complications in this regard.    Nephrolithiasis  History of nephrolithiasis this past fall 2022.  He continues to recover well in this regard without any ongoing complications or concerns  today.      Possible Sleep Apnea: yes {   Implanted Device:   - Type of device: pacemaker Patient advised to bring device information on day of surgery.      Risks and Recommendations:  The patient has the following additional risks and recommendations for perioperative complications:   - No identified additional risk factors other than previously addressed    Medication Instructions:  Patient will hold baby aspirin and multivitamin for the week leading up to procedure.  He will take all other medications as prescribed the day of surgery following the procedure.    RECOMMENDATION:  APPROVAL GIVEN to proceed with proposed procedure, without further diagnostic evaluation.        Subjective     HPI related to upcoming procedure: Patient is here today for preoperative examination.  He will be undergoing cataract removal.  His first procedure on his right eye is scheduled for February 21st and the second procedure on his left eye is scheduled for March 14th.  This will be his first eye surgery.  He has had other surgeries in the past and has always tolerated anesthesia well without complications.  He recently was seen in clinic for his annual examination and had routine lab work completed at that time.  Labs all remained stable.  He does follow with Dr. Colindres neurology for management of BPH.  Recently had nephrolithiasis requiring surgery and stent fragment removal etc.  He reports recovering well from this.  No ongoing concerns in this regard.  He denies any symptoms of illness currently.  He reports having COVID this past fall 2022.  No residual issues from this.  He does have history of normochromic normocytic anemia.  His hemoglobin was stable last month at the time of his physical.  He continues MetroGel for management of acne rosacea.  He is on tamsulosin 0.4 mg daily for BPH.  He has a pacemaker in place with history of sick sinus syndrome.    Preop Questions 2/13/2023   1. Have you ever had a heart attack or  stroke? No   2. Have you ever had surgery on your heart or blood vessels, such as a stent placement, a coronary artery bypass, or surgery on an artery in your head, neck, heart, or legs? YES  - pacemaker   3. Do you have chest pain with activity? No   4. Do you have a history of  heart failure? No   5. Do you currently have a cold, bronchitis or symptoms of other infection? No   6. Do you have a cough, shortness of breath, or wheezing? No   7. Do you or anyone in your family have previous history of blood clots? No   8. Do you or does anyone in your family have a serious bleeding problem such as prolonged bleeding following surgeries or cuts? No   9. Have you ever had problems with anemia or been told to take iron pills? No   10. Have you had any abnormal blood loss such as black, tarry or bloody stools? No   11. Have you ever had a blood transfusion? No   12. Are you willing to have a blood transfusion if it is medically needed before, during, or after your surgery? Yes   13. Have you or any of your relatives ever had problems with anesthesia? No   14. Do you have sleep apnea, excessive snoring or daytime drowsiness? YES    14a. Do you have a CPAP machine? No   15. Do you have any artifical heart valves or other implanted medical devices like a pacemaker, defibrillator, or continuous glucose monitor? YES - pacemaker   15a. What type of device do you have? pacemaker   15b. Name of the clinic that manages your device:  [facility]   16. Do you have artificial joints? No   17. Are you allergic to latex? No       Health Care Directive:  Patient does not have a Health Care Directive or Living Will: Patient states has Advance Directive and will bring in a copy to clinic.    Preoperative Review of :   reviewed - no record of controlled substances prescribed.      Status of Chronic Conditions:  Chronic conditions are as listed in HPI and all remain stable.       Review of Systems  CONSTITUTIONAL: NEGATIVE for fever,  chills, change in weight  ENT/MOUTH: NEGATIVE for ear, mouth and throat problems  RESP: NEGATIVE for significant cough or SOB  CV: NEGATIVE for chest pain, palpitations or peripheral edema    Patient Active Problem List    Diagnosis Date Noted     SSS (sick sinus syndrome) (H) 01/31/2023     Priority: Medium     Stage 3 chronic kidney disease, unspecified whether stage 3a or 3b CKD (H) 01/31/2023     Priority: Medium     Bilateral degenerative progressive high myopia 11/14/2022     Priority: Medium     Stable OD, OS.       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       Routine general medical examination at a health care facility 08/11/2021     Priority: Medium     h/o fx last yr--check DXA       Syncope, unspecified syncope type 03/11/2020     Priority: Medium     Left upper lobe pulmonary nodule 09/03/2019     Priority: Medium     Cardiac pacemaker in situ, dual chamber 09/03/2019     Priority: Medium     Medtronic [id number] Sandra XT  MRI, RA and RV Leads: Medtronic [id number]   CapSureFix Novus  MRI SureScan, DOI 08/28/2019 by Dr. Douglas Mensah, [facility], [address], [place] [address].         Mobitz type 1 second degree atrioventricular block 08/27/2019     Priority: Medium     Essential hypertension with goal blood pressure less than 130/80 11/28/2017     Priority: Medium     Open-angle glaucoma of both eyes, mild stage, unspecified open-angle glaucoma type 11/28/2017     Priority: Medium     BPH with urinary obstruction      Priority: Medium     Created by Conversion         Diverticulosis      Priority: Medium     Created by Conversion  [facility] Annotation: Jun 21 2012  8:27AM - Derek Kumar: sigmoid   colon   noted on abdominal/pelvis CT  Replacement Utility updated for latest IMO load         Hypertension      Priority: Medium     Created by Conversion  Replacement Utility updated for latest IMO load         Right inguinal hernia 05/27/2016     Priority: Medium     small, reducible, asymptomatic         Hearing loss 11/24/2015     Priority: Medium     Glaucoma 11/24/2015     Priority: Medium     Rosacea      Priority: Medium     Created by Conversion         Nephrolithiasis      Priority: Medium     Created by Conversion         Hypercholesterolemia      Priority: Medium     Created by Conversion         Impaired Fasting Glucose      Priority: Medium     Created by Conversion         Serology Prostate-specific Antigen (PSA) Elevated      Priority: Medium     Created by Conversion  [facility] Annotation: Jan 2 2009  1:27PM - Derek Kumar: 3.94 (12/06)   ->   3.38 (9/07) -> 4.83 (9/10/08)         Male Erectile Disorder      Priority: Medium     Created by Conversion         Squamous Cell Carcinoma Of The Skin      Priority: Medium     Created by Conversion         History of hepatitis B 01/01/1990     Priority: Medium      Past Medical History:   Diagnosis Date     Hypertension      Pacemaker      Past Surgical History:   Procedure Laterality Date     APPENDECTOMY        ARTHROSCOPY KNEE Right      CYSTOURETEROSCOPY, WITH LITHOTRIPSY USING ZABRINA 120P LASER AND URETERAL STENT INSERTION Left 10/25/2022    Procedure: CYSTOSCOPY, LEFT URETEROSCOPY, LASER LITHOTRIPSY;  Surgeon: Bob Dozier MD;  Location: [facility] OR     GENITOURINARY SURGERY       IR NEPHROSTOMY TUBE PLACEMENT LEFT  10/26/2022     IR URETERAL STENT PLACEMENT LEFT  11/1/2022     Current Outpatient Medications   Medication Sig Dispense Refill     acetaminophen (TYLENOL) 325 MG tablet Take 1-2 tablets (325-650 mg) by mouth every 4 hours as needed for other or mild pain (For optimal non-opioid multimodal pain management to improve pain control.)       aspirin (ASA) 81 MG chewable tablet Take 1 tablet (81 mg) by mouth At Bedtime       dorzolamide (TRUSOPT) 2 % ophthalmic solution Place 1 drop into both eyes 2 times daily       doxycycline hyclate (VIBRA-TABS) 100 MG tablet [DOXYCYCLINE (VIBRA-TABS) 100 MG TABLET] TAKE 1 TABLET BY MOUTH DAILY 90 tablet 3     Glucosamine-Chondroitin 250-200 MG CAPS Take 1 capsule by mouth daily       latanoprost (XALATAN) 0.005 % ophthalmic solution Place 1 drop into both eyes At Bedtime       lisinopril (ZESTRIL) 10 MG tablet Take 1 tablet (10 mg) by mouth daily 90 tablet 3     metroNIDAZOLE (METROGEL) 1 % external gel APPLY A SMALL AMOUNT TO AFFECTED AREA(S) TOPICALLY ONCE DAILY 60 g 11     multivitamin, therapeutic (THERA-VIT) TABS tablet Take 1 tablet by mouth daily       tamsulosin (FLOMAX) 0.4 MG capsule TAKE 1 CAPSULE AT BEDTIME (Patient taking differently: Take 0.4 mg by mouth every morning) 90 capsule 3     triamcinolone (KENALOG) 0.1 % external cream Apply topically 2 times daily as needed for irritation         No Known Allergies     Social History     Tobacco Use     Smoking status: Former     Types: Pipe     Smokeless tobacco: Never   Substance Use Topics     Alcohol use: No     Family History   Problem Relation Age of Onset     Dementia Mother      Heart Disease  "Father      Diabetes Father      History   Drug Use No         Objective     /70 (BP Location: Right arm, Patient Position: Sitting, Cuff Size: Adult Regular)   Pulse 71   Temp 98  F (36.7  C) (Temporal)   Resp 20   Ht 1.734 m (5' 8.27\")   Wt 66.2 kg (145 lb 14.4 oz)   SpO2 99%   BMI 22.01 kg/m      Physical Exam  GENERAL APPEARANCE: healthy, alert and no distress  HENT: ear canals and TM's normal and nose and mouth without ulcers or lesions  RESP: lungs clear to auscultation - no rales, rhonchi or wheezes  CV: regular rate and rhythm, normal S1 S2, no S3 or S4 and no murmur, click or rub   ABDOMEN: soft, nontender, no HSM or masses and bowel sounds normal  NEURO: Normal strength and tone, sensory exam grossly normal, mentation intact and speech normal    Recent Labs   Lab Test 01/31/23  0825 12/15/22  1219 10/27/22  0641 10/26/22  0614 10/20/22  1036 10/01/22  2249 10/01/22  1932 07/06/22  0758   HGB 11.3* 10.8*   < > 9.8* 10.8*   < > 12.6* 12.0*    278  --  153 131*   < > 152 129*   INR  --   --   --  1.33*  --   --  1.05  --    * 145   < > 146* 145   < > 143 146*   POTASSIUM 4.6 4.5   < > 4.1 4.2   < > 4.0 4.2   CR 1.43* 1.32*   < > 1.41* 1.33*   < > 1.53* 1.30*   A1C  --   --   --   --  5.7*  --   --  5.7*    < > = values in this interval not displayed.        Diagnostics:  No labs were ordered during this visit.   No EKG required for low risk surgery (cataract, skin procedure, breast biopsy, etc).    Revised Cardiac Risk Index (RCRI):  The patient has the following serious cardiovascular risks for perioperative complications:   - No serious cardiac risks = 0 points     RCRI Interpretation: 0 points: Class I (very low risk - 0.4% complication rate)           Signed Electronically by: NAHUN Seals CNP  Copy of this evaluation report is provided to requesting physician.      "

## 2023-03-06 ENCOUNTER — TELEPHONE (OUTPATIENT)
Dept: FAMILY MEDICINE | Facility: CLINIC | Age: 88
End: 2023-03-06
Payer: MEDICARE

## 2023-03-06 DIAGNOSIS — N40.0 BENIGN PROSTATIC HYPERPLASIA, UNSPECIFIED WHETHER LOWER URINARY TRACT SYMPTOMS PRESENT: ICD-10-CM

## 2023-03-06 RX ORDER — TAMSULOSIN HYDROCHLORIDE 0.4 MG/1
0.4 CAPSULE ORAL AT BEDTIME
Qty: 90 CAPSULE | Refills: 3 | Status: SHIPPED | OUTPATIENT
Start: 2023-03-06 | End: 2024-06-24

## 2023-03-06 NOTE — TELEPHONE ENCOUNTER
Reason for Call:  Medication or medication refill:        Name of the medication requested: patient currently out of his medication tamsulosin. He has tried calling [facility] a few times and unable to get a hold of them for a refill. Please send a new refill to them.        Can we leave a detailed message on this number? YES    Phone number patient can be reached at: Home number on file [phone] (home)    Best Time: anytime    Call taken on 3/6/2023 at 12:53 PM by Summer Ellison

## 2023-04-07 ENCOUNTER — ANCILLARY PROCEDURE (OUTPATIENT)
Dept: CARDIOLOGY | Facility: CLINIC | Age: 88
End: 2023-04-07
Attending: INTERNAL MEDICINE
Payer: MEDICARE

## 2023-04-07 DIAGNOSIS — I44.1 SECOND DEGREE MOBITZ I AV BLOCK: ICD-10-CM

## 2023-04-07 DIAGNOSIS — Z95.0 PACEMAKER: ICD-10-CM

## 2023-04-07 LAB
MDC_IDC_LEAD_IMPLANT_DT: NORMAL
MDC_IDC_LEAD_IMPLANT_DT: NORMAL
MDC_IDC_LEAD_LOCATION: NORMAL
MDC_IDC_LEAD_LOCATION: NORMAL
MDC_IDC_LEAD_LOCATION_DETAIL_1: NORMAL
MDC_IDC_LEAD_LOCATION_DETAIL_1: NORMAL
MDC_IDC_LEAD_MFG: NORMAL
MDC_IDC_LEAD_MFG: NORMAL
MDC_IDC_LEAD_MODEL: NORMAL
MDC_IDC_LEAD_MODEL: NORMAL
MDC_IDC_LEAD_POLARITY_TYPE: NORMAL
MDC_IDC_LEAD_POLARITY_TYPE: NORMAL
MDC_IDC_LEAD_SERIAL: NORMAL
MDC_IDC_LEAD_SERIAL: NORMAL
MDC_IDC_LEAD_SPECIAL_FUNCTION: NORMAL
MDC_IDC_LEAD_SPECIAL_FUNCTION: NORMAL
MDC_IDC_MSMT_BATTERY_DTM: NORMAL
MDC_IDC_MSMT_BATTERY_REMAINING_LONGEVITY: 133 MO
MDC_IDC_MSMT_BATTERY_RRT_TRIGGER: 2.62
MDC_IDC_MSMT_BATTERY_STATUS: NORMAL
MDC_IDC_MSMT_BATTERY_VOLTAGE: 3.02 V
MDC_IDC_MSMT_LEADCHNL_RA_IMPEDANCE_VALUE: 323 OHM
MDC_IDC_MSMT_LEADCHNL_RA_IMPEDANCE_VALUE: 361 OHM
MDC_IDC_MSMT_LEADCHNL_RA_PACING_THRESHOLD_AMPLITUDE: 0.38 V
MDC_IDC_MSMT_LEADCHNL_RA_PACING_THRESHOLD_PULSEWIDTH: 0.4 MS
MDC_IDC_MSMT_LEADCHNL_RA_SENSING_INTR_AMPL: 1.88 MV
MDC_IDC_MSMT_LEADCHNL_RA_SENSING_INTR_AMPL: 1.88 MV
MDC_IDC_MSMT_LEADCHNL_RV_IMPEDANCE_VALUE: 418 OHM
MDC_IDC_MSMT_LEADCHNL_RV_IMPEDANCE_VALUE: 494 OHM
MDC_IDC_MSMT_LEADCHNL_RV_PACING_THRESHOLD_AMPLITUDE: 0.5 V
MDC_IDC_MSMT_LEADCHNL_RV_PACING_THRESHOLD_PULSEWIDTH: 0.4 MS
MDC_IDC_MSMT_LEADCHNL_RV_SENSING_INTR_AMPL: 7.25 MV
MDC_IDC_MSMT_LEADCHNL_RV_SENSING_INTR_AMPL: 7.25 MV
MDC_IDC_PG_IMPLANT_DTM: NORMAL
MDC_IDC_PG_MFG: NORMAL
MDC_IDC_PG_MODEL: NORMAL
MDC_IDC_PG_SERIAL: NORMAL
MDC_IDC_PG_TYPE: NORMAL
MDC_IDC_SESS_CLINIC_NAME: NORMAL
MDC_IDC_SESS_DTM: NORMAL
MDC_IDC_SESS_TYPE: NORMAL
MDC_IDC_SET_BRADY_AT_MODE_SWITCH_RATE: 171 {BEATS}/MIN
MDC_IDC_SET_BRADY_HYSTRATE: NORMAL
MDC_IDC_SET_BRADY_LOWRATE: 50 {BEATS}/MIN
MDC_IDC_SET_BRADY_MAX_SENSOR_RATE: 120 {BEATS}/MIN
MDC_IDC_SET_BRADY_MAX_TRACKING_RATE: 120 {BEATS}/MIN
MDC_IDC_SET_BRADY_MODE: NORMAL
MDC_IDC_SET_BRADY_PAV_DELAY_LOW: 180 MS
MDC_IDC_SET_BRADY_SAV_DELAY_LOW: 150 MS
MDC_IDC_SET_LEADCHNL_RA_PACING_AMPLITUDE: 1.5 V
MDC_IDC_SET_LEADCHNL_RA_PACING_ANODE_ELECTRODE_1: NORMAL
MDC_IDC_SET_LEADCHNL_RA_PACING_ANODE_LOCATION_1: NORMAL
MDC_IDC_SET_LEADCHNL_RA_PACING_CAPTURE_MODE: NORMAL
MDC_IDC_SET_LEADCHNL_RA_PACING_CATHODE_ELECTRODE_1: NORMAL
MDC_IDC_SET_LEADCHNL_RA_PACING_CATHODE_LOCATION_1: NORMAL
MDC_IDC_SET_LEADCHNL_RA_PACING_POLARITY: NORMAL
MDC_IDC_SET_LEADCHNL_RA_PACING_PULSEWIDTH: 0.4 MS
MDC_IDC_SET_LEADCHNL_RA_SENSING_ANODE_ELECTRODE_1: NORMAL
MDC_IDC_SET_LEADCHNL_RA_SENSING_ANODE_LOCATION_1: NORMAL
MDC_IDC_SET_LEADCHNL_RA_SENSING_CATHODE_ELECTRODE_1: NORMAL
MDC_IDC_SET_LEADCHNL_RA_SENSING_CATHODE_LOCATION_1: NORMAL
MDC_IDC_SET_LEADCHNL_RA_SENSING_POLARITY: NORMAL
MDC_IDC_SET_LEADCHNL_RA_SENSING_SENSITIVITY: 0.3 MV
MDC_IDC_SET_LEADCHNL_RV_PACING_AMPLITUDE: 1.5 V
MDC_IDC_SET_LEADCHNL_RV_PACING_ANODE_ELECTRODE_1: NORMAL
MDC_IDC_SET_LEADCHNL_RV_PACING_ANODE_LOCATION_1: NORMAL
MDC_IDC_SET_LEADCHNL_RV_PACING_CAPTURE_MODE: NORMAL
MDC_IDC_SET_LEADCHNL_RV_PACING_CATHODE_ELECTRODE_1: NORMAL
MDC_IDC_SET_LEADCHNL_RV_PACING_CATHODE_LOCATION_1: NORMAL
MDC_IDC_SET_LEADCHNL_RV_PACING_POLARITY: NORMAL
MDC_IDC_SET_LEADCHNL_RV_PACING_PULSEWIDTH: 0.4 MS
MDC_IDC_SET_LEADCHNL_RV_SENSING_ANODE_ELECTRODE_1: NORMAL
MDC_IDC_SET_LEADCHNL_RV_SENSING_ANODE_LOCATION_1: NORMAL
MDC_IDC_SET_LEADCHNL_RV_SENSING_CATHODE_ELECTRODE_1: NORMAL
MDC_IDC_SET_LEADCHNL_RV_SENSING_CATHODE_LOCATION_1: NORMAL
MDC_IDC_SET_LEADCHNL_RV_SENSING_POLARITY: NORMAL
MDC_IDC_SET_LEADCHNL_RV_SENSING_SENSITIVITY: 0.9 MV
MDC_IDC_SET_ZONE_DETECTION_INTERVAL: 350 MS
MDC_IDC_SET_ZONE_DETECTION_INTERVAL: 400 MS
MDC_IDC_SET_ZONE_TYPE: NORMAL
MDC_IDC_STAT_AT_BURDEN_PERCENT: 0 %
MDC_IDC_STAT_AT_DTM_END: NORMAL
MDC_IDC_STAT_AT_DTM_START: NORMAL
MDC_IDC_STAT_BRADY_AP_VP_PERCENT: 0.08 %
MDC_IDC_STAT_BRADY_AP_VS_PERCENT: 21.38 %
MDC_IDC_STAT_BRADY_AS_VP_PERCENT: 0.04 %
MDC_IDC_STAT_BRADY_AS_VS_PERCENT: 78.5 %
MDC_IDC_STAT_BRADY_DTM_END: NORMAL
MDC_IDC_STAT_BRADY_DTM_START: NORMAL
MDC_IDC_STAT_BRADY_RA_PERCENT_PACED: 21.85 %
MDC_IDC_STAT_BRADY_RV_PERCENT_PACED: 0.12 %
MDC_IDC_STAT_EPISODE_RECENT_COUNT: 0
MDC_IDC_STAT_EPISODE_RECENT_COUNT_DTM_END: NORMAL
MDC_IDC_STAT_EPISODE_RECENT_COUNT_DTM_START: NORMAL
MDC_IDC_STAT_EPISODE_TOTAL_COUNT: 0
MDC_IDC_STAT_EPISODE_TOTAL_COUNT: 4
MDC_IDC_STAT_EPISODE_TOTAL_COUNT: 9
MDC_IDC_STAT_EPISODE_TOTAL_COUNT_DTM_END: NORMAL
MDC_IDC_STAT_EPISODE_TOTAL_COUNT_DTM_START: NORMAL
MDC_IDC_STAT_EPISODE_TYPE: NORMAL

## 2023-04-07 PROCEDURE — 93294 REM INTERROG EVL PM/LDLS PM: CPT | Performed by: INTERNAL MEDICINE

## 2023-04-07 PROCEDURE — 93296 REM INTERROG EVL PM/IDS: CPT | Performed by: INTERNAL MEDICINE

## 2023-07-02 DIAGNOSIS — L71.9 ROSACEA: ICD-10-CM

## 2023-07-02 RX ORDER — DOXYCYCLINE HYCLATE 100 MG
TABLET ORAL
Qty: 90 TABLET | Refills: 3 | Status: ON HOLD | OUTPATIENT
Start: 2023-07-02 | End: 2024-02-04

## 2023-07-17 ENCOUNTER — ANCILLARY PROCEDURE (OUTPATIENT)
Dept: CARDIOLOGY | Facility: CLINIC | Age: 88
End: 2023-07-17
Attending: INTERNAL MEDICINE
Payer: MEDICARE

## 2023-07-17 DIAGNOSIS — R55 SYNCOPE, UNSPECIFIED SYNCOPE TYPE: ICD-10-CM

## 2023-07-17 DIAGNOSIS — Z95.0 PACEMAKER: ICD-10-CM

## 2023-07-17 DIAGNOSIS — I44.1 SECOND DEGREE MOBITZ I AV BLOCK: ICD-10-CM

## 2023-07-17 LAB
MDC_IDC_EPISODE_DTM: NORMAL
MDC_IDC_EPISODE_DURATION: 1 S
MDC_IDC_EPISODE_ID: 14
MDC_IDC_EPISODE_TYPE: NORMAL
MDC_IDC_LEAD_IMPLANT_DT: NORMAL
MDC_IDC_LEAD_IMPLANT_DT: NORMAL
MDC_IDC_LEAD_LOCATION: NORMAL
MDC_IDC_LEAD_LOCATION: NORMAL
MDC_IDC_LEAD_LOCATION_DETAIL_1: NORMAL
MDC_IDC_LEAD_LOCATION_DETAIL_1: NORMAL
MDC_IDC_LEAD_MFG: NORMAL
MDC_IDC_LEAD_MFG: NORMAL
MDC_IDC_LEAD_MODEL: NORMAL
MDC_IDC_LEAD_MODEL: NORMAL
MDC_IDC_LEAD_POLARITY_TYPE: NORMAL
MDC_IDC_LEAD_POLARITY_TYPE: NORMAL
MDC_IDC_LEAD_SERIAL: NORMAL
MDC_IDC_LEAD_SERIAL: NORMAL
MDC_IDC_LEAD_SPECIAL_FUNCTION: NORMAL
MDC_IDC_LEAD_SPECIAL_FUNCTION: NORMAL
MDC_IDC_MSMT_BATTERY_DTM: NORMAL
MDC_IDC_MSMT_BATTERY_REMAINING_LONGEVITY: 131 MO
MDC_IDC_MSMT_BATTERY_RRT_TRIGGER: 2.62
MDC_IDC_MSMT_BATTERY_STATUS: NORMAL
MDC_IDC_MSMT_BATTERY_VOLTAGE: 3.01 V
MDC_IDC_MSMT_LEADCHNL_RA_IMPEDANCE_VALUE: 323 OHM
MDC_IDC_MSMT_LEADCHNL_RA_IMPEDANCE_VALUE: 380 OHM
MDC_IDC_MSMT_LEADCHNL_RA_PACING_THRESHOLD_AMPLITUDE: 0.38 V
MDC_IDC_MSMT_LEADCHNL_RA_PACING_THRESHOLD_PULSEWIDTH: 0.4 MS
MDC_IDC_MSMT_LEADCHNL_RA_SENSING_INTR_AMPL: 1.88 MV
MDC_IDC_MSMT_LEADCHNL_RA_SENSING_INTR_AMPL: 1.88 MV
MDC_IDC_MSMT_LEADCHNL_RV_IMPEDANCE_VALUE: 437 OHM
MDC_IDC_MSMT_LEADCHNL_RV_IMPEDANCE_VALUE: 494 OHM
MDC_IDC_MSMT_LEADCHNL_RV_PACING_THRESHOLD_AMPLITUDE: 0.5 V
MDC_IDC_MSMT_LEADCHNL_RV_PACING_THRESHOLD_PULSEWIDTH: 0.4 MS
MDC_IDC_MSMT_LEADCHNL_RV_SENSING_INTR_AMPL: 7.38 MV
MDC_IDC_MSMT_LEADCHNL_RV_SENSING_INTR_AMPL: 7.38 MV
MDC_IDC_PG_IMPLANT_DTM: NORMAL
MDC_IDC_PG_MFG: NORMAL
MDC_IDC_PG_MODEL: NORMAL
MDC_IDC_PG_SERIAL: NORMAL
MDC_IDC_PG_TYPE: NORMAL
MDC_IDC_SESS_CLINIC_NAME: NORMAL
MDC_IDC_SESS_DTM: NORMAL
MDC_IDC_SESS_TYPE: NORMAL
MDC_IDC_SET_BRADY_AT_MODE_SWITCH_RATE: 171 {BEATS}/MIN
MDC_IDC_SET_BRADY_HYSTRATE: NORMAL
MDC_IDC_SET_BRADY_LOWRATE: 50 {BEATS}/MIN
MDC_IDC_SET_BRADY_MAX_SENSOR_RATE: 120 {BEATS}/MIN
MDC_IDC_SET_BRADY_MAX_TRACKING_RATE: 120 {BEATS}/MIN
MDC_IDC_SET_BRADY_MODE: NORMAL
MDC_IDC_SET_BRADY_PAV_DELAY_LOW: 180 MS
MDC_IDC_SET_BRADY_SAV_DELAY_LOW: 150 MS
MDC_IDC_SET_LEADCHNL_RA_PACING_AMPLITUDE: 1.5 V
MDC_IDC_SET_LEADCHNL_RA_PACING_ANODE_ELECTRODE_1: NORMAL
MDC_IDC_SET_LEADCHNL_RA_PACING_ANODE_LOCATION_1: NORMAL
MDC_IDC_SET_LEADCHNL_RA_PACING_CAPTURE_MODE: NORMAL
MDC_IDC_SET_LEADCHNL_RA_PACING_CATHODE_ELECTRODE_1: NORMAL
MDC_IDC_SET_LEADCHNL_RA_PACING_CATHODE_LOCATION_1: NORMAL
MDC_IDC_SET_LEADCHNL_RA_PACING_POLARITY: NORMAL
MDC_IDC_SET_LEADCHNL_RA_PACING_PULSEWIDTH: 0.4 MS
MDC_IDC_SET_LEADCHNL_RA_SENSING_ANODE_ELECTRODE_1: NORMAL
MDC_IDC_SET_LEADCHNL_RA_SENSING_ANODE_LOCATION_1: NORMAL
MDC_IDC_SET_LEADCHNL_RA_SENSING_CATHODE_ELECTRODE_1: NORMAL
MDC_IDC_SET_LEADCHNL_RA_SENSING_CATHODE_LOCATION_1: NORMAL
MDC_IDC_SET_LEADCHNL_RA_SENSING_POLARITY: NORMAL
MDC_IDC_SET_LEADCHNL_RA_SENSING_SENSITIVITY: 0.3 MV
MDC_IDC_SET_LEADCHNL_RV_PACING_AMPLITUDE: 1.5 V
MDC_IDC_SET_LEADCHNL_RV_PACING_ANODE_ELECTRODE_1: NORMAL
MDC_IDC_SET_LEADCHNL_RV_PACING_ANODE_LOCATION_1: NORMAL
MDC_IDC_SET_LEADCHNL_RV_PACING_CAPTURE_MODE: NORMAL
MDC_IDC_SET_LEADCHNL_RV_PACING_CATHODE_ELECTRODE_1: NORMAL
MDC_IDC_SET_LEADCHNL_RV_PACING_CATHODE_LOCATION_1: NORMAL
MDC_IDC_SET_LEADCHNL_RV_PACING_POLARITY: NORMAL
MDC_IDC_SET_LEADCHNL_RV_PACING_PULSEWIDTH: 0.4 MS
MDC_IDC_SET_LEADCHNL_RV_SENSING_ANODE_ELECTRODE_1: NORMAL
MDC_IDC_SET_LEADCHNL_RV_SENSING_ANODE_LOCATION_1: NORMAL
MDC_IDC_SET_LEADCHNL_RV_SENSING_CATHODE_ELECTRODE_1: NORMAL
MDC_IDC_SET_LEADCHNL_RV_SENSING_CATHODE_LOCATION_1: NORMAL
MDC_IDC_SET_LEADCHNL_RV_SENSING_POLARITY: NORMAL
MDC_IDC_SET_LEADCHNL_RV_SENSING_SENSITIVITY: 0.9 MV
MDC_IDC_SET_ZONE_DETECTION_INTERVAL: 350 MS
MDC_IDC_SET_ZONE_DETECTION_INTERVAL: 400 MS
MDC_IDC_SET_ZONE_TYPE: NORMAL
MDC_IDC_STAT_AT_BURDEN_PERCENT: 0 %
MDC_IDC_STAT_AT_DTM_END: NORMAL
MDC_IDC_STAT_AT_DTM_START: NORMAL
MDC_IDC_STAT_BRADY_AP_VP_PERCENT: 0.07 %
MDC_IDC_STAT_BRADY_AP_VS_PERCENT: 23.48 %
MDC_IDC_STAT_BRADY_AS_VP_PERCENT: 0.04 %
MDC_IDC_STAT_BRADY_AS_VS_PERCENT: 76.41 %
MDC_IDC_STAT_BRADY_DTM_END: NORMAL
MDC_IDC_STAT_BRADY_DTM_START: NORMAL
MDC_IDC_STAT_BRADY_RA_PERCENT_PACED: 24.27 %
MDC_IDC_STAT_BRADY_RV_PERCENT_PACED: 0.11 %
MDC_IDC_STAT_EPISODE_RECENT_COUNT: 0
MDC_IDC_STAT_EPISODE_RECENT_COUNT: 1
MDC_IDC_STAT_EPISODE_RECENT_COUNT_DTM_END: NORMAL
MDC_IDC_STAT_EPISODE_RECENT_COUNT_DTM_START: NORMAL
MDC_IDC_STAT_EPISODE_TOTAL_COUNT: 0
MDC_IDC_STAT_EPISODE_TOTAL_COUNT: 10
MDC_IDC_STAT_EPISODE_TOTAL_COUNT: 4
MDC_IDC_STAT_EPISODE_TOTAL_COUNT_DTM_END: NORMAL
MDC_IDC_STAT_EPISODE_TOTAL_COUNT_DTM_START: NORMAL
MDC_IDC_STAT_EPISODE_TYPE: NORMAL

## 2023-07-17 PROCEDURE — 93296 REM INTERROG EVL PM/IDS: CPT | Performed by: INTERNAL MEDICINE

## 2023-07-17 PROCEDURE — 93294 REM INTERROG EVL PM/LDLS PM: CPT | Performed by: INTERNAL MEDICINE

## 2023-07-18 ENCOUNTER — LAB (OUTPATIENT)
Dept: LAB | Facility: CLINIC | Age: 88
End: 2023-07-18
Payer: MEDICARE

## 2023-07-18 DIAGNOSIS — R97.20 ELEVATED PROSTATE SPECIFIC ANTIGEN (PSA): Primary | ICD-10-CM

## 2023-07-18 LAB — PSA SERPL DL<=0.01 NG/ML-MCNC: 7.91 NG/ML

## 2023-07-18 PROCEDURE — 84153 ASSAY OF PSA TOTAL: CPT

## 2023-07-18 PROCEDURE — 36415 COLL VENOUS BLD VENIPUNCTURE: CPT

## 2023-07-24 ENCOUNTER — TRANSFERRED RECORDS (OUTPATIENT)
Dept: HEALTH INFORMATION MANAGEMENT | Facility: CLINIC | Age: 88
End: 2023-07-24
Payer: MEDICARE

## 2023-07-27 ENCOUNTER — TRANSFERRED RECORDS (OUTPATIENT)
Dept: HEALTH INFORMATION MANAGEMENT | Facility: CLINIC | Age: 88
End: 2023-07-27
Payer: MEDICARE

## 2023-08-24 ENCOUNTER — MEDICAL CORRESPONDENCE (OUTPATIENT)
Dept: HEALTH INFORMATION MANAGEMENT | Facility: CLINIC | Age: 88
End: 2023-08-24

## 2023-08-24 ENCOUNTER — LAB (OUTPATIENT)
Dept: LAB | Facility: CLINIC | Age: 88
End: 2023-08-24
Payer: MEDICARE

## 2023-08-24 DIAGNOSIS — Z01.818 PRE-OP EXAM: Primary | ICD-10-CM

## 2023-08-24 DIAGNOSIS — R97.20 ELEVATED PROSTATE SPECIFIC ANTIGEN (PSA): ICD-10-CM

## 2023-08-24 LAB — POTASSIUM SERPL-SCNC: 4 MMOL/L (ref 3.4–5.3)

## 2023-08-24 PROCEDURE — 36415 COLL VENOUS BLD VENIPUNCTURE: CPT

## 2023-08-24 PROCEDURE — 84132 ASSAY OF SERUM POTASSIUM: CPT

## 2023-08-27 ENCOUNTER — ANESTHESIA EVENT (OUTPATIENT)
Dept: SURGERY | Facility: CLINIC | Age: 88
End: 2023-08-27
Payer: MEDICARE

## 2023-08-28 ENCOUNTER — ANESTHESIA (OUTPATIENT)
Dept: SURGERY | Facility: CLINIC | Age: 88
End: 2023-08-28
Payer: MEDICARE

## 2023-08-28 ENCOUNTER — HOSPITAL ENCOUNTER (OUTPATIENT)
Facility: CLINIC | Age: 88
Discharge: HOME | End: 2023-08-28
Attending: OTOLARYNGOLOGY | Admitting: OTOLARYNGOLOGY
Payer: MEDICARE

## 2023-08-28 VITALS
DIASTOLIC BLOOD PRESSURE: 85 MMHG | OXYGEN SATURATION: 98 % | SYSTOLIC BLOOD PRESSURE: 170 MMHG | TEMPERATURE: 97 F | RESPIRATION RATE: 20 BRPM | HEART RATE: 69 BPM

## 2023-08-28 PROCEDURE — 250N000009 HC RX 250: Performed by: OTOLARYNGOLOGY

## 2023-08-28 PROCEDURE — C1762 CONN TISS, HUMAN(INC FASCIA): HCPCS | Performed by: OTOLARYNGOLOGY

## 2023-08-28 PROCEDURE — 250N000009 HC RX 250

## 2023-08-28 PROCEDURE — 272N000001 HC OR GENERAL SUPPLY STERILE: Performed by: OTOLARYNGOLOGY

## 2023-08-28 PROCEDURE — 258N000003 HC RX IP 258 OP 636

## 2023-08-28 PROCEDURE — 250N000013 HC RX MED GY IP 250 OP 250 PS 637

## 2023-08-28 PROCEDURE — 710N000010 HC RECOVERY PHASE 1, LEVEL 2, PER MIN: Performed by: OTOLARYNGOLOGY

## 2023-08-28 PROCEDURE — 250N000013 HC RX MED GY IP 250 OP 250 PS 637: Performed by: ANESTHESIOLOGY

## 2023-08-28 PROCEDURE — 360N000074 HC SURGERY LEVEL 1, PER MIN: Performed by: OTOLARYNGOLOGY

## 2023-08-28 PROCEDURE — 370N000017 HC ANESTHESIA TECHNICAL FEE, PER MIN: Performed by: OTOLARYNGOLOGY

## 2023-08-28 PROCEDURE — 250N000011 HC RX IP 250 OP 636

## 2023-08-28 PROCEDURE — 250N000009 HC RX 250: Performed by: NURSE ANESTHETIST, CERTIFIED REGISTERED

## 2023-08-28 PROCEDURE — 999N000141 HC STATISTIC PRE-PROCEDURE NURSING ASSESSMENT: Performed by: OTOLARYNGOLOGY

## 2023-08-28 PROCEDURE — 250N000025 HC SEVOFLURANE, PER MIN: Performed by: OTOLARYNGOLOGY

## 2023-08-28 PROCEDURE — 710N000012 HC RECOVERY PHASE 2, PER MINUTE: Performed by: OTOLARYNGOLOGY

## 2023-08-28 PROCEDURE — 258N000003 HC RX IP 258 OP 636: Performed by: NURSE ANESTHETIST, CERTIFIED REGISTERED

## 2023-08-28 PROCEDURE — 250N000011 HC RX IP 250 OP 636: Mod: JZ | Performed by: NURSE ANESTHETIST, CERTIFIED REGISTERED

## 2023-08-28 DEVICE — GRAFT COSTAL CARTILAGE MED 3X30MM 10-30MM 450030: Type: IMPLANTABLE DEVICE | Site: FACE | Status: FUNCTIONAL

## 2023-08-28 RX ORDER — LIDOCAINE 40 MG/G
CREAM TOPICAL
Status: DISCONTINUED | OUTPATIENT
Start: 2023-08-28 | End: 2023-08-28 | Stop reason: HOSPADM

## 2023-08-28 RX ORDER — OXYCODONE HYDROCHLORIDE 5 MG/1
5 TABLET ORAL
Status: DISCONTINUED | OUTPATIENT
Start: 2023-08-28 | End: 2023-08-29 | Stop reason: HOSPADM

## 2023-08-28 RX ORDER — ONDANSETRON 2 MG/ML
4 INJECTION INTRAMUSCULAR; INTRAVENOUS EVERY 30 MIN PRN
Status: DISCONTINUED | OUTPATIENT
Start: 2023-08-28 | End: 2023-08-28 | Stop reason: HOSPADM

## 2023-08-28 RX ORDER — ONDANSETRON 4 MG/1
4 TABLET, ORALLY DISINTEGRATING ORAL EVERY 30 MIN PRN
Status: DISCONTINUED | OUTPATIENT
Start: 2023-08-28 | End: 2023-08-29 | Stop reason: HOSPADM

## 2023-08-28 RX ORDER — SODIUM CHLORIDE, SODIUM LACTATE, POTASSIUM CHLORIDE, CALCIUM CHLORIDE 600; 310; 30; 20 MG/100ML; MG/100ML; MG/100ML; MG/100ML
INJECTION, SOLUTION INTRAVENOUS CONTINUOUS
Status: DISCONTINUED | OUTPATIENT
Start: 2023-08-28 | End: 2023-08-28 | Stop reason: HOSPADM

## 2023-08-28 RX ORDER — GINSENG 100 MG
CAPSULE ORAL PRN
Status: DISCONTINUED | OUTPATIENT
Start: 2023-08-28 | End: 2023-08-28 | Stop reason: HOSPADM

## 2023-08-28 RX ORDER — ACETAMINOPHEN 325 MG/1
975 TABLET ORAL ONCE
Status: COMPLETED | OUTPATIENT
Start: 2023-08-28 | End: 2023-08-28

## 2023-08-28 RX ORDER — HYDROMORPHONE HCL IN WATER/PF 6 MG/30 ML
0.4 PATIENT CONTROLLED ANALGESIA SYRINGE INTRAVENOUS EVERY 5 MIN PRN
Status: DISCONTINUED | OUTPATIENT
Start: 2023-08-28 | End: 2023-08-28 | Stop reason: HOSPADM

## 2023-08-28 RX ORDER — OXYCODONE HYDROCHLORIDE 5 MG/1
5 TABLET ORAL
Status: COMPLETED | OUTPATIENT
Start: 2023-08-28 | End: 2023-08-28

## 2023-08-28 RX ORDER — EPHEDRINE SULFATE 50 MG/ML
INJECTION, SOLUTION INTRAMUSCULAR; INTRAVENOUS; SUBCUTANEOUS PRN
Status: DISCONTINUED | OUTPATIENT
Start: 2023-08-28 | End: 2023-08-28

## 2023-08-28 RX ORDER — FENTANYL CITRATE 50 UG/ML
INJECTION, SOLUTION INTRAMUSCULAR; INTRAVENOUS PRN
Status: DISCONTINUED | OUTPATIENT
Start: 2023-08-28 | End: 2023-08-28

## 2023-08-28 RX ORDER — ONDANSETRON 2 MG/ML
4 INJECTION INTRAMUSCULAR; INTRAVENOUS EVERY 30 MIN PRN
Status: DISCONTINUED | OUTPATIENT
Start: 2023-08-28 | End: 2023-08-29 | Stop reason: HOSPADM

## 2023-08-28 RX ORDER — CEFAZOLIN SODIUM/WATER 2 G/20 ML
SYRINGE (ML) INTRAVENOUS
Status: DISCONTINUED
Start: 2023-08-28 | End: 2023-08-28 | Stop reason: HOSPADM

## 2023-08-28 RX ORDER — PROPOFOL 10 MG/ML
INJECTION, EMULSION INTRAVENOUS PRN
Status: DISCONTINUED | OUTPATIENT
Start: 2023-08-28 | End: 2023-08-28

## 2023-08-28 RX ORDER — HYDROMORPHONE HCL IN WATER/PF 6 MG/30 ML
0.2 PATIENT CONTROLLED ANALGESIA SYRINGE INTRAVENOUS EVERY 5 MIN PRN
Status: DISCONTINUED | OUTPATIENT
Start: 2023-08-28 | End: 2023-08-28 | Stop reason: HOSPADM

## 2023-08-28 RX ORDER — OXYCODONE HYDROCHLORIDE 5 MG/1
10 TABLET ORAL
Status: DISCONTINUED | OUTPATIENT
Start: 2023-08-28 | End: 2023-08-29 | Stop reason: HOSPADM

## 2023-08-28 RX ORDER — LIDOCAINE HYDROCHLORIDE AND EPINEPHRINE 10; 10 MG/ML; UG/ML
INJECTION, SOLUTION INFILTRATION; PERINEURAL PRN
Status: DISCONTINUED | OUTPATIENT
Start: 2023-08-28 | End: 2023-08-28 | Stop reason: HOSPADM

## 2023-08-28 RX ORDER — MAGNESIUM HYDROXIDE 1200 MG/15ML
LIQUID ORAL PRN
Status: DISCONTINUED | OUTPATIENT
Start: 2023-08-28 | End: 2023-08-28 | Stop reason: HOSPADM

## 2023-08-28 RX ORDER — ONDANSETRON 4 MG/1
4 TABLET, ORALLY DISINTEGRATING ORAL EVERY 30 MIN PRN
Status: DISCONTINUED | OUTPATIENT
Start: 2023-08-28 | End: 2023-08-28 | Stop reason: HOSPADM

## 2023-08-28 RX ORDER — ONDANSETRON 2 MG/ML
INJECTION INTRAMUSCULAR; INTRAVENOUS PRN
Status: DISCONTINUED | OUTPATIENT
Start: 2023-08-28 | End: 2023-08-28

## 2023-08-28 RX ORDER — FENTANYL CITRATE 50 UG/ML
50 INJECTION, SOLUTION INTRAMUSCULAR; INTRAVENOUS
Status: DISCONTINUED | OUTPATIENT
Start: 2023-08-28 | End: 2023-08-29 | Stop reason: HOSPADM

## 2023-08-28 RX ORDER — FENTANYL CITRATE 50 UG/ML
50 INJECTION, SOLUTION INTRAMUSCULAR; INTRAVENOUS EVERY 5 MIN PRN
Status: DISCONTINUED | OUTPATIENT
Start: 2023-08-28 | End: 2023-08-28 | Stop reason: HOSPADM

## 2023-08-28 RX ORDER — DEXAMETHASONE SODIUM PHOSPHATE 4 MG/ML
INJECTION, SOLUTION INTRA-ARTICULAR; INTRALESIONAL; INTRAMUSCULAR; INTRAVENOUS; SOFT TISSUE PRN
Status: DISCONTINUED | OUTPATIENT
Start: 2023-08-28 | End: 2023-08-28

## 2023-08-28 RX ORDER — FENTANYL CITRATE 50 UG/ML
25 INJECTION, SOLUTION INTRAMUSCULAR; INTRAVENOUS EVERY 5 MIN PRN
Status: DISCONTINUED | OUTPATIENT
Start: 2023-08-28 | End: 2023-08-28 | Stop reason: HOSPADM

## 2023-08-28 RX ORDER — CEFAZOLIN SODIUM/WATER 2 G/20 ML
SYRINGE (ML) INTRAVENOUS PRN
Status: DISCONTINUED | OUTPATIENT
Start: 2023-08-28 | End: 2023-08-28

## 2023-08-28 RX ADMIN — ACETAMINOPHEN 975 MG: 325 TABLET ORAL at 20:46

## 2023-08-28 RX ADMIN — PROPOFOL 100 MCG/KG/MIN: 10 INJECTION, EMULSION INTRAVENOUS at 15:38

## 2023-08-28 RX ADMIN — SODIUM CHLORIDE, POTASSIUM CHLORIDE, SODIUM LACTATE AND CALCIUM CHLORIDE: 600; 310; 30; 20 INJECTION, SOLUTION INTRAVENOUS at 16:28

## 2023-08-28 RX ADMIN — FENTANYL CITRATE 25 MCG: 50 INJECTION, SOLUTION INTRAMUSCULAR; INTRAVENOUS at 21:40

## 2023-08-28 RX ADMIN — FENTANYL CITRATE 50 MCG: 50 INJECTION, SOLUTION INTRAMUSCULAR; INTRAVENOUS at 16:49

## 2023-08-28 RX ADMIN — Medication 10 MG: at 16:00

## 2023-08-28 RX ADMIN — FENTANYL CITRATE 50 MCG: 50 INJECTION, SOLUTION INTRAMUSCULAR; INTRAVENOUS at 15:21

## 2023-08-28 RX ADMIN — Medication 2 G: at 15:18

## 2023-08-28 RX ADMIN — SODIUM CHLORIDE, POTASSIUM CHLORIDE, SODIUM LACTATE AND CALCIUM CHLORIDE: 600; 310; 30; 20 INJECTION, SOLUTION INTRAVENOUS at 15:12

## 2023-08-28 RX ADMIN — ACETAMINOPHEN 975 MG: 325 TABLET ORAL at 14:46

## 2023-08-28 RX ADMIN — FENTANYL CITRATE 25 MCG: 50 INJECTION, SOLUTION INTRAMUSCULAR; INTRAVENOUS at 21:22

## 2023-08-28 RX ADMIN — LIDOCAINE HYDROCHLORIDE 20 MG: 10 INJECTION, SOLUTION EPIDURAL; INFILTRATION; INTRACAUDAL; PERINEURAL at 15:38

## 2023-08-28 RX ADMIN — PHENYLEPHRINE HYDROCHLORIDE 0.5 MCG/KG/MIN: 10 INJECTION INTRAVENOUS at 16:00

## 2023-08-28 RX ADMIN — PHENYLEPHRINE HYDROCHLORIDE 100 MCG: 10 INJECTION INTRAVENOUS at 16:11

## 2023-08-28 RX ADMIN — FENTANYL CITRATE 50 MCG: 50 INJECTION, SOLUTION INTRAMUSCULAR; INTRAVENOUS at 16:51

## 2023-08-28 RX ADMIN — FENTANYL CITRATE 50 MCG: 50 INJECTION, SOLUTION INTRAMUSCULAR; INTRAVENOUS at 16:00

## 2023-08-28 RX ADMIN — ONDANSETRON 4 MG: 2 INJECTION INTRAMUSCULAR; INTRAVENOUS at 16:08

## 2023-08-28 RX ADMIN — OXYCODONE HYDROCHLORIDE 5 MG: 5 TABLET ORAL at 20:06

## 2023-08-28 RX ADMIN — DEXAMETHASONE SODIUM PHOSPHATE 4 MG: 4 INJECTION, SOLUTION INTRA-ARTICULAR; INTRALESIONAL; INTRAMUSCULAR; INTRAVENOUS; SOFT TISSUE at 15:44

## 2023-08-28 RX ADMIN — PHENYLEPHRINE HYDROCHLORIDE 100 MCG: 10 INJECTION INTRAVENOUS at 16:04

## 2023-08-28 ASSESSMENT — ENCOUNTER SYMPTOMS: DYSRHYTHMIAS: 1

## 2023-08-28 ASSESSMENT — ACTIVITIES OF DAILY LIVING (ADL)
ADLS_ACUITY_SCORE: 35

## 2023-08-28 NOTE — ANESTHESIA PREPROCEDURE EVALUATION
Anesthesia Pre-Procedure Evaluation    Patient: Mikaela Figueroa   MRN: [id number] : 1935        Procedure : Procedure(s):  FOREHEAD FLAP REPAIR MOHS DEFECT RIGHT NOSE          Past Medical History:   Diagnosis Date    Hypertension     Pacemaker       Past Surgical History:   Procedure Laterality Date    APPENDECTOMY      ARTHROSCOPY KNEE Right     CYSTOURETEROSCOPY, WITH LITHOTRIPSY USING ZABRINA 120P LASER AND URETERAL STENT INSERTION Left 10/25/2022    Procedure: CYSTOSCOPY, LEFT URETEROSCOPY, LASER LITHOTRIPSY;  Surgeon: Bob Dozier MD;  Location: [facility] OR    GENITOURINARY SURGERY      IR NEPHROSTOMY TUBE PLACEMENT LEFT  10/26/2022    IR URETERAL STENT PLACEMENT LEFT  2022      No Known Allergies   Social History     Tobacco Use    Smoking status: Former     Types: Pipe    Smokeless tobacco: Never   Substance Use Topics    Alcohol use: No      Wt Readings from Last 1 Encounters:   23 66.2 kg (145 lb 14.4 oz)        Anesthesia Evaluation   Pt has had prior anesthetic.     No history of anesthetic complications       ROS/MED HX  ENT/Pulmonary:  - neg pulmonary ROS     Neurologic:  - neg neurologic ROS     Cardiovascular: Comment: 2023: EF 60-65%, valves unremarkable    (+) Dyslipidemia hypertension- -   -  - -              pacemaker,          dysrhythmias (mobitz 1, SSS),              METS/Exercise Tolerance:     Hematologic:       Musculoskeletal:       GI/Hepatic:  - neg GI/hepatic ROS     Renal/Genitourinary:     (+) renal disease, type: CRI,            Endo:  - neg endo ROS     Psychiatric/Substance Use:  - neg psychiatric ROS     Infectious Disease:  - neg infectious disease ROS     Malignancy:   (+) Malignancy (skin),     Other:            Physical Exam    Airway        Mallampati: II   TM distance: > 3 FB   Neck ROM: full   Mouth opening: > 3 cm    Respiratory Devices and Support         Dental     Comment: Good condition        Cardiovascular          Rhythm and rate: regular  and normal     Pulmonary           breath sounds clear to auscultation           OUTSIDE LABS:  CBC:   Lab Results   Component Value Date    WBC 8.9 01/31/2023    WBC 12.1 (H) 12/15/2022    HGB 11.3 (L) 01/31/2023    HGB 10.8 (L) 12/15/2022    HCT 35.3 (L) 01/31/2023    HCT 34.0 (L) 12/15/2022     01/31/2023     12/15/2022     BMP:   Lab Results   Component Value Date     (H) 01/31/2023     12/15/2022    POTASSIUM 4.0 08/24/2023    POTASSIUM 4.6 01/31/2023    CHLORIDE 109 (H) 01/31/2023    CHLORIDE 110 (H) 12/15/2022    CO2 24 01/31/2023    CO2 25 12/15/2022    BUN 28.0 (H) 01/31/2023    BUN 27.3 (H) 12/15/2022    CR 1.43 (H) 01/31/2023    CR 1.32 (H) 12/15/2022     (H) 01/31/2023    GLC 98 12/15/2022     COAGS:   Lab Results   Component Value Date    PTT 31 10/01/2022    INR 1.33 (H) 10/26/2022     POC: No results found for: BGM, HCG, HCGS  HEPATIC:   Lab Results   Component Value Date    ALBUMIN 3.7 10/01/2022    PROTTOTAL 7.4 10/01/2022    ALT 15 10/01/2022    AST 31 10/01/2022    ALKPHOS 105 10/01/2022    BILITOTAL 0.6 10/01/2022     OTHER:   Lab Results   Component Value Date    LACT 1.6 10/01/2022    A1C 5.7 (H) 10/20/2022    LIBERTAD 9.7 01/31/2023    MAG 2.0 10/01/2022    LIPASE 24 10/01/2022       Anesthesia Plan    ASA Status:  3       Anesthesia Type: General.     - Airway: LMA              Consents    Anesthesia Plan(s) and associated risks, benefits, and realistic alternatives discussed. Questions answered and patient/representative(s) expressed understanding.     - Discussed: Risks, Benefits and Alternatives for BOTH SEDATION and the PROCEDURE were discussed     - Discussed with:  Patient            Postoperative Care            Comments:                Carin Russell MD

## 2023-08-28 NOTE — ANESTHESIA PROCEDURE NOTES
Airway       Patient location during procedure: OR  Staff -        CRNA: Kesha Mendez APRN CRNA       Performed By: CRNA  Consent for Airway        Urgency: elective  Indications and Patient Condition       Indications for airway management: arturo-procedural       Induction type:intravenous       Mask difficulty assessment: 0 - not attempted    Final Airway Details       Final airway type: supraglottic airway    Supraglottic Airway Details        Type: LMA       Brand: Ambu AuraGain       LMA size: 4    Post intubation assessment        Placement verified by: capnometry, equal breath sounds and chest rise        Number of attempts at approach: 1       Number of other approaches attempted: 0       Secured with: silk tape       Ease of procedure: easy       Dentition: Unchanged

## 2023-08-28 NOTE — ANESTHESIA CARE TRANSFER NOTE
Patient: iMkaela Figueroa    Procedure: Procedure(s):  FOREHEAD FLAP REPAIR MOHS DEFECT RIGHT NOSE; COMPOSITE GRAFT       Diagnosis: Personal history of other malignant neoplasm of skin [Z85.828]  Acquired deformity of nose [M95.0]  Open wound of nose [S01.20XA]  Diagnosis Additional Information: No value filed.    Anesthesia Type:   General     Note:    Oropharynx: oropharynx clear of all foreign objects  Level of Consciousness: drowsy  Oxygen Supplementation: face mask  Level of Supplemental Oxygen (L/min / FiO2): 6  Independent Airway: airway patency satisfactory and stable  Dentition: dentition unchanged  Vital Signs Stable: post-procedure vital signs reviewed and stable  Report to RN Given: handoff report given  Patient transferred to: PACU    Handoff Report: Identifed the Patient, Identified the Reponsible Provider, Reviewed the pertinent medical history, Discussed the surgical course, Reviewed Intra-OP anesthesia mangement and issues during anesthesia, Set expectations for post-procedure period and Allowed opportunity for questions and acknowledgement of understanding    Vitals:  Vitals Value Taken Time   /58 08/28/23 1810   Temp 36.2  C (97.1  F) 08/28/23 1810   Pulse 76 08/28/23 1812   Resp 17 08/28/23 1812   SpO2 99 % 08/28/23 1812   Vitals shown include unvalidated device data.    Electronically Signed By: NAHUN Arana CRNA  August 28, 2023  6:14 PM

## 2023-08-28 NOTE — H&P
[facility] History and Physical    Mikaela Figueroa MRN# [id number]   Age: 87 year old YOB: 1935     Date of Admission:  8/28/2023    Home clinic: unknown  Primary care provider: Derek Kumar          Assessment and Plan:   Assessment:   Nasal tip defect s/p mohs surgery      Plan:   Forehead flap, composite graft, possible cadaveric rib grafting    Orders Placed This Encounter   Procedures    Forced Air Warming Device    Verify Beta Blocker Status    Continue Current IV Fluids    Glucose monitor nursing POCT - Glycemic Management PERIOP    Peripheral IV catheter    Oxygen: Nasal cannula               Chief Complaint:   Nasal wound     History is obtained from the patient          History of Present Illness:   This patient is a 87 year old  male who presents with the following condition requiring a hospital admission:    Mohs defect nasal tip          Past Medical History:     Past Medical History:   Diagnosis Date    Hypertension     Pacemaker              Past Surgical History:     Past Surgical History:   Procedure Laterality Date    APPENDECTOMY      ARTHROSCOPY KNEE Right     CYSTOURETEROSCOPY, WITH LITHOTRIPSY USING ZABRINA 120P LASER AND URETERAL STENT INSERTION Left 10/25/2022    Procedure: CYSTOSCOPY, LEFT URETEROSCOPY, LASER LITHOTRIPSY;  Surgeon: Bob Dozier MD;  Location: [facility] OR    GENITOURINARY SURGERY      IR NEPHROSTOMY TUBE PLACEMENT LEFT  10/26/2022    IR URETERAL STENT PLACEMENT LEFT  11/1/2022             Social History:     Social History     Tobacco Use    Smoking status: Former     Types: Pipe    Smokeless tobacco: Never   Substance Use Topics    Alcohol use: No             Family History:     Family History   Problem Relation Age of Onset    Dementia Mother     Heart Disease Father     Diabetes Father              Immunizations:   Immunizations are up to date          Allergies:   No Known Allergies          Medications:     Current  Facility-Administered Medications   Medication    acetaminophen (TYLENOL) tablet 975 mg    lactated ringers infusion    lidocaine (LMX4) cream    lidocaine 1 % 0.1-1 mL    sodium chloride (PF) 0.9% PF flush 3 mL    sodium chloride (PF) 0.9% PF flush 3 mL             Review of Systems:   CONSTITUTIONAL: NEGATIVE for fever, chills, change in weight  ENT/MOUTH: NEGATIVE for ear, mouth and throat problems  RESP: NEGATIVE for significant cough or SOB  CV: NEGATIVE for chest pain, palpitations or peripheral edema          Physical Exam:   Blood pressure (!) 177/78, pulse 79, temperature 98.1  F (36.7  C), temperature source Temporal, resp. rate 18, SpO2 99 %.  ENT:   Normocephalic, without obvious abnormality, atraumatic, sinuses nontender on palpation, external ears without lesions, oral pharynx with moist mucous membranes, tonsils without erythema or exudates, gums normal and good dentition., cutaneous defect of nasal tip involving soft tissue triangle, normocephalic, without obvious abnormality, atramatic, sinuses nontender on palpation, external ears without lesions, oral pharynx with moist mucus membranes, tonsils without erythema or exudates, gums normal and good dentition.     Neck:   Supple, symmetrical, trachea midline, no adenopathy, thyroid symmetric, not enlarged and no tenderness, skin normal     Hematologic / Lymphatic:   no cervical lymphadenopathy and no supraclavicular lymphadenopathy           Data:   No results found for any visits on 08/28/23.       Attestation:  I have reviewed today's vital signs, notes, medications, labs and imaging.  Amount of time performed on this history and physical: 30 minutes.    Tod Rosa MD

## 2023-08-29 NOTE — ANESTHESIA POSTPROCEDURE EVALUATION
Patient: Mikaela Figueroa    Procedure: Procedure(s):  FOREHEAD FLAP REPAIR MOHS DEFECT RIGHT NOSE; COMPOSITE GRAFT       Anesthesia Type:  General    Note:  Disposition: Inpatient   Postop Pain Control:             Sign Out: Well controlled pain   PONV: No   Neuro/Psych:             Sign Out: Acceptable/Baseline neuro status   Airway/Respiratory:             Sign Out: Acceptable/Baseline resp. status   CV/Hemodynamics:             Sign Out: Acceptable CV status   Other NRE: NONE   DID A NON-ROUTINE EVENT OCCUR?            Last vitals:  Vitals Value Taken Time   /79 08/28/23 1845   Temp 36.1  C (97  F) 08/28/23 1845   Pulse 68 08/28/23 1849   Resp 22 08/28/23 1849   SpO2 97 % 08/28/23 1849   Vitals shown include unvalidated device data.    Electronically Signed By: Rony Cullen MD  August 28, 2023  8:27 PM

## 2023-09-06 NOTE — OP NOTE
LOCATION:  [facility]                     DATE:  2023       PATIENT NAME:  Mikaela Figueroa  : 1935      SURGEON: Jake Rosa M.D.      PRE-OPERATIVE DIAGNOSIS:  Nasal tip defect        POST-OPERATIVE DIAGNOSIS: Same      Operative Procedure:      1.  Preparation of Mohs site defect - CPT code 34370   2.  Composite graft to the soft tissue triangle - CPT code 53516  3.  External nasal valve reconstruction - CPT code 00242    4.  Paramedian forehead flap  - 73937       Anesthesia: General      EBL: 50 ml        Complications: None      Findings:       1. 2 x 3 cm defect of the nasal tip with a through-and-through defect of the soft tissue triangle      2. 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  He presents today for reconstruction of his defect.  After discussing the various risks and benefits, we elected to proceed with a combination of paramedian forehead flap, composite grafting, and cadaveric rib cartilage for reconstruction.  He understands the risks and benefits of the procedures and agrees to proceed.      Procedure: The patient was brought to the operating room and moved to the operating room table. A formal time out was completed. General anesthesia was induced, and the patient was intubated.  He was prepped and draped sterilely, and local anesthetic was injected into the planned operative sites.  A template was made of the nasal tip.  Given the defect encompassed more than 50% of the tip subunit, the decision was made to remove the rest of the nasal tip skin to improve the aesthetic outcome.  A template was then drawn onto the forehead and a paramedian forehead flap was designed.  It was incised and raised and based off the right supratrochlear artery, it was raised in a subcutaneous plane for resurfacing and then a transition was made into the  subfrontalis plane for the majority of the pedicle.  It was thinned accordingly and rotated into place for covering of the nasal skin defect.  The forehead flap donor site was widely undermined and closed with a combination of 3-0 Vicryl suture for the deep layer and 5-0 nylon for the skin.  A portion of the wound was able to be closed and was dressed with Xeroform and 5-0 silk and left to granulate.  A composite graft was then harvested from the patient s right helical root.  The donor wound site was cauterized for hemostasis and closed with 5-0 chromic gut suture.  The composite graft was contoured accordingly and secured to the right soft tissue triangle area with 5-0 chromic suture.  Cadaveric rib cartilage was placed in antibiotic soaked saline and then carved in an oblique fashion.  The straightest piece of cartilage was chosen after allowing time to warp and used as a lateral crura replacement graft.  It was sutured to the right medial stephenie remnants and placed in a pocket dissected along the alar rim and secured in place with 5-0 PDS suture.  A dome-equalizing suture was placed with 5-0 PDS for symmetry and the forehead flap was then sutured into place with 6-0 nylon and 5-0 chromic suture.  The nose was then dressed with antibiotic ointment and the forehead flap pedicle was coated in skin glue and wrapped in Xeroform.  The procedure concluded and the patient was extubated safely and transferred to the recovery room in stable condition.  He tolerated the procedure well.            DISPOSITION:  Home.  Follow-up in one week.           ZF:vicente   Dictated: 08/30/2023   Transcribed: 08/30/2023

## 2023-10-18 ENCOUNTER — ANCILLARY PROCEDURE (OUTPATIENT)
Dept: CARDIOLOGY | Facility: CLINIC | Age: 88
End: 2023-10-18
Attending: INTERNAL MEDICINE
Payer: MEDICARE

## 2023-10-18 DIAGNOSIS — Z95.0 PACEMAKER: ICD-10-CM

## 2023-10-18 DIAGNOSIS — I44.1 SECOND DEGREE MOBITZ I AV BLOCK: ICD-10-CM

## 2023-10-18 DIAGNOSIS — R55 SYNCOPE, UNSPECIFIED SYNCOPE TYPE: ICD-10-CM

## 2023-10-20 LAB
MDC_IDC_LEAD_CONNECTION_STATUS: NORMAL
MDC_IDC_LEAD_CONNECTION_STATUS: NORMAL
MDC_IDC_LEAD_IMPLANT_DT: NORMAL
MDC_IDC_LEAD_IMPLANT_DT: NORMAL
MDC_IDC_LEAD_LOCATION: NORMAL
MDC_IDC_LEAD_LOCATION: NORMAL
MDC_IDC_LEAD_LOCATION_DETAIL_1: NORMAL
MDC_IDC_LEAD_LOCATION_DETAIL_1: NORMAL
MDC_IDC_LEAD_MFG: NORMAL
MDC_IDC_LEAD_MFG: NORMAL
MDC_IDC_LEAD_MODEL: NORMAL
MDC_IDC_LEAD_MODEL: NORMAL
MDC_IDC_LEAD_POLARITY_TYPE: NORMAL
MDC_IDC_LEAD_POLARITY_TYPE: NORMAL
MDC_IDC_LEAD_SERIAL: NORMAL
MDC_IDC_LEAD_SERIAL: NORMAL
MDC_IDC_LEAD_SPECIAL_FUNCTION: NORMAL
MDC_IDC_LEAD_SPECIAL_FUNCTION: NORMAL
MDC_IDC_MSMT_BATTERY_DTM: NORMAL
MDC_IDC_MSMT_BATTERY_REMAINING_LONGEVITY: 127 MO
MDC_IDC_MSMT_BATTERY_RRT_TRIGGER: 2.62
MDC_IDC_MSMT_BATTERY_STATUS: NORMAL
MDC_IDC_MSMT_BATTERY_VOLTAGE: 3.01 V
MDC_IDC_MSMT_LEADCHNL_RA_IMPEDANCE_VALUE: 323 OHM
MDC_IDC_MSMT_LEADCHNL_RA_IMPEDANCE_VALUE: 361 OHM
MDC_IDC_MSMT_LEADCHNL_RA_PACING_THRESHOLD_AMPLITUDE: 0.38 V
MDC_IDC_MSMT_LEADCHNL_RA_PACING_THRESHOLD_PULSEWIDTH: 0.4 MS
MDC_IDC_MSMT_LEADCHNL_RA_SENSING_INTR_AMPL: 1.5 MV
MDC_IDC_MSMT_LEADCHNL_RA_SENSING_INTR_AMPL: 1.5 MV
MDC_IDC_MSMT_LEADCHNL_RV_IMPEDANCE_VALUE: 361 OHM
MDC_IDC_MSMT_LEADCHNL_RV_IMPEDANCE_VALUE: 418 OHM
MDC_IDC_MSMT_LEADCHNL_RV_PACING_THRESHOLD_AMPLITUDE: 0.5 V
MDC_IDC_MSMT_LEADCHNL_RV_PACING_THRESHOLD_PULSEWIDTH: 0.4 MS
MDC_IDC_MSMT_LEADCHNL_RV_SENSING_INTR_AMPL: 7.38 MV
MDC_IDC_MSMT_LEADCHNL_RV_SENSING_INTR_AMPL: 7.38 MV
MDC_IDC_PG_IMPLANT_DTM: NORMAL
MDC_IDC_PG_MFG: NORMAL
MDC_IDC_PG_MODEL: NORMAL
MDC_IDC_PG_SERIAL: NORMAL
MDC_IDC_PG_TYPE: NORMAL
MDC_IDC_SESS_CLINIC_NAME: NORMAL
MDC_IDC_SESS_DTM: NORMAL
MDC_IDC_SESS_TYPE: NORMAL
MDC_IDC_SET_BRADY_AT_MODE_SWITCH_RATE: 171 {BEATS}/MIN
MDC_IDC_SET_BRADY_HYSTRATE: NORMAL
MDC_IDC_SET_BRADY_LOWRATE: 50 {BEATS}/MIN
MDC_IDC_SET_BRADY_MAX_SENSOR_RATE: 120 {BEATS}/MIN
MDC_IDC_SET_BRADY_MAX_TRACKING_RATE: 120 {BEATS}/MIN
MDC_IDC_SET_BRADY_MODE: NORMAL
MDC_IDC_SET_BRADY_PAV_DELAY_LOW: 180 MS
MDC_IDC_SET_BRADY_SAV_DELAY_LOW: 150 MS
MDC_IDC_SET_LEADCHNL_RA_PACING_AMPLITUDE: 1.5 V
MDC_IDC_SET_LEADCHNL_RA_PACING_ANODE_ELECTRODE_1: NORMAL
MDC_IDC_SET_LEADCHNL_RA_PACING_ANODE_LOCATION_1: NORMAL
MDC_IDC_SET_LEADCHNL_RA_PACING_CAPTURE_MODE: NORMAL
MDC_IDC_SET_LEADCHNL_RA_PACING_CATHODE_ELECTRODE_1: NORMAL
MDC_IDC_SET_LEADCHNL_RA_PACING_CATHODE_LOCATION_1: NORMAL
MDC_IDC_SET_LEADCHNL_RA_PACING_POLARITY: NORMAL
MDC_IDC_SET_LEADCHNL_RA_PACING_PULSEWIDTH: 0.4 MS
MDC_IDC_SET_LEADCHNL_RA_SENSING_ANODE_ELECTRODE_1: NORMAL
MDC_IDC_SET_LEADCHNL_RA_SENSING_ANODE_LOCATION_1: NORMAL
MDC_IDC_SET_LEADCHNL_RA_SENSING_CATHODE_ELECTRODE_1: NORMAL
MDC_IDC_SET_LEADCHNL_RA_SENSING_CATHODE_LOCATION_1: NORMAL
MDC_IDC_SET_LEADCHNL_RA_SENSING_POLARITY: NORMAL
MDC_IDC_SET_LEADCHNL_RA_SENSING_SENSITIVITY: 0.3 MV
MDC_IDC_SET_LEADCHNL_RV_PACING_AMPLITUDE: 1.5 V
MDC_IDC_SET_LEADCHNL_RV_PACING_ANODE_ELECTRODE_1: NORMAL
MDC_IDC_SET_LEADCHNL_RV_PACING_ANODE_LOCATION_1: NORMAL
MDC_IDC_SET_LEADCHNL_RV_PACING_CAPTURE_MODE: NORMAL
MDC_IDC_SET_LEADCHNL_RV_PACING_CATHODE_ELECTRODE_1: NORMAL
MDC_IDC_SET_LEADCHNL_RV_PACING_CATHODE_LOCATION_1: NORMAL
MDC_IDC_SET_LEADCHNL_RV_PACING_POLARITY: NORMAL
MDC_IDC_SET_LEADCHNL_RV_PACING_PULSEWIDTH: 0.4 MS
MDC_IDC_SET_LEADCHNL_RV_SENSING_ANODE_ELECTRODE_1: NORMAL
MDC_IDC_SET_LEADCHNL_RV_SENSING_ANODE_LOCATION_1: NORMAL
MDC_IDC_SET_LEADCHNL_RV_SENSING_CATHODE_ELECTRODE_1: NORMAL
MDC_IDC_SET_LEADCHNL_RV_SENSING_CATHODE_LOCATION_1: NORMAL
MDC_IDC_SET_LEADCHNL_RV_SENSING_POLARITY: NORMAL
MDC_IDC_SET_LEADCHNL_RV_SENSING_SENSITIVITY: 0.9 MV
MDC_IDC_SET_ZONE_DETECTION_INTERVAL: 350 MS
MDC_IDC_SET_ZONE_DETECTION_INTERVAL: 400 MS
MDC_IDC_SET_ZONE_STATUS: NORMAL
MDC_IDC_SET_ZONE_STATUS: NORMAL
MDC_IDC_SET_ZONE_TYPE: NORMAL
MDC_IDC_SET_ZONE_VENDOR_TYPE: NORMAL
MDC_IDC_STAT_AT_BURDEN_PERCENT: 0 %
MDC_IDC_STAT_AT_DTM_END: NORMAL
MDC_IDC_STAT_AT_DTM_START: NORMAL
MDC_IDC_STAT_BRADY_AP_VP_PERCENT: 0.07 %
MDC_IDC_STAT_BRADY_AP_VS_PERCENT: 22.69 %
MDC_IDC_STAT_BRADY_AS_VP_PERCENT: 0.05 %
MDC_IDC_STAT_BRADY_AS_VS_PERCENT: 77.2 %
MDC_IDC_STAT_BRADY_DTM_END: NORMAL
MDC_IDC_STAT_BRADY_DTM_START: NORMAL
MDC_IDC_STAT_BRADY_RA_PERCENT_PACED: 23.02 %
MDC_IDC_STAT_BRADY_RV_PERCENT_PACED: 0.12 %
MDC_IDC_STAT_EPISODE_RECENT_COUNT: 0
MDC_IDC_STAT_EPISODE_RECENT_COUNT_DTM_END: NORMAL
MDC_IDC_STAT_EPISODE_RECENT_COUNT_DTM_START: NORMAL
MDC_IDC_STAT_EPISODE_TOTAL_COUNT: 0
MDC_IDC_STAT_EPISODE_TOTAL_COUNT: 10
MDC_IDC_STAT_EPISODE_TOTAL_COUNT: 4
MDC_IDC_STAT_EPISODE_TOTAL_COUNT_DTM_END: NORMAL
MDC_IDC_STAT_EPISODE_TOTAL_COUNT_DTM_START: NORMAL
MDC_IDC_STAT_EPISODE_TYPE: NORMAL
MDC_IDC_STAT_TACHYTHERAPY_RECENT_DTM_END: NORMAL
MDC_IDC_STAT_TACHYTHERAPY_RECENT_DTM_START: NORMAL
MDC_IDC_STAT_TACHYTHERAPY_TOTAL_DTM_END: NORMAL
MDC_IDC_STAT_TACHYTHERAPY_TOTAL_DTM_START: NORMAL

## 2023-10-20 PROCEDURE — 93296 REM INTERROG EVL PM/IDS: CPT | Performed by: INTERNAL MEDICINE

## 2023-10-20 PROCEDURE — 93294 REM INTERROG EVL PM/LDLS PM: CPT | Performed by: INTERNAL MEDICINE

## 2023-10-31 DIAGNOSIS — L71.9 ROSACEA: ICD-10-CM

## 2023-10-31 RX ORDER — METRONIDAZOLE 10 MG/G
GEL TOPICAL
Qty: 60 G | Refills: 1 | Status: SHIPPED | OUTPATIENT
Start: 2023-10-31

## 2023-10-31 NOTE — TELEPHONE ENCOUNTER
Prescription approved per [facility] Refill Protocol.  Jailyn Renee, RN  [facility] Triage Nurse

## 2023-12-11 ENCOUNTER — ANCILLARY PROCEDURE (OUTPATIENT)
Dept: CARDIOLOGY | Facility: CLINIC | Age: 88
End: 2023-12-11
Attending: INTERNAL MEDICINE
Payer: MEDICARE

## 2023-12-11 VITALS
OXYGEN SATURATION: 99 % | RESPIRATION RATE: 16 BRPM | DIASTOLIC BLOOD PRESSURE: 81 MMHG | WEIGHT: 146.7 LBS | HEART RATE: 71 BPM | SYSTOLIC BLOOD PRESSURE: 154 MMHG | BODY MASS INDEX: 22.13 KG/M2

## 2023-12-11 DIAGNOSIS — Z95.0 PACEMAKER: ICD-10-CM

## 2023-12-11 DIAGNOSIS — I45.9 HEART BLOCK: Primary | ICD-10-CM

## 2023-12-11 DIAGNOSIS — I47.29 NSVT (NONSUSTAINED VENTRICULAR TACHYCARDIA) (H): ICD-10-CM

## 2023-12-11 DIAGNOSIS — R55 SYNCOPE, UNSPECIFIED SYNCOPE TYPE: ICD-10-CM

## 2023-12-11 DIAGNOSIS — I44.1 SECOND DEGREE MOBITZ I AV BLOCK: ICD-10-CM

## 2023-12-11 PROCEDURE — 99214 OFFICE O/P EST MOD 30 MIN: CPT | Performed by: INTERNAL MEDICINE

## 2023-12-11 NOTE — PATIENT INSTRUCTIONS
Please make an appointment with Dr Kumar for your annual visit in January.We will plan follow up in heart clinic in 1 year unless you have any heart related questions.My nurse is Wilma and her number is [phone].Please arrange follow up in the heart clinic in about a year.Please check your blood pressure a few times per month,

## 2023-12-11 NOTE — LETTER
12/11/2023    Derek Kumar MD  [address]  [facility] [address]    RE: Mikaela Figueroa       Dear Colleague,     I had the pleasure of seeing Mikaela Figueroa in the [facility].    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  Pacemaker interrogation from October 2023 is reviewed.  Echocardiogram completed January 2023 reported normal systolic function, normal right ventricular systolic function, no significant valve abnormalities.  Pacemaker interrogation today demonstrates normal pacemaker function.  A few short episodes of asymptomatic nonsustained ventricular tachycardia.  No change in settings with the next follow-up March 14, 2024 remotely.    2.  Hypertension.  Blood pressure mildly elevated upon arrival but improved during my examination.  I did ask him to have follow-up blood pressure readings at home and he does plan to follow-up with Dr. Kumar in January.  Would anticipate a follow-up chemistry profile with his upcoming visit.  He had a potassium in August of 4 and a basic metabolic profile last January where the sodium was mildly elevated at 146 and creatinine 1.43.  He would like to have repeat labs when he sees Dr. Kumar.    3.  Dyslipidemia.  Lipid results dating to January 2023 finds a total cholesterol of 126 with an LDL of 75 with prior LDL October 2022 of 67.       4.  Skin cancer.Patient reports reconstruction of his nose    5.  Renal insufficiency with a history of ureteral stent he reports that he follows up with urology..      Plan 1.  Follow-up in 1 yearor sooner if required  2.  Blood pressure monitoring  3.  Follow-up laboratory studies when he sees Dr. Kumar.         History of Present Illness/Subjective    HPI: Mikaela Figueroa is a 88 year  old male who is seen in follow up. Patient had recurrent syncope felt to be secondary to advanced degree heart block with subsequent pacemaker implantation.  The pacemaker was implanted 2019 in [place]..  Patient had a Medtronic system placed.  Most recent device check in the chart record prior to today is from 2023 at which time he was pacing 23% of the time with normal pacemaker function.  We visited 2023.  He had not been complaining at that time of palpitations, dizziness, syncope or near syncope.  He denied chest discomfort and shortness of breath.  He was previously treated for nephrolithiasis and ureteral stent.  The ureteral stent was removed last December with chronic underlying renal insufficiency.  He has a history of spinal stenosis.  Most recent notes from his primary care provider in 2023 is reviewed.        He underwent pacemaker interrogation today.  The device reveals normal device function, atrial pacing 23% of the time, no  significant atrial arrhythmias infrequent nonsustained ventricular tachycardia 7-11 beats in duration without associated symptoms.  He had an echocardiogram in January that revealed normal systolic function as outlined.  There was no significant valve abnormality on that study.        Recent Echocardiogram Results:  Echocardiogram Complete  Order: [id number]  Status: Edited Result - FINAL     Visible to patient: Yes (seen)     Next appt: 2023 at 01:00 PM in Cardiology ([facility] Device Nurse 1)     Dx: SSS (sick sinus syndrome) (H); NSVT (...     2 Result Notes  Details    Reading Physician Reading Date Result Priority   Joss Dickson DO  [phone] 2023      Narrative & Impression  [id number]  [id number]  [id number]  [id number]^ROBERTA^OWEN^[place] [address]     Name: NEAL WEST  MRN: [id number]  : 1935  Study Date: 2023 09:51 AM  Age: 87 yrs  Gender: Male  Patient  Location: [facility]  Reason For Study: SSS (sick sinus syndrome) (H), NSVT (nonsustained  ventricular tachycardia)  Ordering Physician: OWEN GRANADOS  Referring Physician: OWEN GRANADOS  Performed By: WILNER     BSA: 1.8 m2  Height: 68 in  Weight: 157 lb  HR: 85  BP: 157/65 mmHg  ______________________________________________________________________________  Procedure  Complete Echo Adult.  ______________________________________________________________________________  Interpretation Summary     1. The left ventricle is normal in size. Left ventricular function is  normal.The ejection fraction is 60-65%. No regional wall motion abnormalities  noted.  2. Normal right ventricle size and systolic function. There is a pacemaker  lead in the right ventricle.  3. No hemodynamically significant valvular abnormalities on 2D or color flow  imaging.  ______________________________________________________________________________  Left Ventricle  The left ventricle is normal in size. Left ventricular function is normal.The  ejection fraction is 60-65%. There is normal left ventricular wall thickness.  No regional wall motion abnormalities noted.     Right Ventricle  Normal right ventricle size and systolic function. There is a pacemaker lead  in the right ventricle.     Atria  The left atrium is moderately dilated. The right atrium is mildly dilated.  There is no color Doppler evidence of an atrial shunt.     Mitral Valve  Mitral valve leaflets appear normal. There is no evidence of mitral stenosis  or clinically significant mitral regurgitation. There is mild (1+) mitral  regurgitation. There is no mitral valve stenosis.     Tricuspid Valve  Tricuspid valve leaflets appear normal. There is no evidence of tricuspid  stenosis or clinically significant tricuspid regurgitation. Right ventricular  systolic pressure could not be approximated due to inadequate tricuspid  regurgitation. No tricuspid regurgitation.     Aortic Valve  There  is mild trileaflet aortic sclerosis. There is trace aortic  regurgitation. No aortic stenosis is present.     Pulmonic Valve  The pulmonic valve is not well seen, but is grossly normal. This degree of  valvular regurgitation is within normal limits. There is trace pulmonic  valvular regurgitation.     Vessels  The aorta root is normal. Normal size ascending aorta. IVC diameter <2.1 cm  collapsing >50% with sniff suggests a normal RA pressure of 3 mmHg.     Pericardium  There is no pericardial effusion.            Recent Coronary Angiogram Results:    10/17/23  8:15 PM 10/18/23  8:44 AM [id number] [facility]      Narrative & Impression    Encounter Type: Routine remote device check  Device: Medtronic [place] (D) pacemaker  Pacing % /Programmed: AP 23%,  0.1%, AAIR-DDDR 50-120bpm  Lead(s): Stable  Battery longevity: Estimating 10.6 years remaining  Presenting: ASVS 69bpm  Atrial high rates: Since 7/16/23, none detected  Anticoagulant: None  Ventricular High rates: None  Comments: Normal device function.   Plan: Due for annual in-clinic device check and Dr. Garcia, reminder letter sent. Susie Mora RN             Physical Examination  Review of Systems   Vitals: 154/81 on arrival.  136/60 during my examination, weight 112, pulse 71, O2 sat 99%  Wt Readings from Last 3 Encounters:   02/13/23 66.2 kg (145 lb 14.4 oz)   02/07/23 68.9 kg (152 lb)   01/31/23 68.9 kg (152 lb)       General Appearance:   no distress, normal body habitus   ENT/Mouth: membranes moist, no oral lesions or bleeding gums.      EYES:  no scleral icterus, normal conjunctivae   Neck: no carotid bruits    Chest/Lungs:   lungs are clear to auscultation, no rales or wheezing, well-healed pacemaker sternal scar, equal chest wall expansion    Cardiovascular:   Regular. Normal first and second heart sounds with soft systolic murmur rubs, or gallops; the carotid, radial and posterior tibial pulses are intact, Jugular venous  pressure within normal limits, no significant edema bilaterally    Abdomen:  no  bruits, or tenderness; bowel sounds are present   Extremities: no cyanosis or clubbing   Skin: no xanthelasma, warm.    Neurologic:  no tremors     Psychiatric: alert and oriented x3, calm        Please refer above for cardiac ROS details.        Medical History  Surgical History Family History Social History   Past Medical History:   Diagnosis Date    Hypertension     Pacemaker      Past Surgical History:   Procedure Laterality Date    APPENDECTOMY      ARTHROSCOPY KNEE Right     CYSTOURETEROSCOPY, WITH LITHOTRIPSY USING ZABRINA 120P LASER AND URETERAL STENT INSERTION Left 10/25/2022    Procedure: CYSTOSCOPY, LEFT URETEROSCOPY, LASER LITHOTRIPSY;  Surgeon: Bob Dozier MD;  [facility] [facility]    GENITOURINARY SURGERY      IR NEPHROSTOMY TUBE PLACEMENT LEFT  10/26/2022    IR URETERAL STENT PLACEMENT LEFT  11/1/2022    REPAIR MOHS Right 8/28/2023    Procedure: FOREHEAD FLAP REPAIR MOHS DEFECT RIGHT NOSE; COMPOSITE GRAFT;  Surgeon: Jake Rosa MD;  Location: [facility] OR     Family History   Problem Relation Age of Onset    Dementia Mother     Heart Disease Father     Diabetes Father         Social History     Socioeconomic History    Marital status:      Spouse name: Not on file    Number of children: Not on file    Years of education: Not on file    Highest education level: Not on file   Occupational History    Not on file   Tobacco Use    Smoking status: Former     Types: Pipe    Smokeless tobacco: Never   Substance and Sexual Activity    Alcohol use: No    Drug use: No    Sexual activity: Not on file   Other Topics Concern    Not on file   Social History Narrative    Not on file     Social Determinants of Health     Financial Resource Strain: Not on file   Food Insecurity: Not on file   Transportation Needs: Not on file   Physical Activity: Not on file   Stress: Not on file   Social Connections: Not on file    Interpersonal Safety: Not on file   Housing Stability: Not on file           Medications  Allergies   Current Outpatient Medications   Medication Sig Dispense Refill    acetaminophen (TYLENOL) 325 MG tablet Take 1-2 tablets (325-650 mg) by mouth every 4 hours as needed for other or mild pain (For optimal non-opioid multimodal pain management to improve pain control.)      dorzolamide (TRUSOPT) 2 % ophthalmic solution Place 1 drop into both eyes 2 times daily      doxycycline hyclate (VIBRA-TABS) 100 MG tablet TAKE 1 TABLET DAILY 90 tablet 3    Glucosamine-Chondroitin 250-200 MG CAPS Take 1 capsule by mouth daily      latanoprost (XALATAN) 0.005 % ophthalmic solution Place 1 drop into both eyes At Bedtime      lisinopril (ZESTRIL) 10 MG tablet Take 1 tablet (10 mg) by mouth daily 90 tablet 3    metroNIDAZOLE (METROGEL) 1 % external gel APPLY A SMALL AMOUNT TO AFFECTED AREA(S) TOPICALLY ONCE DAILY 60 g 1    multivitamin, therapeutic (THERA-VIT) TABS tablet Take 1 tablet by mouth daily      tamsulosin (FLOMAX) 0.4 MG capsule Take 1 capsule (0.4 mg) by mouth At Bedtime 90 capsule 3    triamcinolone (KENALOG) 0.1 % external cream Apply topically 2 times daily as needed for irritation       No Known Allergies       Lab Results    Chemistry/lipid CBC Cardiac Enzymes/BNP/TSH/INR   Recent Labs   Lab Test 01/31/23  0825   CHOL 126   HDL 40   LDL 75   TRIG 53     Recent Labs   Lab Test 01/31/23  0825 10/20/22  1036 01/27/22  1146   LDL 75 67 86     Recent Labs   Lab Test 08/24/23  1106 01/31/23  0825   NA  --  146*   POTASSIUM 4.0 4.6   CHLORIDE  --  109*   CO2  --  24   GLC  --  106*   BUN  --  28.0*   CR  --  1.43*   GFRESTIMATED  --  48*   LIBERTAD  --  9.7     Recent Labs   Lab Test 01/31/23  0825 12/15/22  1219 10/27/22  0641   CR 1.43* 1.32* 1.30*     Recent Labs   Lab Test 10/20/22  1036 07/06/22  0758 01/27/22  1146   A1C 5.7* 5.7* 5.8*          Recent Labs   Lab Test 01/31/23  0825   WBC 8.9   HGB 11.3*   HCT 35.3*   MCV  "95        Recent Labs   Lab Test 01/31/23  0825 12/15/22  1219 10/27/22  0641   HGB 11.3* 10.8* 10.6*    Recent Labs   Lab Test 10/01/22  1932   TROPONINI 0.10     Recent Labs   Lab Test 01/14/22  1423   BNP 35     No results for input(s): \"TSH\" in the last 74680 hours.  Recent Labs   Lab Test 10/26/22  0614 10/01/22  1932 06/13/21  2020   INR 1.33* 1.05 1.22*        Daniel Garcia MD      Thank you for allowing me to participate in the care of your patient.      Sincerely,     Daniel Garcia MD     [facility]  cc:   Daniel Garcia MD  [address]  [place] [address]      "

## 2023-12-11 NOTE — PROGRESS NOTES
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  Pacemaker interrogation from October 2023 is reviewed.  Echocardiogram completed January 2023 reported normal systolic function, normal right ventricular systolic function, no significant valve abnormalities.  Pacemaker interrogation today demonstrates normal pacemaker function.  A few short episodes of asymptomatic nonsustained ventricular tachycardia.  No change in settings with the next follow-up March 14, 2024 remotely.    2.  Hypertension.  Blood pressure mildly elevated upon arrival but improved during my examination.  I did ask him to have follow-up blood pressure readings at home and he does plan to follow-up with Dr. Kumar in January.  Would anticipate a follow-up chemistry profile with his upcoming visit.  He had a potassium in August of 4 and a basic metabolic profile last January where the sodium was mildly elevated at 146 and creatinine 1.43.  He would like to have repeat labs when he sees Dr. Kumar.    3.  Dyslipidemia.  Lipid results dating to January 2023 finds a total cholesterol of 126 with an LDL of 75 with prior LDL October 2022 of 67.       4.  Skin cancer.Patient reports reconstruction of his nose    5.  Renal insufficiency with a history of ureteral stent he reports that he follows up with urology..      Plan 1.  Follow-up in 1 yearor sooner if required  2.  Blood pressure monitoring  3.  Follow-up laboratory studies when he sees Dr. Kumar.         History of Present Illness/Subjective    HPI: Mikaela Figueroa is a 88 year old male who is seen in follow up. Patient had recurrent syncope felt to be secondary to advanced degree heart block with subsequent pacemaker implantation.  The pacemaker was implanted August 2019 in [place]..   Patient had a Medtronic system placed.  Most recent device check in the chart record prior to today is from 2023 at which time he was pacing 23% of the time with normal pacemaker function.  We visited 2023.  He had not been complaining at that time of palpitations, dizziness, syncope or near syncope.  He denied chest discomfort and shortness of breath.  He was previously treated for nephrolithiasis and ureteral stent.  The ureteral stent was removed last December with chronic underlying renal insufficiency.  He has a history of spinal stenosis.  Most recent notes from his primary care provider in 2023 is reviewed.        He underwent pacemaker interrogation today.  The device reveals normal device function, atrial pacing 23% of the time, no  significant atrial arrhythmias infrequent nonsustained ventricular tachycardia 7-11 beats in duration without associated symptoms.  He had an echocardiogram in January that revealed normal systolic function as outlined.  There was no significant valve abnormality on that study.        Recent Echocardiogram Results:  Echocardiogram Complete  Order: [id number]  Status: Edited Result - FINAL     Visible to patient: Yes (seen)     Next appt: 2023 at 01:00 PM in Cardiology ([facility] Device Nurse 1)     Dx: SSS (sick sinus syndrome) (H); NSVT (...     2 Result Notes  Details    Reading Physician Reading Date Result Priority   Joss DicksonDO  [phone] 2023      Narrative & Impression  [id number]  [id number]  [id number]  [id number]^ROBERTA^OWEN^[place] [address]     Name: NEAL WEST  MRN: [id number]  : 1935  Study Date: 2023 09:51 AM  Age: 87 yrs  Gender: Male  Patient Location: [facility]  Reason For Study: SSS (sick sinus syndrome) (H), NSVT (nonsustained  ventricular tachycardia)  Ordering Physician: OWEN GRANADOS  Referring Physician: OWEN GRANADOS  Performed By: WILNER     BSA:  1.8 m2  Height: 68 in  Weight: 157 lb  HR: 85  BP: 157/65 mmHg  ______________________________________________________________________________  Procedure  Complete Echo Adult.  ______________________________________________________________________________  Interpretation Summary     1. The left ventricle is normal in size. Left ventricular function is  normal.The ejection fraction is 60-65%. No regional wall motion abnormalities  noted.  2. Normal right ventricle size and systolic function. There is a pacemaker  lead in the right ventricle.  3. No hemodynamically significant valvular abnormalities on 2D or color flow  imaging.  ______________________________________________________________________________  Left Ventricle  The left ventricle is normal in size. Left ventricular function is normal.The  ejection fraction is 60-65%. There is normal left ventricular wall thickness.  No regional wall motion abnormalities noted.     Right Ventricle  Normal right ventricle size and systolic function. There is a pacemaker lead  in the right ventricle.     Atria  The left atrium is moderately dilated. The right atrium is mildly dilated.  There is no color Doppler evidence of an atrial shunt.     Mitral Valve  Mitral valve leaflets appear normal. There is no evidence of mitral stenosis  or clinically significant mitral regurgitation. There is mild (1+) mitral  regurgitation. There is no mitral valve stenosis.     Tricuspid Valve  Tricuspid valve leaflets appear normal. There is no evidence of tricuspid  stenosis or clinically significant tricuspid regurgitation. Right ventricular  systolic pressure could not be approximated due to inadequate tricuspid  regurgitation. No tricuspid regurgitation.     Aortic Valve  There is mild trileaflet aortic sclerosis. There is trace aortic  regurgitation. No aortic stenosis is present.     Pulmonic Valve  The pulmonic valve is not well seen, but is grossly normal. This degree of  valvular  regurgitation is within normal limits. There is trace pulmonic  valvular regurgitation.     Vessels  The aorta root is normal. Normal size ascending aorta. IVC diameter <2.1 cm  collapsing >50% with sniff suggests a normal RA pressure of 3 mmHg.     Pericardium  There is no pericardial effusion.            Recent Coronary Angiogram Results:    10/17/23  8:15 PM 10/18/23  8:44 AM [id number] [facility]      Narrative & Impression    Encounter Type: Routine remote device check  Device: Medtronic Sandra (D) pacemaker  Pacing % /Programmed: AP 23%,  0.1%, AAIR-DDDR 50-120bpm  Lead(s): Stable  Battery longevity: Estimating 10.6 years remaining  Presenting: ASVS 69bpm  Atrial high rates: Since 7/16/23, none detected  Anticoagulant: None  Ventricular High rates: None  Comments: Normal device function.   Plan: Due for annual in-clinic device check and Dr. Garcia, reminder letter sent. Susie Mora RN             Physical Examination  Review of Systems   Vitals: 154/81 on arrival.  136/60 during my examination, weight 112, pulse 71, O2 sat 99%  Wt Readings from Last 3 Encounters:   02/13/23 66.2 kg (145 lb 14.4 oz)   02/07/23 68.9 kg (152 lb)   01/31/23 68.9 kg (152 lb)       General Appearance:   no distress, normal body habitus   ENT/Mouth: membranes moist, no oral lesions or bleeding gums.      EYES:  no scleral icterus, normal conjunctivae   Neck: no carotid bruits    Chest/Lungs:   lungs are clear to auscultation, no rales or wheezing, well-healed pacemaker sternal scar, equal chest wall expansion    Cardiovascular:   Regular. Normal first and second heart sounds with soft systolic murmur rubs, or gallops; the carotid, radial and posterior tibial pulses are intact, Jugular venous pressure within normal limits, no significant edema bilaterally    Abdomen:  no  bruits, or tenderness; bowel sounds are present   Extremities: no cyanosis or clubbing   Skin: no xanthelasma, warm.    Neurologic:  no  tremors     Psychiatric: alert and oriented x3, calm        Please refer above for cardiac ROS details.        Medical History  Surgical History Family History Social History   Past Medical History:   Diagnosis Date    Hypertension     Pacemaker      Past Surgical History:   Procedure Laterality Date    APPENDECTOMY      ARTHROSCOPY KNEE Right     CYSTOURETEROSCOPY, WITH LITHOTRIPSY USING ZABRINA 120P LASER AND URETERAL STENT INSERTION Left 10/25/2022    Procedure: CYSTOSCOPY, LEFT URETEROSCOPY, LASER LITHOTRIPSY;  Surgeon: Bob Dozier MD;  Location: [facility] Main OR    GENITOURINARY SURGERY      IR NEPHROSTOMY TUBE PLACEMENT LEFT  10/26/2022    IR URETERAL STENT PLACEMENT LEFT  11/1/2022    REPAIR MOHS Right 8/28/2023    Procedure: FOREHEAD FLAP REPAIR MOHS DEFECT RIGHT NOSE; COMPOSITE GRAFT;  Surgeon: Jake Rosa MD;  Location: [facility] Main OR     Family History   Problem Relation Age of Onset    Dementia Mother     Heart Disease Father     Diabetes Father         Social History     Socioeconomic History    Marital status:      Spouse name: Not on file    Number of children: Not on file    Years of education: Not on file    Highest education level: Not on file   Occupational History    Not on file   Tobacco Use    Smoking status: Former     Types: Pipe    Smokeless tobacco: Never   Substance and Sexual Activity    Alcohol use: No    Drug use: No    Sexual activity: Not on file   Other Topics Concern    Not on file   Social History Narrative    Not on file     Social Determinants of Health     Financial Resource Strain: Not on file   Food Insecurity: Not on file   Transportation Needs: Not on file   Physical Activity: Not on file   Stress: Not on file   Social Connections: Not on file   Interpersonal Safety: Not on file   Housing Stability: Not on file           Medications  Allergies   Current Outpatient Medications   Medication Sig Dispense Refill    acetaminophen (TYLENOL) 325 MG tablet Take 1-2  tablets (325-650 mg) by mouth every 4 hours as needed for other or mild pain (For optimal non-opioid multimodal pain management to improve pain control.)      dorzolamide (TRUSOPT) 2 % ophthalmic solution Place 1 drop into both eyes 2 times daily      doxycycline hyclate (VIBRA-TABS) 100 MG tablet TAKE 1 TABLET DAILY 90 tablet 3    Glucosamine-Chondroitin 250-200 MG CAPS Take 1 capsule by mouth daily      latanoprost (XALATAN) 0.005 % ophthalmic solution Place 1 drop into both eyes At Bedtime      lisinopril (ZESTRIL) 10 MG tablet Take 1 tablet (10 mg) by mouth daily 90 tablet 3    metroNIDAZOLE (METROGEL) 1 % external gel APPLY A SMALL AMOUNT TO AFFECTED AREA(S) TOPICALLY ONCE DAILY 60 g 1    multivitamin, therapeutic (THERA-VIT) TABS tablet Take 1 tablet by mouth daily      tamsulosin (FLOMAX) 0.4 MG capsule Take 1 capsule (0.4 mg) by mouth At Bedtime 90 capsule 3    triamcinolone (KENALOG) 0.1 % external cream Apply topically 2 times daily as needed for irritation       No Known Allergies       Lab Results    Chemistry/lipid CBC Cardiac Enzymes/BNP/TSH/INR   Recent Labs   Lab Test 01/31/23  0825   CHOL 126   HDL 40   LDL 75   TRIG 53     Recent Labs   Lab Test 01/31/23  0825 10/20/22  1036 01/27/22  1146   LDL 75 67 86     Recent Labs   Lab Test 08/24/23  1106 01/31/23  0825   NA  --  146*   POTASSIUM 4.0 4.6   CHLORIDE  --  109*   CO2  --  24   GLC  --  106*   BUN  --  28.0*   CR  --  1.43*   GFRESTIMATED  --  48*   LIBERTAD  --  9.7     Recent Labs   Lab Test 01/31/23  0825 12/15/22  1219 10/27/22  0641   CR 1.43* 1.32* 1.30*     Recent Labs   Lab Test 10/20/22  1036 07/06/22  0758 01/27/22  1146   A1C 5.7* 5.7* 5.8*          Recent Labs   Lab Test 01/31/23  0825   WBC 8.9   HGB 11.3*   HCT 35.3*   MCV 95        Recent Labs   Lab Test 01/31/23  0825 12/15/22  1219 10/27/22  0641   HGB 11.3* 10.8* 10.6*    Recent Labs   Lab Test 10/01/22  1932   TROPONINI 0.10     Recent Labs   Lab Test 01/14/22  1423   BNP 35  "    No results for input(s): \"TSH\" in the last 70912 hours.  Recent Labs   Lab Test 10/26/22  0614 10/01/22  1932 06/13/21 2020   INR 1.33* 1.05 1.22*        Daniel Garcia MD                                    "

## 2023-12-12 LAB
MDC_IDC_EPISODE_DTM: NORMAL
MDC_IDC_EPISODE_DTM: NORMAL
MDC_IDC_EPISODE_DURATION: 2 S
MDC_IDC_EPISODE_DURATION: 2 S
MDC_IDC_EPISODE_ID: 15
MDC_IDC_EPISODE_ID: 16
MDC_IDC_EPISODE_TYPE: NORMAL
MDC_IDC_EPISODE_TYPE: NORMAL
MDC_IDC_EPISODE_TYPE_INDUCED: NO
MDC_IDC_EPISODE_TYPE_INDUCED: NO
MDC_IDC_LEAD_CONNECTION_STATUS: NORMAL
MDC_IDC_LEAD_CONNECTION_STATUS: NORMAL
MDC_IDC_LEAD_IMPLANT_DT: NORMAL
MDC_IDC_LEAD_IMPLANT_DT: NORMAL
MDC_IDC_LEAD_LOCATION: NORMAL
MDC_IDC_LEAD_LOCATION: NORMAL
MDC_IDC_LEAD_LOCATION_DETAIL_1: NORMAL
MDC_IDC_LEAD_LOCATION_DETAIL_1: NORMAL
MDC_IDC_LEAD_MFG: NORMAL
MDC_IDC_LEAD_MFG: NORMAL
MDC_IDC_LEAD_MODEL: NORMAL
MDC_IDC_LEAD_MODEL: NORMAL
MDC_IDC_LEAD_POLARITY_TYPE: NORMAL
MDC_IDC_LEAD_POLARITY_TYPE: NORMAL
MDC_IDC_LEAD_SERIAL: NORMAL
MDC_IDC_LEAD_SERIAL: NORMAL
MDC_IDC_LEAD_SPECIAL_FUNCTION: NORMAL
MDC_IDC_LEAD_SPECIAL_FUNCTION: NORMAL
MDC_IDC_MSMT_BATTERY_DTM: NORMAL
MDC_IDC_MSMT_BATTERY_REMAINING_LONGEVITY: 126 MO
MDC_IDC_MSMT_BATTERY_RRT_TRIGGER: 2.62
MDC_IDC_MSMT_BATTERY_STATUS: NORMAL
MDC_IDC_MSMT_BATTERY_VOLTAGE: 3.01 V
MDC_IDC_MSMT_LEADCHNL_RA_IMPEDANCE_VALUE: 342 OHM
MDC_IDC_MSMT_LEADCHNL_RA_IMPEDANCE_VALUE: 399 OHM
MDC_IDC_MSMT_LEADCHNL_RA_PACING_THRESHOLD_AMPLITUDE: 0.5 V
MDC_IDC_MSMT_LEADCHNL_RA_PACING_THRESHOLD_PULSEWIDTH: 0.4 MS
MDC_IDC_MSMT_LEADCHNL_RA_SENSING_INTR_AMPL: 2.1 MV
MDC_IDC_MSMT_LEADCHNL_RV_IMPEDANCE_VALUE: 399 OHM
MDC_IDC_MSMT_LEADCHNL_RV_IMPEDANCE_VALUE: 475 OHM
MDC_IDC_MSMT_LEADCHNL_RV_PACING_THRESHOLD_AMPLITUDE: 0.5 V
MDC_IDC_MSMT_LEADCHNL_RV_PACING_THRESHOLD_PULSEWIDTH: 0.4 MS
MDC_IDC_MSMT_LEADCHNL_RV_SENSING_INTR_AMPL: 7.4 MV
MDC_IDC_PG_IMPLANT_DTM: NORMAL
MDC_IDC_PG_MFG: NORMAL
MDC_IDC_PG_MODEL: NORMAL
MDC_IDC_PG_SERIAL: NORMAL
MDC_IDC_PG_TYPE: NORMAL
MDC_IDC_SESS_CLINIC_NAME: NORMAL
MDC_IDC_SESS_DTM: NORMAL
MDC_IDC_SESS_TYPE: NORMAL
MDC_IDC_SET_BRADY_AT_MODE_SWITCH_RATE: 171 {BEATS}/MIN
MDC_IDC_SET_BRADY_HYSTRATE: NORMAL
MDC_IDC_SET_BRADY_LOWRATE: 50 {BEATS}/MIN
MDC_IDC_SET_BRADY_MAX_SENSOR_RATE: 120 {BEATS}/MIN
MDC_IDC_SET_BRADY_MAX_TRACKING_RATE: 120 {BEATS}/MIN
MDC_IDC_SET_BRADY_MODE: NORMAL
MDC_IDC_SET_BRADY_PAV_DELAY_LOW: 180 MS
MDC_IDC_SET_BRADY_SAV_DELAY_LOW: 150 MS
MDC_IDC_SET_LEADCHNL_RA_PACING_AMPLITUDE: NORMAL
MDC_IDC_SET_LEADCHNL_RA_PACING_ANODE_ELECTRODE_1: NORMAL
MDC_IDC_SET_LEADCHNL_RA_PACING_ANODE_LOCATION_1: NORMAL
MDC_IDC_SET_LEADCHNL_RA_PACING_CAPTURE_MODE: NORMAL
MDC_IDC_SET_LEADCHNL_RA_PACING_CATHODE_ELECTRODE_1: NORMAL
MDC_IDC_SET_LEADCHNL_RA_PACING_CATHODE_LOCATION_1: NORMAL
MDC_IDC_SET_LEADCHNL_RA_PACING_POLARITY: NORMAL
MDC_IDC_SET_LEADCHNL_RA_PACING_PULSEWIDTH: 0.4 MS
MDC_IDC_SET_LEADCHNL_RA_SENSING_ANODE_ELECTRODE_1: NORMAL
MDC_IDC_SET_LEADCHNL_RA_SENSING_ANODE_LOCATION_1: NORMAL
MDC_IDC_SET_LEADCHNL_RA_SENSING_CATHODE_ELECTRODE_1: NORMAL
MDC_IDC_SET_LEADCHNL_RA_SENSING_CATHODE_LOCATION_1: NORMAL
MDC_IDC_SET_LEADCHNL_RA_SENSING_POLARITY: NORMAL
MDC_IDC_SET_LEADCHNL_RA_SENSING_SENSITIVITY: 0.3 MV
MDC_IDC_SET_LEADCHNL_RV_PACING_AMPLITUDE: NORMAL
MDC_IDC_SET_LEADCHNL_RV_PACING_ANODE_ELECTRODE_1: NORMAL
MDC_IDC_SET_LEADCHNL_RV_PACING_ANODE_LOCATION_1: NORMAL
MDC_IDC_SET_LEADCHNL_RV_PACING_CAPTURE_MODE: NORMAL
MDC_IDC_SET_LEADCHNL_RV_PACING_CATHODE_ELECTRODE_1: NORMAL
MDC_IDC_SET_LEADCHNL_RV_PACING_CATHODE_LOCATION_1: NORMAL
MDC_IDC_SET_LEADCHNL_RV_PACING_POLARITY: NORMAL
MDC_IDC_SET_LEADCHNL_RV_PACING_PULSEWIDTH: 0.4 MS
MDC_IDC_SET_LEADCHNL_RV_SENSING_ANODE_ELECTRODE_1: NORMAL
MDC_IDC_SET_LEADCHNL_RV_SENSING_ANODE_LOCATION_1: NORMAL
MDC_IDC_SET_LEADCHNL_RV_SENSING_CATHODE_ELECTRODE_1: NORMAL
MDC_IDC_SET_LEADCHNL_RV_SENSING_CATHODE_LOCATION_1: NORMAL
MDC_IDC_SET_LEADCHNL_RV_SENSING_POLARITY: NORMAL
MDC_IDC_SET_LEADCHNL_RV_SENSING_SENSITIVITY: 0.9 MV
MDC_IDC_SET_ZONE_DETECTION_INTERVAL: 350 MS
MDC_IDC_SET_ZONE_DETECTION_INTERVAL: 400 MS
MDC_IDC_SET_ZONE_STATUS: NORMAL
MDC_IDC_SET_ZONE_STATUS: NORMAL
MDC_IDC_SET_ZONE_TYPE: NORMAL
MDC_IDC_SET_ZONE_VENDOR_TYPE: NORMAL
MDC_IDC_STAT_AT_BURDEN_PERCENT: 0 %
MDC_IDC_STAT_AT_DTM_END: NORMAL
MDC_IDC_STAT_AT_DTM_START: NORMAL
MDC_IDC_STAT_AT_MODE_SW_COUNT: 0
MDC_IDC_STAT_BRADY_AP_VP_PERCENT: 0.07 %
MDC_IDC_STAT_BRADY_AP_VS_PERCENT: 21.94 %
MDC_IDC_STAT_BRADY_AS_VP_PERCENT: 0.04 %
MDC_IDC_STAT_BRADY_AS_VS_PERCENT: 77.95 %
MDC_IDC_STAT_BRADY_DTM_END: NORMAL
MDC_IDC_STAT_BRADY_DTM_START: NORMAL
MDC_IDC_STAT_BRADY_RA_PERCENT_PACED: 22.66 %
MDC_IDC_STAT_BRADY_RV_PERCENT_PACED: 0.11 %
MDC_IDC_STAT_EPISODE_RECENT_COUNT: 0
MDC_IDC_STAT_EPISODE_RECENT_COUNT: 3
MDC_IDC_STAT_EPISODE_RECENT_COUNT_DTM_END: NORMAL
MDC_IDC_STAT_EPISODE_RECENT_COUNT_DTM_START: NORMAL
MDC_IDC_STAT_EPISODE_TOTAL_COUNT: 0
MDC_IDC_STAT_EPISODE_TOTAL_COUNT: 12
MDC_IDC_STAT_EPISODE_TOTAL_COUNT: 4
MDC_IDC_STAT_EPISODE_TOTAL_COUNT_DTM_END: NORMAL
MDC_IDC_STAT_EPISODE_TOTAL_COUNT_DTM_START: NORMAL
MDC_IDC_STAT_EPISODE_TYPE: NORMAL
MDC_IDC_STAT_TACHYTHERAPY_RECENT_DTM_END: NORMAL
MDC_IDC_STAT_TACHYTHERAPY_RECENT_DTM_START: NORMAL
MDC_IDC_STAT_TACHYTHERAPY_TOTAL_DTM_END: NORMAL
MDC_IDC_STAT_TACHYTHERAPY_TOTAL_DTM_START: NORMAL

## 2023-12-12 PROCEDURE — 93280 PM DEVICE PROGR EVAL DUAL: CPT | Performed by: INTERNAL MEDICINE

## 2024-01-04 ENCOUNTER — PATIENT OUTREACH (OUTPATIENT)
Dept: CARE COORDINATION | Facility: CLINIC | Age: 89
End: 2024-01-04
Payer: MEDICARE

## 2024-01-16 DIAGNOSIS — I10 ESSENTIAL HYPERTENSION: ICD-10-CM

## 2024-01-16 RX ORDER — LISINOPRIL 10 MG/1
10 TABLET ORAL DAILY
Qty: 90 TABLET | Refills: 3 | Status: ON HOLD | OUTPATIENT
Start: 2024-01-16 | End: 2024-02-04

## 2024-01-18 ENCOUNTER — PATIENT OUTREACH (OUTPATIENT)
Dept: CARE COORDINATION | Facility: CLINIC | Age: 89
End: 2024-01-18
Payer: MEDICARE

## 2024-01-29 ENCOUNTER — TRANSFERRED RECORDS (OUTPATIENT)
Dept: HEALTH INFORMATION MANAGEMENT | Facility: CLINIC | Age: 89
End: 2024-01-29
Payer: MEDICARE

## 2024-01-31 ENCOUNTER — NURSE TRIAGE (OUTPATIENT)
Dept: NURSING | Facility: CLINIC | Age: 89
End: 2024-01-31
Payer: MEDICARE

## 2024-02-01 ENCOUNTER — NURSE TRIAGE (OUTPATIENT)
Dept: NURSING | Facility: CLINIC | Age: 89
End: 2024-02-01

## 2024-02-01 ENCOUNTER — APPOINTMENT (OUTPATIENT)
Dept: CT IMAGING | Facility: CLINIC | Age: 89
DRG: 689 | End: 2024-02-01
Attending: EMERGENCY MEDICINE
Payer: MEDICARE

## 2024-02-01 ENCOUNTER — HOSPITAL ENCOUNTER (INPATIENT)
Facility: CLINIC | Age: 89
LOS: 3 days | Discharge: NURSING, REHAB OR LONG-TERM CARE | DRG: 689 | End: 2024-02-04
Attending: EMERGENCY MEDICINE | Admitting: INTERNAL MEDICINE
Payer: MEDICARE

## 2024-02-01 DIAGNOSIS — N39.0 URINARY TRACT INFECTION WITHOUT HEMATURIA, SITE UNSPECIFIED: ICD-10-CM

## 2024-02-01 DIAGNOSIS — R33.9 URINARY RETENTION: ICD-10-CM

## 2024-02-01 DIAGNOSIS — I10 ESSENTIAL HYPERTENSION: ICD-10-CM

## 2024-02-01 DIAGNOSIS — N28.9 ACUTE RENAL INSUFFICIENCY: ICD-10-CM

## 2024-02-01 LAB
ALBUMIN SERPL BCG-MCNC: 3.9 G/DL (ref 3.5–5.2)
ALBUMIN UR-MCNC: 70 MG/DL
ALBUMIN UR-MCNC: 70 MG/DL
ALP SERPL-CCNC: 100 U/L (ref 40–150)
ALT SERPL W P-5'-P-CCNC: 11 U/L (ref 0–70)
ANION GAP SERPL CALCULATED.3IONS-SCNC: 10 MMOL/L (ref 7–15)
APPEARANCE UR: ABNORMAL
APPEARANCE UR: ABNORMAL
AST SERPL W P-5'-P-CCNC: 16 U/L (ref 0–45)
BASE EXCESS BLDV CALC-SCNC: -2.3 MMOL/L (ref -3–3)
BASOPHILS # BLD AUTO: 0 10E3/UL (ref 0–0.2)
BASOPHILS NFR BLD AUTO: 0 %
BILIRUB SERPL-MCNC: 0.4 MG/DL
BILIRUB UR QL STRIP: NEGATIVE
BILIRUB UR QL STRIP: NEGATIVE
BUN SERPL-MCNC: 53 MG/DL (ref 8–23)
CALCIUM SERPL-MCNC: 9.9 MG/DL (ref 8.8–10.2)
CHLORIDE SERPL-SCNC: 111 MMOL/L (ref 98–107)
COLOR UR AUTO: YELLOW
COLOR UR AUTO: YELLOW
CREAT SERPL-MCNC: 2.5 MG/DL (ref 0.67–1.17)
DEPRECATED HCO3 PLAS-SCNC: 24 MMOL/L (ref 22–29)
EGFRCR SERPLBLD CKD-EPI 2021: 24 ML/MIN/1.73M2
EOSINOPHIL # BLD AUTO: 0 10E3/UL (ref 0–0.7)
EOSINOPHIL NFR BLD AUTO: 0 %
ERYTHROCYTE [DISTWIDTH] IN BLOOD BY AUTOMATED COUNT: 13.2 % (ref 10–15)
GLUCOSE SERPL-MCNC: 91 MG/DL (ref 70–99)
GLUCOSE UR STRIP-MCNC: NEGATIVE MG/DL
GLUCOSE UR STRIP-MCNC: NEGATIVE MG/DL
HCO3 BLDV-SCNC: 24 MMOL/L (ref 21–28)
HCT VFR BLD AUTO: 36.3 % (ref 40–53)
HGB BLD-MCNC: 11.6 G/DL (ref 13.3–17.7)
HGB UR QL STRIP: ABNORMAL
HGB UR QL STRIP: ABNORMAL
IMM GRANULOCYTES # BLD: 0.1 10E3/UL
IMM GRANULOCYTES NFR BLD: 1 %
KETONES UR STRIP-MCNC: NEGATIVE MG/DL
KETONES UR STRIP-MCNC: NEGATIVE MG/DL
LACTATE SERPL-SCNC: 1 MMOL/L (ref 0.7–2)
LEUKOCYTE ESTERASE UR QL STRIP: ABNORMAL
LEUKOCYTE ESTERASE UR QL STRIP: ABNORMAL
LIPASE SERPL-CCNC: 48 U/L (ref 13–60)
LYMPHOCYTES # BLD AUTO: 0.6 10E3/UL (ref 0.8–5.3)
LYMPHOCYTES NFR BLD AUTO: 5 %
MCH RBC QN AUTO: 30.9 PG (ref 26.5–33)
MCHC RBC AUTO-ENTMCNC: 32 G/DL (ref 31.5–36.5)
MCV RBC AUTO: 97 FL (ref 78–100)
MONOCYTES # BLD AUTO: 1.5 10E3/UL (ref 0–1.3)
MONOCYTES NFR BLD AUTO: 12 %
MUCOUS THREADS #/AREA URNS LPF: PRESENT /LPF
NEUTROPHILS # BLD AUTO: 10.1 10E3/UL (ref 1.6–8.3)
NEUTROPHILS NFR BLD AUTO: 82 %
NITRATE UR QL: NEGATIVE
NITRATE UR QL: NEGATIVE
NRBC # BLD AUTO: 0 10E3/UL
NRBC BLD AUTO-RTO: 0 /100
O2/TOTAL GAS SETTING VFR VENT: 0 %
OXYHGB MFR BLDV: 24 % (ref 70–75)
PCO2 BLDV: 51 MM HG (ref 40–50)
PH BLDV: 7.29 [PH] (ref 7.32–7.43)
PH UR STRIP: 5.5 [PH] (ref 5–7)
PH UR STRIP: 5.5 [PH] (ref 5–7)
PLATELET # BLD AUTO: 177 10E3/UL (ref 150–450)
PO2 BLDV: 20 MM HG (ref 25–47)
POTASSIUM SERPL-SCNC: 5 MMOL/L (ref 3.4–5.3)
PROT SERPL-MCNC: 7.7 G/DL (ref 6.4–8.3)
RBC # BLD AUTO: 3.76 10E6/UL (ref 4.4–5.9)
RBC URINE: 14 /HPF
RBC URINE: 178 /HPF
SAO2 % BLDV: 24.2 % (ref 70–75)
SODIUM SERPL-SCNC: 145 MMOL/L (ref 135–145)
SP GR UR STRIP: 1.01 (ref 1–1.03)
SP GR UR STRIP: 1.01 (ref 1–1.03)
UROBILINOGEN UR STRIP-MCNC: <2 MG/DL
UROBILINOGEN UR STRIP-MCNC: <2 MG/DL
WBC # BLD AUTO: 12.4 10E3/UL (ref 4–11)
WBC CLUMPS #/AREA URNS HPF: PRESENT /HPF
WBC CLUMPS #/AREA URNS HPF: PRESENT /HPF
WBC URINE: >182 /HPF
WBC URINE: >182 /HPF

## 2024-02-01 PROCEDURE — 85025 COMPLETE CBC W/AUTO DIFF WBC: CPT | Performed by: EMERGENCY MEDICINE

## 2024-02-01 PROCEDURE — 83605 ASSAY OF LACTIC ACID: CPT | Performed by: EMERGENCY MEDICINE

## 2024-02-01 PROCEDURE — 250N000011 HC RX IP 250 OP 636: Performed by: INTERNAL MEDICINE

## 2024-02-01 PROCEDURE — 82805 BLOOD GASES W/O2 SATURATION: CPT | Performed by: EMERGENCY MEDICINE

## 2024-02-01 PROCEDURE — 83690 ASSAY OF LIPASE: CPT | Performed by: EMERGENCY MEDICINE

## 2024-02-01 PROCEDURE — 250N000013 HC RX MED GY IP 250 OP 250 PS 637: Performed by: INTERNAL MEDICINE

## 2024-02-01 PROCEDURE — 120N000001 HC R&B MED SURG/OB

## 2024-02-01 PROCEDURE — 99285 EMERGENCY DEPT VISIT HI MDM: CPT | Mod: 25

## 2024-02-01 PROCEDURE — 81001 URINALYSIS AUTO W/SCOPE: CPT | Performed by: EMERGENCY MEDICINE

## 2024-02-01 PROCEDURE — 70450 CT HEAD/BRAIN W/O DYE: CPT | Mod: MG

## 2024-02-01 PROCEDURE — 250N000011 HC RX IP 250 OP 636: Performed by: EMERGENCY MEDICINE

## 2024-02-01 PROCEDURE — 36415 COLL VENOUS BLD VENIPUNCTURE: CPT | Performed by: EMERGENCY MEDICINE

## 2024-02-01 PROCEDURE — 81003 URINALYSIS AUTO W/O SCOPE: CPT | Performed by: EMERGENCY MEDICINE

## 2024-02-01 PROCEDURE — 80053 COMPREHEN METABOLIC PANEL: CPT | Performed by: EMERGENCY MEDICINE

## 2024-02-01 PROCEDURE — 87106 FUNGI IDENTIFICATION YEAST: CPT | Performed by: EMERGENCY MEDICINE

## 2024-02-01 PROCEDURE — 74176 CT ABD & PELVIS W/O CONTRAST: CPT | Mod: MG

## 2024-02-01 PROCEDURE — 99223 1ST HOSP IP/OBS HIGH 75: CPT | Performed by: INTERNAL MEDICINE

## 2024-02-01 PROCEDURE — 96365 THER/PROPH/DIAG IV INF INIT: CPT

## 2024-02-01 RX ORDER — ONDANSETRON 4 MG/1
4 TABLET, ORALLY DISINTEGRATING ORAL EVERY 6 HOURS PRN
Status: DISCONTINUED | OUTPATIENT
Start: 2024-02-01 | End: 2024-02-04 | Stop reason: HOSPADM

## 2024-02-01 RX ORDER — SODIUM CHLORIDE 9 MG/ML
INJECTION, SOLUTION INTRAVENOUS CONTINUOUS
Status: DISCONTINUED | OUTPATIENT
Start: 2024-02-01 | End: 2024-02-02

## 2024-02-01 RX ORDER — ACETAMINOPHEN 650 MG/1
650 SUPPOSITORY RECTAL EVERY 4 HOURS PRN
Status: DISCONTINUED | OUTPATIENT
Start: 2024-02-01 | End: 2024-02-04 | Stop reason: HOSPADM

## 2024-02-01 RX ORDER — ACETAMINOPHEN 325 MG/1
650 TABLET ORAL EVERY 4 HOURS PRN
Status: DISCONTINUED | OUTPATIENT
Start: 2024-02-01 | End: 2024-02-04 | Stop reason: HOSPADM

## 2024-02-01 RX ORDER — METRONIDAZOLE 10 MG/G
GEL TOPICAL DAILY
Status: DISCONTINUED | OUTPATIENT
Start: 2024-02-02 | End: 2024-02-04 | Stop reason: HOSPADM

## 2024-02-01 RX ORDER — ONDANSETRON 2 MG/ML
4 INJECTION INTRAMUSCULAR; INTRAVENOUS EVERY 6 HOURS PRN
Status: DISCONTINUED | OUTPATIENT
Start: 2024-02-01 | End: 2024-02-04 | Stop reason: HOSPADM

## 2024-02-01 RX ORDER — TAMSULOSIN HYDROCHLORIDE 0.4 MG/1
0.4 CAPSULE ORAL AT BEDTIME
Status: DISCONTINUED | OUTPATIENT
Start: 2024-02-01 | End: 2024-02-04 | Stop reason: HOSPADM

## 2024-02-01 RX ORDER — HEPARIN SODIUM 5000 [USP'U]/.5ML
5000 INJECTION, SOLUTION INTRAVENOUS; SUBCUTANEOUS EVERY 8 HOURS
Status: DISCONTINUED | OUTPATIENT
Start: 2024-02-01 | End: 2024-02-04 | Stop reason: HOSPADM

## 2024-02-01 RX ORDER — AMLODIPINE BESYLATE 5 MG/1
5 TABLET ORAL ONCE
Status: COMPLETED | OUTPATIENT
Start: 2024-02-01 | End: 2024-02-01

## 2024-02-01 RX ORDER — ACETAMINOPHEN 325 MG/1
325-650 TABLET ORAL EVERY 4 HOURS PRN
Status: DISCONTINUED | OUTPATIENT
Start: 2024-02-01 | End: 2024-02-04 | Stop reason: HOSPADM

## 2024-02-01 RX ORDER — HYDRALAZINE HYDROCHLORIDE 20 MG/ML
10 INJECTION INTRAMUSCULAR; INTRAVENOUS EVERY 6 HOURS PRN
Status: DISCONTINUED | OUTPATIENT
Start: 2024-02-01 | End: 2024-02-04 | Stop reason: HOSPADM

## 2024-02-01 RX ORDER — CALCIUM CARBONATE 500 MG/1
1000 TABLET, CHEWABLE ORAL 4 TIMES DAILY PRN
Status: DISCONTINUED | OUTPATIENT
Start: 2024-02-01 | End: 2024-02-04 | Stop reason: HOSPADM

## 2024-02-01 RX ORDER — CEFTRIAXONE 1 G/1
1 INJECTION, POWDER, FOR SOLUTION INTRAMUSCULAR; INTRAVENOUS EVERY 24 HOURS
Status: DISCONTINUED | OUTPATIENT
Start: 2024-02-02 | End: 2024-02-04 | Stop reason: HOSPADM

## 2024-02-01 RX ORDER — LIDOCAINE 40 MG/G
CREAM TOPICAL
Status: DISCONTINUED | OUTPATIENT
Start: 2024-02-01 | End: 2024-02-04 | Stop reason: HOSPADM

## 2024-02-01 RX ORDER — CEFTRIAXONE 1 G/1
1 INJECTION, POWDER, FOR SOLUTION INTRAMUSCULAR; INTRAVENOUS ONCE
Status: COMPLETED | OUTPATIENT
Start: 2024-02-01 | End: 2024-02-01

## 2024-02-01 RX ADMIN — CEFTRIAXONE 1 G: 1 INJECTION, POWDER, FOR SOLUTION INTRAMUSCULAR; INTRAVENOUS at 16:42

## 2024-02-01 RX ADMIN — TAMSULOSIN HYDROCHLORIDE 0.4 MG: 0.4 CAPSULE ORAL at 21:09

## 2024-02-01 RX ADMIN — HEPARIN SODIUM 5000 UNITS: 5000 INJECTION, SOLUTION INTRAVENOUS; SUBCUTANEOUS at 21:09

## 2024-02-01 RX ADMIN — AMLODIPINE BESYLATE 5 MG: 5 TABLET ORAL at 21:10

## 2024-02-01 RX ADMIN — HYDRALAZINE HYDROCHLORIDE 10 MG: 20 INJECTION INTRAMUSCULAR; INTRAVENOUS at 18:48

## 2024-02-01 RX ADMIN — ACETAMINOPHEN 650 MG: 325 TABLET ORAL at 18:48

## 2024-02-01 ASSESSMENT — ACTIVITIES OF DAILY LIVING (ADL)
ADLS_ACUITY_SCORE: 39
DEPENDENT_IADLS:: INDEPENDENT
ADLS_ACUITY_SCORE: 39
ADLS_ACUITY_SCORE: 35
ADLS_ACUITY_SCORE: 41
ADLS_ACUITY_SCORE: 39

## 2024-02-01 NOTE — ED PROVIDER NOTES
EMERGENCY DEPARTMENT ENCOUNTER      NAME: Mikaela Figueroa  AGE: 88 year old male  YOB: 1935  MRN: [id number]  EVALUATION DATE & TIME: 2/1/2024  2:13 PM    PCP: Derek Kumar    ED PROVIDER: Candace Bunn M.D.      Chief Complaint   Patient presents with    Fall    Generalized Weakness    Urinary Frequency     diar    Diarrhea       FINAL IMPRESSION:  1. Urinary tract infection without hematuria, site unspecified    2. Urinary retention    3. Acute renal insufficiency        ED COURSE & MEDICAL DECISION MAKING:    Urinary frequency, diarrhea.  Inconsistent historian due to cognitive decline.   I ordered blood work including CBC, CMP, lipase, lactic acid, VBG.  I ordered CT A/P due to symptoms of diarrhea and urinary frequency.   I ordered ceftriaxone for treatment of UTI after I reviewed urinalysis which showed UTI (500LE, >182 WBC, WBC clumps present).  Labs significant for SUJATHA on CKD, BUN 53 with past value 28, Cr 2.5 with past value 1.43. Cr typically between 1.3-1.4. WBC 12.4, Hb stable at 11.6 similar to previous values. VBG shows respiratory acidosis.   CT head ordered due to cognitive decline over the past few months, rule out intracranial bleed although low suspicion, no trauma reported, no focal findings on exam.  Patient signed out to oncoming physician pending CT results and admission.    2:43 PM I met with the patient to gather history and to perform my initial exam. I discussed the plan for care while in the Emergency Department.      Pertinent Labs & Imaging studies reviewed. (See chart for details).    Medical Decision Making  Obtained supplemental history:Supplemental history obtained?: Documented in chart and Family Member/Significant Other  Reviewed external records: External records reviewed?: Documented in chart and Other: reviewed cardiology note from 12/11/23, hx of syncope and 2nd degree AV block, s/p pacemaker 2019, echoh 1/2023 stable, interrogated pacemaker in office and  "normal function.   Care impacted by chronic illness:Chronic Kidney Disease, Heart Disease, and Hypertension, hyperlipidemia, dementia.  Care significantly affected by social determinants of health:N/A  Did you consider but not order tests?: Work up considered but not performed and documented in chart, if applicable  Did you interpret images independently?: Independent interpretation of ECG and images noted in documentation, when applicable.  Consultation discussion with other provider:Did you involve another provider (consultant, , pharmacy, etc.)?: No  Admission considered. Patient was signed out to the oncoming physician, disposition pending.    At the conclusion of the encounter I discussed the results of all of the tests and the disposition. The questions were answered. The patient or family acknowledged understanding and was agreeable with the care plan.      CRITICAL CARE:  N/A    [facility]    Patient information was obtained from: Patient and daughter.    Use of : N/A        Mikaela Figueroa is a 88 year old male who presents via private car for evaluation of urinary urgency, frequency, and diarrhea. The patient's daughter reports that the patient has been experiencing 10 days of diarrhea, which has worsened in the last 2-3 days. Previously, he was having episodes of diarrhea approximately once per hour, but today it has increased to diarrhea every 20 minutes. Patient's daughter notes that her mother (patient's wife) says the patient got out of bed to use the restroom up to 20 times last night. His daughter also notes \"significant cognitive decline in the last few months\" and says that this may explain some discrepancies in the history he provides.    The patient reports that he was experiencing ongoing constipation, which he was trying to manage by taking a chocolate laxative and Senokot. He first started taking laxatives a week and a half ago, and he says his last dose was about 1-2 days ago. He says " his diarrhea started sometime last week, and also endorses increased urinary urgency and urinary frequency as well as right flank pain.     Denies hematuria, dysuria, nausea, vomiting, or any other concerns at this time. Patient's daughter notes that he recently had a forehead flap repair in August 2023, but says he has not taken any antibiotics since this procedure.       REVIEW OF SYSTEMS  All other systems negative unless noted in HPI.    PAST MEDICAL HISTORY:  Past Medical History:   Diagnosis Date    Hypertension     Pacemaker        PAST SURGICAL HISTORY:  Past Surgical History:   Procedure Laterality Date    APPENDECTOMY      ARTHROSCOPY KNEE Right     CYSTOURETEROSCOPY, WITH LITHOTRIPSY USING ZABRINA 120P LASER AND URETERAL STENT INSERTION Left 10/25/2022    Procedure: CYSTOSCOPY, LEFT URETEROSCOPY, LASER LITHOTRIPSY;  Surgeon: Bob Dozier MD;  Location: [facility] OR    GENITOURINARY SURGERY      IR NEPHROSTOMY TUBE PLACEMENT LEFT  10/26/2022    IR URETERAL STENT PLACEMENT LEFT  11/1/2022    REPAIR MOHS Right 8/28/2023    Procedure: FOREHEAD FLAP REPAIR MOHS DEFECT RIGHT NOSE; COMPOSITE GRAFT;  Surgeon: Jake Rosa MD;  Location: [facility] Main OR         CURRENT MEDICATIONS:    Current Facility-Administered Medications   Medication    acetaminophen (TYLENOL) tablet 650 mg    Or    acetaminophen (TYLENOL) Suppository 650 mg    acetaminophen (TYLENOL) tablet 325-650 mg    amLODIPine (NORVASC) tablet 5 mg    calcium carbonate (TUMS) chewable tablet 1,000 mg    cefTRIAXone (ROCEPHIN) 1 g vial to attach to  mL bag for ADULTS or NS 50 mL bag for PEDS    heparin ANTICOAGULANT injection 5,000 Units    hydrALAZINE (APRESOLINE) injection 10 mg    lidocaine (LMX4) cream    lidocaine 1 % 0.1-1 mL    melatonin tablet 2 mg    metroNIDAZOLE (METROGEL) 1 % gel    ondansetron (ZOFRAN ODT) ODT tab 4 mg    Or    ondansetron (ZOFRAN) injection 4 mg    QUEtiapine (SEROquel) half-tab 12.5 mg    sodium chloride  "(PF) 0.9% PF flush 3 mL    sodium chloride (PF) 0.9% PF flush 3 mL    sodium chloride 0.45% infusion    tamsulosin (FLOMAX) capsule 0.4 mg         ALLERGIES:  No Known Allergies    FAMILY HISTORY:  Family History   Problem Relation Age of Onset    Dementia Mother     Heart Disease Father     Diabetes Father        SOCIAL HISTORY:  Social History     Socioeconomic History    Marital status:    Tobacco Use    Smoking status: Former     Types: Pipe    Smokeless tobacco: Never   Substance and Sexual Activity    Alcohol use: No    Drug use: No       VITALS:  Patient Vitals for the past 24 hrs:   BP Temp Temp src Pulse Resp SpO2 Height   02/02/24 1600 (!) 172/78 97.5  F (36.4  C) Oral 80 18 98 % --   02/02/24 1438 (!) 175/79 -- -- -- -- -- --   02/02/24 1407 (!) 189/88 98.4  F (36.9  C) Oral 80 18 97 % 1.527 m (5' 0.1\")   02/02/24 0823 (!) 167/74 98  F (36.7  C) Oral 80 25 98 % --   02/02/24 0745 -- -- -- 81 21 99 % --   02/02/24 0715 -- -- -- 80 11 98 % --   02/02/24 0656 -- -- -- 84 -- 99 % --   02/02/24 0415 (!) 151/71 98.2  F (36.8  C) Axillary -- -- -- --   02/02/24 0115 -- 98.2  F (36.8  C) Oral -- -- -- --   02/01/24 2015 -- 97.2  F (36.2  C) Axillary -- -- -- --   02/01/24 1848 (!) 185/88 -- -- -- -- -- --   02/01/24 1845 (!) 185/88 -- -- 83 20 99 % --   02/01/24 1830 (!) 206/98 -- -- 85 -- 100 % --   02/01/24 1815 (!) 195/90 -- -- 85 20 98 % --   02/01/24 1700 (!) 180/88 -- -- 81 -- 98 % --   02/01/24 1645 (!) 187/89 -- -- 80 -- 98 % --       PHYSICAL EXAM    VITAL SIGNS: BP (!) 172/78 (BP Location: Right arm)   Pulse 80   Temp 97.5  F (36.4  C) (Oral)   Resp 18   Ht 1.527 m (5' 0.1\")   Wt 70.3 kg (155 lb)   SpO2 98%   BMI 30.17 kg/m    Physical Exam  Vitals and nursing note reviewed.   Constitutional:       Comments: Elderly, appears non toxic. Frail.   HENT:      Head: Normocephalic and atraumatic.      Nose: No congestion.      Mouth/Throat:      Pharynx: No oropharyngeal exudate or posterior " oropharyngeal erythema.   Eyes:      General:         Right eye: No discharge.         Left eye: No discharge.      Pupils: Pupils are equal, round, and reactive to light.   Cardiovascular:      Rate and Rhythm: Normal rate and regular rhythm.      Pulses: Normal pulses.   Pulmonary:      Effort: Pulmonary effort is normal. No respiratory distress.   Musculoskeletal:         General: No signs of injury.      Cervical back: Normal range of motion. No rigidity.   Skin:     General: Skin is warm and dry.      Findings: No rash.   Neurological:      Mental Status: He is alert. Mental status is at baseline.      Cranial Nerves: No cranial nerve deficit.      Comments: Interactive, clear speech, no facial droop. Inconsistent medical historian.          LABS  Labs Ordered and Resulted from Time of ED Arrival to Time of ED Departure   ROUTINE UA WITH MICROSCOPIC REFLEX TO CULTURE - Abnormal       Result Value    Color Urine Yellow      Appearance Urine Cloudy (*)     Glucose Urine Negative      Bilirubin Urine Negative      Ketones Urine Negative      Specific Gravity Urine 1.011      Blood Urine 0.5 mg/dL (*)     pH Urine 5.5      Protein Albumin Urine 70 (*)     Urobilinogen Urine <2.0      Nitrite Urine Negative      Leukocyte Esterase Urine 500 Rober/uL (*)     WBC Clumps Urine Present (*)     RBC Urine 178 (*)     WBC Urine >182 (*)    COMPREHENSIVE METABOLIC PANEL - Abnormal    Sodium 145      Potassium 5.0      Carbon Dioxide (CO2) 24      Anion Gap 10      Urea Nitrogen 53.0 (*)     Creatinine 2.50 (*)     GFR Estimate 24 (*)     Calcium 9.9      Chloride 111 (*)     Glucose 91      Alkaline Phosphatase 100      AST 16      ALT 11      Protein Total 7.7      Albumin 3.9      Bilirubin Total 0.4     BLOOD GAS VENOUS - Abnormal    pH Venous 7.29 (*)     pCO2 Venous 51 (*)     pO2 Venous 20 (*)     Bicarbonate Venous 24      Base Excess/Deficit Venous -2.3      FIO2 0      Oxyhemoglobin Venous 24 (*)     O2 Sat, Venous  24.2 (*)    ROUTINE UA WITH MICROSCOPIC REFLEX TO CULTURE - Abnormal    Color Urine Yellow      Appearance Urine Cloudy (*)     Glucose Urine Negative      Bilirubin Urine Negative      Ketones Urine Negative      Specific Gravity Urine 1.011      Blood Urine 0.5 mg/dL (*)     pH Urine 5.5      Protein Albumin Urine 70 (*)     Urobilinogen Urine <2.0      Nitrite Urine Negative      Leukocyte Esterase Urine 500 Rober/uL (*)     WBC Clumps Urine Present (*)     Mucus Urine Present (*)     RBC Urine 14 (*)     WBC Urine >182 (*)    CBC WITH PLATELETS AND DIFFERENTIAL - Abnormal    WBC Count 12.4 (*)     RBC Count 3.76 (*)     Hemoglobin 11.6 (*)     Hematocrit 36.3 (*)     MCV 97      MCH 30.9      MCHC 32.0      RDW 13.2      Platelet Count 177      % Neutrophils 82      % Lymphocytes 5      % Monocytes 12      % Eosinophils 0      % Basophils 0      % Immature Granulocytes 1      NRBCs per 100 WBC 0      Absolute Neutrophils 10.1 (*)     Absolute Lymphocytes 0.6 (*)     Absolute Monocytes 1.5 (*)     Absolute Eosinophils 0.0      Absolute Basophils 0.0      Absolute Immature Granulocytes 0.1      Absolute NRBCs 0.0     BASIC METABOLIC PANEL - Abnormal    Sodium 148 (*)     Potassium 4.6      Chloride 114 (*)     Carbon Dioxide (CO2) 25      Anion Gap 9      Urea Nitrogen 50.4 (*)     Creatinine 2.18 (*)     GFR Estimate 29 (*)     Calcium 9.4      Glucose 94     CBC WITH PLATELETS - Abnormal    WBC Count 10.1      RBC Count 3.61 (*)     Hemoglobin 11.3 (*)     Hematocrit 34.7 (*)     MCV 96      MCH 31.3      MCHC 32.6      RDW 13.2      Platelet Count 174     LIPASE - Normal    Lipase 48     LACTIC ACID WHOLE BLOOD - Normal    Lactic Acid 1.0           RADIOLOGY  CT Abdomen Pelvis w/o Contrast   Final Result   IMPRESSION:       1.  Questionable mild wall thickening of the distal colon, which could either be pseudothickening from underdistention or could reflect a mild colitis.      2.  Distal colonic diverticulosis.  No inflamed colonic diverticuli.      3.  Severe bilateral hydronephrosis extending to the ureteropelvic junctions, without obstructing ureteral calculi.      4.  Sequela of chronic bladder outlet obstruction with a diffusely thickened and trabeculated urinary bladder and multiple intraluminal bladder calculi measuring up to 7 mm. Correlate with urinalysis to exclude cystitis.      Head CT w/o contrast   Final Result   IMPRESSION:   1.  No intracranial hemorrhage, mass lesions, or CT evidence for an acute infarct.   2.  Mild diffuse cerebral parenchymal volume loss. Disproportionate volume loss involving the anterior temporal lobes.   3.  Presumed chronic hypertensive/microvascular ischemic white matter changes.   4.  The lateral and third ventricles are dilated greater than expected for the degree of sulcal dilatation. Please correlate with clinical findings of normal pressure hydrocephalus.            NA      EKG:    NA       PROCEDURES:  N/A      MEDICATIONS GIVEN IN THE EMERGENCY:  Medications   hydrALAZINE (APRESOLINE) injection 10 mg (10 mg Intravenous $Given 2/1/24 1848)   cefTRIAXone (ROCEPHIN) 1 g vial to attach to  mL bag for ADULTS or NS 50 mL bag for PEDS (has no administration in time range)   tamsulosin (FLOMAX) capsule 0.4 mg (0.4 mg Oral $Given 2/1/24 2109)   metroNIDAZOLE (METROGEL) 1 % gel ( Topical Not Given 2/2/24 1439)   acetaminophen (TYLENOL) tablet 325-650 mg (650 mg Oral $Given 2/1/24 1848)   lidocaine 1 % 0.1-1 mL (has no administration in time range)   lidocaine (LMX4) cream (has no administration in time range)   sodium chloride (PF) 0.9% PF flush 3 mL (3 mLs Intracatheter Not Given 2/2/24 1039)   sodium chloride (PF) 0.9% PF flush 3 mL (has no administration in time range)   acetaminophen (TYLENOL) tablet 650 mg (has no administration in time range)     Or   acetaminophen (TYLENOL) Suppository 650 mg (has no administration in time range)   calcium carbonate (TUMS) chewable tablet  1,000 mg (has no administration in time range)   heparin ANTICOAGULANT injection 5,000 Units (5,000 Units Subcutaneous $Given 2/2/24 1310)   ondansetron (ZOFRAN ODT) ODT tab 4 mg (has no administration in time range)     Or   ondansetron (ZOFRAN) injection 4 mg (has no administration in time range)   QUEtiapine (SEROquel) half-tab 12.5 mg (has no administration in time range)   melatonin tablet 2 mg (has no administration in time range)   amLODIPine (NORVASC) tablet 5 mg (5 mg Oral $Given 2/2/24 1055)   sodium chloride 0.45% infusion ( Intravenous $New Bag 2/2/24 1310)   cefTRIAXone (ROCEPHIN) 1 g vial to attach to  mL bag for ADULTS or NS 50 mL bag for PEDS (0 g Intravenous Stopped 2/1/24 1837)   amLODIPine (NORVASC) tablet 5 mg (5 mg Oral $Given 2/1/24 2110)       NEW PRESCRIPTIONS STARTED AT TODAY'S ER VISIT  Current Discharge Medication List           I, Honey Wynn, am serving as a scribe to document services personally performed by Candace Bunn MD, based on my observations and the provider's statements to me.  I, Candace Bunn MD, attest that Honey Wynn is acting in a scribe capacity, has observed my performance of the services and has documented them in accordance with my direction.     Candace Bunn MD  Emergency Medicine  [facility] EMERGENCY ROOM  [address] [id number] [phone]  Dept: [phone]             Candace Bunn MD  02/02/24 1611       Candace Bunn MD  02/02/24 1611

## 2024-02-01 NOTE — ED TRIAGE NOTES
Pt arrives to ED with multiple complaints. Pt is here with daughter who states pt fell yesterday with no injuries. Not on thinners. No LOC. Pt states for the past couple of days has been having urinary urgency and frequency along with diarrhea and increased weakness and decreased mobility. Pt endorses to right sided flank pt. Hx of kidney stones one as large as a walnut. Has [employer] pacemaker      Triage Assessment (Adult)       Row Name 02/01/24 1158          Triage Assessment    Airway WDL WDL        Respiratory WDL    Respiratory WDL WDL        Skin Circulation/Temperature WDL    Skin Circulation/Temperature WDL WDL        Cardiac WDL    Cardiac WDL WDL        Peripheral/Neurovascular WDL    Peripheral Neurovascular WDL WDL        Cognitive/Neuro/Behavioral WDL    Cognitive/Neuro/Behavioral WDL WDL

## 2024-02-01 NOTE — ED NOTES
EMERGENCY DEPARTMENT SIGN OUT NOTE        ED COURSE AND MEDICAL DECISION MAKING  Patient was signed out to me by Dr Candace Bunn at 1550    In brief, Mikaela Figueroa is a 88 year old male who initially presented lives w wife, diarrhea x20 since yest w/ urinary urgency/freq.  Mild confusion.  Has SUJATHA, mild acidosis, UTI. Given rocephin.     At time of sign out, disposition was pending ct head/abd/pelv, cbc and admission CBC notable for mild leukocytosis.  Evidence of urinary retention on CT.  This in combination with his UTI and acute renal insufficiency Bloom catheter was placed.  He had already been treated with antibiotics as above.  His CT shows questionable colitis which may be genuine given his diarrhea versus under distention.  Will be admitted to medicine for further management.    ED Course as of 02/01/24 1744   Thu Feb 01, 2024   1629 CT Abdomen Pelvis w/o Contrast  CT abdomen pelvis independently interpreted by myself with massive hydroureter bilaterally, bladder wall thickening.  Will place Bloom catheter.         FINAL IMPRESSION    1. Urinary tract infection without hematuria, site unspecified    2. Urinary retention    3. Acute renal insufficiency        ED MEDS  Medications   cefTRIAXone (ROCEPHIN) 1 g vial to attach to  mL bag for ADULTS or NS 50 mL bag for PEDS (1 g Intravenous $New Bag 2/1/24 1642)       LAB  Labs Ordered and Resulted from Time of ED Arrival to Time of ED Departure   ROUTINE UA WITH MICROSCOPIC REFLEX TO CULTURE - Abnormal       Result Value    Color Urine Yellow      Appearance Urine Cloudy (*)     Glucose Urine Negative      Bilirubin Urine Negative      Ketones Urine Negative      Specific Gravity Urine 1.011      Blood Urine 0.5 mg/dL (*)     pH Urine 5.5      Protein Albumin Urine 70 (*)     Urobilinogen Urine <2.0      Nitrite Urine Negative      Leukocyte Esterase Urine 500 Rober/uL (*)     WBC Clumps Urine Present (*)     RBC Urine 178 (*)     WBC Urine >182 (*)     COMPREHENSIVE METABOLIC PANEL - Abnormal    Sodium 145      Potassium 5.0      Carbon Dioxide (CO2) 24      Anion Gap 10      Urea Nitrogen 53.0 (*)     Creatinine 2.50 (*)     GFR Estimate 24 (*)     Calcium 9.9      Chloride 111 (*)     Glucose 91      Alkaline Phosphatase 100      AST 16      ALT 11      Protein Total 7.7      Albumin 3.9      Bilirubin Total 0.4     BLOOD GAS VENOUS - Abnormal    pH Venous 7.29 (*)     pCO2 Venous 51 (*)     pO2 Venous 20 (*)     Bicarbonate Venous 24      Base Excess/Deficit Venous -2.3      FIO2 0      Oxyhemoglobin Venous 24 (*)     O2 Sat, Venous 24.2 (*)    ROUTINE UA WITH MICROSCOPIC REFLEX TO CULTURE - Abnormal    Color Urine Yellow      Appearance Urine Cloudy (*)     Glucose Urine Negative      Bilirubin Urine Negative      Ketones Urine Negative      Specific Gravity Urine 1.011      Blood Urine 0.5 mg/dL (*)     pH Urine 5.5      Protein Albumin Urine 70 (*)     Urobilinogen Urine <2.0      Nitrite Urine Negative      Leukocyte Esterase Urine 500 Rober/uL (*)     WBC Clumps Urine Present (*)     Mucus Urine Present (*)     RBC Urine 14 (*)     WBC Urine >182 (*)    CBC WITH PLATELETS AND DIFFERENTIAL - Abnormal    WBC Count 12.4 (*)     RBC Count 3.76 (*)     Hemoglobin 11.6 (*)     Hematocrit 36.3 (*)     MCV 97      MCH 30.9      MCHC 32.0      RDW 13.2      Platelet Count 177      % Neutrophils 82      % Lymphocytes 5      % Monocytes 12      % Eosinophils 0      % Basophils 0      % Immature Granulocytes 1      NRBCs per 100 WBC 0      Absolute Neutrophils 10.1 (*)     Absolute Lymphocytes 0.6 (*)     Absolute Monocytes 1.5 (*)     Absolute Eosinophils 0.0      Absolute Basophils 0.0      Absolute Immature Granulocytes 0.1      Absolute NRBCs 0.0     LIPASE - Normal    Lipase 48     LACTIC ACID WHOLE BLOOD - Normal    Lactic Acid 1.0     URINE CULTURE   ENTERIC BACTERIA AND VIRUS PANEL BY PCR   C. DIFFICILE TOXIN B PCR WITH REFLEX TO C. DIFFICILE ANTIGEN AND  TOXINS A/B EIA   URINE CULTURE       RADIOLOGY    CT Abdomen Pelvis w/o Contrast   Final Result   IMPRESSION:       1.  Questionable mild wall thickening of the distal colon, which could either be pseudothickening from underdistention or could reflect a mild colitis.      2.  Distal colonic diverticulosis. No inflamed colonic diverticuli.      3.  Severe bilateral hydronephrosis extending to the ureteropelvic junctions, without obstructing ureteral calculi.      4.  Sequela of chronic bladder outlet obstruction with a diffusely thickened and trabeculated urinary bladder and multiple intraluminal bladder calculi measuring up to 7 mm. Correlate with urinalysis to exclude cystitis.      Head CT w/o contrast   Final Result   IMPRESSION:   1.  No intracranial hemorrhage, mass lesions, or CT evidence for an acute infarct.   2.  Mild diffuse cerebral parenchymal volume loss. Disproportionate volume loss involving the anterior temporal lobes.   3.  Presumed chronic hypertensive/microvascular ischemic white matter changes.   4.  The lateral and third ventricles are dilated greater than expected for the degree of sulcal dilatation. Please correlate with clinical findings of normal pressure hydrocephalus.             DISCHARGE MEDS  New Prescriptions    No medications on file       Demetra Pulliam MD  [facility] EMERGENCY ROOM  [address] [id number] [phone]       Demetra Pulliam MD  02/01/24 7641

## 2024-02-01 NOTE — TELEPHONE ENCOUNTER
Nurse Triage SBAR    Is this a 2nd Level Triage? NO    Situation: Fall    Background: :Patient has been having a decline in strength and cognition.    Assessment: Patient's daughter (CTC located in chart) calling to report patient fell this evening, no injuries noted. He had also nearly fell X2 today. He was using his walker when he had fallen. He reports that he has been feeling increase in generalized weakness recently, tonight he needed assistance standing up from toilet, he was able to ambulate independently with walker after fall. Patient reports he has been having intermittent pain to inside of right knee over past couple of months related to an old injury. Daughter denies any acute illnesses or fevers noted.     Daughter is inquiring on PT or Home Care assistance.    Protocol Recommended Disposition:   According to the protocol, patient should see provider within 3 days.  Care advice given. Patient verbalizes understanding and agrees with plan of care. Transferred to scheduling, no appointments available. Routing note to PCP and Care Team to check on scheduling and FYI.    Kristel Heredia RN  01/31/24 9:50 PM  [facility] Nurse Advisor        Reason for Disposition   MILD weakness (i.e., does not interfere with ability to work, go to school, normal activities)  (Exception: Mild weakness is a chronic symptom.)    Additional Information   Negative: Fainted (passed out)   Negative: New-onset or worsening weakness of the face, arm or leg on one side of the body   Negative: [1] New-onset or worsening dizziness AND [2] described as spinning or off balance (i.e., vertigo)   Negative: [1] New-onset or worsening dizziness AND [2] NO spinning sensation or trouble with balance   Negative: New-onset or worsening pale skin (pallor)   Negative: Pregnant and fall   Negative: Patient has a concerning injury to a specific part of the body (e.g., chest, leg, head)   Negative: Patient has a wound (abrasion, cut,  puncture, other skin injury or tear)   Negative: Injury (or injuries) that need emergency care   Negative: Sounds like a serious injury to the triager   Negative: Major injury from dangerous force (e.g., fall > 10 feet or 3 meters)   Negative: [1] Major bleeding (e.g., actively dripping or spurting) AND [2] can't be stopped   Negative: Shock suspected (e.g., cold/pale/clammy skin, too weak to stand)   Negative: Difficult to awaken or acting confused (e.g., disoriented, slurred speech)   Negative: [1] SEVERE weakness (i.e., unable to walk or barely able to walk, requires support) AND [2] new-onset or worsening   Negative: [1] Can't stand (bear weight) or walk AND [2] new-onset after fall   Negative: Sounds like a life-threatening emergency to the triager   Negative: [1] Muscle pain AND [2] dark (cola colored) or red-colored urine   Negative: [1] Unable to get up until help (e.g., caregiver, family, friend) arrived AND [2] on the ground 1 hour or more   Negative: Patient sounds very sick or weak to the triager   Negative: [1] No prior tetanus shots (or is not fully vaccinated) AND [2] any wound (e.g., cut or scrape)   Negative: [1] HIV positive or severe immunodeficiency (severely weak immune system) AND [2] DIRTY cut or scrape   Negative: [1] Caller has NON-URGENT question AND [2] triager unable to answer question   Negative: [1] Caller has URGENT question AND [2] triager unable to answer question   Negative: [1] MODERATE weakness (i.e., interferes with work, school, normal activities) AND [2] new-onset or worsening   Negative: [1] Fever > 101 F (38.3 C) AND [2] age > 60 years   Negative: [1] Fever > 100.0 F (37.8 C) AND [2] bedridden (e.g., CVA, chronic illness, recovering from surgery)   Negative: [1] Fever > 100.0 F (37.8 C) AND [2] diabetes mellitus or weak immune system (e.g., HIV positive, cancer chemo, splenectomy, organ transplant, chronic steroids)   Negative: Suspicious history for the fall    Protocols  used: Falls and [id number]

## 2024-02-01 NOTE — PHARMACY-ADMISSION MEDICATION HISTORY
Pharmacist Admission Medication History    Admission medication history is complete. The information provided in this note is only as accurate as the sources available at the time of the update.    Information Source(s): Patient, Family member, Clinic records, [facility], and Kalyani, daughter  via in-person    Pertinent Information:     Changes made to PTA medication list:  Added: None  Deleted: None  Changed: None    Medication Affordability:       Allergies reviewed with patient and updates made in EHR: yes    Medication History Completed By: Ranjeet AlasD 2/1/2024 4:55 PM    PTA Med List   Medication Sig Last Dose    acetaminophen (TYLENOL) 325 MG tablet Take 1-2 tablets (325-650 mg) by mouth every 4 hours as needed for other or mild pain (For optimal non-opioid multimodal pain management to improve pain control.) Unknown at prn    doxycycline hyclate (VIBRA-TABS) 100 MG tablet TAKE 1 TABLET DAILY 1/31/2024 at am    Glucosamine-Chondroitin 250-200 MG CAPS Take 1 capsule by mouth daily 1/31/2024 at am    lisinopril (ZESTRIL) 10 MG tablet TAKE 1 TABLET DAILY 1/31/2024 at am    metroNIDAZOLE (METROGEL) 1 % external gel APPLY A SMALL AMOUNT TO AFFECTED AREA(S) TOPICALLY ONCE DAILY 1/31/2024 at am    multivitamin, therapeutic (THERA-VIT) TABS tablet Take 1 tablet by mouth daily 1/31/2024 at am    tamsulosin (FLOMAX) 0.4 MG capsule Take 1 capsule (0.4 mg) by mouth At Bedtime 1/31/2024 at pm    triamcinolone (KENALOG) 0.1 % external cream Apply topically 2 times daily as needed for irritation Unknown at prn

## 2024-02-01 NOTE — TELEPHONE ENCOUNTER
Nurse Triage SBAR    Is this a 2nd Level Triage? YES, LICENSED PRACTITIONER REVIEW IS REQUIRED    Situation: Patient's daughter, Kalyani calling re: Urinary frequency change. C2C on file.    Background: Was up last night about 20 times using the bathroom. Has a hx of kidney stones. Daughter wondering if he can come in to leave a urine sample today in case he has a UTI or kidney stones.    Assessment:   Pt states no pain when urinating. Is able to get urine out, but he has urgency and increased frequency. Does have some lower right back pain, rates it as moderate. Denies fever or vomiting.    Protocol Recommended Disposition:   See in Office Today    Recommendation: Protocol recommends See in Office Today. Pt does have an appointment at the [facility] tomorrow morning to evaluate for weakness and falls. Will route Second Level Triage to PCP for recommendation.      Routed to provider    Does the patient meet one of the following criteria for ADS visit consideration? 16+ years old, with an [facility] PCP     TIP  Providers, please consider if this condition is appropriate for management at one of our Acute and Diagnostic Services sites.     If patient is a good candidate, please use dotphrase <dot>triageresponse and select Refer to ADS to document.      Candace Lewis, RN, BSN  [facility]   Triage Nurse Advisor    Reason for Disposition   Side (flank) or lower back pain present    Additional Information   Negative: Shock suspected (e.g., cold/pale/clammy skin, too weak to stand, low BP, rapid pulse)   Negative: Sounds like a life-threatening emergency to the triager   Negative: Followed a female genital area injury (e.g., labia, vagina, vulva)   Negative: Followed a male genital area injury (penis, scrotum)   Negative: Vaginal discharge   Negative: Pus (white, yellow) or bloody discharge from end of penis   Negative: Pain or burning with passing urine (urination) and pregnant   Negative: Pain or burning with  passing urine (urination) and female   Negative: Pain or burning with passing urine (urination) and male   Negative: Pain or itching in the vulvar area   Negative: Pain in scrotum is main symptom   Negative: Blood in the urine is main symptom   Negative: Symptoms arising from use of a urinary catheter (e.g., Coude, Bloom)   Negative: Unable to urinate (or only a few drops) > 4 hours and bladder feels very full (e.g., palpable bladder or strong urge to urinate)   Negative: Decreased urination and drinking very little and dehydration suspected (e.g., dark urine, no urine > 12 hours, very dry mouth, very lightheaded)   Negative: Patient sounds very sick or weak to the triager   Negative: Fever > 100.4 F  (38.0 C)    Protocols used: Urinary Symptoms-A-OH

## 2024-02-02 ENCOUNTER — APPOINTMENT (OUTPATIENT)
Dept: PHYSICAL THERAPY | Facility: CLINIC | Age: 89
DRG: 689 | End: 2024-02-02
Attending: INTERNAL MEDICINE
Payer: MEDICARE

## 2024-02-02 ENCOUNTER — APPOINTMENT (OUTPATIENT)
Dept: SPEECH THERAPY | Facility: CLINIC | Age: 89
DRG: 689 | End: 2024-02-02
Attending: INTERNAL MEDICINE
Payer: MEDICARE

## 2024-02-02 LAB
ANION GAP SERPL CALCULATED.3IONS-SCNC: 9 MMOL/L (ref 7–15)
BACTERIA UR CULT: ABNORMAL
BUN SERPL-MCNC: 50.4 MG/DL (ref 8–23)
CALCIUM SERPL-MCNC: 9.4 MG/DL (ref 8.8–10.2)
CHLORIDE SERPL-SCNC: 114 MMOL/L (ref 98–107)
CREAT SERPL-MCNC: 2.18 MG/DL (ref 0.67–1.17)
DEPRECATED HCO3 PLAS-SCNC: 25 MMOL/L (ref 22–29)
EGFRCR SERPLBLD CKD-EPI 2021: 29 ML/MIN/1.73M2
ERYTHROCYTE [DISTWIDTH] IN BLOOD BY AUTOMATED COUNT: 13.2 % (ref 10–15)
GLUCOSE SERPL-MCNC: 94 MG/DL (ref 70–99)
HCT VFR BLD AUTO: 34.7 % (ref 40–53)
HGB BLD-MCNC: 11.3 G/DL (ref 13.3–17.7)
MCH RBC QN AUTO: 31.3 PG (ref 26.5–33)
MCHC RBC AUTO-ENTMCNC: 32.6 G/DL (ref 31.5–36.5)
MCV RBC AUTO: 96 FL (ref 78–100)
PLATELET # BLD AUTO: 174 10E3/UL (ref 150–450)
POTASSIUM SERPL-SCNC: 4.6 MMOL/L (ref 3.4–5.3)
RBC # BLD AUTO: 3.61 10E6/UL (ref 4.4–5.9)
SODIUM SERPL-SCNC: 148 MMOL/L (ref 135–145)
WBC # BLD AUTO: 10.1 10E3/UL (ref 4–11)

## 2024-02-02 PROCEDURE — 92526 ORAL FUNCTION THERAPY: CPT | Mod: GN

## 2024-02-02 PROCEDURE — 250N000011 HC RX IP 250 OP 636: Performed by: INTERNAL MEDICINE

## 2024-02-02 PROCEDURE — 258N000002 HC RX IP 258 OP 250: Performed by: INTERNAL MEDICINE

## 2024-02-02 PROCEDURE — 99233 SBSQ HOSP IP/OBS HIGH 50: CPT | Performed by: INTERNAL MEDICINE

## 2024-02-02 PROCEDURE — 36415 COLL VENOUS BLD VENIPUNCTURE: CPT | Performed by: INTERNAL MEDICINE

## 2024-02-02 PROCEDURE — 97110 THERAPEUTIC EXERCISES: CPT | Mod: GP

## 2024-02-02 PROCEDURE — 97161 PT EVAL LOW COMPLEX 20 MIN: CPT | Mod: GP

## 2024-02-02 PROCEDURE — 120N000001 HC R&B MED SURG/OB

## 2024-02-02 PROCEDURE — 250N000013 HC RX MED GY IP 250 OP 250 PS 637: Performed by: INTERNAL MEDICINE

## 2024-02-02 PROCEDURE — 85027 COMPLETE CBC AUTOMATED: CPT | Performed by: INTERNAL MEDICINE

## 2024-02-02 PROCEDURE — 97530 THERAPEUTIC ACTIVITIES: CPT | Mod: GP

## 2024-02-02 PROCEDURE — 92610 EVALUATE SWALLOWING FUNCTION: CPT | Mod: GN

## 2024-02-02 PROCEDURE — 80048 BASIC METABOLIC PNL TOTAL CA: CPT | Performed by: INTERNAL MEDICINE

## 2024-02-02 RX ORDER — SODIUM CHLORIDE 450 MG/100ML
INJECTION, SOLUTION INTRAVENOUS CONTINUOUS
Status: DISCONTINUED | OUTPATIENT
Start: 2024-02-02 | End: 2024-02-03

## 2024-02-02 RX ORDER — AMLODIPINE BESYLATE 5 MG/1
5 TABLET ORAL DAILY
Status: DISCONTINUED | OUTPATIENT
Start: 2024-02-02 | End: 2024-02-04

## 2024-02-02 RX ADMIN — AMLODIPINE BESYLATE 5 MG: 5 TABLET ORAL at 10:55

## 2024-02-02 RX ADMIN — HEPARIN SODIUM 5000 UNITS: 5000 INJECTION, SOLUTION INTRAVENOUS; SUBCUTANEOUS at 21:00

## 2024-02-02 RX ADMIN — HEPARIN SODIUM 5000 UNITS: 5000 INJECTION, SOLUTION INTRAVENOUS; SUBCUTANEOUS at 13:10

## 2024-02-02 RX ADMIN — SODIUM CHLORIDE: 4.5 INJECTION, SOLUTION INTRAVENOUS at 13:10

## 2024-02-02 RX ADMIN — HEPARIN SODIUM 5000 UNITS: 5000 INJECTION, SOLUTION INTRAVENOUS; SUBCUTANEOUS at 04:17

## 2024-02-02 RX ADMIN — CEFTRIAXONE 1 G: 1 INJECTION, POWDER, FOR SOLUTION INTRAMUSCULAR; INTRAVENOUS at 17:20

## 2024-02-02 RX ADMIN — TAMSULOSIN HYDROCHLORIDE 0.4 MG: 0.4 CAPSULE ORAL at 21:01

## 2024-02-02 ASSESSMENT — ACTIVITIES OF DAILY LIVING (ADL)
ADLS_ACUITY_SCORE: 41
ADLS_ACUITY_SCORE: 43
ADLS_ACUITY_SCORE: 41
ADLS_ACUITY_SCORE: 24
ADLS_ACUITY_SCORE: 41

## 2024-02-02 NOTE — CONSULTS
SPIRITUAL HEALTH SERVICES ([facility])  SPIRITUAL ASSESSMENT Progress Note  [facility]. 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  His daughter Kalyani feels that she too is well supported.      PLAN: No plans to follow.      Tamara Galvez, Ph.D., [facility]      [facility] available 24/7 for emergency requests/referrals, either by having the on-call  paged or by entering an ASAP/STAT consult in Epic (this will also page the on-call ).

## 2024-02-02 NOTE — CONSULTS
[facility] CONSULTATION    Type of Consult: emergency room  Place of Service: [facility]   Reason for consult: Urinary retention, bilateral hydronephrosis, UTI  Request for consult by: Dr. Altamirano    History of present illness:   Mikaela Figueroa is a 88 year old male that was admitted for Urinary retention. Urology is being consulted for urinary retention and bilateral hydronephrosis in setting of UTI. History obtained through patient, patient's daughter and chart review.     Patient known to our group. Previously followed by Dr. Colindres for elevated PSA, BPH and kidney stones. Takes tamsulosin 0.4 mg daily for underlying BPH. Last seen 07/2023. Patient voiding well at time of visit. He reports a decline in his urinary function the past 4-6 weeks. His urinary function significant worsened the past week. He was brought to the ER after a fall with associated generalized weakness and poor urinary function. CT in ER revealed bilateral hydronephrosis with distended bladder. Smith catheter was placed. No gross hematuria reported. UA suspicious for infection. IV antibiotics started in ER.    Patient sitting up in bed upon arrival today. He reports feeling significantly better than yesterday after having smith catheter placed and being starting on IV antibiotics. Afebrile. Yellow urine output without sediment per patient. Denies abdominal pain, chills and N/V.     Past Medical History:  Past Medical History:   Diagnosis Date    Hypertension     Pacemaker        Past Surgical History:  Past Surgical History:   Procedure Laterality Date    APPENDECTOMY      ARTHROSCOPY KNEE Right     CYSTOURETEROSCOPY, WITH LITHOTRIPSY USING ZABRINA 120P LASER AND URETERAL STENT INSERTION Left 10/25/2022    Procedure: CYSTOSCOPY, LEFT URETEROSCOPY, LASER LITHOTRIPSY;  Surgeon: Bob Dozier MD;  Location: [facility] OR    GENITOURINARY SURGERY      IR NEPHROSTOMY TUBE PLACEMENT LEFT  10/26/2022    IR URETERAL STENT PLACEMENT  LEFT  11/1/2022    REPAIR MOHS Right 8/28/2023    Procedure: FOREHEAD FLAP REPAIR MOHS DEFECT RIGHT NOSE; COMPOSITE GRAFT;  Surgeon: Jake Rosa MD;  Location: [facility] Main OR       Social History:  Social History     Socioeconomic History    Marital status:      Spouse name: Not on file    Number of children: Not on file    Years of education: Not on file    Highest education level: Not on file   Occupational History    Not on file   Tobacco Use    Smoking status: Former     Types: Pipe    Smokeless tobacco: Never   Substance and Sexual Activity    Alcohol use: No    Drug use: No    Sexual activity: Not on file   Other Topics Concern    Not on file   Social History Narrative    Not on file     Social Determinants of Health     Financial Resource Strain: Not on file   Food Insecurity: Not on file   Transportation Needs: Not on file   Physical Activity: Not on file   Stress: Not on file   Social Connections: Not on file   Interpersonal Safety: Not on file   Housing Stability: Not on file       Medications:  Current Facility-Administered Medications   Medication    acetaminophen (TYLENOL) tablet 650 mg    Or    acetaminophen (TYLENOL) Suppository 650 mg    acetaminophen (TYLENOL) tablet 325-650 mg    amLODIPine (NORVASC) tablet 5 mg    calcium carbonate (TUMS) chewable tablet 1,000 mg    cefTRIAXone (ROCEPHIN) 1 g vial to attach to  mL bag for ADULTS or NS 50 mL bag for PEDS    heparin ANTICOAGULANT injection 5,000 Units    hydrALAZINE (APRESOLINE) injection 10 mg    lidocaine (LMX4) cream    lidocaine 1 % 0.1-1 mL    melatonin tablet 2 mg    metroNIDAZOLE (METROGEL) 1 % gel    ondansetron (ZOFRAN ODT) ODT tab 4 mg    Or    ondansetron (ZOFRAN) injection 4 mg    QUEtiapine (SEROquel) half-tab 12.5 mg    sodium chloride (PF) 0.9% PF flush 3 mL    sodium chloride (PF) 0.9% PF flush 3 mL    sodium chloride 0.45% infusion    tamsulosin (FLOMAX) capsule 0.4 mg     Current Outpatient Medications    Medication    acetaminophen (TYLENOL) 325 MG tablet    doxycycline hyclate (VIBRA-TABS) 100 MG tablet    Glucosamine-Chondroitin 250-200 MG CAPS    lisinopril (ZESTRIL) 10 MG tablet    metroNIDAZOLE (METROGEL) 1 % external gel    multivitamin, therapeutic (THERA-VIT) TABS tablet    tamsulosin (FLOMAX) 0.4 MG capsule    triamcinolone (KENALOG) 0.1 % external cream       Allergies:   No Known Allergies    Review of Systems:   A full 12 point review of systems was taken and is negative aside from what is noted above in the HPI    Physical Exam:  Temp:  [97.2  F (36.2  C)-98.2  F (36.8  C)] 98  F (36.7  C)  Pulse:  [77-93] 80  Resp:  [11-25] 25  BP: (151-206)/() 167/74  SpO2:  [95 %-100 %] 98 %  General: NAD, alert, cooperative  Head: normocephalic, without abnormality / atraumatic  Abdomen: soft, non tender, non distended. No suprapubic fullness/tenderness. No CVA tenderness noted bilaterally  Geniturinary: Bloom catheter in place. Urine yellow. No sediment or blood clots.   Psychological: alert and oriented, answers questions appropriately      Labs:   Creatinine   Date Value Ref Range Status   02/02/2024 2.18 (H) 0.67 - 1.17 mg/dL Final       Lab Results   Component Value Date    WBC 10.1 02/02/2024     Lab Results   Component Value Date    HGB 11.3 02/02/2024     Lab Results   Component Value Date     02/02/2024       UA RESULTS:  Recent Labs   Lab Test 02/01/24  1533 10/01/22  1953 01/14/22  1423   COLOR Yellow   < > Yellow   APPEARANCE Cloudy*   < > Clear   URINEGLC Negative   < > Negative   URINEBILI Negative   < > Negative   URINEKETONE Negative   < > Negative   SG 1.011   < > 1.015   UBLD 0.5 mg/dL*   < > Negative   URINEPH 5.5   < > 7.0   PROTEIN 70*   < > Negative   UROBILINOGEN  --   --  0.2   NITRITE Negative   < > Negative   LEUKEST 500 Rober/uL*   < > Negative   RBCU 14*   < >  --    WBCU >182*   < >  --     < > = values in this interval not displayed.         Urine culture: pending    Lab  Results: personally reviewed     Imaging:  EXAM: CT ABDOMEN PELVIS W/O CONTRAST  LOCATION: [facility]  DATE: 2/1/2024     INDICATION: Diarrhea.  COMPARISON: CT abdomen pelvis 10/02/2022.  TECHNIQUE: CT scan of the abdomen and pelvis was performed without IV contrast. Multiplanar reformats were obtained. Dose reduction techniques were used.  CONTRAST: None.     FINDINGS:      LOWER CHEST: Subpleural scarring. No pleural effusions. Partially visualized CIED.     HEPATOBILIARY: No calcified gallstones or biliary ductal dilation.     PANCREAS: Normal.     SPLEEN: Normal.     ADRENAL GLANDS: Normal.     KIDNEYS/BLADDER: Severe bilateral hydronephrosis and ureterectasis extending to the ureteropelvic junctions. No intrarenal or intraureteral calculi. Diffusely thickened and trabeculated urinary bladder. Multiple small intraluminal bladder calculi   measuring up to 7 mm adjacent to the right ureteropelvic junction.     BOWEL: No bowel obstruction. Appendix is surgically absent. Distal colon is decompressed, which limits its evaluation. Questionable mild wall thickening of the distal colon extending from the distal transverse colon through the sigmoid colon. Multiple   noninflamed descending and sigmoid colonic diverticuli.     LYMPH NODES: No enlarged lymph node.     VASCULATURE: Moderate aortobiiliac atherosclerosis. No abdominal aortic aneurysm.     PELVIC ORGANS: Enlarged prostate.     MUSCULOSKELETAL: Diffusely demineralized bones. There are 6 nonrib-bearing lumbar-type vertebral bodies. Chronic compression fractures of the L1, L2, and L4 vertebral bodies are unchanged. No acute bony abnormality.                                                                      IMPRESSION:     1.  Questionable mild wall thickening of the distal colon, which could either be pseudothickening from underdistention or could reflect a mild colitis.     2.  Distal colonic diverticulosis. No inflamed colonic  diverticuli.     3.  Severe bilateral hydronephrosis extending to the ureteropelvic junctions, without obstructing ureteral calculi.     4.  Sequela of chronic bladder outlet obstruction with a diffusely thickened and trabeculated urinary bladder and multiple intraluminal bladder calculi measuring up to 7 mm. Correlate with urinalysis to exclude cystitis.    I have personally reviewed the imaging reports above.     Assessment / Plan : Mikaela Figueroa is being seen by [facility] for urinary retention and bilateral hydronephrosis in setting of a UTI.     - Maintain smith catheter. Good urine output since catheter placement. Continue tamsulosin for underlying BPH.   - Bilateral hydronephrosis likely associated to urinary retention. Renal function improved overnight with catheter placement. Renal US to be completed tomorrow AM to assess for improvement/resolution in hydronephrosis. Repeat BMP tomorrow AM.   - IV antibiotics. Awaiting UC results to narrowing antibiotic selection.   - TOV to be completed as an outpatient in 1-2 weeks. Email sent to scheduling team to arrange visit.     Thank you for consulting [facility] regarding this patient's care. We will continue to follow. Please contact us with questions or concerns.     Daniel Shaver PA-C  [facility]   [phone]

## 2024-02-02 NOTE — PROGRESS NOTES
Pt A&Ox4 VSS, tele: 2 degree block, pacemaker, PRN hydralazine q6hrs SBP >180. Room air, no c/o pain. Bloom catheter due to rentention. Good output. Loose/watery stools prior to admission. 0.9@75/hr. Lab order to r/o Cdiff: no stool this shift.Walker w/GB, not out of bed overnight.Daughter in room overnight.

## 2024-02-02 NOTE — PROGRESS NOTES
Pt settled into unit bed. Initial VS included elevated BP, recheck was 175/79. Wife at bedside. Call light within reach, pt is safe.     Steph Meyer RN

## 2024-02-02 NOTE — H&P
Admission History & Physical  Mikaela Figueroa, 1935, [id number]    [facility]  Derek Kumar, [phone]    Assessment and Plan:  88 year old male with past medical history significant for kidney stones requiring stent placement, lithotripsy most recently in December 2023, BPH, UTIs, CKD 3, hypertension, cognitive impairment pacemaker in for sick sinus syndrome lives at home with his wife brought into the ED with complaints of fall, generalized weakness, urinary frequency urgency ongoing for a day,   Admitted for acute pyelonephritis with urinary retention, severe bilateral hydronephrosis, nonobstructing bladder calculi, ARF on CKD 3.    Acute pyelonephritis  Urinary retention severe bilateral hydronephrosis  History of BPH  ARF on CKD 3  Acute metabolic encephalopathy  Hemodynamically stable  Continue ceftriaxone  Follow-up on urine cultures  Bloom placed in the ED  Continue tamsulosin  Request urology consultation  Likely will need repeat kidney ultrasound before discharge  Baseline creatinine is 1.3-1.4, at 2.5 today  Avoid nephrotoxic medications  Monitor urinary output  Hold lisinopril    Hypertension  Hold lisinopril because of SUJATHA.  IV hydralazine as needed    Generalized weakness  PT OT eval    History of cognitive impairment  Acute metabolic encephalopathy  At risk for delirium  Sleep regulation  Frequent reorientation.    Clinically Significant Risk Factors Present on Admission                  # Hypertension: Noted on problem list           # Pacemaker present          Expected length of stay : anticipate hospitalization more than 2 days.     Chief Complaint: Fall, urinary symptoms    HPI:     Mikaela Figueroa is a 88 year old male with past medical history significant for kidney stones requiring stent placement, lithotripsy most recently in December 2023, BPH, UTIs, CKD 3, hypertension, cognitive impairment, pacemaker in for sick sinus syndrome lives at home with his  wife brought into the ED with complaints of fall, generalized weakness, urinary frequency urgency ongoing for a day.  He had a fall yesterday, overnight developed more urinary complaints with urgency and frequency.  No fevers chills.  No abdominal pain, nausea vomiting.  He did have change in bowels initially constipation followed by diarrhea.  He has some right-sided lower back pain.  No other flank pain.  No other shortness of breath chest pain, cough.  Daughter at bedside and helped with some of the history.  According to daughter he is more confused today compared to baseline.    In ED blood pressure is elevated, afebrile, lab workup revealed UTI, mild leukocytosis of 12.4, ARF on CKD 3 with a creatinine of 2.5.  CT scan of the abdomen pelvis showed severe bilateral hydronephrosis with extension until ureteropelvic junction without obstructing ureteral calculi, has chronic bladder outlet obstruction with diffusely thickened and trabeculated urinary bladder with multiple intraluminal calculi measuring up to 7 mm.  Received ceftriaxone and admitted.    Medical History  Past Medical History:   Diagnosis Date    Hypertension     Pacemaker       Patient Active Problem List    Diagnosis Date Noted    Urinary retention 02/01/2024     Priority: Medium    Urinary tract infection without hematuria, site unspecified 02/01/2024     Priority: Medium    SSS (sick sinus syndrome) (H) 01/31/2023     Priority: Medium    Stage 3 chronic kidney disease, unspecified whether stage 3a or 3b CKD (H) 01/31/2023     Priority: Medium    Bilateral degenerative progressive high myopia 11/14/2022     Priority: Medium     Stable OD, OS.      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      Routine general medical examination at a health care facility 08/11/2021     Priority: Medium     h/o fx last yr--check DXA      Syncope, unspecified syncope type 03/11/2020     Priority: Medium    Left upper lobe pulmonary nodule 09/03/2019     Priority: Medium    Cardiac pacemaker in situ, dual chamber 09/03/2019     Priority: Medium     Medtronic [id number] Lebam XT DR MRI, RA and RV Leads: Medtronic [id number]   CapSureFix Novus MRI SureScan, DOI 08/28/2019 by Dr. Douglas Mensah, [facility], [address], [place] [address].        Mobitz type 1 second degree atrioventricular block 08/27/2019     Priority: Medium    Essential hypertension with goal blood pressure less than 130/80 11/28/2017     Priority: Medium    Open-angle glaucoma of both eyes, mild stage, unspecified open-angle glaucoma type 11/28/2017     Priority: Medium    BPH with urinary obstruction      Priority: Medium     Created by Conversion        Diverticulosis      Priority: Medium     Created by Conversion  [facility] Annotation: Jun 21 2012  8:27Derek Momin: sigmoid   colon   noted on abdominal/pelvis CT  Replacement Utility updated for latest IMO load        Hypertension      Priority: Medium     Created by Conversion  Replacement Utility updated for latest IMO  load        Right inguinal hernia 05/27/2016     Priority: Medium     small, reducible, asymptomatic        Hearing loss 11/24/2015     Priority: Medium    Glaucoma 11/24/2015     Priority: Medium    Rosacea      Priority: Medium     Created by Conversion        Nephrolithiasis      Priority: Medium     Created by Conversion        Hypercholesterolemia      Priority: Medium     Created by Conversion        Impaired Fasting Glucose      Priority: Medium     Created by Conversion        Serology Prostate-specific Antigen (PSA) Elevated      Priority: Medium     Created by Conversion  [facility] Annotation: Jan 2 2009  1:27PM - Derek Kumar: 3.94 (12/06)   ->   3.38 (9/07) -> 4.83 (9/10/08)        Male Erectile Disorder      Priority: Medium     Created by Conversion        Squamous Cell Carcinoma Of The Skin      Priority: Medium     Created by Conversion        History of hepatitis B 01/01/1990     Priority: Medium        Surgical History  He  has a past surgical history that includes Arthroscopy knee (Right); Abdomen surgery; appendectomy; Cystoureteroscopy, With Lithotripsy Using Zabrina 120P Laser And Ureteral Stent Insertion (Left, 10/25/2022); IR Nephrostomy Tube Placement Left (10/26/2022); IR Ureteral Stent Placement Left (11/1/2022); and Repair MOHS (Right, 8/28/2023).     Past Surgical History:   Procedure Laterality Date    APPENDECTOMY      ARTHROSCOPY KNEE Right     CYSTOURETEROSCOPY, WITH LITHOTRIPSY USING ZABRINA 120P LASER AND URETERAL STENT INSERTION Left 10/25/2022    Procedure: CYSTOSCOPY, LEFT URETEROSCOPY, LASER LITHOTRIPSY;  Surgeon: Bob Dozier MD;  [place] [facility] [place]    GENITOURINARY SURGERY      IR NEPHROSTOMY TUBE PLACEMENT LEFT  10/26/2022    IR URETERAL STENT PLACEMENT LEFT  11/1/2022    REPAIR MOHS Right 8/28/2023    Procedure: FOREHEAD FLAP REPAIR MOHS DEFECT RIGHT NOSE; COMPOSITE GRAFT;  Surgeon: Jake Rosa MD;  Location: [facility] Main OR       [facility]  No Known  "Allergies    Prior to Admission Medications   (Not in a hospital admission)      Social History  Reviewed, and he  reports that he has quit smoking. His smoking use included pipe. He has never used smokeless tobacco. He reports that he does not drink alcohol and does not use drugs.  Social History     Tobacco Use    Smoking status: Former     Types: Pipe    Smokeless tobacco: Never   Substance Use Topics    Alcohol use: No       Family History  Reviewed, and I have reviewed this patient's family history and updated it with pertinent information if needed.  Family History   Problem Relation Age of Onset    Dementia Mother     Heart Disease Father     Diabetes Father           Review Of Systems  Complete As per admission HPI, all other systems reviewed and negative.     Physical Exam:  Vital signs:  Temp: 98.1  F (36.7  C) Temp src: Temporal BP: (!) 180/88 Pulse: 81   Resp: 18 SpO2: 98 % O2 Device: None (Room air)     Weight: 70.3 kg (155 lb)  Estimated body mass index is 23.38 kg/m  as calculated from the following:    Height as of 2/13/23: 1.734 m (5' 8.27\").    Weight as of this encounter: 70.3 kg (155 lb).    General Appearance:  Awake Alert, orientedx3, not in any apparent distress   Head:  Normocephalic, without obvious abnormality   Eyes:  PERRL, conjunctiva/corneas clear   Throat: Oral mucosa moist   Neck: Supple,  no JVD   Lungs:   Clear to auscultation bilaterally, respirations unlabored   Chest Wall:  Left upper chest pacemaker in situ site looks clean.   Heart:  Regular rate and rhythm, S1, S2 normal,no murmur   Abdomen:   Soft, non-tender, bowel sounds present,  no guarding, rigidity no CVA tenderness, Bloom catheter with cloudy urine   Extremities:  no edema, no joint swelling   Skin: Skin color, texture, turgor normal, no rashes or lesions   Neurologic: Alert and oriented X 3, no focal deficits     Results:  Labs Ordered and Resulted from Time of ED Arrival to Time of ED Departure   ROUTINE UA WITH " MICROSCOPIC REFLEX TO CULTURE - Abnormal       Result Value    Color Urine Yellow      Appearance Urine Cloudy (*)     Glucose Urine Negative      Bilirubin Urine Negative      Ketones Urine Negative      Specific Gravity Urine 1.011      Blood Urine 0.5 mg/dL (*)     pH Urine 5.5      Protein Albumin Urine 70 (*)     Urobilinogen Urine <2.0      Nitrite Urine Negative      Leukocyte Esterase Urine 500 Rober/uL (*)     WBC Clumps Urine Present (*)     RBC Urine 178 (*)     WBC Urine >182 (*)    COMPREHENSIVE METABOLIC PANEL - Abnormal    Sodium 145      Potassium 5.0      Carbon Dioxide (CO2) 24      Anion Gap 10      Urea Nitrogen 53.0 (*)     Creatinine 2.50 (*)     GFR Estimate 24 (*)     Calcium 9.9      Chloride 111 (*)     Glucose 91      Alkaline Phosphatase 100      AST 16      ALT 11      Protein Total 7.7      Albumin 3.9      Bilirubin Total 0.4     BLOOD GAS VENOUS - Abnormal    pH Venous 7.29 (*)     pCO2 Venous 51 (*)     pO2 Venous 20 (*)     Bicarbonate Venous 24      Base Excess/Deficit Venous -2.3      FIO2 0      Oxyhemoglobin Venous 24 (*)     O2 Sat, Venous 24.2 (*)    ROUTINE UA WITH MICROSCOPIC REFLEX TO CULTURE - Abnormal    Color Urine Yellow      Appearance Urine Cloudy (*)     Glucose Urine Negative      Bilirubin Urine Negative      Ketones Urine Negative      Specific Gravity Urine 1.011      Blood Urine 0.5 mg/dL (*)     pH Urine 5.5      Protein Albumin Urine 70 (*)     Urobilinogen Urine <2.0      Nitrite Urine Negative      Leukocyte Esterase Urine 500 Rober/uL (*)     WBC Clumps Urine Present (*)     Mucus Urine Present (*)     RBC Urine 14 (*)     WBC Urine >182 (*)    CBC WITH PLATELETS AND DIFFERENTIAL - Abnormal    WBC Count 12.4 (*)     RBC Count 3.76 (*)     Hemoglobin 11.6 (*)     Hematocrit 36.3 (*)     MCV 97      MCH 30.9      MCHC 32.0      RDW 13.2      Platelet Count 177      % Neutrophils 82      % Lymphocytes 5      % Monocytes 12      % Eosinophils 0      % Basophils 0       % Immature Granulocytes 1      NRBCs per 100 WBC 0      Absolute Neutrophils 10.1 (*)     Absolute Lymphocytes 0.6 (*)     Absolute Monocytes 1.5 (*)     Absolute Eosinophils 0.0      Absolute Basophils 0.0      Absolute Immature Granulocytes 0.1      Absolute NRBCs 0.0     LIPASE - Normal    Lipase 48     LACTIC ACID WHOLE BLOOD - Normal    Lactic Acid 1.0     URINE CULTURE   ENTERIC BACTERIA AND VIRUS PANEL BY PCR   C. DIFFICILE TOXIN B PCR WITH REFLEX TO C. DIFFICILE ANTIGEN AND TOXINS A/B EIA   URINE CULTURE      EKG:  EKG: Sinus rhythm with first-degree AV block, left fascicular block.  No other acute ST-T wave changes.    Imaging:   CT Abdomen Pelvis w/o Contrast    Result Date: 2/1/2024  EXAM: CT ABDOMEN PELVIS W/O CONTRAST LOCATION: [facility] DATE: 2/1/2024 INDICATION: Diarrhea. COMPARISON: CT abdomen pelvis 10/02/2022. TECHNIQUE: CT scan of the abdomen and pelvis was performed without IV contrast. Multiplanar reformats were obtained. Dose reduction techniques were used. CONTRAST: None. FINDINGS: LOWER CHEST: Subpleural scarring. No pleural effusions. Partially visualized CIED. HEPATOBILIARY: No calcified gallstones or biliary ductal dilation. PANCREAS: Normal. SPLEEN: Normal. ADRENAL GLANDS: Normal. KIDNEYS/BLADDER: Severe bilateral hydronephrosis and ureterectasis extending to the ureteropelvic junctions. No intrarenal or intraureteral calculi. Diffusely thickened and trabeculated urinary bladder. Multiple small intraluminal bladder calculi measuring up to 7 mm adjacent to the right ureteropelvic junction. BOWEL: No bowel obstruction. Appendix is surgically absent. Distal colon is decompressed, which limits its evaluation. Questionable mild wall thickening of the distal colon extending from the distal transverse colon through the sigmoid colon. Multiple noninflamed descending and sigmoid colonic diverticuli. LYMPH NODES: No enlarged lymph node. VASCULATURE: Moderate  aortobiiliac atherosclerosis. No abdominal aortic aneurysm. PELVIC ORGANS: Enlarged prostate. MUSCULOSKELETAL: Diffusely demineralized bones. There are 6 nonrib-bearing lumbar-type vertebral bodies. Chronic compression fractures of the L1, L2, and L4 vertebral bodies are unchanged. No acute bony abnormality.     IMPRESSION:  1.  Questionable mild wall thickening of the distal colon, which could either be pseudothickening from underdistention or could reflect a mild colitis. 2.  Distal colonic diverticulosis. No inflamed colonic diverticuli. 3.  Severe bilateral hydronephrosis extending to the ureteropelvic junctions, without obstructing ureteral calculi. 4.  Sequela of chronic bladder outlet obstruction with a diffusely thickened and trabeculated urinary bladder and multiple intraluminal bladder calculi measuring up to 7 mm. Correlate with urinalysis to exclude cystitis.    Head CT w/o contrast    Result Date: 2/1/2024  EXAM: CT HEAD W/O CONTRAST LOCATION: [facility] DATE: 2/1/2024 INDICATION: increased confusion, hx cognitive decline COMPARISON: None. TECHNIQUE: Routine CT Head without IV contrast. Multiplanar reformats. Dose reduction techniques were used. FINDINGS: INTRACRANIAL CONTENTS: No intracranial hemorrhage. Mild diffuse cerebral parenchymal volume loss. Disproportionate volume loss involving the anterior temporal lobes. No midline shift. The basilar cisterns are patent. No cerebellar tonsillar ectopia. Moderate periventricular and scattered foci of deep white matter hypodensities involving both cerebral hemispheres. No CT evidence for an acute infarct. The lateral and third ventricles are dilated greater than expected for the degree of sulcal dilatation. VISUALIZED ORBITS/SINUSES/MASTOIDS: Prior bilateral cataract surgery. Visualized portions of the orbits are otherwise unremarkable. No paranasal sinus mucosal disease. No middle ear or mastoid effusion. BONES/SOFT TISSUES: No  acute abnormality.     IMPRESSION: 1.  No intracranial hemorrhage, mass lesions, or CT evidence for an acute infarct. 2.  Mild diffuse cerebral parenchymal volume loss. Disproportionate volume loss involving the anterior temporal lobes. 3.  Presumed chronic hypertensive/microvascular ischemic white matter changes. 4.  The lateral and third ventricles are dilated greater than expected for the degree of sulcal dilatation. Please correlate with clinical findings of normal pressure hydrocephalus.         > 75 MINUTES SPENT BY ME on the date of service doing chart review, history, exam, documentation & further activities per the note.      Raine Altamirano M.D  [facility] Service  Internal Medicine    2/1/2024  6:24 PM

## 2024-02-02 NOTE — ED NOTES
Dr. Altamirano updated on catheter pain. Pain described as sharp. Also updated on elevated BP's. Awaiting to hear back.

## 2024-02-02 NOTE — PROGRESS NOTES
"   02/02/24 0952   Appointment Info   Signing Clinician's Name / Credentials (PT) Jailyn Hernandez, PT, DPT   Living Environment   Current Living Arrangements house   Home Accessibility stairs to enter home;stairs within home   Number of Stairs, Main Entrance 2   Stair Railings, Main Entrance none   Number of Stairs, Within Home, Primary greater than 10 stairs  (14)   Stair Railings, Within Home, Primary railing on left side (ascending);railing on right side (ascending)   Self-Care   Equipment Currently Used at Home none;walker, rolling;cane, straight   Fall history within last six months yes   Number of times patient has fallen within last six months 1   Activity/Exercise/Self-Care Comment pt was independent with functional mobility prior to Tuesday - then progressed to cane & walker   General Information   Onset of Illness/Injury or Date of Surgery 02/01/24   Referring Physician Raine Altamirano MD   Patient/Family Therapy Goals Statement (PT) Return to Home   Pertinent History of Current Problem (include personal factors and/or comorbidities that impact the POC) Per Chart Review -\"88 year old male with past medical history significant for kidney stones requiring stent placement, lithotripsy most recently in December 2023, BPH, UTIs, CKD 3, hypertension, cognitive impairment pacemaker in for sick sinus syndrome lives at home with his wife brought into the ED with complaints of fall, generalized weakness, urinary frequency urgency ongoing for a day,   Admitted for acute pyelonephritis with urinary retention, severe bilateral hydronephrosis, nonobstructing bladder calculi, ARF on CKD 3.\"   Existing Precautions/Restrictions fall   Range of Motion (ROM)   Range of Motion ROM deficits secondary to weakness   Strength (Manual Muscle Testing)   Strength (Manual Muscle Testing) Deficits observed during functional mobility   Bed Mobility   Bed Mobility supine-sit   Supine-Sit [place] (Bed Mobility) minimum assist (75% " patient effort);supervision;verbal cues   Impairments Contributing to Impaired Bed Mobility decreased strength;decreased ROM   Transfers   Transfers sit-stand transfer   Impairments Contributing to Impaired Transfers decreased strength;impaired balance   Sit-Stand Transfer   Sit-Stand [place] (Transfers) supervision;verbal cues;minimum assist (75% patient effort)   Assistive Device (Sit-Stand Transfers) walker, front-wheeled   Gait/Stairs (Locomotion)   Comment, (Gait/Stairs) unable to safely assess at initial evaluation   Clinical Impression   Criteria for Skilled Therapeutic Intervention Yes, treatment indicated   PT Diagnosis (PT) Impaired Functional Mobility   Influenced by the following impairments weakness, decreased ROM, impaired balance   Functional limitations due to impairments gait, transfers, stairs   Clinical Presentation (PT Evaluation Complexity) stable   Clinical Presentation Rationale pt presents as medically diagnosed   Clinical Decision Making (Complexity) low complexity   Planned Therapy Interventions (PT) balance training;bed mobility training;gait training;home exercise program;ROM (range of motion);stair training;strengthening;transfer training   Risk & Benefits of therapy have been explained evaluation/treatment results reviewed;patient   PT Total Evaluation Time   PT Eval, Low Complexity Minutes (58365) 10   Physical Therapy Goals   PT Frequency Daily   PT Predicted Duration/Target Date for Goal Attainment 02/09/24   PT Goals Transfers;Gait;Stairs   PT: Transfers Supervision/stand-by assist;Sit to/from stand;Assistive device   PT: Gait Supervision/stand-by assist;Rolling walker;25 feet   PT: Stairs Supervision/stand-by assist;4 stairs;Rail on left   Interventions   Interventions Quick Adds Therapeutic Activity;Therapeutic Procedure   Therapeutic Procedure/Exercise   Ther. Procedure: strength, endurance, ROM, flexibillity Minutes (19084) 13   Symptoms Noted During/After Treatment fatigue    Treatment Detail/Skilled Intervention LE supine & seated LE therex: 8-10 reps completed bilateral, cueing for safety/technique/specific muscle activation, SBA for supine & min A for seated   Therapeutic Activity   Therapeutic Activities: dynamic activities to improve functional performance Minutes (16021) 10   Symptoms Noted During/After Treatment Fatigue   Treatment Detail/Skilled Intervention Additional sit to/from stands: cueing for safety/hand placement on stable surface/FWW management, Min A; standing balance: cueing for safety/UE support via FWW - noted posterior lean for added stability from bed, added challenges of lateral weight shifting/marching in place, Mod A; Steps fwd/back/side: cueing for safety/keeping FWW on ground & close, Mod A; Sit to supine: cueing for safety/technique, Min A   PT Discharge Planning   PT Plan gait as christopher, LE therex, sit to/from stands   PT Discharge Recommendation (DC Rec) Transitional Care Facility   PT Rationale for DC Rec pt requires increased assist for functional mobility & was unable to trial gait d/t increased instability & weakness. TCU for continuing to progresss balance/strength/activity tolerance   PT Brief overview of current status Mod A with steps fwd/back/side with FWW & standing balance; Min A bed mobility   PT Equipment Needed at Discharge walker, rolling   Total Session Time   Timed Code Treatment Minutes 23   Total Session Time (sum of timed and untimed services) 33

## 2024-02-02 NOTE — CONSULTS
Care Management Initial Consult    General Information  Assessment completed with: Patient, Children, VM-chart review, daughter Kalyani at bedside  Type of CM/SW Visit: Initial Assessment    Primary Care Provider verified and updated as needed: Yes   Readmission within the last 30 days: no previous admission in last 30 days      Reason for Consult: discharge planning  Advance Care Planning: Advance Care Planning Reviewed:  (states has HCD at home and thinks should be on file with [facility] as given previousloy to PCP. Verbally states may Reece and then daughter Kalyani and son Daniel.)          Communication Assessment  Patient's communication style: spoken language (English or Bilingual)             Cognitive  Cognitive/Neuro/Behavioral: alert and appropriately oriented  People in home: spouse  may Reece  Current living Arrangements: house (able to meet all needs on main level (2 steps to enter home) but has basement with laundry (currently down there but can move it upstairs when needed))      Able to return to prior arrangements: yes       Family/Social Support:  Care provided by: self  Provides care for: no one  Marital Status:   Wife, Children (may Reece, rafaela Auguste and son Daniel)  wife Shanell       Description of Support System: Supportive, Involved         Current Resources:   Patient receiving home care services: No     Community Resources:    Equipment currently used at home:    Supplies currently used at home: Incontinence Supplies, Hearing Aid Batteries (Rx glasses. 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  Financial Concerns: none   Referral to Financial Worker: No       Does the patient's insurance plan have a 3 day qualifying hospital stay waiver?  Yes     Which insurance plan 3 day waiver is available? [employer]    Will  "the waiver be used for post-acute placement? Undetermined at this time    Lifestyle & Psychosocial Needs:  Social Determinants of Health     Food Insecurity: Not on file   Depression: Not at risk (2/13/2023)    PHQ-2     PHQ-2 Score: 0   Housing Stability: Not on file   Tobacco Use: Medium Risk (12/11/2023)    Patient History     Smoking Tobacco Use: Former     Smokeless Tobacco Use: Never     Passive Exposure: Not on file   Financial Resource Strain: Not on file   Alcohol Use: Not on file   Transportation Needs: Not on file   Physical Activity: Not on file   Interpersonal Safety: Not on file   Stress: Not on file   Social Connections: Not on file       Functional Status:  Prior to admission patient needed assistance:   Dependent ADLs:: Independent, Ambulation-no assistive device (but has used a walker for past 2 days since having a fall at home)  Dependent IADLs:: Independent  Assesssment of Functional Status: Not at baseline with ADL Functioning, Not at baseline with mobility, Not at  functional baseline    Mental Health Status:  Mental Health Status: No Current Concerns       Chemical Dependency Status:  Chemical Dependency Status: No Current Concerns             Values/Beliefs:  Spiritual, Cultural Beliefs, Restorationist Practices, Values that affect care: no (\"Cheondoism, Cheondoism\")               Additional Information:  Chart reviewed.     CM met with patient and daughter Kalyani; assessment completed. Lives with wife in private home. Independent with all cares at baseline, including driving but has recently been talking about giving that up. No private duty or skilled HC services. No mobility device for assistance at baseline but per daughter, patient had fall at home two evenings ago and has since been using walker for safety.     At this time, patient and daughter are hopeful that patient will get stronger as medical status improves. Hopeful he can return home.     PT/OT/speech evaluations are pending. " Patient/daughter do not want any referrals for HC or TCU sent at this time. Aware CM/SW will continue to follow and assist with discharge planning.     Daughter will provide transportation at hospital discharge.     Shanae Jolley RN

## 2024-02-02 NOTE — PROGRESS NOTES
"Speech-Language Pathology: Clinical Swallow Evaluation     02/02/24 1200   Appointment Info   Signing Clinician's Name / Credentials (SLP) Blanca Beard MS, CCC-SLP   General Information   Onset of Illness/Injury or Date of Surgery 02/01/24   Referring Physician Raine Altamirano MD   Pertinent History of Current Problem Per H&P, \"88 year old male with past medical history significant for kidney stones requiring stent placement, lithotripsy most recently in December 2023, BPH, UTIs, CKD 3, hypertension, cognitive impairment pacemaker in for sick sinus syndrome lives at home with his wife brought into the ED with complaints of fall, generalized weakness, urinary frequency urgency ongoing for a day,   Admitted for acute pyelonephritis with urinary retention, severe bilateral hydronephrosis, nonobstructing bladder calculi, ARF on CKD 3.\" Clinical swallow evaluation completed per MD orders.   General Observations Alert, confused, and cooperative.   Type of Evaluation   Type of Evaluation Swallow Evaluation   Oral Motor   Oral Musculature generally intact   Structural Abnormalities none present   Mucosal Quality good   Dentition (Oral Motor)   Dentition (Oral Motor) natural dentition;adequate dentition;poor condition   Facial Symmetry (Oral Motor)   Facial Symmetry (Oral Motor) WNL   Lip Function (Oral Motor)   Lip Range of Motion (Oral Motor) WNL   Lip Strength (Oral Motor) WNL   Lip Coordination (Oral Motor) bilateral   Tongue Function (Oral Motor)   Tongue Strength (Oral Motor) WNL   Tongue Coordination/Speed (Oral Motor) WNL   Tongue ROM (Oral Motor) WNL   Jaw Function (Oral Motor)   Jaw Function (Oral Motor) WNL   Cough/Swallow/Gag Reflex (Oral Motor)   Soft Palate/Velum (Oral Motor) WNL   Volitional Throat Clear/Cough (Oral Motor) WNL   Volitional Swallow (Oral Motor) WNL   Vocal Quality/Secretion Management (Oral Motor)   Vocal Quality (Oral Motor) WNL   Secretion Management (Oral Motor) WNL   General Swallowing " Observations   Past History of Dysphagia Per EMR review & pt report, none.   Respiratory Support room air   Current Diet/Method of Nutritional Intake (General Swallowing Observations, NIS) thin liquids (level 0);regular diet   Swallowing Evaluation Clinical swallow evaluation   Clinical Swallow Evaluation   Feeding Assistance no assistance needed   Additional evaluation(s) completed today No   Clinical Swallow Evaluation Textures Trialed thin liquids;pureed;solid foods   Clinical Swallow Eval: Thin Liquid Texture Trial   Mode of Presentation, Thin Liquids cup;straw;self-fed   Volume of Liquid or Food Presented 4 oz   Oral Phase of Swallow WFL   Pharyngeal Phase of Swallow coughing/choking  (after regular solid only as mixed texture)   Diagnostic Statement No overt s/s of aspiration.   Clinical Swallow Evaluation: Puree Solid Texture Trial   Mode of Presentation, Puree spoon;self-fed   Volume of Puree Presented 2 oz   Oral Phase, Puree WFL   Pharyngeal Phase, Puree intact   Diagnostic Statement No overt s/s of aspiration or dysphagia. Pt santo globus sensation   Clinical Swallow Evaluation: Solid Food Texture Trial   Mode of Presentation self-fed   Volume Presented 2 crackers   Oral Phase WFL   Pharyngeal Phase throat clearing   Diagnostic Statement Intermittent throat clearing with constantin cracker when pt self-softening with thin liquids. Prolonged mastication   Esophageal Phase of Swallow   Patient reports or presents with symptoms of esophageal dysphagia No   Swallowing Recommendations   Diet Consistency Recommendations thin liquids (level 0);easy to chew (level 7)   Supervision Level for Intake patient independent   Mode of Delivery Recommendations bolus size, small;food moistened;slow rate of intake   Postural Recommendations none   Swallowing Maneuver Recommendations alternate food and liquid intake   Monitoring/Assistance Required (Eating/Swallowing) stop eating activities when fatigue is present;monitor for  cough or change in vocal quality with intake   Recommended Feeding/Eating Techniques (Swallow Eval) maintain upright sitting position for eating;maintain upright posture during/after eating for 30 minutes;minimize distractions during oral intake;set-up and prepare tray   Medication Administration Recommendations, Swallowing (SLP) as tolerated   Instrumental Assessment Recommendations instrumental evaluation not recommended at this time   General Therapy Interventions   Planned Therapy Interventions Dysphagia Treatment   Dysphagia treatment Modified diet education;Instruction of safe swallow strategies;Compensatory strategies for swallowing   Clinical Impression   Criteria for Skilled Therapeutic Interventions Met (SLP Eval) Yes, treatment indicated   SLP Diagnosis Mild oropharyngeal dysphagia   Risks & Benefits of therapy have been explained evaluation/treatment results reviewed;care plan/treatment goals reviewed;risks/benefits reviewed;current/potential barriers reviewed;participants voiced agreement with care plan;participants included;patient   Clinical Impression Comments Clinical Swallow Evaluation completed. Patient with clear vocal quality. Oral motor function was WNL. Patient had no s/s aspiration with thin liquids or puree. Patient with throat clearing with regular solid, unsuccessful with liquid wash to clear mild globus sensation. Mastication was prolonged. Hyolaryngeal elevation appears intact. Recommend with regular diet and thin liquids when sitting fully upright and alert. Patient should take small bites/sips at a slow rate. SLP will continue to follow for dysphagia therapy.   SLP Total Evaluation Time   Eval: oral/pharyngeal swallow function, clinical swallow Minutes ([id number]) 18   SLP Discharge Planning   SLP Plan diet f/u, trial reg   SLP Discharge Recommendation other (see comments)  (defer to medical team)   SLP Rationale for DC Rec Mild oropharyngeal dysphagia   SLP Brief overview of current  status  Recommend with regular diet and thin liquids when sitting fully upright and alert. Patient should take small bites/sips at a slow rate. SLP will continue to follow for dysphagia therapy.

## 2024-02-03 ENCOUNTER — APPOINTMENT (OUTPATIENT)
Dept: ULTRASOUND IMAGING | Facility: CLINIC | Age: 89
DRG: 689 | End: 2024-02-03
Attending: INTERNAL MEDICINE
Payer: MEDICARE

## 2024-02-03 ENCOUNTER — APPOINTMENT (OUTPATIENT)
Dept: OCCUPATIONAL THERAPY | Facility: CLINIC | Age: 89
DRG: 689 | End: 2024-02-03
Attending: INTERNAL MEDICINE
Payer: MEDICARE

## 2024-02-03 ENCOUNTER — APPOINTMENT (OUTPATIENT)
Dept: SPEECH THERAPY | Facility: CLINIC | Age: 89
DRG: 689 | End: 2024-02-03
Payer: MEDICARE

## 2024-02-03 LAB
ANION GAP SERPL CALCULATED.3IONS-SCNC: 10 MMOL/L (ref 7–15)
BUN SERPL-MCNC: 40.6 MG/DL (ref 8–23)
CALCIUM SERPL-MCNC: 9 MG/DL (ref 8.8–10.2)
CHLORIDE SERPL-SCNC: 111 MMOL/L (ref 98–107)
CREAT SERPL-MCNC: 1.79 MG/DL (ref 0.67–1.17)
DEPRECATED HCO3 PLAS-SCNC: 21 MMOL/L (ref 22–29)
EGFRCR SERPLBLD CKD-EPI 2021: 36 ML/MIN/1.73M2
ERYTHROCYTE [DISTWIDTH] IN BLOOD BY AUTOMATED COUNT: 13.4 % (ref 10–15)
GLUCOSE SERPL-MCNC: 107 MG/DL (ref 70–99)
HCT VFR BLD AUTO: 30.8 % (ref 40–53)
HGB BLD-MCNC: 10.7 G/DL (ref 13.3–17.7)
MCH RBC QN AUTO: 34.1 PG (ref 26.5–33)
MCHC RBC AUTO-ENTMCNC: 34.7 G/DL (ref 31.5–36.5)
MCV RBC AUTO: 98 FL (ref 78–100)
PLATELET # BLD AUTO: 168 10E3/UL (ref 150–450)
POTASSIUM SERPL-SCNC: 4.1 MMOL/L (ref 3.4–5.3)
RBC # BLD AUTO: 3.14 10E6/UL (ref 4.4–5.9)
SODIUM SERPL-SCNC: 142 MMOL/L (ref 135–145)
WBC # BLD AUTO: 8.2 10E3/UL (ref 4–11)

## 2024-02-03 PROCEDURE — 36415 COLL VENOUS BLD VENIPUNCTURE: CPT | Performed by: INTERNAL MEDICINE

## 2024-02-03 PROCEDURE — 250N000011 HC RX IP 250 OP 636: Performed by: INTERNAL MEDICINE

## 2024-02-03 PROCEDURE — 258N000002 HC RX IP 258 OP 250: Performed by: INTERNAL MEDICINE

## 2024-02-03 PROCEDURE — 250N000013 HC RX MED GY IP 250 OP 250 PS 637: Performed by: INTERNAL MEDICINE

## 2024-02-03 PROCEDURE — 85014 HEMATOCRIT: CPT | Performed by: INTERNAL MEDICINE

## 2024-02-03 PROCEDURE — 99232 SBSQ HOSP IP/OBS MODERATE 35: CPT | Performed by: INTERNAL MEDICINE

## 2024-02-03 PROCEDURE — 97535 SELF CARE MNGMENT TRAINING: CPT | Mod: GO

## 2024-02-03 PROCEDURE — 120N000001 HC R&B MED SURG/OB

## 2024-02-03 PROCEDURE — 97166 OT EVAL MOD COMPLEX 45 MIN: CPT | Mod: GO

## 2024-02-03 PROCEDURE — 76770 US EXAM ABDO BACK WALL COMP: CPT

## 2024-02-03 PROCEDURE — 92526 ORAL FUNCTION THERAPY: CPT | Mod: GN | Performed by: SPEECH-LANGUAGE PATHOLOGIST

## 2024-02-03 PROCEDURE — 80048 BASIC METABOLIC PNL TOTAL CA: CPT | Performed by: PHYSICIAN ASSISTANT

## 2024-02-03 RX ORDER — FLUCONAZOLE 100 MG/1
200 TABLET ORAL DAILY
Status: DISCONTINUED | OUTPATIENT
Start: 2024-02-03 | End: 2024-02-04 | Stop reason: HOSPADM

## 2024-02-03 RX ADMIN — HEPARIN SODIUM 5000 UNITS: 5000 INJECTION, SOLUTION INTRAVENOUS; SUBCUTANEOUS at 12:32

## 2024-02-03 RX ADMIN — HEPARIN SODIUM 5000 UNITS: 5000 INJECTION, SOLUTION INTRAVENOUS; SUBCUTANEOUS at 19:54

## 2024-02-03 RX ADMIN — TAMSULOSIN HYDROCHLORIDE 0.4 MG: 0.4 CAPSULE ORAL at 19:54

## 2024-02-03 RX ADMIN — AMLODIPINE BESYLATE 5 MG: 5 TABLET ORAL at 08:59

## 2024-02-03 RX ADMIN — SODIUM CHLORIDE: 4.5 INJECTION, SOLUTION INTRAVENOUS at 03:25

## 2024-02-03 RX ADMIN — HEPARIN SODIUM 5000 UNITS: 5000 INJECTION, SOLUTION INTRAVENOUS; SUBCUTANEOUS at 06:34

## 2024-02-03 RX ADMIN — CEFTRIAXONE 1 G: 1 INJECTION, POWDER, FOR SOLUTION INTRAMUSCULAR; INTRAVENOUS at 16:12

## 2024-02-03 RX ADMIN — FLUCONAZOLE 200 MG: 100 TABLET ORAL at 13:28

## 2024-02-03 ASSESSMENT — ACTIVITIES OF DAILY LIVING (ADL)
ADLS_ACUITY_SCORE: 24
IADL_COMMENTS: SPOUSE ASSISTS
ADLS_ACUITY_SCORE: 24

## 2024-02-03 NOTE — PROGRESS NOTES
[facility] Progress Note    Creatinine continues to improve.    Reviewed renal ultrasound images (report not done yet). Has bilateral hydronephrosis which I am not surprised is present. He likely has chronic bilateral hydronephrosis and it may never completely resolve. Given that his creatinine continues to improve and his urine out is good, I do not recommend ureteral stent or nephrostomy tube placement at this time.      Jacinto Gordon MD   10:36 AM

## 2024-02-03 NOTE — PROGRESS NOTES
Care Management Follow Up    Length of Stay (days): 1    Expected Discharge Date: 02/04/2024     Concerns to be Addressed: IV antibiotics, Urology consult today, renal ultrasound tomorrow PT/OT/speech  Patient plan of care discussed at interdisciplinary rounds:  CM reviewed ER notes. CM updated 2N Charge RN.     Anticipated Discharge Disposition: Transitional Care ([facility])  Disposition Comments: Will D/C to [facility] on Sunday 2/4 at 1pm via [facility] WC transport. No PAS needed. Daughter Kalyani is primary family contact.  Anticipated Discharge Services:  ([facility])  Anticipated Discharge DME:  (anticipate D/C with smith catheter)    Patient/family educated on Medicare website which has current facility and service quality ratings:  (None at this time)  Education Provided on the Discharge Plan: Yes (AVS per bedside RN)  Patient/Family in Agreement with the Plan: yes    Referrals Placed by CM/SW: Post Acute Facilities  Private pay costs discussed:  CM discussed anticipated out of pocket expense for [facility] transport with patient and daughter Kalyani at bedside. Daughter verbalized understanding that this service is not covered by insurance. Daughter request for WC transport as she feels this is the safest option. She plans to be at the hospital prior to discharge and will likely follow to TCU.     Additional Information:  Chart reviewed.     PT recommendations for TCU.   CM met with patient and daughter Kalyani bedside, also wife Shanell via speaker phone from daughters phone per patient request/consent. CM discussed PT recommendations for TCU. CM offered TCU choices and provided medicare.gov info to review ratings. CM sent TCU referrals per patient request.     [facility]  LEONOR left VM with admissions requesting return call to LEONOR.     [facility]   Kanchan in admissions confirms facility could accept on Sunday 2/4 pending bed  availability but would need to call the weekend supervisor to confirm.    [facility] due to no bed availability.     [facility]  Yamile in admissions confirms facility can accept on Sunday 2/4 or Monday 2/5. Patient/family accept this placement. CM updated facility via in basket.     Patient/family request for [facility]  transportation. Patient/daughter Kalyani aware of, and agreeable to anticipated out of pocket expense for WC transport.     CM arranged [facility] WC transport for Sunday 2/4 at 1pm. This will need to be cancelled if patient is not medically ready for discharge.   No PAS is needed for this TCU.     CM updated 2N Charge RN.     Shanae Jolley, RN

## 2024-02-03 NOTE — PROGRESS NOTES
"[facility] PROGRESS NOTE      Identification/Summary:   \"Ricardo\" Henry is an 88 year old male with past medical history significant for kidney stones requiring stent placement, lithotripsy most recently in December 2023, BPH, UTIs, CKD 3, hypertension, cognitive impairment pacemaker in for sick sinus syndrome lives at home with his wife brought into the ED with complaints of fall, generalized weakness, urinary frequency urgency ongoing for a day. Admitted for acute pyelonephritis with urinary retention, severe bilateral hydronephrosis, nonobstructing bladder calculi, ARF on CKD 3.   Had Bloom catheter placed in the ED, urine culture growing Candida.  Repeat ultrasound with persistent hydronephrosis.  Appreciate urology input.  Plan to discharge to TCU tomorrow.     Acute pyelonephritis  Urinary retention severe bilateral hydronephrosis  History of BPH  ARF on CKD 3  Acute metabolic encephalopathy  Hemodynamically stable  Continue ceftriaxone  Follow-up on urine cultures  Bloom placed in the ED 2/1  Continue tamsulosin  Baseline creatinine is 1.3-1.4  Creatinine improving  Avoid nephrotoxic medications  Monitor urinary output  Hold lisinopril  Urology consult appreciated  Discharge with Bloom catheter  Renal ultrasound today unchanged  No further intervention per urology.  Discontinue IV fluids today  Urine culture with Keira  Will discuss with ID about discharge plan     Mild hypernatremia  Resolving    Coughing with swallowing  Speech therapy consulted.  On modified diet     Hypertension  Hold lisinopril because of SUJATHA.  IV hydralazine as needed  Added amlodipine  Discontinue IV fluids     Generalized weakness  PT OT eval     History of cognitive impairment  Acute metabolic encephalopathy improving  At risk for delirium  Sleep regulation  Frequent reorientation.     Normocytic anemia  Monitor hemoglobin  No sign of bleeding.    Diet: Combination Diet Easy to Chew (level 7); Thin Liquids (level " "0)  DVT Prophylaxis:   Heparin  Code Status: Full Code    Clinically Significant Risk Factors         # Hypernatremia: Highest Na = 148 mmol/L in last 2 days, will monitor as appropriate          # Hypertension: Noted on problem list            # Financial/Environmental Concerns: none   # Pacemaker present          Anticipated possible discharge in 1 days to TCU once stable creatinine, placement milestones are met.    Interval History/Subjective:  Eating breakfast in bed, denies any abdominal pain nausea vomiting, shortness of breath, chest pain.  Bloom catheter with clear urine.    Physical Exam/Objective:  Vitals I/O   Vital signs:  Temp: 98.4  F (36.9  C) Temp src: Oral BP: (!) 164/77 Pulse: 71   Resp: 16 SpO2: 99 % O2 Device: None (Room air)   Height: 177.8 cm (5' 10\") Weight: 67.2 kg (148 lb 2.4 oz)  Estimated body mass index is 21.26 kg/m  as calculated from the following:    Height as of this encounter: 1.778 m (5' 10\").    Weight as of this encounter: 67.2 kg (148 lb 2.4 oz).      I/O last 3 completed shifts:  In: 2050 [I.V.:2050]  Out: 1925 [Urine:1925]     Body mass index is 21.26 kg/m .    General Appearance:  Alert, cooperative, no distress   Head:  Normocephalic, atraumatic   Eyes:  PERRL    Throat:  mucosa; moist   Neck: No JVD, thyromegaly   Lungs:   Clear to auscultation bilaterally, respirations unlabored   Chest Wall:  No tenderness or deformity   Heart:  Regular rate and rhythm, S1, S2 normal,no murmur   Abdomen:   Soft, non tender, non distended, bowel sounds present, no guarding or rigidity   Extremities: No edema, no joint swelling   Skin: Skin color, texture, turgor normal, no rashes or lesions   Neurologic: Alert and oriented X 3, Moves all 4 extremities       Medications:   Personally Reviewed.   amLODIPine  5 mg Oral Daily    cefTRIAXone  1 g Intravenous Q24H    heparin ANTICOAGULANT  5,000 Units Subcutaneous Q8H    metroNIDAZOLE   Topical Daily    sodium chloride (PF)  3 mL Intracatheter " Q8H    tamsulosin  0.4 mg Oral At Bedtime       Data reviewed today: I personally reviewed all new medications, labs, imaging/diagnostics reports over the past 24 hours. Pertinent findings include    Labs:  Most Recent 3 CBC's:  Recent Labs   Lab Test 02/03/24  0830 02/02/24  0546 02/01/24  1512   WBC 8.2 10.1 12.4*   HGB 10.7* 11.3* 11.6*   MCV 98 96 97    174 177     Most Recent 3 BMP's:  Recent Labs   Lab Test 02/03/24  0830 02/02/24  0546 02/01/24  1512    148* 145   POTASSIUM 4.1 4.6 5.0   CHLORIDE 111* 114* 111*   CO2 21* 25 24   BUN 40.6* 50.4* 53.0*   CR 1.79* 2.18* 2.50*   ANIONGAP 10 9 10   LIBERTAD 9.0 9.4 9.9   * 94 91       Imaging:   Recent Results (from the past 24 hour(s))   US Renal Complete Non-Vascular    Narrative    EXAM: US RENAL COMPLETE NON-VASCULAR  LOCATION: [facility]  DATE: 2/3/2024    INDICATION: Assess for resolution improvement of hydronephrosis  COMPARISON: CT abdomen pelvis 02/01/2024.  TECHNIQUE: Routine Bilateral Renal and Bladder Ultrasound.    FINDINGS:    RIGHT KIDNEY: 12.8 x 5.9 x 8.4 cm. Normal cortical thickness and echogenicity. Severe hydronephrosis and proximal ureterectasis. No sonographically detectable intrarenal calculi.    LEFT KIDNEY: 12.2 x 5.5 x 6.6 cm. Normal cortical thickness and echogenicity. Severe hydronephrosis and proximal ureterectasis. No sonographically detectable intrarenal calculi.     BLADDER: Completely decompressed by a Bloom catheter and not well evaluated. Known intraluminal bladder calculi are not well visualized due to shadowing from gas.    PROSTATE: Prostatomegaly.      Impression    IMPRESSION:    1.  Severe bilateral hydronephrosis and proximal ureterectasis has not significantly changed after decompression of the bladder by a Bloom catheter.     38 MINUTES SPENT BY ME on the date of service doing chart review, history, exam, documentation & further activities per the note.    Ranie Altamirano  MD  Hospitalist  [facility]

## 2024-02-03 NOTE — PROGRESS NOTES
Occupational Therapy     02/03/24 0930   Appointment Info   Signing Clinician's Name / Credentials (OT) Chrissy Parikh OT   Living Environment   People in Home spouse   Current Living Arrangements house   Living Environment Comments able to live on main level; STS with vanity nearby; tub/shower   Self-Care   Usual Activity Tolerance good   Current Activity Tolerance good   Fall history within last six months yes   Number of times patient has fallen within last six months 1   Activity/Exercise/Self-Care Comment Ind BADLs at baseline   Instrumental Activities of Daily Living (IADL)   IADL Comments spouse assists   General Information   Onset of Illness/Injury or Date of Surgery 02/01/24   Referring Physician Raine Altamirano MD   Patient/Family Therapy Goal Statement (OT) get stronger; be able to perform ADLs safely   Additional Occupational Profile Info/Pertinent History of Current Problem 88 year old male with past medical history significant for kidney stones requiring stent placement, lithotripsy most recently in December 2023, BPH, UTIs, CKD 3, hypertension, cognitive impairment pacemaker in for sick sinus syndrome lives at home with his wife brought into the ED with complaints of fall, generalized weakness, urinary frequency urgency ongoing for a day. Admitted for acute pyelonephritis with urinary retention, severe bilateral hydronephrosis, nonobstructing bladder calculi, ARF on CKD 3.   Cognitive Status Examination   Orientation Status person;place   Affect/Mental Status (Cognitive) other (see comments)  (monitor; further cog assessment)   Cognitive Status Comments Daughter states that pt has had decr cog for the last few months   Visual Perception   Visual Impairment/Limitations WFL;corrective lenses full-time   Sensory   Sensory Quick Adds sensation intact   Range of Motion Comprehensive   General Range of Motion no range of motion deficits identified   Strength Comprehensive (MMT)   Comment, General Manual Muscle  Testing (MMT) Assessment generalized weakness   Coordination   Upper Extremity Coordination No deficits were identified   Bed Mobility   Bed Mobility supine-sit   Supine-Sit Abbot (Bed Mobility) contact guard;other (see comments)   Comment (Bed Mobility) HOB elevated   Transfers   Transfers sit-stand transfer   Sit-Stand Transfer   Sit-Stand Abbot (Transfers) minimum assist (75% patient effort)   Assistive Device (Sit-Stand Transfers) walker, front-wheeled   Activities of Daily Living   BADL Assessment/Intervention lower body dressing   Lower Body Dressing Assessment/Training   Position (Lower Body Dressing) supported sitting   Abbot Level (Lower Body Dressing) moderate assist (50% patient effort)   Clinical Impression   Criteria for Skilled Therapeutic Interventions Met (OT) Yes, treatment indicated   OT Diagnosis Decr ADLs   OT Problem List-Impairments impacting ADL problems related to;cognition;mobility;strength   Assessment of Occupational Performance 3-5 Performance Deficits   Identified Performance Deficits transfers; LB dressing; bathing; g/h   Planned Therapy Interventions (OT) ADL retraining;bed mobility training;cognition;transfer training;home program guidelines;progressive activity/exercise   Clinical Decision Making Complexity (OT) detailed assessment/moderate complexity   Risk & Benefits of therapy have been explained evaluation/treatment results reviewed;care plan/treatment goals reviewed;patient   OT Total Evaluation Time   OT Eval, Moderate Complexity Minutes (61259) 10   OT Goals   Therapy Frequency (OT) Daily   OT Predicted Duration/Target Date for Goal Attainment 02/07/24   OT Goals Lower Body Dressing;Hygiene/Grooming;Toilet Transfer/Toileting   OT: Hygiene/Grooming modified independent;while standing   OT: Lower Body Dressing Modified independent   OT: Toilet Transfer/Toileting Modified independent   Interventions   Interventions Quick Adds Self-Care/Home Management    Self-Care/Home Management   Self-Care/Home Mgmt/ADL, Compensatory, Meal Prep Minutes (15604) 15   Symptoms Noted During/After Treatment (Meal Preparation/Planning Training) none   Treatment Detail/Skilled Intervention Pt completes bed mob supine>sit with HOB, SBA with verbal cues for safe technique. Fxl transfers, sit<>stand at EOB and chair, CGA/Min A with FWW and verbal cues for safety. Took steps from EOB to chair with FWW and CGA/Min A-verbal cues for FWW safety. Pt completed LB dressing with Min A needed for line management (catheter). Pt seated in chair at end of session, all needs within reach.   OT Discharge Planning   OT Plan 2/7- act christopher; cog; any ADLs; UE exercise   OT Discharge Recommendation (DC Rec) Transitional Care Facility   OT Rationale for DC Rec Pt is not at his baseline for ADLs and is a falls risk; would benefit from OT at TCU to facilitate incr safety/indep with ADLs and fxl mob.   OT Brief overview of current status CGA/Min A ADLs and fxl mob   OT Equipment Needed at Discharge shower chair;other (see comments)  (grab bar for tub/shower)   Total Session Time   Timed Code Treatment Minutes 15   Total Session Time (sum of timed and untimed services) 25   Chrissy Parikh, OT 2/3/2024

## 2024-02-03 NOTE — PLAN OF CARE
Problem: Risk for Delirium  Goal: Improved Sleep  Outcome: Progressing   Goal Outcome Evaluation:       Patient denies having any pain. Calls appropriately. .IVF running. Bloom in place and patent. On telemetry monitoring. Renal ultrasound scheduled for the morning.

## 2024-02-03 NOTE — PLAN OF CARE
Problem: Fall Injury Risk  Goal: Absence of Fall and Fall-Related Injury  Intervention: Identify and Manage Contributors  Recent Flowsheet Documentation  Taken 2/2/2024 1720 by Sherif Corcoran RN  Medication Review/Management: medications reviewed     Problem: Adult Inpatient Plan of Care  Goal: Optimal Comfort and Wellbeing  Outcome: Progressing     Goal Outcome Evaluation:       Pt doing well this shift. He denies pain, states he feels much better. Bloom patent and draining clear, yellow urine. IV ABX given. TELE: NSR with BBB. IV fluids running. Pt will have therapies tomorrow and renal US. Vitally stable.

## 2024-02-03 NOTE — PROGRESS NOTES
"PROGRESS NOTE    SUBJECTIVE:  No acute events overnight. Daughter is present with him today. He's tolerating the catheter well.      OBJECTIVE:  Temp (24hrs), Av.9  F (36.6  C), Min:97.5  F (36.4  C), Max:98.4  F (36.9  C)      Patient Vitals for the past 24 hrs:   BP Temp Temp src Pulse Resp SpO2 Height Weight   24 0406 (!) 155/77 97.8  F (36.6  C) Oral 72 18 94 % -- --   24 2305 -- 98  F (36.7  C) Oral -- -- 97 % -- --   24 2300 (!) 165/80 -- -- 76 18 -- -- --   24 (!) 189/81 97.8  F (36.6  C) Oral 76 18 97 % 1.778 m (5' 10\") 67.2 kg (148 lb 2.4 oz)   24 1600 (!) 172/78 97.5  F (36.4  C) Oral 80 18 98 % -- --   24 1438 (!) 175/79 -- -- -- -- -- -- --   24 1407 (!) 189/88 98.4  F (36.9  C) Oral 80 18 97 % 1.527 m (5' 0.1\") --   24 0823 (!) 167/74 98  F (36.7  C) Oral 80 25 98 % -- --     Intake/Output Summary (Last 24 hours) at 2/3/2024 0755  Last data filed at 2/3/2024 0500  Gross per 24 hour   Intake 2050 ml   Output 1925 ml   Net 125 ml       PHYSICAL EXAM:  Unlabored breathing  Comfortable  Bloom catheter in place with clear yellow urine      WBC Count   Date Value Ref Range Status   2024 10.1 4.0 - 11.0 10e3/uL Final   2024 12.4 (H) 4.0 - 11.0 10e3/uL Final   2023 8.9 4.0 - 11.0 10e3/uL Final     Hemoglobin   Date Value Ref Range Status   2024 11.3 (L) 13.3 - 17.7 g/dL Final   2024 11.6 (L) 13.3 - 17.7 g/dL Final   2023 11.3 (L) 13.3 - 17.7 g/dL Final     Platelet Count   Date Value Ref Range Status   2024 174 150 - 450 10e3/uL Final   2024 177 150 - 450 10e3/uL Final   2023 214 150 - 450 10e3/uL Final     Recent Labs   Lab Test 24  0546 24  1512 23  1106 23  0825   * 145  --  146*   POTASSIUM 4.6 5.0 4.0 4.6   CHLORIDE 114* 111*  --  109*   CO2 25 24  --  24   BUN 50.4* 53.0*  --  28.0*   CR 2.18* 2.50*  --  1.43*   ANIONGAP 9 10  --  13   LIBERTAD 9.4 9.9  --  9.7   GLC 94 " 91  --  106*       Ucx 2/1/24 = 50K-100K C. albicans      Principal Problem:    Urinary retention  Active Problems:    Urinary tract infection without hematuria, site unspecified        ASSESSMENT & PLAN: 88 year old male with BPH, retention, SUJATHA, bilateral hydronephrosis, and a yeast UTI.  He's doing well with the catheter and without complaints. Blood work and a renal ultrasound are scheduled for today but haven't been performed yet.  I won't be surprised if there is still some hydronephrosis remaining (could be chronic) and I wouldn't do any intervention directly on the kidneys so long as the creatinine continues to improve.  He has yeast growing in his urine culture and I'd recommend treating that.  I reviewed his CT scan from admission and his prostate is massive.  The prior plan was to do a voiding trial in 1-2 weeks.  I do not recommend this. He has a very high chance of failing, or even if he passes, going back into retention and kidney failure.  I recommend that he keep the catheter until he sees a robotic urologist as an outpatient. He was ok with this. [facility] will help with getting follow up arranged.    Jacinto Gordon MD

## 2024-02-04 ENCOUNTER — APPOINTMENT (OUTPATIENT)
Dept: SPEECH THERAPY | Facility: CLINIC | Age: 89
DRG: 689 | End: 2024-02-04
Payer: MEDICARE

## 2024-02-04 VITALS
HEIGHT: 70 IN | RESPIRATION RATE: 18 BRPM | SYSTOLIC BLOOD PRESSURE: 172 MMHG | TEMPERATURE: 98.8 F | BODY MASS INDEX: 21.21 KG/M2 | DIASTOLIC BLOOD PRESSURE: 78 MMHG | HEART RATE: 69 BPM | OXYGEN SATURATION: 98 % | WEIGHT: 148.15 LBS

## 2024-02-04 LAB
ANION GAP SERPL CALCULATED.3IONS-SCNC: 7 MMOL/L (ref 7–15)
BUN SERPL-MCNC: 39.9 MG/DL (ref 8–23)
CALCIUM SERPL-MCNC: 9.3 MG/DL (ref 8.8–10.2)
CHLORIDE SERPL-SCNC: 108 MMOL/L (ref 98–107)
CREAT SERPL-MCNC: 1.65 MG/DL (ref 0.67–1.17)
DEPRECATED HCO3 PLAS-SCNC: 24 MMOL/L (ref 22–29)
EGFRCR SERPLBLD CKD-EPI 2021: 40 ML/MIN/1.73M2
GLUCOSE SERPL-MCNC: 102 MG/DL (ref 70–99)
PLATELET # BLD AUTO: 160 10E3/UL (ref 150–450)
POTASSIUM SERPL-SCNC: 4 MMOL/L (ref 3.4–5.3)
SODIUM SERPL-SCNC: 139 MMOL/L (ref 135–145)

## 2024-02-04 PROCEDURE — 36415 COLL VENOUS BLD VENIPUNCTURE: CPT | Performed by: INTERNAL MEDICINE

## 2024-02-04 PROCEDURE — 92526 ORAL FUNCTION THERAPY: CPT | Mod: GN | Performed by: SPEECH-LANGUAGE PATHOLOGIST

## 2024-02-04 PROCEDURE — 99239 HOSP IP/OBS DSCHRG MGMT >30: CPT | Performed by: INTERNAL MEDICINE

## 2024-02-04 PROCEDURE — 80048 BASIC METABOLIC PNL TOTAL CA: CPT | Performed by: INTERNAL MEDICINE

## 2024-02-04 PROCEDURE — 250N000013 HC RX MED GY IP 250 OP 250 PS 637: Performed by: INTERNAL MEDICINE

## 2024-02-04 PROCEDURE — 85049 AUTOMATED PLATELET COUNT: CPT | Performed by: INTERNAL MEDICINE

## 2024-02-04 PROCEDURE — 250N000011 HC RX IP 250 OP 636: Performed by: INTERNAL MEDICINE

## 2024-02-04 RX ORDER — DOCUSATE SODIUM 100 MG/1
100 CAPSULE, LIQUID FILLED ORAL 2 TIMES DAILY PRN
Status: DISCONTINUED | OUTPATIENT
Start: 2024-02-04 | End: 2024-02-04 | Stop reason: HOSPADM

## 2024-02-04 RX ORDER — AMLODIPINE BESYLATE 10 MG/1
10 TABLET ORAL DAILY
Start: 2024-02-04 | End: 2024-03-08 | Stop reason: DRUGHIGH

## 2024-02-04 RX ORDER — AMLODIPINE BESYLATE 10 MG/1
10 TABLET ORAL DAILY
Status: DISCONTINUED | OUTPATIENT
Start: 2024-02-04 | End: 2024-02-04 | Stop reason: HOSPADM

## 2024-02-04 RX ORDER — AMLODIPINE BESYLATE 10 MG/1
10 TABLET ORAL DAILY
Start: 2024-02-04 | End: 2024-02-04

## 2024-02-04 RX ORDER — FLUCONAZOLE 200 MG/1
200 TABLET ORAL DAILY
Start: 2024-02-04 | End: 2024-02-10

## 2024-02-04 RX ORDER — LISINOPRIL 10 MG/1
10 TABLET ORAL DAILY
Qty: 90 TABLET | Refills: 3 | Status: SHIPPED | OUTPATIENT
Start: 2024-02-04 | End: 2024-02-04

## 2024-02-04 RX ORDER — CEFDINIR 300 MG/1
300 CAPSULE ORAL DAILY
Start: 2024-02-04 | End: 2024-02-09

## 2024-02-04 RX ADMIN — HEPARIN SODIUM 5000 UNITS: 5000 INJECTION, SOLUTION INTRAVENOUS; SUBCUTANEOUS at 06:39

## 2024-02-04 RX ADMIN — FLUCONAZOLE 200 MG: 100 TABLET ORAL at 08:58

## 2024-02-04 RX ADMIN — AMLODIPINE BESYLATE 10 MG: 10 TABLET ORAL at 08:58

## 2024-02-04 RX ADMIN — DOCUSATE SODIUM 100 MG: 100 CAPSULE, LIQUID FILLED ORAL at 10:56

## 2024-02-04 ASSESSMENT — ACTIVITIES OF DAILY LIVING (ADL)
ADLS_ACUITY_SCORE: 24

## 2024-02-04 NOTE — DISCHARGE SUMMARY
[facility] MEDICINE  DISCHARGE SUMMARY     Primary Care Physician: Derek Kumar  Admission Date: 2/1/2024   Discharge Provider: Raine Altamirano MD Discharge Date: 2/4/2024   Diet: Orders Placed This Encounter      Combination Diet Easy to Chew (level 7); Thin Liquids (level 0)      Diet      Code Status: Full Code   Activity: activity as tolerated   [facility]      Condition at Discharge: Stable      REASON FOR PRESENTATION(See Admission Note for Details)   Fall, generalized weakness, urinary symptoms    PRINCIPAL & ACTIVE DISCHARGE DIAGNOSES     Principal Problem:  Acute pyelonephritis  Urinary retention, severe bilateral hydronephrosis s/p Bloom placement  History of BPH  ARF on CKD 3 improving  Acute metabolic encephalopathy resolved  Mild hypernatremia improved  Dysphagia  Hypertension  Generalized weakness  Chronic cognitive impairment  Normocytic anemia    SIGNIFICANT FINDINGS (Imaging, labs):     Most Recent 3 CBC's:  Recent Labs   Lab Test 02/04/24  0549 02/03/24  0830 02/02/24  0546 02/01/24  1512   WBC  --  8.2 10.1 12.4*   HGB  --  10.7* 11.3* 11.6*   MCV  --  98 96 97    168 174 177      Most Recent 3 BMP's:  Recent Labs   Lab Test 02/04/24  0549 02/03/24  0830 02/02/24  0546    142 148*   POTASSIUM 4.0 4.1 4.6   CHLORIDE 108* 111* 114*   CO2 24 21* 25   BUN 39.9* 40.6* 50.4*   CR 1.65* 1.79* 2.18*   ANIONGAP 7 10 9   LIBERTAD 9.3 9.0 9.4   * 107* 94     Most Recent 2 LFT's:  Recent Labs   Lab Test 02/01/24  1512 10/01/22  1932   AST 16 31   ALT 11 15   ALKPHOS 100 105   BILITOTAL 0.4 0.6     Most Recent TSH, T4 and A1c Labs:  Recent Labs   Lab Test 10/20/22  1036   A1C 5.7*     Results for orders placed or performed during the hospital encounter of 02/01/24   Head CT w/o contrast    Narrative    EXAM: CT HEAD W/O CONTRAST  LOCATION: [facility]  DATE: 2/1/2024    INDICATION: increased confusion, hx cognitive  decline  COMPARISON: None.  TECHNIQUE: Routine CT Head without IV contrast. Multiplanar reformats. Dose reduction techniques were used.    FINDINGS:  INTRACRANIAL CONTENTS: No intracranial hemorrhage. Mild diffuse cerebral parenchymal volume loss. Disproportionate volume loss involving the anterior temporal lobes. No midline shift. The basilar cisterns are patent. No cerebellar tonsillar ectopia.   Moderate periventricular and scattered foci of deep white matter hypodensities involving both cerebral hemispheres. No CT evidence for an acute infarct. The lateral and third ventricles are dilated greater than expected for the degree of sulcal   dilatation.    VISUALIZED ORBITS/SINUSES/MASTOIDS: Prior bilateral cataract surgery. Visualized portions of the orbits are otherwise unremarkable. No paranasal sinus mucosal disease. No middle ear or mastoid effusion.    BONES/SOFT TISSUES: No acute abnormality.      Impression    IMPRESSION:  1.  No intracranial hemorrhage, mass lesions, or CT evidence for an acute infarct.  2.  Mild diffuse cerebral parenchymal volume loss. Disproportionate volume loss involving the anterior temporal lobes.  3.  Presumed chronic hypertensive/microvascular ischemic white matter changes.  4.  The lateral and third ventricles are dilated greater than expected for the degree of sulcal dilatation. Please correlate with clinical findings of normal pressure hydrocephalus.     CT Abdomen Pelvis w/o Contrast    Narrative    EXAM: CT ABDOMEN PELVIS W/O CONTRAST  LOCATION: [facility]  DATE: 2/1/2024    INDICATION: Diarrhea.  COMPARISON: CT abdomen pelvis 10/02/2022.  TECHNIQUE: CT scan of the abdomen and pelvis was performed without IV contrast. Multiplanar reformats were obtained. Dose reduction techniques were used.  CONTRAST: None.    FINDINGS:     LOWER CHEST: Subpleural scarring. No pleural effusions. Partially visualized CIED.    HEPATOBILIARY: No calcified gallstones or  biliary ductal dilation.    PANCREAS: Normal.    SPLEEN: Normal.    ADRENAL GLANDS: Normal.    KIDNEYS/BLADDER: Severe bilateral hydronephrosis and ureterectasis extending to the ureteropelvic junctions. No intrarenal or intraureteral calculi. Diffusely thickened and trabeculated urinary bladder. Multiple small intraluminal bladder calculi   measuring up to 7 mm adjacent to the right ureteropelvic junction.    BOWEL: No bowel obstruction. Appendix is surgically absent. Distal colon is decompressed, which limits its evaluation. Questionable mild wall thickening of the distal colon extending from the distal transverse colon through the sigmoid colon. Multiple   noninflamed descending and sigmoid colonic diverticuli.    LYMPH NODES: No enlarged lymph node.    VASCULATURE: Moderate aortobiiliac atherosclerosis. No abdominal aortic aneurysm.    PELVIC ORGANS: Enlarged prostate.    MUSCULOSKELETAL: Diffusely demineralized bones. There are 6 nonrib-bearing lumbar-type vertebral bodies. Chronic compression fractures of the L1, L2, and L4 vertebral bodies are unchanged. No acute bony abnormality.      Impression    IMPRESSION:     1.  Questionable mild wall thickening of the distal colon, which could either be pseudothickening from underdistention or could reflect a mild colitis.    2.  Distal colonic diverticulosis. No inflamed colonic diverticuli.    3.  Severe bilateral hydronephrosis extending to the ureteropelvic junctions, without obstructing ureteral calculi.    4.  Sequela of chronic bladder outlet obstruction with a diffusely thickened and trabeculated urinary bladder and multiple intraluminal bladder calculi measuring up to 7 mm. Correlate with urinalysis to exclude cystitis.   US Renal Complete Non-Vascular    Narrative    EXAM: US RENAL COMPLETE NON-VASCULAR  LOCATION: [facility]  DATE: 2/3/2024    INDICATION: Assess for resolution improvement of hydronephrosis  COMPARISON: CT abdomen  pelvis 02/01/2024.  TECHNIQUE: Routine Bilateral Renal and Bladder Ultrasound.    FINDINGS:    RIGHT KIDNEY: 12.8 x 5.9 x 8.4 cm. Normal cortical thickness and echogenicity. Severe hydronephrosis and proximal ureterectasis. No sonographically detectable intrarenal calculi.    LEFT KIDNEY: 12.2 x 5.5 x 6.6 cm. Normal cortical thickness and echogenicity. Severe hydronephrosis and proximal ureterectasis. No sonographically detectable intrarenal calculi.     BLADDER: Completely decompressed by a Bloom catheter and not well evaluated. Known intraluminal bladder calculi are not well visualized due to shadowing from gas.    PROSTATE: Prostatomegaly.      Impression    IMPRESSION:    1.  Severe bilateral hydronephrosis and proximal ureterectasis has not significantly changed after decompression of the bladder by a Bloom catheter.        PENDING LABS         PROCEDURES ( this hospitalization only)          RECOMMENDATION FOR F/U VISIT       DISPOSITION     Skilled Nursing Facility    SUMMARY OF HOSPITAL COURSE:    88 year old male with past medical history significant for kidney stones requiring stent placement, lithotripsy most recently in December 2023, BPH, UTIs, CKD 3, hypertension, cognitive impairment pacemaker in for sick sinus syndrome lives at home with his wife brought into the ED with complaints of fall, generalized weakness, urinary frequency urgency ongoing for a day. Admitted for acute pyelonephritis with urinary retention, severe bilateral hydronephrosis, nonobstructing bladder calculi, ARF on CKD 3.  He was treated with IV ceftriaxone, IV fluids, urology consulted.  Had Bloom catheter placed in the ED, urine culture growing Candida.  Was improving on ceftriaxone.  Continuing on cefdinir, fluconazole added.  Repeat ultrasound kidney showed persistent hydronephrosis, urology felt most likely has chronic hydronephrosis and recommended to keep the Bloom catheter until he follows up with urology for possible  prostate surgery.  ARF improved with IV fluids.  Lisinopril on hold because of ARF, persistent hydronephrosis.  Started on amlodipine for hypertension.  He had coughing with swallowing and seen by speech therapy and started on modified diet.  Follow-up with speech therapy as outpatient.    Discharge Medications with Med changes:        Review of your medicines        START taking        Dose / Directions   amLODIPine 10 MG tablet  Commonly known as: NORVASC  Used for: Essential hypertension      Dose: 10 mg  Take 1 tablet (10 mg) by mouth daily for 7 days  Refills: 0     cefdinir 300 MG capsule  Commonly known as: OMNICEF      Dose: 300 mg  Take 1 capsule (300 mg) by mouth daily for 5 days  Refills: 0     fluconazole 200 MG tablet  Commonly known as: DIFLUCAN  Indication: Candida in urine      Dose: 200 mg  Take 1 tablet (200 mg) by mouth daily for 6 days  Refills: 0            CONTINUE these medicines which have NOT CHANGED        Dose / Directions   acetaminophen 325 MG tablet  Commonly known as: TYLENOL  Used for: Ureteral calculus      Dose: 325-650 mg  Take 1-2 tablets (325-650 mg) by mouth every 4 hours as needed for other or mild pain (For optimal non-opioid multimodal pain management to improve pain control.)  Refills: 0     Glucosamine-Chondroitin 250-200 MG Caps  Used for: Ureteral calculus      Dose: 1 capsule  Take 1 capsule by mouth daily  Refills: 0     lisinopril 10 MG tablet  Commonly known as: ZESTRIL  Used for: Essential hypertension      Dose: 10 mg  Take 1 tablet (10 mg) by mouth daily  Quantity: 90 tablet  Refills: 3     metroNIDAZOLE 1 % external gel  Commonly known as: METROGEL  Used for: Rosacea      APPLY A SMALL AMOUNT TO AFFECTED AREA(S) TOPICALLY ONCE DAILY  Quantity: 60 g  Refills: 1     multivitamin, therapeutic Tabs tablet      Dose: 1 tablet  Take 1 tablet by mouth daily  Refills: 0     tamsulosin 0.4 MG capsule  Commonly known as: FLOMAX  Used for: Benign prostatic hyperplasia,  unspecified whether lower urinary tract symptoms present      Dose: 0.4 mg  Take 1 capsule (0.4 mg) by mouth At Bedtime  Quantity: 90 capsule  Refills: 3     triamcinolone 0.1 % external cream  Commonly known as: KENALOG      Apply topically 2 times daily as needed for irritation  Refills: 0            STOP taking      doxycycline hyclate 100 MG tablet  Commonly known as: VIBRA-TABS                  Where to get your medicines        These medications were sent to [id number] - [place], [place] - [address] [place] [address]  [address] [place] [id number]      Phone: [phone]   lisinopril 10 MG tablet              Rationale for medication changes:    Fluconazole, cefdinir for UTI  Lisinopril on hold  Started on amlodipine      Consults   Urology      Immunizations given this encounter     [unfilled]      Discharge Procedure Orders   General info for SNF   Order Comments: Length of Stay Estimate: Short Term Care: Estimated # of Days <30  Condition at Discharge: Improving  Level of care:skilled   Rehabilitation Potential: Good  Admission H&P remains valid and up-to-date: Yes  Recent Chemotherapy: N/A  Use Nursing Home Standing Orders: Yes     Mantoux instructions   Order Comments: Give two-step Mantoux (PPD) Per Facility Policy Yes     Follow Up and recommended labs and tests   Order Comments: Follow up with Nursing home physician.  Check BMP in 3 days   Follow up with primary care provider in 2 weeks  Follow up with specialist urology, keep the catheter until follow up     Reason for your hospital stay   Order Comments: UTI, urinary retention s/p smith, hydronephrosis     Activity - Up with assistive device     Order Specific Question Answer Comments   Is discharge order? Yes      Full Code     Order Specific Question Answer Comments   Code status determined by: Discussion with patient/ legal decision maker      Physical Therapy Adult Consult   Order Comments: Evaluate and treat as clinically  "indicated.    Reason:  weakness     Occupational Therapy Adult Consult   Order Comments: Evaluate and treat as clinically indicated.    Reason:  weakness     Speech Language Path Adult Consult   Order Comments: Evaluate and treat as clinically indicated.    Reason:  dysphagia     Fall precautions     Diet   Order Comments: Follow this diet upon discharge: Orders Placed This Encounter      Combination Diet Easy to Chew (level 7); Thin Liquids (level 0)     Order Specific Question Answer Comments   Is discharge order? Yes      Examination     Vital Signs in last 24 hours:   Vital signs:  Temp: 97.7  F (36.5  C) Temp src: Oral BP: (!) 162/76 Pulse: 71   Resp: 18 SpO2: 98 % O2 Device: None (Room air)   Height: 177.8 cm (5' 10\") Weight: 67.2 kg (148 lb 2.4 oz)  Estimated body mass index is 21.26 kg/m  as calculated from the following:    Height as of this encounter: 1.778 m (5' 10\").    Weight as of this encounter: 67.2 kg (148 lb 2.4 oz).       Pertinent positives on exam:  Abdomen: Soft nontender nondistended bowel sounds present no guarding or rigidity.  Bloom catheter in place with clear urine.    Please see EMR for more detailed significant labs, imaging, consultant notes etc.  Total time spent on discharge: > 35 minutes    Raine Altamirano MD   [facility] Hospitalist Service: Ph:[phone]    CC:Derek Kumar      "

## 2024-02-04 NOTE — PROGRESS NOTES
Critical Lab Notification       Nurse notified urologist: [place]     Urine color changed from clear, yellow, to reddish, with red streaks in tube. No discomfort, Bladder scanned for 80.      New orders for: No new orders, continue to monitor overnight.

## 2024-02-04 NOTE — PLAN OF CARE
Problem: Adult Inpatient Plan of Care  Goal: Plan of Care Review  Description: The Plan of Care Review/Shift note should be completed every shift.  The Outcome Evaluation is a brief statement about your assessment that the patient is improving, declining, or no change.  This information will be displayed automatically on your shift  note.  Outcome: Progressing     Problem: UTI (Urinary Tract Infection)  Goal: Improved Infection Symptoms  Outcome: Progressing   Goal Outcome Evaluation:         Pt alert and oriented x4  Pt denies any pain.   Bloom in place and draining well with vilma brownish color - Urologist said a little blood in the urine is normal. Bloom bag changed today and cares done.   Continue with antibiotics.   Tele discontinued today.   Report given to nurse at [facility].   Pt is discharging to TCU with wheel chair transport. IV removed, pt changed into normal clothes and pt discharged to TCU.

## 2024-02-04 NOTE — PLAN OF CARE
Problem: Fall Injury Risk  Goal: Absence of Fall and Fall-Related Injury  Outcome: Progressing  Intervention: Promote Injury-Free Environment  Recent Flowsheet Documentation  Taken 2/3/2024 1612 by Sherif Corcoran RN  Safety Promotion/Fall Prevention: safety round/check completed     Problem: Adult Inpatient Plan of Care  Goal: Absence of Hospital-Acquired Illness or Injury  Intervention: Identify and Manage Fall Risk  Recent Flowsheet Documentation  Taken 2/3/2024 1612 by Sherif Corcoran RN  Safety Promotion/Fall Prevention: safety round/check completed     Goal Outcome Evaluation:       Pt denies pain. He appetite is diminished today. IV antibiotics given. Bloom in place, leaking this shift. Urine changed color to a darker red color, Urology MD notified, continue to monitor. Vitally stable. Plans to work with therapy tomorrow.

## 2024-02-04 NOTE — PROGRESS NOTES
Occupational Therapy Discharge Summary    Reason for therapy discharge:    Discharged to transitional care facility.    Progress towards therapy goal(s). See goals on Care Plan in [facility] electronic health record for goal details.  Goals not met.  Barriers to achieving goals:   limited tolerance for therapy and discharge from facility.    Therapy recommendation(s):    Continued therapy is recommended.  Rationale/Recommendations:  Continued OT at TCU to facilitate increased safety/indep with ADLs.    Chrissy Parikh, OT 2/4/2024

## 2024-02-04 NOTE — PLAN OF CARE
Goal Outcome Evaluation:     Bloom in place and patent. Was a dark red color at start of shift but has cleared up during the night. In the morning the color of urine in the tubing was a clear yellow. Patient denies pain. Calls appropriately.     Problem: Fall Injury Risk  Goal: Absence of Fall and Fall-Related Injury  Outcome: Progressing  Intervention: Promote Injury-Free Environment  Recent Flowsheet Documentation  Taken 2/4/2024 0005 by Armida Chavez RN  Safety Promotion/Fall Prevention:   room near nurse's station   room organization consistent   safety round/check completed   clutter free environment maintained

## 2024-02-04 NOTE — PROGRESS NOTES
PROGRESS NOTE    SUBJECTIVE:  No acute events overnight. Did have some gross hematuria in the tubing last night which has cleared. Spoke with daughter on the phone after seeing the patient to update her.    OBJECTIVE:  Temp (24hrs), Av.9  F (36.6  C), Min:97.5  F (36.4  C), Max:98.4  F (36.9  C)      Patient Vitals for the past 24 hrs:   BP Temp Temp src Pulse Resp SpO2   24 0832 (!) 172/78 -- -- 69 18 98 %   24 0445 (!) 162/76 97.7  F (36.5  C) Oral -- 18 98 %   24 0005 (!) 145/72 97.6  F (36.4  C) Oral -- 18 99 %   24 1931 (!) 163/73 98.5  F (36.9  C) Oral -- 20 98 %   24 1554 139/75 97.7  F (36.5  C) Oral -- 16 98 %   24 1125 (!) 164/77 98.4  F (36.9  C) Oral 71 16 99 %     Intake/Output Summary (Last 24 hours) at 2/3/2024 0755  Last data filed at 2/3/2024 0500  Gross per 24 hour   Intake 2050 ml   Output 1925 ml   Net 125 ml       PHYSICAL EXAM:  Unlabored breathing  Comfortable  Bloom catheter in place with clear yellow urine      WBC Count   Date Value Ref Range Status   2024 8.2 4.0 - 11.0 10e3/uL Final   2024 10.1 4.0 - 11.0 10e3/uL Final   2024 12.4 (H) 4.0 - 11.0 10e3/uL Final     Hemoglobin   Date Value Ref Range Status   2024 10.7 (L) 13.3 - 17.7 g/dL Final   2024 11.3 (L) 13.3 - 17.7 g/dL Final   2024 11.6 (L) 13.3 - 17.7 g/dL Final     Platelet Count   Date Value Ref Range Status   2024 160 150 - 450 10e3/uL Final   2024 168 150 - 450 10e3/uL Final   2024 174 150 - 450 10e3/uL Final     Recent Labs   Lab Test 24  0549 24  0830 24  0546    142 148*   POTASSIUM 4.0 4.1 4.6   CHLORIDE 108* 111* 114*   CO2 24 21* 25   BUN 39.9* 40.6* 50.4*   CR 1.65* 1.79* 2.18*   ANIONGAP 7 10 9   LIBERTAD 9.3 9.0 9.4   * 107* 94       Ucx 24 = 50K-100K C. albicans      Principal Problem:    Urinary retention  Active Problems:    Urinary tract infection without hematuria, site  unspecified        ASSESSMENT & PLAN: 88 year old male with BPH, retention, SUJATHA, bilateral hydronephrosis, and a yeast UTI.  He's doing well with the catheter and without complaints. Urine is yellow.  Creatinine continues to improve. I'm ok with the hydronephrosis seen on ultrasound as his creatinine continues to improve. No surgical intervention for the hydronephrosis is needed. Reassured him that he may have intermittent gross hematuria and so long as the catheter drains, nothing needs to be done.  Encouraged good fluid intake.  Will keep plans for follow up as an outpatient with a robotic prostate provider ([facility] will reach out to help schedule).  Continue treatment for the yeast UTI.    Jacinto Gordon MD

## 2024-02-04 NOTE — PROGRESS NOTES
Physical Therapy Discharge Summary    Reason for therapy discharge:    Discharged to transitional care facility.    Progress towards therapy goal(s). See goals on Care Plan in [facility] electronic health record for goal details.  Goals partially met.  Barriers to achieving goals:   discharge from facility.    Therapy recommendation(s):    Continued therapy is recommended.  Rationale/Recommendations:  TCU to continue to address functional mobility limitations.

## 2024-02-04 NOTE — PLAN OF CARE
Speech Language Therapy Discharge Summary    Reason for therapy discharge:    All goals and outcomes met, no further needs identified.    Progress towards therapy goal(s). See goals on Care Plan in [facility] electronic health record for goal details.  Goals met    Therapy recommendation(s):    No further therapy is recommended.    Recommend easy to chew diet and thin liquids when sitting fully upright and alert. Patient should take small bites/sips at a slow rate. The patient should double swallow before taking a sip of liquid to fully clear the bolus. The patient expressed they have no plan of eating a regular diet and are content with easy to chew and thin liquids. The patient demonstrated competency of safe swallowing strategies and understanding of the risk for aspiration.

## 2024-02-04 NOTE — PROGRESS NOTES
Care Management Discharge Note    Discharge Date: 02/04/2024    Discharge Disposition: Transitional Care (TCU)    Discharge Services:  ([facility] TCU)    Discharge DME:  (anticipate D/C with smith catheter)    Discharge Transportation: agency ([facility]) at [phone]    Private pay costs discussed: transportation costs    Does the patient's insurance plan have a 3 day qualifying hospital stay waiver?  No    PAS Confirmation Code:  not needed  Patient/family educated on Medicare website which has current facility and service quality ratings:  Yes    Education Provided on the Discharge Plan: Yes (AVS per bedside RN)  Persons Notified of Discharge Plans: pt and daughter  Patient/Family in Agreement with the Plan: yes    Handoff Referral Completed: Yes    Additional Information:  Chart reviewed.  Plan for discharge to [facility] ([facility] TCU today.  Discharge orders have been entered.    Call placed to Admissions at [facility] and left message requesting return call to confirm acceptance to their facility today.    Call placed to [facility] and confirmed pt has w/c transport scheduled at [phone] today.    Met with pt to discuss discharge planning and he is agreeable with current plan.    Anai Gordon RN

## 2024-02-05 ENCOUNTER — PATIENT OUTREACH (OUTPATIENT)
Dept: CARE COORDINATION | Facility: CLINIC | Age: 89
End: 2024-02-05
Payer: MEDICARE

## 2024-02-05 NOTE — LETTER
[facility]       To:  [facility]            Please give to facility      From:   Gaye SPENCER  Care Coordinator   [facility]   P: [phone]  [email or web address]        Patient Name:  Mikaela Figueroa     YOB: 1935   Admit date: 2-4-2024          Information Needed:  Please contact me when the patient will discharge (or if they will move to long term care)- include the discharge date, disposition, and main diagnosis       If the patient is discharged with home care services, please provide the name of the agency    Also- Please inform me if a care conference is being held.     Phone or Email with information                              Thank you

## 2024-02-05 NOTE — PROGRESS NOTES
Clinic Care Coordination Contact  Care Coordination Transition Communication    Clinical Data: Patient was hospitalized at        [facility] MEDICINE  DISCHARGE SUMMARY      Primary Care Physician: Derek Kumar                                                                  Admission Date: 2/1/2024   Discharge Provider: Raine Altamirano MD Discharge Date: 2/4/2024   Diet: Orders Placed This Encounter      Combination Diet Easy to Chew (level 7); Thin Liquids (level 0)      Diet       Code Status: Full Code   Activity: activity as tolerated    [facility]       Condition at Discharge: Stable      REASON FOR PRESENTATION(See Admission Note for Details)   Fall, generalized weakness, urinary symptoms     PRINCIPAL & ACTIVE DISCHARGE DIAGNOSES      Principal Problem:  Acute pyelonephritis  Urinary retention, severe bilateral hydronephrosis s/p Bloom placement  History of BPH  ARF on CKD 3 improving  Acute metabolic encephalopathy resolved  Mild hypernatremia improved  Dysphagia  Hypertension  Generalized weakness  Chronic cognitive impairment  Normocytic anemia     Assessment: Patient has transitioned to TCU/ARU for short term rehabilitation:    [facility]: [facility]  Transition Communication:  Notified facility of Primary Care- Care Coordination support via Epic fax.    Plan: Care Coordinator will await notification from facility staff informing of patient's discharge plans/needs. Care Coordinator will review chart and outreach to facility staff every 4 weeks and as needed.     Gaye Groves,   [facility]  [phone]

## 2024-02-06 ENCOUNTER — LAB REQUISITION (OUTPATIENT)
Dept: LAB | Facility: CLINIC | Age: 89
End: 2024-02-06
Payer: MEDICARE

## 2024-02-06 DIAGNOSIS — I10 ESSENTIAL (PRIMARY) HYPERTENSION: ICD-10-CM

## 2024-02-07 LAB
ACANTHOCYTES BLD QL SMEAR: NORMAL
AUER BODIES BLD QL SMEAR: NORMAL
BASO STIPL BLD QL SMEAR: NORMAL
BITE CELLS BLD QL SMEAR: NORMAL
BLISTER CELLS BLD QL SMEAR: NORMAL
BURR CELLS BLD QL SMEAR: NORMAL
DACRYOCYTES BLD QL SMEAR: NORMAL
ELLIPTOCYTES BLD QL SMEAR: NORMAL
ERYTHROCYTE [DISTWIDTH] IN BLOOD BY AUTOMATED COUNT: 13.1 % (ref 10–15)
FRAGMENTS BLD QL SMEAR: NORMAL
HCT VFR BLD AUTO: 34.6 % (ref 40–53)
HGB BLD-MCNC: 11.1 G/DL (ref 13.3–17.7)
HGB C CRYSTALS: NORMAL
HOWELL-JOLLY BOD BLD QL SMEAR: NORMAL
MCH RBC QN AUTO: 30.8 PG (ref 26.5–33)
MCHC RBC AUTO-ENTMCNC: 32.1 G/DL (ref 31.5–36.5)
MCV RBC AUTO: 96 FL (ref 78–100)
NEUTS HYPERSEG BLD QL SMEAR: NORMAL
PLAT MORPH BLD: NORMAL
PLATELET # BLD AUTO: 152 10E3/UL (ref 150–450)
POLYCHROMASIA BLD QL SMEAR: NORMAL
RBC # BLD AUTO: 3.6 10E6/UL (ref 4.4–5.9)
RBC AGGLUT BLD QL: NORMAL
RBC MORPH BLD: NORMAL
ROULEAUX BLD QL SMEAR: NORMAL
SICKLE CELLS BLD QL SMEAR: NORMAL
SMUDGE CELLS BLD QL SMEAR: NORMAL
SPHEROCYTES BLD QL SMEAR: NORMAL
STOMATOCYTES BLD QL SMEAR: NORMAL
TARGETS BLD QL SMEAR: NORMAL
TOXIC GRANULES BLD QL SMEAR: NORMAL
VARIANT LYMPHS BLD QL SMEAR: NORMAL
WBC # BLD AUTO: 9.4 10E3/UL (ref 4–11)

## 2024-02-07 PROCEDURE — P9603 ONE-WAY ALLOW PRORATED MILES: HCPCS | Performed by: FAMILY MEDICINE

## 2024-02-07 PROCEDURE — 36415 COLL VENOUS BLD VENIPUNCTURE: CPT | Performed by: FAMILY MEDICINE

## 2024-02-07 PROCEDURE — 85041 AUTOMATED RBC COUNT: CPT | Performed by: FAMILY MEDICINE

## 2024-02-07 PROCEDURE — 80048 BASIC METABOLIC PNL TOTAL CA: CPT | Mod: ORL | Performed by: FAMILY MEDICINE

## 2024-02-08 LAB
ANION GAP SERPL CALCULATED.3IONS-SCNC: 11 MMOL/L (ref 7–15)
BUN SERPL-MCNC: 51.7 MG/DL (ref 8–23)
CALCIUM SERPL-MCNC: 8.8 MG/DL (ref 8.8–10.2)
CHLORIDE SERPL-SCNC: 103 MMOL/L (ref 98–107)
CREAT SERPL-MCNC: 1.91 MG/DL (ref 0.67–1.17)
DEPRECATED HCO3 PLAS-SCNC: 22 MMOL/L (ref 22–29)
EGFRCR SERPLBLD CKD-EPI 2021: 34 ML/MIN/1.73M2
GLUCOSE SERPL-MCNC: 134 MG/DL (ref 70–99)
POTASSIUM SERPL-SCNC: 4.5 MMOL/L (ref 3.4–5.3)
SODIUM SERPL-SCNC: 136 MMOL/L (ref 135–145)

## 2024-02-09 ENCOUNTER — DOCUMENTATION ONLY (OUTPATIENT)
Dept: OTHER | Facility: CLINIC | Age: 89
End: 2024-02-09
Payer: MEDICARE

## 2024-02-13 ENCOUNTER — LAB REQUISITION (OUTPATIENT)
Dept: LAB | Facility: CLINIC | Age: 89
End: 2024-02-13
Payer: MEDICARE

## 2024-02-13 DIAGNOSIS — R53.1 WEAKNESS: ICD-10-CM

## 2024-02-14 ENCOUNTER — LAB REQUISITION (OUTPATIENT)
Dept: LAB | Facility: CLINIC | Age: 89
End: 2024-02-14
Payer: MEDICARE

## 2024-02-14 DIAGNOSIS — I10 ESSENTIAL (PRIMARY) HYPERTENSION: ICD-10-CM

## 2024-02-14 LAB
ANION GAP SERPL CALCULATED.3IONS-SCNC: 11 MMOL/L (ref 7–15)
BUN SERPL-MCNC: 60 MG/DL (ref 8–23)
CALCIUM SERPL-MCNC: 9.2 MG/DL (ref 8.8–10.2)
CHLORIDE SERPL-SCNC: 106 MMOL/L (ref 98–107)
CREAT SERPL-MCNC: 2.08 MG/DL (ref 0.67–1.17)
DEPRECATED HCO3 PLAS-SCNC: 20 MMOL/L (ref 22–29)
EGFRCR SERPLBLD CKD-EPI 2021: 30 ML/MIN/1.73M2
GLUCOSE SERPL-MCNC: 86 MG/DL (ref 70–99)
POTASSIUM SERPL-SCNC: 5.6 MMOL/L (ref 3.4–5.3)
SODIUM SERPL-SCNC: 137 MMOL/L (ref 135–145)

## 2024-02-14 PROCEDURE — P9604 ONE-WAY ALLOW PRORATED TRIP: HCPCS | Performed by: PHYSICIAN ASSISTANT

## 2024-02-14 PROCEDURE — 80048 BASIC METABOLIC PNL TOTAL CA: CPT | Performed by: PHYSICIAN ASSISTANT

## 2024-02-14 PROCEDURE — 36415 COLL VENOUS BLD VENIPUNCTURE: CPT | Performed by: PHYSICIAN ASSISTANT

## 2024-02-15 ENCOUNTER — LAB REQUISITION (OUTPATIENT)
Dept: LAB | Facility: CLINIC | Age: 89
End: 2024-02-15
Payer: MEDICARE

## 2024-02-15 DIAGNOSIS — E87.6 HYPOKALEMIA: ICD-10-CM

## 2024-02-15 LAB — POTASSIUM SERPL-SCNC: 5.9 MMOL/L (ref 3.4–5.3)

## 2024-02-15 PROCEDURE — P9604 ONE-WAY ALLOW PRORATED TRIP: HCPCS | Performed by: FAMILY MEDICINE

## 2024-02-15 PROCEDURE — 84132 ASSAY OF SERUM POTASSIUM: CPT | Performed by: FAMILY MEDICINE

## 2024-02-15 PROCEDURE — 36415 COLL VENOUS BLD VENIPUNCTURE: CPT | Performed by: FAMILY MEDICINE

## 2024-02-16 LAB — POTASSIUM SERPL-SCNC: 5.3 MMOL/L (ref 3.4–5.3)

## 2024-02-16 PROCEDURE — P9604 ONE-WAY ALLOW PRORATED TRIP: HCPCS | Performed by: FAMILY MEDICINE

## 2024-02-16 PROCEDURE — 36415 COLL VENOUS BLD VENIPUNCTURE: CPT | Performed by: FAMILY MEDICINE

## 2024-02-16 PROCEDURE — 84132 ASSAY OF SERUM POTASSIUM: CPT | Performed by: FAMILY MEDICINE

## 2024-02-18 ENCOUNTER — LAB REQUISITION (OUTPATIENT)
Dept: LAB | Facility: CLINIC | Age: 89
End: 2024-02-18
Payer: MEDICARE

## 2024-02-18 DIAGNOSIS — E87.6 HYPOKALEMIA: ICD-10-CM

## 2024-02-19 LAB — POTASSIUM SERPL-SCNC: 4.6 MMOL/L (ref 3.4–5.3)

## 2024-02-19 PROCEDURE — 36415 COLL VENOUS BLD VENIPUNCTURE: CPT | Performed by: FAMILY MEDICINE

## 2024-02-19 PROCEDURE — P9604 ONE-WAY ALLOW PRORATED TRIP: HCPCS | Performed by: FAMILY MEDICINE

## 2024-02-19 PROCEDURE — 84132 ASSAY OF SERUM POTASSIUM: CPT | Performed by: FAMILY MEDICINE

## 2024-02-20 ENCOUNTER — LAB REQUISITION (OUTPATIENT)
Dept: LAB | Facility: CLINIC | Age: 89
End: 2024-02-20
Payer: MEDICARE

## 2024-02-20 LAB
ALBUMIN UR-MCNC: 70 MG/DL
APPEARANCE UR: ABNORMAL
BACTERIA #/AREA URNS HPF: ABNORMAL /HPF
BILIRUB UR QL STRIP: NEGATIVE
COLOR UR AUTO: ABNORMAL
GLUCOSE UR STRIP-MCNC: NEGATIVE MG/DL
HGB UR QL STRIP: ABNORMAL
KETONES UR STRIP-MCNC: NEGATIVE MG/DL
LEUKOCYTE ESTERASE UR QL STRIP: ABNORMAL
NITRATE UR QL: POSITIVE
PH UR STRIP: 5.5 [PH] (ref 5–7)
RBC URINE: 35 /HPF
RENAL TUB EPI: 1 /HPF
SP GR UR STRIP: 1.01 (ref 1–1.03)
UROBILINOGEN UR STRIP-MCNC: NORMAL MG/DL
WBC CLUMPS #/AREA URNS HPF: PRESENT /HPF
WBC URINE: >182 /HPF

## 2024-02-20 PROCEDURE — 81001 URINALYSIS AUTO W/SCOPE: CPT | Performed by: FAMILY MEDICINE

## 2024-02-20 PROCEDURE — 87086 URINE CULTURE/COLONY COUNT: CPT | Performed by: FAMILY MEDICINE

## 2024-02-20 PROCEDURE — 87186 SC STD MICRODIL/AGAR DIL: CPT | Performed by: FAMILY MEDICINE

## 2024-02-21 LAB — BACTERIA UR CULT: ABNORMAL

## 2024-02-23 ENCOUNTER — TELEPHONE (OUTPATIENT)
Dept: FAMILY MEDICINE | Facility: CLINIC | Age: 89
End: 2024-02-23
Payer: MEDICARE

## 2024-02-23 NOTE — TELEPHONE ENCOUNTER
General Call    Contacts         Type Contact Phone/Fax    02/23/2024 11:31 AM CST Phone (Incoming) Jannet (Home Care) [phone]     [facility]          Reason for Call:  Home care orders    What are your questions or concerns:  Jannet from [facility] home care called asking home care orders for skilled nursing, PT, OT and home health aid for the start of care.     Could we send this information to you in [employer] or would you prefer to receive a phone call?:   Patient would prefer a phone call   Okay to leave a detailed message?: Yes at Home number on file [phone] (other)

## 2024-02-26 ENCOUNTER — TELEPHONE (OUTPATIENT)
Dept: FAMILY MEDICINE | Facility: CLINIC | Age: 89
End: 2024-02-26
Payer: MEDICARE

## 2024-02-26 ENCOUNTER — TRANSFERRED RECORDS (OUTPATIENT)
Dept: HEALTH INFORMATION MANAGEMENT | Facility: CLINIC | Age: 89
End: 2024-02-26
Payer: MEDICARE

## 2024-02-26 NOTE — TELEPHONE ENCOUNTER
Left detailed message of approved orders for Jannet. To call back with any questions or if needing clarification. To fax any orders needing to be signed to Dr Kumar at [phone].    SERENA LomeliN, RN  [facility]

## 2024-02-26 NOTE — TELEPHONE ENCOUNTER
Home Health Care    Reason for call:  verbal orders    Orders are needed for this patient.  Skilled Nursinx/wk for 4 wks, then 1x/wk for 5 wks, 6 PRN  HHA-1x/wk for 9 wks    Also on medications-    Pt is wondering about Lisinopril as it was not on discharge from TCU.   Should he be taking it or not or wait for follow up?  Atiya has advise pt not to take until follow up with pcp    The pt did have high potassium at TCU and discharge did state this need to be recheck at follow appt with pcp    They also did start pt on Amlodipine 2.5mg 1x/day. She is working on getting this filled through TCU follow up      Pt Provider: José    Phone Number Homecare Nurse can be reached at: [phone] Atiya      
Okay for orders as requested.  Patient should take all medications as instructed to do so on discharge from transitional care unit.  Appropriate follow-up test will be completed when he sees Dr. Kumar for his follow-up.  
Patient calls back inquiring about status of request. Writer inform caller message was sent to provider- awaiting for response. Caller asked if someone else could okay the orders due to needing to see the pt tomorrow morning. Please advise      
Writer called and gave verbal orders and relayed recommendations per provider to Atiya with [facility].    SERENA EngleN, RN  [facility]    
Ramona FOY

## 2024-02-26 NOTE — TELEPHONE ENCOUNTER
Appt scheduled with dr. barakat  
Reason for Call:  Other call back    Detailed comments: Patient is requesting to speak to Dr. Kumar or Nurse to provide relevant information.    Patient will not provide specific details to Writer.     Phone Number Patient can be reached at: Home number on file [phone] (home)    Best Time: Any    Can we leave a detailed message on this number? YES    Call taken on 12/7/2022 at 11:34 AM by Valorie Caballero    
Normal for race

## 2024-02-27 ENCOUNTER — NURSE TRIAGE (OUTPATIENT)
Dept: NURSING | Facility: CLINIC | Age: 89
End: 2024-02-27
Payer: MEDICARE

## 2024-02-27 ENCOUNTER — PATIENT OUTREACH (OUTPATIENT)
Dept: CARE COORDINATION | Facility: CLINIC | Age: 89
End: 2024-02-27
Payer: MEDICARE

## 2024-02-27 NOTE — PROGRESS NOTES
Clinic Care Coordination Contact  [facility]: Post-Discharge Note  SITUATION                                                      Admission:    Admission Date: 02/04/24   Reason for Admission: rehab  Discharge:   Discharge Date: 02/22/24  Discharge Diagnosis: rehab    BACKGROUND                                                      Per hospital discharge summary and inpatient provider notes:  Fall, generalized weakness, urinary symptoms     PRINCIPAL & ACTIVE DISCHARGE DIAGNOSES      Principal Problem:  Acute pyelonephritis  Urinary retention, severe bilateral hydronephrosis s/p Bloom placement  History of BPH  ARF on CKD 3 improving  Acute metabolic encephalopathy resolved  Mild hypernatremia improved  Dysphagia  Hypertension  Generalized weakness  Chronic cognitive impairment  Normocytic anemia    [facility]     ASSESSMENT      Enrollment  Outreach Frequency: monthly, more frequently as needed    Discharge Assessment  How are you doing now that you are home?: well  Do you feel your condition is stable enough to be safe at home until your provider visit?: Yes  Does the patient have their discharge instructions? : Yes  Does the patient have questions regarding their discharge instructions? : No  Were you started on any new medications or were there changes to any of your previous medications? : No  Does the patient have all of their medications?: Yes  Do you have questions regarding any of your medications? : No  Do you have all of your needed medical supplies or equipment (DME)?  (i.e. oxygen tank, CPAP, cane, etc.): Yes  Discharge follow-up appointment scheduled within 14 calendar days? : Yes  Discharge Follow Up Appointment Date: 03/05/24  Discharge Follow Up Appointment Scheduled with?: Primary Care Provider         Post-op (Clinicians Only)  Fever: No  Chills: No  Bowel Function: normal  Urinary Status: indwelling urinary catheter    Talked to patient with wife listening too.   He received good care at TCU.  Home care has started. Has equipment he needs. His kids are also helping he and his wife.  Output from the catheter looks clear and no odor.  Reviewed role of care coordination and they will danyel if they need help.  No other concerns. Reviewed upcoming appts.     PLAN                                                      Outpatient Plan:  will attend appts as scheduled.     Future Appointments   Date Time Provider Department Center   3/5/2024 11:20 AM David Henry MD [facility]   3/14/2024 12:00 AM [facility] REMOTE DEVICE CHECK FROM HOME [facility]   5/24/2024  7:20 AM Derek Kumar MD [facility]         For any urgent concerns, please contact our 24 hour nurse triage line: [phone] ([phone])         SMOOTH Hernandez

## 2024-02-27 NOTE — LETTER
[facility] CARE COORDINATION  [facility]    February 27, 2024    Mikaela Figueroa  [address] [address]      Dear Mikaela,    I am a clinic care coordinator who works with Derek Kumar MD with the [facility]. I wanted to thank you for spending the time to talk with me.  Below is a description of clinic care coordination and how I can further assist you.       The clinic care coordination team is made up of a registered nurse, , financial resource worker and community health worker who understand the health care system. The goal of clinic care coordination is to help you manage your health and improve access to the health care system. Our team works alongside your provider to assist you in determining your health and social needs. We can help you obtain health care and community resources, providing you with necessary information and education. We can work with you through any barriers and develop a care plan that helps coordinate and strengthen the communication between you and your care team.  Our services are voluntary and are offered without charge to you personally.    Please feel free to contact me with any questions or concerns regarding care coordination and what we can offer.      We are focused on providing you with the highest-quality healthcare experience possible.    Sincerely,     Gaye Groves,   [facility]  [phone]

## 2024-02-27 NOTE — TELEPHONE ENCOUNTER
Nurse Triage SBAR    Situation: Information - Follow up from ER visit    Background: Daughter, Kalyani, calling. Consent: on file in chart.    Assessment: Patient has his follow up on March 5th. They are following up from the ER due to a complications from a UTI. Patient has seen a urologist following the ER visit. They are wondering if they should keep there appt. They also are going to discuss driving with the provider.     Protocol Recommended Disposition: Home care/ Telephone Advise    Recommendation: Advised Daughter that the patient needs to  keep the appt . Care advice given. Daughter verbalizes understanding and agrees with plan of care. Reviewed concerning symptoms and when to call back.     Syeda Nelson RN Nursing Advisor 2/27/2024 4:22 PM     Reason for Disposition   General information question, no triage required and triager able to answer question    Additional Information   Negative: [1] Caller is not with the adult (patient) AND [2] reporting urgent symptoms   Negative: Lab result questions   Negative: Medication questions   Negative: Caller can't be reached by phone   Negative: Caller has already spoken to PCP or another triager   Negative: RN needs further essential information from caller in order to complete triage   Negative: Requesting regular office appointment   Negative: [1] Caller requesting NON-URGENT health information AND [2] PCP's office is the best resource   Negative: Health Information question, no triage required and triager able to answer question    Protocols used: Information Only Call - [facility]

## 2024-02-28 ENCOUNTER — TELEPHONE (OUTPATIENT)
Dept: FAMILY MEDICINE | Facility: CLINIC | Age: 89
End: 2024-02-28
Payer: MEDICARE

## 2024-02-28 NOTE — TELEPHONE ENCOUNTER
Home Care is calling regarding an established patient with [facility].       Requesting orders from: Derek Kumar  Provider is following patient: Yes  Is this a 60-day recertification request?  No    Orders Requested    Physical Therapy  Request for initial certification (first set of orders)   Frequency:  1 time a week for 1 week, 2 times a week for 2 weeks, and 1 time a week for 3 weeks.  To work on strength, balance and endurance.       Information was gathered and will be sent to provider for review.  RN will contact Home Care with information after provider review.  Confirmed ok to leave a detailed message with call back.  Contact information confirmed and updated as needed.    SERENA LomeilN, RN  [facility]

## 2024-02-29 NOTE — TELEPHONE ENCOUNTER
Left detailed message of approved order. To call back if she have any questions or needing clarification. To fax any orders needing to be signed to Dr Kumar at [phone].    SERENA LomeliN, RN  [facility]

## 2024-03-03 ENCOUNTER — HEALTH MAINTENANCE LETTER (OUTPATIENT)
Age: 89
End: 2024-03-03

## 2024-03-05 ENCOUNTER — OFFICE VISIT (OUTPATIENT)
Dept: FAMILY MEDICINE | Facility: CLINIC | Age: 89
End: 2024-03-05
Payer: MEDICARE

## 2024-03-05 VITALS
BODY MASS INDEX: 22.76 KG/M2 | HEART RATE: 80 BPM | OXYGEN SATURATION: 98 % | WEIGHT: 159 LBS | TEMPERATURE: 97.8 F | DIASTOLIC BLOOD PRESSURE: 70 MMHG | SYSTOLIC BLOOD PRESSURE: 138 MMHG | RESPIRATION RATE: 16 BRPM | HEIGHT: 70 IN

## 2024-03-05 DIAGNOSIS — R41.3 MEMORY LOSS: ICD-10-CM

## 2024-03-05 DIAGNOSIS — N13.30 HYDRONEPHROSIS, UNSPECIFIED HYDRONEPHROSIS TYPE: Primary | ICD-10-CM

## 2024-03-05 PROCEDURE — 99213 OFFICE O/P EST LOW 20 MIN: CPT | Performed by: FAMILY MEDICINE

## 2024-03-05 RX ORDER — RESPIRATORY SYNCYTIAL VIRUS VACCINE 120MCG/0.5
0.5 KIT INTRAMUSCULAR ONCE
Qty: 1 EACH | Refills: 0 | Status: CANCELLED | OUTPATIENT
Start: 2024-03-05 | End: 2024-03-05

## 2024-03-05 ASSESSMENT — PAIN SCALES - GENERAL: PAINLEVEL: NO PAIN (0)

## 2024-03-05 NOTE — PROGRESS NOTES
2/27/2024     3:35 PM   Post Discharge Outreach   Admission Date 2/4/2024   Reason for Admission rehab   Discharge Date 2/22/2024   Discharge Diagnosis rehab   How are you doing now that you are home? well   Do you feel your condition is stable enough to be safe at home until your provider visit? Yes   Does the patient have their discharge instructions?  Yes   Does the patient have questions regarding their discharge instructions?  No   Were you started on any new medications or were there changes to any of your previous medications?  No   Does the patient have all of their medications? Yes   Do you have questions regarding any of your medications?  No   Do you have all of your needed medical supplies or equipment (DME)?  (i.e. oxygen tank, CPAP, cane, etc.) Yes   Discharge follow-up appointment scheduled within 14 calendar days?  Yes   Discharge Follow Up Appointment Date 3/5/2024   Discharge Follow Up Appointment Scheduled with? Primary Care Provider     Hospital Follow-up Visit: Patient is being seen here along with his daughter to discuss his recent hospitalization due to hydronephrosis weakness secondary to UTI superimposed on chronic  Prostatic hypertrophy.  Patient is not alert and responsive 88-year-old who has been having some mild memory issues and some driving concerns according to his daughter.  He was hospitalized for 3 overnights at [facility] for UTIs treated with antibiotics catheter was placed he has been doing catheter hygiene appropriately at home he is getting home health care visits in addition to occupational therapy physical therapy and acts of daily living visits.  His wife is at home they do live in a [facility] in the [place] daughter is overall concerned about his health and safety and falls risk following this recurrent UTI situation.  We have suggested cranberry tablets he has a urology appointment on the 22nd of this month for definitive bladder training to  see if we can resolve these chronic UTIs which appear to be affecting memory and some motor skills.  My suggestion was to see if we can get this resolved not take away his driving privileges at this visit and see how he does medically.  I did offer a neurology referral but daughter was adamant about not doing that as it caused some family friction when neurology consultation was ordered for memory concerns for her mother.  I understand that I will hold back on that.  Chart was reviewed all medical questions asked were answered.  I will see if we can see the patient for follow-up with one of my partners here in 30 days.    Hospital/Nursing Home/IP Rehab Facility: [facility]  Date of Admission: 2/1/24  Date of Discharge: 2/4/24  TCU 2/4/24 - 2/20/2024  Reason(s) for Admission: fall, weakness, urinary sx    Was your hospitalization related to COVID-19? No   Problems taking medications regularly:  None  Medication changes since discharge: None  Problems adhering to non-medication therapy:  None    Summary of hospitalization:  [facility] discharge summary reviewed  Diagnostic Tests/Treatments reviewed.  Follow up needed:   Other Healthcare Providers Involved in Patient s Care:           Update since discharge: improved.         Plan of care communicated with patient

## 2024-03-07 ENCOUNTER — TELEPHONE (OUTPATIENT)
Dept: FAMILY MEDICINE | Facility: CLINIC | Age: 89
End: 2024-03-07
Payer: MEDICARE

## 2024-03-07 NOTE — TELEPHONE ENCOUNTER
Discharge summary shows Amlodipine 10 mg daily.  I do not have records to review from TCU.  I will forward to Dr. Kumar to review when he returns.  Thanks.

## 2024-03-07 NOTE — TELEPHONE ENCOUNTER
General Call      Reason for Call:  medication question: Amlodipine dosage    What are your questions or concerns:  Yadira-Nurse looking for medication clarification for Amlodipine. Patient has 2.5mg dosage at home, but was changed at some point possibly while in patient at the hospital. Nurse needing clarification on what dosage patient should be taking of medication. Please advise and call Yadira back at: [phone]

## 2024-03-07 NOTE — TELEPHONE ENCOUNTER
Left message to return call. Please relay provider's message to ALLAN May when she returns call.    SERENA LomeliN, RN  [facility]

## 2024-03-08 RX ORDER — AMLODIPINE BESYLATE 2.5 MG/1
2.5 TABLET ORAL DAILY
COMMUNITY
Start: 2024-03-08 | End: 2024-03-20

## 2024-03-08 NOTE — TELEPHONE ENCOUNTER
"Left message to return call for ALLAN May with [facility].    Please relay message to ALLAN May that per Dr Kumar:  \"Please notify to continue amlodipine 2.5 mg daily.  Will readdress at time of upcoming office visit on 4/3/24.\"     PARISA Lomeli, RN  [facility]   "

## 2024-03-08 NOTE — TELEPHONE ENCOUNTER
Called and informed pt that writer left message for home care nurse, but want to make sure pt is aware to take amlodipine 2.5 mg daily until appt 4/3/24 to readdress. Patient verbalized understanding. He stated he does not need any refills at this time.    SERENA LomeliN, RN  [facility]

## 2024-03-08 NOTE — TELEPHONE ENCOUNTER
Yadira [facility]  returns call. Writer relay RN/provider's message below. Caller verbalizes understanding and has no further questions.

## 2024-03-14 ENCOUNTER — ANCILLARY PROCEDURE (OUTPATIENT)
Dept: CARDIOLOGY | Facility: CLINIC | Age: 89
End: 2024-03-14
Attending: INTERNAL MEDICINE
Payer: MEDICARE

## 2024-03-14 DIAGNOSIS — Z95.0 CARDIAC PACEMAKER IN SITU: ICD-10-CM

## 2024-03-14 DIAGNOSIS — I49.5 SICK SINUS SYNDROME (H): ICD-10-CM

## 2024-03-15 ENCOUNTER — DOCUMENTATION ONLY (OUTPATIENT)
Dept: OTHER | Facility: CLINIC | Age: 89
End: 2024-03-15
Payer: MEDICARE

## 2024-03-15 LAB
MDC_IDC_EPISODE_DTM: NORMAL
MDC_IDC_EPISODE_DTM: NORMAL
MDC_IDC_EPISODE_DURATION: 1 S
MDC_IDC_EPISODE_DURATION: 1 S
MDC_IDC_EPISODE_ID: 17
MDC_IDC_EPISODE_ID: 18
MDC_IDC_EPISODE_TYPE: NORMAL
MDC_IDC_EPISODE_TYPE: NORMAL
MDC_IDC_LEAD_CONNECTION_STATUS: NORMAL
MDC_IDC_LEAD_CONNECTION_STATUS: NORMAL
MDC_IDC_LEAD_IMPLANT_DT: NORMAL
MDC_IDC_LEAD_IMPLANT_DT: NORMAL
MDC_IDC_LEAD_LOCATION: NORMAL
MDC_IDC_LEAD_LOCATION: NORMAL
MDC_IDC_LEAD_LOCATION_DETAIL_1: NORMAL
MDC_IDC_LEAD_LOCATION_DETAIL_1: NORMAL
MDC_IDC_LEAD_MFG: NORMAL
MDC_IDC_LEAD_MFG: NORMAL
MDC_IDC_LEAD_MODEL: NORMAL
MDC_IDC_LEAD_MODEL: NORMAL
MDC_IDC_LEAD_POLARITY_TYPE: NORMAL
MDC_IDC_LEAD_POLARITY_TYPE: NORMAL
MDC_IDC_LEAD_SERIAL: NORMAL
MDC_IDC_LEAD_SERIAL: NORMAL
MDC_IDC_LEAD_SPECIAL_FUNCTION: NORMAL
MDC_IDC_LEAD_SPECIAL_FUNCTION: NORMAL
MDC_IDC_MSMT_BATTERY_DTM: NORMAL
MDC_IDC_MSMT_BATTERY_REMAINING_LONGEVITY: 123 MO
MDC_IDC_MSMT_BATTERY_RRT_TRIGGER: 2.62
MDC_IDC_MSMT_BATTERY_STATUS: NORMAL
MDC_IDC_MSMT_BATTERY_VOLTAGE: 3.01 V
MDC_IDC_MSMT_LEADCHNL_RA_IMPEDANCE_VALUE: 304 OHM
MDC_IDC_MSMT_LEADCHNL_RA_IMPEDANCE_VALUE: 380 OHM
MDC_IDC_MSMT_LEADCHNL_RA_PACING_THRESHOLD_AMPLITUDE: 0.38 V
MDC_IDC_MSMT_LEADCHNL_RA_PACING_THRESHOLD_PULSEWIDTH: 0.4 MS
MDC_IDC_MSMT_LEADCHNL_RA_SENSING_INTR_AMPL: 1.5 MV
MDC_IDC_MSMT_LEADCHNL_RA_SENSING_INTR_AMPL: 1.5 MV
MDC_IDC_MSMT_LEADCHNL_RV_IMPEDANCE_VALUE: 380 OHM
MDC_IDC_MSMT_LEADCHNL_RV_IMPEDANCE_VALUE: 456 OHM
MDC_IDC_MSMT_LEADCHNL_RV_PACING_THRESHOLD_AMPLITUDE: 0.5 V
MDC_IDC_MSMT_LEADCHNL_RV_PACING_THRESHOLD_PULSEWIDTH: 0.4 MS
MDC_IDC_MSMT_LEADCHNL_RV_SENSING_INTR_AMPL: 7.5 MV
MDC_IDC_MSMT_LEADCHNL_RV_SENSING_INTR_AMPL: 7.5 MV
MDC_IDC_PG_IMPLANT_DTM: NORMAL
MDC_IDC_PG_MFG: NORMAL
MDC_IDC_PG_MODEL: NORMAL
MDC_IDC_PG_SERIAL: NORMAL
MDC_IDC_PG_TYPE: NORMAL
MDC_IDC_SESS_CLINIC_NAME: NORMAL
MDC_IDC_SESS_DTM: NORMAL
MDC_IDC_SESS_TYPE: NORMAL
MDC_IDC_SET_BRADY_AT_MODE_SWITCH_RATE: 171 {BEATS}/MIN
MDC_IDC_SET_BRADY_HYSTRATE: NORMAL
MDC_IDC_SET_BRADY_LOWRATE: 50 {BEATS}/MIN
MDC_IDC_SET_BRADY_MAX_SENSOR_RATE: 120 {BEATS}/MIN
MDC_IDC_SET_BRADY_MAX_TRACKING_RATE: 120 {BEATS}/MIN
MDC_IDC_SET_BRADY_MODE: NORMAL
MDC_IDC_SET_BRADY_PAV_DELAY_LOW: 180 MS
MDC_IDC_SET_BRADY_SAV_DELAY_LOW: 150 MS
MDC_IDC_SET_LEADCHNL_RA_PACING_AMPLITUDE: 1.5 V
MDC_IDC_SET_LEADCHNL_RA_PACING_ANODE_ELECTRODE_1: NORMAL
MDC_IDC_SET_LEADCHNL_RA_PACING_ANODE_LOCATION_1: NORMAL
MDC_IDC_SET_LEADCHNL_RA_PACING_CAPTURE_MODE: NORMAL
MDC_IDC_SET_LEADCHNL_RA_PACING_CATHODE_ELECTRODE_1: NORMAL
MDC_IDC_SET_LEADCHNL_RA_PACING_CATHODE_LOCATION_1: NORMAL
MDC_IDC_SET_LEADCHNL_RA_PACING_POLARITY: NORMAL
MDC_IDC_SET_LEADCHNL_RA_PACING_PULSEWIDTH: 0.4 MS
MDC_IDC_SET_LEADCHNL_RA_SENSING_ANODE_ELECTRODE_1: NORMAL
MDC_IDC_SET_LEADCHNL_RA_SENSING_ANODE_LOCATION_1: NORMAL
MDC_IDC_SET_LEADCHNL_RA_SENSING_CATHODE_ELECTRODE_1: NORMAL
MDC_IDC_SET_LEADCHNL_RA_SENSING_CATHODE_LOCATION_1: NORMAL
MDC_IDC_SET_LEADCHNL_RA_SENSING_POLARITY: NORMAL
MDC_IDC_SET_LEADCHNL_RA_SENSING_SENSITIVITY: 0.3 MV
MDC_IDC_SET_LEADCHNL_RV_PACING_AMPLITUDE: 1.5 V
MDC_IDC_SET_LEADCHNL_RV_PACING_ANODE_ELECTRODE_1: NORMAL
MDC_IDC_SET_LEADCHNL_RV_PACING_ANODE_LOCATION_1: NORMAL
MDC_IDC_SET_LEADCHNL_RV_PACING_CAPTURE_MODE: NORMAL
MDC_IDC_SET_LEADCHNL_RV_PACING_CATHODE_ELECTRODE_1: NORMAL
MDC_IDC_SET_LEADCHNL_RV_PACING_CATHODE_LOCATION_1: NORMAL
MDC_IDC_SET_LEADCHNL_RV_PACING_POLARITY: NORMAL
MDC_IDC_SET_LEADCHNL_RV_PACING_PULSEWIDTH: 0.4 MS
MDC_IDC_SET_LEADCHNL_RV_SENSING_ANODE_ELECTRODE_1: NORMAL
MDC_IDC_SET_LEADCHNL_RV_SENSING_ANODE_LOCATION_1: NORMAL
MDC_IDC_SET_LEADCHNL_RV_SENSING_CATHODE_ELECTRODE_1: NORMAL
MDC_IDC_SET_LEADCHNL_RV_SENSING_CATHODE_LOCATION_1: NORMAL
MDC_IDC_SET_LEADCHNL_RV_SENSING_POLARITY: NORMAL
MDC_IDC_SET_LEADCHNL_RV_SENSING_SENSITIVITY: 0.9 MV
MDC_IDC_SET_ZONE_DETECTION_INTERVAL: 350 MS
MDC_IDC_SET_ZONE_DETECTION_INTERVAL: 400 MS
MDC_IDC_SET_ZONE_STATUS: NORMAL
MDC_IDC_SET_ZONE_STATUS: NORMAL
MDC_IDC_SET_ZONE_TYPE: NORMAL
MDC_IDC_SET_ZONE_VENDOR_TYPE: NORMAL
MDC_IDC_STAT_AT_BURDEN_PERCENT: 0 %
MDC_IDC_STAT_AT_DTM_END: NORMAL
MDC_IDC_STAT_AT_DTM_START: NORMAL
MDC_IDC_STAT_BRADY_AP_VP_PERCENT: 0.08 %
MDC_IDC_STAT_BRADY_AP_VS_PERCENT: 17.17 %
MDC_IDC_STAT_BRADY_AS_VP_PERCENT: 0.1 %
MDC_IDC_STAT_BRADY_AS_VS_PERCENT: 82.65 %
MDC_IDC_STAT_BRADY_DTM_END: NORMAL
MDC_IDC_STAT_BRADY_DTM_START: NORMAL
MDC_IDC_STAT_BRADY_RA_PERCENT_PACED: 17.54 %
MDC_IDC_STAT_BRADY_RV_PERCENT_PACED: 0.19 %
MDC_IDC_STAT_EPISODE_RECENT_COUNT: 0
MDC_IDC_STAT_EPISODE_RECENT_COUNT: 2
MDC_IDC_STAT_EPISODE_RECENT_COUNT_DTM_END: NORMAL
MDC_IDC_STAT_EPISODE_RECENT_COUNT_DTM_START: NORMAL
MDC_IDC_STAT_EPISODE_TOTAL_COUNT: 0
MDC_IDC_STAT_EPISODE_TOTAL_COUNT: 14
MDC_IDC_STAT_EPISODE_TOTAL_COUNT: 4
MDC_IDC_STAT_EPISODE_TOTAL_COUNT_DTM_END: NORMAL
MDC_IDC_STAT_EPISODE_TOTAL_COUNT_DTM_START: NORMAL
MDC_IDC_STAT_EPISODE_TYPE: NORMAL
MDC_IDC_STAT_TACHYTHERAPY_RECENT_DTM_END: NORMAL
MDC_IDC_STAT_TACHYTHERAPY_RECENT_DTM_START: NORMAL
MDC_IDC_STAT_TACHYTHERAPY_TOTAL_DTM_END: NORMAL
MDC_IDC_STAT_TACHYTHERAPY_TOTAL_DTM_START: NORMAL

## 2024-03-15 PROCEDURE — 93296 REM INTERROG EVL PM/IDS: CPT | Performed by: INTERNAL MEDICINE

## 2024-03-15 PROCEDURE — 93294 REM INTERROG EVL PM/LDLS PM: CPT | Performed by: INTERNAL MEDICINE

## 2024-03-20 DIAGNOSIS — I10 ESSENTIAL HYPERTENSION: Primary | ICD-10-CM

## 2024-03-20 RX ORDER — AMLODIPINE BESYLATE 2.5 MG/1
2.5 TABLET ORAL DAILY
Qty: 90 TABLET | Refills: 1 | Status: SHIPPED | OUTPATIENT
Start: 2024-03-20 | End: 2024-04-17

## 2024-03-20 NOTE — TELEPHONE ENCOUNTER
Medication Question or Refill    Contacts         Type Contact Phone/Fax    03/20/2024 12:01 PM CDT Fax (Incoming) [id number] - [place], [place] - [address] [place] [address] (Pharmacy) [phone]            What medication are you calling about (include dose and sig)?: amLODIPine (NORVASC) 2.5 MG tablet    Preferred Pharmacy:    [id number] - [place], [place] - [address] [place] [address]  [address] [place] [id number]  Phone: [phone] Fax: [phone]      Controlled Substance Agreement on file:   CSA -- Patient Level:    CSA: None found at the patient level.       Who prescribed the medication?: José    Do you need a refill? Yes    When did you use the medication last? N/A

## 2024-04-01 ENCOUNTER — TRANSFERRED RECORDS (OUTPATIENT)
Dept: HEALTH INFORMATION MANAGEMENT | Facility: CLINIC | Age: 89
End: 2024-04-01
Payer: MEDICARE

## 2024-04-03 ENCOUNTER — OFFICE VISIT (OUTPATIENT)
Dept: FAMILY MEDICINE | Facility: CLINIC | Age: 89
End: 2024-04-03
Payer: MEDICARE

## 2024-04-03 VITALS
DIASTOLIC BLOOD PRESSURE: 68 MMHG | WEIGHT: 164 LBS | SYSTOLIC BLOOD PRESSURE: 136 MMHG | HEIGHT: 70 IN | OXYGEN SATURATION: 98 % | HEART RATE: 80 BPM | TEMPERATURE: 98.1 F | BODY MASS INDEX: 23.48 KG/M2 | RESPIRATION RATE: 15 BRPM

## 2024-04-03 DIAGNOSIS — R33.9 URINARY RETENTION: Primary | ICD-10-CM

## 2024-04-03 DIAGNOSIS — N39.0 URINARY TRACT INFECTION WITHOUT HEMATURIA, SITE UNSPECIFIED: ICD-10-CM

## 2024-04-03 DIAGNOSIS — E55.9 VITAMIN D INSUFFICIENCY: ICD-10-CM

## 2024-04-03 DIAGNOSIS — R41.3 MEMORY LOSS: ICD-10-CM

## 2024-04-03 DIAGNOSIS — N18.32 CKD STAGE 3B, GFR 30-44 ML/MIN (H): ICD-10-CM

## 2024-04-03 DIAGNOSIS — N13.8 BPH WITH URINARY OBSTRUCTION: ICD-10-CM

## 2024-04-03 DIAGNOSIS — N40.1 BPH WITH URINARY OBSTRUCTION: ICD-10-CM

## 2024-04-03 LAB
ERYTHROCYTE [DISTWIDTH] IN BLOOD BY AUTOMATED COUNT: 12.2 % (ref 10–15)
HCT VFR BLD AUTO: 33.6 % (ref 40–53)
HGB BLD-MCNC: 10.9 G/DL (ref 13.3–17.7)
MCH RBC QN AUTO: 31 PG (ref 26.5–33)
MCHC RBC AUTO-ENTMCNC: 32.4 G/DL (ref 31.5–36.5)
MCV RBC AUTO: 96 FL (ref 78–100)
PLATELET # BLD AUTO: 172 10E3/UL (ref 150–450)
RBC # BLD AUTO: 3.52 10E6/UL (ref 4.4–5.9)
WBC # BLD AUTO: 8 10E3/UL (ref 4–11)

## 2024-04-03 PROCEDURE — 85027 COMPLETE CBC AUTOMATED: CPT | Performed by: FAMILY MEDICINE

## 2024-04-03 PROCEDURE — 99215 OFFICE O/P EST HI 40 MIN: CPT | Performed by: FAMILY MEDICINE

## 2024-04-03 PROCEDURE — 80053 COMPREHEN METABOLIC PANEL: CPT | Performed by: FAMILY MEDICINE

## 2024-04-03 PROCEDURE — 82306 VITAMIN D 25 HYDROXY: CPT | Performed by: FAMILY MEDICINE

## 2024-04-03 PROCEDURE — 36415 COLL VENOUS BLD VENIPUNCTURE: CPT | Performed by: FAMILY MEDICINE

## 2024-04-03 PROCEDURE — 84443 ASSAY THYROID STIM HORMONE: CPT | Performed by: FAMILY MEDICINE

## 2024-04-03 RX ORDER — RESPIRATORY SYNCYTIAL VIRUS VACCINE 120MCG/0.5
0.5 KIT INTRAMUSCULAR ONCE
Qty: 1 EACH | Refills: 0 | Status: CANCELLED | OUTPATIENT
Start: 2024-04-03 | End: 2024-04-03

## 2024-04-03 RX ORDER — LISINOPRIL 10 MG/1
10 TABLET ORAL DAILY
COMMUNITY
End: 2024-05-19

## 2024-04-03 ASSESSMENT — PAIN SCALES - GENERAL: PAINLEVEL: NO PAIN (0)

## 2024-04-03 NOTE — PROGRESS NOTES
Assessment/Plan:    Urinary retention  Urinary retention concerns.  Bloom catheter with a leg bag currently.  Scheduled to see urologist in the next week.  Has home health care nurse visit scheduled for tomorrow.  - Comprehensive metabolic panel    Urinary tract infection without hematuria, site unspecified  Prior UTI secondary to urinary retention associated BPH.  Asymptomatic currently following prior treatment.    BPH with urinary obstruction  Working with urology to determine management for BPH with urinary obstruction.    Stage 3 chronic kidney disease, 3b CKD (H)  History of CKD stage IIIb noted with most recent creatinine 2.08 and GFR 30.  Update renal function to ensure desired improvement or stable findings.  - CBC with platelets  - Comprehensive metabolic panel    Memory loss  Memory loss, slow progressive.  I do feel patient would be reasonable to resume driving privileges with supervision as well as shorter trips during daylight hours without rush hour highway use etc.  Patient and daughter Kalyani agreeable to this plan.  - TSH with free T4 reflex  - Comprehensive metabolic panel    Vitamin D insufficiency  Ensure adequate vitamin D replacement.  - Vitamin D Deficiency    40 minutes total time spent on the date of encounter including patient chart review, counseling and coordination of cares, and documentation.                  Subjective:    Mikaela HSU Henry is seen today for follow-up assessment.  Had been hospitalized previously February 1 through February for and then through transitional care.  Describes urinary retention with BPH.  UTI and pyelonephritis concerns associate with weakness and fall.  Known history of acute on chronic renal failure with CKD stage IIIb historically with most recent creatinine 2.08 and GFR 30.  Normochromic normocytic anemia.  In general no fever.  Bloom catheter leg bag working well.  Has home health nurse visit scheduled for tomorrow.  Prior Citrobacter UTI infection.   "Daughter Kalyani is with.  She is wondering about driving privileges and ability for father to resume driving.  Comprehensive review of systems as above otherwise all negative.       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 Zavaleta) then subsequent procedure 2 weeks later removing remaining stone in ureter \"with great difficulty\"   Dad - dec DM, CAD   Mom - dec Alzheimers   6-siblings [employer] (retired clergyman)   No smoke   Occ EtOH   Left fibula fracture (spiral) 5/07 (Dr. Quick, [facility])   TRUS with bx 10/7/11 biopsies negative (followed by Dr. Colindres)   Eye exam with Dr. Duarte in this building (monitoring for +/- glaucoma) - followed q year   Dr. Colindres, [facility] every year for elevated PSA, etc. (h/o TRUS with bx negative)   Dr. Beavers, dermatology for q 6 month checks re: \"precancerous\" lesions and rosacea; Mohs procedure left preauricular location 3/17/16   Bilateral hearing aids with h/o hearing loss (began using March, 2015)   Pacemaker 8/28/19 (PACEMAKER MOI XT DR COLETET PERRY)   Attends [employer] in [place], [place]       Past Surgical History:   Procedure Laterality Date    APPENDECTOMY      ARTHROSCOPY KNEE Right     CYSTOURETEROSCOPY, WITH LITHOTRIPSY USING ZABRINA 120P LASER AND URETERAL STENT INSERTION Left 10/25/2022    Procedure: CYSTOSCOPY, LEFT URETEROSCOPY, LASER LITHOTRIPSY;  Surgeon: Bob Dozier MD;  Location: [facility] OR    GENITOURINARY SURGERY      IR NEPHROSTOMY TUBE PLACEMENT LEFT  10/26/2022    IR URETERAL STENT PLACEMENT LEFT  11/1/2022    REPAIR MOHS Right 8/28/2023    Procedure: FOREHEAD FLAP REPAIR MOHS DEFECT RIGHT NOSE; COMPOSITE GRAFT;  Surgeon: Moe, " "MD Jake;  Location: [facility] Main OR        Family History   Problem Relation Age of Onset    Dementia Mother     Heart Disease Father     Diabetes Father         Past Medical History:   Diagnosis Date    Hypertension     Pacemaker         Social History     Tobacco Use    Smoking status: Former     Types: Pipe    Smokeless tobacco: Never   Substance Use Topics    Alcohol use: No    Drug use: No        Current Outpatient Medications   Medication Sig Dispense Refill    acetaminophen (TYLENOL) 325 MG tablet Take 1-2 tablets (325-650 mg) by mouth every 4 hours as needed for other or mild pain (For optimal non-opioid multimodal pain management to improve pain control.)      amLODIPine (NORVASC) 2.5 MG tablet Take 1 tablet (2.5 mg) by mouth daily 90 tablet 1    Cranberry (CRANBEREX PO) Taking 6 a day      Glucosamine-Chondroitin 250-200 MG CAPS Take 1 capsule by mouth daily      lisinopril (ZESTRIL) 10 MG tablet Take 10 mg by mouth daily      metroNIDAZOLE (METROGEL) 1 % external gel APPLY A SMALL AMOUNT TO AFFECTED AREA(S) TOPICALLY ONCE DAILY 60 g 1    multivitamin, therapeutic (THERA-VIT) TABS tablet Take 1 tablet by mouth daily      tamsulosin (FLOMAX) 0.4 MG capsule Take 1 capsule (0.4 mg) by mouth At Bedtime 90 capsule 3    triamcinolone (KENALOG) 0.1 % external cream Apply topically 2 times daily as needed for irritation            Objective:    Vitals:    04/03/24 1301   BP: 136/68   Pulse: 80   Resp: 15   Temp: 98.1  F (36.7  C)   SpO2: 98%   Weight: 74.4 kg (164 lb)   Height: 1.778 m (5' 10\")      Body mass index is 23.53 kg/m .    Alert.  Cooperative.  Minimal cognitive impairment, chronic, stable.  Smiling.  Well-groomed.  No psychomotor agitation etc.      This note has been dictated using voice recognition software and as a result may contain minor grammatical errors and unintended word substitutions.         Answers submitted by the patient for this visit:  General Questionnaire (Submitted on " 4/3/2024)  Chief Complaint: Chronic problems general questions HPI Form  What is the reason for your visit today? : followup after fall and prostate issues and recent infection  How many servings of fruits and vegetables do you eat daily?: 4 or more  On average, how many sweetened beverages do you drink each day (Examples: soda, juice, sweet tea, etc.  Do NOT count diet or artificially sweetened beverages)?: 1  How many minutes a day do you exercise enough to make your heart beat faster?: 9 or less  How many days a week do you exercise enough to make your heart beat faster?: 3 or less  How many days per week do you miss taking your medication?: 0

## 2024-04-04 LAB
ALBUMIN SERPL BCG-MCNC: 4 G/DL (ref 3.5–5.2)
ALP SERPL-CCNC: 174 U/L (ref 40–150)
ALT SERPL W P-5'-P-CCNC: 10 U/L (ref 0–70)
ANION GAP SERPL CALCULATED.3IONS-SCNC: 10 MMOL/L (ref 7–15)
AST SERPL W P-5'-P-CCNC: 16 U/L (ref 0–45)
BILIRUB SERPL-MCNC: 0.2 MG/DL
BUN SERPL-MCNC: 41.2 MG/DL (ref 8–23)
CALCIUM SERPL-MCNC: 9.3 MG/DL (ref 8.8–10.2)
CHLORIDE SERPL-SCNC: 106 MMOL/L (ref 98–107)
CREAT SERPL-MCNC: 2.02 MG/DL (ref 0.67–1.17)
DEPRECATED HCO3 PLAS-SCNC: 26 MMOL/L (ref 22–29)
EGFRCR SERPLBLD CKD-EPI 2021: 31 ML/MIN/1.73M2
GLUCOSE SERPL-MCNC: 105 MG/DL (ref 70–99)
POTASSIUM SERPL-SCNC: 5.1 MMOL/L (ref 3.4–5.3)
PROT SERPL-MCNC: 7 G/DL (ref 6.4–8.3)
SODIUM SERPL-SCNC: 142 MMOL/L (ref 135–145)
TSH SERPL DL<=0.005 MIU/L-ACNC: 1.72 UIU/ML (ref 0.3–4.2)
VIT D+METAB SERPL-MCNC: 47 NG/ML (ref 20–50)

## 2024-04-12 ENCOUNTER — TRANSFERRED RECORDS (OUTPATIENT)
Dept: HEALTH INFORMATION MANAGEMENT | Facility: CLINIC | Age: 89
End: 2024-04-12
Payer: MEDICARE

## 2024-04-17 DIAGNOSIS — I10 ESSENTIAL HYPERTENSION: ICD-10-CM

## 2024-04-17 RX ORDER — AMLODIPINE BESYLATE 2.5 MG/1
2.5 TABLET ORAL DAILY
Qty: 90 TABLET | Refills: 1 | Status: SHIPPED | OUTPATIENT
Start: 2024-04-17

## 2024-04-17 NOTE — TELEPHONE ENCOUNTER
Pending Prescriptions:                       Disp   Refills    amLODIPine (NORVASC) 2.5 MG tablet        90 tab*1            Sig: Take 1 tablet (2.5 mg) by mouth daily

## 2024-04-17 NOTE — TELEPHONE ENCOUNTER
Routing refill request to provider for review/approval because:  Change in pharmacy request to [facility].  Labs out of range:    Creatinine   Date Value Ref Range Status   04/03/2024 2.02 (H) 0.67 - 1.17 mg/dL Final     Pending Prescriptions:                       Disp   Refills    amLODIPine (NORVASC) 2.5 MG tablet         90 tab*1        Sig: Take 1 tablet (2.5 mg) by mouth daily    Last Written Prescription Date:  3/20/24  Last Fill Quantity: 90,  # refills: 1   Last office visitwith prescribing provider: 4/3/2024   Future Office Visit:   Appointments in Next Year      May 24, 2024  7:20 AM  (Arrive by 7:00 AM)  Annual Wellness Visit with Derek Kumar MD  [facility] ([facility]) [phone]     Jun 20, 2024 12:00 AM  CARDIAC DEVICE CHECK - REMOTE with [facility] REMOTE DEVICE CHECK FROM HOME  [facility] ([facility] ) [phone]            SERENA LomeliN, RN  [facility]

## 2024-04-23 ENCOUNTER — TELEPHONE (OUTPATIENT)
Dept: FAMILY MEDICINE | Facility: CLINIC | Age: 89
End: 2024-04-23
Payer: MEDICARE

## 2024-04-23 NOTE — TELEPHONE ENCOUNTER
ALLAN May, at [facility] is requesting skilled nursing orders below:      Home Care is calling regarding an established patient with [facility].       Requesting orders from: Derek Kumar  Provider is following patient: Yes  Is this a 60-day recertification request?  No    Orders Requested    Skilled Nursing  Request for initial certification (first set of orders)   Frequency:  One visit weekly for 8 weeks with 6 PRN visits for catheter management    Confirmed ok to leave a detailed message with call back.  Contact information confirmed and updated as needed.    Ivis Garza RN, BSN  [facility]

## 2024-04-24 ENCOUNTER — MEDICAL CORRESPONDENCE (OUTPATIENT)
Dept: HEALTH INFORMATION MANAGEMENT | Facility: CLINIC | Age: 89
End: 2024-04-24

## 2024-04-24 NOTE — TELEPHONE ENCOUNTER
Left detailed of approved requested orders for ALLAN May. To call back with any questions or concerns.    SERENA LomeliN, RN  [facility]

## 2024-04-26 PROBLEM — N40.1 LOWER URINARY TRACT SYMPTOMS DUE TO BENIGN PROSTATIC HYPERPLASIA: Status: ACTIVE | Noted: 2024-02-19

## 2024-04-26 PROBLEM — Z87.442 HISTORY OF RENAL CALCULI: Status: ACTIVE | Noted: 2024-02-19

## 2024-05-03 ENCOUNTER — OFFICE VISIT (OUTPATIENT)
Dept: FAMILY MEDICINE | Facility: CLINIC | Age: 89
End: 2024-05-03
Payer: MEDICARE

## 2024-05-03 VITALS
BODY MASS INDEX: 24.89 KG/M2 | RESPIRATION RATE: 16 BRPM | DIASTOLIC BLOOD PRESSURE: 70 MMHG | WEIGHT: 158.6 LBS | TEMPERATURE: 98.5 F | HEART RATE: 75 BPM | SYSTOLIC BLOOD PRESSURE: 134 MMHG | OXYGEN SATURATION: 98 % | HEIGHT: 67 IN

## 2024-05-03 DIAGNOSIS — Z95.0 CARDIAC PACEMAKER IN SITU: ICD-10-CM

## 2024-05-03 DIAGNOSIS — N40.1 BPH WITH URINARY OBSTRUCTION: ICD-10-CM

## 2024-05-03 DIAGNOSIS — N18.30 STAGE 3 CHRONIC KIDNEY DISEASE, UNSPECIFIED WHETHER STAGE 3A OR 3B CKD (H): ICD-10-CM

## 2024-05-03 DIAGNOSIS — E78.00 PURE HYPERCHOLESTEROLEMIA: ICD-10-CM

## 2024-05-03 DIAGNOSIS — I10 ESSENTIAL HYPERTENSION WITH GOAL BLOOD PRESSURE LESS THAN 130/80: ICD-10-CM

## 2024-05-03 DIAGNOSIS — N13.8 BPH WITH URINARY OBSTRUCTION: ICD-10-CM

## 2024-05-03 DIAGNOSIS — Z01.818 PRE-OP EXAMINATION: Primary | ICD-10-CM

## 2024-05-03 DIAGNOSIS — R97.20 ELEVATED PROSTATE SPECIFIC ANTIGEN (PSA): ICD-10-CM

## 2024-05-03 PROCEDURE — 99214 OFFICE O/P EST MOD 30 MIN: CPT

## 2024-05-03 ASSESSMENT — PAIN SCALES - GENERAL: PAINLEVEL: NO PAIN (0)

## 2024-05-17 ENCOUNTER — TRANSFERRED RECORDS (OUTPATIENT)
Dept: HEALTH INFORMATION MANAGEMENT | Facility: CLINIC | Age: 89
End: 2024-05-17
Payer: MEDICARE

## 2024-05-19 ENCOUNTER — APPOINTMENT (OUTPATIENT)
Dept: CT IMAGING | Facility: CLINIC | Age: 89
DRG: 699 | End: 2024-05-19
Attending: EMERGENCY MEDICINE
Payer: MEDICARE

## 2024-05-19 ENCOUNTER — HOSPITAL ENCOUNTER (INPATIENT)
Facility: CLINIC | Age: 89
LOS: 8 days | Discharge: HOME HEALTH | DRG: 699 | End: 2024-05-27
Attending: EMERGENCY MEDICINE | Admitting: INTERNAL MEDICINE
Payer: MEDICARE

## 2024-05-19 DIAGNOSIS — K59.00 CONSTIPATION, UNSPECIFIED CONSTIPATION TYPE: ICD-10-CM

## 2024-05-19 DIAGNOSIS — B37.49 CANDIDA PYELONEPHRITIS: ICD-10-CM

## 2024-05-19 DIAGNOSIS — E87.5 HYPERKALEMIA: ICD-10-CM

## 2024-05-19 DIAGNOSIS — N17.9 AKI (ACUTE KIDNEY INJURY) (H): ICD-10-CM

## 2024-05-19 DIAGNOSIS — R10.9 ACUTE RIGHT FLANK PAIN: ICD-10-CM

## 2024-05-19 DIAGNOSIS — N40.1 LOWER URINARY TRACT SYMPTOMS DUE TO BENIGN PROSTATIC HYPERPLASIA: ICD-10-CM

## 2024-05-19 DIAGNOSIS — B95.2 ENTEROCOCCUS UTI: ICD-10-CM

## 2024-05-19 DIAGNOSIS — N13.30 HYDRONEPHROSIS, UNSPECIFIED HYDRONEPHROSIS TYPE: Primary | ICD-10-CM

## 2024-05-19 DIAGNOSIS — N39.0 ENTEROCOCCUS UTI: ICD-10-CM

## 2024-05-19 PROBLEM — Z95.0 CARDIAC PACEMAKER IN SITU: Status: ACTIVE | Noted: 2019-09-03

## 2024-05-19 LAB
ALBUMIN SERPL BCG-MCNC: 3.3 G/DL (ref 3.5–5.2)
ALBUMIN UR-MCNC: 300 MG/DL
ALP SERPL-CCNC: 85 U/L (ref 40–150)
ALT SERPL W P-5'-P-CCNC: 8 U/L (ref 0–70)
ANION GAP SERPL CALCULATED.3IONS-SCNC: 13 MMOL/L (ref 7–15)
ANION GAP SERPL CALCULATED.3IONS-SCNC: 8 MMOL/L (ref 7–15)
APPEARANCE UR: ABNORMAL
AST SERPL W P-5'-P-CCNC: 18 U/L (ref 0–45)
ATRIAL RATE - MUSE: 79 BPM
BASOPHILS # BLD MANUAL: 0 10E3/UL (ref 0–0.2)
BASOPHILS NFR BLD MANUAL: 0 %
BILIRUB SERPL-MCNC: 0.3 MG/DL
BILIRUB UR QL STRIP: NEGATIVE
BUN SERPL-MCNC: 49.5 MG/DL (ref 8–23)
BUN SERPL-MCNC: 53.4 MG/DL (ref 8–23)
CALCIUM SERPL-MCNC: 8.3 MG/DL (ref 8.8–10.2)
CALCIUM SERPL-MCNC: 8.9 MG/DL (ref 8.8–10.2)
CHLORIDE SERPL-SCNC: 106 MMOL/L (ref 98–107)
CHLORIDE SERPL-SCNC: 110 MMOL/L (ref 98–107)
COLOR UR AUTO: ABNORMAL
CREAT SERPL-MCNC: 2.42 MG/DL (ref 0.67–1.17)
CREAT SERPL-MCNC: 2.7 MG/DL (ref 0.67–1.17)
DEPRECATED HCO3 PLAS-SCNC: 20 MMOL/L (ref 22–29)
DEPRECATED HCO3 PLAS-SCNC: 23 MMOL/L (ref 22–29)
DIASTOLIC BLOOD PRESSURE - MUSE: 54 MMHG
EGFRCR SERPLBLD CKD-EPI 2021: 22 ML/MIN/1.73M2
EGFRCR SERPLBLD CKD-EPI 2021: 25 ML/MIN/1.73M2
ELLIPTOCYTES BLD QL SMEAR: SLIGHT
EOSINOPHIL # BLD MANUAL: 0 10E3/UL (ref 0–0.7)
EOSINOPHIL NFR BLD MANUAL: 0 %
ERYTHROCYTE [DISTWIDTH] IN BLOOD BY AUTOMATED COUNT: 12.7 % (ref 10–15)
ERYTHROCYTE [DISTWIDTH] IN BLOOD BY AUTOMATED COUNT: 12.7 % (ref 10–15)
GLUCOSE BLDC GLUCOMTR-MCNC: 138 MG/DL (ref 70–99)
GLUCOSE BLDC GLUCOMTR-MCNC: 80 MG/DL (ref 70–99)
GLUCOSE SERPL-MCNC: 103 MG/DL (ref 70–99)
GLUCOSE SERPL-MCNC: 116 MG/DL (ref 70–99)
GLUCOSE UR STRIP-MCNC: NEGATIVE MG/DL
HCT VFR BLD AUTO: 27.3 % (ref 40–53)
HCT VFR BLD AUTO: 27.9 % (ref 40–53)
HGB BLD-MCNC: 9.1 G/DL (ref 13.3–17.7)
HGB BLD-MCNC: 9.2 G/DL (ref 13.3–17.7)
HGB UR QL STRIP: ABNORMAL
INR PPP: 1.11 (ref 0.85–1.15)
INTERPRETATION ECG - MUSE: NORMAL
KETONES UR STRIP-MCNC: NEGATIVE MG/DL
LACTATE SERPL-SCNC: 1.2 MMOL/L (ref 0.7–2)
LEUKOCYTE ESTERASE UR QL STRIP: ABNORMAL
LYMPHOCYTES # BLD MANUAL: 1.6 10E3/UL (ref 0.8–5.3)
LYMPHOCYTES NFR BLD MANUAL: 9 %
MCH RBC QN AUTO: 31 PG (ref 26.5–33)
MCH RBC QN AUTO: 31.3 PG (ref 26.5–33)
MCHC RBC AUTO-ENTMCNC: 32.6 G/DL (ref 31.5–36.5)
MCHC RBC AUTO-ENTMCNC: 33.7 G/DL (ref 31.5–36.5)
MCV RBC AUTO: 93 FL (ref 78–100)
MCV RBC AUTO: 95 FL (ref 78–100)
MONOCYTES # BLD MANUAL: 1.6 10E3/UL (ref 0–1.3)
MONOCYTES NFR BLD MANUAL: 9 %
NEUTROPHILS # BLD MANUAL: 15 10E3/UL (ref 1.6–8.3)
NEUTROPHILS NFR BLD MANUAL: 82 %
NITRATE UR QL: NEGATIVE
NRBC # BLD AUTO: 0 10E3/UL
NRBC # BLD AUTO: 0 10E3/UL
NRBC BLD AUTO-RTO: 0 /100
NRBC BLD AUTO-RTO: 0 /100
P AXIS - MUSE: 51 DEGREES
PH UR STRIP: 6.5 [PH] (ref 5–7)
PLAT MORPH BLD: ABNORMAL
PLATELET # BLD AUTO: 140 10E3/UL (ref 150–450)
PLATELET # BLD AUTO: 165 10E3/UL (ref 150–450)
POTASSIUM SERPL-SCNC: 5.2 MMOL/L (ref 3.4–5.3)
POTASSIUM SERPL-SCNC: 5.6 MMOL/L (ref 3.4–5.3)
PR INTERVAL - MUSE: 222 MS
PROT SERPL-MCNC: 5.8 G/DL (ref 6.4–8.3)
QRS DURATION - MUSE: 114 MS
QT - MUSE: 398 MS
QTC - MUSE: 456 MS
R AXIS - MUSE: -73 DEGREES
RBC # BLD AUTO: 2.94 10E6/UL (ref 4.4–5.9)
RBC # BLD AUTO: 2.94 10E6/UL (ref 4.4–5.9)
RBC MORPH BLD: ABNORMAL
RBC URINE: >182 /HPF
SODIUM SERPL-SCNC: 139 MMOL/L (ref 135–145)
SODIUM SERPL-SCNC: 141 MMOL/L (ref 135–145)
SP GR UR STRIP: 1.01 (ref 1–1.03)
SYSTOLIC BLOOD PRESSURE - MUSE: 112 MMHG
T AXIS - MUSE: 60 DEGREES
UROBILINOGEN UR STRIP-MCNC: <2 MG/DL
VENTRICULAR RATE- MUSE: 79 BPM
WBC # BLD AUTO: 15.1 10E3/UL (ref 4–11)
WBC # BLD AUTO: 18.3 10E3/UL (ref 4–11)
WBC CLUMPS #/AREA URNS HPF: PRESENT /HPF
WBC URINE: >182 /HPF

## 2024-05-19 PROCEDURE — 85007 BL SMEAR W/DIFF WBC COUNT: CPT | Performed by: INTERNAL MEDICINE

## 2024-05-19 PROCEDURE — 96375 TX/PRO/DX INJ NEW DRUG ADDON: CPT

## 2024-05-19 PROCEDURE — 99207 PR APP CREDIT; MD BILLING SHARED VISIT: CPT | Performed by: HOSPITALIST

## 2024-05-19 PROCEDURE — 84295 ASSAY OF SERUM SODIUM: CPT | Performed by: INTERNAL MEDICINE

## 2024-05-19 PROCEDURE — 80048 BASIC METABOLIC PNL TOTAL CA: CPT | Performed by: EMERGENCY MEDICINE

## 2024-05-19 PROCEDURE — 250N000011 HC RX IP 250 OP 636: Performed by: EMERGENCY MEDICINE

## 2024-05-19 PROCEDURE — 258N000003 HC RX IP 258 OP 636: Performed by: INTERNAL MEDICINE

## 2024-05-19 PROCEDURE — 81001 URINALYSIS AUTO W/SCOPE: CPT | Performed by: EMERGENCY MEDICINE

## 2024-05-19 PROCEDURE — 120N000001 HC R&B MED SURG/OB

## 2024-05-19 PROCEDURE — 85610 PROTHROMBIN TIME: CPT | Performed by: EMERGENCY MEDICINE

## 2024-05-19 PROCEDURE — 83605 ASSAY OF LACTIC ACID: CPT | Performed by: HOSPITALIST

## 2024-05-19 PROCEDURE — 36415 COLL VENOUS BLD VENIPUNCTURE: CPT | Performed by: INTERNAL MEDICINE

## 2024-05-19 PROCEDURE — 85025 COMPLETE CBC W/AUTO DIFF WBC: CPT | Performed by: EMERGENCY MEDICINE

## 2024-05-19 PROCEDURE — 82374 ASSAY BLOOD CARBON DIOXIDE: CPT | Performed by: INTERNAL MEDICINE

## 2024-05-19 PROCEDURE — 87086 URINE CULTURE/COLONY COUNT: CPT | Performed by: EMERGENCY MEDICINE

## 2024-05-19 PROCEDURE — 36415 COLL VENOUS BLD VENIPUNCTURE: CPT | Performed by: HOSPITALIST

## 2024-05-19 PROCEDURE — 99285 EMERGENCY DEPT VISIT HI MDM: CPT | Mod: 25

## 2024-05-19 PROCEDURE — 93005 ELECTROCARDIOGRAM TRACING: CPT | Performed by: EMERGENCY MEDICINE

## 2024-05-19 PROCEDURE — 74176 CT ABD & PELVIS W/O CONTRAST: CPT | Mod: MA

## 2024-05-19 PROCEDURE — 250N000013 HC RX MED GY IP 250 OP 250 PS 637

## 2024-05-19 PROCEDURE — 85025 COMPLETE CBC W/AUTO DIFF WBC: CPT | Performed by: INTERNAL MEDICINE

## 2024-05-19 PROCEDURE — 250N000011 HC RX IP 250 OP 636: Performed by: INTERNAL MEDICINE

## 2024-05-19 PROCEDURE — 85007 BL SMEAR W/DIFF WBC COUNT: CPT | Performed by: EMERGENCY MEDICINE

## 2024-05-19 PROCEDURE — 80053 COMPREHEN METABOLIC PANEL: CPT | Performed by: EMERGENCY MEDICINE

## 2024-05-19 PROCEDURE — 36415 COLL VENOUS BLD VENIPUNCTURE: CPT | Performed by: EMERGENCY MEDICINE

## 2024-05-19 PROCEDURE — 96365 THER/PROPH/DIAG IV INF INIT: CPT

## 2024-05-19 PROCEDURE — 99223 1ST HOSP IP/OBS HIGH 75: CPT | Mod: AI | Performed by: INTERNAL MEDICINE

## 2024-05-19 RX ORDER — ACETAMINOPHEN 650 MG/1
650 SUPPOSITORY RECTAL EVERY 4 HOURS PRN
Status: DISCONTINUED | OUTPATIENT
Start: 2024-05-19 | End: 2024-05-27 | Stop reason: HOSPADM

## 2024-05-19 RX ORDER — ACETAMINOPHEN 325 MG/1
650 TABLET ORAL EVERY 4 HOURS PRN
Status: DISCONTINUED | OUTPATIENT
Start: 2024-05-19 | End: 2024-05-27 | Stop reason: HOSPADM

## 2024-05-19 RX ORDER — AMOXICILLIN 250 MG
2 CAPSULE ORAL 2 TIMES DAILY PRN
Status: DISCONTINUED | OUTPATIENT
Start: 2024-05-19 | End: 2024-05-27 | Stop reason: HOSPADM

## 2024-05-19 RX ORDER — FENTANYL CITRATE 50 UG/ML
50 INJECTION, SOLUTION INTRAMUSCULAR; INTRAVENOUS ONCE
Status: COMPLETED | OUTPATIENT
Start: 2024-05-19 | End: 2024-05-19

## 2024-05-19 RX ORDER — OXYBUTYNIN CHLORIDE 5 MG/1
5 TABLET ORAL 3 TIMES DAILY
Status: DISCONTINUED | OUTPATIENT
Start: 2024-05-19 | End: 2024-05-27 | Stop reason: HOSPADM

## 2024-05-19 RX ORDER — SODIUM CHLORIDE 9 MG/ML
INJECTION, SOLUTION INTRAVENOUS CONTINUOUS
Status: DISCONTINUED | OUTPATIENT
Start: 2024-05-19 | End: 2024-05-26

## 2024-05-19 RX ORDER — CEFTRIAXONE 1 G/1
1 INJECTION, POWDER, FOR SOLUTION INTRAMUSCULAR; INTRAVENOUS EVERY 24 HOURS
Status: DISCONTINUED | OUTPATIENT
Start: 2024-05-20 | End: 2024-05-27 | Stop reason: HOSPADM

## 2024-05-19 RX ORDER — HYDROCODONE BITARTRATE AND ACETAMINOPHEN 5; 325 MG/1; MG/1
1 TABLET ORAL EVERY 4 HOURS PRN
Status: DISCONTINUED | OUTPATIENT
Start: 2024-05-19 | End: 2024-05-27 | Stop reason: HOSPADM

## 2024-05-19 RX ORDER — CEFTRIAXONE 1 G/1
1 INJECTION, POWDER, FOR SOLUTION INTRAMUSCULAR; INTRAVENOUS ONCE
Status: COMPLETED | OUTPATIENT
Start: 2024-05-19 | End: 2024-05-19

## 2024-05-19 RX ORDER — LIDOCAINE 40 MG/G
CREAM TOPICAL
Status: DISCONTINUED | OUTPATIENT
Start: 2024-05-19 | End: 2024-05-27 | Stop reason: HOSPADM

## 2024-05-19 RX ORDER — CALCIUM CARBONATE 500 MG/1
1000 TABLET, CHEWABLE ORAL 4 TIMES DAILY PRN
Status: DISCONTINUED | OUTPATIENT
Start: 2024-05-19 | End: 2024-05-27 | Stop reason: HOSPADM

## 2024-05-19 RX ORDER — HYDROMORPHONE HCL IN WATER/PF 6 MG/30 ML
0.2 PATIENT CONTROLLED ANALGESIA SYRINGE INTRAVENOUS EVERY 4 HOURS PRN
Status: COMPLETED | OUTPATIENT
Start: 2024-05-19 | End: 2024-05-19

## 2024-05-19 RX ORDER — AMOXICILLIN 250 MG
1 CAPSULE ORAL 2 TIMES DAILY PRN
Status: DISCONTINUED | OUTPATIENT
Start: 2024-05-19 | End: 2024-05-27 | Stop reason: HOSPADM

## 2024-05-19 RX ADMIN — HYDROMORPHONE HYDROCHLORIDE 0.2 MG: 0.2 INJECTION, SOLUTION INTRAMUSCULAR; INTRAVENOUS; SUBCUTANEOUS at 12:25

## 2024-05-19 RX ADMIN — OXYBUTYNIN CHLORIDE 5 MG: 5 TABLET ORAL at 20:38

## 2024-05-19 RX ADMIN — SODIUM CHLORIDE: 9 INJECTION, SOLUTION INTRAVENOUS at 05:43

## 2024-05-19 RX ADMIN — FENTANYL CITRATE 50 MCG: 50 INJECTION, SOLUTION INTRAMUSCULAR; INTRAVENOUS at 02:20

## 2024-05-19 RX ADMIN — SODIUM CHLORIDE 500 ML: 9 INJECTION, SOLUTION INTRAVENOUS at 05:43

## 2024-05-19 RX ADMIN — CEFTRIAXONE 1 G: 1 INJECTION, POWDER, FOR SOLUTION INTRAMUSCULAR; INTRAVENOUS at 03:51

## 2024-05-19 RX ADMIN — OXYBUTYNIN CHLORIDE 5 MG: 5 TABLET ORAL at 12:26

## 2024-05-19 RX ADMIN — HYDROMORPHONE HYDROCHLORIDE 0.2 MG: 0.2 INJECTION, SOLUTION INTRAMUSCULAR; INTRAVENOUS; SUBCUTANEOUS at 05:47

## 2024-05-19 ASSESSMENT — ACTIVITIES OF DAILY LIVING (ADL)
ADLS_ACUITY_SCORE: 39
ADLS_ACUITY_SCORE: 49
ADLS_ACUITY_SCORE: 39
ADLS_ACUITY_SCORE: 39
ADLS_ACUITY_SCORE: 41
ADLS_ACUITY_SCORE: 39
ADLS_ACUITY_SCORE: 36
ADLS_ACUITY_SCORE: 36
ADLS_ACUITY_SCORE: 39
ADLS_ACUITY_SCORE: 36
ADLS_ACUITY_SCORE: 39
ADLS_ACUITY_SCORE: 41
ADLS_ACUITY_SCORE: 39
ADLS_ACUITY_SCORE: 36
ADLS_ACUITY_SCORE: 41
ADLS_ACUITY_SCORE: 39
ADLS_ACUITY_SCORE: 36

## 2024-05-19 ASSESSMENT — COLUMBIA-SUICIDE SEVERITY RATING SCALE - C-SSRS
2. HAVE YOU ACTUALLY HAD ANY THOUGHTS OF KILLING YOURSELF IN THE PAST MONTH?: NO
1. IN THE PAST MONTH, HAVE YOU WISHED YOU WERE DEAD OR WISHED YOU COULD GO TO SLEEP AND NOT WAKE UP?: NO
6. HAVE YOU EVER DONE ANYTHING, STARTED TO DO ANYTHING, OR PREPARED TO DO ANYTHING TO END YOUR LIFE?: NO

## 2024-05-19 NOTE — ED TRIAGE NOTES
Pt had TURP procedure performed at [place] [facility] on Friday. Pt was discharged Saturday and has not had much urinary output in catheter drainage bag. Pt is having R flank pain and urinary urgency after drinking any water.     Triage Assessment (Adult)       Row Name 05/19/24 0044          Triage Assessment    Airway WDL WDL        Respiratory WDL    Respiratory WDL WDL        Skin Circulation/Temperature WDL    Skin Circulation/Temperature WDL WDL        Cardiac WDL    Cardiac WDL WDL        Peripheral/Neurovascular WDL    Peripheral Neurovascular WDL WDL        Cognitive/Neuro/Behavioral WDL    Cognitive/Neuro/Behavioral WDL WDL

## 2024-05-19 NOTE — ED NOTES
Pt's bladder scan resulted at 42 mL. Irrigation port on 3 way catheter was left open and pt states he has had to change his incontinence brief and it was saturated with bloody appearing urine multiple times in the day. Catheter irrigated and clear fluid returned in collection bag. Plug placed in irrigation port and MD updated.

## 2024-05-19 NOTE — ED PROVIDER NOTES
EMERGENCY DEPARTMENT ENCOUnter      NAME: Mikaela Figueroa  AGE: 88 year old male  YOB: 1935  MRN: [id number]  EVALUATION DATE & TIME: 5/19/2024 12:44 AM    PCP: Derek Kumar    ED PROVIDER: Germaine Verma MD      Chief Complaint   Patient presents with    Post-op Problem         FINAL IMPRESSION:  1. Acute right flank pain    2. Hydronephrosis, unspecified hydronephrosis type    3. SUJATHA (acute kidney injury) (H24)    4. Hyperkalemia          ED COURSE & MEDICAL DECISION MAKING:      In summary, the patient is an 88-year-old male that presents to the emergency department for evaluation of concerns that his Bloom catheter is not draining and right flank pain.  The patient's Bloom catheter is draining.  The patient had a three-way stopcock on the Bloom catheter outlet and the stopcock was turned to the drain away from the catheter bag.  Patient's flank pain could be secondary to his known hydronephrosis and possible UTI.  He is also noted to have worsening kidney function.  We will admit to the hospital for further care and evaluation.    0048-I met with the patient, obtained history, performed an initial exam, and discussed options and plan for diagnostics and treatment here in the ED. Fentanyl 50 mcg IV was administered for pain.  0401-Updated and rechecked patient.  Ceftriaxone 1 g IV was administered for possible urinary tract infection.  0424-I spoke with Varun Minor PA-C with [facility].  He recommends admission to the hospital and plan is for a urinary stent.  0500-discussed case with hospitalist, Dr. Mays, and he accepts patient for admission.    Medical Decision Making  Obtained supplemental history:Supplemental history obtained?: No  Reviewed external records: External records reviewed?: Documented in chart  Care impacted by chronic illness:Hypertension  Care significantly affected by social determinants of health:Access to Medical Care  Did you consider but not order tests?:  Work up considered but not performed and documented in chart, if applicable  Did you interpret images independently?: Independent interpretation of ECG and images noted in documentation, when applicable.  Consultation discussion with other provider:Did you involve another provider (consultant, , pharmacy, etc.)?: I discussed the care with another health care provider, see documentation for details.  Hospitalist and urology  Admit.      At the conclusion of the encounter I discussed the results of all of the tests and the disposition. The questions were answered. The patient or family acknowledged understanding and was agreeable with the care plan.         MEDICATIONS GIVEN IN THE EMERGENCY:  Medications   fentaNYL (PF) (SUBLIMAZE) injection 50 mcg (50 mcg Intravenous $Given 5/19/24 9112)   cefTRIAXone (ROCEPHIN) 1 g vial to attach to  mL bag for ADULTS or NS 50 mL bag for PEDS (0 g Intravenous Stopped 5/19/24 3449)       NEW PRESCRIPTIONS STARTED AT TODAY'S ER VISIT  New Prescriptions    No medications on file          =================================================================    HPI        Mikaela Figueroa is a 88 year old male with a pertinent history of HTN who presents to this ED via walk-in for evaluation of post-op problem.    The patient reports he underwent a TURP procedure at [facility] on Friday (2 days ago and was discharged yesterday afternoon. The procedure was done by Dr. Dozier from [facility]. He had a smith catheter placed during hospitalization and was noted to have hematuria at that time but cleared up on the day of discharge. He is now having urinary retention and right flank pain. He rates his flank pain  a 5-6/10. He is not on blood thinners.    Otherwise, the patient denied having vomiting, fevers, chills, and any other medical complaints at this time.    Per chart review, the patient had a cystoscopy, bi-polar transurethral resection of prostate on 05/17/2024 at [facility], performed by Dr. Bob Dozier. 22 Croatian 3-way urethral smith catheter was inserted. Patient was discontinued lisinopril on 05/18 for hyperkalemia. Smith with clear but bloody urine output but eventually turned more yellow in color near discharge.    REVIEW OF SYSTEMS     Constitutional:  Denies fever or chills  HENT:  Denies sore throat   Respiratory:  Denies cough or shortness of breath   Cardiovascular:  Denies chest pain or palpitations  GI:  Denies abdominal pain, nausea, or vomiting  : Positive for urinary retention, hematuria, and right flank pain.  Musculoskeletal:  Denies any new extremity pain   Skin:  Denies rash   Neurologic:  Denies headache, focal weakness or sensory changes    All other systems reviewed and are negative      PAST MEDICAL HISTORY:  Past Medical History:   Diagnosis Date    Hypertension     Pacemaker        PAST SURGICAL HISTORY:  Past Surgical History:   Procedure Laterality Date    APPENDECTOMY      ARTHROSCOPY KNEE Right     CYSTOURETEROSCOPY, WITH LITHOTRIPSY USING ZABRINA 120P LASER AND URETERAL STENT INSERTION Left 10/25/2022    Procedure: CYSTOSCOPY, LEFT URETEROSCOPY, LASER LITHOTRIPSY;  Surgeon: Bob Dozier MD;  Location: [facility] OR    GENITOURINARY SURGERY      IR NEPHROSTOMY TUBE PLACEMENT LEFT  10/26/2022    IR URETERAL STENT PLACEMENT LEFT  11/1/2022    REPAIR MOHS Right 8/28/2023    Procedure: FOREHEAD FLAP REPAIR MOHS DEFECT RIGHT NOSE; COMPOSITE GRAFT;  Surgeon: Jake Rosa MD;  Location: [facility] Main OR           CURRENT MEDICATIONS:    acetaminophen (TYLENOL) 325 MG tablet  amLODIPine (NORVASC) 2.5 MG tablet  Cranberry (CRANBEREX PO)  Glucosamine-Chondroitin 250-200 MG CAPS  lisinopril (ZESTRIL) 10 MG tablet  metroNIDAZOLE (METROGEL) 1 % external gel  multivitamin, therapeutic (THERA-VIT) TABS tablet  tamsulosin (FLOMAX) 0.4 MG capsule  triamcinolone (KENALOG) 0.1 % external cream        ALLERGIES:  No Known Allergies    FAMILY  "HISTORY:  Family History   Problem Relation Age of Onset    Dementia Mother     Heart Disease Father     Diabetes Father        SOCIAL HISTORY:   Social History     Socioeconomic History    Marital status:    Tobacco Use    Smoking status: Former     Types: Pipe    Smokeless tobacco: Never   Vaping Use    Vaping status: Never Used   Substance and Sexual Activity    Alcohol use: No    Drug use: No     Social Determinants of Health     Interpersonal Safety: Low Risk  (3/5/2024)    Interpersonal Safety     Do you feel physically and emotionally safe where you currently live?: Yes     Within the past 12 months, have you been hit, slapped, kicked or otherwise physically hurt by someone?: No     Within the past 12 months, have you been humiliated or emotionally abused in other ways by your partner or ex-partner?: No       VITALS:  Patient Vitals for the past 24 hrs:   BP Temp Temp src Pulse Resp SpO2 Height Weight   05/19/24 0430 137/65 -- -- 81 -- 98 % -- --   05/19/24 0400 (!) 145/65 -- -- 80 -- 98 % -- --   05/19/24 0330 139/64 -- -- 81 -- 98 % -- --   05/19/24 0043 (!) 145/68 98.9  F (37.2  C) Temporal 89 16 96 % 1.778 m (5' 10\") 70.3 kg (155 lb)       PHYSICAL EXAM    Constitutional:  Well developed, Well nourished,  HENT:  Normocephalic, Atraumatic, Bilateral external ears normal, Oropharynx moist, Nose normal.   Neck:  Normal range of motion, No meningismus, No stridor.   Eyes:  EOMI, Conjunctiva normal, No discharge.   Respiratory:  Normal breath sounds, No respiratory distress, No wheezing, No chest tenderness.   Cardiovascular:  Normal heart rate, Normal rhythm, No murmurs  GI:  Soft, No tenderness, No guarding, No CVA tenderness.   Musculoskeletal:  Neurovascularly intact distally, No edema, No tenderness, No cyanosis, Good range of motion in all major joints. No tenderness to palpation or major deformities noted.   Integument:  Warm, Dry, No erythema, No rash.   Lymphatic:  No lymphadenopathy noted. "   Neurologic:  Alert & oriented , Normal motor function,  No focal deficits noted.   Psychiatric:  Affect normal, Judgment normal, Mood normal.   -pinkish color urine is noted to be in Bloom catheter tubing and bag     LAB:  All pertinent labs reviewed and interpreted.  Results for orders placed or performed during the hospital encounter of 05/19/24   CT Abdomen Pelvis w/o Contrast    Impression    IMPRESSION:   1. Severe right and moderate to severe left diffuse hydroureter/hydronephrosis down to the level of the urinary bladder, not significantly changed as compared to 02/01/2024. No obstructing ureteral stone visualized.  2. Prostatomegaly with diffuse urinary bladder wall thickening, likely due to chronic bladder outlet obstruction.  3. Colonic diverticulosis without CT evidence of acute diverticulitis.     Result Value Ref Range    INR 1.11 0.85 - 1.15   Basic metabolic panel   Result Value Ref Range    Sodium 139 135 - 145 mmol/L    Potassium 5.6 (H) 3.4 - 5.3 mmol/L    Chloride 106 98 - 107 mmol/L    Carbon Dioxide (CO2) 20 (L) 22 - 29 mmol/L    Anion Gap 13 7 - 15 mmol/L    Urea Nitrogen 53.4 (H) 8.0 - 23.0 mg/dL    Creatinine 2.70 (H) 0.67 - 1.17 mg/dL    GFR Estimate 22 (L) >60 mL/min/1.73m2    Calcium 8.9 8.8 - 10.2 mg/dL    Glucose 116 (H) 70 - 99 mg/dL   UA with Microscopic reflex to Culture    Specimen: Urine, Bloom Catheter   Result Value Ref Range    Color Urine Light Orange (A) Colorless, Straw, Light Yellow, Yellow    Appearance Urine Turbid (A) Clear    Glucose Urine Negative Negative mg/dL    Bilirubin Urine Negative Negative    Ketones Urine Negative Negative mg/dL    Specific Gravity Urine 1.008 1.001 - 1.030    Blood Urine 1.0 mg/dL (A) Negative    pH Urine 6.5 5.0 - 7.0    Protein Albumin Urine 300 (A) Negative mg/dL    Urobilinogen Urine <2.0 <2.0 mg/dL    Nitrite Urine Negative Negative    Leukocyte Esterase Urine 500 Rober/uL (A) Negative    WBC Clumps Urine Present (A) None Seen /HPF     RBC Urine >182 (H) <=2 /HPF    WBC Urine >182 (H) <=5 /HPF   CBC with platelets and differential   Result Value Ref Range    WBC Count 18.3 (H) 4.0 - 11.0 10e3/uL    RBC Count 2.94 (L) 4.40 - 5.90 10e6/uL    Hemoglobin 9.2 (L) 13.3 - 17.7 g/dL    Hematocrit 27.3 (L) 40.0 - 53.0 %    MCV 93 78 - 100 fL    MCH 31.3 26.5 - 33.0 pg    MCHC 33.7 31.5 - 36.5 g/dL    RDW 12.7 10.0 - 15.0 %    Platelet Count 165 150 - 450 10e3/uL    NRBCs per 100 WBC 0 <1 /100    Absolute NRBCs 0.0 10e3/uL   Manual Differential   Result Value Ref Range    % Neutrophils 82 %    % Lymphocytes 9 %    % Monocytes 9 %    % Eosinophils 0 %    % Basophils 0 %    Absolute Neutrophils 15.0 (H) 1.6 - 8.3 10e3/uL    Absolute Lymphocytes 1.6 0.8 - 5.3 10e3/uL    Absolute Monocytes 1.6 (H) 0.0 - 1.3 10e3/uL    Absolute Eosinophils 0.0 0.0 - 0.7 10e3/uL    Absolute Basophils 0.0 0.0 - 0.2 10e3/uL    RBC Morphology Confirmed RBC Indices     Platelet Assessment  Automated Count Confirmed. Platelet morphology is normal.     Automated Count Confirmed. Platelet morphology is normal.    Elliptocytes Slight (A) None Seen   ECG 12-LEAD WITH MUSE (LHE)   Result Value Ref Range    Systolic Blood Pressure 112 mmHg    Diastolic Blood Pressure 54 mmHg    Ventricular Rate 79 BPM    Atrial Rate 79 BPM    OK Interval 222 ms    QRS Duration 114 ms     ms    QTc 456 ms    P Axis 51 degrees    R AXIS -73 degrees    T Axis 60 degrees    Interpretation ECG       Sinus rhythm with 1st degree A-V block  Incomplete right bundle branch block  Left anterior fascicular block  Abnormal ECG  When compared with ECG of 01-OCT-2022 18:54,  No significant change was found  Confirmed by SEE ED PROVIDER NOTE FOR, ECG INTERPRETATION (4000),  RODRÍGUEZ HERRING ([id number]) on 5/19/2024 3:27:52 AM         RADIOLOGY:  I have independently reviewed and interpreted the above imaging, pending the final radiology read.  CT Abdomen Pelvis w/o Contrast   Final Result   IMPRESSION:    1. Severe  right and moderate to severe left diffuse hydroureter/hydronephrosis down to the level of the urinary bladder, not significantly changed as compared to 2024. No obstructing ureteral stone visualized.   2. Prostatomegaly with diffuse urinary bladder wall thickening, likely due to chronic bladder outlet obstruction.   3. Colonic diverticulosis without CT evidence of acute diverticulitis.             EK-rate is 79, sinus, first-degree AV block, incomplete right bundle branch block, left anterior fascicular block  I have independently reviewed and interpreted this EKG          I, Kike Prieto, am serving as a scribe to document services personally performed by Dr. Verma based on my observation and the provider's statements to me. I, Germaine Verma MD attest that Kike Prieto is acting in a scribe capacity, has observed my performance of the services and has documented them in accordance with my direction.    Germaine Verma MD  Emergency Medicine  [facility] EMERGENCY ROOM  [address] [id number] [phone]  Dept: [phone]     Germaine Verma MD  24 [id number]

## 2024-05-19 NOTE — ED NOTES
Given IV dilaudid for 8/10 flank pain; slightly effective relief. NPO for possible IR intervention. Will continue to monitor.

## 2024-05-19 NOTE — PHARMACY-ADMISSION MEDICATION HISTORY
Pharmacy Intern Admission Medication History    Admission medication history is complete. The information provided in this note is only as accurate as the sources available at the time of the update.    Medication reconciliation/reorder completed by provider prior to medication history? No    Information Source(s): Patient, Daughter and CareEverywhere/SureScripts via in-person    Pertinent Information: Spoke with the patient and his daughter in the room. Patient reported that he recently stopped taking lisinopril per provider direction d/t elevated potassium levels     Changes made to PTA medication list:  Added: None   Deleted: glucosamine and lisinopril   Changed: None     Allergies reviewed with patient and updates made in EHR: yes    Medication available for use during hospital stay: None     Medication History Completed By: Bismark Brennan 5/19/2024 8:53 AM    PTA Med List   Medication Sig Last Dose    acetaminophen (TYLENOL) 325 MG tablet Take 1-2 tablets (325-650 mg) by mouth every 4 hours as needed for other or mild pain (For optimal non-opioid multimodal pain management to improve pain control.) Unknown at PRN    amLODIPine (NORVASC) 2.5 MG tablet Take 1 tablet (2.5 mg) by mouth daily 5/18/2024 at hs    Cranberry (CRANBEREX PO) Take 6 tablets by mouth daily 5/18/2024    metroNIDAZOLE (METROGEL) 1 % external gel APPLY A SMALL AMOUNT TO AFFECTED AREA(S) TOPICALLY ONCE DAILY 5/18/2024    multivitamin, therapeutic (THERA-VIT) TABS tablet Take 1 tablet by mouth daily 5/18/2024    tamsulosin (FLOMAX) 0.4 MG capsule Take 1 capsule (0.4 mg) by mouth At Bedtime 5/18/2024 at hs    triamcinolone (KENALOG) 0.1 % external cream Apply topically 2 times daily as needed for irritation 5/18/2024 at PRN

## 2024-05-19 NOTE — CONSULTS
[facility] CONSULTATION    Type of Consult: inpatient  Place of Service: [facility]   Reason for consult: Bilateral hydronephrosis, UTI, SUJATHA   Request for consult by: [facility]    History of present illness:   Mikaela Figueroa is a 88 year old male that was admitted for SUJATHA (acute kidney injury) (H24). Urology is being consulted for see above. History obtained through patient and chart review.     Patient with history of CKD, bilateral hydronephrosis, and BPH, underwent TURP on 5/16 with Dr. Dozier due to urinary retention causing chronic hydronephrosis, secondary to BPH.  Patient was discharged from hospital with three-way Bloom catheter, at some point plug fell out of irrigation port and urine was draining into patient's brief.  Overnight the patient developed right flank pain causing him to present to the emergency department.  Patient is also experiencing bladder spasms during approximately every 25 minutes.    During the emergency department workup CT demonstrated bilateral hydronephrosis, CR 2.7, WBC 18.3, with UA concerning for infection.  Patient was then started on IV fluids and Rocephin.     Repeat labs on 5/19 demonstrated improvement in creatinine to 2.4 to and improvement in WBC to 15.1.     Past Medical History:  Past Medical History:   Diagnosis Date    Hypertension     Pacemaker        Past Surgical History:  Past Surgical History:   Procedure Laterality Date    APPENDECTOMY      ARTHROSCOPY KNEE Right     CYSTOURETEROSCOPY, WITH LITHOTRIPSY USING ZABRINA 120P LASER AND URETERAL STENT INSERTION Left 10/25/2022    Procedure: CYSTOSCOPY, LEFT URETEROSCOPY, LASER LITHOTRIPSY;  Surgeon: Bob Dozier MD;  Location: [facility] OR    GENITOURINARY SURGERY      IR NEPHROSTOMY TUBE PLACEMENT LEFT  10/26/2022    IR URETERAL STENT PLACEMENT LEFT  11/1/2022    REPAIR MOHS Right 8/28/2023    Procedure: FOREHEAD FLAP REPAIR MOHS DEFECT RIGHT NOSE; COMPOSITE GRAFT;  Surgeon: Jake Rosa MD;   Location: [facility] OR       Social History:  Social History     Socioeconomic History    Marital status:      Spouse name: Not on file    Number of children: Not on file    Years of education: Not on file    Highest education level: Not on file   Occupational History    Not on file   Tobacco Use    Smoking status: Former     Types: Pipe    Smokeless tobacco: Never   Vaping Use    Vaping status: Never Used   Substance and Sexual Activity    Alcohol use: No    Drug use: No    Sexual activity: Not on file   Other Topics Concern    Not on file   Social History Narrative    Not on file     Social Determinants of Health     Financial Resource Strain: Not on file   Food Insecurity: Not on file   Transportation Needs: Not on file   Physical Activity: Not on file   Stress: Not on file   Social Connections: Not on file   Interpersonal Safety: Low Risk  (3/5/2024)    Interpersonal Safety     Do you feel physically and emotionally safe where you currently live?: Yes     Within the past 12 months, have you been hit, slapped, kicked or otherwise physically hurt by someone?: No     Within the past 12 months, have you been humiliated or emotionally abused in other ways by your partner or ex-partner?: No   Housing Stability: Not on file       Medications:  Current Facility-Administered Medications   Medication Dose Route Frequency Provider Last Rate Last Admin    acetaminophen (TYLENOL) tablet 650 mg  650 mg Oral Q4H PRN Bruna Mays MD        Or    acetaminophen (TYLENOL) Suppository 650 mg  650 mg Rectal Q4H PRN Bruna Mays MD        calcium carbonate (TUMS) chewable tablet 1,000 mg  1,000 mg Oral 4x Daily PRN Bruna Mays MD        [START ON 5/20/2024] cefTRIAXone (ROCEPHIN) 1 g vial to attach to  mL bag for ADULTS or NS 50 mL bag for PEDS  1 g Intravenous Q24H Bruna Mays MD        HYDROcodone-acetaminophen (NORCO) 5-325 MG per tablet 1 tablet  1 tablet Oral Q4H PRN Bruna Mays  MD        lidocaine (LMX4) cream   Topical Q1H PRN Bruna Mays MD        lidocaine 1 % 0.1-1 mL  0.1-1 mL Other Q1H PRN Bruna Mays MD        melatonin tablet 1 mg  1 mg Oral At Bedtime PRN Bruna Mays MD        oxyBUTYnin (DITROPAN) tablet 5 mg  5 mg Oral TID Varun Minor PA-C   5 mg at 05/19/24 1226    senna-docusate (SENOKOT-S/PERICOLACE) 8.6-50 MG per tablet 1 tablet  1 tablet Oral BID PRN Bruna Mays MD        Or    senna-docusate (SENOKOT-S/PERICOLACE) 8.6-50 MG per tablet 2 tablet  2 tablet Oral BID PRN Bruna Mays MD        sodium chloride (PF) 0.9% PF flush 3 mL  3 mL Intracatheter Q8H Bruna Mays MD   3 mL at 05/19/24 0543    sodium chloride (PF) 0.9% PF flush 3 mL  3 mL Intracatheter q1 min prn Bruna Mays MD        sodium chloride 0.9 % infusion   Intravenous Continuous Bruna Mays MD 75 mL/hr at 05/19/24 1218 Rate Verify at 05/19/24 1218       Allergies:   No Known Allergies    Review of Systems:   A full 12 point review of systems was taken and is negative aside from what is noted above in the HPI    Physical Exam:  Temp:  [97.9  F (36.6  C)-98.9  F (37.2  C)] 98.1  F (36.7  C)  Pulse:  [77-89] 80  Resp:  [12-21] 12  BP: (135-161)/(63-73) 149/67  SpO2:  [96 %-100 %] 98 %  General: NAD, alert, cooperative  Head: normocephalic, without abnormality / atraumatic  Abdomen: soft, not tender, not distended.  No suprapubic fullness/tenderness.  No CVA tenderness noted  Geniturinary: Three-way Bloom catheter patent and draining.  Urine orange/camila, no clots or sediment observed in tubing.  Skin: no rashes or lesions  Musculoskeletal: moves extremities equally; no calf edema or tenderness  Psychological: alert and oriented, answers questions appropriately      Labs:   Creatinine   Date Value Ref Range Status   05/19/2024 2.42 (H) 0.67 - 1.17 mg/dL Final       Lab Results   Component Value Date    WBC 15.1 05/19/2024     Lab Results   Component Value  Date    HGB 9.1 05/19/2024     Lab Results   Component Value Date     05/19/2024       UA RESULTS:  Recent Labs   Lab Test 05/19/24  0138 10/01/22  1953 01/14/22  1423   COLOR Light Orange*   < > Yellow   APPEARANCE Turbid*   < > Clear   URINEGLC Negative   < > Negative   URINEBILI Negative   < > Negative   URINEKETONE Negative   < > Negative   SG 1.008   < > 1.015   UBLD 1.0 mg/dL*   < > Negative   URINEPH 6.5   < > 7.0   PROTEIN 300*   < > Negative   UROBILINOGEN  --   --  0.2   NITRITE Negative   < > Negative   LEUKEST 500 Rober/uL*   < > Negative   RBCU >182*   < >  --    WBCU >182*   < >  --     < > = values in this interval not displayed.         Urine culture: pending    Lab Results: personally reviewed     Imaging:  EXAM: CT ABDOMEN PELVIS W/O CONTRAST  LOCATION: [facility]  DATE: 5/19/2024     INDICATION: Right flank pain.  COMPARISON: CT abdomen and pelvis on 02/01/2024.  TECHNIQUE: CT scan of the abdomen and pelvis was performed without intravenous contrast Multiplanar reformats were obtained. Dose reduction techniques were used.     FINDINGS:   LOWER CHEST: Basilar pulmonary opacities, likely atelectasis.     ABDOMEN/PELVIS: Limited evaluation of the abdominal organs due to lack of intravenous contrast.     HEPATOBILIARY: The unenhanced liver and gallbladder are grossly unremarkable.     PANCREAS: The unenhanced pancreas is grossly unremarkable.     SPLEEN: No splenomegaly.     ADRENAL GLANDS: No adrenal nodules.     KIDNEYS/BLADDER: No radiodense kidney stones. Severe right and moderate to severe left diffuse hydroureter/hydronephrosis down to the level of the urinary bladder. No obstructing ureteral stone visualized. The urinary bladder is decompressed with Bloom   catheter. Diffuse urinary bladder wall thickening, could be due to underdistention versus chronic bladder outlet obstruction versus chronic cystitis.     BOWEL: No abnormally dilated bowel loops. Colonic  diverticulosis without CT evidence of acute diverticulitis.     PERITONEUM: No evidence of free fluid in the abdomen and pelvis. No free peritoneal or portal venous gas.     PELVIC ORGANS: The prostate gland is enlarged measuring 6.6 cm in transverse diameter.     VASCULATURE: Moderate atherosclerotic vascular calcification of the abdominal aorta and iliac arteries.     LYMPH NODES: Unremarkable.     MUSCULOSKELETAL: Diffuse osteopenia with multilevel degenerative changes of the spine. Multiple compression deformities of the visualized thoracic and lumbar spine most prominent at L1 and L3 vertebra with approximately 50-60% vertebral height loss.   Small right inguinal hernia containing fat and nondilated small bowel loops.                                                                      IMPRESSION:   1. Severe right and moderate to severe left diffuse hydroureter/hydronephrosis down to the level of the urinary bladder, not significantly changed as compared to 02/01/2024. No obstructing ureteral stone visualized.  2. Prostatomegaly with diffuse urinary bladder wall thickening, likely due to chronic bladder outlet obstruction.  3. Colonic diverticulosis without CT evidence of acute diverticulitis.    I have personally reviewed the imaging reports above.     Assessment / Plan : Greenwood W Henry is being seen by [facility] for Bilateral hydro, SUJATHA, UTI, BPH s/p TURP    -88-year-old male admitted for SUJATHA with recent TURP on 5/17 for BPH.  Chronic bilateral hydronephrosis, not improved with Bloom catheter s/p  - UA concerning for active urinary tract infection.  UC pending.  Recommend continuing Rocephin pending results of urine culture.  - CT 5/19 severer bilateral hydronephrosis right greater than left to the level of the bladder. No obstructing tones. Prostatomegaly with bladder wall thickening.    -White count elevated but improved to 15.3  -Creatinine elevated, but improved to 2.43  -Discussed necessity  for decompression of the urinary tract in the setting of infection with family.  Given  bladder anatomy there was some difficulty visualizing ureteral orifices during his previous procedure, which would make ureteral stents difficult. Shared decision-making was utilized in determining if patient would like to be transferred to hospital with IR capabilities to complete bilateral PNT placement tonight versus tomorrow.  Patient and family member present agreed to PNT placement at [facility] tomorrow was reasonable given improvement in labs, continued urine output, and anxiety surrounding hospital transfer.  -Consult placed for IR to place bilateral PNT's  - Oxybutinin TID ordrered for bladder spasms. Nursing to try apply warm towel/blanket to lower abdomen.  -N.p.o. at midnight  -Continue to monitor creatinine and WBC.    This case was discussed with:  Dr. Crow Quinteros    Thank you for consulting [facility] regarding this patient's care. Please contact us with questions or concerns.     Varun Minor PA-C  [facility]   [phone]

## 2024-05-19 NOTE — PLAN OF CARE
Problem: Adult Inpatient Plan of Care  Goal: Optimal Comfort and Wellbeing  Outcome: Not Progressing  Intervention: Monitor Pain and Promote Comfort  Recent Flowsheet Documentation  Taken 5/19/2024 1225 by Naye Crews RN  Pain Management Interventions: medication (see MAR)    Pt continues to have bladder spasm pain. New orders placed for oxybutynin 5 mg TID. Will continue to monitor and intervene as needed. Voiding via 3 way smith catheter. Had moderate bypasses; urology updated and plans for IR intervention for possible stent placement. NPO until cleared by urology. Tele- SR with 1st degree AV block. Report given to Anita

## 2024-05-19 NOTE — ED NOTES
Met patient at bedside with his daughter. Patient denies pain or bladder spasms he states the pain medication he received a while ago is helping. On assessment his 3 way smith catheter is still bypassing. Bed sheets changed and patient repositioned. Questions from the patient regarding bed availability answered.   Moody Ventura RN  5/19/2024  8:05 AM

## 2024-05-19 NOTE — PROGRESS NOTES
[facility]    Medicine Progress Note - Hospitalist Service    Date of Admission:  5/19/2024    Assessment & Plan   87 y/o M with recent TURP, CKD3b, HTN, HLD, SSS s/p PM presents with SUJATHA superimposed on CKD3b and UTI. IV ceftriaxone, urology consulted given recent TURP and 3-way Smith.     1. Recent TURP - Acute/chronic renal failure, with CT evidence of severe and persistent  right and left hydroureter/hydronephrosis down to the level of the urinary bladder. Per CT - not significantly changed as compared to 02/01/2024.      CT also showed Prostatomegaly with diffuse urinary bladder wall thickening, likely due to chronic bladder outlet obstruction.      He also has UTI, not septic looking      - IVF, NPO, PRN pain meds, continue Rocephin   - Urology referral   - Has chronic smith      2. Hyperkalemia with CKD stage IIIb  - tele  - 500 ml bolus, also helped with dehydration   - repeat K        Chronic Medical Conditions     1. CKD stage III with anemia, Hgb of 9+  2. HLD and HTN   3. SSS (sick sinus syndrome) and has PM          Diet: NPO for Medical/Clinical Reasons Except for: Meds, Ice Chips    DVT Prophylaxis: Pneumatic Compression Devices, Anti-embolisim stockings (TEDs), Ambulate every shift, and VTE Prophylaxis contraindicated due to pending initial urology consultation, as may require invasive intervention / possible cystoscopy and/or complex catheter exchange  Smith Catheter: PRESENT, indication:    Lines: None     Cardiac Monitoring: ACTIVE order. Indication: hyperK  Code Status: Full Code      Clinically Significant Risk Factors Present on Admission        # Hyperkalemia: Highest K = 5.6 mmol/L in last 2 days, will monitor as appropriate          # Acute Kidney Injury, unspecified: based on a >150% or 0.3 mg/dL increase in last creatinine compared to past 90 day average, will monitor renal function  # Hypertension: Noted on problem list          # Financial/Environmental  Concerns:     # Pacemaker present       Disposition Plan     Medically Ready for Discharge: Anticipated in 2-4 Days             Surendra Doe MD  Hospitalist Service  [facility]  Securely message with [employer] (more info)  Text page via [employer] Paging/Directory   ______________________________________________________________________    Interval History   No acute events overnight.    No report of chest pain, shortness of breath, or other new complaints. Discussed plan of care with patient. Answered all questions to patient's verbalized understanding and satisfaction.    Updated his daughter at bedside and answered all of her questions as well.     Physical Exam   Vital Signs: Temp: 98  F (36.7  C) Temp src: Oral BP: 135/63 Pulse: 86   Resp: 17 SpO2: 98 % O2 Device: None (Room air)    Weight: 155 lbs 0 oz    GENERAL:  Alert, appears comfortable, in no acute distress, appears stated age   HEAD:  Normocephalic, without obvious abnormality, atraumatic   EYES:  PERRL, conjunctiva/corneas clear, no scleral icterus, EOM's intact   NOSE: Nares normal, septum midline, mucosa normal, no drainage   THROAT: Lips, mucosa, and tongue normal; teeth and gums normal, mouth moist   NECK: Supple, symmetrical, trachea midline   BACK:   Symmetric, no curvature, ROM normal   LUNGS:   Clear to auscultation bilaterally, no rales, rhonchi, or wheezing, symmetric chest rise on inhalation, respirations unlabored   CHEST WALL:  No tenderness or deformity   HEART:  Regular rate and rhythm, S1 and S2 normal, no murmur, rub, or gallop    ABDOMEN:   Soft, non-tender, bowel sounds active all four quadrants, no masses, no organomegaly, no rebound or guarding   EXTREMITIES: Extremities normal, atraumatic, no cyanosis or edema    SKIN: Dry to touch, no exanthems in the visualized areas   NEURO: Alert, oriented x 4, moves all four extremities freely/spontaneously   PSYCH: Cooperative, behavior is appropriate        Medical  Decision Making   Post-midnight so not billed to patient.        Data     I have personally reviewed the following data over the past 24 hrs:    18.3 (H)  \   9.2 (L)   / 165     139 106 53.4 (H) /  116 (H)   5.6 (H) 20 (L) 2.70 (H) \     INR:  1.11 PTT:  N/A   D-dimer:  N/A Fibrinogen:  N/A       Imaging results reviewed over the past 24 hrs:   Recent Results (from the past 24 hour(s))   CT Abdomen Pelvis w/o Contrast    Narrative    EXAM: CT ABDOMEN PELVIS W/O CONTRAST  LOCATION: [facility]  DATE: 5/19/2024    INDICATION: Right flank pain.  COMPARISON: CT abdomen and pelvis on 02/01/2024.  TECHNIQUE: CT scan of the abdomen and pelvis was performed without intravenous contrast Multiplanar reformats were obtained. Dose reduction techniques were used.    FINDINGS:   LOWER CHEST: Basilar pulmonary opacities, likely atelectasis.    ABDOMEN/PELVIS: Limited evaluation of the abdominal organs due to lack of intravenous contrast.    HEPATOBILIARY: The unenhanced liver and gallbladder are grossly unremarkable.    PANCREAS: The unenhanced pancreas is grossly unremarkable.    SPLEEN: No splenomegaly.    ADRENAL GLANDS: No adrenal nodules.    KIDNEYS/BLADDER: No radiodense kidney stones. Severe right and moderate to severe left diffuse hydroureter/hydronephrosis down to the level of the urinary bladder. No obstructing ureteral stone visualized. The urinary bladder is decompressed with Bloom   catheter. Diffuse urinary bladder wall thickening, could be due to underdistention versus chronic bladder outlet obstruction versus chronic cystitis.    BOWEL: No abnormally dilated bowel loops. Colonic diverticulosis without CT evidence of acute diverticulitis.    PERITONEUM: No evidence of free fluid in the abdomen and pelvis. No free peritoneal or portal venous gas.    PELVIC ORGANS: The prostate gland is enlarged measuring 6.6 cm in transverse diameter.    VASCULATURE: Moderate atherosclerotic vascular  calcification of the abdominal aorta and iliac arteries.    LYMPH NODES: Unremarkable.    MUSCULOSKELETAL: Diffuse osteopenia with multilevel degenerative changes of the spine. Multiple compression deformities of the visualized thoracic and lumbar spine most prominent at L1 and L3 vertebra with approximately 50-60% vertebral height loss.   Small right inguinal hernia containing fat and nondilated small bowel loops.      Impression    IMPRESSION:   1. Severe right and moderate to severe left diffuse hydroureter/hydronephrosis down to the level of the urinary bladder, not significantly changed as compared to 02/01/2024. No obstructing ureteral stone visualized.  2. Prostatomegaly with diffuse urinary bladder wall thickening, likely due to chronic bladder outlet obstruction.  3. Colonic diverticulosis without CT evidence of acute diverticulitis.

## 2024-05-19 NOTE — H&P
[facility] MEDICINE ADMISSION HISTORY AND PHYSICAL       Assessment & Plan      Present on Admission (POA)    1. Recent TURP - Acute/chronic renal failure, with CT evidence of severe and persistent  right and left hydroureter/hydronephrosis down to the level of the urinary bladder. Per CT - not significantly changed as compared to 02/01/2024.     CT also showed Prostatomegaly with diffuse urinary bladder wall thickening, likely due to chronic bladder outlet obstruction.     He also has UTI, not septic looking     - IVF, NPO, PRN pain meds, continue Rocephin   - Urology referral   - Has chronic smith     2. Hyperkalemia with CKD stage III  - tele  - 500 ml bolus, also helped with dehydration   - repeat K      Chronic Medical Conditions    1. CKD stage III with anemia, Hgb of 9+  2. HLD and HTN   3. SSS (sick sinus syndrome) and has PM      VTE prophylaxis --  SCDs, if appropriate, add SQH  Diet -- NPO  Code Status -- Full   Barriers to discharge -- Admitting/Acute medical condition/s  Discharge Disposition and goals --  Unable to determine at this point, pending progress/treatment response.     PPE - I was wearing PPE including but not limited to - N95 mask, Gloves, and/or Safety glasses.  Admission Status --Inpatient     Care plan is based on available information and patient's condition at encounter. Care plan was discussed and agreed to proceed by the patient/family member. Made aware that care plan may change.     I recommend to review/revise history/care plan for information/results not available during my encounter. All or some home medication/s were not resumed or was modified on admission due to safety concerns. Will have rounding AM doc  review safety to resume held/modified home medications.     Availability -- If need to coordinate care after night shift  -- Contact assigned AM rounding Hospitalist.     75 minutes spent by me on the date of service doing chart review, history,  exam, diagnostic test results interpretation, care coordination with RN, RT and/or Pharm, & further activities per the note.      Cristiano Mays MD, MPH, FACP, [facility]  Internal Medicine - Hospitalist        Chief Complaint Flank pain      HISTORY     - Patient was seen in ED - 13. I met his daughter too.     - He is here with R flank pain and poor urine output. Last May 17 admitted at [place] - he had TURP to address persistent hydronephrosis. Left with smith catheter. At home, noted worsening flank pain and reduced - output from urine. Urine appeared to be concentrated. No fevers. No CP. No SOB. No diarrhea.     - ED course/treatments/referral - CT abdomen, IV rocephin, and IVF. Creatinine increased to 2.7      - ROS --- No headache. No dizziness. No weakness. No CP or SOB. No palpitations. No abdominal pain. No nausea or vomiting. No bleeding symptoms. No weight loss. Rest of 12 point ROS was reviewed and negative.       Past Medical History     Past Medical History:   Diagnosis Date    Hypertension     Pacemaker          Surgical History     Past Surgical History:   Procedure Laterality Date    APPENDECTOMY      ARTHROSCOPY KNEE Right     CYSTOURETEROSCOPY, WITH LITHOTRIPSY USING ZABRINA 120P LASER AND URETERAL STENT INSERTION Left 10/25/2022    Procedure: CYSTOSCOPY, LEFT URETEROSCOPY, LASER LITHOTRIPSY;  Surgeon: Bob Dozier MD;  Location: [facility] OR    GENITOURINARY SURGERY      IR NEPHROSTOMY TUBE PLACEMENT LEFT  10/26/2022    IR URETERAL STENT PLACEMENT LEFT  11/1/2022    REPAIR MOHS Right 8/28/2023    Procedure: FOREHEAD FLAP REPAIR MOHS DEFECT RIGHT NOSE; COMPOSITE GRAFT;  Surgeon: Jake Rosa MD;  Location: [facility] Main OR        Family History      Family History   Problem Relation Age of Onset    Dementia Mother     Heart Disease Father     Diabetes Father          Social History      .  Social History     Socioeconomic History    Marital status:      Spouse name: Not on file     Number of children: Not on file    Years of education: Not on file    Highest education level: Not on file   Occupational History    Not on file   Tobacco Use    Smoking status: Former     Types: Pipe    Smokeless tobacco: Never   Vaping Use    Vaping status: Never Used   Substance and Sexual Activity    Alcohol use: No    Drug use: No    Sexual activity: Not on file   Other Topics Concern    Not on file   Social History Narrative    Not on file     Social Determinants of Health     Financial Resource Strain: Not on file   Food Insecurity: Not on file   Transportation Needs: Not on file   Physical Activity: Not on file   Stress: Not on file   Social Connections: Not on file   Interpersonal Safety: Low Risk  (3/5/2024)    Interpersonal Safety     Do you feel physically and emotionally safe where you currently live?: Yes     Within the past 12 months, have you been hit, slapped, kicked or otherwise physically hurt by someone?: No     Within the past 12 months, have you been humiliated or emotionally abused in other ways by your partner or ex-partner?: No   Housing Stability: Not on file          Allergies      No Known Allergies      Prior to Admission Medications      No current facility-administered medications for this encounter.     Current Outpatient Medications   Medication Sig Dispense Refill    acetaminophen (TYLENOL) 325 MG tablet Take 1-2 tablets (325-650 mg) by mouth every 4 hours as needed for other or mild pain (For optimal non-opioid multimodal pain management to improve pain control.)      amLODIPine (NORVASC) 2.5 MG tablet Take 1 tablet (2.5 mg) by mouth daily 90 tablet 1    Cranberry (CRANBEREX PO) Taking 6 a day      Glucosamine-Chondroitin 250-200 MG CAPS Take 1 capsule by mouth daily      lisinopril (ZESTRIL) 10 MG tablet Take 10 mg by mouth daily      metroNIDAZOLE (METROGEL) 1 % external gel APPLY A SMALL AMOUNT TO AFFECTED AREA(S) TOPICALLY ONCE DAILY 60 g 1    multivitamin, therapeutic  "(THERA-VIT) TABS tablet Take 1 tablet by mouth daily      tamsulosin (FLOMAX) 0.4 MG capsule Take 1 capsule (0.4 mg) by mouth At Bedtime 90 capsule 3    triamcinolone (KENALOG) 0.1 % external cream Apply topically 2 times daily as needed for irritation             Review of Systems     A 12 point comprehensive review of systems was negative except as noted above in HPI.    PHYSICAL EXAMINATION       Vitals      Vitals: BP (!) 145/65   Pulse 80   Temp 98.9  F (37.2  C) (Temporal)   Resp 16   Ht 1.778 m (5' 10\")   Wt 70.3 kg (155 lb)   SpO2 98%   BMI 22.24 kg/m    BMI= Body mass index is 22.24 kg/m .      Examination     General Appearance:  Alert, cooperative, no distress  Head:    Normocephalic, without obvious abnormality, atraumatic  EENT:  PERRL, conjunctiva/corneas clear, EOM's intact.   Neck:   Supple, symmetrical, trachea midline, no adenopathy; no NVE  Back:  Symmetric, no curvature, no CVA tenderness  Chest/Lungs: Clear to auscultation bilaterally, respirations unlabored, No tenderness or deformity. No abdominal breathing or use of accessory muscles.   Heart:    Regular rate and rhythm, S1 and S2 normal, no murmur, rub   or gallop  Abdomen: Soft, non-tender, bowel sounds active all four quadrants, not peritoneal on palpation. Not distended  - smith cath  Extremities:  Extremities normal, atraumatic, no swelling   Skin:  Skin color, texture, turgor normal, no rashes or lesion  Neurologic:  Awake and alert, No lateralizing or localizing signs          Pertinent Lab     Results for orders placed or performed during the hospital encounter of 05/19/24   CT Abdomen Pelvis w/o Contrast    Impression    IMPRESSION:   1. Severe right and moderate to severe left diffuse hydroureter/hydronephrosis down to the level of the urinary bladder, not significantly changed as compared to 02/01/2024. No obstructing ureteral stone visualized.  2. Prostatomegaly with diffuse urinary bladder wall thickening, likely due to " chronic bladder outlet obstruction.  3. Colonic diverticulosis without CT evidence of acute diverticulitis.     Result Value Ref Range    INR 1.11 0.85 - 1.15   Basic metabolic panel   Result Value Ref Range    Sodium 139 135 - 145 mmol/L    Potassium 5.6 (H) 3.4 - 5.3 mmol/L    Chloride 106 98 - 107 mmol/L    Carbon Dioxide (CO2) 20 (L) 22 - 29 mmol/L    Anion Gap 13 7 - 15 mmol/L    Urea Nitrogen 53.4 (H) 8.0 - 23.0 mg/dL    Creatinine 2.70 (H) 0.67 - 1.17 mg/dL    GFR Estimate 22 (L) >60 mL/min/1.73m2    Calcium 8.9 8.8 - 10.2 mg/dL    Glucose 116 (H) 70 - 99 mg/dL   UA with Microscopic reflex to Culture    Specimen: Urine, Bloom Catheter   Result Value Ref Range    Color Urine Light Orange (A) Colorless, Straw, Light Yellow, Yellow    Appearance Urine Turbid (A) Clear    Glucose Urine Negative Negative mg/dL    Bilirubin Urine Negative Negative    Ketones Urine Negative Negative mg/dL    Specific Gravity Urine 1.008 1.001 - 1.030    Blood Urine 1.0 mg/dL (A) Negative    pH Urine 6.5 5.0 - 7.0    Protein Albumin Urine 300 (A) Negative mg/dL    Urobilinogen Urine <2.0 <2.0 mg/dL    Nitrite Urine Negative Negative    Leukocyte Esterase Urine 500 Rober/uL (A) Negative    WBC Clumps Urine Present (A) None Seen /HPF    RBC Urine >182 (H) <=2 /HPF    WBC Urine >182 (H) <=5 /HPF   CBC with platelets and differential   Result Value Ref Range    WBC Count 18.3 (H) 4.0 - 11.0 10e3/uL    RBC Count 2.94 (L) 4.40 - 5.90 10e6/uL    Hemoglobin 9.2 (L) 13.3 - 17.7 g/dL    Hematocrit 27.3 (L) 40.0 - 53.0 %    MCV 93 78 - 100 fL    MCH 31.3 26.5 - 33.0 pg    MCHC 33.7 31.5 - 36.5 g/dL    RDW 12.7 10.0 - 15.0 %    Platelet Count 165 150 - 450 10e3/uL    NRBCs per 100 WBC 0 <1 /100    Absolute NRBCs 0.0 10e3/uL   Manual Differential   Result Value Ref Range    % Neutrophils 82 %    % Lymphocytes 9 %    % Monocytes 9 %    % Eosinophils 0 %    % Basophils 0 %    Absolute Neutrophils 15.0 (H) 1.6 - 8.3 10e3/uL    Absolute Lymphocytes 1.6  0.8 - 5.3 10e3/uL    Absolute Monocytes 1.6 (H) 0.0 - 1.3 10e3/uL    Absolute Eosinophils 0.0 0.0 - 0.7 10e3/uL    Absolute Basophils 0.0 0.0 - 0.2 10e3/uL    RBC Morphology Confirmed RBC Indices     Platelet Assessment  Automated Count Confirmed. Platelet morphology is normal.     Automated Count Confirmed. Platelet morphology is normal.    Elliptocytes Slight (A) None Seen   ECG 12-LEAD WITH MUSE (LHE)   Result Value Ref Range    Systolic Blood Pressure 112 mmHg    Diastolic Blood Pressure 54 mmHg    Ventricular Rate 79 BPM    Atrial Rate 79 BPM    WI Interval 222 ms    QRS Duration 114 ms     ms    QTc 456 ms    P Axis 51 degrees    R AXIS -73 degrees    T Axis 60 degrees    Interpretation ECG       Sinus rhythm with 1st degree A-V block  Incomplete right bundle branch block  Left anterior fascicular block  Abnormal ECG  When compared with ECG of 01-OCT-2022 18:54,  No significant change was found  Confirmed by SEE ED PROVIDER NOTE FOR, ECG INTERPRETATION ([id number]),  RODRÍGUEZ HERRING ([id number]) on 5/19/2024 3:27:52 AM             Pertinent Radiology

## 2024-05-20 ENCOUNTER — APPOINTMENT (OUTPATIENT)
Dept: INTERVENTIONAL RADIOLOGY/VASCULAR | Facility: CLINIC | Age: 89
DRG: 699 | End: 2024-05-20
Attending: NURSE PRACTITIONER
Payer: MEDICARE

## 2024-05-20 LAB
ANION GAP SERPL CALCULATED.3IONS-SCNC: 8 MMOL/L (ref 7–15)
BACTERIA UR CULT: NORMAL
BASOPHILS # BLD MANUAL: 0 10E3/UL (ref 0–0.2)
BASOPHILS NFR BLD MANUAL: 0 %
BUN SERPL-MCNC: 41.3 MG/DL (ref 8–23)
CALCIUM SERPL-MCNC: 8.2 MG/DL (ref 8.8–10.2)
CHLORIDE SERPL-SCNC: 113 MMOL/L (ref 98–107)
CREAT SERPL-MCNC: 2.13 MG/DL (ref 0.67–1.17)
DEPRECATED HCO3 PLAS-SCNC: 21 MMOL/L (ref 22–29)
EGFRCR SERPLBLD CKD-EPI 2021: 29 ML/MIN/1.73M2
ELLIPTOCYTES BLD QL SMEAR: SLIGHT
EOSINOPHIL # BLD MANUAL: 0 10E3/UL (ref 0–0.7)
EOSINOPHIL NFR BLD MANUAL: 0 %
GLUCOSE SERPL-MCNC: 111 MG/DL (ref 70–99)
HOLD SPECIMEN: NORMAL
LYMPHOCYTES # BLD MANUAL: 1.1 10E3/UL (ref 0.8–5.3)
LYMPHOCYTES NFR BLD MANUAL: 7 %
MONOCYTES # BLD MANUAL: 1.8 10E3/UL (ref 0–1.3)
MONOCYTES NFR BLD MANUAL: 12 %
NEUTROPHILS # BLD MANUAL: 12.2 10E3/UL (ref 1.6–8.3)
NEUTROPHILS NFR BLD MANUAL: 81 %
PATH REV: ABNORMAL
PLAT MORPH BLD: ABNORMAL
POTASSIUM SERPL-SCNC: 4.5 MMOL/L (ref 3.4–5.3)
RBC MORPH BLD: ABNORMAL
SODIUM SERPL-SCNC: 142 MMOL/L (ref 135–145)

## 2024-05-20 PROCEDURE — 258N000003 HC RX IP 258 OP 636: Performed by: INTERNAL MEDICINE

## 2024-05-20 PROCEDURE — 36415 COLL VENOUS BLD VENIPUNCTURE: CPT | Performed by: HOSPITALIST

## 2024-05-20 PROCEDURE — 50432 PLMT NEPHROSTOMY CATHETER: CPT | Mod: 50

## 2024-05-20 PROCEDURE — 250N000011 HC RX IP 250 OP 636: Performed by: RADIOLOGY

## 2024-05-20 PROCEDURE — 0T9130Z DRAINAGE OF LEFT KIDNEY WITH DRAINAGE DEVICE, PERCUTANEOUS APPROACH: ICD-10-PCS | Performed by: RADIOLOGY

## 2024-05-20 PROCEDURE — C1769 GUIDE WIRE: HCPCS

## 2024-05-20 PROCEDURE — 250N000011 HC RX IP 250 OP 636: Performed by: INTERNAL MEDICINE

## 2024-05-20 PROCEDURE — 80048 BASIC METABOLIC PNL TOTAL CA: CPT | Performed by: HOSPITALIST

## 2024-05-20 PROCEDURE — 272N000508 HC NEEDLE CR8

## 2024-05-20 PROCEDURE — 87106 FUNGI IDENTIFICATION YEAST: CPT | Performed by: NURSE PRACTITIONER

## 2024-05-20 PROCEDURE — 0T9030Z DRAINAGE OF RIGHT KIDNEY WITH DRAINAGE DEVICE, PERCUTANEOUS APPROACH: ICD-10-PCS | Performed by: RADIOLOGY

## 2024-05-20 PROCEDURE — 120N000001 HC R&B MED SURG/OB

## 2024-05-20 PROCEDURE — 250N000013 HC RX MED GY IP 250 OP 250 PS 637

## 2024-05-20 PROCEDURE — 250N000013 HC RX MED GY IP 250 OP 250 PS 637: Performed by: INTERNAL MEDICINE

## 2024-05-20 PROCEDURE — 250N000009 HC RX 250: Performed by: RADIOLOGY

## 2024-05-20 PROCEDURE — 99233 SBSQ HOSP IP/OBS HIGH 50: CPT | Performed by: HOSPITALIST

## 2024-05-20 PROCEDURE — C1729 CATH, DRAINAGE: HCPCS

## 2024-05-20 RX ORDER — NALOXONE HYDROCHLORIDE 0.4 MG/ML
0.2 INJECTION, SOLUTION INTRAMUSCULAR; INTRAVENOUS; SUBCUTANEOUS
Status: DISCONTINUED | OUTPATIENT
Start: 2024-05-20 | End: 2024-05-20 | Stop reason: HOSPADM

## 2024-05-20 RX ORDER — FLUMAZENIL 0.1 MG/ML
0.2 INJECTION, SOLUTION INTRAVENOUS
Status: DISCONTINUED | OUTPATIENT
Start: 2024-05-20 | End: 2024-05-20 | Stop reason: HOSPADM

## 2024-05-20 RX ORDER — NALOXONE HYDROCHLORIDE 0.4 MG/ML
0.4 INJECTION, SOLUTION INTRAMUSCULAR; INTRAVENOUS; SUBCUTANEOUS
Status: DISCONTINUED | OUTPATIENT
Start: 2024-05-20 | End: 2024-05-20 | Stop reason: HOSPADM

## 2024-05-20 RX ORDER — FENTANYL CITRATE 50 UG/ML
25-50 INJECTION, SOLUTION INTRAMUSCULAR; INTRAVENOUS EVERY 5 MIN PRN
Status: DISCONTINUED | OUTPATIENT
Start: 2024-05-20 | End: 2024-05-20 | Stop reason: HOSPADM

## 2024-05-20 RX ORDER — OXYBUTYNIN CHLORIDE 5 MG/1
5 TABLET ORAL ONCE
Status: COMPLETED | OUTPATIENT
Start: 2024-05-20 | End: 2024-05-20

## 2024-05-20 RX ADMIN — LIDOCAINE HYDROCHLORIDE 10 ML: 10 INJECTION, SOLUTION INFILTRATION; PERINEURAL at 15:19

## 2024-05-20 RX ADMIN — OXYBUTYNIN CHLORIDE 5 MG: 5 TABLET ORAL at 03:59

## 2024-05-20 RX ADMIN — OXYBUTYNIN CHLORIDE 5 MG: 5 TABLET ORAL at 13:23

## 2024-05-20 RX ADMIN — OXYBUTYNIN CHLORIDE 5 MG: 5 TABLET ORAL at 19:15

## 2024-05-20 RX ADMIN — FENTANYL CITRATE 25 MCG: 50 INJECTION, SOLUTION INTRAMUSCULAR; INTRAVENOUS at 15:22

## 2024-05-20 RX ADMIN — MIDAZOLAM HYDROCHLORIDE 1 MG: 1 INJECTION, SOLUTION INTRAMUSCULAR; INTRAVENOUS at 15:06

## 2024-05-20 RX ADMIN — OXYBUTYNIN CHLORIDE 5 MG: 5 TABLET ORAL at 08:55

## 2024-05-20 RX ADMIN — HYDROCODONE BITARTRATE AND ACETAMINOPHEN 1 TABLET: 5; 325 TABLET ORAL at 08:55

## 2024-05-20 RX ADMIN — CEFTRIAXONE 1 G: 1 INJECTION, POWDER, FOR SOLUTION INTRAMUSCULAR; INTRAVENOUS at 03:58

## 2024-05-20 RX ADMIN — HYDROCODONE BITARTRATE AND ACETAMINOPHEN 1 TABLET: 5; 325 TABLET ORAL at 13:23

## 2024-05-20 RX ADMIN — FENTANYL CITRATE 25 MCG: 50 INJECTION, SOLUTION INTRAMUSCULAR; INTRAVENOUS at 15:30

## 2024-05-20 RX ADMIN — SODIUM CHLORIDE: 9 INJECTION, SOLUTION INTRAVENOUS at 09:24

## 2024-05-20 RX ADMIN — FENTANYL CITRATE 25 MCG: 50 INJECTION, SOLUTION INTRAMUSCULAR; INTRAVENOUS at 15:12

## 2024-05-20 ASSESSMENT — ACTIVITIES OF DAILY LIVING (ADL)
ADLS_ACUITY_SCORE: 39
ADLS_ACUITY_SCORE: 40
ADLS_ACUITY_SCORE: 39
ADLS_ACUITY_SCORE: 40
ADLS_ACUITY_SCORE: 36
ADLS_ACUITY_SCORE: 39
ADLS_ACUITY_SCORE: 36
ADLS_ACUITY_SCORE: 39
ADLS_ACUITY_SCORE: 40
ADLS_ACUITY_SCORE: 39
ADLS_ACUITY_SCORE: 36
ADLS_ACUITY_SCORE: 39
ADLS_ACUITY_SCORE: 40
ADLS_ACUITY_SCORE: 39
ADLS_ACUITY_SCORE: 36
ADLS_ACUITY_SCORE: 36

## 2024-05-20 NOTE — CONSULTS
Interventional Radiology - Consultation CHART REVIEW Note:  Inpatient - [facility]  05/20/2024     Reason for Consult:  bilateral PNT placement   Requesting Provider:  aVrun Mnior PA-C     HPI:  Mikaela Figueroa is a 88 year old male with BPH with urinary retention s/p TURP on 5/17/24 with Dr. Dozier; now with bilateral hydronephrosis, SUJATHA, and UTI; started on IV rocephin; labs now improving. IR consulted for bilateral PNT placement.     IMAGING:    EXAM: CT ABDOMEN PELVIS W/O CONTRAST  LOCATION: [facility]  DATE: 5/19/2024     INDICATION: Right flank pain.  COMPARISON: CT abdomen and pelvis on 02/01/2024.  TECHNIQUE: CT scan of the abdomen and pelvis was performed without intravenous contrast Multiplanar reformats were obtained. Dose reduction techniques were used.     FINDINGS:   LOWER CHEST: Basilar pulmonary opacities, likely atelectasis.     ABDOMEN/PELVIS: Limited evaluation of the abdominal organs due to lack of intravenous contrast.     HEPATOBILIARY: The unenhanced liver and gallbladder are grossly unremarkable.     PANCREAS: The unenhanced pancreas is grossly unremarkable.     SPLEEN: No splenomegaly.     ADRENAL GLANDS: No adrenal nodules.     KIDNEYS/BLADDER: No radiodense kidney stones. Severe right and moderate to severe left diffuse hydroureter/hydronephrosis down to the level of the urinary bladder. No obstructing ureteral stone visualized. The urinary bladder is decompressed with Bloom   catheter. Diffuse urinary bladder wall thickening, could be due to underdistention versus chronic bladder outlet obstruction versus chronic cystitis.     BOWEL: No abnormally dilated bowel loops. Colonic diverticulosis without CT evidence of acute diverticulitis.     PERITONEUM: No evidence of free fluid in the abdomen and pelvis. No free peritoneal or portal venous gas.     PELVIC ORGANS: The prostate gland is enlarged measuring 6.6 cm in transverse  diameter.     VASCULATURE: Moderate atherosclerotic vascular calcification of the abdominal aorta and iliac arteries.     LYMPH NODES: Unremarkable.     MUSCULOSKELETAL: Diffuse osteopenia with multilevel degenerative changes of the spine. Multiple compression deformities of the visualized thoracic and lumbar spine most prominent at L1 and L3 vertebra with approximately 50-60% vertebral height loss.   Small right inguinal hernia containing fat and nondilated small bowel loops.                                                                      IMPRESSION:   1. Severe right and moderate to severe left diffuse hydroureter/hydronephrosis down to the level of the urinary bladder, not significantly changed as compared to 02/01/2024. No obstructing ureteral stone visualized.  2. Prostatomegaly with diffuse urinary bladder wall thickening, likely due to chronic bladder outlet obstruction.  3. Colonic diverticulosis without CT evidence of acute diverticulitis.        NPO Status:  ordered for midnight per chart review   Anticoagulation/Antiplatelets/Bleeding tendencies:  none per chart review and discussion with RN.  Antibiotics:  IV rocephin Q24H    PAST MEDICAL HISTORY:  Past Medical History:   Diagnosis Date    Hypertension     Pacemaker      Patient Active Problem List    Diagnosis Date Noted    Hyperkalemia 05/19/2024     Priority: Medium    SUJATHA (acute kidney injury) (H24) 05/19/2024     Priority: Medium    Acute right flank pain 05/19/2024     Priority: Medium    Hydronephrosis, unspecified hydronephrosis type 05/19/2024     Priority: Medium    History of renal calculi 02/19/2024     Priority: Medium    Lower urinary tract symptoms due to benign prostatic hyperplasia 02/19/2024     Priority: Medium    Urinary retention 02/01/2024     Priority: Medium    Urinary tract infection without hematuria, site unspecified 02/01/2024     Priority: Medium    SSS (sick sinus syndrome) (H) 01/31/2023     Priority: Medium    Stage 3  chronic kidney disease, unspecified whether stage 3a or 3b CKD (H) 01/31/2023     Priority: Medium    Bilateral degenerative progressive high myopia 11/14/2022     Priority: Medium     Stable OD, OS.      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      Routine general medical examination at a health care facility 08/11/2021     Priority: Medium     h/o fx last yr--check DXA      Syncope, unspecified syncope type 03/11/2020     Priority: Medium    Left upper lobe pulmonary nodule 09/03/2019     Priority: Medium    Cardiac pacemaker in situ, dual chamber 09/03/2019     Priority: Medium     Medtronic [id number] Sandra XT  MRI, RA and RV Leads: Medtronic [id number]   CapSureFix Novus MRI SureScan, DOI 08/28/2019 by Dr. Douglas Mensah, [facility], [address], [place] [address].        Mobitz type 1 second degree atrioventricular block 08/27/2019     Priority: Medium    Essential hypertension with goal blood pressure less than 130/80 11/28/2017     Priority: Medium    Open-angle glaucoma of both  eyes, mild stage, unspecified open-angle glaucoma type 11/28/2017     Priority: Medium    BPH with urinary obstruction      Priority: Medium     Created by Conversion        Diverticulosis      Priority: Medium     Created by Conversion  [employer] Annotation: Jun 21 2012  8:27AM - José Derek: sigmoid   colon   noted on abdominal/pelvis CT  Replacement Utility updated for latest IMO load        Hypertension      Priority: Medium     Created by Conversion  Replacement Utility updated for latest IMO load        Right inguinal hernia 05/27/2016     Priority: Medium     small, reducible, asymptomatic        Hearing loss 11/24/2015     Priority: Medium    Glaucoma 11/24/2015     Priority: Medium    Rosacea      Priority: Medium     Created by Conversion        Nephrolithiasis      Priority: Medium     Created by Conversion        Hypercholesterolemia      Priority: Medium     Created by Conversion        Impaired Fasting Glucose      Priority: Medium     Created by Conversion        Serology Prostate-specific Antigen (PSA) Elevated      Priority: Medium     Created by Conversion  [facility] Annotation: Jan 2 2009  1:27PM - Derek Kumar: 3.94 (12/06)   ->   3.38 (9/07) -> 4.83 (9/10/08)        Male Erectile Disorder      Priority: Medium     Created by Conversion        Squamous Cell Carcinoma Of The Skin      Priority: Medium     Created by Conversion        History of hepatitis B 01/01/1990     Priority: Medium         PAST SURGICAL HISTORY:  Past Surgical History:   Procedure Laterality Date    APPENDECTOMY      ARTHROSCOPY KNEE Right     CYSTOURETEROSCOPY, WITH LITHOTRIPSY USING ZABRINA 120P LASER AND URETERAL STENT INSERTION Left 10/25/2022    Procedure: CYSTOSCOPY, LEFT URETEROSCOPY, LASER LITHOTRIPSY;  Surgeon: Bob Dozier MD;  [place] [facility]    GENITOURINARY SURGERY      IR NEPHROSTOMY TUBE PLACEMENT LEFT  10/26/2022    IR URETERAL STENT PLACEMENT LEFT  11/1/2022    REPAIR MOHS Right  8/28/2023    Procedure: FOREHEAD FLAP REPAIR MOHS DEFECT RIGHT NOSE; COMPOSITE GRAFT;  Surgeon: Jake Rosa MD;  Location: [facility] Main OR       ALLERGIES:  No Known Allergies     MEDICATIONS:  Current Facility-Administered Medications   Medication Dose Route Frequency Provider Last Rate Last Admin    acetaminophen (TYLENOL) tablet 650 mg  650 mg Oral Q4H PRN Bruna Mays MD        Or    acetaminophen (TYLENOL) Suppository 650 mg  650 mg Rectal Q4H PRN Bruna Mays MD        calcium carbonate (TUMS) chewable tablet 1,000 mg  1,000 mg Oral 4x Daily PRN Bruna Mays MD        cefTRIAXone (ROCEPHIN) 1 g vial to attach to  mL bag for ADULTS or NS 50 mL bag for PEDS  1 g Intravenous Q24H Bruna Mays MD   1 g at 05/20/24 0358    HYDROcodone-acetaminophen (NORCO) 5-325 MG per tablet 1 tablet  1 tablet Oral Q4H PRN Bruna Mays MD        lidocaine (LMX4) cream   Topical Q1H PRN Bruna Mays MD        lidocaine 1 % 0.1-1 mL  0.1-1 mL Other Q1H PRN Bruna Mays MD        melatonin tablet 1 mg  1 mg Oral At Bedtime PRN Bruna Mays MD        oxyBUTYnin (DITROPAN) tablet 5 mg  5 mg Oral TID Varun Minor PA-C   5 mg at 05/19/24 2038    senna-docusate (SENOKOT-S/PERICOLACE) 8.6-50 MG per tablet 1 tablet  1 tablet Oral BID PRN Bruna Mays MD        Or    senna-docusate (SENOKOT-S/PERICOLACE) 8.6-50 MG per tablet 2 tablet  2 tablet Oral BID PRN Bruna Mays MD        sodium chloride (PF) 0.9% PF flush 3 mL  3 mL Intracatheter Q8H Bruna Mays MD   3 mL at 05/19/24 0543    sodium chloride (PF) 0.9% PF flush 3 mL  3 mL Intracatheter q1 min prn Bruna Mays MD        sodium chloride 0.9 % infusion   Intravenous Continuous Bruna Mays MD 75 mL/hr at 05/19/24 1218 Rate Verify at 05/19/24 1218        LABS:  INR   Date Value Ref Range Status   05/19/2024 1.11 0.85 - 1.15 Final      Hemoglobin   Date Value Ref Range Status   05/19/2024 9.1 (L)  "13.3 - 17.7 g/dL Final     Platelet Count   Date Value Ref Range Status   05/19/2024 140 (L) 150 - 450 10e3/uL Final     Creatinine   Date Value Ref Range Status   05/19/2024 2.42 (H) 0.67 - 1.17 mg/dL Final     Potassium   Date Value Ref Range Status   05/19/2024 5.2 3.4 - 5.3 mmol/L Final   10/11/2022 4.5 3.5 - 5.0 mmol/L Final         EXAM:  BP (!) 148/69 (BP Location: Right arm)   Pulse 84   Temp 98.3  F (36.8  C) (Oral)   Resp 16   Ht 1.778 m (5' 10\")   Wt 70.7 kg (155 lb 13.8 oz)   SpO2 100%   BMI 22.36 kg/m    Not assessed      ASSESSMENT/PLAN:    Recommend proceeding with bilateral nephrostomy tube placements with sedation.  Keep NPO.   On scheduled rocephin.   No labs needed.     Radiologist to further review procedure with patient.    EMR reviewed. No formal assessment completed.    Thank you for kindly for this consultation.     Total time spent on the date of the encounter: 15 minutes.      NAHUN Knox CNP  Interventional Radiology  [phone]        "

## 2024-05-20 NOTE — PROGRESS NOTES
Place of Service:  [facility]     Reason for follow up: SUJATHA, possible UTI, Chronic bilateral hydronephrosis, BPH s/p TURP 5/17/24    SUBJECTIVE:  Events: no acute events overnight    Patient c/o right flank pain. Denies left flank pain, abdominal pain, fever, chills. Tolerating smith.     NPO for bilateral PNT placement in IR today.     OBJECTIVE:  PHYSICAL EXAM:  Temp: 97.4  F (36.3  C) Temp src: Oral BP: (!) 151/67 Pulse: 81   Resp: 16 SpO2: 97 % O2 Device: None (Room air)    General: NAD, alert, cooperative  Head: normocephalic, without abnormality / atraumatic  Abdomen: soft, non tender, non distended. no suprapubic fullness, no suprapubic tenderness. Right CVA tenderness, no left CVA tenderness.    Genitourinary: Penis and scrotum without erythema or edema. Smith catheter draining clear yellow urine, adequate output.   Skin: No rashes or lesions  Musculoskeletal: moves all four extremities equally.   Psychological: alert and oriented, answers questions appropriately.    LABS:  Creatinine   Date Value Ref Range Status   05/20/2024 2.13 (H) 0.67 - 1.17 mg/dL Final     WBC Count   Date Value Ref Range Status   05/19/2024 15.1 (H) 4.0 - 11.0 10e3/uL Final     Hemoglobin   Date Value Ref Range Status   05/19/2024 9.1 (L) 13.3 - 17.7 g/dL Final     Platelet Count   Date Value Ref Range Status   05/19/2024 140 (L) 150 - 450 10e3/uL Final     UA:  UA RESULTS:  Recent Labs   Lab Test 05/19/24  0138 10/01/22  1953 01/14/22  1423   COLOR Light Orange*   < > Yellow   APPEARANCE Turbid*   < > Clear   URINEGLC Negative   < > Negative   URINEBILI Negative   < > Negative   URINEKETONE Negative   < > Negative   SG 1.008   < > 1.015   UBLD 1.0 mg/dL*   < > Negative   URINEPH 6.5   < > 7.0   PROTEIN 300*   < > Negative   UROBILINOGEN  --   --  0.2   NITRITE Negative   < > Negative   LEUKEST 500 Rober/uL*   < > Negative   RBCU >182*   < >  --    WBCU >182*   < >  --     < > = values in this interval not displayed.      Cultures:  Urine 5/19: <10k mix of urogenital niyah.     Lab Results: personally reviewed.     ASSESSMENT/PLAN:  Mikaela Figueroa is being seen by [facility] for SUJATHA, chronic bilateral hydronephrosis, BPH s/p TURP 5/17/24    - 88-year-old male admitted for SUJATHA with recent TURP on 5/17 for BPH. Hx of chronic bilateral hydronephrosis, not improved with Smith catheter.   - Creatinine 2.13 today, 2.42 on 5/19. Bilateral PNT placement planned for today.   - UA+, UC: 10k mixed niyah. WBC 15.1 on 5/19. Afebrile. Agree with ceftriaxone coverage, await PNT UC results.   - Maintain smith catheter for now to optimize drainage.   - Continue oxybutynin for bladder spasms. Consider TOV with PVR checks prior to discharge, will need to discontinue oxybutynin AM of TOV.   - Once fully cleared of UTI or fully treated if Cx +, then anticipate bilateral antegrade nephrostograms with possible ureteral stent placement.      This case was discussed with:  Dr Bob Hernandez, APRN, CNP  [facility]   [phone]

## 2024-05-20 NOTE — PLAN OF CARE
Problem: UTI (Urinary Tract Infection)  Goal: Improved Infection Symptoms  Outcome: Progressing  Intervention: Prevent Infection Progression  Recent Flowsheet Documentation  Taken 5/19/2024 2038 by Maria Del Carmen Nelson RN  Bladder Spasm Management:   catheter secured to abdomen/leg   warm compress to lower abdomen   Goal Outcome Evaluation:  Patient vital signs are at baseline: Yes  Patient able to ambulate as they were prior to admission or with assist devices provided by therapies during their stay:  No,  Reason:  pt refused to ambulated during this shift  Patient MUST void prior to discharge:  No,  Reason:  Bloom in place  Patient able to tolerate oral intake:  Yes  Pain has adequate pain control using Oral analgesics:  Yes  Does patient have an identified :  Yes  Has goal D/C date and time been discussed with patient:  Yes     Pt alert and oriented x4. VSS on RA. Pt stated that he only has pain when he has spasms.     Tele readings during this shift were SR with 1 degree block and NSR with a BBB.

## 2024-05-20 NOTE — IR NOTE
Patient Name: Mikaela Figueroa  Medical Record Number: [id number]  Today's Date: 5/20/2024    Procedure: bilateral nephrostomy tube placement  Proceduralist: Dr. Charlton      Sedation medications administered: 1 mg midazolam and 75 mcg fentanyl   Sedation time: 20 minutes    Report given to: ALLAN Corona  : n/a    Other Notes: Pt arrived to IR room 1 from Pre/post bay 3. Consent reviewed. Pt denies any questions or concerns regarding procedure. Pt positioned prone and monitored per protocol. Pt tolerated procedure without any noted complications. VSS on monitor. Pt transferred back to  354 .    Bilateral nephrostomy tubes attached to [employer] gravity drainage bags.  Samples collected from right and left nephrostomy tubes and sent to lab.      PNT discharge instructions entered into AVS.

## 2024-05-20 NOTE — PROGRESS NOTES
Patient triggered sepsis protocol. Lactic was 1.2. Vitals are stable. Sinus rhythm with 1st degree AV block.

## 2024-05-20 NOTE — PLAN OF CARE
Problem: Adult Inpatient Plan of Care  Goal: Absence of Hospital-Acquired Illness or Injury  Outcome: Not Progressing  Intervention: Identify and Manage Fall Risk  Recent Flowsheet Documentation  Taken 5/20/2024 0855 by Mckenzie Aponte RN  Safety Promotion/Fall Prevention:   activity supervised   mobility aid in reach   clutter free environment maintained   safety round/check completed  Intervention: Prevent and Manage VTE (Venous Thromboembolism) Risk  Recent Flowsheet Documentation  Taken 5/20/2024 0855 by Mckenzie Aponte RN  VTE Prevention/Management: SCDs (sequential compression devices) off  Intervention: Prevent Infection  Recent Flowsheet Documentation  Taken 5/20/2024 0855 by Mckenzie Aponte RN  Infection Prevention:   hand hygiene promoted   single patient room provided     Problem: Pain Acute  Goal: Optimal Pain Control and Function  Outcome: Progressing  Intervention: Prevent or Manage Pain  Recent Flowsheet Documentation  Taken 5/20/2024 0855 by Mckenzie Aponte RN  Medication Review/Management:   medications reviewed   high-risk medications identified   Goal Outcome Evaluation:  Pt A/O. Remained NPO with medication exception (sips of water) for IR procedure, transported to IR at 1400, still in progress. Bloom patent and draining. Pain managed with PRN medication,effective per pt. Stable on cardiac monitoring. Uses call light appropriately, alarms on for safety. Daughter and spouse at bedside and supportive of pt.      Mckenzie Aponte RN   Shift [id number].

## 2024-05-20 NOTE — DISCHARGE INSTRUCTIONS
Percutaneous Nephrostomy Tube (PNT) Discharge Instructions:  You had a nephrostomy tube (PNT) placed. This is a catheter (plastic tube) that is inserted through your skin into your kidney. The nephrostomy tube is placed to drain urine from your body into a collecting bag outside your body. Please follow the below instructions regarding care of your nephrostomy tube:    Care Instructions:  - If you received sedation for your procedure, do not drive or operate heavy machinery for the rest of the day.  - Rest after your drain was placed. Avoid strenuous activity and heavy lifting for the next 2 days. Return to your normal activities as you tolerate after the 2 day restriction.  - Keep the nephrostomy tube site clean and dry.   - You may shower, but do not submerge the nephrostomy tube site in water (avoid tub baths, Jacuzzis, hot tubs and pools)  - If you have a gauze dressing, change the gauze and tape dressing as needed to keep site clean and dry. If you have a securement device, leave in place until your next exchange.  - Clean around nephrostomy tubes exit sites with soap and water, pat and apply new split gauze around the tube at the exit site.  - Urine going into the bag may be red with blood for the first few days.  - Keep the drainage bag lower than your kidney to keep urine from backing up.  - Inspect the tube often for kinks.  - Empty your drainage bag when it is approximately half way fluid. Follow below instructions for emptying bag:  - Clean hands well with soap and water.  - Place a container near the outlet valve of the drainage bag.  - To open the valve:  - If you have a [facility] leg bag: Twist the blue valve at the bottom of the bag while holding the valve over your container to empty bag. Re-twist the valve closed on the drainage bag once complete.  - If you have a [place] leg bag: Turn the lever downward while holding the valve over your container to empty the bag. Turn the valve upward to  close once complete.  - Discard drainage into toilet once urine drained.    Follow-Up:  - Routine (every 2-3 months) nephrostomy tube exchange is recommended. Your Urologist may order and schedule exchanges by calling [facility] Outpatient Scheduling at [phone]  - Follow up with your Urologist as previously instructed.    Contact [facility] RN Line at [phone] if you experience the following:  - Nephrostomy tube(s) stops draining urine.  - Nephrostomy tube(s) site is leaking urine.  - Extreme pain at the nephrostomy tube site.  - Nephrostomy tube appears to be falling out or has fallen out, becomes clogged or breaks.    Seek Medical Evaluation for the following:  - Fever (greater than 101 F (38.3C)).  - Purulent (yellow/green/foul smelling) drainage from nephrostomy tube exit sites.  - Significant bleeding from nephrostomy tube site(s).  - Significant or worsening pain at nephrostomy tube exit site(s) or back.

## 2024-05-20 NOTE — PROGRESS NOTES
[facility]    Medicine Progress Note - Hospitalist Service    Date of Admission:  5/19/2024    Assessment & Plan   89 y/o M with recent TURP, CKD3b, HTN, HLD, SSS s/p PM presents with SUJATHA superimposed on CKD3b and UTI. IV ceftriaxone, urology consulted given recent TURP and 3-way Smith. Urology recommends relief of obstruction with b/l PNTs - consultation to IR for b/l PNTs appreciated.     Recent TURP - Acute/chronic renal failure, with CT evidence of severe and persistent  right and left hydroureter/hydronephrosis down to the level of the urinary bladder. Per CT - not significantly changed as compared to 02/01/2024.      CT also showed Prostatomegaly with diffuse urinary bladder wall thickening, likely due to chronic bladder outlet obstruction.      He also has UTI, not septic looking      - IVF, NPO, PRN pain meds, continue Rocephin   - Urology referral   - Has chronic smith   - IR consulted to place b/l PNTs     Hyperkalemia with CKD stage IIIb  - Normalizing - admission 5.6 --> 5.2      CKD stage III with anemia, Hgb of 9+  - order home meds    HLD and HTN   - order home meds    SSS (sick sinus syndrome) and has PM  - order home meds  - PCP follow-up         Diet: NPO per Anesthesia Guidelines for Procedure/Surgery Except for: Meds    DVT Prophylaxis: Pneumatic Compression Devices, Anti-embolisim stockings (TEDs), Ambulate every shift, and VTE Prophylaxis contraindicated due to pending initial urology consultation, as may require invasive intervention / possible cystoscopy and/or complex catheter exchange  Smith Catheter: PRESENT, indication: Surgical procedure  Lines: None     Cardiac Monitoring: ACTIVE order. Indication: hyperK  Code Status: Full Code      Clinically Significant Risk Factors        # Hyperkalemia: Highest K = 5.6 mmol/L in last 2 days, will monitor as appropriate   # Hypocalcemia: Lowest Ca = 8.3 mg/dL in last 2 days, will monitor and replace as appropriate     #  Hypoalbuminemia: Lowest albumin = 3.3 g/dL at 5/19/2024  8:28 AM, will monitor as appropriate       # Hypertension: Noted on problem list            # Financial/Environmental Concerns:     # Pacemaker present       Disposition Plan     Medically Ready for Discharge: 1-2 days             Surendra Doe MD  Hospitalist Service  [facility]  Securely message with [employer] (more info)  Text page via [employer] Paging/Directory   ______________________________________________________________________    Interval History   No acute events overnight.    No report of chest pain, shortness of breath, or other new complaints. His spouse and daughter at present at bedside. Discussed plan of care with patient. Answered all questions to patient's verbalized understanding and satisfaction.    Physical Exam   Vital Signs: Temp: 99.3  F (37.4  C) Temp src: Oral BP: (!) 151/67 Pulse: 80   Resp: 16 SpO2: 92 % O2 Device: None (Room air)    Weight: 155 lbs 13.84 oz    GENERAL:  Alert, appears comfortable, in no acute distress, appears stated age   HEAD:  Normocephalic, without obvious abnormality, atraumatic   EYES:  PERRL, conjunctiva/corneas clear, no scleral icterus, EOM's intact   NOSE: Nares normal, septum midline, mucosa normal, no drainage   THROAT: Lips, mucosa, and tongue normal; teeth and gums normal, mouth moist   NECK: Supple, symmetrical, trachea midline   BACK:   Symmetric, no curvature, ROM normal   LUNGS:   Clear to auscultation bilaterally, no rales, rhonchi, or wheezing, symmetric chest rise on inhalation, respirations unlabored   CHEST WALL:  No tenderness or deformity   HEART:  Regular rate and rhythm, S1 and S2 normal, no murmur, rub, or gallop    ABDOMEN:   Soft, non-tender, bowel sounds active all four quadrants, no masses, no organomegaly, no rebound or guarding   EXTREMITIES: Extremities normal, atraumatic, no cyanosis or edema    SKIN: Dry to touch, no exanthems in the visualized areas    NEURO: Alert, oriented x 4, moves all four extremities freely/spontaneously   PSYCH: Cooperative, behavior is appropriate        Medical Decision Making   56 MINUTES SPENT BY ME on the date of service doing chart review, history, exam, documentation & further activities per the note.         Data     I have personally reviewed the following data over the past 24 hrs:    15.1 (H)  \   9.1 (L)   / 140 (L)     141 110 (H) 49.5 (H) /  138 (H)   5.2 23 2.42 (H) \     ALT: 8 AST: 18 AP: 85 TBILI: 0.3   ALB: 3.3 (L) TOT PROTEIN: 5.8 (L) LIPASE: N/A     Procal: N/A CRP: N/A Lactic Acid: 1.2         Imaging results reviewed over the past 24 hrs:   No results found for this or any previous visit (from the past 24 hour(s)).

## 2024-05-20 NOTE — PROCEDURES
[facility]    Procedure: Bilateral nephrostomy tube placement    Date/Time: 5/20/2024 3:39 PM    Performed by: Nneka Charlton DO  Authorized by: Nneka Charlton DO      UNIVERSAL PROTOCOL   Site Marked: Yes  Prior Images Obtained and Reviewed:  Yes  Required items: Required blood products, implants, devices and special equipment available    Patient identity confirmed:  Verbally with patient, arm band, provided demographic data and hospital-assigned identification number  Patient was reevaluated immediately before administering moderate or deep sedation or anesthesia  Confirmation Checklist:  Patient's identity using two indicators, relevant allergies, procedure was appropriate and matched the consent or emergent situation and correct equipment/implants were available  Time out: Immediately prior to the procedure a time out was called    Universal Protocol: the Joint Commission Universal Protocol was followed    Preparation: Patient was prepped and draped in usual sterile fashion       ANESTHESIA    Anesthesia:  Local infiltration  Local Anesthetic:  Lidocaine 1% without epinephrine      SEDATION  Patient Sedated: Yes    Sedation Type:  Moderate (conscious) sedation  Vital signs: Vital signs monitored during sedation    See dictated procedure note for full details.  Findings: Severe bilateral hydronephrosis and hydroureter     Placement of bilateral 10 Urdu nephrostomy tube placement    Specimens: none    Complications: None    Condition: Stable    Plan: Bilateral tubes to gravity drainage.       PROCEDURE    Patient Tolerance:  Patient tolerated the procedure well with no immediate complications  Length of time physician/provider present for 1:1 monitoring during sedation: 30

## 2024-05-20 NOTE — PLAN OF CARE
Patient only has pain when his bladder is he is having his spasms. Patient has been afebrile and WBC are 15.1. Lactic came back at 1.2. SR with 1st degree on tele.   Problem: UTI (Urinary Tract Infection)  Goal: Improved Infection Symptoms  Outcome: Progressing   Goal Outcome Evaluation:

## 2024-05-20 NOTE — PRE-PROCEDURE
GENERAL PRE-PROCEDURE:   Date/Time:  5/20/2024 2:20 PM    Written consent obtained?: Yes    Risks and benefits: Risks, benefits and alternatives were discussed    Consent given by:  Patient  Patient states understanding of procedure being performed: Yes    Patient's understanding of procedure matches consent: Yes    Procedure consent matches procedure scheduled: Yes    Expected level of sedation:  Moderate  Appropriately NPO:  Yes  ASA Class:  2  Mallampati  :  Grade 3- soft palate visible, posterior pharyngeal wall not visible  Lungs:  Lungs clear with good breath sounds bilaterally  Heart:  Normal heart sounds and rate  History & Physical reviewed:  History and physical reviewed and no updates needed  Statement of review:  I have reviewed the lab findings, diagnostic data, medications, and the plan for sedation

## 2024-05-21 ENCOUNTER — APPOINTMENT (OUTPATIENT)
Dept: OCCUPATIONAL THERAPY | Facility: CLINIC | Age: 89
DRG: 699 | End: 2024-05-21
Attending: HOSPITALIST
Payer: MEDICARE

## 2024-05-21 ENCOUNTER — APPOINTMENT (OUTPATIENT)
Dept: PHYSICAL THERAPY | Facility: CLINIC | Age: 89
DRG: 699 | End: 2024-05-21
Attending: HOSPITALIST
Payer: MEDICARE

## 2024-05-21 LAB
ANION GAP SERPL CALCULATED.3IONS-SCNC: 9 MMOL/L (ref 7–15)
BACTERIA UR CULT: ABNORMAL
BUN SERPL-MCNC: 36.5 MG/DL (ref 8–23)
CALCIUM SERPL-MCNC: 8.2 MG/DL (ref 8.8–10.2)
CHLORIDE SERPL-SCNC: 110 MMOL/L (ref 98–107)
CREAT SERPL-MCNC: 2.06 MG/DL (ref 0.67–1.17)
DEPRECATED HCO3 PLAS-SCNC: 22 MMOL/L (ref 22–29)
EGFRCR SERPLBLD CKD-EPI 2021: 31 ML/MIN/1.73M2
GLUCOSE SERPL-MCNC: 197 MG/DL (ref 70–99)
POTASSIUM SERPL-SCNC: 4.4 MMOL/L (ref 3.4–5.3)
SODIUM SERPL-SCNC: 141 MMOL/L (ref 135–145)

## 2024-05-21 PROCEDURE — 97116 GAIT TRAINING THERAPY: CPT | Mod: GP

## 2024-05-21 PROCEDURE — 250N000013 HC RX MED GY IP 250 OP 250 PS 637: Performed by: HOSPITALIST

## 2024-05-21 PROCEDURE — 99233 SBSQ HOSP IP/OBS HIGH 50: CPT | Performed by: HOSPITALIST

## 2024-05-21 PROCEDURE — 97535 SELF CARE MNGMENT TRAINING: CPT | Mod: GO

## 2024-05-21 PROCEDURE — 120N000001 HC R&B MED SURG/OB

## 2024-05-21 PROCEDURE — 80048 BASIC METABOLIC PNL TOTAL CA: CPT | Performed by: HOSPITALIST

## 2024-05-21 PROCEDURE — 250N000013 HC RX MED GY IP 250 OP 250 PS 637: Performed by: INTERNAL MEDICINE

## 2024-05-21 PROCEDURE — 36415 COLL VENOUS BLD VENIPUNCTURE: CPT | Performed by: HOSPITALIST

## 2024-05-21 PROCEDURE — 258N000003 HC RX IP 258 OP 636: Performed by: INTERNAL MEDICINE

## 2024-05-21 PROCEDURE — 97166 OT EVAL MOD COMPLEX 45 MIN: CPT | Mod: GO

## 2024-05-21 PROCEDURE — 250N000013 HC RX MED GY IP 250 OP 250 PS 637

## 2024-05-21 PROCEDURE — 97161 PT EVAL LOW COMPLEX 20 MIN: CPT | Mod: GP

## 2024-05-21 PROCEDURE — 250N000011 HC RX IP 250 OP 636: Performed by: INTERNAL MEDICINE

## 2024-05-21 RX ORDER — TRIAMCINOLONE ACETONIDE 1 MG/G
CREAM TOPICAL 2 TIMES DAILY PRN
Status: DISCONTINUED | OUTPATIENT
Start: 2024-05-21 | End: 2024-05-27 | Stop reason: HOSPADM

## 2024-05-21 RX ORDER — TAMSULOSIN HYDROCHLORIDE 0.4 MG/1
0.4 CAPSULE ORAL AT BEDTIME
Status: DISCONTINUED | OUTPATIENT
Start: 2024-05-21 | End: 2024-05-27 | Stop reason: HOSPADM

## 2024-05-21 RX ORDER — ACETAMINOPHEN 325 MG/1
325-650 TABLET ORAL EVERY 4 HOURS PRN
Status: DISCONTINUED | OUTPATIENT
Start: 2024-05-21 | End: 2024-05-21

## 2024-05-21 RX ORDER — METRONIDAZOLE 10 MG/G
GEL TOPICAL DAILY
Status: DISCONTINUED | OUTPATIENT
Start: 2024-05-21 | End: 2024-05-27 | Stop reason: HOSPADM

## 2024-05-21 RX ORDER — AMLODIPINE BESYLATE 2.5 MG/1
2.5 TABLET ORAL DAILY
Status: DISCONTINUED | OUTPATIENT
Start: 2024-05-21 | End: 2024-05-27 | Stop reason: HOSPADM

## 2024-05-21 RX ADMIN — TAMSULOSIN HYDROCHLORIDE 0.4 MG: 0.4 CAPSULE ORAL at 20:09

## 2024-05-21 RX ADMIN — CEFTRIAXONE 1 G: 1 INJECTION, POWDER, FOR SOLUTION INTRAMUSCULAR; INTRAVENOUS at 03:56

## 2024-05-21 RX ADMIN — SODIUM CHLORIDE: 9 INJECTION, SOLUTION INTRAVENOUS at 00:44

## 2024-05-21 RX ADMIN — OXYBUTYNIN CHLORIDE 5 MG: 5 TABLET ORAL at 13:27

## 2024-05-21 RX ADMIN — OXYBUTYNIN CHLORIDE 5 MG: 5 TABLET ORAL at 07:55

## 2024-05-21 RX ADMIN — AMLODIPINE BESYLATE 2.5 MG: 2.5 TABLET ORAL at 10:11

## 2024-05-21 RX ADMIN — HYDROCODONE BITARTRATE AND ACETAMINOPHEN 1 TABLET: 5; 325 TABLET ORAL at 07:55

## 2024-05-21 RX ADMIN — ACETAMINOPHEN 650 MG: 325 TABLET ORAL at 03:54

## 2024-05-21 RX ADMIN — SENNOSIDES AND DOCUSATE SODIUM 2 TABLET: 8.6; 5 TABLET ORAL at 08:04

## 2024-05-21 RX ADMIN — OXYBUTYNIN CHLORIDE 5 MG: 5 TABLET ORAL at 20:09

## 2024-05-21 ASSESSMENT — ACTIVITIES OF DAILY LIVING (ADL)
ADLS_ACUITY_SCORE: 39
ADLS_ACUITY_SCORE: 39
ADLS_ACUITY_SCORE: 34
ADLS_ACUITY_SCORE: 39
ADLS_ACUITY_SCORE: 34
ADLS_ACUITY_SCORE: 34
ADLS_ACUITY_SCORE: 39
ADLS_ACUITY_SCORE: 39
ADLS_ACUITY_SCORE: 34
ADLS_ACUITY_SCORE: 39
ADLS_ACUITY_SCORE: 34
ADLS_ACUITY_SCORE: 39

## 2024-05-21 NOTE — PROGRESS NOTES
05/21/24 1508   Appointment Info   Signing Clinician's Name / Credentials (OT) Maribell Pérez, OTR/L   Living Environment   People in Home spouse  (who has cogntive decline)   Current Living Arrangements house   Living Environment Comments has standard toilet with vanity, tub shower with chair and clamp on grabbar, reacher   Self-Care   Activity/Exercise/Self-Care Comment independent with adls/iadls/driving   Instrumental Activities of Daily Living (IADL)   IADL Comments has supportive children nearby   General Information   Onset of Illness/Injury or Date of Surgery 05/19/24   Referring Physician Surendra Doe   Patient/Family Therapy Goal Statement (OT) get stronger   Additional Occupational Profile Info/Pertinent History of Current Problem 87 y/o M with recent TURP, CKD3b, HTN, HLD, SSS s/p PM presents with SUJATHA superimposed on CKD3b and UTI. Nephrostomy tubes placed 5/20/24   Existing Precautions/Restrictions fall   Cognitive Status Examination   Affect/Mental Status (Cognitive) WNL   Cognitive Status Comments pt is a retired missionary to [employer]   Range of Motion Comprehensive   General Range of Motion no range of motion deficits identified   Strength Comprehensive (MMT)   General Manual Muscle Testing (MMT) Assessment no strength deficits identified   Bed Mobility   Comment (Bed Mobility) min   Transfers   Transfer Comments min   Activities of Daily Living   BADL Assessment/Intervention lower body dressing;toileting   Lower Body Dressing Assessment/Training   [place] Level (Lower Body Dressing) minimum assist (75% patient effort)   Toileting   [place] Level (Toileting) minimum assist (75% patient effort)   Clinical Impression   Criteria for Skilled Therapeutic Interventions Met (OT) Yes, treatment indicated   OT Diagnosis decreased ind with adls   OT Problem List-Impairments impacting ADL activity tolerance impaired;cognition;mobility;strength;pain   Assessment of Occupational Performance 3-5  Performance Deficits   Identified Performance Deficits dressing, toileting, transfers, bed mobility,   Planned Therapy Interventions (OT) ADL retraining;bed mobility training;transfer training   Clinical Decision Making Complexity (OT) detailed assessment/moderate complexity   Risk & Benefits of therapy have been explained evaluation/treatment results reviewed;care plan/treatment goals reviewed;risks/benefits reviewed;current/potential barriers reviewed;participants voiced agreement with care plan;participants included;patient   OT Total Evaluation Time   OT Eval, Moderate Complexity Minutes (14849) 15   OT Goals   Therapy Frequency (OT) 5 times/week   OT Predicted Duration/Target Date for Goal Attainment 05/26/24   OT Goals Lower Body Dressing;Bed Mobility;Toilet Transfer/Toileting   OT: Lower Body Dressing Modified independent;within precautions   OT: Bed Mobility Modified independent;supine to/from sitting;rolling;bridging;within precautions   OT: Toilet Transfer/Toileting Modified independent;toilet transfer;cleaning and garment management;within precautions   Interventions   Interventions Quick Adds Self-Care/Home Management   Self-Care/Home Management   Self-Care/Home Mgmt/ADL, Compensatory, Meal Prep Minutes (75613) 23   Symptoms Noted During/After Treatment (Meal Preparation/Planning Training) fatigue   Treatment Detail/Skilled Intervention Pt s daughter present for session.  She asked great questions about home adaptations.  Showed her the commode over the toilet in the pts bathroom and suggested that may be beneficial for home.  Pt tried it and said he thinks his bathroom at home will work for him. Pt worked on dressing himself.  He needed assist due to weakness from the past several days of immobility.  He needed verbal cues for safe hand placement with transfers to chair and toilet.  He walked with FWW and CGA due to weakness.  He is open to TCU if needed.   OT Discharge Planning   OT Plan 5/26-duarte  dressing with reacher, sock aid, bed mob, standing at sink for AM cares, activity tolerance/strengthening.   OT Discharge Recommendation (DC Rec) Transitional Care Facility  (or home if progresses rapidly)   OT Rationale for DC Rec Pt merced benefit from OT at TCU for adls and strengthening after prolonged immobility.   OT Brief overview of current status min with le dressing and toileting   Total Session Time   Timed Code Treatment Minutes 23   Total Session Time (sum of timed and untimed services) 38

## 2024-05-21 NOTE — PLAN OF CARE
Problem: Adult Inpatient Plan of Care  Goal: Optimal Comfort and Wellbeing  Outcome: Progressing  Intervention: Monitor Pain and Promote Comfort  Recent Flowsheet Documentation  Taken 5/21/2024 2971 by Milagros Eaton, RN  Pain Management Interventions: medication (see MAR)   Goal Outcome Evaluation:       Pt is alert and oriented x4. C/o abdominal discomfort, PRN Tylenol given. Boyd neph tube draining well. Left tube draining pink tinge urine, right tube draining yellow colored urine. Pt's smith cath was leaking/bypassing. Called SWAT RN. SWAT advised to withdraw the water the water balloon, push the catheter in a little bit more then fill the balloon again. Withdraw 58ml of clear water from the balloon. Witnessed by Charge Nurse. Dark cloudy urine started to flow from the catheter, small amount, when water from the balloon was emptied. Informed SWAT that 58 ml sterile water was withdrawn from the balloon. SWAT advised to just fill it up with 30cc as indicated in smith (size is 22Fr). No leakage noted thereafter. VSS. Tele read NSR w/ 1st degree AV block.

## 2024-05-21 NOTE — PLAN OF CARE
Problem: Adult Inpatient Plan of Care  Goal: Plan of Care Review  Outcome: Progressing     Goal Outcome Evaluation: VSS. Denies pain. Tele- NSR with 1st degree block and BBB. A & O X4. Nephrostomy tubes are draining- output from left- 220 ml, and output from right- 400ml. Left side is red tinged. Bloom catheter is patent and intact. IVF. A X 2 standing at bedside. Generalized weakness. On alarms for safety.

## 2024-05-21 NOTE — PROGRESS NOTES
05/21/24 1440   Appointment Info   Signing Clinician's Name / Credentials (PT) Jacinto Gonzalez, PT   Quick Adds   Quick Adds Certification   Living Environment   People in Home spouse   Current Living Arrangements house   Home Accessibility stairs to enter home;stairs within home   Number of Stairs, Main Entrance 2   Stair Railings, Main Entrance none   Number of Stairs, Within Home, Primary greater than 10 stairs   Stair Railings, Within Home, Primary railing on left side (ascending)   Transportation Anticipated agency   Living Environment Comments can stay on main level   Self-Care   Usual Activity Tolerance good   Current Activity Tolerance good   Equipment Currently Used at Home walker, rolling   Fall history within last six months yes   Number of times patient has fallen within last six months 1   Activity/Exercise/Self-Care Comment Indep with I ADL's, ADL's, drives   General Information   Onset of Illness/Injury or Date of Surgery 05/19/24   Pertinent History of Current Problem (include personal factors and/or comorbidities that impact the POC) SUJATHA (acute kidney injury) (H24) 5/19/2024    BPH with urinary obstruction Unknown    Hypertension Unknown    Cardiac pacemaker in situ, dual chamber 9/3/2019    Hyperkalemia 5/19/2024    Acute right flank pain 5/19/2024    Hydronephrosis, unspecified hydronephrosis type 5/19/2024   Existing Precautions/Restrictions no known precautions/restrictions   General Observations multiple drains   Cognition   Affect/Mental Status (Cognition) WNL   Follows Commands (Cognition) WNL   Cognitive Status Comments Patient aware of cognitive decline   Posture    Posture Not impaired   Range of Motion (ROM)   Range of Motion ROM is WFL   Strength (Manual Muscle Testing)   Strength (Manual Muscle Testing) No deficits observed during functional mobility   Transfers   Transfers sit-stand transfer   Sit-Stand Transfer   Sit-Stand [place] (Transfers) verbal cues;1 person  assist;moderate assist (50% patient effort)   Assistive Device (Sit-Stand Transfers) walker, front-wheeled   Comment, (Sit-Stand Transfer) multiple attempts to stand, taxing effort for paitent to stand from recliner chair   Gait/Stairs (Locomotion)   [place] Level (Gait) verbal cues;contact guard;1 person assist   Assistive Device (Gait) walker, front-wheeled   Distance in Feet (Gait) 20   Pattern (Gait) swing-through   Balance   Balance Comments slight LOB noted with initial standing going backwards   Clinical Impression   Criteria for Skilled Therapeutic Intervention Yes, treatment indicated   PT Diagnosis (PT) Impaired functional mobility   Influenced by the following impairments weakness, pain, decreased balance   Functional limitations due to impairments transfers, gait   Clinical Presentation (PT Evaluation Complexity) stable   Clinical Presentation Rationale Patient presents as medically diagnosed   Clinical Decision Making (Complexity) low complexity   Planned Therapy Interventions (PT) home exercise program;gait training;stair training;transfer training;patient/family education   Risk & Benefits of therapy have been explained patient   PT Total Evaluation Time   PT Eval, Low Complexity Minutes (34987) 10   Physical Therapy Goals   PT Frequency Daily   PT Predicted Duration/Target Date for Goal Attainment 05/28/24   PT Goals Transfers;Gait;Stairs;PT Goal 1   PT: Transfers Modified independent;Sit to/from stand;Assistive device;Within precautions   PT: Gait Modified independent;Standard walker;Within precautions;150 feet   PT: Stairs Supervision/stand-by assist;4 stairs;Rail on right   PT: Goal 1 Indep with HEP   Interventions   Interventions Quick Adds Gait Training   Gait Training   Gait Training Minutes (42470) 15   Symptoms Noted During/After Treatment (Gait Training) none   Treatment Detail/Skilled Intervention rapid stable gait in hallway, noted slightl sway with turning 3 times.  Patient appears  to almost lose his balance but does not when turning. VC to slow down and pause when turning but noted to continue  to have same slight loss of balance even after turning.  Loraine noted to lean slightly to R when ambulating but no path deviations and did not bump anything on R. Able to go up/down 4 steps with R rail and spc.  VC for use of cane on steps   Distance in Feet 200, 120   [place] Level (Gait Training) contact guard   Physical Assistance Level (Gait Training) supervision;verbal cues;1 person assist   Weight Bearing (Gait Training) full weight-bearing   Assistive Device (Gait Training) rolling walker   Pattern Analysis (Gait Training) swing-through gait   Impairments (Gait Analysis/Training) balance impaired   Stair Railings present on right side   Physical Assist/Nonphysical Assist (Stairs) verbal cues;supervision;1 person assist   Level of [place] (Stairs) contact guard   Assistive Device (Stairs) straight cane   PT Discharge Planning   PT Plan Progress with gait/balance, Bee Balance test   PT Discharge Recommendation (DC Rec) Transitional Care Facility   PT Rationale for DC Rec Patient demonstrating slight LOB several times during ambulation. Lul has only 1 recorded fall but per family has been more near misses reently   PT Brief overview of current status Patient cga for gait/transfers, high fall risk   PT Equipment Needed at Discharge walker, rolling   Total Session Time   Timed Code Treatment Minutes 15   Total Session Time (sum of timed and untimed services) 25

## 2024-05-21 NOTE — PLAN OF CARE
I Illness Severity: Stable    PPatient Summary:      Telemetry Orders: No    Interpretation of Cardiac Rhythm: N/A    Dyspnea improved and O2 sats >88% at RA or at prior home O2 therapy level:  Yes    Last Bowel Movement: 5/22    Acute Symptoms or Concerns: urinary tract infection, hydronephrosis, new nephrostomy tubes, smith catheter with cloudy output, mobility and activity tolerance concerns.    Is this a new or worsening symptom or concern? Yes    Is this symptom or concern being adequately managed? Yes    Shift Summary: Patient up in the chair for all of shift. Ambulated in the hallway x2 and ambulated in the room multiple times during the day. Bowel movements x3 after not have a BM since surgical procedure. Nephrostomy tubes emptied as needed. One dose of Norco given as patient was worried about pain with ambulating but refused pain.    AActions:    Diagnostic testing and/or procedures completed, and results are available for discharge:  Not Met    SSituational Awareness:      Anticipated post discharge treatment plan formulated and the following needs have been identified:   IV antibiotics, homecare, and rehab (PT, OT, ST)    SSynthesis:     Was bedside rounding completed on your shift? Yes     What team members present during bedside rounds?  RN and MD    Problem: Adult Inpatient Plan of Care  Goal: Readiness for Transition of Care  Outcome: Progressing

## 2024-05-21 NOTE — PROGRESS NOTES
Place of Service:  [facility]     Reason for follow up: SUJATHA, possible UTI, Chronic bilateral hydronephrosis, BPH s/p TURP 5/17/24    SUBJECTIVE:  Events: no acute events overnight. S/p bilateral PNT placement 5/20.     Pt reports he is feeling better. +BM today. Bilateral (R>L) flank pain resolved s/p PNT placement 5/20. Denies abdominal pain, fever, chills, N/V. Tolerating smith.     OBJECTIVE:  PHYSICAL EXAM:  Temp: 98.4  F (36.9  C) Temp src: Oral BP: (!) 151/68 Pulse: 69   Resp: 20 SpO2: 95 % O2 Device: None (Room air) Oxygen Delivery: 2 LPM  General: NAD, alert, cooperative, sitting up in chair.   Head: normocephalic, without abnormality / atraumatic  Abdomen: soft, non tender, non distended. no suprapubic fullness, no suprapubic tenderness. No bilateral CVA tenderness. Right PNT draining slightly cloudy yellow urine, flushed with 4 ml sterile saline with good return. Left PNT draining semi-thick cloudy pink urine, flushed with 10 ml sterile saline with good return.   Genitourinary: Penis edematous, paraphimosis noted and reduced. Smith catheter draining thick purulent pink urine, small volume.   Skin: No rashes or lesions  Musculoskeletal: moves all four extremities equally.   Psychological: alert and oriented, answers questions appropriately.    LABS:  Creatinine   Date Value Ref Range Status   05/21/2024 2.06 (H) 0.67 - 1.17 mg/dL Final     WBC Count   Date Value Ref Range Status   05/19/2024 15.1 (H) 4.0 - 11.0 10e3/uL Final     Hemoglobin   Date Value Ref Range Status   05/19/2024 9.1 (L) 13.3 - 17.7 g/dL Final     Platelet Count   Date Value Ref Range Status   05/19/2024 140 (L) 150 - 450 10e3/uL Final     UA:  UA RESULTS:  Recent Labs   Lab Test 05/19/24  0138 10/01/22  1953 01/14/22  1423   COLOR Light Orange*   < > Yellow   APPEARANCE Turbid*   < > Clear   URINEGLC Negative   < > Negative   URINEBILI Negative   < > Negative   URINEKETONE Negative   < > Negative   SG 1.008   < > 1.015   UBLD  1.0 mg/dL*   < > Negative   URINEPH 6.5   < > 7.0   PROTEIN 300*   < > Negative   UROBILINOGEN  --   --  0.2   NITRITE Negative   < > Negative   LEUKEST 500 Rober/uL*   < > Negative   RBCU >182*   < >  --    WBCU >182*   < >  --     < > = values in this interval not displayed.     Cultures:  Urine 5/19: <10k mix of urogenital niyah.   UC R. PNT 5/20: pending  UC L. PNT 5/20: pending     Lab Results: personally reviewed.     ASSESSMENT/PLAN:  Mikaela Figueroa is being seen by [facility] for SUJATHA, chronic bilateral hydronephrosis, BPH s/p TURP 5/17/24    - 88-year-old male admitted for SUJATHA, possible UTI, right flank pain with recent TURP on 5/17 for BPH and hx of chronic bilateral hydronephrosis not improved with Smith catheter. S/p bilateral PNT placement 5/20.   - Creatinine improving, 2.06 today. Monitor.    - UA+, UC: 10k mixed niyah. Bilateral PNT UC pending, right draining cloudy yellow, left draining more purulent pink urine, smith draining purulent pink after PNTs placed. WBC 15.1 on 5/19. Remains afebrile. Agree with ceftriaxone coverage, await PNT UC results.   - Maintain smith catheter for now to optimize drainage.   - Continue oxybutynin for bladder spasms. Consider TOV with PVR checks prior to discharge, will need to discontinue oxybutynin AM of TOV.   - Once fully cleared of UTI or fully treated if Cx +, then anticipate bilateral antegrade nephrostograms with possible ureteral stent placement.        Rigo Hernandez, APRN, CNP  [facility]   [phone]

## 2024-05-21 NOTE — PROGRESS NOTES
[facility]    Medicine Progress Note - Hospitalist Service    Date of Admission:  5/19/2024    Assessment & Plan   87 y/o M with recent TURP, CKD3b, HTN, HLD, SSS s/p PM presents with SUJATHA superimposed on CKD3b and UTI. IV ceftriaxone, urology consulted given recent TURP and 3-way Bloom. Urology recommends relief of obstruction with b/l PNTs - consultation to IR for b/l PNTs appreciated. IR placed b/l PNTs on 5/20. Now awaiting urine culture from 5/20 from the PNT as recommended by Urology (not the admitting 5/19 urine culture), so continue IV ceftriaxone in the meantime.     Recent TURP - Acute on chronic renal failure  Probable UTI; bacteruria in setting of recent TURP and prostate instrumentation  -CT evidence of severe and persistent  right and left hydroureter/hydronephrosis down to the level of the urinary bladder. Per CT - not significantly changed as compared to 02/01/2024.   -CT also showed Prostatomegaly with diffuse urinary bladder wall thickening, likely due to chronic bladder outlet obstruction.   -Certainly sypmptomatic with dysuria, frequency, urge. Also, recent TURP and prostate instrumentation with bacteruria indicates antibacterial treatment.   -Per Urology, follow-up and tailor antibiotics from the urine culture from 5/20 from the PNT (not the admitting 5/19 urine culture)  - Urology referral appreciated  - IR consulted to place b/l PNTs, done on 5/20     Hyperkalemia with CKD stage IIIb  - Normalizing - admission 5.6 --> 5.2      CKD stage III with anemia, Hgb of 9+  - Order home meds  - Monitor Hgb - transfuse for Hgb < 7.0    HLD and HTN   - order home meds    SSS (sick sinus syndrome) and has PM  - order home meds  - PCP follow-up         Diet: Regular Diet Adult    DVT Prophylaxis: Pneumatic Compression Devices, Anti-embolisim stockings (TEDs), Ambulate every shift, and VTE Prophylaxis contraindicated due to pending initial urology consultation, as may require  invasive intervention / possible cystoscopy and/or complex catheter exchange  Bloom Catheter: PRESENT, indication: Surgical procedure  Lines: None     Cardiac Monitoring: None  Code Status: Full Code      Clinically Significant Risk Factors              # Hypoalbuminemia: Lowest albumin = 3.3 g/dL at 5/19/2024  8:28 AM, will monitor as appropriate       # Hypertension: Noted on problem list            # Financial/Environmental Concerns:     # Pacemaker present       Disposition Plan     Medically Ready for Discharge: 2 or so days, awaiting urine culture from PNT on 5/20             Surendra Doe MD  Hospitalist Service  [facility]  Securely message with [employer] (more info)  Text page via [facility] Paging/Directory   ______________________________________________________________________    Interval History   No acute events overnight.    No report of chest pain, shortness of breath, or other new complaints. He is in great spirits, as is his daughter Kalyani. Discussed plan of care with both Ricardo and Kalyani. Answered all questions to both of their verbalized understanding and satisfaction.    Physical Exam   Vital Signs: Temp: 98.4  F (36.9  C) Temp src: Oral BP: (!) 168/79 Pulse: 77   Resp: 20 SpO2: 95 % O2 Device: None (Room air) Oxygen Delivery: 2 LPM  Weight: 159 lbs 13.34 oz    GENERAL:  Alert, appears comfortable, in no acute distress, appears stated age   HEAD:  Normocephalic, without obvious abnormality, atraumatic   EYES:  PERRL, conjunctiva/corneas clear, no scleral icterus, EOM's intact   NOSE: Nares normal, septum midline, mucosa normal, no drainage   THROAT: Lips, mucosa, and tongue normal; teeth and gums normal, mouth moist   NECK: Supple, symmetrical, trachea midline   BACK:   Symmetric, no curvature, ROM normal   LUNGS:   Clear to auscultation bilaterally, no rales, rhonchi, or wheezing, symmetric chest rise on inhalation, respirations unlabored   CHEST WALL:  No tenderness or  deformity   HEART:  Regular rate and rhythm, S1 and S2 normal, no murmur, rub, or gallop    ABDOMEN:   Soft, non-tender, bowel sounds active all four quadrants, no masses, no organomegaly, no rebound or guarding   EXTREMITIES: Extremities normal, atraumatic, no cyanosis or edema    SKIN: Dry to touch, no exanthems in the visualized areas   NEURO: Alert, oriented x 4, moves all four extremities freely/spontaneously   PSYCH: Cooperative, behavior is appropriate        Medical Decision Making   52 MINUTES SPENT BY ME on the date of service doing chart review, history, exam, documentation & further activities per the note.         Data     I have personally reviewed the following data over the past 24 hrs:    N/A  \   N/A   / N/A     142 113 (H) 41.3 (H) /  111 (H)   4.5 21 (L) 2.13 (H) \       Imaging results reviewed over the past 24 hrs:   Recent Results (from the past 24 hour(s))   IR Nephrostomy Tube Placement Bilateral    Narrative    [facility]    EXAM: IR NEPHROSTOMY TUBE PLACEMENT BILATERAL    LOCATION: [facility]    CLINICAL HISTORY: [place] and SUJATHA.    PROCEDURES PERFORMED:  1. Ultrasound guided access of the intrarenal collecting system bilaterally.   2. Bilateral antegrade nephrostograms.  3. Bilateral percutaneous nephrostomy tube placement.    MODERATE SEDATION: 1 Versed and 75 Fentanyl were administered intravenously for moderate sedation. Pulse oximetry, heart rate and blood pressure were continuously monitored by an independent trained observer. The physician spent 20 minutes of   face-to-face moderate sedation time with the patient.    ADDITIONAL MEDICATIONS: see EMR     CONTRAST: See EMR    FLUOROSCOPIC TIME: 2.9 minutes  CUMULATIVE AIR KERMA/DOSE: 17 mGy    STERILE BARRIER TECHNIQUE: Maximal Sterile Barrier Technique Utilized: Cap AND mask AND sterile gown AND sterile gloves AND sterile full body drape AND hand hygiene AND skin preparation 2% chlorhexidine for cutaneous  "antisepsis (or acceptable alternative   antiseptics).   Sterile Ultrasound Technique Utilized ?Sterile gel AND sterile probe covers.    UNIVERSAL PROTOCOL: Standard universal protocol per facility guidelines was followed. See EMR for documentation.     TECHNIQUE/FINDINGS:   Following a discussion of the risks, benefits, indications and alternatives to treatment, appropriate informed consent was obtained.  The patient was brought to the interventional radiology suite and placed on the table. The bilateral flank were prepped   and draped in usual sterile fashion. A time out was performed per universal protocol policy to ensure correct patient, site and procedure to be performed.     A preliminary ultrasound of the left flank was performed which demonstrates severe hydronephrosis.  Ultrasound images were archived.  Then, under ultrasound guidance an appropriate site was marked in the left lateral lower back region for needle   placement and this region was subsequently infiltrated with 1% Lidocaine for local anesthesia. Under direct ultrasound guidance, a 21-gauge needle was inserted into the lower pole of the dilated calyx until urine return was noted. A small amount of   contrast was then injected, which demonstrates severe hydronephrosis and proximal hydroureter. A 0.018\" wire was then advanced through the needle and coiled within the renal pelvis. The needle was then exchanged for a coaxial Addie set, which was advanced   over the wire through the calyx into the renal pelvis. The 0.018\" wire was placed to the side as a safety wire and a 0.035\" guidewire was advanced into the renal pelvis. The tract was serially dilated with final placement of a 10-Slovak nephrostomy tube   with the tip coiled in the renal pelvis. Injection of contrast demonstrates the pigtail is centered in the renal pelvis. The contrast was aspirated and the tube was placed to gravity bag drainage. The catheter was secured to the skin using a 2-0 " "Ethilon   and a sterile dressing was applied.      Attention was then turned to the right kidney. Preliminary ultrasound of the right flank demonstrates severe hydronephrosis. An appropriate site was marked in the right lower back region for needle placement and this region was then infiltrated with 1%   Lidocaine. Under direct ultrasound guidance, a 21-gauge needle was inserted into the lower pole of the dilated calyx until urine return was noted.  A small amount of contrast was then injected which demonstrates severe hydronephrosis and proximal   hydroureter.  A 0.018\" wire was then advanced through the needle and coiled within the renal pelvis. The needle was then exchanged for a coaxial Addie set, which was advanced over the wire through the calyx into the renal pelvis. The 0.018 wire was placed   to the side as a safety wire and a 0.035\" guidewire was advanced into the renal pelvis. The tract was serially dilated with final placement of an 10-[place]inean nephrostomy tube with the tip coiled in the renal pelvis. Injection of contrast demonstrates the   pigtail is centered in the renal pelvis.  The contrast was aspirated and the tube was placed to gravity bag drainage. The catheter was secured to the skin using a 2-0 Ethilon and a sterile dressing was applied.      Throughout the procedure, the patient was monitored by a radiology nurse for cardiac rhythm and oxygen saturation which remained stable. The patient tolerated the procedure well and left interventional radiology in stable condition.      Impression    IMPRESSION:  1. Successful placement of a 10.2-[place]inean left percutaneous nephrostomy tube. Left antegrade nephrostogram demonstrates severe hydronephrosis and hydroureter.  2. Successful placement of a 10.2-[place] right percutaneous nephrostomy tube. Right antegrade nephrostogram demonstrates severe hydronephrosis and hydroureter.       "

## 2024-05-22 ENCOUNTER — APPOINTMENT (OUTPATIENT)
Dept: PHYSICAL THERAPY | Facility: CLINIC | Age: 89
DRG: 699 | End: 2024-05-22
Payer: MEDICARE

## 2024-05-22 ENCOUNTER — APPOINTMENT (OUTPATIENT)
Dept: OCCUPATIONAL THERAPY | Facility: CLINIC | Age: 89
DRG: 699 | End: 2024-05-22
Payer: MEDICARE

## 2024-05-22 LAB
CREAT SERPL-MCNC: 1.93 MG/DL (ref 0.67–1.17)
EGFRCR SERPLBLD CKD-EPI 2021: 33 ML/MIN/1.73M2
WBC # BLD AUTO: 11.8 10E3/UL (ref 4–11)

## 2024-05-22 PROCEDURE — 85048 AUTOMATED LEUKOCYTE COUNT: CPT | Performed by: NURSE PRACTITIONER

## 2024-05-22 PROCEDURE — 82565 ASSAY OF CREATININE: CPT | Performed by: NURSE PRACTITIONER

## 2024-05-22 PROCEDURE — 250N000013 HC RX MED GY IP 250 OP 250 PS 637: Performed by: INTERNAL MEDICINE

## 2024-05-22 PROCEDURE — 99232 SBSQ HOSP IP/OBS MODERATE 35: CPT | Performed by: INTERNAL MEDICINE

## 2024-05-22 PROCEDURE — 250N000013 HC RX MED GY IP 250 OP 250 PS 637: Performed by: HOSPITALIST

## 2024-05-22 PROCEDURE — 250N000011 HC RX IP 250 OP 636: Performed by: HOSPITALIST

## 2024-05-22 PROCEDURE — 36415 COLL VENOUS BLD VENIPUNCTURE: CPT | Performed by: NURSE PRACTITIONER

## 2024-05-22 PROCEDURE — 120N000001 HC R&B MED SURG/OB

## 2024-05-22 PROCEDURE — 97112 NEUROMUSCULAR REEDUCATION: CPT | Mod: GP

## 2024-05-22 PROCEDURE — 97535 SELF CARE MNGMENT TRAINING: CPT | Mod: GO

## 2024-05-22 PROCEDURE — 97116 GAIT TRAINING THERAPY: CPT | Mod: GP

## 2024-05-22 RX ORDER — NALOXONE HYDROCHLORIDE 0.4 MG/ML
0.2 INJECTION, SOLUTION INTRAMUSCULAR; INTRAVENOUS; SUBCUTANEOUS
Status: DISCONTINUED | OUTPATIENT
Start: 2024-05-22 | End: 2024-05-27 | Stop reason: HOSPADM

## 2024-05-22 RX ORDER — FLUCONAZOLE 100 MG/1
100 TABLET ORAL DAILY
Status: DISCONTINUED | OUTPATIENT
Start: 2024-05-23 | End: 2024-05-27 | Stop reason: HOSPADM

## 2024-05-22 RX ORDER — NALOXONE HYDROCHLORIDE 0.4 MG/ML
0.4 INJECTION, SOLUTION INTRAMUSCULAR; INTRAVENOUS; SUBCUTANEOUS
Status: DISCONTINUED | OUTPATIENT
Start: 2024-05-22 | End: 2024-05-27 | Stop reason: HOSPADM

## 2024-05-22 RX ORDER — FLUCONAZOLE 100 MG/1
200 TABLET ORAL DAILY
Status: DISCONTINUED | OUTPATIENT
Start: 2024-05-22 | End: 2024-05-22 | Stop reason: DRUGHIGH

## 2024-05-22 RX ORDER — FLUCONAZOLE 100 MG/1
200 TABLET ORAL ONCE
Status: COMPLETED | OUTPATIENT
Start: 2024-05-22 | End: 2024-05-22

## 2024-05-22 RX ADMIN — METRONIDAZOLE: 10 GEL TOPICAL at 08:29

## 2024-05-22 RX ADMIN — ACETAMINOPHEN 650 MG: 325 TABLET ORAL at 00:57

## 2024-05-22 RX ADMIN — AMLODIPINE BESYLATE 2.5 MG: 2.5 TABLET ORAL at 08:23

## 2024-05-22 RX ADMIN — OXYBUTYNIN CHLORIDE 5 MG: 5 TABLET ORAL at 13:07

## 2024-05-22 RX ADMIN — OXYBUTYNIN CHLORIDE 5 MG: 5 TABLET ORAL at 08:23

## 2024-05-22 RX ADMIN — FLUCONAZOLE 200 MG: 100 TABLET ORAL at 16:16

## 2024-05-22 RX ADMIN — CEFTRIAXONE 1 G: 1 INJECTION, POWDER, FOR SOLUTION INTRAMUSCULAR; INTRAVENOUS at 03:54

## 2024-05-22 RX ADMIN — TAMSULOSIN HYDROCHLORIDE 0.4 MG: 0.4 CAPSULE ORAL at 20:10

## 2024-05-22 RX ADMIN — OXYBUTYNIN CHLORIDE 5 MG: 5 TABLET ORAL at 20:10

## 2024-05-22 ASSESSMENT — ACTIVITIES OF DAILY LIVING (ADL)
ADLS_ACUITY_SCORE: 34

## 2024-05-22 NOTE — PLAN OF CARE
Problem: Adult Inpatient Plan of Care  Goal: Absence of Hospital-Acquired Illness or Injury  Intervention: Prevent Skin Injury     Problem: Adult Inpatient Plan of Care  Goal: Optimal Comfort and Wellbeing  Outcome: Progressing     Pt is A&Ox4, VSS on RA. A1 with walker and gait belt when ambulating. Bloom in place, patent. Bilateral neph tubes in place, patent. Dressings to tubes have small dried drainage. Pt is reporting mild pain during shift. Utilized scheduled and PRN medications along with non-pharm therapies for pain relief.

## 2024-05-22 NOTE — PLAN OF CARE
Pt A0x4, daughter at the bedside, VSS, pt denies pain, left neph bag still slightly camila in color, dried scant drainage on dressing- this is not new, otherwise dry and intact, right neph bag straw color and draining appropriately, dressing CDI. Urinary catheter also draining straw colored and appropriately. A1 with walker and gb, alarms on for safety, call light within reach.     Goal Outcome Evaluation:    Problem: Adult Inpatient Plan of Care  Goal: Optimal Comfort and Wellbeing  Outcome: Progressing

## 2024-05-22 NOTE — PROGRESS NOTES
[facility]    Medicine Progress Note - Hospitalist Service    Date of Admission:  5/19/2024    Assessment & Plan   87 y/o M with recent TURP, CKD3b, HTN, HLD, SSS s/p PM presents with SUJATHA superimposed on CKD3b and UTI. IV ceftriaxone, urology consulted given recent TURP and 3-way Bloom. Urology recommends relief of obstruction with b/l PNTs - consultation to IR for b/l PNTs appreciated. IR placed b/l PNTs on 5/20. Now awaiting urine culture from 5/20 from the PNT as recommended by Urology (not the admitting 5/19 urine culture), so continue IV ceftriaxone in the meantime.      Recent TURP - Acute on chronic renal failure  Probable UTI; bacteruria in setting of recent TURP and prostate instrumentation  CT evidence of severe and persistent  right and left hydroureter/hydronephrosis down to the level of the urinary bladder. Per CT - not significantly changed as compared to 02/01/2024.   -CT also showed Prostatomegaly with diffuse urinary bladder wall thickening, likely due to chronic bladder outlet obstruction.   - b/l PNTs, done on 5/20  -Per Urology, follow-up and tailor antibiotics from the urine culture from 5/20 from the PNT (Growing Enterococcus faecalis, Candida.  Sensitivities pending).  - Continue ceftriaxone 1 g IV q24h  - Order Fluconazole 200 mg daily  - Urology referral appreciated  - will maintain Bloom catheter at discharge, outpatient follow up for TOV with urology, and possible ureteral stent placement in 1 to 2 weeks.      Hyperkalemia with CKD stage IIIb  - Normalized - admission 5.6 --> 4.4     CKD stage III with anemia, Hgb of 9+  - Repeat hemoglobin in a.m.  - transfuse for Hgb < 7.0     HLD and HTN   - PTA medication: amlodipine.     SSS (sick sinus syndrome) and has PM  - PCP follow-up          Diet: Regular Diet Adult    DVT Prophylaxis: Pneumatic Compression Devices  Bloom Catheter: PRESENT, indication: Surgical procedure  Lines: None     Cardiac Monitoring: None  Code  Status: Full Code      Clinically Significant Risk Factors              # Hypoalbuminemia: Lowest albumin = 3.3 g/dL at 5/19/2024  8:28 AM, will monitor as appropriate     # Hypertension: Noted on problem list            # Financial/Environmental Concerns:     # Pacemaker present       Disposition Plan     Medically Ready for Discharge: Anticipated Tomorrow     Sterling Conrad DO  Hospitalist Service  [facility]  Securely message with [facility] (more info)  Text page via [employer] Paging/Directory   ______________________________________________________________________    Interval History   No new complaints.  Patient with bilateral nephrostomy tubes and Bloom catheter draining clear yellow urine.   Physical Exam   Vital Signs: Temp: 97.5  F (36.4  C) Temp src: Oral BP: (!) 158/71 Pulse: 64   Resp: 20 SpO2: 98 % O2 Device: None (Room air)    Weight: 159 lbs 13.34 oz    Constitutional: awake, alert, cooperative, no apparent distress, and appears stated age  Respiratory: No increased work of breathing, good air exchange, clear to auscultation bilaterally, no crackles or wheezing  Cardiovascular: Normal apical impulse, regular rate and rhythm, normal S1 and S2, no S3 or S4, and no murmur noted  GI: No scars, normal bowel sounds, soft, non-distended, non-tender, no masses palpated, no hepatosplenomegally  Genitounirinary: Bloom catheter in place.  No irritation around penile meatus.   Musculoskeletal: There is no redness, warmth, or swelling of the joints.  Full range of motion noted.  Motor strength is 5 out of 5 all extremities bilaterally.  Tone is normal.  Neurologic: Awake, alert, oriented to name, place and time.  Cranial nerves II-XII are grossly intact.  Motor is 5 out of 5 bilaterally.  Cerebellar finger to nose, heel to shin intact.  Sensory is intact.  Babinski down going, Romberg negative, and gait is normal.    Medical Decision Making       40 MINUTES SPENT BY ME on the date of service  doing chart review, history, exam, documentation & further activities per the note.      Data     I have personally reviewed the following data over the past 24 hrs:    11.8 (H)  \   N/A   / N/A     N/A N/A N/A /  N/A   N/A N/A 1.93 (H) \       Imaging results reviewed over the past 24 hrs:   No results found for this or any previous visit (from the past 24 hour(s)).

## 2024-05-22 NOTE — PROGRESS NOTES
Care Management Follow Up    Length of Stay (days): 3    Expected Discharge Date: 05/22/2024     Concerns to be Addressed: discharge planning       Patient plan of care discussed at interdisciplinary rounds: Yes    Anticipated Discharge Disposition: Home     Anticipated Discharge Services: Home Care    Patient/family educated on Medicare website which has current facility and service quality ratings: yes    Education Provided on the Discharge Plan: Yes (AVS per bedside RN)    Patient/Family in Agreement with the Plan: yes    Referrals Placed by CM/SW: Homecare       Additional Information:  CM reviewed chart. CM met with patient and daughter (bedside). Daughter would not give CM TCU choices until family talks about the discharge plan. 10:16 AM     CM met with daughter to discuss discharge plan. Daughter pleasant and involved in her Dad's care. Patient and family preference is home with family, home care and maybe private duty care. CM provided resources to patient and family. Daughter requests that a referral be sent to [facility]. Previously open to [facility]. Family will provide transportation home at discharge. CM will follow.        Referral pending  [facility] ([facility])         Eva Pascual RN

## 2024-05-22 NOTE — PROGRESS NOTES
Place of Service:  [facility]     Reason for follow up: SUJATHA, possible UTI, Chronic bilateral hydronephrosis, BPH s/p TURP 5/17/24    SUBJECTIVE:  Events: no acute events overnight. S/p bilateral PNT placement 5/20.     Pt reports he is feeling ok. Denies abdominal and flank pain, fever, chills, N/V. Tolerating smith.     OBJECTIVE:  PHYSICAL EXAM:  Temp: 97.5  F (36.4  C) Temp src: Oral BP: (!) 158/71 Pulse: 64   Resp: 20 SpO2: 98 % O2 Device: None (Room air)    General: NAD, alert, cooperative, sitting up in chair.   Head: normocephalic, without abnormality / atraumatic  Abdomen: soft, non tender, non distended. no suprapubic fullness, no suprapubic tenderness. No bilateral CVA tenderness. Bilateral PNT draining slightly cloudy yellow urine.   Genitourinary: Penis non-edematous. Smith catheter draining small amount of camila urine.   Skin: No rashes or lesions  Musculoskeletal: moves all four extremities equally.   Psychological: alert and oriented, answers questions appropriately.    LABS:  Creatinine   Date Value Ref Range Status   05/22/2024 1.93 (H) 0.67 - 1.17 mg/dL Final     WBC Count   Date Value Ref Range Status   05/22/2024 11.8 (H) 4.0 - 11.0 10e3/uL Final     Hemoglobin   Date Value Ref Range Status   05/19/2024 9.1 (L) 13.3 - 17.7 g/dL Final     Platelet Count   Date Value Ref Range Status   05/19/2024 140 (L) 150 - 450 10e3/uL Final     UA:  UA RESULTS:  Recent Labs   Lab Test 05/19/24  0138 10/01/22  1953 01/14/22  1423   COLOR Light Orange*   < > Yellow   APPEARANCE Turbid*   < > Clear   URINEGLC Negative   < > Negative   URINEBILI Negative   < > Negative   URINEKETONE Negative   < > Negative   SG 1.008   < > 1.015   UBLD 1.0 mg/dL*   < > Negative   URINEPH 6.5   < > 7.0   PROTEIN 300*   < > Negative   UROBILINOGEN  --   --  0.2   NITRITE Negative   < > Negative   LEUKEST 500 Rober/uL*   < > Negative   RBCU >182*   < >  --    WBCU >182*   < >  --     < > = values in this interval not  displayed.     Cultures:  Urine 5/19: <10k mix of urogenital niyah.   UC R. PNT 5/20, prelim: 50-100k Enterococcus faecalis, 50-100k Yeast  UC L. PNT 5/20, prelim: 10-50k Candida albicans     Lab Results: personally reviewed.     ASSESSMENT/PLAN:  Mikaela Figueroa is being seen by [facility] for SUJATHA, chronic bilateral hydronephrosis, BPH s/p TURP 5/17/24    - 88-year-old male admitted for SUJATHA, possible UTI, right flank pain with recent TURP on 5/17 for BPH and hx of chronic bilateral hydronephrosis not improved with Smith catheter. S/p bilateral PNT placement 5/20.   - Creatinine improving, 1.93 today. Monitor.    - WBC improving, 11.2 today. Remains afebrile. R PNT UC: Enterococus faecalis and yeast, Left PNT: candida albicans. Continue broad spectrum antibiotic, adjust as needed pending final cultures/sensitivities. Recommend completing 10-14 day course. Recommend adding diflucan.   - Maintain smith catheter at discharge to allow for treatment of UTI and resolution of SUJATHA. Message sent to schedulers to arrange for TOV and clinic follow up next week. Smith catheter discharge instructions and [facility] contact info added to discharge orders.    - Continue oxybutynin for bladder spasms at discharge, will need to discontinue oxybutynin AM of TOV.   - Consider bilateral antegrade nephrostograms with possible ureteral stent placement in 1-2 weeks to allow for treatment of infection prior.     - Updated Dr. Dozier.       Rigo Hernandez, APRN, CNP  [facility]   [phone]

## 2024-05-22 NOTE — CONSULTS
"SPIRITUAL HEALTH SERVICES CONSULT NOTE  [facility]; 3S    Saw pt Mikaela Figueroa per Spiritual Care Consult (admission screening response)    Patient/Family Understanding of Illness and Goals of Care - Ricardo engaged easily in conversation. He says that he doesn't feel much better than he did when he was admitted, but said \"It's a slow process and may take some time.\" He is hopeful that he can discharge to home, but is aware that he may need a TCU. He say that he has been to [facility] in the past and had a good experience. Ricardo says that trusts his care team here and they recommendations that they make.     Distress and Loss - Not discussed    Strengths, Coping, and Resources - Ricardo identifies his wife, two children and his Rastafari community for support. He says that he has no problem asking for and receiving help.     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 He reflected on some of his experiences doing those years. He knows from experience that things don't always go according to plan. He says that he has lots of people praying for him and welcomes a prayer from me as well. His Rastafari is aware of his hospital admission.     Plan of Care: Spiritual care staff will remain available for further follow-up as requested by patient, family or staff.      Time Spent with Patient/Family: 50 minutes    Syeda Chavez M.Div.      Office: [phone] (for non-urgent requests)  Please Vocera or page through [facility] for time-sensitive requests    "

## 2024-05-23 ENCOUNTER — APPOINTMENT (OUTPATIENT)
Dept: OCCUPATIONAL THERAPY | Facility: CLINIC | Age: 89
DRG: 699 | End: 2024-05-23
Payer: MEDICARE

## 2024-05-23 ENCOUNTER — APPOINTMENT (OUTPATIENT)
Dept: PHYSICAL THERAPY | Facility: CLINIC | Age: 89
DRG: 699 | End: 2024-05-23
Payer: MEDICARE

## 2024-05-23 LAB
BACTERIA UR CULT: ABNORMAL
BACTERIA UR CULT: ABNORMAL
HGB BLD-MCNC: 8.9 G/DL (ref 13.3–17.7)

## 2024-05-23 PROCEDURE — 120N000001 HC R&B MED SURG/OB

## 2024-05-23 PROCEDURE — 250N000013 HC RX MED GY IP 250 OP 250 PS 637: Performed by: HOSPITALIST

## 2024-05-23 PROCEDURE — 250N000013 HC RX MED GY IP 250 OP 250 PS 637: Performed by: INTERNAL MEDICINE

## 2024-05-23 PROCEDURE — 99232 SBSQ HOSP IP/OBS MODERATE 35: CPT | Performed by: INTERNAL MEDICINE

## 2024-05-23 PROCEDURE — 99223 1ST HOSP IP/OBS HIGH 75: CPT | Performed by: INTERNAL MEDICINE

## 2024-05-23 PROCEDURE — 85018 HEMOGLOBIN: CPT | Performed by: INTERNAL MEDICINE

## 2024-05-23 PROCEDURE — 250N000011 HC RX IP 250 OP 636: Performed by: HOSPITALIST

## 2024-05-23 PROCEDURE — 36415 COLL VENOUS BLD VENIPUNCTURE: CPT | Performed by: INTERNAL MEDICINE

## 2024-05-23 PROCEDURE — 97116 GAIT TRAINING THERAPY: CPT | Mod: GP

## 2024-05-23 PROCEDURE — 97535 SELF CARE MNGMENT TRAINING: CPT | Mod: GO

## 2024-05-23 PROCEDURE — 87040 BLOOD CULTURE FOR BACTERIA: CPT | Performed by: INTERNAL MEDICINE

## 2024-05-23 RX ORDER — GRANULES FOR ORAL 3 G/1
3 POWDER ORAL ONCE
Status: COMPLETED | OUTPATIENT
Start: 2024-05-23 | End: 2024-05-23

## 2024-05-23 RX ORDER — LEVOFLOXACIN 250 MG/1
250 TABLET, FILM COATED ORAL DAILY
Status: DISCONTINUED | OUTPATIENT
Start: 2024-05-23 | End: 2024-05-27 | Stop reason: HOSPADM

## 2024-05-23 RX ADMIN — GRANULES FOR ORAL SOLUTION 3 G: 3 POWDER ORAL at 17:29

## 2024-05-23 RX ADMIN — AMLODIPINE BESYLATE 2.5 MG: 2.5 TABLET ORAL at 08:26

## 2024-05-23 RX ADMIN — METRONIDAZOLE: 10 GEL TOPICAL at 08:26

## 2024-05-23 RX ADMIN — LEVOFLOXACIN 250 MG: 250 TABLET, FILM COATED ORAL at 17:29

## 2024-05-23 RX ADMIN — FLUCONAZOLE 100 MG: 100 TABLET ORAL at 08:26

## 2024-05-23 RX ADMIN — OXYBUTYNIN CHLORIDE 5 MG: 5 TABLET ORAL at 13:58

## 2024-05-23 RX ADMIN — TAMSULOSIN HYDROCHLORIDE 0.4 MG: 0.4 CAPSULE ORAL at 20:30

## 2024-05-23 RX ADMIN — OXYBUTYNIN CHLORIDE 5 MG: 5 TABLET ORAL at 20:30

## 2024-05-23 RX ADMIN — OXYBUTYNIN CHLORIDE 5 MG: 5 TABLET ORAL at 08:26

## 2024-05-23 RX ADMIN — CEFTRIAXONE 1 G: 1 INJECTION, POWDER, FOR SOLUTION INTRAMUSCULAR; INTRAVENOUS at 03:33

## 2024-05-23 ASSESSMENT — ACTIVITIES OF DAILY LIVING (ADL)
ADLS_ACUITY_SCORE: 30
ADLS_ACUITY_SCORE: 34
ADLS_ACUITY_SCORE: 30
ADLS_ACUITY_SCORE: 34
ADLS_ACUITY_SCORE: 30
ADLS_ACUITY_SCORE: 34
ADLS_ACUITY_SCORE: 30
ADLS_ACUITY_SCORE: 34
ADLS_ACUITY_SCORE: 34
ADLS_ACUITY_SCORE: 30
ADLS_ACUITY_SCORE: 30

## 2024-05-23 NOTE — PLAN OF CARE
Physical Therapy Discharge Summary    Reason for therapy discharge:    All goals and outcomes met, no further needs identified.    Progress towards therapy goal(s). See goals on Care Plan in [facility] electronic health record for goal details.  Goals met    Therapy recommendation(s):    Continued therapy is recommended.  Rationale/Recommendations:  continued PT with home PT to improve functional mobility.  Continue home exercise program.

## 2024-05-23 NOTE — PROGRESS NOTES
[facility]    Medicine Progress Note - Hospitalist Service    Date of Admission:  5/19/2024    Assessment & Plan   87 y/o M with recent TURP, CKD3b, HTN, HLD, SSS s/p PM presents with SUJATHA superimposed on CKD3b and UTI. IV ceftriaxone, urology consulted given recent TURP and 3-way Bloom. Status post bilateral percutaneous nephrostomy tubes (PNT).  IR placed b/l PNTs on 5/20. Urine culture from 5/20 consistent with pyelonephritis from Candida albicans and Enterococcus.  ID consultation to assist with antibiotic management.      Recent TURP -   Candida pyelonephritis  Enterococcus pyelonephritis  CT evidence of severe and persistent  right and left hydroureter/hydronephrosis down to the level of the urinary bladder. Per CT - not significantly changed as compared to 02/01/2024.   -CT also showed Prostatomegaly with diffuse urinary bladder wall thickening, likely due to chronic bladder outlet obstruction.   - b/l PNTs, done on 5/20  -Per Urology, follow-up and tailor antibiotics from the urine culture from 5/20 from the PNT (Growing Enterococcus faecalis, Candida.  Sensitivities pending).  - Ceftriaxone 1 g IV q24h  - Fluconazole 100 mg daily (started 5/22/24, 14 day duration, renal dose adjusted).  - will maintain Bloom catheter at discharge, outpatient follow up for TOV with urology, and possible ureteral stent placement in 1 to 2 weeks.   - ID consultation.     Hyperkalemia with CKD stage IIIb  - Normalized - admission 5.6 --> 4.4     CKD stage III with anemia, Hgb of 9+  - Repeat hemoglobin in a.m.  - transfuse for Hgb < 7.0     HLD and HTN   - PTA medication: amlodipine.     SSS (sick sinus syndrome) and has PM  - PCP follow-up              Diet: Regular Diet Adult    DVT Prophylaxis: Low Risk/Ambulatory with no VTE prophylaxis indicated  Bloom Catheter: PRESENT, indication: Acute retention or obstruction  Lines: None     Cardiac Monitoring: None  Code Status: Full Code      Clinically  Significant Risk Factors              # Hypoalbuminemia: Lowest albumin = 3.3 g/dL at 5/19/2024  8:28 AM, will monitor as appropriate     # Hypertension: Noted on problem list            # Financial/Environmental Concerns:     # Pacemaker present       Disposition Plan     Medically Ready for Discharge: Anticipated Tomorrow             Sterling Conrad DO  Hospitalist Service  [facility]  Securely message with [employer] (more info)  Text page via [employer] Paging/Directory   ______________________________________________________________________    Interval History   Ricardo has no new complaints. No fever or chills.     Physical Exam   Vital Signs: Temp: 97.3  F (36.3  C) Temp src: Oral BP: (!) 168/71 Pulse: 66   Resp: 18 SpO2: 98 % O2 Device: None (Room air)    Weight: 159 lbs 13.34 oz    Constitutional: awake, alert, cooperative, no apparent distress, and appears stated age  Respiratory: No increased work of breathing, good air exchange, clear to auscultation bilaterally, no crackles or wheezing  Cardiovascular: Normal apical impulse, regular rate and rhythm, normal S1 and S2, no S3 or S4, and no murmur noted  GI: No scars, normal bowel sounds, soft, non-distended, non-tender, no masses palpated, no hepatosplenomegally  Genitounirinary: Bloom catheter in place.  Bilateral nephrostomy tubes, draining clear yellow urine.  No irritation around penile meatus.   Musculoskeletal: There is no redness, warmth, or swelling of the joints.  Neurologic: Awake, alert, oriented to name, place and time.      Medical Decision Making       40 MINUTES SPENT BY ME on the date of service doing chart review, history, exam, documentation & further activities per the note.      Data     I have personally reviewed the following data over the past 24 hrs:    N/A  \   8.9 (L)   / N/A     N/A N/A N/A /  N/A   N/A N/A N/A \       Imaging results reviewed over the past 24 hrs:   No results found for this or any previous  visit (from the past 24 hour(s)).

## 2024-05-23 NOTE — CONSULTS
[facility]    Infectious Disease Consultation     Date of Admission:  5/19/2024  Date of Consult (When I saw the patient): 05/23/24    Assessment & Plan   Mikaela Figueroa is a 88 year old male who was admitted on 5/19/2024.     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  Collected Specimen Info Organism Ampicillin Nitrofurantoin Vancomycin   05/20/24 Urine from Nephrostomy, Right Enterococcus faecalis  S  S  S     Candida albicans      05/20/24 Urine from Nephrostomy, Left Candida albicans        Recommendations    Ceftriaxone, levofloxacin, fluconazole  Fosfomycin x 1  Monitor for drug interactions  Dose adjustment per pharmacy  Will focus antibiotics based on final results  Patient will need antibiotics for at least 2 weeks, longer course depending on clinical response.  Per urology plan for nephrostograms with possible ureteral stent placement in 1 to 2 weeks.  Patient will need to remain on antibiotics for the duration  Thank you for consulting infectious disease.  Will follow with you.  Updated patient and questions answered    Vianca Spain MD  [facility]  Answering Service: [phone]  On-Call ID provider: [phone], option: [phone]      Reason for Consult   Reason for consult: I was asked to evaluate this patient for     enterococcus complicated UTI     .    Primary Care Physician   Derek Kumar    Chief Complaint   Right flank pain    History is obtained from the patient and medical records    History of Present Illness   Mikaela Figueroa is a 88 year old male who presents with history  of TURP 5/17/2024, bilateral hydronephrosis, status post percutaneous nephrostomy tube placement on 5/20/2024, admitted with worsening flank pain, has Bloom catheter on admission.  No fevers or chills.  Elevated creatinine on admission  No nausea or vomiting  Status post nephrostomy tube placements on 5/20/2024.  Doing well, sitting in chair, reading.    Past Medical History   I have reviewed this patient's medical history and updated it with pertinent information if needed.   Past Medical History:   Diagnosis Date    Hypertension     Pacemaker        Past Surgical History   I have reviewed this patient's surgical history and updated it with pertinent information if needed.  Past Surgical History:   Procedure Laterality Date    APPENDECTOMY      ARTHROSCOPY KNEE Right     CYSTOURETEROSCOPY, WITH LITHOTRIPSY USING ZABRINA 120P LASER AND URETERAL STENT INSERTION Left 10/25/2022    Procedure: CYSTOSCOPY, LEFT URETEROSCOPY, LASER LITHOTRIPSY;  Surgeon: Bob Dozier MD;  Location: [facility] OR    GENITOURINARY SURGERY      IR NEPHROSTOMY TUBE PLACEMENT BILATERAL  5/20/2024    IR NEPHROSTOMY TUBE PLACEMENT LEFT  10/26/2022    IR URETERAL STENT PLACEMENT LEFT  11/1/2022    REPAIR MOHS Right 8/28/2023    Procedure: FOREHEAD FLAP REPAIR MOHS DEFECT RIGHT NOSE; COMPOSITE GRAFT;  Surgeon: Jake Rosa MD;  Location: [facility] Main OR       Prior to Admission Medications   Prior to Admission Medications   Prescriptions Last Dose Informant Patient Reported? Taking?   Cranberry (CRANBEREX PO) 5/18/2024  Yes Yes   Sig: Take 6 tablets by mouth daily   acetaminophen (TYLENOL) 325 MG tablet Unknown at PRN  No Yes   Sig: Take 1-2 tablets (325-650 mg) by mouth every 4 hours as needed for other or mild pain (For optimal non-opioid multimodal pain management to improve pain control.)   amLODIPine (NORVASC) 2.5 MG tablet 5/18/2024 at hs  No Yes   Sig: Take 1 tablet (2.5 mg) by mouth daily   metroNIDAZOLE (METROGEL) 1 % external  gel 5/18/2024  No Yes   Sig: APPLY A SMALL AMOUNT TO AFFECTED AREA(S) TOPICALLY ONCE DAILY   multivitamin, therapeutic (THERA-VIT) TABS tablet 5/18/2024  Yes Yes   Sig: Take 1 tablet by mouth daily   tamsulosin (FLOMAX) 0.4 MG capsule 5/18/2024 at hs  No Yes   Sig: Take 1 capsule (0.4 mg) by mouth At Bedtime   triamcinolone (KENALOG) 0.1 % external cream 5/18/2024 at PRN  Yes Yes   Sig: Apply topically 2 times daily as needed for irritation      Facility-Administered Medications: None     Allergies   No Known Allergies    Immunization History   Immunization History   Administered Date(s) Administered    COVID-19 Bivalent 18+ (Moderna) 01/31/2023    COVID-19 Monovalent 18+ (Moderna) 02/28/2021, 03/26/2021, 11/17/2021    COVID-19 Monovalent Booster 18+ (Moderna) 07/06/2022    DT (PEDS <7y) 12/01/2000    Flu, Unspecified 10/17/2007, 11/01/2008, 10/01/2015, 10/01/2019, 10/06/2020, 11/17/2021, 01/01/2022    Influenza (High Dose) 3 valent vaccine 10/28/2016, 10/01/2017, 09/25/2018, 10/09/2019    Influenza (IIV3) PF 11/01/2008, 10/02/2009    Influenza Vaccine 65+ (Fluzone HD) 09/17/2020, 11/17/2021, 10/20/2022    Influenza Vaccine, 6+MO IM (QUADRIVALENT W/PRESERVATIVES) 10/02/2009, 09/21/2010    Mantoux Tuberculin Skin Test 02/04/2024, 02/13/2024    Pneumo Conj 13-V (2010&after) 11/25/2014    Pneumococcal 23 valent 06/08/2004    Pneumococcal, Unspecified 06/08/2004, 01/10/2015, 11/11/2016, 01/01/2022    TD,PF 7+ (Tenivac) 12/22/2020    Td (Adult), Adsorbed 12/15/2010    Td,adult,historic,unspecified 12/15/2010    Zoster recombinant adjuvanted (SHINGRIX) 08/01/2022    Zoster vaccine, live 10/01/2008, 11/01/2008, 01/01/2022       Social History   I have reviewed this patient's social history and updated it with pertinent information if needed. Mikaela Figueroa  reports that he has quit smoking. His smoking use included pipe. He has never used smokeless tobacco. He reports that he does not drink alcohol and does not use  drugs.    Family History   I have reviewed this patient's family history and updated it with pertinent information if needed.   Family History   Problem Relation Age of Onset    Dementia Mother     Heart Disease Father     Diabetes Father        Review of Systems   The 10 point Review of Systems is negative other than noted in the HPI or here.     Physical Exam   Temp: 97.4  F (36.3  C) Temp src: Oral BP: (!) 151/69 Pulse: 81   Resp: 16 SpO2: 99 % O2 Device: None (Room air)    Vital Signs with Ranges  Temp:  [97.3  F (36.3  C)-98.2  F (36.8  C)] 97.4  F (36.3  C)  Pulse:  [66-81] 81  Resp:  [16-18] 16  BP: (151-168)/(69-71) 151/69  SpO2:  [98 %-99 %] 99 %  159 lbs 13.34 oz  Body mass index is 22.93 kg/m .    GENERAL APPEARANCE:  awake, NAD  EYES: Eyes grossly normal to inspection, conjunctivae and sclerae normal  HENT: mouth without ulcers or lesions  NECK: supple  RESP: normal breathing pattern  CV: No significant edema  LYMPHATICS: no swelling  ABDOMEN: Catheter  MS: Weakness  SKIN: no suspicious lesions or rashes  NEURO: coherent deconditioned      LABORATORY DATA:  Reviewed    CBC RESULTS:   Recent Labs   Lab Test 05/23/24  0616 05/22/24  0902 05/19/24  0828   WBC  --  11.8* 15.1*   RBC  --   --  2.94*   HGB 8.9*  --  9.1*   HCT  --   --  27.9*   MCV  --   --  95   MCH  --   --  31.0   MCHC  --   --  32.6   RDW  --   --  12.7   PLT  --   --  140*        Last Comprehensive Metabolic Panel:  Sodium   Date Value Ref Range Status   05/21/2024 141 135 - 145 mmol/L Final     Comment:     Reference intervals for this test were updated on 09/26/2023 to more accurately reflect our healthy population. There may be differences in the flagging of prior results with similar values performed with this method. Interpretation of those prior results can be made in the context of the updated reference intervals.      Potassium   Date Value Ref Range Status   05/21/2024 4.4 3.4 - 5.3 mmol/L Final   10/11/2022 4.5 3.5 - 5.0 mmol/L  Final     Chloride   Date Value Ref Range Status   05/21/2024 110 (H) 98 - 107 mmol/L Final   10/11/2022 104 98 - 107 mmol/L Final     Carbon Dioxide (CO2)   Date Value Ref Range Status   05/21/2024 22 22 - 29 mmol/L Final   10/11/2022 27 22 - 31 mmol/L Final     Anion Gap   Date Value Ref Range Status   05/21/2024 9 7 - 15 mmol/L Final   10/11/2022 7 5 - 18 mmol/L Final     Glucose   Date Value Ref Range Status   05/21/2024 197 (H) 70 - 99 mg/dL Final   10/11/2022 106 70 - 125 mg/dL Final     GLUCOSE BY METER POCT   Date Value Ref Range Status   05/19/2024 138 (H) 70 - 99 mg/dL Final     Urea Nitrogen   Date Value Ref Range Status   05/21/2024 36.5 (H) 8.0 - 23.0 mg/dL Final   10/11/2022 28 8 - 28 mg/dL Final     Creatinine   Date Value Ref Range Status   05/22/2024 1.93 (H) 0.67 - 1.17 mg/dL Final     GFR Estimate   Date Value Ref Range Status   05/22/2024 33 (L) >60 mL/min/1.73m2 Final   12/22/2020 >60 >60 mL/min/1.73m2 Final     Calcium   Date Value Ref Range Status   05/21/2024 8.2 (L) 8.8 - 10.2 mg/dL Final     Bilirubin Total   Date Value Ref Range Status   05/19/2024 0.3 <=1.2 mg/dL Final     Alkaline Phosphatase   Date Value Ref Range Status   05/19/2024 85 40 - 150 U/L Final     Comment:     Reference intervals for this test were updated on 11/14/2023 to more accurately reflect our healthy population. There may be differences in the flagging of prior results with similar values performed with this method. Interpretation of those prior results can be made in the context of the updated reference intervals.     ALT   Date Value Ref Range Status   05/19/2024 8 0 - 70 U/L Final     Comment:     Reference intervals for this test were updated on 6/12/2023 to more accurately reflect our healthy population. There may be differences in the flagging of prior results with similar values performed with this method. Interpretation of those prior results can be made in the context of the updated reference intervals.    "    AST   Date Value Ref Range Status   05/19/2024 18 0 - 45 U/L Final     Comment:     Reference intervals for this test were updated on 6/12/2023 to more accurately reflect our healthy population. There may be differences in the flagging of prior results with similar values performed with this method. Interpretation of those prior results can be made in the context of the updated reference intervals.             No results found for: \"CRP\"         MICROBIOLOGY:    Reviewed    Blood cultures  Other Micro:    RADIOLOGY:    IR Nephrostomy Tube Placement Bilateral    Result Date: 5/20/2024  [place] [facility] EXAM: IR NEPHROSTOMY TUBE PLACEMENT BILATERAL LOCATION: [facility] CLINICAL HISTORY: Arabi and SUJATHA. PROCEDURES PERFORMED: 1. Ultrasound guided access of the intrarenal collecting system bilaterally. 2. Bilateral antegrade nephrostograms. 3. Bilateral percutaneous nephrostomy tube placement. MODERATE SEDATION: 1 Versed and 75 Fentanyl were administered intravenously for moderate sedation. Pulse oximetry, heart rate and blood pressure were continuously monitored by an independent trained observer. The physician spent 20 minutes of face-to-face moderate sedation time with the patient. ADDITIONAL MEDICATIONS: see EMR CONTRAST: See EMR FLUOROSCOPIC TIME: 2.9 minutes CUMULATIVE AIR KERMA/DOSE: 17 mGy STERILE BARRIER TECHNIQUE: Maximal Sterile Barrier Technique Utilized: Cap AND mask AND sterile gown AND sterile gloves AND sterile full body drape AND hand hygiene AND skin preparation 2% chlorhexidine for cutaneous antisepsis (or acceptable alternative  antiseptics).   Sterile Ultrasound Technique Utilized ?Sterile gel AND sterile probe covers. UNIVERSAL PROTOCOL: Standard universal protocol per facility guidelines was followed. See EMR for documentation. TECHNIQUE/FINDINGS: Following a discussion of the risks, benefits, indications and alternatives to treatment, appropriate informed consent was " "obtained.  The patient was brought to the interventional radiology suite and placed on the table. The bilateral flank were prepped and draped in usual sterile fashion. A time out was performed per universal protocol policy to ensure correct patient, site and procedure to be performed. A preliminary ultrasound of the left flank was performed which demonstrates severe hydronephrosis.  Ultrasound images were archived.  Then, under ultrasound guidance an appropriate site was marked in the left lateral lower back region for needle placement and this region was subsequently infiltrated with 1% Lidocaine for local anesthesia. Under direct ultrasound guidance, a 21-gauge needle was inserted into the lower pole of the dilated calyx until urine return was noted. A small amount of contrast was then injected, which demonstrates severe hydronephrosis and proximal hydroureter. A 0.018\" wire was then advanced through the needle and coiled within the renal pelvis. The needle was then exchanged for a coaxial Addie set, which was advanced  over the wire through the calyx into the renal pelvis. The 0.018\" wire was placed to the side as a safety wire and a 0.035\" guidewire was advanced into the renal pelvis. The tract was serially dilated with final placement of a 10-Indonesian nephrostomy tube  with the tip coiled in the renal pelvis. Injection of contrast demonstrates the pigtail is centered in the renal pelvis. The contrast was aspirated and the tube was placed to gravity bag drainage. The catheter was secured to the skin using a 2-0 Ethilon  and a sterile dressing was applied.  Attention was then turned to the right kidney. Preliminary ultrasound of the right flank demonstrates severe hydronephrosis. An appropriate site was marked in the right lower back region for needle placement and this region was then infiltrated with 1% Lidocaine. Under direct ultrasound guidance, a 21-gauge needle was inserted into the lower pole of the dilated " "calyx until urine return was noted.  A small amount of contrast was then injected which demonstrates severe hydronephrosis and proximal hydroureter.  A 0.018\" wire was then advanced through the needle and coiled within the renal pelvis. The needle was then exchanged for a coaxial Addie set, which was advanced over the wire through the calyx into the renal pelvis. The 0.018 wire was placed  to the side as a safety wire and a 0.035\" guidewire was advanced into the renal pelvis. The tract was serially dilated with final placement of an 10-Puerto Rican nephrostomy tube with the tip coiled in the renal pelvis. Injection of contrast demonstrates the pigtail is centered in the renal pelvis.  The contrast was aspirated and the tube was placed to gravity bag drainage. The catheter was secured to the skin using a 2-0 Ethilon and a sterile dressing was applied.  Throughout the procedure, the patient was monitored by a radiology nurse for cardiac rhythm and oxygen saturation which remained stable. The patient tolerated the procedure well and left interventional radiology in stable condition.     IMPRESSION: 1. Successful placement of a 10.2-Puerto Rican left percutaneous nephrostomy tube. Left antegrade nephrostogram demonstrates severe hydronephrosis and hydroureter. 2. Successful placement of a 10.2-Puerto Rican right percutaneous nephrostomy tube. Right antegrade nephrostogram demonstrates severe hydronephrosis and hydroureter.    CT Abdomen Pelvis w/o Contrast    Result Date: 5/19/2024  EXAM: CT ABDOMEN PELVIS W/O CONTRAST LOCATION: [facility] DATE: 5/19/2024 INDICATION: Right flank pain. COMPARISON: CT abdomen and pelvis on 02/01/2024. TECHNIQUE: CT scan of the abdomen and pelvis was performed without intravenous contrast Multiplanar reformats were obtained. Dose reduction techniques were used. FINDINGS: LOWER CHEST: Basilar pulmonary opacities, likely atelectasis. ABDOMEN/PELVIS: Limited evaluation of the abdominal " organs due to lack of intravenous contrast. HEPATOBILIARY: The unenhanced liver and gallbladder are grossly unremarkable. PANCREAS: The unenhanced pancreas is grossly unremarkable. SPLEEN: No splenomegaly. ADRENAL GLANDS: No adrenal nodules. KIDNEYS/BLADDER: No radiodense kidney stones. Severe right and moderate to severe left diffuse hydroureter/hydronephrosis down to the level of the urinary bladder. No obstructing ureteral stone visualized. The urinary bladder is decompressed with Bloom catheter. Diffuse urinary bladder wall thickening, could be due to underdistention versus chronic bladder outlet obstruction versus chronic cystitis. BOWEL: No abnormally dilated bowel loops. Colonic diverticulosis without CT evidence of acute diverticulitis. PERITONEUM: No evidence of free fluid in the abdomen and pelvis. No free peritoneal or portal venous gas. PELVIC ORGANS: The prostate gland is enlarged measuring 6.6 cm in transverse diameter. VASCULATURE: Moderate atherosclerotic vascular calcification of the abdominal aorta and iliac arteries. LYMPH NODES: Unremarkable. MUSCULOSKELETAL: Diffuse osteopenia with multilevel degenerative changes of the spine. Multiple compression deformities of the visualized thoracic and lumbar spine most prominent at L1 and L3 vertebra with approximately 50-60% vertebral height loss. Small right inguinal hernia containing fat and nondilated small bowel loops.     IMPRESSION: 1. Severe right and moderate to severe left diffuse hydroureter/hydronephrosis down to the level of the urinary bladder, not significantly changed as compared to 02/01/2024. No obstructing ureteral stone visualized. 2. Prostatomegaly with diffuse urinary bladder wall thickening, likely due to chronic bladder outlet obstruction. 3. Colonic diverticulosis without CT evidence of acute diverticulitis.

## 2024-05-23 NOTE — PLAN OF CARE
Pt A0x4 throughout the shift, daughter at bedside, pt denies pain, VSS, catheter and neph tubes draining appropriately and all draining straw colored urine, dressings have dried drainage, otherwise dry and intact.  A1 walker and gb. Alarms on for safety, call light within reach.     Goal Outcome Evaluation:    Problem: Adult Inpatient Plan of Care  Goal: Optimal Comfort and Wellbeing  Outcome: Progressing

## 2024-05-23 NOTE — PROGRESS NOTES
Place of Service:  [facility]     Reason for follow up: SUJATHA, possible UTI, Chronic bilateral hydronephrosis, BPH s/p TURP 5/17/24    SUBJECTIVE:  Events: no acute events overnight. S/p bilateral PNT placement 5/20.     Pt reports he is feeling well. Denies abdominal and flank pain, fever, chills, N/V. Tolerating smith.     Daughter is present.     OBJECTIVE:  PHYSICAL EXAM:  Temp: 97.3  F (36.3  C) Temp src: Oral BP: (!) 168/71 Pulse: 66   Resp: 18 SpO2: 98 % O2 Device: None (Room air)    General: NAD, alert, cooperative, sitting up in chair.   Head: normocephalic, without abnormality / atraumatic  Abdomen: soft, non tender, non distended. no suprapubic fullness, no suprapubic tenderness. No bilateral CVA tenderness. Bilateral PNT draining slightly cloudy yellow urine, good output.   Genitourinary: Penis and scrotum without erythema or edema. Smith catheter draining camila urine, 150 ml on noc shift.   Skin: No rashes or lesions  Musculoskeletal: moves all four extremities equally.   Psychological: alert and oriented, answers questions appropriately.    LABS:  Creatinine   Date Value Ref Range Status   05/22/2024 1.93 (H) 0.67 - 1.17 mg/dL Final     WBC Count   Date Value Ref Range Status   05/22/2024 11.8 (H) 4.0 - 11.0 10e3/uL Final     Hemoglobin   Date Value Ref Range Status   05/23/2024 8.9 (L) 13.3 - 17.7 g/dL Final     Platelet Count   Date Value Ref Range Status   05/19/2024 140 (L) 150 - 450 10e3/uL Final     UA:  UA RESULTS:  Recent Labs   Lab Test 05/19/24  0138 10/01/22  1953 01/14/22  1423   COLOR Light Orange*   < > Yellow   APPEARANCE Turbid*   < > Clear   URINEGLC Negative   < > Negative   URINEBILI Negative   < > Negative   URINEKETONE Negative   < > Negative   SG 1.008   < > 1.015   UBLD 1.0 mg/dL*   < > Negative   URINEPH 6.5   < > 7.0   PROTEIN 300*   < > Negative   UROBILINOGEN  --   --  0.2   NITRITE Negative   < > Negative   LEUKEST 500 Rober/uL*   < > Negative   RBCU >182*   < >  --     WBCU >182*   < >  --     < > = values in this interval not displayed.     Cultures:  Urine 5/19: <10k mix of urogenital niyah.   UC R. PNT 5/20, prelim: 50-100k Enterococcus faecalis, 50-100k candida albicans  Susceptibility     Enterococcus faecalis     DERIC     Ampicillin <=2 ug/mL Susceptible     Nitrofurantoin <=16 ug/mL Susceptible     Vancomycin 1 ug/mL Susceptible              UC L. PNT 5/20, prelim: 10-50k Candida albicans     Lab Results: personally reviewed.     ASSESSMENT/PLAN:  Mikaela Figueroa is being seen by [facility] for SUJATHA, chronic bilateral hydronephrosis, BPH s/p TURP 5/17/24    - 88-year-old male admitted for SUJATHA, possible UTI, right flank pain with recent TURP on 5/17 for BPH and hx of chronic bilateral hydronephrosis not improved with Smith catheter. S/p bilateral PNT placement 5/20.   - Creatinine at baseline, 1.93 on 5/22. Monitor.    - WBC stable, 11.8 today. Remains afebrile. R PNT UC: Enterococus faecalis and candida, Left PNT: candida albicans. Receiving IV ceftriaxone and po diflucan. ID consult pending for antibiotic recs, appreciate recs.   - Maintain smith catheter at discharge to allow for treatment of UTI and resolution of SUJATHA. Pt is now scheduled for TOV/follow up 5/31. Smith catheter discharge instructions and [facility] contact info added to discharge orders.    - Continue oxybutynin for bladder spasms at discharge, will need to discontinue oxybutynin AM of TOV.   - Continue tamsulosin.   - Consider bilateral antegrade nephrostograms with possible ureteral stent placement in 1-2 weeks (to allow time for treatment of infection prior).   -[facility] will sign off. Please re-consult with any new or worsening urologic concerns.      Updated: Dr. Dozier.       Rigo Hernandez, APRN, CNP  [facility]   [phone]

## 2024-05-23 NOTE — PLAN OF CARE
Problem: UTI (Urinary Tract Infection)  Goal: Improved Infection Symptoms  Outcome: Progressing     Problem: Adult Inpatient Plan of Care  Goal: Optimal Comfort and Wellbeing  Outcome: Progressing     Patient a/o x4, VSS on RA. IV fluids in between IV Abx. Tolerated IV ABX and oral medications. Assist of x1 with a walker and gait belt.  Bloom catheter intact with good yellow output.  Right and Left nephrostomy tubes intact with marked dried drainage on dressing, and has yellow output.  Denied general pain, chest pain, SOB, lightheadedness, dizziness, or N/V.  Bed alarm on for safety precautions. Family at bedside during night.  Pending home care referrals.

## 2024-05-24 ENCOUNTER — HOME INFUSION (PRE-WILLOW HOME INFUSION) (OUTPATIENT)
Dept: PHARMACY | Facility: CLINIC | Age: 89
End: 2024-05-24

## 2024-05-24 ENCOUNTER — APPOINTMENT (OUTPATIENT)
Dept: OCCUPATIONAL THERAPY | Facility: CLINIC | Age: 89
DRG: 699 | End: 2024-05-24
Payer: MEDICARE

## 2024-05-24 LAB
ALBUMIN SERPL BCG-MCNC: 3.2 G/DL (ref 3.5–5.2)
ALP SERPL-CCNC: 83 U/L (ref 40–150)
ALT SERPL W P-5'-P-CCNC: 12 U/L (ref 0–70)
ANION GAP SERPL CALCULATED.3IONS-SCNC: 10 MMOL/L (ref 7–15)
AST SERPL W P-5'-P-CCNC: 14 U/L (ref 0–45)
BASOPHILS # BLD AUTO: 0 10E3/UL (ref 0–0.2)
BASOPHILS NFR BLD AUTO: 0 %
BILIRUB SERPL-MCNC: 0.3 MG/DL
BUN SERPL-MCNC: 39.7 MG/DL (ref 8–23)
CALCIUM SERPL-MCNC: 8.7 MG/DL (ref 8.8–10.2)
CHLORIDE SERPL-SCNC: 107 MMOL/L (ref 98–107)
CREAT SERPL-MCNC: 1.91 MG/DL (ref 0.67–1.17)
DEPRECATED HCO3 PLAS-SCNC: 21 MMOL/L (ref 22–29)
EGFRCR SERPLBLD CKD-EPI 2021: 34 ML/MIN/1.73M2
EOSINOPHIL # BLD AUTO: 0.4 10E3/UL (ref 0–0.7)
EOSINOPHIL NFR BLD AUTO: 4 %
ERYTHROCYTE [DISTWIDTH] IN BLOOD BY AUTOMATED COUNT: 12.4 % (ref 10–15)
GLUCOSE SERPL-MCNC: 105 MG/DL (ref 70–99)
HCT VFR BLD AUTO: 24.3 % (ref 40–53)
HGB BLD-MCNC: 8.3 G/DL (ref 13.3–17.7)
IMM GRANULOCYTES # BLD: 0.1 10E3/UL
IMM GRANULOCYTES NFR BLD: 1 %
LYMPHOCYTES # BLD AUTO: 1.4 10E3/UL (ref 0.8–5.3)
LYMPHOCYTES NFR BLD AUTO: 16 %
MCH RBC QN AUTO: 32.8 PG (ref 26.5–33)
MCHC RBC AUTO-ENTMCNC: 34.2 G/DL (ref 31.5–36.5)
MCV RBC AUTO: 96 FL (ref 78–100)
MONOCYTES # BLD AUTO: 1.4 10E3/UL (ref 0–1.3)
MONOCYTES NFR BLD AUTO: 15 %
NEUTROPHILS # BLD AUTO: 6 10E3/UL (ref 1.6–8.3)
NEUTROPHILS NFR BLD AUTO: 65 %
NRBC # BLD AUTO: 0 10E3/UL
NRBC BLD AUTO-RTO: 0 /100
PLATELET # BLD AUTO: 163 10E3/UL (ref 150–450)
POTASSIUM SERPL-SCNC: 4.1 MMOL/L (ref 3.4–5.3)
PROT SERPL-MCNC: 5.9 G/DL (ref 6.4–8.3)
RBC # BLD AUTO: 2.53 10E6/UL (ref 4.4–5.9)
SODIUM SERPL-SCNC: 138 MMOL/L (ref 135–145)
WBC # BLD AUTO: 9.2 10E3/UL (ref 4–11)

## 2024-05-24 PROCEDURE — 250N000013 HC RX MED GY IP 250 OP 250 PS 637: Performed by: HOSPITALIST

## 2024-05-24 PROCEDURE — 250N000011 HC RX IP 250 OP 636: Performed by: HOSPITALIST

## 2024-05-24 PROCEDURE — 85025 COMPLETE CBC W/AUTO DIFF WBC: CPT | Performed by: INTERNAL MEDICINE

## 2024-05-24 PROCEDURE — 250N000013 HC RX MED GY IP 250 OP 250 PS 637: Performed by: INTERNAL MEDICINE

## 2024-05-24 PROCEDURE — 36415 COLL VENOUS BLD VENIPUNCTURE: CPT | Performed by: INTERNAL MEDICINE

## 2024-05-24 PROCEDURE — 97535 SELF CARE MNGMENT TRAINING: CPT | Mod: GO

## 2024-05-24 PROCEDURE — 99232 SBSQ HOSP IP/OBS MODERATE 35: CPT | Performed by: INTERNAL MEDICINE

## 2024-05-24 PROCEDURE — 258N000003 HC RX IP 258 OP 636: Performed by: INTERNAL MEDICINE

## 2024-05-24 PROCEDURE — 99233 SBSQ HOSP IP/OBS HIGH 50: CPT | Performed by: INTERNAL MEDICINE

## 2024-05-24 PROCEDURE — 120N000001 HC R&B MED SURG/OB

## 2024-05-24 PROCEDURE — 80053 COMPREHEN METABOLIC PANEL: CPT | Performed by: INTERNAL MEDICINE

## 2024-05-24 RX ADMIN — OXYBUTYNIN CHLORIDE 5 MG: 5 TABLET ORAL at 13:48

## 2024-05-24 RX ADMIN — ACETAMINOPHEN 650 MG: 325 TABLET ORAL at 01:34

## 2024-05-24 RX ADMIN — AMLODIPINE BESYLATE 2.5 MG: 2.5 TABLET ORAL at 08:12

## 2024-05-24 RX ADMIN — SODIUM CHLORIDE: 9 INJECTION, SOLUTION INTRAVENOUS at 04:53

## 2024-05-24 RX ADMIN — OXYBUTYNIN CHLORIDE 5 MG: 5 TABLET ORAL at 20:46

## 2024-05-24 RX ADMIN — TAMSULOSIN HYDROCHLORIDE 0.4 MG: 0.4 CAPSULE ORAL at 20:46

## 2024-05-24 RX ADMIN — LEVOFLOXACIN 250 MG: 250 TABLET, FILM COATED ORAL at 08:12

## 2024-05-24 RX ADMIN — CEFTRIAXONE 1 G: 1 INJECTION, POWDER, FOR SOLUTION INTRAMUSCULAR; INTRAVENOUS at 04:26

## 2024-05-24 RX ADMIN — OXYBUTYNIN CHLORIDE 5 MG: 5 TABLET ORAL at 08:12

## 2024-05-24 RX ADMIN — SODIUM CHLORIDE: 9 INJECTION, SOLUTION INTRAVENOUS at 18:07

## 2024-05-24 RX ADMIN — FLUCONAZOLE 100 MG: 100 TABLET ORAL at 08:13

## 2024-05-24 RX ADMIN — METRONIDAZOLE: 10 GEL TOPICAL at 08:23

## 2024-05-24 RX ADMIN — HYDROCODONE BITARTRATE AND ACETAMINOPHEN 1 TABLET: 5; 325 TABLET ORAL at 20:52

## 2024-05-24 ASSESSMENT — ACTIVITIES OF DAILY LIVING (ADL)
ADLS_ACUITY_SCORE: 30

## 2024-05-24 NOTE — PROGRESS NOTES
[facility] ([facility])  SPIRITUAL ASSESSMENT Progress Note  [facility]. 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 He was sitting up in his bedside chair with Packers paraphernalia surrounding him. He was in the middle of a visit with his family so I will attempt a followup visit next week.      PLAN: [facility] will follow.      Tamara Galvez, Ph.D., [facility]      [facility] available 24/7 for emergency requests/referrals, either by having the on-call  paged or by entering an ASAP/STAT consult in Epic (this will also page the on-call ).

## 2024-05-24 NOTE — PROGRESS NOTES
[facility]    Referral received from Eva Pascual RN CM, for IV antibiotics.    Benefits verified. Pt has coverage for IV antibiotics under his  For Life plan.  Patient has a $150 deductible (has met $0). Pt has 75% coverage until his $3000 out of pocket has been met (has met $54.64 so far).  Once the out of pocket has been met, patient has 100% coverage.    Should pt decide to do home infusion, writer will speak with pt to review benefits, home infusion and to offer choice of agency/home infusion provider.  Per Eva Pascual, pt may need to go to TCU, as it is questionable as to whether pt has someone to help him with the home IV therapy in the home.    Thank you for the referral.    Patricia Guillen RN, BSN  [facility] Liaison  [phone] (Mon through Fri, 8:00 am-5:00 pm)  [phone] (Office)    ADDENDUM  Writer spoke with pt and his dtr, to review benefits, home infusion and to offer choice of agency/home infusion provider.  Pt is fine with [facility] as the home care agency and with [facility] for home IV antibiotic services.  [facility] can accommodate a teach with pt's dtr, if pt were to discharge to home this weekend. Please call [facility] re: discharge plan over the weekend, at [phone] or [phone].

## 2024-05-24 NOTE — PROGRESS NOTES
Care Management Follow Up    Length of Stay (days): 5    Expected Discharge Date: 05/25/2024     Concerns to be Addressed: discharge planning       Patient plan of care discussed at interdisciplinary rounds: Yes    Anticipated Discharge Disposition: Home     Anticipated Discharge Services: Home Care, Home Infusion    Education Provided on the Discharge Plan: Yes (AVS per bedside RN)    Patient/Family in Agreement with the Plan: yes    Referrals Placed by CM/SW: Homecare         Additional Information:  CM reviewed chart. RN CM following for discharge needs. Possible home infusion needed. Home with family and home care. Family will provide transportation home at discharge. RN CM to follow.      Referral pending  [facility] ([place])       [facility] accepted (for home infusion if needed).        Eva Pascual RN

## 2024-05-24 NOTE — PROGRESS NOTES
Therapy: IV ABX (Ceftriaxone)  Insurance: [employer]     This patient has coverage for IV ABX (Ceftriaxone) through their  For Life plan, patient has a deductible of $150.00 met $0.00, once pt meets the deductible they are covered at 75%. Patient has an out of pocket of $3000.00 met $54.64. Once the out of pocket was met pt is covered at 100%.    In reference to hospital admission to [facility] on 5/19/24 and referral from Eva GUNDERSON  Please contact Intake with any questions, [phone] or In Basket pool,  Home Infusion ([id number]).

## 2024-05-24 NOTE — PLAN OF CARE
Problem: Adult Inpatient Plan of Care  Goal: Absence of Hospital-Acquired Illness or Injury  Intervention: Identify and Manage Fall Risk  Recent Flowsheet Documentation  Taken 5/24/2024 1212 by Tamanna Doe RN  Safety Promotion/Fall Prevention: safety round/check completed  Taken 5/24/2024 1012 by Tamanna Doe RN  Safety Promotion/Fall Prevention: safety round/check completed  Taken 5/24/2024 0812 by Tamanna Doe RN  Safety Promotion/Fall Prevention: safety round/check completed     Problem: Adult Inpatient Plan of Care  Goal: Optimal Comfort and Wellbeing  Outcome: Progressing  Intervention: Monitor Pain and Promote Comfort  Recent Flowsheet Documentation  Taken 5/24/2024 0812 by Tamanna Doe RN  Pain Management Interventions:   medication (see MAR)   repositioned     Problem: Pain Acute  Goal: Optimal Pain Control and Function  Outcome: Progressing  Intervention: Develop Pain Management Plan  Recent Flowsheet Documentation  Taken 5/24/2024 0812 by Tamanna Doe RN  Pain Management Interventions:   medication (see MAR)   repositioned  Intervention: Prevent or Manage Pain  Recent Flowsheet Documentation  Taken 5/24/2024 1212 by Tamanna Doe RN  Medication Review/Management: medications reviewed  Taken 5/24/2024 1012 by Tamanna Doe RN  Medication Review/Management: medications reviewed  Taken 5/24/2024 0812 by Tamanna Doe RN  Bowel Elimination Promotion:   adequate fluid intake promoted   ambulation promoted  Medication Review/Management: medications reviewed  Intervention: Optimize Psychosocial Wellbeing  Recent Flowsheet Documentation  Taken 5/24/2024 0812 by Tamanna Doe RN  Supportive Measures: active listening utilized       Pt A&Ox4. VSS. RA. Very mild pain 1-2/10, tolerable with schedule Tylenol. No PRN meds given. Denies N/V, SOB, CP, HA. Up to toilet with assist of 1, gait belt and walker. Up in chair for meals. Bloom intact and patent. Nephrostomy bags intact and good voiding amount. Calls  appropriately. Bed/chair alarm on for pt safety.

## 2024-05-24 NOTE — PROGRESS NOTES
[facility]    Medicine Progress Note - Hospitalist Service    Date of Admission:  5/19/2024    Assessment & Plan   87 y/o M with recent TURP, CKD3b, HTN, HLD, SSS s/p PM presents with SUJATHA superimposed on CKD3b and UTI. IV ceftriaxone, urology consulted given recent TURP and 3-way Bloom. Status post bilateral percutaneous nephrostomy tubes (PNT).  IR placed b/l PNTs on 5/20. Urine culture from 5/20 consistent with pyelonephritis from Candida albicans and Enterococcus.  ID consultation to assist with antibiotic management.      Recent TURP -   Candida pyelonephritis  Enterococcus pyelonephritis  CT evidence of severe and persistent  right and left hydroureter/hydronephrosis down to the level of the urinary bladder. Per CT - not significantly changed as compared to 02/01/2024.   -CT also showed Prostatomegaly with diffuse urinary bladder wall thickening, likely due to chronic bladder outlet obstruction.   - b/l PNTs, done on 5/20  - 5/20 urine culture from the PNT - Growing Enterococcus faecalis, Candida.  -5/23 Blood cultures NGTD    -Continue ceftriaxone 1 g IV q24h  -Continue levofloxacin 250 mg daily  - Fluconazole 100 mg daily (started 5/22/24, 14 day duration, renal dose adjusted).  - will maintain Bloom catheter at discharge, outpatient follow up for TOV with urology, and possible ureteral stent placement in 1 to 2 weeks.   - ID consultation appreciated.     Hyperkalemia with CKD stage IIIb  -Hyperkalemia normalized.     CKD stage III with anemia  Hemoglobin 8.3 g/dL  - transfuse for Hgb < 7.0     HLD and HTN   - PTA medication: amlodipine.     SSS (sick sinus syndrome) and has PM  - PCP follow-up              Diet: Regular Diet Adult    DVT Prophylaxis: Low Risk/Ambulatory with no VTE prophylaxis indicated and Pneumatic Compression Devices  Bloom Catheter: PRESENT, indication: Acute retention or obstruction  Lines: None     Cardiac Monitoring: None  Code Status: Full Code       Clinically Significant Risk Factors              # Hypoalbuminemia: Lowest albumin = 3.2 g/dL at 5/24/2024  6:21 AM, will monitor as appropriate     # Hypertension: Noted on problem list            # Financial/Environmental Concerns:     # Pacemaker present       Disposition Plan     Medically Ready for Discharge: Anticipated in 2-4 Days, awaiting blood culture results final ID recommendations.     Sterling Conrad DO  Hospitalist Service  [facility]  Securely message with [employer] (more info)  Text page via [employer] Paging/Directory   ______________________________________________________________________    Interval History   No new complaints.    Physical Exam   Vital Signs: Temp: 97.3  F (36.3  C) Temp src: Oral BP: (!) 156/69 Pulse: 99   Resp: 16 SpO2: 97 % O2 Device: None (Room air)    Weight: 159 lbs 13.34 oz    Constitutional: awake, alert, cooperative, no apparent distress, and appears stated age  Respiratory: No increased work of breathing, good air exchange, clear to auscultation bilaterally, no crackles or wheezing  Cardiovascular: Normal apical impulse, regular rate and rhythm, normal S1 and S2, no S3 or S4, and no murmur noted  GI: No scars, normal bowel sounds, soft, non-distended, non-tender, no masses palpated, no hepatosplenomegally  Genitounirinary: Bloom catheter in place.  Bilateral nephrostomy tubes, draining cloudy yellow urine.  No irritation around penile meatus.   Musculoskeletal: There is no redness, warmth, or swelling of the joints.  Neurologic: Awake, alert, oriented to name, place and time.      Medical Decision Making       40 MINUTES SPENT BY ME on the date of service doing chart review, history, exam, documentation & further activities per the note.      Data     I have personally reviewed the following data over the past 24 hrs:    9.2  \   8.3 (L)   / 163     138 107 39.7 (H) /  105 (H)   4.1 21 (L) 1.91 (H) \     ALT: 12 AST: 14 AP: 83 TBILI: 0.3   ALB:  3.2 (L) TOT PROTEIN: 5.9 (L) LIPASE: N/A       Imaging results reviewed over the past 24 hrs:   No results found for this or any previous visit (from the past 24 hour(s)).

## 2024-05-24 NOTE — PROGRESS NOTES
[facility]    Infectious Disease Progress Note    Date of Service : 05/24/2024     Assessment & Plan   Mikaela Figueroa is a 88 year old male who was admitted on 5/19/2024.     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  Collected Specimen Info Organism Ampicillin Nitrofurantoin Vancomycin   05/20/24 Urine from Nephrostomy, Right Enterococcus faecalis  S  S  S     Candida albicans      05/20/24 Urine from Nephrostomy, Left Candida albicans         Collected 5/20/2024  3:32 PM       Status: Final result       Visible to patient: Yes (not seen)    Specimen Information: Nephrostomy, Right; Urine   0 Result Notes  Culture 50,000-100,000 CFU/mL Enterococcus faecalis Abnormal       50,000-100,000 CFU/mL Candida albicans Abnormal    Susceptibilities not routinely done, refer to antibiogram to view typical susceptibility profiles        Resulting Agency: [facility]     Susceptibility     Enterococcus faecalis     DERIC     Ampicillin <=2 ug/mL Susceptible     Ciprofloxacin 1 ug/mL Susceptible *     Daptomycin 2 ug/mL Susceptible *     Doxycycline >=16 ug/mL Resistant *     Gentamicin Synergy Susceptible... Susceptible *,1     Levofloxacin 2 ug/mL Susceptible *     Linezolid 2 ug/mL Susceptible *     Nitrofurantoin <=16 ug/mL Susceptible     Streptomycin Synergy Susceptible... Susceptible *     Tigecycline <=0.12 ug/mL Susceptible *     Vancomycin 1 ug/mL Susceptible              * Suppressed Antibiotic   1 No high level gentamicin resistance found - therefore combination therapy with an aminoglycoside may be indicated for serious enterococcal  infections such as bacteremia and endocarditis.                RECOMMENDATIONS:    Antibiotics ceftriaxone, levofloxacin, fluconazole  Received fosfomycin x 1  Urology following plan for bilateral antegrade nephrostogram's with possible ureteral stent placement in 1 to 2 weeks  Follow culture results   Focus/de-escalate antibiotics based on final culture results  Dose adjustment per pharmacist  Patient will need at least 2 weeks of antibiotics, longer course depending on clinical response.  Monitor CBC, CMP  Discussed with patient patient's daughter at bedside  ID will chart check over the weekend and follow early next week.  Please call with questions or change in clinical status over the weekend.      Vianca Spain MD  [facility]  [phone]      Interval History   Doing better, tolerating antibiotics      Physical Exam   Temp: 97.3  F (36.3  C) Temp src: Oral BP: (!) 156/69 Pulse: 99   Resp: 16 SpO2: 97 % O2 Device: None (Room air)    Vitals:    05/19/24 0043 05/20/24 0405 05/21/24 0317   Weight: 70.3 kg (155 lb) 70.7 kg (155 lb 13.8 oz) 72.5 kg (159 lb 13.3 oz)     Vital Signs with Ranges  Temp:  [97.3  F (36.3  C)-97.6  F (36.4  C)] 97.3  F (36.3  C)  Pulse:  [74-99] 99  Resp:  [16] 16  BP: (151-163)/(69-72) 156/69  SpO2:  [97 %-99 %] 97 %    Constitutional: Awake, advanced age, sitting in chair at bedside  Lungs: normal breathing pattern, no crackles or wheezing  Cardiovascular: Regular rate and rhythm, S1 S2  Abdomen: non distended  Nephrostomy tube, catheter  Skin: warm  Neuro: deconditioned  Psych: able to answer questions    Medications   Current Facility-Administered Medications   Medication Dose Route Frequency Provider Last Rate Last Admin    sodium chloride 0.9 % infusion   Intravenous Continuous Bruna Mays MD 75 mL/hr at 05/24/24 0453 New Bag at 05/24/24 0453     Current Facility-Administered Medications   Medication Dose Route Frequency Provider Last Rate Last  "Admin    amLODIPine (NORVASC) tablet 2.5 mg  2.5 mg Oral Daily Surendra Doe MD   2.5 mg at 05/24/24 0812    cefTRIAXone (ROCEPHIN) 1 g vial to attach to  mL bag for ADULTS or NS 50 mL bag for PEDS  1 g Intravenous Q24H Surendra Doe MD   1 g at 05/24/24 0426    fluconazole (DIFLUCAN) tablet 100 mg  100 mg Oral Daily Sterling Conrad DO   100 mg at 05/24/24 0813    levofloxacin (LEVAQUIN) tablet 250 mg  250 mg Oral Daily Vianca Spain MD   250 mg at 05/24/24 0812    metroNIDAZOLE (METROGEL) 1 % gel   Topical Daily Surendra Doe MD   Given at 05/24/24 0823    oxyBUTYnin (DITROPAN) tablet 5 mg  5 mg Oral TID Surendra Doe MD   5 mg at 05/24/24 0812    sodium chloride (PF) 0.9% PF flush 3 mL  3 mL Intracatheter Q8H Bruna Mays MD   3 mL at 05/24/24 0426    tamsulosin (FLOMAX) capsule 0.4 mg  0.4 mg Oral At Bedtime Surendra Doe MD   0.4 mg at 05/23/24 2030       Data   All microbiology laboratory data reviewed.  Recent Labs   Lab Test 05/24/24  0621 05/23/24  0616 05/22/24  0902 05/19/24  0828 05/19/24  0114   WBC 9.2  --  11.8* 15.1* 18.3*   HGB 8.3* 8.9*  --  9.1* 9.2*   HCT 24.3*  --   --  27.9* 27.3*   MCV 96  --   --  95 93     --   --  140* 165     Recent Labs   Lab Test 05/24/24  0621 05/22/24  0902 05/21/24  0927   CR 1.91* 1.93* 2.06*     No lab results found.  No lab results found.    Invalid input(s): \"UC\"    MICROBIOLOGY:    Reviewed    7-Day Micro Results       Collected Updated Procedure Result Status      05/23/2024 1736 05/24/2024 0946 Blood Culture Arm, Right [[id number]]   Blood from Arm, Right    Preliminary result Component Value   Culture No growth after 12 hours  [P]                05/23/2024 1735 05/24/2024 0946 Blood Culture Arm, Left [[id number]]   Blood from Arm, Left    Preliminary result Component Value   Culture No growth after 12 hours  [P]                05/20/2024 1532 05/23/2024 0241 Urine Culture [[id number]]    (Abnormal)   Urine from " Nephrostomy, Right    Final result Component Value   Culture 50,000-100,000 CFU/mL Enterococcus faecalis    50,000-100,000 CFU/mL Candida albicans    Susceptibilities not routinely done, refer to antibiogram to view typical susceptibility profiles        Susceptibility        Enterococcus faecalis      DERIC      Ampicillin <=2 ug/mL Susceptible      Ciprofloxacin 1 ug/mL Susceptible  [*]       Daptomycin 2 ug/mL Susceptible  [*]       Doxycycline >=16 ug/mL Resistant  [*]       Gentamicin Synergy Susceptible ug/mL Susceptible  [*,1]       Levofloxacin 2 ug/mL Susceptible  [*]       Linezolid 2 ug/mL Susceptible  [*]       Nitrofurantoin <=16 ug/mL Susceptible      Streptomycin Synergy Susceptible ug/mL Susceptible  [*]       Tigecycline <=0.12 ug/mL Susceptible  [*]       Vancomycin 1 ug/mL Susceptible                   [*]  Suppressed Antibiotic     [1]  No high level gentamicin resistance found - therefore combination therapy with an aminoglycoside may be indicated for serious enterococcal infections such as bacteremia and endocarditis.                    05/20/2024 1524 05/21/2024 1534 Urine Culture [[id number]]   (Abnormal)   Urine from Nephrostomy, Left    Final result Component Value   Culture 10,000-50,000 CFU/mL Candida albicans    Susceptibilities not routinely done, refer to antibiogram to view typical susceptibility profiles               05/19/2024 0138 05/20/2024 0514 Urine Culture [[id number]]   Urine, Bloom Catheter    Final result Component Value   Culture <10,000 CFU/mL Mixture of Urogenital Rebecca                        RADIOLOGY:    Reviewed  IR Nephrostomy Tube Placement Bilateral    Result Date: 5/20/2024  [place] RADIOLOGY EXAM: IR NEPHROSTOMY TUBE PLACEMENT BILATERAL LOCATION: [facility] CLINICAL HISTORY: [place]ondo and SUJATHA. PROCEDURES PERFORMED: 1. Ultrasound guided access of the intrarenal collecting system bilaterally. 2. Bilateral antegrade nephrostograms. 3. Bilateral  "percutaneous nephrostomy tube placement. MODERATE SEDATION: 1 Versed and 75 Fentanyl were administered intravenously for moderate sedation. Pulse oximetry, heart rate and blood pressure were continuously monitored by an independent trained observer. The physician spent 20 minutes of face-to-face moderate sedation time with the patient. ADDITIONAL MEDICATIONS: see EMR CONTRAST: See EMR FLUOROSCOPIC TIME: 2.9 minutes CUMULATIVE AIR KERMA/DOSE: 17 mGy STERILE BARRIER TECHNIQUE: Maximal Sterile Barrier Technique Utilized: Cap AND mask AND sterile gown AND sterile gloves AND sterile full body drape AND hand hygiene AND skin preparation 2% chlorhexidine for cutaneous antisepsis (or acceptable alternative  antiseptics).   Sterile Ultrasound Technique Utilized ?Sterile gel AND sterile probe covers. UNIVERSAL PROTOCOL: Standard universal protocol per facility guidelines was followed. See EMR for documentation. TECHNIQUE/FINDINGS: Following a discussion of the risks, benefits, indications and alternatives to treatment, appropriate informed consent was obtained.  The patient was brought to the interventional radiology suite and placed on the table. The bilateral flank were prepped and draped in usual sterile fashion. A time out was performed per universal protocol policy to ensure correct patient, site and procedure to be performed. A preliminary ultrasound of the left flank was performed which demonstrates severe hydronephrosis.  Ultrasound images were archived.  Then, under ultrasound guidance an appropriate site was marked in the left lateral lower back region for needle placement and this region was subsequently infiltrated with 1% Lidocaine for local anesthesia. Under direct ultrasound guidance, a 21-gauge needle was inserted into the lower pole of the dilated calyx until urine return was noted. A small amount of contrast was then injected, which demonstrates severe hydronephrosis and proximal hydroureter. A 0.018\" wire " "was then advanced through the needle and coiled within the renal pelvis. The needle was then exchanged for a coaxial Addie set, which was advanced  over the wire through the calyx into the renal pelvis. The 0.018\" wire was placed to the side as a safety wire and a 0.035\" guidewire was advanced into the renal pelvis. The tract was serially dilated with final placement of a 10-[place]ankan nephrostomy tube  with the tip coiled in the renal pelvis. Injection of contrast demonstrates the pigtail is centered in the renal pelvis. The contrast was aspirated and the tube was placed to gravity bag drainage. The catheter was secured to the skin using a 2-0 Ethilon  and a sterile dressing was applied.  Attention was then turned to the right kidney. Preliminary ultrasound of the right flank demonstrates severe hydronephrosis. An appropriate site was marked in the right lower back region for needle placement and this region was then infiltrated with 1% Lidocaine. Under direct ultrasound guidance, a 21-gauge needle was inserted into the lower pole of the dilated calyx until urine return was noted.  A small amount of contrast was then injected which demonstrates severe hydronephrosis and proximal hydroureter.  A 0.018\" wire was then advanced through the needle and coiled within the renal pelvis. The needle was then exchanged for a coaxial Addie set, which was advanced over the wire through the calyx into the renal pelvis. The 0.018 wire was placed  to the side as a safety wire and a 0.035\" guidewire was advanced into the renal pelvis. The tract was serially dilated with final placement of an 10-[place]ankan nephrostomy tube with the tip coiled in the renal pelvis. Injection of contrast demonstrates the pigtail is centered in the renal pelvis.  The contrast was aspirated and the tube was placed to gravity bag drainage. The catheter was secured to the skin using a 2-0 Ethilon and a sterile dressing was applied.  Throughout the procedure, the " patient was monitored by a radiology nurse for cardiac rhythm and oxygen saturation which remained stable. The patient tolerated the procedure well and left interventional radiology in stable condition.     IMPRESSION: 1. Successful placement of a 10.2-[place] Islander left percutaneous nephrostomy tube. Left antegrade nephrostogram demonstrates severe hydronephrosis and hydroureter. 2. Successful placement of a 10.2-[place] Islander right percutaneous nephrostomy tube. Right antegrade nephrostogram demonstrates severe hydronephrosis and hydroureter.    CT Abdomen Pelvis w/o Contrast    Result Date: 5/19/2024  EXAM: CT ABDOMEN PELVIS W/O CONTRAST LOCATION: [facility] DATE: 5/19/2024 INDICATION: Right flank pain. COMPARISON: CT abdomen and pelvis on 02/01/2024. TECHNIQUE: CT scan of the abdomen and pelvis was performed without intravenous contrast Multiplanar reformats were obtained. Dose reduction techniques were used. FINDINGS: LOWER CHEST: Basilar pulmonary opacities, likely atelectasis. ABDOMEN/PELVIS: Limited evaluation of the abdominal organs due to lack of intravenous contrast. HEPATOBILIARY: The unenhanced liver and gallbladder are grossly unremarkable. PANCREAS: The unenhanced pancreas is grossly unremarkable. SPLEEN: No splenomegaly. ADRENAL GLANDS: No adrenal nodules. KIDNEYS/BLADDER: No radiodense kidney stones. Severe right and moderate to severe left diffuse hydroureter/hydronephrosis down to the level of the urinary bladder. No obstructing ureteral stone visualized. The urinary bladder is decompressed with Bloom catheter. Diffuse urinary bladder wall thickening, could be due to underdistention versus chronic bladder outlet obstruction versus chronic cystitis. BOWEL: No abnormally dilated bowel loops. Colonic diverticulosis without CT evidence of acute diverticulitis. PERITONEUM: No evidence of free fluid in the abdomen and pelvis. No free peritoneal or portal venous gas. PELVIC ORGANS: The  prostate gland is enlarged measuring 6.6 cm in transverse diameter. VASCULATURE: Moderate atherosclerotic vascular calcification of the abdominal aorta and iliac arteries. LYMPH NODES: Unremarkable. MUSCULOSKELETAL: Diffuse osteopenia with multilevel degenerative changes of the spine. Multiple compression deformities of the visualized thoracic and lumbar spine most prominent at L1 and L3 vertebra with approximately 50-60% vertebral height loss. Small right inguinal hernia containing fat and nondilated small bowel loops.     IMPRESSION: 1. Severe right and moderate to severe left diffuse hydroureter/hydronephrosis down to the level of the urinary bladder, not significantly changed as compared to 02/01/2024. No obstructing ureteral stone visualized. 2. Prostatomegaly with diffuse urinary bladder wall thickening, likely due to chronic bladder outlet obstruction. 3. Colonic diverticulosis without CT evidence of acute diverticulitis.      Attestation:  Total time on the floor involved in the patient's care: minutes. Chart reviewed, reviewed cultures, external notes, labs, antiinfective monitoring, evaluation, counseling, management    Medical Decision Making       MANAGEMENT DISCUSSED with the following over the past 24 hours: Patient, patient's daughter at bedside, nurse   NOTE(S)/MEDICAL RECORDS REVIEWED over the past 24 hours: Reviewed  Tests ORDERED & REVIEWED in the past 24 hours:  - See lab/imaging results included in the data section of the note  SUPPLEMENTAL HISTORY, in addition to the patient's history, over the past 24 hours obtained from:   - Daughter  Medical complexity over the past 24 hours:  - Intensive monitoring for MEDICATION TOXICITY  - Antibiotic plan

## 2024-05-24 NOTE — PLAN OF CARE
Problem: Adult Inpatient Plan of Care  Goal: Optimal Comfort and Wellbeing  Intervention: Monitor Pain and Promote Comfort  Recent Flowsheet Documentation  Taken 5/23/2024 2015 by Tamanna Doe RN  Pain Management Interventions: declines     Problem: Pain Acute  Goal: Optimal Pain Control and Function  Outcome: Progressing  Intervention: Develop Pain Management Plan  Recent Flowsheet Documentation  Taken 5/23/2024 2015 by Tamanna Doe RN  Pain Management Interventions: declines  Intervention: Prevent or Manage Pain  Recent Flowsheet Documentation  Taken 5/23/2024 2015 by Tamanna Doe RN  Medication Review/Management: medications reviewed       Pt A&Ox4. VSS. RA. Denies pain. No PRN meds given. Denies N/V, SOB, CP, HA. Up to toilet with assist of 1, gait belt and walker. Had 1x BM this shift. Bloom intact and patent. Calls appropriately. Bed alarm on for pt safety.

## 2024-05-24 NOTE — PLAN OF CARE
Problem: UTI (Urinary Tract Infection)  Goal: Improved Infection Symptoms  Outcome: Progressing   Goal Outcome Evaluation:    AO x4. VSS on RA. BP is a bit high 163/72, did not meet parameters to notify provider. Bloom patent. Nephro bag in place. Dressing c/d/I. Pain is controlled with PRN tylenol. Make needs known. Call light within reach.

## 2024-05-25 PROCEDURE — 99232 SBSQ HOSP IP/OBS MODERATE 35: CPT | Performed by: INTERNAL MEDICINE

## 2024-05-25 PROCEDURE — 250N000013 HC RX MED GY IP 250 OP 250 PS 637: Performed by: INTERNAL MEDICINE

## 2024-05-25 PROCEDURE — 250N000011 HC RX IP 250 OP 636: Performed by: HOSPITALIST

## 2024-05-25 PROCEDURE — 250N000013 HC RX MED GY IP 250 OP 250 PS 637: Performed by: HOSPITALIST

## 2024-05-25 PROCEDURE — 258N000003 HC RX IP 258 OP 636: Performed by: INTERNAL MEDICINE

## 2024-05-25 PROCEDURE — 120N000001 HC R&B MED SURG/OB

## 2024-05-25 RX ADMIN — LEVOFLOXACIN 250 MG: 250 TABLET, FILM COATED ORAL at 08:05

## 2024-05-25 RX ADMIN — CEFTRIAXONE 1 G: 1 INJECTION, POWDER, FOR SOLUTION INTRAMUSCULAR; INTRAVENOUS at 04:58

## 2024-05-25 RX ADMIN — TAMSULOSIN HYDROCHLORIDE 0.4 MG: 0.4 CAPSULE ORAL at 21:02

## 2024-05-25 RX ADMIN — OXYBUTYNIN CHLORIDE 5 MG: 5 TABLET ORAL at 13:13

## 2024-05-25 RX ADMIN — FLUCONAZOLE 100 MG: 100 TABLET ORAL at 08:05

## 2024-05-25 RX ADMIN — OXYBUTYNIN CHLORIDE 5 MG: 5 TABLET ORAL at 21:02

## 2024-05-25 RX ADMIN — METRONIDAZOLE: 10 GEL TOPICAL at 08:06

## 2024-05-25 RX ADMIN — AMLODIPINE BESYLATE 2.5 MG: 2.5 TABLET ORAL at 08:05

## 2024-05-25 RX ADMIN — SODIUM CHLORIDE: 9 INJECTION, SOLUTION INTRAVENOUS at 08:05

## 2024-05-25 RX ADMIN — OXYBUTYNIN CHLORIDE 5 MG: 5 TABLET ORAL at 08:05

## 2024-05-25 RX ADMIN — HYDROCODONE BITARTRATE AND ACETAMINOPHEN 1 TABLET: 5; 325 TABLET ORAL at 01:42

## 2024-05-25 ASSESSMENT — ACTIVITIES OF DAILY LIVING (ADL)
ADLS_ACUITY_SCORE: 30

## 2024-05-25 NOTE — PROGRESS NOTES
[facility]    Medicine Progress Note - Hospitalist Service    Date of Admission:  5/19/2024    Assessment & Plan   87 y/o M with recent TURP, CKD3b, HTN, HLD, SSS s/p PM presents with SUJATHA superimposed on CKD3b and UTI. IV ceftriaxone, urology consulted given recent TURP and 3-way Bloom. Status post bilateral percutaneous nephrostomy tubes (PNT).  IR placed b/l PNTs on 5/20. Urine culture from 5/20 consistent with pyelonephritis from Candida albicans and Enterococcus.  ID and Urology following.      Recent TURP -   Candida pyelonephritis  Enterococcus pyelonephritis  CT evidence of severe and persistent  right and left hydroureter/hydronephrosis down to the level of the urinary bladder. Per CT - not significantly changed as compared to 02/01/2024.   -CT also showed Prostatomegaly with diffuse urinary bladder wall thickening, likely due to chronic bladder outlet obstruction.   - b/l PNTs, done on 5/20  - 5/20 urine culture from the PNT - Growing Enterococcus faecalis, Candida.  -5/23 Blood cultures NGTD    -Continue ceftriaxone 1 g IV q24h  -Continue levofloxacin to 50 mg daily  - Fluconazole 100 mg daily (started 5/22/24, 14 day duration, renal dose adjusted).  - will maintain Bloom catheter at discharge, outpatient follow up for TOV with urology, and possible ureteral stent placement in 1 to 2 weeks.   - ID consultation appreciated, awaiting final blood culture results before final antibiotic recommendations.     Hyperkalemia with CKD stage IIIb  -Hyperkalemia normalized.     CKD stage III with anemia  Hemoglobin 8.3 g/dL  - transfuse for Hgb < 7.0     HLD and HTN   - PTA medication: amlodipine.     SSS (sick sinus syndrome) and has PM  - PCP follow-up              Diet: Regular Diet Adult    DVT Prophylaxis: Low Risk/Ambulatory with no VTE prophylaxis indicated and Pneumatic Compression Devices  Bloom Catheter: PRESENT, indication: Surgical procedure;Acute retention or obstruction  Lines:  None     Cardiac Monitoring: None  Code Status: Full Code      Clinically Significant Risk Factors              # Hypoalbuminemia: Lowest albumin = 3.2 g/dL at 5/24/2024  6:21 AM, will monitor as appropriate     # Hypertension: Noted on problem list            # Financial/Environmental Concerns:     # Pacemaker present       Disposition Plan     Medically Ready for Discharge: Anticipated Tomorrow             Sterling Conrad DO  Hospitalist Service  [facility]  Securely message with [employer]igon (more info)  Text page via [employer] Paging/Directory   ______________________________________________________________________    Interval History   No new complaints.  No fever.     Physical Exam   Vital Signs: Temp: 97.8  F (36.6  C) Temp src: Oral BP: 130/60 Pulse: 72   Resp: 18 SpO2: 97 % O2 Device: None (Room air)    Weight: 159 lbs 13.34 oz    Constitutional: awake, alert, cooperative, no apparent distress, and appears stated age  Respiratory: No increased work of breathing, good air exchange, clear to auscultation bilaterally, no crackles or wheezing  Cardiovascular: Normal apical impulse, regular rate and rhythm, normal S1 and S2, no S3 or S4, and no murmur noted  GI: No scars, normal bowel sounds, soft, non-distended, non-tender, no masses palpated, no hepatosplenomegally  Genitounirinary: Bloom catheter in place.  Bilateral nephrostomy tubes, draining cloudy yellow urine.  No irritation around penile meatus.   Musculoskeletal: There is no redness, warmth, or swelling of the joints.  Neurologic: Awake, alert, oriented to name, place and time.      Medical Decision Making       40 MINUTES SPENT BY ME on the date of service doing chart review, history, exam, documentation & further activities per the note.      Data         Imaging results reviewed over the past 24 hrs:   No results found for this or any previous visit (from the past 24 hour(s)).

## 2024-05-25 NOTE — PLAN OF CARE
Goal Outcome Evaluation:      Plan of Care Reviewed With: patient    Overall Patient Progress: improvingOverall Patient Progress: improving     Patient is A&O X 4. Is pleasant and cooperative. VSS. Complains of pain in right flank. PRN medication given and effective. Assist x 1 with gait belt and walker. Bloom patent. Nephrostomy x 2 is patent. Tolerating PO intake.     Will continue plan of care.     Taina Corley RN, BSN

## 2024-05-25 NOTE — PROGRESS NOTES
Patient is A & O x 4. Assist x 1 with walker gait belt. VSS, on RA. Pt denies pain. Voiding utilizing a L and R nephrostomy, and 3 way smith. R PIV saline locked. IV abx. Tolerating a regular diet. Daily weights.    TCU vs home with home care

## 2024-05-25 NOTE — PLAN OF CARE
Problem: Adult Inpatient Plan of Care  Goal: Optimal Comfort and Wellbeing  Outcome: Progressing     Problem: Pain Acute  Goal: Optimal Pain Control and Function  Outcome: Progressing  Intervention: Prevent or Manage Pain  Recent Flowsheet Documentation  Taken 5/25/2024 1000 by Tamanna Doe RN  Medication Review/Management:   medications reviewed   high-risk medications identified  Taken 5/25/2024 0800 by Tamanna Doe RN  Bowel Elimination Promotion:   adequate fluid intake promoted   ambulation promoted  Medication Review/Management:   medications reviewed   high-risk medications identified  Intervention: Optimize Psychosocial Wellbeing  Recent Flowsheet Documentation  Taken 5/25/2024 0800 by Tamanna Doe RN  Supportive Measures: active listening utilized       Pt A&Ox4.  VSS on RA. Denies pain or very mild 1-2/10. NO PRN meds given. Need A1 with GB & Walker. Up in chair for meals. Has BM this shift. Good urine output with nephrostomy bags. Bloom intact and patent. Calls appropriately. Bed/chair alarm on for pt safety.   Waiting for blood culture result back for discharge plan.

## 2024-05-26 ENCOUNTER — APPOINTMENT (OUTPATIENT)
Dept: OCCUPATIONAL THERAPY | Facility: CLINIC | Age: 89
DRG: 699 | End: 2024-05-26
Payer: MEDICARE

## 2024-05-26 PROCEDURE — 250N000011 HC RX IP 250 OP 636: Performed by: HOSPITALIST

## 2024-05-26 PROCEDURE — 120N000001 HC R&B MED SURG/OB

## 2024-05-26 PROCEDURE — 250N000013 HC RX MED GY IP 250 OP 250 PS 637: Performed by: INTERNAL MEDICINE

## 2024-05-26 PROCEDURE — 99232 SBSQ HOSP IP/OBS MODERATE 35: CPT | Performed by: INTERNAL MEDICINE

## 2024-05-26 PROCEDURE — 97535 SELF CARE MNGMENT TRAINING: CPT | Mod: GO

## 2024-05-26 PROCEDURE — 250N000013 HC RX MED GY IP 250 OP 250 PS 637: Performed by: HOSPITALIST

## 2024-05-26 RX ORDER — POLYETHYLENE GLYCOL 3350 17 G/17G
17 POWDER, FOR SOLUTION ORAL DAILY
Status: DISCONTINUED | OUTPATIENT
Start: 2024-05-26 | End: 2024-05-27 | Stop reason: HOSPADM

## 2024-05-26 RX ADMIN — SALINE NASAL SPRAY 1 SPRAY: 1.5 SOLUTION NASAL at 19:02

## 2024-05-26 RX ADMIN — TAMSULOSIN HYDROCHLORIDE 0.4 MG: 0.4 CAPSULE ORAL at 20:25

## 2024-05-26 RX ADMIN — AMLODIPINE BESYLATE 2.5 MG: 2.5 TABLET ORAL at 08:11

## 2024-05-26 RX ADMIN — CEFTRIAXONE 1 G: 1 INJECTION, POWDER, FOR SOLUTION INTRAMUSCULAR; INTRAVENOUS at 05:07

## 2024-05-26 RX ADMIN — OXYBUTYNIN CHLORIDE 5 MG: 5 TABLET ORAL at 13:05

## 2024-05-26 RX ADMIN — FLUCONAZOLE 100 MG: 100 TABLET ORAL at 08:03

## 2024-05-26 RX ADMIN — POLYETHYLENE GLYCOL 3350 17 G: 17 POWDER, FOR SOLUTION ORAL at 11:01

## 2024-05-26 RX ADMIN — ACETAMINOPHEN 650 MG: 325 TABLET ORAL at 08:03

## 2024-05-26 RX ADMIN — LEVOFLOXACIN 250 MG: 250 TABLET, FILM COATED ORAL at 08:03

## 2024-05-26 RX ADMIN — OXYBUTYNIN CHLORIDE 5 MG: 5 TABLET ORAL at 20:25

## 2024-05-26 RX ADMIN — METRONIDAZOLE: 10 GEL TOPICAL at 10:49

## 2024-05-26 RX ADMIN — OXYBUTYNIN CHLORIDE 5 MG: 5 TABLET ORAL at 08:03

## 2024-05-26 RX ADMIN — SALINE NASAL SPRAY 1 SPRAY: 1.5 SOLUTION NASAL at 16:30

## 2024-05-26 ASSESSMENT — ACTIVITIES OF DAILY LIVING (ADL)
ADLS_ACUITY_SCORE: 30

## 2024-05-26 NOTE — PROVIDER NOTIFICATION
[facility] [address] Mikaela Figueroa    Patient was wondering if he could get flonase     Thank you in advanced    [phone]

## 2024-05-26 NOTE — PROGRESS NOTES
[facility]    Medicine Progress Note - Hospitalist Service    Date of Admission:  5/19/2024    Assessment & Plan   87 y/o M with recent TURP, CKD3b, HTN, HLD, SSS s/p PM presents with SUJATHA superimposed on CKD3b and UTI. IV ceftriaxone, urology consulted given recent TURP and 3-way Bloom. Status post bilateral percutaneous nephrostomy tubes (PNT).  IR placed b/l PNTs on 5/20. Urine culture from 5/20 consistent with pyelonephritis from Candida albicans and Enterococcus.  ID and Urology following.      Recent TURP -   Candida pyelonephritis  Enterococcus pyelonephritis  CT evidence of severe and persistent  right and left hydroureter/hydronephrosis down to the level of the urinary bladder. Per CT - not significantly changed as compared to 02/01/2024.   -CT also showed Prostatomegaly with diffuse urinary bladder wall thickening, likely due to chronic bladder outlet obstruction.   - b/l PNTs, done on 5/20  - 5/20 urine culture from the PNT - Growing Enterococcus faecalis, Candida.  -5/23 Blood cultures NGTD    -Continue ceftriaxone 1 g IV q24h  -Continue levofloxacin po 250 mg daily  - Fluconazole 100 mg daily (started 5/22/24, 14 day duration, renal dose adjusted).  - will maintain Bloom catheter at discharge, outpatient follow up for TOV with urology, and possible ureteral stent placement in 1 to 2 weeks.   - ID consultation appreciated, awaiting final blood culture results before final antibiotic recommendations.     Hyperkalemia with CKD stage IIIb  -Hyperkalemia normalized.     CKD stage III with anemia  Hemoglobin 8.3 g/dL  - transfuse for Hgb < 7.0     HLD and HTN   - PTA medication: amlodipine.     SSS (sick sinus syndrome) and has PM  - PCP follow-up      Constipation  Order Miralax 17 g po daily.           Diet: Regular Diet Adult    DVT Prophylaxis: Low Risk/Ambulatory with no VTE prophylaxis indicated and Pneumatic Compression Devices  Bloom Catheter: PRESENT, indication: Surgical  procedure  Lines: None     Cardiac Monitoring: None  Code Status: Full Code      Clinically Significant Risk Factors              # Hypoalbuminemia: Lowest albumin = 3.2 g/dL at 5/24/2024  6:21 AM, will monitor as appropriate     # Hypertension: Noted on problem list            # Financial/Environmental Concerns:     # Pacemaker present       Disposition Plan     Medically Ready for Discharge: Anticipated Today             Sterling Conrad DO  Hospitalist Service  [facility]  Securely message with [employer] (more info)  Text page via [email or web address] Paging/Directory   ______________________________________________________________________    Interval History   Patient reports mild abdominal discomfort, improved with moving and passing flatus.  Denies bowel movement for over 24 hours.     Physical Exam   Vital Signs: Temp: 97.4  F (36.3  C) Temp src: Oral BP: (!) 156/69 Pulse: 71   Resp: 18 SpO2: 100 % O2 Device: None (Room air)    Weight: 159 lbs 13.34 oz    Constitutional: awake, alert, cooperative, no apparent distress, and appears stated age  Respiratory: No increased work of breathing, good air exchange, clear to auscultation bilaterally, no crackles or wheezing  Cardiovascular: Normal apical impulse, regular rate and rhythm, normal S1 and S2, no S3 or S4, and no murmur noted  GI: No scars, normal bowel sounds, soft, non-distended, non-tender, no masses palpated, no hepatosplenomegally  Genitounirinary: Bloom catheter in place.  Bilateral nephrostomy tubes, draining yellow urine.  No irritation around penile meatus.   Musculoskeletal: There is no redness, warmth, or swelling of the joints.  Neurologic: Awake, alert, oriented to name, place and time.      Medical Decision Making       40 MINUTES SPENT BY ME on the date of service doing chart review, history, exam, documentation & further activities per the note.      Data         Imaging results reviewed over the past 24 hrs:   No results found for  this or any previous visit (from the past 24 hour(s)).

## 2024-05-26 NOTE — PLAN OF CARE
Alert and oriented x4. Able to make needs known via call light. Neph tubes x2 patent. No prn medications requested. VSS.      Problem: Adult Inpatient Plan of Care  Goal: Optimal Comfort and Wellbeing  Outcome: Progressing  Goal: Readiness for Transition of Care  Outcome: Progressing     Problem: Risk for Delirium  Goal: Improved Sleep  Outcome: Progressing

## 2024-05-26 NOTE — PLAN OF CARE
"  Problem: Adult Inpatient Plan of Care  Goal: Optimal Comfort and Wellbeing  Outcome: Progressing  Intervention: Monitor Pain and Promote Comfort  Recent Flowsheet Documentation  Taken 5/26/2024 0803 by Tamanna Doe RN  Pain Management Interventions: medication (see MAR)     Problem: Pain Acute  Goal: Optimal Pain Control and Function  Outcome: Progressing  Intervention: Develop Pain Management Plan  Recent Flowsheet Documentation  Taken 5/26/2024 0803 by Tamanna Doe RN  Pain Management Interventions: medication (see MAR)  Intervention: Prevent or Manage Pain  Recent Flowsheet Documentation  Taken 5/26/2024 1403 by Tamanna Doe RN  Medication Review/Management: medications reviewed  Taken 5/26/2024 1203 by Tamanna Doe RN  Medication Review/Management: medications reviewed  Taken 5/26/2024 1003 by Tamanna Doe RN  Medication Review/Management: medications reviewed  Taken 5/26/2024 0803 by Tamanna Doe RN  Sensory Stimulation Regulation:   care clustered   music on   television on  Bowel Elimination Promotion:   adequate fluid intake promoted   ambulation promoted  Medication Review/Management: medications reviewed  Intervention: Optimize Psychosocial Wellbeing  Recent Flowsheet Documentation  Taken 5/26/2024 0803 by Tamanna Doe RN  Supportive Measures: active listening utilized   Goal Outcome Evaluation:      Plan of Care Reviewed With: patient        Pt A&Ox4.  VSS on RA. Very mild pain, tolerable with PRN Tylenol. Need A1 with GB & Walker. C/o discomfort on belly, pt stated \"could be constipation\". Miralax given, no result. Good urine output with nephrostomy bags. Bloom intact and patent. Noticed thick pink urine output. Reporting to next shift nurse to follow up. Calls appropriately. Bed/chair alarm on for pt safety.            "

## 2024-05-27 VITALS
BODY MASS INDEX: 22.88 KG/M2 | HEART RATE: 66 BPM | TEMPERATURE: 97.4 F | HEIGHT: 70 IN | RESPIRATION RATE: 16 BRPM | WEIGHT: 159.83 LBS | OXYGEN SATURATION: 99 % | DIASTOLIC BLOOD PRESSURE: 67 MMHG | SYSTOLIC BLOOD PRESSURE: 148 MMHG

## 2024-05-27 PROCEDURE — 250N000013 HC RX MED GY IP 250 OP 250 PS 637: Performed by: INTERNAL MEDICINE

## 2024-05-27 PROCEDURE — 250N000013 HC RX MED GY IP 250 OP 250 PS 637: Performed by: HOSPITALIST

## 2024-05-27 PROCEDURE — 250N000011 HC RX IP 250 OP 636: Performed by: HOSPITALIST

## 2024-05-27 PROCEDURE — 99239 HOSP IP/OBS DSCHRG MGMT >30: CPT | Performed by: INTERNAL MEDICINE

## 2024-05-27 RX ORDER — OXYBUTYNIN CHLORIDE 5 MG/1
5 TABLET ORAL 3 TIMES DAILY
Qty: 21 TABLET | Refills: 0 | Status: SHIPPED | OUTPATIENT
Start: 2024-05-27 | End: 2024-05-27

## 2024-05-27 RX ORDER — OXYBUTYNIN CHLORIDE 5 MG/1
5 TABLET ORAL 3 TIMES DAILY
Qty: 21 TABLET | Refills: 0 | Status: SHIPPED | OUTPATIENT
Start: 2024-05-27

## 2024-05-27 RX ORDER — LEVOFLOXACIN 250 MG/1
250 TABLET, FILM COATED ORAL DAILY
Qty: 10 TABLET | Refills: 0 | Status: SHIPPED | OUTPATIENT
Start: 2024-05-28

## 2024-05-27 RX ORDER — LEVOFLOXACIN 250 MG/1
250 TABLET, FILM COATED ORAL DAILY
Qty: 10 TABLET | Refills: 0 | Status: SHIPPED | OUTPATIENT
Start: 2024-05-28 | End: 2024-05-27

## 2024-05-27 RX ORDER — ACETAMINOPHEN 325 MG/1
650 TABLET ORAL EVERY 4 HOURS PRN
COMMUNITY
Start: 2024-05-27

## 2024-05-27 RX ORDER — POLYETHYLENE GLYCOL 3350 17 G/17G
17 POWDER, FOR SOLUTION ORAL DAILY PRN
Qty: 510 G | Refills: 0 | Status: SHIPPED | OUTPATIENT
Start: 2024-05-27

## 2024-05-27 RX ORDER — FLUCONAZOLE 100 MG/1
100 TABLET ORAL DAILY
Qty: 10 TABLET | Refills: 0 | Status: SHIPPED | OUTPATIENT
Start: 2024-05-28

## 2024-05-27 RX ORDER — FLUCONAZOLE 100 MG/1
100 TABLET ORAL DAILY
Qty: 10 TABLET | Refills: 0 | Status: SHIPPED | OUTPATIENT
Start: 2024-05-28 | End: 2024-05-27

## 2024-05-27 RX ADMIN — OXYBUTYNIN CHLORIDE 5 MG: 5 TABLET ORAL at 08:15

## 2024-05-27 RX ADMIN — LEVOFLOXACIN 250 MG: 250 TABLET, FILM COATED ORAL at 08:14

## 2024-05-27 RX ADMIN — METRONIDAZOLE: 10 GEL TOPICAL at 08:16

## 2024-05-27 RX ADMIN — FLUCONAZOLE 100 MG: 100 TABLET ORAL at 08:15

## 2024-05-27 RX ADMIN — POLYETHYLENE GLYCOL 3350 17 G: 17 POWDER, FOR SOLUTION ORAL at 08:14

## 2024-05-27 RX ADMIN — SENNOSIDES AND DOCUSATE SODIUM 2 TABLET: 8.6; 5 TABLET ORAL at 08:20

## 2024-05-27 RX ADMIN — AMLODIPINE BESYLATE 2.5 MG: 2.5 TABLET ORAL at 08:14

## 2024-05-27 RX ADMIN — CEFTRIAXONE 1 G: 1 INJECTION, POWDER, FOR SOLUTION INTRAMUSCULAR; INTRAVENOUS at 04:25

## 2024-05-27 ASSESSMENT — ACTIVITIES OF DAILY LIVING (ADL)
ADLS_ACUITY_SCORE: 30
ADLS_ACUITY_SCORE: 28
ADLS_ACUITY_SCORE: 30
ADLS_ACUITY_SCORE: 28
ADLS_ACUITY_SCORE: 30
ADLS_ACUITY_SCORE: 28

## 2024-05-27 NOTE — PLAN OF CARE
"  Problem: Adult Inpatient Plan of Care  Goal: Optimal Comfort and Wellbeing  Outcome: Progressing   Goal Outcome Evaluation:                        Problem: Adult Inpatient Plan of Care  Goal: Optimal Comfort and Wellbeing  Outcome: Progressing     /63 (BP Location: Right arm)   Pulse 68   Temp 98  F (36.7  C) (Oral)   Resp 16   Ht 1.778 m (5' 10\")   Wt 72.5 kg (159 lb 13.3 oz)   SpO2 98%   BMI 22.93 kg/m      Pt Aox4. Up with stand by assist w/ walker and gait belt. Denies pain/discomfort/nausea    Steve Hernandez RN    "

## 2024-05-27 NOTE — DISCHARGE SUMMARY
[facility]  Hospitalist Discharge Summary      Date of Admission:  5/19/2024  Date of Discharge:  5/27/2024  Discharging Provider: Sterling Conrad DO  Discharge Service: Hospitalist Service    Addendum 6/1/24 1300, responding to coding inquiry about discharge diagnoses.   Patient had possible iatrogenic versus catheter associated urinary tract infection/pyelonephritis.     Sterling Conrad DO, [facility] Service  Internal Medicine      Discharge Diagnoses   Status post transurethral resection of prostate  Acute postoperative urinary retention and bilateral hydronephrosis  Candida pyelonephritis  Enterococcus pyelonephritis  Hyperkalemia   Chroinc kidney disease stage IIIb  Anemia of chronic kidney disease  Hyperlipidemia  Essential hypertension  Constipation, medication induced.     Clinically Significant Risk Factors          Follow-ups Needed After Discharge   Follow-up Appointments     Follow-up and recommended labs and tests       [facility] will call  you to arrange outpatient trial void (smith removal   attempt) and clinic follow up (to included possible arrangement with IR   for antegrade ureteral stent placement) in 1-2 weeks.  * please stop taking oxybutynin the morning of your trial void (last dose   would be the evening before).        Follow-up and recommended labs and tests       Follow up with Urology as scheduled this Friday, May 31.for hospital   follow- up and Removal of Smith, TOV.  The following labs/tests are   recommended: CBC, BMP.            Unresulted Labs Ordered in the Past 30 Days of this Admission       Date and Time Order Name Status Description    5/23/2024  5:16 PM Blood Culture Arm, Right Preliminary     5/23/2024  5:16 PM Blood Culture Arm, Left Preliminary         These results will be followed up by [facility] [place] [facility], discharged on Augmentin, Fluconazole and levofloxacin per ID recommendations.      Discharge Disposition   Admited to home care:    Condition at discharge: Stable    Hospital Course   87 y/o M with recent TURP, CKD3b, HTN, HLD, SSS s/p PM presents with SUJATHA superimposed on CKD3b and UTI. IV ceftriaxone, urology consulted given recent TURP and 3-way Bloom. Status post bilateral percutaneous nephrostomy tubes (PNT). IR placed b/l PNTs on 5/20. Urine culture from 5/20 consistent with pyelonephritis from Candida albicans and Enterococcus. ID updated antibiotic regimen recommendations prior to discharge and patient will continue levofloxacin, augmentin and fluconazole for total of 2 weeks, and may be extended if needed after outpatient urology follow-up.      Consultations This Hospital Stay   UROLOGY IP CONSULT  INTERVENTIONAL RADIOLOGY ADULT/PEDS IP CONSULT  INTERVENTIONAL RADIOLOGY ADULT/PEDS IP CONSULT  OCCUPATIONAL THERAPY ADULT IP CONSULT  PHYSICAL THERAPY ADULT IP CONSULT  [facility] IP CONSULT  INFECTIOUS DISEASES IP CONSULT    Code Status   Full Code    Time Spent on this Encounter   ISterling DO, personally saw the patient today and spent greater than 30 minutes discharging this patient.       Sterling Conrad DO  [address] [address]  Phone: [phone]  Fax: [phone]  ______________________________________________________________________    Physical Exam   Vital Signs: Temp: 97.4  F (36.3  C) Temp src: Oral BP: (!) 148/67 Pulse: 66   Resp: 16 SpO2: 99 % O2 Device: None (Room air)    Weight: 159 lbs 13.34 oz  Constitutional: awake, alert, cooperative, no apparent distress, and appears stated age  Respiratory: No increased work of breathing, good air exchange, clear to auscultation bilaterally, no crackles or wheezing  Cardiovascular: Normal apical impulse, regular rate and rhythm, normal S1 and S2, no S3 or S4, and no murmur noted  GI: No scars, normal bowel sounds, soft, non-distended, non-tender, no masses palpated, no  hepatosplenomegally  Genitounirinary: Smith catheter in place.  Bilateral nephrostomy tubes, draining yellow urine.  No irritation around penile meatus.   Musculoskeletal: There is no redness, warmth, or swelling of the joints.  Neurologic: Awake, alert, oriented to name, place and time.         Primary Care Physician   Derek Kumar    Discharge Orders      IR Nephrostomy Tube Change Bilateral     Home Care Referral      Reason for your hospital stay    You had a smith catheter placed for after TURP and bilateral PNT placement for hydronephrosis.     Activity    Your activity upon discharge: Having a smith catheter does not limit your activity, however, it is recommended you do not drive with a smith catheter in place.     Tubes and drains    You are going home with the following tubes or drains: Smith catheter    CATHETER CARE   You are being discharged with a smith catheter. You may choose to alternate between a leg (day) bag and large (night) bag. Drain urine from the bag when it is about 2/3rds full. Use plain soap and water to wash urethral/groin area daily. Males - you may apply a small amount of Bacitracin or Neosporin ointment to the tip of the penis three times a day for comfort (decreases friction).     ACTIVITY   Having a smith catheter does not limit your activities, however, it is recommended you do not drive with a smith catheter in place.     Seek medial evaluation if:  - You are feeling chilled or feverish, temperature >100.5.    - You develop thick cherry colored urine, clots or smith catheter is not draining well.   - You develop purulent drainage from the urethra.     FOLLOW UP:   [facility] will call you to arrange for your catheter removed in our office, typically 1-2 weeks after discharge from the hospital. Alternatively, this may be completed at a transitional care unit if a bladder scanner is available.     Follow-up and recommended labs and tests     [facility] will call  you to arrange  outpatient trial void (smith removal attempt) and clinic follow up (to included possible arrangement with IR for antegrade ureteral stent placement) in 1-2 weeks.  * please stop taking oxybutynin the morning of your trial void (last dose would be the evening before).     Reason for your hospital stay    Status post TURP, with acute urinary retention, Candida and Enterococcus pyelonephritis.     Follow-up and recommended labs and tests     Follow up with Urology as scheduled this Friday, May 31.for hospital follow- up and Removal of Smith, TOV.  The following labs/tests are recommended: CBC, BMP.     Activity    Your activity upon discharge: activity as tolerated     When to contact your care team    Call your primary doctor if you have any of the following: temperature greater than 100.4 degrees, increased swelling, increased pain, or nausea, vomiting.     Tubes and drains    You are going home with the following tubes or drains: smith catheter.  Tube cares per hospital or home care instructions  Nephrostomy tubes.  Tube cares per hospital or home care instructions     Tub Transfer Bench Order    DME Documentation:   Describe the reason for need to support medical necessity: Ensure safety at home.     I, the undersigned, certify that the above prescribed supplies are medically necessary for this patient and is both reasonable and necessary in reference to accepted standards of medical and necessary in reference to accepted standards of medical practice in the treatment of this patient's condition and is not prescribed as a convenience.     Diet    Follow this diet upon discharge: Orders Placed This Encounter      Regular Diet Adult       Significant Results and Procedures   Most Recent 3 CBC's:  Recent Labs   Lab Test 05/24/24  0621 05/23/24  0616 05/22/24  0902 05/19/24  0828 05/19/24  0114   WBC 9.2  --  11.8* 15.1* 18.3*   HGB 8.3* 8.9*  --  9.1* 9.2*   MCV 96  --   --  95 93     --   --  140* 165     Most  Recent 3 BMP's:  Recent Labs   Lab Test 05/24/24  0621 05/22/24  0902 05/21/24  0927 05/20/24  0944     --  141 142   POTASSIUM 4.1  --  4.4 4.5   CHLORIDE 107  --  110* 113*   CO2 21*  --  22 21*   BUN 39.7*  --  36.5* 41.3*   CR 1.91* 1.93* 2.06* 2.13*   ANIONGAP 10  --  9 8   LIBERTAD 8.7*  --  8.2* 8.2*   *  --  197* 111*     Most Recent 2 LFT's:  Recent Labs   Lab Test 05/24/24  0621 05/19/24  0828   AST 14 18   ALT 12 8   ALKPHOS 83 85   BILITOTAL 0.3 0.3     7-Day Micro Results       Collected Updated Procedure Result Status      05/23/2024 1736 05/26/2024 2146 Blood Culture Arm, Right [[id number]]   Blood from Arm, Right    Preliminary result Component Value   Culture No growth after 3 days  [P]                05/23/2024 1735 05/26/2024 2146 Blood Culture Arm, Left [[id number]]   Blood from Arm, Left    Preliminary result Component Value   Culture No growth after 3 days  [P]                05/20/2024 1532 05/23/2024 0241 Urine Culture [[id number]]    (Abnormal)   Urine from Nephrostomy, Right    Final result Component Value   Culture 50,000-100,000 CFU/mL Enterococcus faecalis    50,000-100,000 CFU/mL Candida albicans    Susceptibilities not routinely done, refer to antibiogram to view typical susceptibility profiles        Susceptibility        Enterococcus faecalis      DERIC      Ampicillin <=2 ug/mL Susceptible      Nitrofurantoin <=16 ug/mL Susceptible      Vancomycin 1 ug/mL Susceptible                           05/20/2024 1524 05/21/2024 1534 Urine Culture [[id number]]   (Abnormal)   Urine from Nephrostomy, Left    Final result Component Value   Culture 10,000-50,000 CFU/mL Candida albicans    Susceptibilities not routinely done, refer to antibiogram to view typical susceptibility profiles                     Most Recent Urinalysis:  Recent Labs   Lab Test 05/19/24  0138 10/01/22  1953 01/14/22  1423   COLOR Light Orange*   < > Yellow   APPEARANCE Turbid*   < > Clear   URINEGLC Negative    < > Negative   URINEBILI Negative   < > Negative   URINEKETONE Negative   < > Negative   SG 1.008   < > 1.015   UBLD 1.0 mg/dL*   < > Negative   URINEPH 6.5   < > 7.0   PROTEIN 300*   < > Negative   UROBILINOGEN  --   --  0.2   NITRITE Negative   < > Negative   LEUKEST 500 Rober/uL*   < > Negative   RBCU >182*   < >  --    WBCU >182*   < >  --     < > = values in this interval not displayed.     Most Recent ESR & CRP:No lab results found.,   Results for orders placed or performed during the hospital encounter of 05/19/24   CT Abdomen Pelvis w/o Contrast    Narrative    EXAM: CT ABDOMEN PELVIS W/O CONTRAST  LOCATION: [facility]  DATE: 5/19/2024    INDICATION: Right flank pain.  COMPARISON: CT abdomen and pelvis on 02/01/2024.  TECHNIQUE: CT scan of the abdomen and pelvis was performed without intravenous contrast Multiplanar reformats were obtained. Dose reduction techniques were used.    FINDINGS:   LOWER CHEST: Basilar pulmonary opacities, likely atelectasis.    ABDOMEN/PELVIS: Limited evaluation of the abdominal organs due to lack of intravenous contrast.    HEPATOBILIARY: The unenhanced liver and gallbladder are grossly unremarkable.    PANCREAS: The unenhanced pancreas is grossly unremarkable.    SPLEEN: No splenomegaly.    ADRENAL GLANDS: No adrenal nodules.    KIDNEYS/BLADDER: No radiodense kidney stones. Severe right and moderate to severe left diffuse hydroureter/hydronephrosis down to the level of the urinary bladder. No obstructing ureteral stone visualized. The urinary bladder is decompressed with Bloom   catheter. Diffuse urinary bladder wall thickening, could be due to underdistention versus chronic bladder outlet obstruction versus chronic cystitis.    BOWEL: No abnormally dilated bowel loops. Colonic diverticulosis without CT evidence of acute diverticulitis.    PERITONEUM: No evidence of free fluid in the abdomen and pelvis. No free peritoneal or portal venous gas.    PELVIC  ORGANS: The prostate gland is enlarged measuring 6.6 cm in transverse diameter.    VASCULATURE: Moderate atherosclerotic vascular calcification of the abdominal aorta and iliac arteries.    LYMPH NODES: Unremarkable.    MUSCULOSKELETAL: Diffuse osteopenia with multilevel degenerative changes of the spine. Multiple compression deformities of the visualized thoracic and lumbar spine most prominent at L1 and L3 vertebra with approximately 50-60% vertebral height loss.   Small right inguinal hernia containing fat and nondilated small bowel loops.      Impression    IMPRESSION:   1. Severe right and moderate to severe left diffuse hydroureter/hydronephrosis down to the level of the urinary bladder, not significantly changed as compared to 02/01/2024. No obstructing ureteral stone visualized.  2. Prostatomegaly with diffuse urinary bladder wall thickening, likely due to chronic bladder outlet obstruction.  3. Colonic diverticulosis without CT evidence of acute diverticulitis.     IR Nephrostomy Tube Placement Bilateral    Narrative    [facility]    EXAM: IR NEPHROSTOMY TUBE PLACEMENT BILATERAL    LOCATION: [facility]    CLINICAL HISTORY: [place] and SUJATHA.    PROCEDURES PERFORMED:  1. Ultrasound guided access of the intrarenal collecting system bilaterally.   2. Bilateral antegrade nephrostograms.  3. Bilateral percutaneous nephrostomy tube placement.    MODERATE SEDATION: 1 Versed and 75 Fentanyl were administered intravenously for moderate sedation. Pulse oximetry, heart rate and blood pressure were continuously monitored by an independent trained observer. The physician spent 20 minutes of   face-to-face moderate sedation time with the patient.    ADDITIONAL MEDICATIONS: see EMR     CONTRAST: See EMR    FLUOROSCOPIC TIME: 2.9 minutes  CUMULATIVE AIR KERMA/DOSE: 17 mGy    STERILE BARRIER TECHNIQUE: Maximal Sterile Barrier Technique Utilized: Cap AND mask AND sterile gown AND sterile gloves AND  "sterile full body drape AND hand hygiene AND skin preparation 2% chlorhexidine for cutaneous antisepsis (or acceptable alternative   antiseptics).   Sterile Ultrasound Technique Utilized ?Sterile gel AND sterile probe covers.    UNIVERSAL PROTOCOL: Standard universal protocol per facility guidelines was followed. See EMR for documentation.     TECHNIQUE/FINDINGS:   Following a discussion of the risks, benefits, indications and alternatives to treatment, appropriate informed consent was obtained.  The patient was brought to the interventional radiology suite and placed on the table. The bilateral flank were prepped   and draped in usual sterile fashion. A time out was performed per universal protocol policy to ensure correct patient, site and procedure to be performed.     A preliminary ultrasound of the left flank was performed which demonstrates severe hydronephrosis.  Ultrasound images were archived.  Then, under ultrasound guidance an appropriate site was marked in the left lateral lower back region for needle   placement and this region was subsequently infiltrated with 1% Lidocaine for local anesthesia. Under direct ultrasound guidance, a 21-gauge needle was inserted into the lower pole of the dilated calyx until urine return was noted. A small amount of   contrast was then injected, which demonstrates severe hydronephrosis and proximal hydroureter. A 0.018\" wire was then advanced through the needle and coiled within the renal pelvis. The needle was then exchanged for a coaxial Addie set, which was advanced   over the wire through the calyx into the renal pelvis. The 0.018\" wire was placed to the side as a safety wire and a 0.035\" guidewire was advanced into the renal pelvis. The tract was serially dilated with final placement of a 10-Montenegrin nephrostomy tube   with the tip coiled in the renal pelvis. Injection of contrast demonstrates the pigtail is centered in the renal pelvis. The contrast was aspirated and " "the tube was placed to gravity bag drainage. The catheter was secured to the skin using a 2-0 Ethilon   and a sterile dressing was applied.      Attention was then turned to the right kidney. Preliminary ultrasound of the right flank demonstrates severe hydronephrosis. An appropriate site was marked in the right lower back region for needle placement and this region was then infiltrated with 1%   Lidocaine. Under direct ultrasound guidance, a 21-gauge needle was inserted into the lower pole of the dilated calyx until urine return was noted.  A small amount of contrast was then injected which demonstrates severe hydronephrosis and proximal   hydroureter.  A 0.018\" wire was then advanced through the needle and coiled within the renal pelvis. The needle was then exchanged for a coaxial Addie set, which was advanced over the wire through the calyx into the renal pelvis. The 0.018 wire was placed   to the side as a safety wire and a 0.035\" guidewire was advanced into the renal pelvis. The tract was serially dilated with final placement of an 10-Libyan nephrostomy tube with the tip coiled in the renal pelvis. Injection of contrast demonstrates the   pigtail is centered in the renal pelvis.  The contrast was aspirated and the tube was placed to gravity bag drainage. The catheter was secured to the skin using a 2-0 Ethilon and a sterile dressing was applied.      Throughout the procedure, the patient was monitored by a radiology nurse for cardiac rhythm and oxygen saturation which remained stable. The patient tolerated the procedure well and left interventional radiology in stable condition.      Impression    IMPRESSION:  1. Successful placement of a 10.2-Libyan left percutaneous nephrostomy tube. Left antegrade nephrostogram demonstrates severe hydronephrosis and hydroureter.  2. Successful placement of a 10.2-Libyan right percutaneous nephrostomy tube. Right antegrade nephrostogram demonstrates severe hydronephrosis and " hydroureter.       Discharge Medications   Current Discharge Medication List        START taking these medications    Details   !! acetaminophen (TYLENOL) 325 MG tablet Take 2 tablets (650 mg) by mouth every 4 hours as needed for mild pain, other, headaches or fever (and adjunct with moderate or severe pain or per patient request)    Associated Diagnoses: Lower urinary tract symptoms due to benign prostatic hyperplasia      amoxicillin-clavulanate (AUGMENTIN) 875-125 MG tablet Take 1 tablet by mouth 2 times daily for 10 days  Qty: 20 tablet, Refills: 0    Associated Diagnoses: Enterococcus UTI      fluconazole (DIFLUCAN) 100 MG tablet Take 1 tablet (100 mg) by mouth daily for 10 days  Qty: 10 tablet, Refills: 0    Associated Diagnoses: Candida pyelonephritis      levofloxacin (LEVAQUIN) 250 MG tablet Take 1 tablet (250 mg) by mouth daily for 10 days  Qty: 10 tablet, Refills: 0    Associated Diagnoses: Enterococcus UTI      oxyBUTYnin (DITROPAN) 5 MG tablet Take 1 tablet (5 mg) by mouth 3 times daily for 7 days  Qty: 21 tablet, Refills: 0    Associated Diagnoses: Hydronephrosis, unspecified hydronephrosis type      polyethylene glycol (MIRALAX) 17 GM/Dose powder Take 17 g by mouth daily as needed for constipation  Qty: 510 g, Refills: 0    Associated Diagnoses: Constipation, unspecified constipation type       !! - Potential duplicate medications found. Please discuss with provider.        CONTINUE these medications which have NOT CHANGED    Details   !! acetaminophen (TYLENOL) 325 MG tablet Take 1-2 tablets (325-650 mg) by mouth every 4 hours as needed for other or mild pain (For optimal non-opioid multimodal pain management to improve pain control.)    Associated Diagnoses: Ureteral calculus      amLODIPine (NORVASC) 2.5 MG tablet Take 1 tablet (2.5 mg) by mouth daily  Qty: 90 tablet, Refills: 1    Associated Diagnoses: Essential hypertension      Cranberry (CRANBEREX PO) Take 6 tablets by mouth daily       metroNIDAZOLE (METROGEL) 1 % external gel APPLY A SMALL AMOUNT TO AFFECTED AREA(S) TOPICALLY ONCE DAILY  Qty: 60 g, Refills: 1    Associated Diagnoses: Rosacea      multivitamin, therapeutic (THERA-VIT) TABS tablet Take 1 tablet by mouth daily      tamsulosin (FLOMAX) 0.4 MG capsule Take 1 capsule (0.4 mg) by mouth At Bedtime  Qty: 90 capsule, Refills: 3    Associated Diagnoses: Benign prostatic hyperplasia, unspecified whether lower urinary tract symptoms present      triamcinolone (KENALOG) 0.1 % external cream Apply topically 2 times daily as needed for irritation       !! - Potential duplicate medications found. Please discuss with provider.        Allergies   No Known Allergies

## 2024-05-27 NOTE — PROGRESS NOTES
Care Management Discharge Note    Discharge Date: 05/27/2024     Discharge Disposition: Home, Home Care, Home Infusion    Discharge Services: None    Discharge DME:  N/A    Discharge Transportation: family or friend will provide    Private pay costs discussed: Not applicable    Does the patient's insurance plan have a 3 day qualifying hospital stay waiver?  No    PAS Confirmation Code:  N/A  Patient/family educated on Medicare website which has current facility and service quality ratings: yes    Education Provided on the Discharge Plan: Yes (AVS per bedside RN)  Persons Notified of Discharge Plans: Provider, C  Patient/Family in Agreement with the Plan: yes    Handoff Referral Completed: Yes    Additional Information:  Pt was open with [facility] but they do not manage IV's. LUTHER received update that pt will discharge on PO medications.     1018: LUTHER called [facility] and spoke with Sami. Pt was open prior with only RN services. Per thearpy notes, pt does not need therapy at discharge. Saim stated they will watch for resume HHC order for RN services.    LUTHER update provider.    Lucia Sorensen

## 2024-05-27 NOTE — PLAN OF CARE
Goal Outcome Evaluation:    Patient discharged home with daughter Kalyani and AURORA. Notified of meds sent to the pharmacy. Will  and take as prescribed. Patient will follow up with urology on Friday. Education provided on smith catheters and nephrostomy tubes.

## 2024-05-27 NOTE — PROGRESS NOTES
Patient is A & O x 4. Assist x 1 with walker gait belt. VSS, on RA. Pt denies pain. Voiding utilizing a L and R nephrostomy, and 3 way smith. Urine from smith this shift was more yellow-orange and cloudy. R PIV saline locked. IV abx. Tolerating a regular diet. Daily weights.    Pt discharge with smith- following up OP with urology for TOV and stent

## 2024-05-28 ENCOUNTER — PATIENT OUTREACH (OUTPATIENT)
Dept: CARE COORDINATION | Facility: CLINIC | Age: 89
End: 2024-05-28
Payer: MEDICARE

## 2024-05-28 LAB
BACTERIA BLD CULT: NO GROWTH
BACTERIA BLD CULT: NO GROWTH

## 2024-05-28 NOTE — PROGRESS NOTES
Clinic Care Coordination Contact  Transitions of Care Outreach  Chief Complaint   Patient presents with    Clinic Care Coordination - Post Hospital       Most Recent Admission Date: 5/19/2024   Most Recent Admission Diagnosis: Hyperkalemia - E87.5  SUJATHA (acute kidney injury) (H24) - N17.9  Acute right flank pain - R10.9  Hydronephrosis, unspecified hydronephrosis type - N13.30     Most Recent Discharge Date: 5/27/2024   Most Recent Discharge Diagnosis: Acute right flank pain - R10.9  Hydronephrosis, unspecified hydronephrosis type - N13.30  SUJATHA (acute kidney injury) (H24) - N17.9  Hyperkalemia - E87.5  Lower urinary tract symptoms due to benign prostatic hyperplasia - N40.1  Enterococcus UTI - N39.0, B95.2  Candida pyelonephritis - B37.49  Constipation, unspecified constipation type - K59.00     Transitions of Care Assessment    Discharge Assessment  How are you doing now that you are home?: good  How are your symptoms? (Red Flag symptoms escalate to triage hotline per guidelines): Improved  Do you know how to contact your clinic care team if you have future questions or changes to your health status? : Yes  Does the patient have their discharge instructions? : Yes  Does the patient have questions regarding their discharge instructions? : No  Were you started on any new medications or were there changes to any of your previous medications? : Yes  Does the patient have all of their medications?: Yes  Do you have questions regarding any of your medications? : No  Do you have all of your needed medical supplies or equipment (DME)?  (i.e. oxygen tank, CPAP, cane, etc.): Yes         Post-op (Clinicians Only)  Did the patient have surgery or a procedure: Yes  Eating & Drinking: eating and drinking without complaints/concerns  PO Intake: regular diet  Urinary Status: indwelling urinary catheter    Doing well at home.  Home care nurse has been out and has another appt tomorrow. Has wife there if he needs help. Will be  following up with [facility].  No other concerns.  Is not able to use My Chart and it was turned off. He asked for letter to be sent to his email.  Writer communicated the risks of unencrypted electronic communication and the patient and/or patient representative has agreed to accept the risks and receive unencrypted communication related to the information or resources we have discussed. We reviewed that no PHI will be included.  Email address verified with the patient.  Will include electronic communication form for patient/caregiver to complete.      Follow up Plan     Discharge Follow-Up  Discharge follow up appointment scheduled in alignment with recommended follow up timeframe or Transitions of Risk Category? (Low = within 30 days; Moderate= within 14 days; High= within 7 days): Yes  Discharge Follow Up Appointment Date: 06/05/24  Discharge Follow Up Appointment Scheduled with?: Primary Care Provider    Future Appointments   Date Time Provider Department Center   6/5/2024  8:00 AM Derek Kumar MD [facility]   6/20/2024 12:00 AM [facility] REMOTE DEVICE CHECK FROM HOME [facility]       Outpatient Plan as outlined on AVS reviewed with patient.    For any urgent concerns, please contact our 24 hour nurse triage line: [phone])       SMOOTH Hernandez

## 2024-05-29 ENCOUNTER — TELEPHONE (OUTPATIENT)
Dept: FAMILY MEDICINE | Facility: CLINIC | Age: 89
End: 2024-05-29
Payer: MEDICARE

## 2024-05-29 ENCOUNTER — PATIENT OUTREACH (OUTPATIENT)
Dept: FAMILY MEDICINE | Facility: CLINIC | Age: 89
End: 2024-05-29
Payer: MEDICARE

## 2024-05-29 ENCOUNTER — MEDICAL CORRESPONDENCE (OUTPATIENT)
Dept: HEALTH INFORMATION MANAGEMENT | Facility: CLINIC | Age: 89
End: 2024-05-29
Payer: MEDICARE

## 2024-05-29 NOTE — TELEPHONE ENCOUNTER
Transitions of Care Outreach  No chief complaint on file.      Most Recent Admission Date: 5/19/2024   Most Recent Admission Diagnosis: Hyperkalemia - E87.5  SUJATHA (acute kidney injury) (H24) - N17.9  Acute right flank pain - R10.9  Hydronephrosis, unspecified hydronephrosis type - N13.30     Most Recent Discharge Date: 5/27/2024   Most Recent Discharge Diagnosis: Acute right flank pain - R10.9  Hydronephrosis, unspecified hydronephrosis type - N13.30  SUJATHA (acute kidney injury) (H24) - N17.9  Hyperkalemia - E87.5  Lower urinary tract symptoms due to benign prostatic hyperplasia - N40.1  Enterococcus UTI - N39.0, B95.2  Candida pyelonephritis - B37.49  Constipation, unspecified constipation type - K59.00     Transitions of Care Assessment    Discharge Assessment  How are you doing now that you are home?: feels ok  How are your symptoms? (Red Flag symptoms escalate to triage hotline per guidelines): Improved  Do you know how to contact your clinic care team if you have future questions or changes to your health status? : Yes  Does the patient have their discharge instructions? : Yes  Does the patient have questions regarding their discharge instructions? : No  Were you started on any new medications or were there changes to any of your previous medications? : No  Does the patient have all of their medications?: Yes  Do you have questions regarding any of your medications? : No  Do you have all of your needed medical supplies or equipment (DME)?  (i.e. oxygen tank, CPAP, cane, etc.): Yes    Follow up Plan     Discharge Follow-Up  Discharge follow up appointment scheduled in alignment with recommended follow up timeframe or Transitions of Risk Category? (Low = within 30 days; Moderate= within 14 days; High= within 7 days): Yes  Discharge Follow Up Appointment Date: 06/05/24  Discharge Follow Up Appointment Scheduled with?: Primary Care Provider    Future Appointments   Date Time Provider Department Center   6/5/2024   8:00 AM Derek Kumar MD [facility]   6/20/2024 12:00 AM [facility] REMOTE DEVICE CHECK FROM [facility]       Outpatient Plan as outlined on AVS reviewed with patient.    For any urgent concerns, please contact our 24 hour nurse triage line: [phone])       Jose Armando Maurer RN

## 2024-05-29 NOTE — TELEPHONE ENCOUNTER
ALLAN Calderon, from [facility] is calling for a request from the PCP for skilled nursing orders-see below:      Home Care is calling regarding an established patient with [facility].       Requesting orders from: Derek Kumar  Provider is following patient: Yes  Is this a 60-day recertification request?  No    Orders Requested    Skilled Nursing  Request for initial certification (first set of orders)   Frequency:  Once weekly for 9 weeks  for monitoring of nephrostomy tubes and smith catheter care management    Kvng also needs an order for wound care and wound dressings around nephrostomy tube sites    Confirmed ok to leave a detailed message with call back.  Contact information confirmed and updated as needed.    Ivis Garza RN, BSN  [facility]

## 2024-05-29 NOTE — TELEPHONE ENCOUNTER
"Informed ALLAN Calderon that per Dr Kumar:  \"Please notify okay for requested verbal orders.\"    ALLAN Calderon was wondering if there are any specific orders for the wound care around nephrostomy tube and writer stated Dr Kumar did not note any specific any particular dressings as some agency has their own dressings they use. If he is looking for any specific wound dressing orders, writer could check with PCP, but ALLAN Calderon stated they should be fine then.     SERENA LomeliN, RN  [facility]  "

## 2024-05-30 ENCOUNTER — TELEPHONE (OUTPATIENT)
Dept: FAMILY MEDICINE | Facility: CLINIC | Age: 89
End: 2024-05-30
Payer: MEDICARE

## 2024-05-30 NOTE — TELEPHONE ENCOUNTER
Home Care is calling regarding an established patient with [facility].        5/30/2024    11:27 AM   Home Care Information   Date of Home Care episode start 5/29/2024   Current following provider Dr Kumar   Date provider agreed to follow 5/29/2024    Name/Phone Number ALLAN May case manager, [phone]   Home Care agency [facility]     Requesting orders from: Derek Kumar  Provider is following patient: Yes  Is this a 60-day recertification request?  No    Orders Requested    Physical Therapy  Request for initial certification (first set of orders)   Frequency: 2 times a week for 2 weeks and one time a week for 2 weeks, starting 6/1/24 to work on strength, balance, and endurance.     Side notes: Patient was opened with SN yesterday and PT eval completed today. Looks like VO was given to [facility] yesterday and clarified with PT Thalia that pt was previously with [facility] and pt was discharged from hospital, home care orders was sent to [facility]. Patient want to stay with [facility].    Information was gathered and will be sent to provider for review.  RN will contact Home Care with information after provider review.  Confirmed ok to leave a detailed message with call back.  Contact information confirmed and updated as needed.    PARISA Lomeli, RN  [facility]

## 2024-05-31 NOTE — TELEPHONE ENCOUNTER
Left detailed message for PT Thalia of approved requested orders. To call back with any questions or concerns. To fax any orders needing to be signed by Dr Kumar to [phone].    SERENA LomeliN, RN  [facility]

## 2024-06-05 ENCOUNTER — OFFICE VISIT (OUTPATIENT)
Dept: FAMILY MEDICINE | Facility: CLINIC | Age: 89
End: 2024-06-05
Payer: MEDICARE

## 2024-06-05 VITALS
DIASTOLIC BLOOD PRESSURE: 70 MMHG | BODY MASS INDEX: 22.76 KG/M2 | SYSTOLIC BLOOD PRESSURE: 138 MMHG | WEIGHT: 159 LBS | HEART RATE: 89 BPM | OXYGEN SATURATION: 99 % | RESPIRATION RATE: 15 BRPM | TEMPERATURE: 97.2 F | HEIGHT: 70 IN

## 2024-06-05 DIAGNOSIS — N13.8 BPH WITH URINARY OBSTRUCTION: Primary | ICD-10-CM

## 2024-06-05 DIAGNOSIS — N18.30 STAGE 3 CHRONIC KIDNEY DISEASE, UNSPECIFIED WHETHER STAGE 3A OR 3B CKD (H): ICD-10-CM

## 2024-06-05 DIAGNOSIS — N10 ACUTE PYELONEPHRITIS: ICD-10-CM

## 2024-06-05 DIAGNOSIS — D64.9 NORMOCHROMIC NORMOCYTIC ANEMIA: ICD-10-CM

## 2024-06-05 DIAGNOSIS — R33.9 URINARY RETENTION: ICD-10-CM

## 2024-06-05 DIAGNOSIS — E78.00 PURE HYPERCHOLESTEROLEMIA: ICD-10-CM

## 2024-06-05 DIAGNOSIS — N40.1 BPH WITH URINARY OBSTRUCTION: Primary | ICD-10-CM

## 2024-06-05 DIAGNOSIS — I10 ESSENTIAL HYPERTENSION: ICD-10-CM

## 2024-06-05 LAB
ERYTHROCYTE [DISTWIDTH] IN BLOOD BY AUTOMATED COUNT: 12.6 % (ref 10–15)
HCT VFR BLD AUTO: 28.1 % (ref 40–53)
HGB BLD-MCNC: 9 G/DL (ref 13.3–17.7)
MCH RBC QN AUTO: 30.4 PG (ref 26.5–33)
MCHC RBC AUTO-ENTMCNC: 32 G/DL (ref 31.5–36.5)
MCV RBC AUTO: 95 FL (ref 78–100)
PLATELET # BLD AUTO: 241 10E3/UL (ref 150–450)
RBC # BLD AUTO: 2.96 10E6/UL (ref 4.4–5.9)
WBC # BLD AUTO: 10.1 10E3/UL (ref 4–11)

## 2024-06-05 PROCEDURE — 99495 TRANSJ CARE MGMT MOD F2F 14D: CPT | Performed by: FAMILY MEDICINE

## 2024-06-05 PROCEDURE — 80048 BASIC METABOLIC PNL TOTAL CA: CPT | Performed by: FAMILY MEDICINE

## 2024-06-05 PROCEDURE — 85027 COMPLETE CBC AUTOMATED: CPT | Performed by: FAMILY MEDICINE

## 2024-06-05 PROCEDURE — 36415 COLL VENOUS BLD VENIPUNCTURE: CPT | Performed by: FAMILY MEDICINE

## 2024-06-05 PROCEDURE — 80061 LIPID PANEL: CPT | Performed by: FAMILY MEDICINE

## 2024-06-05 RX ORDER — RESPIRATORY SYNCYTIAL VIRUS VACCINE 120MCG/0.5
0.5 KIT INTRAMUSCULAR ONCE
Qty: 1 EACH | Refills: 0 | Status: CANCELLED | OUTPATIENT
Start: 2024-06-05 | End: 2024-06-05

## 2024-06-05 ASSESSMENT — PAIN SCALES - GENERAL: PAINLEVEL: NO PAIN (0)

## 2024-06-05 NOTE — PROGRESS NOTES
"   Hospital Follow-up Visit:    Hospital/Nursing Home/IP Rehab Facility: [facility]  Date of Admission: 5/19/24  Date of Discharge: 5/27/24  Reason(s) for Admission: s/p transurethral resection of prostate  Was the patient in the ICU or did the patient experience delirium during hospitalization?  No  Do you have any other stressors you would like to discuss with your provider? No    Problems taking medications regularly:  None  Medication changes since discharge: None  Problems adhering to non-medication therapy:  None    Summary of hospitalization:  [facility] discharge summary reviewed    Hospital Course  87 y/o M with recent TURP, CKD3b, HTN, HLD, SSS s/p PM presents with SUJATHA superimposed on CKD3b and UTI. IV ceftriaxone, urology consulted given recent TURP and 3-way Bloom. Status post bilateral percutaneous nephrostomy tubes (PNT). IR placed b/l PNTs on 5/20. Urine culture from 5/20 consistent with pyelonephritis from Candida albicans and Enterococcus. ID updated antibiotic regimen recommendations prior to discharge and patient will continue levofloxacin, augmentin and fluconazole for total of 2 weeks, and may be extended if needed after outpatient urology follow-up.             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 Zavaleta) then subsequent procedure 2 weeks later removing remaining stone in ureter \"with great difficulty\"   Dad - dec DM, CAD   Mom - dec Alzheimers   6-siblings Faith (retired clergyman)   No smoke   Occ EtOH   Left fibula fracture (spiral) 5/07 (Dr. Quick, [facility])   TRUS with bx 10/7/11 biopsies negative (followed by Dr. Colindres)   Eye exam with Dr. Duarte " "in this building (monitoring for +/- glaucoma) - followed q year   Dr. Colindres, [facility] every year for elevated PSA, etc. (h/o TRUS with bx negative)   Dr. Beavers, dermatology for q 6 month checks re: \"precancerous\" lesions and rosacea; Mohs procedure left preauricular location 3/17/16   Bilateral hearing aids with h/o hearing loss (began using March, 2015)   Pacemaker 8/28/19 (PACEMAKER MOI XT DR COLETTE PERRY)   Attends [employer] in [place], [place]         Diagnostic Tests/Treatments reviewed.  Follow up needed: Scheduled void test Friday, June 7, 2024 in order to determine if Bloom catheter removal possible.      CT Abdomen Pelvis w/o Contrast     Narrative     EXAM: CT ABDOMEN PELVIS W/O CONTRAST  LOCATION: [facility]  DATE: 5/19/2024     INDICATION: Right flank pain.  COMPARISON: CT abdomen and pelvis on 02/01/2024.  TECHNIQUE: CT scan of the abdomen and pelvis was performed without intravenous contrast Multiplanar reformats were obtained. Dose reduction techniques were used.     FINDINGS:   LOWER CHEST: Basilar pulmonary opacities, likely atelectasis.     ABDOMEN/PELVIS: Limited evaluation of the abdominal organs due to lack of intravenous contrast.     HEPATOBILIARY: The unenhanced liver and gallbladder are grossly unremarkable.     PANCREAS: The unenhanced pancreas is grossly unremarkable.     SPLEEN: No splenomegaly.     ADRENAL GLANDS: No adrenal nodules.     KIDNEYS/BLADDER: No radiodense kidney stones. Severe right and moderate to severe left diffuse hydroureter/hydronephrosis down to the level of the urinary bladder. No obstructing ureteral stone visualized. The urinary bladder is decompressed with Bloom   catheter. Diffuse urinary bladder wall thickening, could be due to underdistention versus chronic bladder outlet obstruction versus chronic cystitis.     BOWEL: No abnormally dilated bowel loops. Colonic diverticulosis without CT evidence of acute " diverticulitis.     PERITONEUM: No evidence of free fluid in the abdomen and pelvis. No free peritoneal or portal venous gas.     PELVIC ORGANS: The prostate gland is enlarged measuring 6.6 cm in transverse diameter.     VASCULATURE: Moderate atherosclerotic vascular calcification of the abdominal aorta and iliac arteries.     LYMPH NODES: Unremarkable.     MUSCULOSKELETAL: Diffuse osteopenia with multilevel degenerative changes of the spine. Multiple compression deformities of the visualized thoracic and lumbar spine most prominent at L1 and L3 vertebra with approximately 50-60% vertebral height loss.   Small right inguinal hernia containing fat and nondilated small bowel loops.        Impression     IMPRESSION:   1. Severe right and moderate to severe left diffuse hydroureter/hydronephrosis down to the level of the urinary bladder, not significantly changed as compared to 02/01/2024. No obstructing ureteral stone visualized.  2. Prostatomegaly with diffuse urinary bladder wall thickening, likely due to chronic bladder outlet obstruction.  3. Colonic diverticulosis without CT evidence of acute diverticulitis.      IR Nephrostomy Tube Placement Bilateral     Narrative     [facility]     EXAM: IR NEPHROSTOMY TUBE PLACEMENT BILATERAL     LOCATION: [facility]     CLINICAL HISTORY: Rittman and SUJATHA.     PROCEDURES PERFORMED:  1. Ultrasound guided access of the intrarenal collecting system bilaterally.   2. Bilateral antegrade nephrostograms.  3. Bilateral percutaneous nephrostomy tube placement.     MODERATE SEDATION: 1 Versed and 75 Fentanyl were administered intravenously for moderate sedation. Pulse oximetry, heart rate and blood pressure were continuously monitored by an independent trained observer. The physician spent 20 minutes of   face-to-face moderate sedation time with the patient.     ADDITIONAL MEDICATIONS: see EMR      CONTRAST: See EMR     FLUOROSCOPIC TIME: 2.9 minutes  CUMULATIVE  "AIR KERMA/DOSE: 17 mGy     STERILE BARRIER TECHNIQUE: Maximal Sterile Barrier Technique Utilized: Cap AND mask AND sterile gown AND sterile gloves AND sterile full body drape AND hand hygiene AND skin preparation 2% chlorhexidine for cutaneous antisepsis (or acceptable alternative   antiseptics).   Sterile Ultrasound Technique Utilized ?Sterile gel AND sterile probe covers.     UNIVERSAL PROTOCOL: Standard universal protocol per facility guidelines was followed. See EMR for documentation.      TECHNIQUE/FINDINGS:   Following a discussion of the risks, benefits, indications and alternatives to treatment, appropriate informed consent was obtained.  The patient was brought to the interventional radiology suite and placed on the table. The bilateral flank were prepped   and draped in usual sterile fashion. A time out was performed per universal protocol policy to ensure correct patient, site and procedure to be performed.      A preliminary ultrasound of the left flank was performed which demonstrates severe hydronephrosis.  Ultrasound images were archived.  Then, under ultrasound guidance an appropriate site was marked in the left lateral lower back region for needle   placement and this region was subsequently infiltrated with 1% Lidocaine for local anesthesia. Under direct ultrasound guidance, a 21-gauge needle was inserted into the lower pole of the dilated calyx until urine return was noted. A small amount of   contrast was then injected, which demonstrates severe hydronephrosis and proximal hydroureter. A 0.018\" wire was then advanced through the needle and coiled within the renal pelvis. The needle was then exchanged for a coaxial Addie set, which was advanced   over the wire through the calyx into the renal pelvis. The 0.018\" wire was placed to the side as a safety wire and a 0.035\" guidewire was advanced into the renal pelvis. The tract was serially dilated with final placement of a 10-Montserratian nephrostomy tube   " "with the tip coiled in the renal pelvis. Injection of contrast demonstrates the pigtail is centered in the renal pelvis. The contrast was aspirated and the tube was placed to gravity bag drainage. The catheter was secured to the skin using a 2-0 Ethilon   and a sterile dressing was applied.       Attention was then turned to the right kidney. Preliminary ultrasound of the right flank demonstrates severe hydronephrosis. An appropriate site was marked in the right lower back region for needle placement and this region was then infiltrated with 1%   Lidocaine. Under direct ultrasound guidance, a 21-gauge needle was inserted into the lower pole of the dilated calyx until urine return was noted.  A small amount of contrast was then injected which demonstrates severe hydronephrosis and proximal   hydroureter.  A 0.018\" wire was then advanced through the needle and coiled within the renal pelvis. The needle was then exchanged for a coaxial Addie set, which was advanced over the wire through the calyx into the renal pelvis. The 0.018 wire was placed   to the side as a safety wire and a 0.035\" guidewire was advanced into the renal pelvis. The tract was serially dilated with final placement of an 10-Nauruan nephrostomy tube with the tip coiled in the renal pelvis. Injection of contrast demonstrates the   pigtail is centered in the renal pelvis.  The contrast was aspirated and the tube was placed to gravity bag drainage. The catheter was secured to the skin using a 2-0 Ethilon and a sterile dressing was applied.       Throughout the procedure, the patient was monitored by a radiology nurse for cardiac rhythm and oxygen saturation which remained stable. The patient tolerated the procedure well and left interventional radiology in stable condition.        Impression     IMPRESSION:  1. Successful placement of a 10.2-Nauruan left percutaneous nephrostomy tube. Left antegrade nephrostogram demonstrates severe hydronephrosis and " hydroureter.  2. Successful placement of a 10.2-Faroese right percutaneous nephrostomy tube. Right antegrade nephrostogram demonstrates severe hydronephrosis and hydroureter.         Other Healthcare Providers Involved in Patient s Care:         Specialist appointment - scheduled follow-up with Dr. Dozier June 14, 2024.  Also has void test scheduled for Friday, June 7, 2024 to determine if Bloom catheter removal possible.  Scheduled preop June 19, 2024 in case decision for bilateral ureteral stent placement.  Update since discharge: improved.       BPH with urinary obstruction  BPH with urinary obstruction status post TURP procedure as noted.  Postoperative urinary retention concerns.  Hospitalized at [facility] May 19 through May 27, 2024.  Bilateral hydronephrosis requiring percutaneous nephrostomy tube placement bilaterally.  Bloom catheter in place due to urinary retention issues currently and did have a void test last Friday which was unsuccessful and will repeat this Friday, June 7, 2024 to determine if greater than half of the 500 ml bladder infusion able to be avoided.  - Basic metabolic panel  - Basic metabolic panel    Urinary retention  As above.    Acute pyelonephritis  Completing course of antibiotics including levofloxacin, Augmentin and fluconazole for bacterial and Candida pyelonephritis noted.    Essential hypertension  Hypertension appears stable currently with use of amlodipine 2.5 mg daily.    Stage 3 chronic kidney disease, unspecified whether stage 3a or 3b CKD (H)  CKD stage IIIb with prior creatinine 1.97 GFR 32 and will ensure stable renal function.  - Basic metabolic panel  - Basic metabolic panel    Pure hypercholesterolemia  Update cholesterol levels with history of hypercholesterolemia.  Fasting status.  - Lipid panel reflex to direct LDL Fasting  - Lipid panel reflex to direct LDL Fasting    Normochromic normocytic anemia  Normochromic normocytic anemia.  Update CBC with recent hemoglobin  8.3 and MCV 95 following surgery.  - CBC with platelets  - CBC with platelets       Plan of care communicated with patient and family

## 2024-06-05 NOTE — LETTER
June 6, 2024      Ricardo Chiur  [address] [address]        Dear ,    We are writing to inform you of your test results.    Your cholesterol results were near goal.  Ensure regular exercise, healthy diet, and weight loss modifications in order to further improve. We will plan to recheck your labs while fasting in the next 12 months to ensure desired improvement.      Your kidney function remains reduced.  It appears stable.  We will continue to follow closely.     Your complete blood count results show stable anemia.  Likely associated with chronic kidney disease.  We will continue to follow closely as well.    Resulted Orders   Lipid panel reflex to direct LDL Fasting   Result Value Ref Range    Cholesterol 113 <200 mg/dL    Triglycerides 42 <150 mg/dL    Direct Measure HDL 34 (L) >=40 mg/dL    LDL Cholesterol Calculated 71 <=100 mg/dL    Non HDL Cholesterol 79 <130 mg/dL    Patient Fasting > 8hrs? Unknown     Narrative    Cholesterol  Desirable:  <200 mg/dL    Triglycerides  Normal:  Less than 150 mg/dL  Borderline High:  150-199 mg/dL  High:  200-499 mg/dL  Very High:  Greater than or equal to 500 mg/dL    Direct Measure HDL  Female:  Greater than or equal to 50 mg/dL   Male:  Greater than or equal to 40 mg/dL    LDL Cholesterol  Desirable:  <100mg/dL  Above Desirable:  100-129 mg/dL   Borderline High:  130-159 mg/dL   High:  160-189 mg/dL   Very High:  >= 190 mg/dL    Non HDL Cholesterol  Desirable:  130 mg/dL  Above Desirable:  130-159 mg/dL  Borderline High:  160-189 mg/dL  High:  190-219 mg/dL  Very High:  Greater than or equal to 220 mg/dL   Basic metabolic panel   Result Value Ref Range    Sodium 137 135 - 145 mmol/L      Comment:      Reference intervals for this test were updated on 09/26/2023 to more accurately reflect our healthy population. There may be differences in the flagging of prior results with similar values performed with this method. Interpretation of those prior  results can be made in the context of the updated reference intervals.     Potassium 5.5 (H) 3.4 - 5.3 mmol/L    Chloride 104 98 - 107 mmol/L    Carbon Dioxide (CO2) 22 22 - 29 mmol/L    Anion Gap 11 7 - 15 mmol/L    Urea Nitrogen 35.9 (H) 8.0 - 23.0 mg/dL    Creatinine 2.27 (H) 0.67 - 1.17 mg/dL    GFR Estimate 27 (L) >60 mL/min/1.73m2    Calcium 9.1 8.8 - 10.2 mg/dL    Glucose 96 70 - 99 mg/dL    Patient Fasting > 8hrs? Unknown    CBC with platelets   Result Value Ref Range    WBC Count 10.1 4.0 - 11.0 10e3/uL    RBC Count 2.96 (L) 4.40 - 5.90 10e6/uL    Hemoglobin 9.0 (L) 13.3 - 17.7 g/dL    Hematocrit 28.1 (L) 40.0 - 53.0 %    MCV 95 78 - 100 fL    MCH 30.4 26.5 - 33.0 pg    MCHC 32.0 31.5 - 36.5 g/dL    RDW 12.6 10.0 - 15.0 %    Platelet Count 241 150 - 450 10e3/uL       If you have any questions or concerns, please call the clinic at the number listed above.       Sincerely,      Derek Kumar MD

## 2024-06-06 LAB
ANION GAP SERPL CALCULATED.3IONS-SCNC: 11 MMOL/L (ref 7–15)
BUN SERPL-MCNC: 35.9 MG/DL (ref 8–23)
CALCIUM SERPL-MCNC: 9.1 MG/DL (ref 8.8–10.2)
CHLORIDE SERPL-SCNC: 104 MMOL/L (ref 98–107)
CHOLEST SERPL-MCNC: 113 MG/DL
CREAT SERPL-MCNC: 2.27 MG/DL (ref 0.67–1.17)
DEPRECATED HCO3 PLAS-SCNC: 22 MMOL/L (ref 22–29)
EGFRCR SERPLBLD CKD-EPI 2021: 27 ML/MIN/1.73M2
FASTING STATUS PATIENT QL REPORTED: ABNORMAL
FASTING STATUS PATIENT QL REPORTED: ABNORMAL
GLUCOSE SERPL-MCNC: 96 MG/DL (ref 70–99)
HDLC SERPL-MCNC: 34 MG/DL
LDLC SERPL CALC-MCNC: 71 MG/DL
NONHDLC SERPL-MCNC: 79 MG/DL
POTASSIUM SERPL-SCNC: 5.5 MMOL/L (ref 3.4–5.3)
SODIUM SERPL-SCNC: 137 MMOL/L (ref 135–145)
TRIGL SERPL-MCNC: 42 MG/DL

## 2024-06-14 ENCOUNTER — TRANSFERRED RECORDS (OUTPATIENT)
Dept: HEALTH INFORMATION MANAGEMENT | Facility: CLINIC | Age: 89
End: 2024-06-14
Payer: MEDICARE

## 2024-06-17 ENCOUNTER — TELEPHONE (OUTPATIENT)
Dept: INTERVENTIONAL RADIOLOGY/VASCULAR | Facility: CLINIC | Age: 89
End: 2024-06-17
Payer: MEDICARE

## 2024-06-17 NOTE — TELEPHONE ENCOUNTER
The following was shared with Ricardo over the phone:    INTERVENTIONAL RADIOLOGY INSTRUCTIONS     You are scheduled for an upcoming procedure in the Interventional Radiology Department at [facility].     Date: 6/19/24     Procedure: Nephrostomy Tube Check     Address:          [facility]                          [address] [place] [address]     Entrance and check-in location:  To find the Radiology department enter through the main entrance.  The information desk will be on your right.  Follow the hallway to the elevators on the left, take these elevators to the Ground (Turtle) Level.  Exit the elevator to the left and proceed down the hallway with glass windows, and this will bring you to the main radiology desk where you will check in.     Check into the Radiology Desk at: 1:00 pm     Do not eat any food after: 5:00 am  You may drink clear liquids until 11:00 am then nothing to drink until after your procedure.  Clear liquids are: water, apple juice, black coffee (no cream, sugar or milk), gatorade, jello     You will receive sedation for your procedure, arrange to have family or a friend drive you home.  Two visitors may accompany you to your procedure.  We recommend you have a responsible adult stay with you for 24 hours after you get home.  Please bring a list of your current medications.    If you have any questions, please call the IR nurses at [phone], Monday through Friday 7:30am - 4:00pm.  If you need to cancel or reschedule, please call the IR schedulers at [phone].     Thank you!

## 2024-06-19 ENCOUNTER — HOSPITAL ENCOUNTER (OUTPATIENT)
Dept: INTERVENTIONAL RADIOLOGY/VASCULAR | Facility: CLINIC | Age: 89
Discharge: HOME | End: 2024-06-19
Attending: UROLOGY | Admitting: RADIOLOGY
Payer: MEDICARE

## 2024-06-19 VITALS
DIASTOLIC BLOOD PRESSURE: 70 MMHG | HEART RATE: 64 BPM | RESPIRATION RATE: 29 BRPM | OXYGEN SATURATION: 100 % | SYSTOLIC BLOOD PRESSURE: 146 MMHG | TEMPERATURE: 97.6 F

## 2024-06-19 DIAGNOSIS — N13.9 URINARY OBSTRUCTION: Primary | ICD-10-CM

## 2024-06-19 DIAGNOSIS — N13.30 UNSPECIFIED HYDRONEPHROSIS: ICD-10-CM

## 2024-06-19 LAB — INR PPP: 1.01 (ref 0.85–1.15)

## 2024-06-19 PROCEDURE — 85610 PROTHROMBIN TIME: CPT | Performed by: NURSE PRACTITIONER

## 2024-06-19 PROCEDURE — 50431 NJX PX NFROSGRM &/URTRGRM: CPT | Mod: 50

## 2024-06-19 PROCEDURE — 36415 COLL VENOUS BLD VENIPUNCTURE: CPT | Performed by: NURSE PRACTITIONER

## 2024-06-19 RX ORDER — SODIUM CHLORIDE 9 MG/ML
INJECTION, SOLUTION INTRAVENOUS CONTINUOUS
Status: DISCONTINUED | OUTPATIENT
Start: 2024-06-19 | End: 2024-06-20 | Stop reason: HOSPADM

## 2024-06-19 RX ORDER — LIDOCAINE 40 MG/G
CREAM TOPICAL
Status: DISCONTINUED | OUTPATIENT
Start: 2024-06-19 | End: 2024-06-20 | Stop reason: HOSPADM

## 2024-06-19 RX ORDER — CEFTRIAXONE 1 G/1
1 INJECTION, POWDER, FOR SOLUTION INTRAMUSCULAR; INTRAVENOUS
Status: DISCONTINUED | OUTPATIENT
Start: 2024-06-19 | End: 2024-06-20 | Stop reason: HOSPADM

## 2024-06-19 NOTE — DISCHARGE INSTRUCTIONS
Capped Percutaneous Nephrostomy tube (PNT) Discharge Instructions:  You are going home with your nephrostomy tube (PNT) capped (not connected to a drainage bag) for trial. This trial will help determine if it is safe to remove your nephrostomy tube at a future appointment.    Care Instructions:  - If you received sedation for your procedure, do not drive or operate heavy machinery for the rest of the day.  - Rest after your PNT was capped. Avoid strenuous activity and heavy lifting for the next 2 days. Return to your normal activities as you tolerate after the 2 day restriction.  - Keep the nephrostomy tube site clean and dry.   - You may shower, but do not submerge the nephrostomy tube site in water (avoid tub baths, Jacuzzis, hot tubs and pools). Cover your PNT exit site with plastic wrap while showering.  - If you have a gauze dressing, change the gauze and tape dressing as needed to keep site clean and dry. If you have a securement device, leave in place until your next exchange.  - Clean around nephrostomy tubes exit sites with soap and water, pat and apply new split gauze around the tube at the exit site.    Follow-Up:  - Please contact [facility] Outpatient Scheduling at [phone]    Attach your PNT to the gravity bag provided to you if you experience any of the following:  - Nephrostomy tube(s) sites are leaking urine.  - Extreme pain at the nephrostomy tube(s) sites or extreme flank pain.    Contact [facility] RN Line at [phone]:  - If you continue to experience any of the above symptoms despite attaching your gravity bag.  - If your nephrostomy tube(s) appears to be falling out or has fallen out or breaks.    Seek Medical Evaluation for the following:  - Fever (greater than 101 F (38.3C)).  - Purulent (yellow/green/foul smelling) drainage from nephrostomy tube exit sites.  - Significant bleeding from nephrostomy tube site(s).  - Significant or worsening pain at nephrostomy tube exit site(s) or  back.

## 2024-06-19 NOTE — IR NOTE
Patient Name: Mikaela Figueroa  Medical Record Number: [id number]  Today's Date: 6/19/2024    Procedure: bilateral nephrostomy tube check  Proceduralist: Dr. Martinez  Pathology present: n/a    Procedure Start: 1439  Procedure end: 1440  Sedation medications administered: none         Other Notes: Pt arrived to IR suite from IR bay 3 . Consent reviewed. Pt denies any questions or concerns regarding procedure. Pt positioned prone and monitored per protocol. Pt tolerated procedure without any noted complications. Pt transferred back to IR bay 3.     Patient tolerated without problems, no sedation needed.  Both nephrostomy tubes capped.  Patient to return in one week for tube check again.     Discharge instructions verbalized  to patient and son Roel. And print out given to patient for home.  Patient and son verbalized understanding.      Two bags sent with patient in case needs to place tubes to drainage.  Patient aware and understands.      Patient ambulated with walker and son along and discharged to home.

## 2024-06-19 NOTE — PROGRESS NOTES
Interventional Radiology - Pre-Procedure Note:  Outpatient - [facility]  06/19/2024     Procedure Requested: Bilateral nephrostogram, possible stent placement  Requested by: Dr Dozier    History and physical by Dr. Derek Kumar 6/5/24 reviewed.    Past Medical History:   Diagnosis Date    Hypertension     Pacemaker       Past Surgical History:   Procedure Laterality Date    APPENDECTOMY      ARTHROSCOPY KNEE Right     CYSTOURETEROSCOPY, WITH LITHOTRIPSY USING ZABRINA 120P LASER AND URETERAL STENT INSERTION Left 10/25/2022    Procedure: CYSTOSCOPY, LEFT URETEROSCOPY, LASER LITHOTRIPSY;  Surgeon: Bob Dozier MD;  Location: [facility] OR    GENITOURINARY SURGERY      IR NEPHROSTOMY TUBE PLACEMENT BILATERAL  5/20/2024    IR NEPHROSTOMY TUBE PLACEMENT LEFT  10/26/2022    IR URETERAL STENT PLACEMENT LEFT  11/1/2022    REPAIR MOHS Right 8/28/2023    Procedure: FOREHEAD FLAP REPAIR MOHS DEFECT RIGHT NOSE; COMPOSITE GRAFT;  Surgeon: Jake Rosa MD;  Location: [facility] OR      [facility] HPI: Mikaela Figueroa is a 88 year old male s/p transurethral prostate resection, bilateral hydronephrosis and subsequent Enterococcus pyelonephritis. He is followed by Dr. Dozier. S/p bilateral nephrostomy tube placement 5/20/24. Patient was referred for nephrostogram and possible intervention.    Per referral by Dr Dozier:  Bilteral Antegrade Nephrostograms,possible removal if obstruction resolved. Right or Left Antegrade or bilateral stents if not resolved.     IMAGING:  [facility]     EXAM: IR NEPHROSTOMY TUBE PLACEMENT BILATERAL     LOCATION: [facility]     CLINICAL HISTORY: [place] and [place].     PROCEDURES PERFORMED:  1. Ultrasound guided access of the intrarenal collecting system bilaterally.   2. Bilateral antegrade nephrostograms.  3. Bilateral percutaneous nephrostomy tube placement.     MODERATE SEDATION: 1 Versed and 75 Fentanyl were administered intravenously for moderate  "sedation. Pulse oximetry, heart rate and blood pressure were continuously monitored by an independent trained observer. The physician spent 20 minutes of   face-to-face moderate sedation time with the patient.     ADDITIONAL MEDICATIONS: see EMR      CONTRAST: See EMR     FLUOROSCOPIC TIME: 2.9 minutes  CUMULATIVE AIR KERMA/DOSE: 17 mGy     STERILE BARRIER TECHNIQUE: Maximal Sterile Barrier Technique Utilized: Cap AND mask AND sterile gown AND sterile gloves AND sterile full body drape AND hand hygiene AND skin preparation 2% chlorhexidine for cutaneous antisepsis (or acceptable alternative   antiseptics).   Sterile Ultrasound Technique Utilized ?Sterile gel AND sterile probe covers.     UNIVERSAL PROTOCOL: Standard universal protocol per facility guidelines was followed. See EMR for documentation.      TECHNIQUE/FINDINGS:   Following a discussion of the risks, benefits, indications and alternatives to treatment, appropriate informed consent was obtained.  The patient was brought to the interventional radiology suite and placed on the table. The bilateral flank were prepped   and draped in usual sterile fashion. A time out was performed per universal protocol policy to ensure correct patient, site and procedure to be performed.      A preliminary ultrasound of the left flank was performed which demonstrates severe hydronephrosis.  Ultrasound images were archived.  Then, under ultrasound guidance an appropriate site was marked in the left lateral lower back region for needle   placement and this region was subsequently infiltrated with 1% Lidocaine for local anesthesia. Under direct ultrasound guidance, a 21-gauge needle was inserted into the lower pole of the dilated calyx until urine return was noted. A small amount of   contrast was then injected, which demonstrates severe hydronephrosis and proximal hydroureter. A 0.018\" wire was then advanced through the needle and coiled within the renal pelvis. The needle was " "then exchanged for a coaxial Addie set, which was advanced   over the wire through the calyx into the renal pelvis. The 0.018\" wire was placed to the side as a safety wire and a 0.035\" guidewire was advanced into the renal pelvis. The tract was serially dilated with final placement of a 10-Stateless nephrostomy tube   with the tip coiled in the renal pelvis. Injection of contrast demonstrates the pigtail is centered in the renal pelvis. The contrast was aspirated and the tube was placed to gravity bag drainage. The catheter was secured to the skin using a 2-0 Ethilon   and a sterile dressing was applied.       Attention was then turned to the right kidney. Preliminary ultrasound of the right flank demonstrates severe hydronephrosis. An appropriate site was marked in the right lower back region for needle placement and this region was then infiltrated with 1%   Lidocaine. Under direct ultrasound guidance, a 21-gauge needle was inserted into the lower pole of the dilated calyx until urine return was noted.  A small amount of contrast was then injected which demonstrates severe hydronephrosis and proximal   hydroureter.  A 0.018\" wire was then advanced through the needle and coiled within the renal pelvis. The needle was then exchanged for a coaxial Addie set, which was advanced over the wire through the calyx into the renal pelvis. The 0.018 wire was placed   to the side as a safety wire and a 0.035\" guidewire was advanced into the renal pelvis. The tract was serially dilated with final placement of an 10-Stateless nephrostomy tube with the tip coiled in the renal pelvis. Injection of contrast demonstrates the   pigtail is centered in the renal pelvis.  The contrast was aspirated and the tube was placed to gravity bag drainage. The catheter was secured to the skin using a 2-0 Ethilon and a sterile dressing was applied.       Throughout the procedure, the patient was monitored by a radiology nurse for cardiac rhythm and oxygen " saturation which remained stable. The patient tolerated the procedure well and left interventional radiology in stable condition.                                                                      IMPRESSION:  1. Successful placement of a 10.2-Swazi left percutaneous nephrostomy tube. Left antegrade nephrostogram demonstrates severe hydronephrosis and hydroureter.  2. Successful placement of a 10.2-Swazi right percutaneous nephrostomy tube. Right antegrade nephrostogram demonstrates severe hydronephrosis and hydroureter.    ANTICOAGULANTS: none  ANTIBIOTICS: Rocephin 1gm IV pre procedure if stents placed    ALLERGIES  No Known Allergies      LABS:  INR   Date Value Ref Range Status   05/19/2024 1.11 0.85 - 1.15 Final      Hemoglobin   Date Value Ref Range Status   06/05/2024 9.0 (L) 13.3 - 17.7 g/dL Final     Platelet Count   Date Value Ref Range Status   06/05/2024 241 150 - 450 10e3/uL Final     Creatinine   Date Value Ref Range Status   06/05/2024 2.27 (H) 0.67 - 1.17 mg/dL Final     Potassium   Date Value Ref Range Status   06/05/2024 5.5 (H) 3.4 - 5.3 mmol/L Final   10/11/2022 4.5 3.5 - 5.0 mmol/L Final           ASSESSMENT/PLAN:   Plan for Bilteral Antegrade Nephrostograms,possible removal if obstruction resolved. Right or Left Antegrade or bilateral stents if not resolved.       EMR reviewed. No formal physical assessment completed.    NAHUN SLAUGHTER CNP  Interventional Radiology

## 2024-06-19 NOTE — PRE-PROCEDURE
Interventional Radiology Pre-Procedure Sedation Assessment   Time of Assessment: 2:27 PM    Expected Level: Moderate Sedation (if needed)    Indication: Sedation is required for the following type of Procedure: Bilateral neph tube checks +/- ureteral stent placements +/- neph tube exchanges    Sedation and procedural consent: Risks, benefits and alternatives were discussed with Patient and son    PO Intake: Appropriately NPO for procedure    ASA Class: Class 2 - MILD SYSTEMIC DISEASE, NO ACUTE PROBLEMS, NO FUNCTIONAL LIMITATIONS.    Mallampati: Grade 3:  Soft palate visible, posterior pharyngeal wall not visible    Lungs: Lungs Clear with good breath sounds bilaterally    Heart: Normal heart sounds and rate    History and physical reviewed and no updates needed. I have reviewed the lab findings, diagnostic data, medications, and the plan for sedation. I have determined this patient to be an appropriate candidate for the planned sedation and procedure and have reassessed the patient IMMEDIATELY PRIOR to sedation and procedure.    Henrry Martinez MD

## 2024-06-19 NOTE — PROCEDURES
Interventional Radiology Post-Procedure Note     Patient name:  Mikaela Figueroa  MRN:  [id number]   Date:  6/19/2024     Procedure(s): Bilateral antegrade nephrostograms    Attending:  Michelle    Sedation:  None    Pre/Post Procedure Diagnosis: Urinary obstruction 2/2 BPH s/p TURP    Drains/Lines:  Indwelling bilateral 10Fr PCNs    Specimen(s):  None    Estimated Blood Loss:  None    Complications:  None     Findings:    Patent/functional PCNs in proper position. Patent bilateral ureters with good emptying of contrast into the urinary bladder. Moderate bilateral hydronephrosis, likely residual revivor effect from prior chronic obstruction in setting of BPH.  No ureterectasis bilaterally.  Neph tubes capped.    Please see dictation in PACS or under the Imaging tab in EPIC for detailed procedure note.    Plan:    -Neph tube capping trial with recheck in 1 week for possible neph tube removals.  -Precautions provided with instructions to place neph tubes back to gravity bag drainage if any concerning symptoms develop.      Henrry Martinez MD  Vascular & Interventional Radiology  [facility]  6/19/2024  2:42 PM

## 2024-06-20 ENCOUNTER — TRANSFERRED RECORDS (OUTPATIENT)
Dept: HEALTH INFORMATION MANAGEMENT | Facility: CLINIC | Age: 89
End: 2024-06-20

## 2024-06-20 ENCOUNTER — HOSPITAL ENCOUNTER (EMERGENCY)
Facility: CLINIC | Age: 89
Discharge: HOME | End: 2024-06-20
Attending: EMERGENCY MEDICINE | Admitting: EMERGENCY MEDICINE
Payer: MEDICARE

## 2024-06-20 ENCOUNTER — ANCILLARY PROCEDURE (OUTPATIENT)
Dept: CARDIOLOGY | Facility: CLINIC | Age: 89
End: 2024-06-20
Attending: INTERNAL MEDICINE
Payer: MEDICARE

## 2024-06-20 VITALS
BODY MASS INDEX: 23.1 KG/M2 | DIASTOLIC BLOOD PRESSURE: 70 MMHG | TEMPERATURE: 97.9 F | HEART RATE: 64 BPM | WEIGHT: 165 LBS | RESPIRATION RATE: 16 BRPM | HEIGHT: 71 IN | OXYGEN SATURATION: 100 % | SYSTOLIC BLOOD PRESSURE: 150 MMHG

## 2024-06-20 DIAGNOSIS — R55 SYNCOPE, UNSPECIFIED SYNCOPE TYPE: ICD-10-CM

## 2024-06-20 DIAGNOSIS — I44.1 SECOND DEGREE MOBITZ I AV BLOCK: ICD-10-CM

## 2024-06-20 DIAGNOSIS — N30.01 ACUTE CYSTITIS WITH HEMATURIA: ICD-10-CM

## 2024-06-20 DIAGNOSIS — R33.9 URINARY RETENTION: ICD-10-CM

## 2024-06-20 DIAGNOSIS — Z95.0 PACEMAKER: ICD-10-CM

## 2024-06-20 LAB
ALBUMIN UR-MCNC: 100 MG/DL
APPEARANCE UR: ABNORMAL
BILIRUB UR QL STRIP: NEGATIVE
COLOR UR AUTO: ABNORMAL
GLUCOSE UR STRIP-MCNC: NEGATIVE MG/DL
HGB UR QL STRIP: ABNORMAL
KETONES UR STRIP-MCNC: NEGATIVE MG/DL
LEUKOCYTE ESTERASE UR QL STRIP: ABNORMAL
NITRATE UR QL: NEGATIVE
PH UR STRIP: 6.5 [PH] (ref 5–7)
RBC URINE: >182 /HPF
SP GR UR STRIP: 1.02 (ref 1–1.03)
UROBILINOGEN UR STRIP-MCNC: <2 MG/DL
WBC URINE: >182 /HPF

## 2024-06-20 PROCEDURE — 81001 URINALYSIS AUTO W/SCOPE: CPT | Performed by: EMERGENCY MEDICINE

## 2024-06-20 PROCEDURE — 99283 EMERGENCY DEPT VISIT LOW MDM: CPT

## 2024-06-20 PROCEDURE — 87086 URINE CULTURE/COLONY COUNT: CPT | Performed by: EMERGENCY MEDICINE

## 2024-06-20 PROCEDURE — 99284 EMERGENCY DEPT VISIT MOD MDM: CPT

## 2024-06-20 RX ORDER — AMOXICILLIN 500 MG/1
500 CAPSULE ORAL 3 TIMES DAILY
Qty: 21 CAPSULE | Refills: 0 | Status: SHIPPED | OUTPATIENT
Start: 2024-06-20 | End: 2024-06-27

## 2024-06-20 ASSESSMENT — COLUMBIA-SUICIDE SEVERITY RATING SCALE - C-SSRS
6. HAVE YOU EVER DONE ANYTHING, STARTED TO DO ANYTHING, OR PREPARED TO DO ANYTHING TO END YOUR LIFE?: NO
2. HAVE YOU ACTUALLY HAD ANY THOUGHTS OF KILLING YOURSELF IN THE PAST MONTH?: NO
1. IN THE PAST MONTH, HAVE YOU WISHED YOU WERE DEAD OR WISHED YOU COULD GO TO SLEEP AND NOT WAKE UP?: NO

## 2024-06-20 ASSESSMENT — ACTIVITIES OF DAILY LIVING (ADL): ADLS_ACUITY_SCORE: 38

## 2024-06-20 NOTE — ED PROVIDER NOTES
EMERGENCY DEPARTMENT ENCOUNTER      NAME: Mikaela Figueroa  AGE: 88 year old male  YOB: 1935  MRN: [id number]  EVALUATION DATE & TIME: 6/20/2024  2:18 AM    PCP: Derek Kumar    ED PROVIDER: Kevin Vasquez M.D.      Chief Complaint   Patient presents with    Flank Pain     Right    Urinary Retention         FINAL IMPRESSION:  1. Urinary retention    2. Acute cystitis with hematuria          ED COURSE & MEDICAL DECISION MAKING:    Pertinent Labs & Imaging studies reviewed. (See chart for details)  88 year old male presents to the Emergency Department for evaluation of urinary retention.  Patient has history of prostate issues.  Does have nephrostomy tubes.  They were Today.  Not able have any urinary output since about 7:30 PM.  Having worsening pain.  Given this did uncap the nephrostomy tubes and urine was draining out.  1 side was bloody.  Could be an infection we will give him amoxicillin for this.  He has grown Enterococcus in the past.  Will send for culture.  He is also growing Candida but will hold off on the antifungals at this time.He is now having no other urinary tract symptoms.  No fever.  Feeling better here.  Will discharge home we will have him call his urologist this morning    2:35 AM I met with the patient to gather history and to perform my initial exam. I discussed the plan for care while in the Emergency Department.       At the conclusion of the encounter I discussed the results of all of the tests and the disposition. The questions were answered. The patient or family acknowledged understanding and was agreeable with the care plan.     Medical Decision Making  Obtained supplemental history:Supplemental history obtained?: Documented in chart and Family Member/Significant Other  Reviewed external records: External records reviewed?: Documented in chart and Outpatient Record: IR visit on 6/19/24  Care impacted by chronic illness:Other: BPH  Care significantly affected by social  determinants of health:N/A  Did you consider but not order tests?: Work up considered but not performed and documented in chart, if applicable  Did you interpret images independently?: Independent interpretation of ECG and images noted in documentation, when applicable.  Consultation discussion with other provider:Did you involve another provider (consultant, , pharmacy, etc.)?: No  Discharge. I prescribed additional prescription strength medication(s) as charted. See documentation for any additional details.         MEDICATIONS GIVEN IN THE EMERGENCY:  Medications - No data to display    NEW PRESCRIPTIONS STARTED AT TODAY'S ER VISIT  Discharge Medication List as of 6/20/2024  3:35 AM        START taking these medications    Details   amoxicillin (AMOXIL) 500 MG capsule Take 1 capsule (500 mg) by mouth 3 times daily for 7 days, Disp-21 capsule, R-0, Local Print                =================================================================    HPI    Patient information was obtained from: Patient and patient's daughter     Use of : N/A       Mikaela Figueroa is a 88 year old male with a pertinent history of urinary retention, s/p bilateral nephrostomy tube placement, BPH s/p TURP, hydronephrosis, CKD 3, who presents to this ED for evaluation of urinary retention    Per chart review: The patient was seen by interventional radiology on 6/19/2024 for bilateral antegrade nephrostograms.  Patient with history of urinary obstruction and BPH s/p TURP. Patient with  Indwelling bilateral 10Fr PCNs.  The patient's bilateral PCNs in proper position and bilateral ureters with good emptying of contrast.  Moderate bilateral hydronephrosis visualized, likely residual revivor effect from prior chronic obstruction in setting of BPH.  No ureterectasis bilaterally.  Neph tubes capped.  Patient discharged with plan to check in 1 week with possible neph tube removals.    The patient reports he has the urge to urinate, but  "has been unable to.  The patient's daughter reports that the patient has had a catheter in since February, but it has been removed.  She also reports that the patient \"just started\" having a bladder spasm tonight as well.  The patient reports he does not remember the last time he urinated and last night, when he his family asked if he could urinate, the patient stated \"I think so\".  The patient is also complaining of upper abdominal pain and right flank pain.  The patient reports he is not taking antibiotics right now.  The patient's daughter reports at discharge yesterday the patient was advised to reattach nephrostomy tube bags if problems arise.    The patient denies any fever, nausea, vomiting, or any other symptoms or complaints.    PAST MEDICAL HISTORY:  Past Medical History:   Diagnosis Date    Hypertension     Pacemaker        PAST SURGICAL HISTORY:  Past Surgical History:   Procedure Laterality Date    APPENDECTOMY      ARTHROSCOPY KNEE Right     CYSTOURETEROSCOPY, WITH LITHOTRIPSY USING ZABRINA 120P LASER AND URETERAL STENT INSERTION Left 10/25/2022    Procedure: CYSTOSCOPY, LEFT URETEROSCOPY, LASER LITHOTRIPSY;  Surgeon: Bob Dozier MD;  [place] [facility] [place]    GENITOURINARY SURGERY      IR NEPHROSTOMY TUBE PLACEMENT BILATERAL  5/20/2024    IR NEPHROSTOMY TUBE PLACEMENT LEFT  10/26/2022    IR URETERAL STENT PLACEMENT LEFT  11/1/2022    REPAIR MOHS Right 8/28/2023    Procedure: FOREHEAD FLAP REPAIR MOHS DEFECT RIGHT NOSE; COMPOSITE GRAFT;  Surgeon: Jake Rosa MD;  Location: [facility] Main OR           CURRENT MEDICATIONS:    No current facility-administered medications for this encounter.     Current Outpatient Medications   Medication Sig Dispense Refill    amoxicillin (AMOXIL) 500 MG capsule Take 1 capsule (500 mg) by mouth 3 times daily for 7 days 21 capsule 0    acetaminophen (TYLENOL) 325 MG tablet Take 2 tablets (650 mg) by mouth every 4 hours as needed for mild pain, other, headaches " or fever (and adjunct with moderate or severe pain or per patient request)      acetaminophen (TYLENOL) 325 MG tablet Take 1-2 tablets (325-650 mg) by mouth every 4 hours as needed for other or mild pain (For optimal non-opioid multimodal pain management to improve pain control.)      amLODIPine (NORVASC) 2.5 MG tablet Take 1 tablet (2.5 mg) by mouth daily (Patient not taking: Reported on 6/19/2024) 90 tablet 1    amoxicillin-clavulanate (AUGMENTIN) 875-125 MG tablet Take 1 tablet by mouth 2 times daily 20 tablet 0    Cranberry (CRANBEREX PO) Take 6 tablets by mouth daily      fluconazole (DIFLUCAN) 100 MG tablet Take 1 tablet (100 mg) by mouth daily 10 tablet 0    levofloxacin (LEVAQUIN) 250 MG tablet Take 1 tablet (250 mg) by mouth daily 10 tablet 0    metroNIDAZOLE (METROGEL) 1 % external gel APPLY A SMALL AMOUNT TO AFFECTED AREA(S) TOPICALLY ONCE DAILY 60 g 1    multivitamin, therapeutic (THERA-VIT) TABS tablet Take 1 tablet by mouth daily      oxyBUTYnin (DITROPAN) 5 MG tablet Take 1 tablet (5 mg) by mouth 3 times daily 21 tablet 0    polyethylene glycol (MIRALAX) 17 GM/Dose powder Take 17 g by mouth daily as needed for constipation 510 g 0    tamsulosin (FLOMAX) 0.4 MG capsule Take 1 capsule (0.4 mg) by mouth At Bedtime 90 capsule 3    triamcinolone (KENALOG) 0.1 % external cream Apply topically 2 times daily as needed for irritation           ALLERGIES:  No Known Allergies    FAMILY HISTORY:  Family History   Problem Relation Age of Onset    Dementia Mother     Heart Disease Father     Diabetes Father        SOCIAL HISTORY:   Social History     Socioeconomic History    Marital status:    Tobacco Use    Smoking status: Never    Smokeless tobacco: Never   Vaping Use    Vaping status: Never Used   Substance and Sexual Activity    Alcohol use: No    Drug use: No     Social Determinants of Health     Interpersonal Safety: Low Risk  (3/5/2024)    Interpersonal Safety     Do you feel physically and  "emotionally safe where you currently live?: Yes     Within the past 12 months, have you been hit, slapped, kicked or otherwise physically hurt by someone?: No     Within the past 12 months, have you been humiliated or emotionally abused in other ways by your partner or ex-partner?: No       VITALS:  BP (!) 150/70   Pulse 64   Temp 97.9  F (36.6  C) (Oral)   Resp 16   Ht 1.803 m (5' 11\")   Wt 74.8 kg (165 lb)   SpO2 100%   BMI 23.01 kg/m      PHYSICAL EXAM    Physical Exam  Vitals and nursing note reviewed.   Constitutional:       General: He is not in acute distress.     Appearance: He is not diaphoretic.   HENT:      Head: Atraumatic.   Eyes:      General: No scleral icterus.     Pupils: Pupils are equal, round, and reactive to light.   Cardiovascular:      Rate and Rhythm: Normal rate and regular rhythm.      Heart sounds: Normal heart sounds.   Pulmonary:      Effort: No respiratory distress.      Breath sounds: Normal breath sounds.   Abdominal:      Palpations: Abdomen is soft.      Tenderness: There is no abdominal tenderness.      Comments: Nephrostomy tubes present.      Musculoskeletal:         General: No tenderness.   Lymphadenopathy:      Cervical: No cervical adenopathy.   Skin:     General: Skin is warm.      Findings: No rash.           LAB:  All pertinent labs reviewed and interpreted.  Labs Ordered and Resulted from Time of ED Arrival to Time of ED Departure   ROUTINE UA WITH MICROSCOPIC REFLEX TO CULTURE - Abnormal       Result Value    Color Urine Pink (*)     Appearance Urine Turbid (*)     Glucose Urine Negative      Bilirubin Urine Negative      Ketones Urine Negative      Specific Gravity Urine 1.016      Blood Urine >1.0 mg/dL (*)     pH Urine 6.5      Protein Albumin Urine 100 (*)     Urobilinogen Urine <2.0      Nitrite Urine Negative      Leukocyte Esterase Urine 500 Rober/uL (*)     RBC Urine >182 (*)     WBC Urine >182 (*)    URINE CULTURE       RADIOLOGY:  Reviewed all pertinent " imaging. Please see official radiology report.  No orders to display         I, Farzana Hammond, am serving as a scribe to document services personally performed by Dr. Kevin Vasquez, based on my observation and the provider's statements to me. I, Kevin Vasquez MD attest that Farzana Hammond is acting in a scribe capacity, has observed my performance of the services and has documented them in accordance with my direction.    Kevin Vasquez M.D.  Emergency Medicine  [facility] EMERGENCY ROOM  [address] [place] [id number]  [phone]  Dept: [phone]       Kevin Vasquez MD  06/20/24 0603

## 2024-06-20 NOTE — ED TRIAGE NOTES
"The patient presents to the ED with daughter with concerns for a sudden onset of right-sided flank pain x30 minutes and inability to void. Reports his nephrostomy tubes were \"capped\" yesterday at an appointment around 1300. He reports he has not been able to void for \"several hours\". He denies fevers or chills. Bladder scan volume 301 cc.     Triage Assessment (Adult)       Row Name 06/20/24 0225          Triage Assessment    Airway WDL WDL        Respiratory WDL    Respiratory WDL WDL        Skin Circulation/Temperature WDL    Skin Circulation/Temperature WDL WDL        Cardiac WDL    Cardiac WDL WDL        Peripheral/Neurovascular WDL    Peripheral Neurovascular WDL WDL        Cognitive/Neuro/Behavioral WDL    Cognitive/Neuro/Behavioral WDL WDL                     "

## 2024-06-20 NOTE — ED NOTES
Bilateral nephrostomy tubes connected to a drainage bag. L nephrostomy draining clear/ yellow urine, R nephrostomy bag draining copper colored urine. MD aware. Urine sent was from the R nephrostomy bag. Dressing replaced. Education provided to pt and daughter. Pt says bladder spasm is gone.

## 2024-06-20 NOTE — ED NOTES
Education provided to pt and daughter on worsening symptoms to watch out for and follow-up with urology. Resource provided on where to pick-up prescription. AVS thoroughly reviewed with pt and daughter. All questions were answered. Vitally stable upon discharge.

## 2024-06-21 ENCOUNTER — TRANSFERRED RECORDS (OUTPATIENT)
Dept: HEALTH INFORMATION MANAGEMENT | Facility: CLINIC | Age: 89
End: 2024-06-21
Payer: MEDICARE

## 2024-06-22 DIAGNOSIS — N40.0 BENIGN PROSTATIC HYPERPLASIA, UNSPECIFIED WHETHER LOWER URINARY TRACT SYMPTOMS PRESENT: ICD-10-CM

## 2024-06-22 LAB — BACTERIA UR CULT: NORMAL

## 2024-06-24 LAB
MDC_IDC_LEAD_CONNECTION_STATUS: NORMAL
MDC_IDC_LEAD_CONNECTION_STATUS: NORMAL
MDC_IDC_LEAD_IMPLANT_DT: NORMAL
MDC_IDC_LEAD_IMPLANT_DT: NORMAL
MDC_IDC_LEAD_LOCATION: NORMAL
MDC_IDC_LEAD_LOCATION: NORMAL
MDC_IDC_LEAD_LOCATION_DETAIL_1: NORMAL
MDC_IDC_LEAD_LOCATION_DETAIL_1: NORMAL
MDC_IDC_LEAD_MFG: NORMAL
MDC_IDC_LEAD_MFG: NORMAL
MDC_IDC_LEAD_MODEL: NORMAL
MDC_IDC_LEAD_MODEL: NORMAL
MDC_IDC_LEAD_POLARITY_TYPE: NORMAL
MDC_IDC_LEAD_POLARITY_TYPE: NORMAL
MDC_IDC_LEAD_SERIAL: NORMAL
MDC_IDC_LEAD_SERIAL: NORMAL
MDC_IDC_LEAD_SPECIAL_FUNCTION: NORMAL
MDC_IDC_LEAD_SPECIAL_FUNCTION: NORMAL
MDC_IDC_MSMT_BATTERY_DTM: NORMAL
MDC_IDC_MSMT_BATTERY_REMAINING_LONGEVITY: 120 MO
MDC_IDC_MSMT_BATTERY_RRT_TRIGGER: 2.62
MDC_IDC_MSMT_BATTERY_STATUS: NORMAL
MDC_IDC_MSMT_BATTERY_VOLTAGE: 3.01 V
MDC_IDC_MSMT_LEADCHNL_RA_IMPEDANCE_VALUE: 323 OHM
MDC_IDC_MSMT_LEADCHNL_RA_IMPEDANCE_VALUE: 361 OHM
MDC_IDC_MSMT_LEADCHNL_RA_PACING_THRESHOLD_AMPLITUDE: 0.38 V
MDC_IDC_MSMT_LEADCHNL_RA_PACING_THRESHOLD_PULSEWIDTH: 0.4 MS
MDC_IDC_MSMT_LEADCHNL_RA_SENSING_INTR_AMPL: 1.62 MV
MDC_IDC_MSMT_LEADCHNL_RA_SENSING_INTR_AMPL: 1.62 MV
MDC_IDC_MSMT_LEADCHNL_RV_IMPEDANCE_VALUE: 418 OHM
MDC_IDC_MSMT_LEADCHNL_RV_IMPEDANCE_VALUE: 475 OHM
MDC_IDC_MSMT_LEADCHNL_RV_PACING_THRESHOLD_AMPLITUDE: 0.38 V
MDC_IDC_MSMT_LEADCHNL_RV_PACING_THRESHOLD_PULSEWIDTH: 0.4 MS
MDC_IDC_MSMT_LEADCHNL_RV_SENSING_INTR_AMPL: 7.38 MV
MDC_IDC_MSMT_LEADCHNL_RV_SENSING_INTR_AMPL: 7.38 MV
MDC_IDC_PG_IMPLANT_DTM: NORMAL
MDC_IDC_PG_MFG: NORMAL
MDC_IDC_PG_MODEL: NORMAL
MDC_IDC_PG_SERIAL: NORMAL
MDC_IDC_PG_TYPE: NORMAL
MDC_IDC_SESS_CLINIC_NAME: NORMAL
MDC_IDC_SESS_DTM: NORMAL
MDC_IDC_SESS_TYPE: NORMAL
MDC_IDC_SET_BRADY_AT_MODE_SWITCH_RATE: 171 {BEATS}/MIN
MDC_IDC_SET_BRADY_HYSTRATE: NORMAL
MDC_IDC_SET_BRADY_LOWRATE: 50 {BEATS}/MIN
MDC_IDC_SET_BRADY_MAX_SENSOR_RATE: 120 {BEATS}/MIN
MDC_IDC_SET_BRADY_MAX_TRACKING_RATE: 120 {BEATS}/MIN
MDC_IDC_SET_BRADY_MODE: NORMAL
MDC_IDC_SET_BRADY_PAV_DELAY_LOW: 180 MS
MDC_IDC_SET_BRADY_SAV_DELAY_LOW: 150 MS
MDC_IDC_SET_LEADCHNL_RA_PACING_AMPLITUDE: 1.5 V
MDC_IDC_SET_LEADCHNL_RA_PACING_ANODE_ELECTRODE_1: NORMAL
MDC_IDC_SET_LEADCHNL_RA_PACING_ANODE_LOCATION_1: NORMAL
MDC_IDC_SET_LEADCHNL_RA_PACING_CAPTURE_MODE: NORMAL
MDC_IDC_SET_LEADCHNL_RA_PACING_CATHODE_ELECTRODE_1: NORMAL
MDC_IDC_SET_LEADCHNL_RA_PACING_CATHODE_LOCATION_1: NORMAL
MDC_IDC_SET_LEADCHNL_RA_PACING_POLARITY: NORMAL
MDC_IDC_SET_LEADCHNL_RA_PACING_PULSEWIDTH: 0.4 MS
MDC_IDC_SET_LEADCHNL_RA_SENSING_ANODE_ELECTRODE_1: NORMAL
MDC_IDC_SET_LEADCHNL_RA_SENSING_ANODE_LOCATION_1: NORMAL
MDC_IDC_SET_LEADCHNL_RA_SENSING_CATHODE_ELECTRODE_1: NORMAL
MDC_IDC_SET_LEADCHNL_RA_SENSING_CATHODE_LOCATION_1: NORMAL
MDC_IDC_SET_LEADCHNL_RA_SENSING_POLARITY: NORMAL
MDC_IDC_SET_LEADCHNL_RA_SENSING_SENSITIVITY: 0.3 MV
MDC_IDC_SET_LEADCHNL_RV_PACING_AMPLITUDE: 1.5 V
MDC_IDC_SET_LEADCHNL_RV_PACING_ANODE_ELECTRODE_1: NORMAL
MDC_IDC_SET_LEADCHNL_RV_PACING_ANODE_LOCATION_1: NORMAL
MDC_IDC_SET_LEADCHNL_RV_PACING_CAPTURE_MODE: NORMAL
MDC_IDC_SET_LEADCHNL_RV_PACING_CATHODE_ELECTRODE_1: NORMAL
MDC_IDC_SET_LEADCHNL_RV_PACING_CATHODE_LOCATION_1: NORMAL
MDC_IDC_SET_LEADCHNL_RV_PACING_POLARITY: NORMAL
MDC_IDC_SET_LEADCHNL_RV_PACING_PULSEWIDTH: 0.4 MS
MDC_IDC_SET_LEADCHNL_RV_SENSING_ANODE_ELECTRODE_1: NORMAL
MDC_IDC_SET_LEADCHNL_RV_SENSING_ANODE_LOCATION_1: NORMAL
MDC_IDC_SET_LEADCHNL_RV_SENSING_CATHODE_ELECTRODE_1: NORMAL
MDC_IDC_SET_LEADCHNL_RV_SENSING_CATHODE_LOCATION_1: NORMAL
MDC_IDC_SET_LEADCHNL_RV_SENSING_POLARITY: NORMAL
MDC_IDC_SET_LEADCHNL_RV_SENSING_SENSITIVITY: 0.9 MV
MDC_IDC_SET_ZONE_DETECTION_INTERVAL: 350 MS
MDC_IDC_SET_ZONE_DETECTION_INTERVAL: 400 MS
MDC_IDC_SET_ZONE_STATUS: NORMAL
MDC_IDC_SET_ZONE_STATUS: NORMAL
MDC_IDC_SET_ZONE_TYPE: NORMAL
MDC_IDC_SET_ZONE_VENDOR_TYPE: NORMAL
MDC_IDC_STAT_AT_BURDEN_PERCENT: 0 %
MDC_IDC_STAT_AT_DTM_END: NORMAL
MDC_IDC_STAT_AT_DTM_START: NORMAL
MDC_IDC_STAT_BRADY_AP_VP_PERCENT: 0.22 %
MDC_IDC_STAT_BRADY_AP_VS_PERCENT: 20.64 %
MDC_IDC_STAT_BRADY_AS_VP_PERCENT: 0.16 %
MDC_IDC_STAT_BRADY_AS_VS_PERCENT: 78.98 %
MDC_IDC_STAT_BRADY_DTM_END: NORMAL
MDC_IDC_STAT_BRADY_DTM_START: NORMAL
MDC_IDC_STAT_BRADY_RA_PERCENT_PACED: 21.09 %
MDC_IDC_STAT_BRADY_RV_PERCENT_PACED: 0.38 %
MDC_IDC_STAT_EPISODE_RECENT_COUNT: 0
MDC_IDC_STAT_EPISODE_RECENT_COUNT_DTM_END: NORMAL
MDC_IDC_STAT_EPISODE_RECENT_COUNT_DTM_START: NORMAL
MDC_IDC_STAT_EPISODE_TOTAL_COUNT: 0
MDC_IDC_STAT_EPISODE_TOTAL_COUNT: 14
MDC_IDC_STAT_EPISODE_TOTAL_COUNT: 4
MDC_IDC_STAT_EPISODE_TOTAL_COUNT_DTM_END: NORMAL
MDC_IDC_STAT_EPISODE_TOTAL_COUNT_DTM_START: NORMAL
MDC_IDC_STAT_EPISODE_TYPE: NORMAL
MDC_IDC_STAT_TACHYTHERAPY_RECENT_DTM_END: NORMAL
MDC_IDC_STAT_TACHYTHERAPY_RECENT_DTM_START: NORMAL
MDC_IDC_STAT_TACHYTHERAPY_TOTAL_DTM_END: NORMAL
MDC_IDC_STAT_TACHYTHERAPY_TOTAL_DTM_START: NORMAL

## 2024-06-24 PROCEDURE — 93296 REM INTERROG EVL PM/IDS: CPT | Performed by: INTERNAL MEDICINE

## 2024-06-24 PROCEDURE — 93294 REM INTERROG EVL PM/LDLS PM: CPT | Performed by: INTERNAL MEDICINE

## 2024-06-24 RX ORDER — TAMSULOSIN HYDROCHLORIDE 0.4 MG/1
0.4 CAPSULE ORAL AT BEDTIME
Qty: 90 CAPSULE | Refills: 3 | Status: SHIPPED | OUTPATIENT
Start: 2024-06-24

## 2024-06-24 NOTE — TELEPHONE ENCOUNTER
Called patient to discuss gap in medication, patient states that right now he can't remember the last time he took the medication but his symptoms are still ongoing and would like to start it daily again and would like it sent to the pharmacy for him.     Jose Armando Maurer RN  [facility]

## 2024-06-27 ENCOUNTER — TRANSFERRED RECORDS (OUTPATIENT)
Dept: HEALTH INFORMATION MANAGEMENT | Facility: CLINIC | Age: 89
End: 2024-06-27
Payer: MEDICARE

## 2024-07-17 ENCOUNTER — APPOINTMENT (OUTPATIENT)
Dept: ULTRASOUND IMAGING | Facility: CLINIC | Age: 89
End: 2024-07-17
Attending: EMERGENCY MEDICINE
Payer: MEDICARE

## 2024-07-17 ENCOUNTER — HOSPITAL ENCOUNTER (EMERGENCY)
Facility: CLINIC | Age: 89
Discharge: HOME HEALTH | End: 2024-07-17
Attending: EMERGENCY MEDICINE | Admitting: EMERGENCY MEDICINE
Payer: MEDICARE

## 2024-07-17 ENCOUNTER — NURSE TRIAGE (OUTPATIENT)
Dept: FAMILY MEDICINE | Facility: CLINIC | Age: 89
End: 2024-07-17
Payer: MEDICARE

## 2024-07-17 VITALS
SYSTOLIC BLOOD PRESSURE: 154 MMHG | BODY MASS INDEX: 21.62 KG/M2 | HEART RATE: 72 BPM | RESPIRATION RATE: 16 BRPM | DIASTOLIC BLOOD PRESSURE: 70 MMHG | TEMPERATURE: 98.1 F | WEIGHT: 155 LBS | OXYGEN SATURATION: 98 %

## 2024-07-17 DIAGNOSIS — R60.0 BILATERAL LEG EDEMA: ICD-10-CM

## 2024-07-17 DIAGNOSIS — N18.9 CHRONIC KIDNEY DISEASE, UNSPECIFIED CKD STAGE: ICD-10-CM

## 2024-07-17 DIAGNOSIS — D64.9 CHRONIC ANEMIA: ICD-10-CM

## 2024-07-17 LAB
ALBUMIN SERPL BCG-MCNC: 3.7 G/DL (ref 3.5–5.2)
ALP SERPL-CCNC: 97 U/L (ref 40–150)
ALT SERPL W P-5'-P-CCNC: 9 U/L (ref 0–70)
ANION GAP SERPL CALCULATED.3IONS-SCNC: 10 MMOL/L (ref 7–15)
AST SERPL W P-5'-P-CCNC: 16 U/L (ref 0–45)
BASOPHILS # BLD AUTO: 0 10E3/UL (ref 0–0.2)
BASOPHILS NFR BLD AUTO: 0 %
BILIRUB DIRECT SERPL-MCNC: <0.2 MG/DL (ref 0–0.3)
BILIRUB SERPL-MCNC: 0.3 MG/DL
BUN SERPL-MCNC: 34.9 MG/DL (ref 8–23)
CALCIUM SERPL-MCNC: 9 MG/DL (ref 8.8–10.4)
CHLORIDE SERPL-SCNC: 104 MMOL/L (ref 98–107)
CREAT SERPL-MCNC: 1.99 MG/DL (ref 0.67–1.17)
EGFRCR SERPLBLD CKD-EPI 2021: 32 ML/MIN/1.73M2
EOSINOPHIL # BLD AUTO: 0.2 10E3/UL (ref 0–0.7)
EOSINOPHIL NFR BLD AUTO: 2 %
ERYTHROCYTE [DISTWIDTH] IN BLOOD BY AUTOMATED COUNT: 12.8 % (ref 10–15)
GLUCOSE SERPL-MCNC: 118 MG/DL (ref 70–99)
HCO3 SERPL-SCNC: 26 MMOL/L (ref 22–29)
HCT VFR BLD AUTO: 30.5 % (ref 40–53)
HGB BLD-MCNC: 9.9 G/DL (ref 13.3–17.7)
IMM GRANULOCYTES # BLD: 0.1 10E3/UL
IMM GRANULOCYTES NFR BLD: 1 %
LYMPHOCYTES # BLD AUTO: 1 10E3/UL (ref 0.8–5.3)
LYMPHOCYTES NFR BLD AUTO: 13 %
MCH RBC QN AUTO: 30.4 PG (ref 26.5–33)
MCHC RBC AUTO-ENTMCNC: 32.5 G/DL (ref 31.5–36.5)
MCV RBC AUTO: 94 FL (ref 78–100)
MONOCYTES # BLD AUTO: 1 10E3/UL (ref 0–1.3)
MONOCYTES NFR BLD AUTO: 13 %
NEUTROPHILS # BLD AUTO: 5.5 10E3/UL (ref 1.6–8.3)
NEUTROPHILS NFR BLD AUTO: 72 %
NRBC # BLD AUTO: 0 10E3/UL
NRBC BLD AUTO-RTO: 0 /100
NT-PROBNP SERPL-MCNC: 423 PG/ML (ref 0–1800)
PLATELET # BLD AUTO: 190 10E3/UL (ref 150–450)
POTASSIUM SERPL-SCNC: 4.9 MMOL/L (ref 3.4–5.3)
PROT SERPL-MCNC: 6.5 G/DL (ref 6.4–8.3)
RBC # BLD AUTO: 3.26 10E6/UL (ref 4.4–5.9)
SODIUM SERPL-SCNC: 140 MMOL/L (ref 135–145)
WBC # BLD AUTO: 7.7 10E3/UL (ref 4–11)

## 2024-07-17 PROCEDURE — 93970 EXTREMITY STUDY: CPT

## 2024-07-17 PROCEDURE — 80053 COMPREHEN METABOLIC PANEL: CPT | Performed by: EMERGENCY MEDICINE

## 2024-07-17 PROCEDURE — 85004 AUTOMATED DIFF WBC COUNT: CPT | Performed by: EMERGENCY MEDICINE

## 2024-07-17 PROCEDURE — 83880 ASSAY OF NATRIURETIC PEPTIDE: CPT | Mod: 91 | Performed by: EMERGENCY MEDICINE

## 2024-07-17 PROCEDURE — 36415 COLL VENOUS BLD VENIPUNCTURE: CPT | Performed by: EMERGENCY MEDICINE

## 2024-07-17 PROCEDURE — 99284 EMERGENCY DEPT VISIT MOD MDM: CPT | Mod: 25

## 2024-07-17 PROCEDURE — 82248 BILIRUBIN DIRECT: CPT | Performed by: EMERGENCY MEDICINE

## 2024-07-17 RX ORDER — FUROSEMIDE 20 MG
20 TABLET ORAL DAILY
Qty: 14 TABLET | Refills: 0 | Status: SHIPPED | OUTPATIENT
Start: 2024-07-17 | End: 2024-07-31

## 2024-07-17 ASSESSMENT — ACTIVITIES OF DAILY LIVING (ADL)
ADLS_ACUITY_SCORE: 38
ADLS_ACUITY_SCORE: 36
ADLS_ACUITY_SCORE: 38

## 2024-07-17 ASSESSMENT — COLUMBIA-SUICIDE SEVERITY RATING SCALE - C-SSRS
6. HAVE YOU EVER DONE ANYTHING, STARTED TO DO ANYTHING, OR PREPARED TO DO ANYTHING TO END YOUR LIFE?: NO
1. IN THE PAST MONTH, HAVE YOU WISHED YOU WERE DEAD OR WISHED YOU COULD GO TO SLEEP AND NOT WAKE UP?: NO
2. HAVE YOU ACTUALLY HAD ANY THOUGHTS OF KILLING YOURSELF IN THE PAST MONTH?: NO

## 2024-07-17 NOTE — ED NOTES
Dgcolumba Auguste believes he has compression stockings st home.  Will discharge.  Has home health nurse coming tomorrow.    Yumiko Hicks RN 7/17/2024 2:29 PM

## 2024-07-17 NOTE — TELEPHONE ENCOUNTER
"Nurse Triage SBAR    Is this a 2nd Level Triage? NO    Situation: Patients rafaela Auguste CTC on file.     Background: Hx of htn    Assessment: Pts daughter and Ricardo called and stated that the patient was just seen in the urologist for a follow up due to the multiple catheters and kidney infections. Nurse at the clinic mentioned to them that he was having some swelling in both legs the extend above the knees and they are hard to the touch, unable to press down. Pt denied any pain or discoloration of the legs.     Protocol Recommended Disposition:   Go To ED/UCC Now (Or To Office With PCP Approval)    Recommendation: Pt was advised to go to the ER to be evaluated. Kalyani and patient verbalized understanding and is on the way to the ER.      Does the patient meet one of the following criteria for ADS visit consideration? 16+ years old, with an [facility] PCP     Jose Armando Maurer RN  [facility]       TIP  Providers, please consider if this condition is appropriate for management at one of our Acute and Diagnostic Services sites.     If patient is a good candidate, please use dotphrase <dot>triageresponse and select Refer to ADS to document.      Reason for Disposition   SEVERE swelling (e.g., swelling extends above knee, entire leg is swollen, weeping fluid)    Additional Information   Negative: Chest pain   Negative: Followed an insect bite and has localized swelling (e.g., small area of puffy or swollen skin)   Negative: Followed a knee injury   Negative: Ankle or foot injury   Negative: Pregnant with leg swelling or edema   Negative: Difficulty breathing at rest   Negative: Entire foot is cool or blue in comparison to other side    Answer Assessment - Initial Assessment Questions  1. ONSET: \"When did the swelling start?\" (e.g., minutes, hours, days)      A couple days   2. LOCATION: \"What part of the leg is swollen?\"  \"Are both legs swollen or just one leg?\"      Both legs   3. SEVERITY: \"How bad is " "the swelling?\" (e.g., localized; mild, moderate, severe)    - Localized: Small area of swelling localized to one leg.    - MILD pedal edema: Swelling limited to foot and ankle, pitting edema < 1/4 inch (6 mm) deep, rest and elevation eliminate most or all swelling.    - MODERATE edema: Swelling of lower leg to knee, pitting edema > 1/4 inch (6 mm) deep, rest and elevation only partially reduce swelling.    - SEVERE edema: Swelling extends above knee, facial or hand swelling present.       Moderate  4. REDNESS: \"Does the swelling look red or infected?\"      none  5. PAIN: \"Is the swelling painful to touch?\" If Yes, ask: \"How painful is it?\"   (Scale 1-10; mild, moderate or severe)      none  6. FEVER: \"Do you have a fever?\" If Yes, ask: \"What is it, how was it measured, and when did it start?\"       none  7. CAUSE: \"What do you think is causing the leg swelling?\"      Not sure   8. MEDICAL HISTORY: \"Do you have a history of blood clots (e.g., DVT), cancer, heart failure, kidney disease, or liver failure?\"      Kidney infections, early stage kidney failure possible  9. RECURRENT SYMPTOM: \"Have you had leg swelling before?\" If Yes, ask: \"When was the last time?\" \"What happened that time?\"      no  10. OTHER SYMPTOMS: \"Do you have any other symptoms?\" (e.g., chest pain, difficulty breathing)        None   11. PREGNANCY: \"Is there any chance you are pregnant?\" \"When was your last menstrual period?\"        N/a    Protocols used: Leg Swelling and Edema-A-OH    "

## 2024-07-17 NOTE — ED TRIAGE NOTES
Pt presents to the ED with c/o bilateral leg swelling that has been going on for 2-3 days. Was at Urology appointment this morning, was told to be seen, no appointments with PCP available. Denies any SOB or CP.      Triage Assessment (Adult)       Row Name 07/17/24 1206          Triage Assessment    Airway WDL WDL        Respiratory WDL    Respiratory WDL WDL        Skin Circulation/Temperature WDL    Skin Circulation/Temperature WDL WDL        Cardiac WDL    Cardiac WDL WDL        Peripheral/Neurovascular WDL    Peripheral Neurovascular WDL WDL        Cognitive/Neuro/Behavioral WDL    Cognitive/Neuro/Behavioral WDL WDL

## 2024-07-17 NOTE — ED PROVIDER NOTES
EMERGENCY DEPARTMENT ENCOUNTER      NAME: Mikaela Figueroa  AGE: 88 year old male  YOB: 1935  MRN: [id number]  EVALUATION DATE & TIME: No admission date for patient encounter.    PCP: Derek Kumar    ED PROVIDER: Demetra Pulliam MD    Chief Complaint   Patient presents with    Leg Swelling         FINAL IMPRESSION:  1. Bilateral leg edema    2. Chronic anemia    3. Chronic kidney disease, unspecified CKD stage          ED COURSE & MEDICAL DECISION MAKING:    Pertinent Labs & Imaging studies reviewed. (See chart for details)  88 year old male with history of HTN, HLD, BPH with resultant indwelling urinary catheter, sick sinus syndrome status post PPM, CKD who presents to the Emergency Department for evaluation of bilateral lower extremity edema that he has noticed has been progressing over the course of the last week.  No recent medication adjustments, shortness of breath.  He does not have any known history of heart failure and per previous echocardiogram on file systolic function was normal.  He does have a history of renal impairment, certainly worsening renal dysfunctions on the differential as is simple edema, hepatic dysfunction, DVT as this is slightly more swollen on the right versus left.    Patient placed on monitor, IV established and blood obtained.  CBC, BMP, LFTs, BNP level for hemoglobin of 9.9 and creatinine of 1.9 which are chronic.  Duplex ultrasound bilateral lower extremities negative for DVT, home with 20 mg Lasix once daily, compression stockings and close follow-up with PCP next week for repeat renal function given the addition of the diuretic.        ED Course as of 07/17/24 1400 Wed Jul 17, 2024   1210 I met with the patient to obtain patient history and performed a physical exam. Discussed plan for ED work up including potential diagnostic studies and interventions.     1215 January 2023 echo with normal systolic function   1240 Hemoglobin(!): 9.9  chronic   1303  Creatinine(!): 1.99  baseline   1358 US Lower Extremity Venous Duplex Bilateral  Ultrasound independently interpreted by myself without visualized DVT       Medical Decision Making    History:  Supplemental history from: Family Member/Significant Other  External Record(s) reviewed: Outpatient Record: 1/09/23, [facility] , telephone encounter today    Work Up:  Chart documentation includes differential considered and any EKGs or imaging independently interpreted by provider, see MDM  In additional to work up documented, I considered the following work up: see MDM    External consultation:  Discussion of management with another provider: na    Complicating factors:  Care impacted by chronic illness: Chronic Kidney Disease, Hyperlipidemia, and Hypertension  Care affected by social determinants of health: Access to Medical Care referred to ED    Disposition considerations: Discharge. I prescribed additional prescription strength medication(s) as charted. See documentation for any additional details.        At the conclusion of the encounter I discussed the results of all of the tests and the disposition. The questions were answered. The patient or family acknowledged understanding and was agreeable with the care plan.    MEDICATIONS GIVEN IN THE EMERGENCY:  Medications - No data to display    NEW PRESCRIPTIONS STARTED AT TODAY'S ER VISIT  New Prescriptions    FUROSEMIDE (LASIX) 20 MG TABLET    Take 1 tablet (20 mg) by mouth daily for 14 days          =================================================================    HPI    Patient information was obtained from: patient and daughter    Use of Intrepreter: N/A        Mikaela Figueroa is a 88 year old male with pertinent medical history of chronic kidney disease, hyperlipidemia, pacemaker, diverticulosis, hypertension, who presents to the ED by personal vehicle for leg swelling.     The patient presents to the ED for leg swelling that  has been going on for a couple of days. His daughter states that he has had minor edema in the past but never this severe. The patient's daughter states that he does have a history of kidney issues and has NEPH tubes in his back. The patient is not anticoagulated.     The patient denies any shortness of breath, liver problems, and heart problems. Patient states no other complaints or concerns at this time.     Per Chart Review:  1/09/23, [facility]: Echocardiogram shows normal systolic function, no significant valve abnormality. Cardiogram was completed secondary to history of pacemaker nonsustained ventricular tachycardia.       PAST MEDICAL HISTORY:  Past Medical History:   Diagnosis Date    Hypertension     Pacemaker        PAST SURGICAL HISTORY:  Past Surgical History:   Procedure Laterality Date    APPENDECTOMY      ARTHROSCOPY KNEE Right     CYSTOURETEROSCOPY, WITH LITHOTRIPSY USING ZABRINA 120P LASER AND URETERAL STENT INSERTION Left 10/25/2022    Procedure: CYSTOSCOPY, LEFT URETEROSCOPY, LASER LITHOTRIPSY;  Surgeon: Bob Dozier MD;  Location: [facility] OR    GENITOURINARY SURGERY      IR NEPHROSTOMY TUBE PLACEMENT BILATERAL  5/20/2024    IR NEPHROSTOMY TUBE PLACEMENT LEFT  10/26/2022    IR URETERAL STENT PLACEMENT LEFT  11/1/2022    REPAIR MOHS Right 8/28/2023    Procedure: FOREHEAD FLAP REPAIR MOHS DEFECT RIGHT NOSE; COMPOSITE GRAFT;  Surgeon: Jake Rosa MD;  Location: [facility] Main OR       CURRENT MEDICATIONS:    Prior to Admission Medications   Prescriptions Last Dose Informant Patient Reported? Taking?   Cranberry (CRANBEREX PO)   Yes No   Sig: Take 6 tablets by mouth daily   acetaminophen (TYLENOL) 325 MG tablet   No No   Sig: Take 1-2 tablets (325-650 mg) by mouth every 4 hours as needed for other or mild pain (For optimal non-opioid multimodal pain management to improve pain control.)   acetaminophen (TYLENOL) 325 MG tablet   No No   Sig: Take 2  tablets (650 mg) by mouth every 4 hours as needed for mild pain, other, headaches or fever (and adjunct with moderate or severe pain or per patient request)   amLODIPine (NORVASC) 2.5 MG tablet   No No   Sig: Take 1 tablet (2.5 mg) by mouth daily   Patient not taking: Reported on 6/19/2024   amoxicillin-clavulanate (AUGMENTIN) 875-125 MG tablet   No No   Sig: Take 1 tablet by mouth 2 times daily   fluconazole (DIFLUCAN) 100 MG tablet   No No   Sig: Take 1 tablet (100 mg) by mouth daily   levofloxacin (LEVAQUIN) 250 MG tablet   No No   Sig: Take 1 tablet (250 mg) by mouth daily   metroNIDAZOLE (METROGEL) 1 % external gel   No No   Sig: APPLY A SMALL AMOUNT TO AFFECTED AREA(S) TOPICALLY ONCE DAILY   multivitamin, therapeutic (THERA-VIT) TABS tablet   Yes No   Sig: Take 1 tablet by mouth daily   oxyBUTYnin (DITROPAN) 5 MG tablet   No No   Sig: Take 1 tablet (5 mg) by mouth 3 times daily   polyethylene glycol (MIRALAX) 17 GM/Dose powder   No No   Sig: Take 17 g by mouth daily as needed for constipation   tamsulosin (FLOMAX) 0.4 MG capsule   No No   Sig: TAKE 1 CAPSULE AT BEDTIME   triamcinolone (KENALOG) 0.1 % external cream   Yes No   Sig: Apply topically 2 times daily as needed for irritation      Facility-Administered Medications: None       ALLERGIES:  No Known Allergies    FAMILY HISTORY:  Family History   Problem Relation Age of Onset    Dementia Mother     Heart Disease Father     Diabetes Father        SOCIAL HISTORY:  Social History     Tobacco Use    Smoking status: Never    Smokeless tobacco: Never   Vaping Use    Vaping status: Never Used   Substance Use Topics    Alcohol use: No    Drug use: No        VITALS:  Patient Vitals for the past 24 hrs:   BP Temp Temp src Pulse Resp SpO2 Weight   07/17/24 1201 (!) 174/86 97.9  F (36.6  C) Temporal 84 18 98 % 70.3 kg (155 lb)       PHYSICAL EXAM    General Appearance: Well-appearing, well-nourished, no acute distress  Cardio:  Regular rate and rhythm, no  murmur/gallop/rub, hypertensive  Pulm:  No respiratory distress, clear to auscultation bilaterally  Extremities:  Extremities normal, atraumatic, no cyanosis, full ROM and motor tone intact, bilateral pulses intact upper and lower, 2+ edema in legs to the knee bilaterally, right is slightly greater than left,   Skin:  Skin warm, dry flaky skin, no rashes  Neuro:  Alert and oriented ×3       RADIOLOGY/LABS:  Reviewed all pertinent imaging. Please see official radiology report. All pertinent labs reviewed and interpreted.    Results for orders placed or performed during the hospital encounter of 07/17/24   Basic metabolic panel   Result Value Ref Range    Sodium 140 135 - 145 mmol/L    Potassium 4.9 3.4 - 5.3 mmol/L    Chloride 104 98 - 107 mmol/L    Carbon Dioxide (CO2) 26 22 - 29 mmol/L    Anion Gap 10 7 - 15 mmol/L    Urea Nitrogen 34.9 (H) 8.0 - 23.0 mg/dL    Creatinine 1.99 (H) 0.67 - 1.17 mg/dL    GFR Estimate 32 (L) >60 mL/min/1.73m2    Calcium 9.0 8.8 - 10.4 mg/dL    Glucose 118 (H) 70 - 99 mg/dL   Hepatic function panel   Result Value Ref Range    Protein Total 6.5 6.4 - 8.3 g/dL    Albumin 3.7 3.5 - 5.2 g/dL    Bilirubin Total 0.3 <=1.2 mg/dL    Alkaline Phosphatase 97 40 - 150 U/L    AST 16 0 - 45 U/L    ALT 9 0 - 70 U/L    Bilirubin Direct <0.20 0.00 - 0.30 mg/dL   N terminal pro BNP outpatient   Result Value Ref Range    N Terminal Pro BNP Outpatient 423 0 - 1,800 pg/mL   CBC with platelets and differential   Result Value Ref Range    WBC Count 7.7 4.0 - 11.0 10e3/uL    RBC Count 3.26 (L) 4.40 - 5.90 10e6/uL    Hemoglobin 9.9 (L) 13.3 - 17.7 g/dL    Hematocrit 30.5 (L) 40.0 - 53.0 %    MCV 94 78 - 100 fL    MCH 30.4 26.5 - 33.0 pg    MCHC 32.5 31.5 - 36.5 g/dL    RDW 12.8 10.0 - 15.0 %    Platelet Count 190 150 - 450 10e3/uL    % Neutrophils 72 %    % Lymphocytes 13 %    % Monocytes 13 %    % Eosinophils 2 %    % Basophils 0 %    % Immature Granulocytes 1 %    NRBCs per 100 WBC 0 <1 /100    Absolute  Neutrophils 5.5 1.6 - 8.3 10e3/uL    Absolute Lymphocytes 1.0 0.8 - 5.3 10e3/uL    Absolute Monocytes 1.0 0.0 - 1.3 10e3/uL    Absolute Eosinophils 0.2 0.0 - 0.7 10e3/uL    Absolute Basophils 0.0 0.0 - 0.2 10e3/uL    Absolute Immature Granulocytes 0.1 <=0.4 10e3/uL    Absolute NRBCs 0.0 10e3/uL       The creation of this record is based on the scribe s observations of the work being performed by Demetra Pulliam MD and the provider s statements to them. It was created on her behalf by Gen Gross, a trained medical scribe. This document has been checked and approved by the attending provider.    Demetra Pulliam MD  Emergency Medicine  [facility] EMERGENCY ROOM  [address] [address]  [phone]  Dept: [phone]     Demetra Pulliam MD  07/17/24 1400

## 2024-07-17 NOTE — DISCHARGE INSTRUCTIONS
You do have edema of both legs, but thankfully kidney function is at baseline/minimally improved from recent, and there is no signs of clots.  Wear the compression stockings applied.  No signs of DVT or blood clot.  I would like to try gently diuresing you with a water pill.  Take this once daily as prescribed.  I would anticipate some increased urine output from your catheters.  Please weigh yourself once daily to monitor weight.  Because this medication can affect the kidney function I would like to closely monitor your kidney function with repeat labs next week.  Please call your primary care doctor for same.

## 2024-07-19 ENCOUNTER — PATIENT OUTREACH (OUTPATIENT)
Dept: CARE COORDINATION | Facility: CLINIC | Age: 89
End: 2024-07-19
Payer: MEDICARE

## 2024-07-19 NOTE — PROGRESS NOTES
Clinic Care Coordination Contact  Community Health Worker Initial Outreach    CHW Initial Information Gathering:  Referral Source: ED Follow-Up  Informal Support system:: Family, Spouse  CHW Additional Questions  If ED/Hospital discharge, follow-up appointment scheduled as recommended?: No  Patient agreeable to assistance with scheduling?: No  Patient declined (specify): Pt's Wife expressed that Pt is following up with surgeon on 07/24. Further indicating that she will to hear back from Surgeon whether Pt needs to be seen by PCP for follow up.  Medication changes made following ED/Hospital discharge?: No  MyChart active?: No  Patient agreeable to assistance with activating MyChart?: No    Patient accepts CC: No, Patient's Wife expressed no additional support is needed at this time. Unable to send care coordination introduction letter since MyChart is not active.  Clinic Care Coordination services remain available via referral if needed.     Rubi Henderson  Community Health Worker    Connected Care Resources   [facility]     *Connected Care Resources Team does NOT   follow patient ongoing. Referrals are identified   based on internal discharge report and the outreach is to ensure   Patient has understanding of their discharge instructions.

## 2024-07-23 ENCOUNTER — OFFICE VISIT (OUTPATIENT)
Dept: FAMILY MEDICINE | Facility: CLINIC | Age: 89
End: 2024-07-23
Payer: MEDICARE

## 2024-07-23 ENCOUNTER — TELEPHONE (OUTPATIENT)
Dept: FAMILY MEDICINE | Facility: CLINIC | Age: 89
End: 2024-07-23

## 2024-07-23 VITALS
WEIGHT: 164 LBS | RESPIRATION RATE: 14 BRPM | DIASTOLIC BLOOD PRESSURE: 82 MMHG | SYSTOLIC BLOOD PRESSURE: 130 MMHG | HEIGHT: 71 IN | TEMPERATURE: 98 F | HEART RATE: 74 BPM | BODY MASS INDEX: 22.96 KG/M2 | OXYGEN SATURATION: 97 %

## 2024-07-23 DIAGNOSIS — N40.0 PROSTATISM: ICD-10-CM

## 2024-07-23 DIAGNOSIS — I10 BENIGN ESSENTIAL HYPERTENSION: ICD-10-CM

## 2024-07-23 DIAGNOSIS — Z96.0 INDWELLING URETHRAL CATHETER PRESENT: ICD-10-CM

## 2024-07-23 DIAGNOSIS — R60.0 BILATERAL LEG EDEMA: Primary | ICD-10-CM

## 2024-07-23 PROCEDURE — 99214 OFFICE O/P EST MOD 30 MIN: CPT | Performed by: FAMILY MEDICINE

## 2024-07-23 PROCEDURE — 36415 COLL VENOUS BLD VENIPUNCTURE: CPT | Performed by: FAMILY MEDICINE

## 2024-07-23 PROCEDURE — 80048 BASIC METABOLIC PNL TOTAL CA: CPT | Performed by: FAMILY MEDICINE

## 2024-07-23 NOTE — TELEPHONE ENCOUNTER
"See PCP's response to home care order request on 7/23/24:    \"Please notify okay for verbal orders.     Derek Kumar MD\"    Writer called ALLAN May, at [facility] and left a detailed message on Yadira's voicemail box with the above PCP's response.    If Yadira calls back, please relay the above PCP's response to Yadira.    Please route to the RN queue for any questions or concerns.    Ivis Garza RN, BSN  [facility]    "

## 2024-07-23 NOTE — TELEPHONE ENCOUNTER
ALLAN May, with [facility] is calling into the clinic for re-certification of skilled nursing orders-see below:      Home Care is calling regarding an established patient with [facility].        5/30/2024    11:27 AM   Home Care Information   Date of Home Care episode start 5/29/2024   Current following provider Dr Kumar   Date provider agreed to follow 5/29/2024    Name/Phone Number ALLAN May case manager, [phone]   Home Care agency [facility]     Requesting orders from: Derek Kumar  Provider is following patient: Yes  Is this a 60-day recertification request?  Yes    Orders Requested    Skilled Nursing  Request for recertification   Frequency:  one time a week with 6 PRN visits      Confirmed ok to leave a detailed message with call back.  Contact information confirmed and updated as needed.    Ivis Garza RN, BSN  [facility]

## 2024-07-23 NOTE — PROGRESS NOTES
Assessment & Plan     Bilateral leg edema    - Basic metabolic panel  (Ca, Cl, CO2, Creat, Gluc, K, Na, BUN); Future    Benign essential hypertension    - Basic metabolic panel  (Ca, Cl, CO2, Creat, Gluc, K, Na, BUN); Future    Indwelling urethral catheter present  Seeing urologic surgery tomorrow   Prostatism  Status post TURP with indwelling catheter        MED REC REQUIRED  Post Medication Reconciliation Status:         Gaurang Silva is a 88 year old, presenting for the following health issues:  ER F/U (Edema at WW 7/17/2024)      7/23/2024    12:12 PM   Additional Questions   Roomed by ELPIDIO Estrella   Accompanied by daughter- Kalyani Silva is being seen for follow-up of a 6-hour hospitalization 1 week ago for bilateral leg edema.  Patient has a history of prostatism with urinary obstruction and has an indwelling catheter which is taken in and out depending on output and voiding ability.  He has a history of benign essential hypertension and has a pacemaker in place.  1 week ago he developed very tight bilateral peripheral edema especially on the right side.  He was seen and evaluated ultrasound revealed no DVT he was placed on Lasix 20 mg daily for 7 days and was given a prescription for 14 tablets he is seen today for reevaluation he is much better essentially his ankle edema is gone he does have an indwelling catheter with about 500 cc of fluid which appears clear he feels very spunky and vivacious today.  No shortness of breath exam stable cardiac rhythm regular at 68.  Plan here is to repeat his basal metabolic profile with clinically I think we can alternate his Lasix to every other day we will recheck his kidney function and potassium here today he is seeing urologic surgery tomorrow for evaluation of whether to pull his catheter or not.  Our laboratory should be available to them.  Time spent face-to-face non-face-to-face 36 minutes.              Review of Systems  14 point negative     "  Objective    /82   Pulse 74   Temp 98  F (36.7  C) (Temporal)   Resp 14   Ht 1.803 m (5' 11\")   Wt 74.4 kg (164 lb)   SpO2 97%   BMI 22.87 kg/m    Body mass index is 22.87 kg/m .  Physical Exam   GENERAL: alert and no distress  NECK: no adenopathy, no asymmetry, masses, or scars  RESP: lungs clear to auscultation - no rales, rhonchi or wheezes  CV: regular rate and rhythm, normal S1 S2, no S3 or S4, no murmur, click or rub, no peripheral edema  ABDOMEN: soft, nontender, no hepatosplenomegaly, no masses and bowel sounds normal  MS: no gross musculoskeletal defects noted, no edema    Patient is peripheral edema has lysed.  Indwelling catheter noted.  Patient was discharged approximately 1 week ago and currently is in proved condition        Signed Electronically by: David Henry MD    "

## 2024-07-24 ENCOUNTER — TRANSFERRED RECORDS (OUTPATIENT)
Dept: HEALTH INFORMATION MANAGEMENT | Facility: CLINIC | Age: 89
End: 2024-07-24
Payer: MEDICARE

## 2024-07-24 LAB
ANION GAP SERPL CALCULATED.3IONS-SCNC: 10 MMOL/L (ref 7–15)
BUN SERPL-MCNC: 45.6 MG/DL (ref 8–23)
CALCIUM SERPL-MCNC: 9.1 MG/DL (ref 8.8–10.4)
CHLORIDE SERPL-SCNC: 102 MMOL/L (ref 98–107)
CREAT SERPL-MCNC: 2.03 MG/DL (ref 0.67–1.17)
EGFRCR SERPLBLD CKD-EPI 2021: 31 ML/MIN/1.73M2
GLUCOSE SERPL-MCNC: 90 MG/DL (ref 70–99)
HCO3 SERPL-SCNC: 28 MMOL/L (ref 22–29)
POTASSIUM SERPL-SCNC: 4.7 MMOL/L (ref 3.4–5.3)
SODIUM SERPL-SCNC: 140 MMOL/L (ref 135–145)

## 2024-07-29 ENCOUNTER — PATIENT OUTREACH (OUTPATIENT)
Dept: CARE COORDINATION | Facility: CLINIC | Age: 89
End: 2024-07-29
Payer: MEDICARE

## 2024-09-11 RX ORDER — LISINOPRIL 10 MG/1
TABLET ORAL
Refills: 0 | OUTPATIENT
Start: 2024-09-11

## 2024-09-11 NOTE — TELEPHONE ENCOUNTER
Attempted to call patient to verify, called twice and phone rang and rang but no answer or VM, will try again tomorrow.     Jose Armando Maurer RN  [facility]

## 2024-09-11 NOTE — TELEPHONE ENCOUNTER
It appears that this prescription was discontinued.  Please call patient to confirm that he is off medication.  If this is the case then we will remain off medicine until follow-up appointment when blood pressure can be rechecked.

## 2024-09-13 NOTE — TELEPHONE ENCOUNTER
Called patient and he stated that he asked for it since he hasn't had any in a while, patient stated if Dr. Kumar would prefer for him to stay off of it until his next appointment he is fine with that.     Jose Armando Maurer RN  [facility]

## 2024-10-03 ENCOUNTER — ANCILLARY PROCEDURE (OUTPATIENT)
Dept: CARDIOLOGY | Facility: CLINIC | Age: 89
End: 2024-10-03
Attending: INTERNAL MEDICINE
Payer: MEDICARE

## 2024-10-03 DIAGNOSIS — R55 SYNCOPE, UNSPECIFIED SYNCOPE TYPE: ICD-10-CM

## 2024-10-03 DIAGNOSIS — Z95.0 PACEMAKER: ICD-10-CM

## 2024-10-03 DIAGNOSIS — I44.1 SECOND DEGREE MOBITZ I AV BLOCK: ICD-10-CM

## 2024-10-07 LAB
MDC_IDC_LEAD_CONNECTION_STATUS: NORMAL
MDC_IDC_LEAD_CONNECTION_STATUS: NORMAL
MDC_IDC_LEAD_IMPLANT_DT: NORMAL
MDC_IDC_LEAD_IMPLANT_DT: NORMAL
MDC_IDC_LEAD_LOCATION: NORMAL
MDC_IDC_LEAD_LOCATION: NORMAL
MDC_IDC_LEAD_LOCATION_DETAIL_1: NORMAL
MDC_IDC_LEAD_LOCATION_DETAIL_1: NORMAL
MDC_IDC_LEAD_MFG: NORMAL
MDC_IDC_LEAD_MFG: NORMAL
MDC_IDC_LEAD_MODEL: NORMAL
MDC_IDC_LEAD_MODEL: NORMAL
MDC_IDC_LEAD_POLARITY_TYPE: NORMAL
MDC_IDC_LEAD_POLARITY_TYPE: NORMAL
MDC_IDC_LEAD_SERIAL: NORMAL
MDC_IDC_LEAD_SERIAL: NORMAL
MDC_IDC_LEAD_SPECIAL_FUNCTION: NORMAL
MDC_IDC_LEAD_SPECIAL_FUNCTION: NORMAL
MDC_IDC_MSMT_BATTERY_DTM: NORMAL
MDC_IDC_MSMT_BATTERY_REMAINING_LONGEVITY: 117 MO
MDC_IDC_MSMT_BATTERY_RRT_TRIGGER: 2.62
MDC_IDC_MSMT_BATTERY_STATUS: NORMAL
MDC_IDC_MSMT_BATTERY_VOLTAGE: 3 V
MDC_IDC_MSMT_LEADCHNL_RA_IMPEDANCE_VALUE: 304 OHM
MDC_IDC_MSMT_LEADCHNL_RA_IMPEDANCE_VALUE: 361 OHM
MDC_IDC_MSMT_LEADCHNL_RA_PACING_THRESHOLD_AMPLITUDE: 0.38 V
MDC_IDC_MSMT_LEADCHNL_RA_PACING_THRESHOLD_PULSEWIDTH: 0.4 MS
MDC_IDC_MSMT_LEADCHNL_RA_SENSING_INTR_AMPL: 1.88 MV
MDC_IDC_MSMT_LEADCHNL_RA_SENSING_INTR_AMPL: 1.88 MV
MDC_IDC_MSMT_LEADCHNL_RV_IMPEDANCE_VALUE: 437 OHM
MDC_IDC_MSMT_LEADCHNL_RV_IMPEDANCE_VALUE: 494 OHM
MDC_IDC_MSMT_LEADCHNL_RV_PACING_THRESHOLD_AMPLITUDE: 0.5 V
MDC_IDC_MSMT_LEADCHNL_RV_PACING_THRESHOLD_PULSEWIDTH: 0.4 MS
MDC_IDC_MSMT_LEADCHNL_RV_SENSING_INTR_AMPL: 6.88 MV
MDC_IDC_MSMT_LEADCHNL_RV_SENSING_INTR_AMPL: 6.88 MV
MDC_IDC_PG_IMPLANT_DTM: NORMAL
MDC_IDC_PG_MFG: NORMAL
MDC_IDC_PG_MODEL: NORMAL
MDC_IDC_PG_SERIAL: NORMAL
MDC_IDC_PG_TYPE: NORMAL
MDC_IDC_SESS_CLINIC_NAME: NORMAL
MDC_IDC_SESS_DTM: NORMAL
MDC_IDC_SESS_TYPE: NORMAL
MDC_IDC_SET_BRADY_AT_MODE_SWITCH_RATE: 171 {BEATS}/MIN
MDC_IDC_SET_BRADY_HYSTRATE: NORMAL
MDC_IDC_SET_BRADY_LOWRATE: 50 {BEATS}/MIN
MDC_IDC_SET_BRADY_MAX_SENSOR_RATE: 120 {BEATS}/MIN
MDC_IDC_SET_BRADY_MAX_TRACKING_RATE: 120 {BEATS}/MIN
MDC_IDC_SET_BRADY_MODE: NORMAL
MDC_IDC_SET_BRADY_PAV_DELAY_LOW: 180 MS
MDC_IDC_SET_BRADY_SAV_DELAY_LOW: 150 MS
MDC_IDC_SET_LEADCHNL_RA_PACING_AMPLITUDE: 1.5 V
MDC_IDC_SET_LEADCHNL_RA_PACING_ANODE_ELECTRODE_1: NORMAL
MDC_IDC_SET_LEADCHNL_RA_PACING_ANODE_LOCATION_1: NORMAL
MDC_IDC_SET_LEADCHNL_RA_PACING_CAPTURE_MODE: NORMAL
MDC_IDC_SET_LEADCHNL_RA_PACING_CATHODE_ELECTRODE_1: NORMAL
MDC_IDC_SET_LEADCHNL_RA_PACING_CATHODE_LOCATION_1: NORMAL
MDC_IDC_SET_LEADCHNL_RA_PACING_POLARITY: NORMAL
MDC_IDC_SET_LEADCHNL_RA_PACING_PULSEWIDTH: 0.4 MS
MDC_IDC_SET_LEADCHNL_RA_SENSING_ANODE_ELECTRODE_1: NORMAL
MDC_IDC_SET_LEADCHNL_RA_SENSING_ANODE_LOCATION_1: NORMAL
MDC_IDC_SET_LEADCHNL_RA_SENSING_CATHODE_ELECTRODE_1: NORMAL
MDC_IDC_SET_LEADCHNL_RA_SENSING_CATHODE_LOCATION_1: NORMAL
MDC_IDC_SET_LEADCHNL_RA_SENSING_POLARITY: NORMAL
MDC_IDC_SET_LEADCHNL_RA_SENSING_SENSITIVITY: 0.3 MV
MDC_IDC_SET_LEADCHNL_RV_PACING_AMPLITUDE: 1.5 V
MDC_IDC_SET_LEADCHNL_RV_PACING_ANODE_ELECTRODE_1: NORMAL
MDC_IDC_SET_LEADCHNL_RV_PACING_ANODE_LOCATION_1: NORMAL
MDC_IDC_SET_LEADCHNL_RV_PACING_CAPTURE_MODE: NORMAL
MDC_IDC_SET_LEADCHNL_RV_PACING_CATHODE_ELECTRODE_1: NORMAL
MDC_IDC_SET_LEADCHNL_RV_PACING_CATHODE_LOCATION_1: NORMAL
MDC_IDC_SET_LEADCHNL_RV_PACING_POLARITY: NORMAL
MDC_IDC_SET_LEADCHNL_RV_PACING_PULSEWIDTH: 0.4 MS
MDC_IDC_SET_LEADCHNL_RV_SENSING_ANODE_ELECTRODE_1: NORMAL
MDC_IDC_SET_LEADCHNL_RV_SENSING_ANODE_LOCATION_1: NORMAL
MDC_IDC_SET_LEADCHNL_RV_SENSING_CATHODE_ELECTRODE_1: NORMAL
MDC_IDC_SET_LEADCHNL_RV_SENSING_CATHODE_LOCATION_1: NORMAL
MDC_IDC_SET_LEADCHNL_RV_SENSING_POLARITY: NORMAL
MDC_IDC_SET_LEADCHNL_RV_SENSING_SENSITIVITY: 0.9 MV
MDC_IDC_SET_ZONE_DETECTION_INTERVAL: 350 MS
MDC_IDC_SET_ZONE_DETECTION_INTERVAL: 400 MS
MDC_IDC_SET_ZONE_STATUS: NORMAL
MDC_IDC_SET_ZONE_STATUS: NORMAL
MDC_IDC_SET_ZONE_TYPE: NORMAL
MDC_IDC_SET_ZONE_VENDOR_TYPE: NORMAL
MDC_IDC_STAT_AT_BURDEN_PERCENT: 0 %
MDC_IDC_STAT_AT_DTM_END: NORMAL
MDC_IDC_STAT_AT_DTM_START: NORMAL
MDC_IDC_STAT_BRADY_AP_VP_PERCENT: 0.18 %
MDC_IDC_STAT_BRADY_AP_VS_PERCENT: 23.73 %
MDC_IDC_STAT_BRADY_AS_VP_PERCENT: 0.09 %
MDC_IDC_STAT_BRADY_AS_VS_PERCENT: 76.01 %
MDC_IDC_STAT_BRADY_DTM_END: NORMAL
MDC_IDC_STAT_BRADY_DTM_START: NORMAL
MDC_IDC_STAT_BRADY_RA_PERCENT_PACED: 24.02 %
MDC_IDC_STAT_BRADY_RV_PERCENT_PACED: 0.27 %
MDC_IDC_STAT_EPISODE_RECENT_COUNT: 0
MDC_IDC_STAT_EPISODE_RECENT_COUNT_DTM_END: NORMAL
MDC_IDC_STAT_EPISODE_RECENT_COUNT_DTM_START: NORMAL
MDC_IDC_STAT_EPISODE_TOTAL_COUNT: 0
MDC_IDC_STAT_EPISODE_TOTAL_COUNT: 14
MDC_IDC_STAT_EPISODE_TOTAL_COUNT: 4
MDC_IDC_STAT_EPISODE_TOTAL_COUNT_DTM_END: NORMAL
MDC_IDC_STAT_EPISODE_TOTAL_COUNT_DTM_START: NORMAL
MDC_IDC_STAT_EPISODE_TYPE: NORMAL
MDC_IDC_STAT_TACHYTHERAPY_RECENT_DTM_END: NORMAL
MDC_IDC_STAT_TACHYTHERAPY_RECENT_DTM_START: NORMAL
MDC_IDC_STAT_TACHYTHERAPY_TOTAL_DTM_END: NORMAL
MDC_IDC_STAT_TACHYTHERAPY_TOTAL_DTM_START: NORMAL

## 2024-10-07 PROCEDURE — 93294 REM INTERROG EVL PM/LDLS PM: CPT | Performed by: INTERNAL MEDICINE

## 2024-10-07 PROCEDURE — 93296 REM INTERROG EVL PM/IDS: CPT | Performed by: INTERNAL MEDICINE

## 2024-10-17 ENCOUNTER — TELEPHONE (OUTPATIENT)
Dept: FAMILY MEDICINE | Facility: CLINIC | Age: 89
End: 2024-10-17
Payer: MEDICARE

## 2024-10-17 NOTE — TELEPHONE ENCOUNTER
Patients daughter Kalyani (CTC on file), to discuss Lisinopril, she stated she got the message back in September to hold the Lisinopril until after next visit. Patient has yet to follow up but she is curious if since patients potassium is back under control should they restart it now or should they continue to wait.     Kalyani is going to check with family and try to schedule patients yearly appointment and the first available right now is in January. Should they still wait until then?    Jose Armando Maurer RN  [facility]

## 2024-10-18 NOTE — TELEPHONE ENCOUNTER
Writer called patient's daughter and relayed message per PCP. Patient's daughter verbalized understanding and agrees with plan. Upon chart review it appears patient is overdue for AWV as last one was 1/31/23 and has been overdue since 1/31/24. Patient's daughter wants to have patient schedule own visit so will have him call clinic to schedule.    PARISA Engle, RN  [facility]

## 2024-10-18 NOTE — TELEPHONE ENCOUNTER
Okay to hold lisinopril until the next visit.  Please help patient to arrange AWV after 1/31/25 (prior AWV was 1/31/24).

## 2024-11-27 ENCOUNTER — TRANSFERRED RECORDS (OUTPATIENT)
Dept: HEALTH INFORMATION MANAGEMENT | Facility: CLINIC | Age: 89
End: 2024-11-27
Payer: MEDICARE

## 2025-01-02 ENCOUNTER — TRANSFERRED RECORDS (OUTPATIENT)
Dept: HEALTH INFORMATION MANAGEMENT | Facility: CLINIC | Age: 89
End: 2025-01-02
Payer: MEDICARE

## 2025-01-16 ENCOUNTER — TRANSFERRED RECORDS (OUTPATIENT)
Dept: HEALTH INFORMATION MANAGEMENT | Facility: CLINIC | Age: 89
End: 2025-01-16
Payer: MEDICARE

## 2025-01-16 DIAGNOSIS — I49.5 SICK SINUS SYNDROME (H): ICD-10-CM

## 2025-01-16 DIAGNOSIS — Z95.0 CARDIAC PACEMAKER IN SITU: Primary | ICD-10-CM

## 2025-01-20 DIAGNOSIS — I10 ESSENTIAL HYPERTENSION: ICD-10-CM

## 2025-01-20 RX ORDER — AMLODIPINE BESYLATE 2.5 MG/1
2.5 TABLET ORAL DAILY
Qty: 90 TABLET | Refills: 3 | Status: SHIPPED | OUTPATIENT
Start: 2025-01-20

## 2025-01-23 ENCOUNTER — OFFICE VISIT (OUTPATIENT)
Dept: CARDIOLOGY | Facility: CLINIC | Age: 89
End: 2025-01-23
Attending: INTERNAL MEDICINE
Payer: MEDICARE

## 2025-01-23 VITALS
BODY MASS INDEX: 23.71 KG/M2 | HEART RATE: 69 BPM | OXYGEN SATURATION: 98 % | WEIGHT: 170 LBS | SYSTOLIC BLOOD PRESSURE: 151 MMHG | DIASTOLIC BLOOD PRESSURE: 80 MMHG

## 2025-01-23 DIAGNOSIS — Z95.0 PACEMAKER: ICD-10-CM

## 2025-01-23 DIAGNOSIS — I44.1 SECOND DEGREE MOBITZ I AV BLOCK: ICD-10-CM

## 2025-01-23 NOTE — LETTER
1/23/2025    Derek Kumar MD  [address]  [facility] [address]    RE: Mikaela Fgiueroa       Dear Colleague,     I had the pleasure of seeing Mikaela Figueroa in the [facility].      Cardiology Progress Note     Assessment:  Sinus node dysfunction status post permanent pacemaker, normal device function, not pacemaker dependent  Hypertension, questionable control  BPH with complications  Lower extremity edema, mild, dependent, unlikely related to heart failure    Plan:  Leg elevation and compressive stockings for leg edema  He was advised to check blood pressure at home and report readings higher than 140/85  We went over the results of echocardiogram from 2 years ago.  At that time he had normal LV function and no valvular abnormalities.  Will continue to monitor pacemaker.  The longitudinal plan of care for the diagnosis(es)/condition(s) as documented were addressed during this visit. Due to the added complexity in care, I will continue to support Ricardo in the subsequent management and with ongoing continuity of care.   Subjective:   This is 89 year old male who comes in today for follow-up visit.  Have not seen him before.  He Permanent pacemaker while living in [place] a few years ago.  He had syncope before received device.  He has not experienced recurrence of syncope since device was implanted.  Last year he underwent TURP for BPH.  He is complicated because of recurrent urinary infections and retention.  He developed worsening renal function.  His usual blood pressure medication including furosemide and lisinopril were stopped.  He continues to take amlodipine.  Today he reports no chest pain or shortness of breath.  He has not had heart palpitations or syncope.  He has mild swelling of lower extremities.    Review of Systems:   Negative other than history of present illness    Objective:   BP (!) 151/80 (BP Location: Right arm, Patient Position: Sitting, Cuff Size: Adult  Regular)   Pulse 69   Wt 77.1 kg (170 lb)   SpO2 98%   BMI 23.71 kg/m    Physical Exam:  GENERAL: no distress  NECK: No JVD  LUNGS: Clear to auscultation.  CARDIAC: regular rhythm, S1 & S2 normal.  No heaves, thrills, gallops or murmurs.  ABDOMEN: flat, negative hepatosplenomegaly, soft and non-tender.  EXTREMITIES: No evidence of cyanosis, clubbing 1+ edema.    Current Outpatient Medications   Medication Sig Dispense Refill     amLODIPine (NORVASC) 2.5 MG tablet TAKE 1 TABLET DAILY 90 tablet 3     Cranberry (CRANBEREX PO) Take 6 tablets by mouth daily       metroNIDAZOLE (METROGEL) 1 % external gel APPLY A SMALL AMOUNT TO AFFECTED AREA(S) TOPICALLY ONCE DAILY 60 g 1     multivitamin, therapeutic (THERA-VIT) TABS tablet Take 1 tablet by mouth daily       polyethylene glycol (MIRALAX) 17 GM/Dose powder Take 17 g by mouth daily as needed for constipation 510 g 0     tamsulosin (FLOMAX) 0.4 MG capsule TAKE 1 CAPSULE AT BEDTIME 90 capsule 3     triamcinolone (KENALOG) 0.1 % external cream Apply topically 2 times daily as needed for irritation       acetaminophen (TYLENOL) 325 MG tablet Take 2 tablets (650 mg) by mouth every 4 hours as needed for mild pain, other, headaches or fever (and adjunct with moderate or severe pain or per patient request)       acetaminophen (TYLENOL) 325 MG tablet Take 1-2 tablets (325-650 mg) by mouth every 4 hours as needed for other or mild pain (For optimal non-opioid multimodal pain management to improve pain control.) (Patient not taking: Reported on 1/23/2025)       amoxicillin-clavulanate (AUGMENTIN) 875-125 MG tablet Take 1 tablet by mouth 2 times daily (Patient not taking: Reported on 1/23/2025) 20 tablet 0     fluconazole (DIFLUCAN) 100 MG tablet Take 1 tablet (100 mg) by mouth daily (Patient not taking: Reported on 1/23/2025) 10 tablet 0     furosemide (LASIX) 20 MG tablet Take 1 tablet (20 mg) by mouth daily for 14 days 14 tablet 0     levofloxacin (LEVAQUIN) 250 MG tablet Take  1 tablet (250 mg) by mouth daily (Patient not taking: Reported on 1/23/2025) 10 tablet 0     oxyBUTYnin (DITROPAN) 5 MG tablet Take 1 tablet (5 mg) by mouth 3 times daily (Patient not taking: Reported on 1/23/2025) 21 tablet 0       Cardiographics:    Pacemaker interrogation: 1/23/2025  Device: Medtronic Sandra (D) Pacemaker   Pacing %/Programmed: AP 21.1% and  0.4% in AAIR<=>DDDR  bpm mode   Lead(s): RA & RV with stable measurements & trends   Battery longevity: Estimated at 9.5 years remaining   Presenting rhythm: APVS, ASVS at 68 bpm   Underlying rhythm: SR at 65 bpm with first degree AVB (230 ms)   Heart rates:  bpm but primarily  bpm per histograms with excellent variability & no complaints of activity intolerance   Atrial High rates: No mode switches, burden <0.1%, Time in AT/AF=44 seconds, no available EGM, V-rates >/=120 bpm ~ 0%  Anticoagulant: Not on OAC or ASA   Ventricular High rates: 4 VHR episodes- 1 EGM suggests NSVT 7 beats at 167 bpm & the other 3 EGM's suggest brief SVT vs AVNRT at 154-188 bpm.   Pt asymptomatic.    Comments: Normal device function.  No programming changes were made today.     Echocardiogram: January 2023  1. The left ventricle is normal in size. Left ventricular function is  normal.The ejection fraction is 60-65%. No regional wall motion abnormalities  noted.  2. Normal right ventricle size and systolic function. There is a pacemaker  lead in the right ventricle.  3. No hemodynamically significant valvular abnormalities on 2D or color flow  imaging.     Lab Results    Chemistry/lipid CBC Cardiac Enzymes/BNP/TSH/INR   Recent Labs   Lab Test 06/05/24  0834   CHOL 113   HDL 34*   LDL 71   TRIG 42     Recent Labs   Lab Test 06/05/24  0834 01/31/23  0825 10/20/22  1036   LDL 71 75 67     Recent Labs   Lab Test 07/23/24  1309      POTASSIUM 4.7   CHLORIDE 102   CO2 28   GLC 90   BUN 45.6*   CR 2.03*   GFRESTIMATED 31*   LIBERTAD 9.1     Recent Labs   Lab Test  07/23/24  1309 07/17/24  1231 06/05/24  0834   CR 2.03* 1.99* 2.27*     Recent Labs   Lab Test 10/20/22  1036 07/06/22  0758 01/27/22  1146   A1C 5.7* 5.7* 5.8*          Recent Labs   Lab Test 07/17/24  1231   WBC 7.7   HGB 9.9*   HCT 30.5*   MCV 94        Recent Labs   Lab Test 07/17/24  1231 06/05/24  0834 05/24/24  0621   HGB 9.9* 9.0* 8.3*    Recent Labs   Lab Test 10/01/22  1932   TROPONINI 0.10     Recent Labs   Lab Test 07/17/24  1231 01/14/22  1423   BNP  --  35   NTBNP 423  --      Recent Labs   Lab Test 04/03/24  1420   TSH 1.72     Recent Labs   Lab Test 06/19/24  1320 05/19/24  0114 10/26/22  0614   INR 1.01 1.11 1.33*                         Thank you for allowing me to participate in the care of your patient.      Sincerely,     Jeanmarie Bhagat MD     [facility]  cc:   Lukas Calzada MD  [address]  [place] [address]

## 2025-01-23 NOTE — PROGRESS NOTES
Cardiology Progress Note     Assessment:  Sinus node dysfunction status post permanent pacemaker, normal device function, not pacemaker dependent  Hypertension, questionable control  BPH with complications  Lower extremity edema, mild, dependent, unlikely related to heart failure    Plan:  Leg elevation and compressive stockings for leg edema  He was advised to check blood pressure at home and report readings higher than 140/85  We went over the results of echocardiogram from 2 years ago.  At that time he had normal LV function and no valvular abnormalities.  Will continue to monitor pacemaker.  The longitudinal plan of care for the diagnosis(es)/condition(s) as documented were addressed during this visit. Due to the added complexity in care, I will continue to support Ricardo in the subsequent management and with ongoing continuity of care.   Subjective:   This is 89 year old male who comes in today for follow-up visit.  Have not seen him before.  He Permanent pacemaker while living in [place] a few years ago.  He had syncope before received device.  He has not experienced recurrence of syncope since device was implanted.  Last year he underwent TURP for BPH.  He is complicated because of recurrent urinary infections and retention.  He developed worsening renal function.  His usual blood pressure medication including furosemide and lisinopril were stopped.  He continues to take amlodipine.  Today he reports no chest pain or shortness of breath.  He has not had heart palpitations or syncope.  He has mild swelling of lower extremities.    Review of Systems:   Negative other than history of present illness    Objective:   BP (!) 151/80 (BP Location: Right arm, Patient Position: Sitting, Cuff Size: Adult Regular)   Pulse 69   Wt 77.1 kg (170 lb)   SpO2 98%   BMI 23.71 kg/m    Physical Exam:  GENERAL: no distress  NECK: No JVD  LUNGS: Clear to auscultation.  CARDIAC: regular rhythm, S1 & S2 normal.  No heaves,  thrills, gallops or murmurs.  ABDOMEN: flat, negative hepatosplenomegaly, soft and non-tender.  EXTREMITIES: No evidence of cyanosis, clubbing 1+ edema.    Current Outpatient Medications   Medication Sig Dispense Refill    amLODIPine (NORVASC) 2.5 MG tablet TAKE 1 TABLET DAILY 90 tablet 3    Cranberry (CRANBEREX PO) Take 6 tablets by mouth daily      metroNIDAZOLE (METROGEL) 1 % external gel APPLY A SMALL AMOUNT TO AFFECTED AREA(S) TOPICALLY ONCE DAILY 60 g 1    multivitamin, therapeutic (THERA-VIT) TABS tablet Take 1 tablet by mouth daily      polyethylene glycol (MIRALAX) 17 GM/Dose powder Take 17 g by mouth daily as needed for constipation 510 g 0    tamsulosin (FLOMAX) 0.4 MG capsule TAKE 1 CAPSULE AT BEDTIME 90 capsule 3    triamcinolone (KENALOG) 0.1 % external cream Apply topically 2 times daily as needed for irritation      acetaminophen (TYLENOL) 325 MG tablet Take 2 tablets (650 mg) by mouth every 4 hours as needed for mild pain, other, headaches or fever (and adjunct with moderate or severe pain or per patient request)      acetaminophen (TYLENOL) 325 MG tablet Take 1-2 tablets (325-650 mg) by mouth every 4 hours as needed for other or mild pain (For optimal non-opioid multimodal pain management to improve pain control.) (Patient not taking: Reported on 1/23/2025)      amoxicillin-clavulanate (AUGMENTIN) 875-125 MG tablet Take 1 tablet by mouth 2 times daily (Patient not taking: Reported on 1/23/2025) 20 tablet 0    fluconazole (DIFLUCAN) 100 MG tablet Take 1 tablet (100 mg) by mouth daily (Patient not taking: Reported on 1/23/2025) 10 tablet 0    furosemide (LASIX) 20 MG tablet Take 1 tablet (20 mg) by mouth daily for 14 days 14 tablet 0    levofloxacin (LEVAQUIN) 250 MG tablet Take 1 tablet (250 mg) by mouth daily (Patient not taking: Reported on 1/23/2025) 10 tablet 0    oxyBUTYnin (DITROPAN) 5 MG tablet Take 1 tablet (5 mg) by mouth 3 times daily (Patient not taking: Reported on 1/23/2025) 21 tablet 0        Cardiographics:    Pacemaker interrogation: 1/23/2025  Device: Medtronic Robeline (D) Pacemaker   Pacing %/Programmed: AP 21.1% and  0.4% in AAIR<=>DDDR  bpm mode   Lead(s): RA & RV with stable measurements & trends   Battery longevity: Estimated at 9.5 years remaining   Presenting rhythm: APVS, ASVS at 68 bpm   Underlying rhythm: SR at 65 bpm with first degree AVB (230 ms)   Heart rates:  bpm but primarily  bpm per histograms with excellent variability & no complaints of activity intolerance   Atrial High rates: No mode switches, burden <0.1%, Time in AT/AF=44 seconds, no available EGM, V-rates >/=120 bpm ~ 0%  Anticoagulant: Not on OAC or ASA   Ventricular High rates: 4 VHR episodes- 1 EGM suggests NSVT 7 beats at 167 bpm & the other 3 EGM's suggest brief SVT vs AVNRT at 154-188 bpm.   Pt asymptomatic.    Comments: Normal device function.  No programming changes were made today.     Echocardiogram: January 2023  1. The left ventricle is normal in size. Left ventricular function is  normal.The ejection fraction is 60-65%. No regional wall motion abnormalities  noted.  2. Normal right ventricle size and systolic function. There is a pacemaker  lead in the right ventricle.  3. No hemodynamically significant valvular abnormalities on 2D or color flow  imaging.     Lab Results    Chemistry/lipid CBC Cardiac Enzymes/BNP/TSH/INR   Recent Labs   Lab Test 06/05/24  0834   CHOL 113   HDL 34*   LDL 71   TRIG 42     Recent Labs   Lab Test 06/05/24  0834 01/31/23  0825 10/20/22  1036   LDL 71 75 67     Recent Labs   Lab Test 07/23/24  1309      POTASSIUM 4.7   CHLORIDE 102   CO2 28   GLC 90   BUN 45.6*   CR 2.03*   GFRESTIMATED 31*   LIBERTAD 9.1     Recent Labs   Lab Test 07/23/24  1309 07/17/24  1231 06/05/24  0834   CR 2.03* 1.99* 2.27*     Recent Labs   Lab Test 10/20/22  1036 07/06/22  0758 01/27/22  1146   A1C 5.7* 5.7* 5.8*          Recent Labs   Lab Test 07/17/24  1231   WBC 7.7   HGB 9.9*   HCT  30.5*   MCV 94        Recent Labs   Lab Test 07/17/24  1231 06/05/24  0834 05/24/24  0621   HGB 9.9* 9.0* 8.3*    Recent Labs   Lab Test 10/01/22  1932   TROPONINI 0.10     Recent Labs   Lab Test 07/17/24  1231 01/14/22  1423   BNP  --  35   NTBNP 423  --      Recent Labs   Lab Test 04/03/24  1420   TSH 1.72     Recent Labs   Lab Test 06/19/24  1320 05/19/24  0114 10/26/22  0614   INR 1.01 1.11 1.33*

## 2025-02-13 ENCOUNTER — OFFICE VISIT (OUTPATIENT)
Dept: FAMILY MEDICINE | Facility: CLINIC | Age: 89
End: 2025-02-13
Payer: MEDICARE

## 2025-02-13 VITALS
DIASTOLIC BLOOD PRESSURE: 68 MMHG | SYSTOLIC BLOOD PRESSURE: 134 MMHG | WEIGHT: 172 LBS | TEMPERATURE: 98.3 F | RESPIRATION RATE: 16 BRPM | BODY MASS INDEX: 26.07 KG/M2 | HEART RATE: 99 BPM | HEIGHT: 68 IN | OXYGEN SATURATION: 97 %

## 2025-02-13 DIAGNOSIS — Z00.00 MEDICARE ANNUAL WELLNESS VISIT, SUBSEQUENT: Primary | ICD-10-CM

## 2025-02-13 DIAGNOSIS — N18.32 CKD STAGE 3B, GFR 30-44 ML/MIN (H): ICD-10-CM

## 2025-02-13 DIAGNOSIS — N40.0 BENIGN PROSTATIC HYPERPLASIA, UNSPECIFIED WHETHER LOWER URINARY TRACT SYMPTOMS PRESENT: ICD-10-CM

## 2025-02-13 DIAGNOSIS — E66.3 OVERWEIGHT: ICD-10-CM

## 2025-02-13 DIAGNOSIS — I10 ESSENTIAL HYPERTENSION: ICD-10-CM

## 2025-02-13 DIAGNOSIS — E78.00 PURE HYPERCHOLESTEROLEMIA: ICD-10-CM

## 2025-02-13 DIAGNOSIS — D64.9 NORMOCHROMIC NORMOCYTIC ANEMIA: ICD-10-CM

## 2025-02-13 DIAGNOSIS — R73.01 IMPAIRED FASTING GLUCOSE: ICD-10-CM

## 2025-02-13 PROBLEM — I49.5 SSS (SICK SINUS SYNDROME) (H): Status: RESOLVED | Noted: 2023-01-31 | Resolved: 2025-02-13

## 2025-02-13 LAB
ERYTHROCYTE [DISTWIDTH] IN BLOOD BY AUTOMATED COUNT: 12.1 % (ref 10–15)
EST. AVERAGE GLUCOSE BLD GHB EST-MCNC: 126 MG/DL
HBA1C MFR BLD: 6 % (ref 0–5.6)
HCT VFR BLD AUTO: 38.2 % (ref 40–53)
HGB BLD-MCNC: 12.5 G/DL (ref 13.3–17.7)
MCH RBC QN AUTO: 30.6 PG (ref 26.5–33)
MCHC RBC AUTO-ENTMCNC: 32.7 G/DL (ref 31.5–36.5)
MCV RBC AUTO: 94 FL (ref 78–100)
PLATELET # BLD AUTO: 178 10E3/UL (ref 150–450)
RBC # BLD AUTO: 4.08 10E6/UL (ref 4.4–5.9)
WBC # BLD AUTO: 8.4 10E3/UL (ref 4–11)

## 2025-02-13 RX ORDER — TAMSULOSIN HYDROCHLORIDE 0.4 MG/1
0.4 CAPSULE ORAL AT BEDTIME
Qty: 90 CAPSULE | Refills: 3 | Status: SHIPPED | OUTPATIENT
Start: 2025-02-13

## 2025-02-13 SDOH — HEALTH STABILITY: PHYSICAL HEALTH: ON AVERAGE, HOW MANY DAYS PER WEEK DO YOU ENGAGE IN MODERATE TO STRENUOUS EXERCISE (LIKE A BRISK WALK)?: 0 DAYS

## 2025-02-13 ASSESSMENT — PAIN SCALES - GENERAL: PAINLEVEL_OUTOF10: MILD PAIN (2)

## 2025-02-13 ASSESSMENT — SOCIAL DETERMINANTS OF HEALTH (SDOH): HOW OFTEN DO YOU GET TOGETHER WITH FRIENDS OR RELATIVES?: TWICE A WEEK

## 2025-02-13 NOTE — PROGRESS NOTES
"Preventive Care Visit  [facility]  Derek Kumar MD, Family Medicine  Feb 13, 2025      Assessment & Plan     Medicare annual wellness visit, subsequent  Annual Wellness Visit completed.  Risk questionaire reviewed in detail.  Appropriate preventive services were discussed with this patient, including applicable screening as appropriate for fall prevention, nutrition, physical activity, tobacco-use cessation, weight loss, and cognition.  Annual Wellness Visits recommended to continue.     Overweight  Overweight status reviewed.  Weight goal remains less than 170 pounds ideally.    Essential hypertension  Hypertension stable with amlodipine 2.5 mg daily.    Hypercholesterolemia  Dietary and exercise modifications with weight goal remains less than 170 pounds ideally.    Benign prostatic hyperplasia, unspecified whether lower urinary tract symptoms present  Tamsulosin 0.4 mg daily.  Apparently utilizing oxybutynin 5 mg 3 times daily but needs to confirm.  Has follow-up with Dr. Bob Dozier November 2025.  - tamsulosin (FLOMAX) 0.4 MG capsule  Dispense: 90 capsule; Refill: 3    CKD stage 3b, GFR 30-44 ml/min (H)  CKD stage IIIb.  Microalbumin screen updated.  - Basic metabolic panel    Normochromic normocytic anemia  Normochromic normocytic anemia with hemoglobin 9.9 and MCV 94 previously and will update CBC.  - CBC with platelets    Impaired Fasting Glucose  A1c obtained.      Patient has been advised of split billing requirements and indicates understanding: Yes        BMI  Estimated body mass index is 26.54 kg/m  as calculated from the following:    Height as of this encounter: 1.715 m (5' 7.5\").    Weight as of this encounter: 78 kg (172 lb).       Counseling  Appropriate preventive services were addressed with this patient via screening, questionnaire, or discussion as appropriate for fall prevention, nutrition, physical activity, Tobacco-use cessation, social engagement, weight loss and " "cognition.  Checklist reviewing preventive services available has been given to the patient.  Reviewed patient's diet, addressing concerns and/or questions.   The patient was instructed to see the dentist every 6 months.   Updated plan of care.  Patient reported difficulty with activities of daily living were addressed today.Addressed any concerns about safety while driving.  The patient was provided with written information regarding signs of hearing loss.           Gaurang Silva is a 89 year old, presenting for the following:  Medicare Visit (Pt is not fasting)        2/13/2025     2:35 PM   Additional Questions   Roomed by [facility]    Patient seen today for annual wellness visit.  He is with his son Roel.  Memory is not as good as it used to be.  No rapid change however.  Amlodipine 2.5 mg daily for hypertension.  Cholesterol elevation historically diet controlled.  BPH and has had TURP procedure in the past.  Did have urinary retention issues and subsequent concerns with pyelonephritis requiring hospitalization and treatment May 2024.  Percutaneous nephrostomy tubes had been utilized around that time as well.  No urinary symptoms residual.  Unclear if he is ever had diagnosis of overactive bladder with oxybutynin 5 mg 3 times daily while continuing tamsulosin 0.4 mg daily.  CKD stage IIIb with prior creatinine of 1.97 and GFR 32.  Patient did get lost driving to [employer] along with his wife and therefore his children now drive to and from appointments etc.  Comprehensive review of systems as above otherwise all negative.      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 Zavaleta) then subsequent procedure 2 " "weeks later removing remaining stone in ureter \"with great difficulty\"   Dad - dec DM, CAD   Mom - dec Alzheimers   6-siblings [employer] (retired clergyman)   No smoke   Occ EtOH   Left fibula fracture (spiral) 5/07 (Dr. Quick, [facility])   TRUS with bx 10/7/11 biopsies negative (followed by Dr. Colindres)   Eye exam with Dr. Duarte in this building (monitoring for +/- glaucoma) - followed q year   Dr. Colindres, [facility] every year for elevated PSA, etc. (h/o TRUS with bx negative)   Dr. Beavers, dermatology for q 6 month checks re: \"precancerous\" lesions and rosacea; Mohs procedure left preauricular location 3/17/16   Bilateral hearing aids with h/o hearing loss (began using March, 2015)   Pacemaker 8/28/19 (PACEMAKER MOI XT DR COLETTE PERRY)   Attends [employer] in [place], [place]           Health Care Directive  Patient does not have a Health Care Directive: Advance Directive received and scanned. Click on Code in the patient header to view.      2/13/2025   General Health   How would you rate your overall physical health? Good   Feel stress (tense, anxious, or unable to sleep) Not at all         2/13/2025   Nutrition   Diet: Regular (no restrictions)         2/13/2025   Exercise   Days per week of moderate/strenous exercise 0 days   (!) EXERCISE CONCERN      2/13/2025   Social Factors   Frequency of gathering with friends or relatives Twice a week   Worry food won't last until get money to buy more No   Food not last or not have enough money for food? No   Do you have housing? (Housing is defined as stable permanent housing and does not include staying ouside in a car, in a tent, in an abandoned building, in an overnight shelter, or couch-surfing.) Yes   Are you worried about losing your housing? No   Lack of transportation? No   Unable to get utilities (heat,electricity)? No         2/13/2025   Fall Risk   Fallen 2 or more times in the past year? No   Trouble with walking or " balance? Yes   Gait Speed Test (Document in seconds) 3.5   Gait Speed Test Interpretation Less than or equal to 5.00 seconds - PASS          2/13/2025   Activities of Daily Living- Home Safety   Needs help with the following daily activites Transportation    Medication administration   Safety concerns in the home None of the above       Multiple values from one day are sorted in reverse-chronological order         2/13/2025   Dental   Dentist two times every year? (!) NO         2/13/2025   Hearing Screening   Hearing concerns? (!) IT'S HARD TO FOLLOW A CONVERSATION IN A NOISY RESTAURANT OR CROWDED ROOM.         2/13/2025   Driving Risk Screening   Patient/family members have concerns about driving (!) YES got lost driving to [employer] a few months ago (children now drive)         2/13/2025   General Alertness/Fatigue Screening   Have you been more tired than usual lately? No         2/13/2025   Urinary Incontinence Screening   Bothered by leaking urine in past 6 months No            Today's PHQ-2 Score:       2/13/2025     2:25 PM   PHQ-2 ( 1999 [employer])   Q1: Little interest or pleasure in doing things 0   Q2: Feeling down, depressed or hopeless 0   PHQ-2 Score 0    Q1: Little interest or pleasure in doing things Not at all   Q2: Feeling down, depressed or hopeless Not at all   PHQ-2 Score 0       Patient-reported           2/13/2025   Substance Use   Alcohol more than 3/day or more than 7/wk No   Do you have a current opioid prescription? No   How severe/bad is pain from 1 to 10? 1/10   Do you use any other substances recreationally? No     Social History     Tobacco Use    Smoking status: Never    Smokeless tobacco: Never   Vaping Use    Vaping status: Never Used   Substance Use Topics    Alcohol use: No    Drug use: No             Reviewed and updated as needed this visit by Provider                    Past Medical History:   Diagnosis Date    Hypertension     Pacemaker      Past Surgical History:   Procedure  Laterality Date    APPENDECTOMY      ARTHROSCOPY KNEE Right     CYSTOURETEROSCOPY, WITH LITHOTRIPSY USING ZABRINA 120P LASER AND URETERAL STENT INSERTION Left 10/25/2022    Procedure: CYSTOSCOPY, LEFT URETEROSCOPY, LASER LITHOTRIPSY;  Surgeon: Bob Dozier MD;  Location: [facility] Main OR    GENITOURINARY SURGERY      IR NEPHROSTOMY TUBE PLACEMENT BILATERAL  5/20/2024    IR NEPHROSTOMY TUBE PLACEMENT LEFT  10/26/2022    IR URETERAL STENT PLACEMENT LEFT  11/1/2022    REPAIR MOHS Right 8/28/2023    Procedure: FOREHEAD FLAP REPAIR MOHS DEFECT RIGHT NOSE; COMPOSITE GRAFT;  Surgeon: Jake Rosa MD;  Location: [facility] Main OR     Lab work is in process  Labs reviewed in EPIC  BP Readings from Last 3 Encounters:   02/13/25 134/68   01/23/25 (!) 151/80   07/23/24 130/82    Wt Readings from Last 3 Encounters:   02/13/25 78 kg (172 lb)   01/23/25 77.1 kg (170 lb)   07/23/24 74.4 kg (164 lb)                  Patient Active Problem List   Diagnosis    Male Erectile Disorder    Rosacea    Diverticulosis    BPH with urinary obstruction    Nephrolithiasis    Hypercholesterolemia    Hypertension    Impaired Fasting Glucose    Serology Prostate-specific Antigen (PSA) Elevated    Squamous Cell Carcinoma Of The Skin    Hearing loss    Glaucoma    Right inguinal hernia    Essential hypertension with goal blood pressure less than 130/80    Open-angle glaucoma of both eyes, mild stage, unspecified open-angle glaucoma type    Left upper lobe pulmonary nodule    Cardiac pacemaker in situ, dual chamber    Syncope, unspecified syncope type    Myopia    Dermatitis    Disorder of optic nerve    Dystrophy of anterior cornea    Encounter for medication refill    Mobitz type 1 second degree atrioventricular block    Need for prophylactic vaccination and inoculation against single disease    Presbyopia    Progressive high myopia    Routine general medical examination at a health care facility    History of hepatitis B    Closed compression  fracture of L3 lumbar vertebra, with routine healing, subsequent encounter    Ureteral calculus    Hydronephrosis    Bilateral degenerative progressive high myopia    Male erectile disorder    Personal history of COVID-19    COVID-19    SSS (sick sinus syndrome) (H)    Stage 3 chronic kidney disease, unspecified whether stage 3a or 3b CKD (H)    Urinary retention    Urinary tract infection without hematuria, site unspecified    History of renal calculi    Lower urinary tract symptoms due to benign prostatic hyperplasia    Hyperkalemia    SUJATHA (acute kidney injury)    Acute right flank pain    Hydronephrosis, unspecified hydronephrosis type     Past Surgical History:   Procedure Laterality Date    APPENDECTOMY      ARTHROSCOPY KNEE Right     CYSTOURETEROSCOPY, WITH LITHOTRIPSY USING ZABRINA 120P LASER AND URETERAL STENT INSERTION Left 10/25/2022    Procedure: CYSTOSCOPY, LEFT URETEROSCOPY, LASER LITHOTRIPSY;  Surgeon: Bob Dozier MD;  Location: [facility] OR    GENITOURINARY SURGERY      IR NEPHROSTOMY TUBE PLACEMENT BILATERAL  5/20/2024    IR NEPHROSTOMY TUBE PLACEMENT LEFT  10/26/2022    IR URETERAL STENT PLACEMENT LEFT  11/1/2022    REPAIR MOHS Right 8/28/2023    Procedure: FOREHEAD FLAP REPAIR MOHS DEFECT RIGHT NOSE; COMPOSITE GRAFT;  Surgeon: Jake Rosa MD;  Location: [facility] Main OR       Social History     Tobacco Use    Smoking status: Never    Smokeless tobacco: Never   Substance Use Topics    Alcohol use: No     Family History   Problem Relation Age of Onset    Dementia Mother     Heart Disease Father     Diabetes Father          Current Outpatient Medications   Medication Sig Dispense Refill    amLODIPine (NORVASC) 2.5 MG tablet TAKE 1 TABLET DAILY 90 tablet 3    Cranberry (CRANBEREX PO) Take 6 tablets by mouth daily      metroNIDAZOLE (METROGEL) 1 % external gel APPLY A SMALL AMOUNT TO AFFECTED AREA(S) TOPICALLY ONCE DAILY 60 g 1    multivitamin, therapeutic (THERA-VIT) TABS tablet Take 1  "tablet by mouth daily      oxyBUTYnin (DITROPAN) 5 MG tablet Take 1 tablet (5 mg) by mouth 3 times daily 21 tablet 0    polyethylene glycol (MIRALAX) 17 GM/Dose powder Take 17 g by mouth daily as needed for constipation 510 g 0    tamsulosin (FLOMAX) 0.4 MG capsule Take 1 capsule (0.4 mg) by mouth at bedtime. 90 capsule 3    triamcinolone (KENALOG) 0.1 % external cream Apply topically 2 times daily as needed for irritation       No Known Allergies  Current providers sharing in care for this patient include:  Patient Care Team:  Derek Kumar MD as PCP - General  Derek Kumar MD as Assigned PCP  Daniel Garcia MD as Assigned Heart and Vascular Provider    The following health maintenance items are reviewed in Epic and correct as of today:  Health Maintenance   Topic Date Due    MICROALBUMIN  Never done    RSV VACCINE (1 - 1-dose 75+ series) Never done    DTAP/TDAP/TD IMMUNIZATION (1 - Tdap) 12/23/2020    ZOSTER IMMUNIZATION (3 of 3) 09/26/2022    COLORECTAL CANCER SCREENING  06/12/2023    INFLUENZA VACCINE (1) 09/01/2024    COVID-19 Vaccine (6 - 2024-25 season) 09/01/2024    LIPID  06/05/2025    HEMOGLOBIN  07/17/2025    BMP  07/23/2025    MEDICARE ANNUAL WELLNESS VISIT  02/13/2026    ANNUAL REVIEW OF HM ORDERS  02/13/2026    FALL RISK ASSESSMENT  02/13/2026    ADVANCE CARE PLANNING  02/13/2030    PHQ-2 (once per calendar year)  Completed    Pneumococcal Vaccine: 50+ Years  Completed    URINALYSIS  Completed    HPV IMMUNIZATION  Aged Out    MENINGITIS IMMUNIZATION  Aged Out         Review of Systems  Constitutional, HEENT, cardiovascular, pulmonary, GI, , musculoskeletal, neuro, skin, endocrine and psych systems are negative, except as otherwise noted.     Objective    Exam  /68   Pulse 99   Temp 98.3  F (36.8  C)   Resp 16   Ht 1.715 m (5' 7.5\")   Wt 78 kg (172 lb)   SpO2 97%   BMI 26.54 kg/m     Estimated body mass index is 26.54 kg/m  as calculated from the following:    Height as of this " "encounter: 1.715 m (5' 7.5\").    Weight as of this encounter: 78 kg (172 lb).      Physical Exam  GENERAL: alert and no distress.  Kyphotic posture.  Transfers slowly.  EYES: Eyes grossly normal to inspection, PERRL and conjunctivae and sclerae normal  HENT: ear canals and TM's normal, nose and mouth without ulcers or lesions.  Bilateral hearing aids in place.  NECK: no adenopathy, no asymmetry, masses, or scars  RESP: lungs clear to auscultation - no rales, rhonchi or wheezes  CV: regular rate and rhythm, normal S1 S2, no S3 or S4, no murmur, click or rub, no peripheral edema  ABDOMEN: soft, nontender, no hepatosplenomegaly, no masses and bowel sounds normal  MS: no gross musculoskeletal defects noted, no edema  SKIN: no suspicious lesions or rashes  NEURO: Normal strength and tone, mentation intact and speech normal  PSYCH: mentation appears normal, affect normal/bright        2/13/2025   Mini Cog   Clock Draw Score 2 Normal   3 Item Recall 1 object recalled   Mini Cog Total Score 3              Signed Electronically by: Derek Kumar MD    "

## 2025-02-15 LAB
CREAT UR-MCNC: 73.3 MG/DL
MICROALBUMIN UR-MCNC: 203 MG/L
MICROALBUMIN/CREAT UR: 276.94 MG/G CR (ref 0–17)

## 2025-03-26 ENCOUNTER — TRANSFERRED RECORDS (OUTPATIENT)
Dept: HEALTH INFORMATION MANAGEMENT | Facility: CLINIC | Age: 89
End: 2025-03-26
Payer: MEDICARE

## 2025-04-28 ENCOUNTER — ANCILLARY PROCEDURE (OUTPATIENT)
Dept: CARDIOLOGY | Facility: CLINIC | Age: 89
End: 2025-04-28
Attending: INTERNAL MEDICINE
Payer: MEDICARE

## 2025-04-28 DIAGNOSIS — Z95.0 CARDIAC PACEMAKER IN SITU: ICD-10-CM

## 2025-04-28 DIAGNOSIS — I49.5 SICK SINUS SYNDROME (H): ICD-10-CM

## 2025-04-28 LAB
MDC_IDC_LEAD_CONNECTION_STATUS: NORMAL
MDC_IDC_LEAD_CONNECTION_STATUS: NORMAL
MDC_IDC_LEAD_IMPLANT_DT: NORMAL
MDC_IDC_LEAD_IMPLANT_DT: NORMAL
MDC_IDC_LEAD_LOCATION: NORMAL
MDC_IDC_LEAD_LOCATION: NORMAL
MDC_IDC_LEAD_LOCATION_DETAIL_1: NORMAL
MDC_IDC_LEAD_LOCATION_DETAIL_1: NORMAL
MDC_IDC_LEAD_MFG: NORMAL
MDC_IDC_LEAD_MFG: NORMAL
MDC_IDC_LEAD_MODEL: NORMAL
MDC_IDC_LEAD_MODEL: NORMAL
MDC_IDC_LEAD_POLARITY_TYPE: NORMAL
MDC_IDC_LEAD_POLARITY_TYPE: NORMAL
MDC_IDC_LEAD_SERIAL: NORMAL
MDC_IDC_LEAD_SERIAL: NORMAL
MDC_IDC_LEAD_SPECIAL_FUNCTION: NORMAL
MDC_IDC_LEAD_SPECIAL_FUNCTION: NORMAL
MDC_IDC_MSMT_BATTERY_DTM: NORMAL
MDC_IDC_MSMT_BATTERY_REMAINING_LONGEVITY: 113 MO
MDC_IDC_MSMT_BATTERY_RRT_TRIGGER: 2.62
MDC_IDC_MSMT_BATTERY_STATUS: NORMAL
MDC_IDC_MSMT_BATTERY_VOLTAGE: 3 V
MDC_IDC_MSMT_LEADCHNL_RA_IMPEDANCE_VALUE: 323 OHM
MDC_IDC_MSMT_LEADCHNL_RA_IMPEDANCE_VALUE: 380 OHM
MDC_IDC_MSMT_LEADCHNL_RA_PACING_THRESHOLD_AMPLITUDE: 0.38 V
MDC_IDC_MSMT_LEADCHNL_RA_PACING_THRESHOLD_PULSEWIDTH: 0.4 MS
MDC_IDC_MSMT_LEADCHNL_RA_SENSING_INTR_AMPL: 1.5 MV
MDC_IDC_MSMT_LEADCHNL_RA_SENSING_INTR_AMPL: 1.5 MV
MDC_IDC_MSMT_LEADCHNL_RV_IMPEDANCE_VALUE: 475 OHM
MDC_IDC_MSMT_LEADCHNL_RV_IMPEDANCE_VALUE: 532 OHM
MDC_IDC_MSMT_LEADCHNL_RV_PACING_THRESHOLD_AMPLITUDE: 0.38 V
MDC_IDC_MSMT_LEADCHNL_RV_PACING_THRESHOLD_PULSEWIDTH: 0.4 MS
MDC_IDC_MSMT_LEADCHNL_RV_SENSING_INTR_AMPL: 7.38 MV
MDC_IDC_MSMT_LEADCHNL_RV_SENSING_INTR_AMPL: 7.38 MV
MDC_IDC_PG_IMPLANT_DTM: NORMAL
MDC_IDC_PG_MFG: NORMAL
MDC_IDC_PG_MODEL: NORMAL
MDC_IDC_PG_SERIAL: NORMAL
MDC_IDC_PG_TYPE: NORMAL
MDC_IDC_SESS_CLINIC_NAME: NORMAL
MDC_IDC_SESS_DTM: NORMAL
MDC_IDC_SESS_TYPE: NORMAL
MDC_IDC_SET_BRADY_AT_MODE_SWITCH_RATE: 171 {BEATS}/MIN
MDC_IDC_SET_BRADY_HYSTRATE: NORMAL
MDC_IDC_SET_BRADY_LOWRATE: 50 {BEATS}/MIN
MDC_IDC_SET_BRADY_MAX_SENSOR_RATE: 120 {BEATS}/MIN
MDC_IDC_SET_BRADY_MAX_TRACKING_RATE: 120 {BEATS}/MIN
MDC_IDC_SET_BRADY_MODE: NORMAL
MDC_IDC_SET_BRADY_PAV_DELAY_LOW: 180 MS
MDC_IDC_SET_BRADY_SAV_DELAY_LOW: 150 MS
MDC_IDC_SET_LEADCHNL_RA_PACING_AMPLITUDE: 1.5 V
MDC_IDC_SET_LEADCHNL_RA_PACING_ANODE_ELECTRODE_1: NORMAL
MDC_IDC_SET_LEADCHNL_RA_PACING_ANODE_LOCATION_1: NORMAL
MDC_IDC_SET_LEADCHNL_RA_PACING_CAPTURE_MODE: NORMAL
MDC_IDC_SET_LEADCHNL_RA_PACING_CATHODE_ELECTRODE_1: NORMAL
MDC_IDC_SET_LEADCHNL_RA_PACING_CATHODE_LOCATION_1: NORMAL
MDC_IDC_SET_LEADCHNL_RA_PACING_POLARITY: NORMAL
MDC_IDC_SET_LEADCHNL_RA_PACING_PULSEWIDTH: 0.4 MS
MDC_IDC_SET_LEADCHNL_RA_SENSING_ANODE_ELECTRODE_1: NORMAL
MDC_IDC_SET_LEADCHNL_RA_SENSING_ANODE_LOCATION_1: NORMAL
MDC_IDC_SET_LEADCHNL_RA_SENSING_CATHODE_ELECTRODE_1: NORMAL
MDC_IDC_SET_LEADCHNL_RA_SENSING_CATHODE_LOCATION_1: NORMAL
MDC_IDC_SET_LEADCHNL_RA_SENSING_POLARITY: NORMAL
MDC_IDC_SET_LEADCHNL_RA_SENSING_SENSITIVITY: 0.3 MV
MDC_IDC_SET_LEADCHNL_RV_PACING_AMPLITUDE: 1.5 V
MDC_IDC_SET_LEADCHNL_RV_PACING_ANODE_ELECTRODE_1: NORMAL
MDC_IDC_SET_LEADCHNL_RV_PACING_ANODE_LOCATION_1: NORMAL
MDC_IDC_SET_LEADCHNL_RV_PACING_CAPTURE_MODE: NORMAL
MDC_IDC_SET_LEADCHNL_RV_PACING_CATHODE_ELECTRODE_1: NORMAL
MDC_IDC_SET_LEADCHNL_RV_PACING_CATHODE_LOCATION_1: NORMAL
MDC_IDC_SET_LEADCHNL_RV_PACING_POLARITY: NORMAL
MDC_IDC_SET_LEADCHNL_RV_PACING_PULSEWIDTH: 0.4 MS
MDC_IDC_SET_LEADCHNL_RV_SENSING_ANODE_ELECTRODE_1: NORMAL
MDC_IDC_SET_LEADCHNL_RV_SENSING_ANODE_LOCATION_1: NORMAL
MDC_IDC_SET_LEADCHNL_RV_SENSING_CATHODE_ELECTRODE_1: NORMAL
MDC_IDC_SET_LEADCHNL_RV_SENSING_CATHODE_LOCATION_1: NORMAL
MDC_IDC_SET_LEADCHNL_RV_SENSING_POLARITY: NORMAL
MDC_IDC_SET_LEADCHNL_RV_SENSING_SENSITIVITY: 0.9 MV
MDC_IDC_SET_ZONE_DETECTION_INTERVAL: 350 MS
MDC_IDC_SET_ZONE_DETECTION_INTERVAL: 400 MS
MDC_IDC_SET_ZONE_STATUS: NORMAL
MDC_IDC_SET_ZONE_STATUS: NORMAL
MDC_IDC_SET_ZONE_TYPE: NORMAL
MDC_IDC_SET_ZONE_VENDOR_TYPE: NORMAL
MDC_IDC_STAT_AT_BURDEN_PERCENT: 0 %
MDC_IDC_STAT_AT_DTM_END: NORMAL
MDC_IDC_STAT_AT_DTM_START: NORMAL
MDC_IDC_STAT_BRADY_AP_VP_PERCENT: 0.64 %
MDC_IDC_STAT_BRADY_AP_VS_PERCENT: 19.75 %
MDC_IDC_STAT_BRADY_AS_VP_PERCENT: 0.21 %
MDC_IDC_STAT_BRADY_AS_VS_PERCENT: 79.4 %
MDC_IDC_STAT_BRADY_DTM_END: NORMAL
MDC_IDC_STAT_BRADY_DTM_START: NORMAL
MDC_IDC_STAT_BRADY_RA_PERCENT_PACED: 20.57 %
MDC_IDC_STAT_BRADY_RV_PERCENT_PACED: 0.85 %
MDC_IDC_STAT_EPISODE_RECENT_COUNT: 0
MDC_IDC_STAT_EPISODE_RECENT_COUNT_DTM_END: NORMAL
MDC_IDC_STAT_EPISODE_RECENT_COUNT_DTM_START: NORMAL
MDC_IDC_STAT_EPISODE_TOTAL_COUNT: 0
MDC_IDC_STAT_EPISODE_TOTAL_COUNT: 16
MDC_IDC_STAT_EPISODE_TOTAL_COUNT: 4
MDC_IDC_STAT_EPISODE_TOTAL_COUNT_DTM_END: NORMAL
MDC_IDC_STAT_EPISODE_TOTAL_COUNT_DTM_START: NORMAL
MDC_IDC_STAT_EPISODE_TYPE: NORMAL
MDC_IDC_STAT_TACHYTHERAPY_RECENT_DTM_END: NORMAL
MDC_IDC_STAT_TACHYTHERAPY_RECENT_DTM_START: NORMAL
MDC_IDC_STAT_TACHYTHERAPY_TOTAL_DTM_END: NORMAL
MDC_IDC_STAT_TACHYTHERAPY_TOTAL_DTM_START: NORMAL

## 2025-05-12 PROCEDURE — 93296 REM INTERROG EVL PM/IDS: CPT | Performed by: INTERNAL MEDICINE

## 2025-05-12 PROCEDURE — 93294 REM INTERROG EVL PM/LDLS PM: CPT | Performed by: INTERNAL MEDICINE

## 2025-07-15 ENCOUNTER — TRANSFERRED RECORDS (OUTPATIENT)
Dept: HEALTH INFORMATION MANAGEMENT | Facility: CLINIC | Age: 89
End: 2025-07-15
Payer: MEDICARE

## (undated) DEVICE — MMIS - SUTURE VICRYL 3-0 CT2 27IN BRAID COAT ABS UNDYED

## (undated) DEVICE — APPLICATOR COTTON TIP 3IN STRL 32-1521

## (undated) DEVICE — LUBRICANT SURG 2 OZ STRL FLIP TOP 2OZLUB

## (undated) DEVICE — SU SILK 5-0 P-3 18" 640G

## (undated) DEVICE — MMIS - CABLE PCNG 12FT ALGTR CLIP STRL LF DISP

## (undated) DEVICE — ESU PENCIL SMOKE EVAC W/ROCKER SWITCH 0703-047-000

## (undated) DEVICE — CUSTOM PACK CYSTO PREFERRED SOT5BCYHEA

## (undated) DEVICE — SOL WATER IRRIG 1000ML BOTTLE 2F7114

## (undated) DEVICE — TRAY PREP DRY SKIN SCRUB 067

## (undated) DEVICE — TUBING SUCTION MEDI-VAC 1/4"X20' N620A - HE

## (undated) DEVICE — SPONGE RAY-TEC 4X8" 7318

## (undated) DEVICE — MMIS - STYLET LD MED 58CM RVOT KIT MOND SS

## (undated) DEVICE — MMIS - SUTURE MONOCRYL + MTPS 4-0 PS2 27IN MONO ABS

## (undated) DEVICE — BASKET NITINOL TIPLESS HALO  1.5FRX120CM 554120

## (undated) DEVICE — MMIS - ADHESIVE DRMBND .7ML LIQUID APL MCBL BRR FLXB

## (undated) DEVICE — SUCTION MANIFOLD NEPTUNE 2 SYS 1 PORT 702-025-000

## (undated) DEVICE — SOL WATER IRRIG 3000ML BAG 2B7117

## (undated) DEVICE — MMIS - TUBING SCT CLR 12FT .25IN MDVC NCDTV MALE TO MALE

## (undated) DEVICE — SUTURE PDS 5-0 18 P-3 + UNDYED PDP493G

## (undated) DEVICE — DRAPE U SPLIT 74X120" 29440

## (undated) DEVICE — SU ETHILON 6-0 P-3 18" BLACK 1698G

## (undated) DEVICE — DRSG TELFA 3X4" 1050

## (undated) DEVICE — CATH URETERAL OPEN END 5FRX70CM M0064002010

## (undated) DEVICE — MMIS - ELECTRODE PT RTN 9FT CORD ADH STRIP VALLEYLAB

## (undated) DEVICE — NDL 27GA 1 1/2" 1188827112

## (undated) DEVICE — ADAPTER CATH 403250

## (undated) DEVICE — PREP SCRUB SOL EXIDINE 4% CHG 4OZ 29002-404

## (undated) DEVICE — SU ETHILON 3-0 PS-1 18" 1663G

## (undated) DEVICE — ESU CORD BIPOLAR 12' E0512

## (undated) DEVICE — SU CHROMIC 5-0 P-3 18" 687G

## (undated) DEVICE — CUSTOM PACK MINOR SBA5BMNHEA

## (undated) DEVICE — PREP POVIDONE-IODINE 10% SOLUTION 4OZ BOTTLE MDS093944

## (undated) DEVICE — SU DERMABOND ADVANCED .7ML DNX12

## (undated) DEVICE — SU VICRYL+ 3-0 27IN SH UND VCP416H

## (undated) DEVICE — MMIS - PACK PACEMAKER

## (undated) DEVICE — SU ETHILON 5-0 P-3 18" BLACK 698G

## (undated) DEVICE — SPONGE NEURO 1/2X3 WECK 200104

## (undated) DEVICE — GUIDEWIRE BENTSON FLEX TIP 0.035"X150CM M0066201250

## (undated) DEVICE — MMIS - SUTURE ETHIBOND 0 CT1 30IN BRAID NABSB GRN

## (undated) DEVICE — MMIS - INTRODUCER SHTH 7FR .092IN 13CM ROBUST VLV SPLT

## (undated) DEVICE — MAT FLOOR SURGICAL 40X38 0702140238

## (undated) DEVICE — Device

## (undated) DEVICE — GLOVE BIOGEL PI ULTRATOUCH G SZ 7.5 42175

## (undated) DEVICE — SYR 10ML FINGER CONTROL W/O NDL 309695

## (undated) DEVICE — GUIDEWIRE SENSOR DUAL FLEX STR 0.035"X150CM M0066703080

## (undated) DEVICE — MMIS - PEN SURGMRK STD TIP FLXB RULER LBL PREP RST

## (undated) DEVICE — MMIS - TIP SCT YNKR STD BULB ON OFF CNTRL WO VENT STRL LF

## (undated) DEVICE — ESU ELEC NDL 1" COATED/INSULATED E1465

## (undated) DEVICE — CONTAINER URINE SPEC 4OZ STRL 1053

## (undated) DEVICE — MMIS - ELECTRODE DFBR OVAL 6.25X4.5IN MLFNC RDTRNS CNCT

## (undated) DEVICE — LASER FIBER HOLMIUM MOSES 200 D/F/L AC-10030100

## (undated) DEVICE — KIT ENDO FIRST STEP DISINFECTANT 200ML W/POUCH EP-4

## (undated) DEVICE — MMIS - SUTURE VICRYL 2-0 CT1 36IN BRAID COAT ABS UNDYED

## (undated) DEVICE — ADAPTER SCOPE UROLOK II LF M0067301400

## (undated) DEVICE — DRSG XEROFORM 1X8"

## (undated) DEVICE — MMIS - SUTURE ETHIBOND 0 SH 36IN 2 ARM BRAID NABSB GRN

## (undated) DEVICE — SOLUTION IRRIG 2B7127 .9NS 3000ML BAG

## (undated) DEVICE — MMIS - TOWEL SURG 26X17IN WHT CTN HI ABS WOVEN DELINT

## (undated) DEVICE — MMIS - DRAPE SURG 76X52IN .75 SHT FNFLD CNVRT STRL LF

## (undated) DEVICE — NDL BLUNT 18GA 1.5" W/O FILTER 305180

## (undated) DEVICE — SOL NACL 0.9% IRRIG 1000ML BOTTLE 2F7124

## (undated) DEVICE — TUBING SET THERMEDX UROLOGY SGL USE LL0006

## (undated) RX ORDER — FENTANYL CITRATE 50 UG/ML
INJECTION, SOLUTION INTRAMUSCULAR; INTRAVENOUS
Status: DISPENSED
Start: 2022-10-26

## (undated) RX ORDER — PROPOFOL 10 MG/ML
INJECTION, EMULSION INTRAVENOUS
Status: DISPENSED
Start: 2022-10-25

## (undated) RX ORDER — DEXAMETHASONE SODIUM PHOSPHATE 10 MG/ML
INJECTION, EMULSION INTRAMUSCULAR; INTRAVENOUS
Status: DISPENSED
Start: 2023-08-28

## (undated) RX ORDER — ONDANSETRON 2 MG/ML
INJECTION INTRAMUSCULAR; INTRAVENOUS
Status: DISPENSED
Start: 2023-08-28

## (undated) RX ORDER — FENTANYL CITRATE 50 UG/ML
INJECTION, SOLUTION INTRAMUSCULAR; INTRAVENOUS
Status: DISPENSED
Start: 2023-08-28

## (undated) RX ORDER — FENTANYL CITRATE 50 UG/ML
INJECTION, SOLUTION INTRAMUSCULAR; INTRAVENOUS
Status: DISPENSED
Start: 2022-10-25

## (undated) RX ORDER — ONDANSETRON 2 MG/ML
INJECTION INTRAMUSCULAR; INTRAVENOUS
Status: DISPENSED
Start: 2022-10-25

## (undated) RX ORDER — FENTANYL CITRATE 50 UG/ML
INJECTION, SOLUTION INTRAMUSCULAR; INTRAVENOUS
Status: DISPENSED
Start: 2022-11-01

## (undated) RX ORDER — FENTANYL CITRATE 50 UG/ML
INJECTION, SOLUTION INTRAMUSCULAR; INTRAVENOUS
Status: DISPENSED
Start: 2024-05-20

## (undated) RX ORDER — LIDOCAINE HYDROCHLORIDE 10 MG/ML
INJECTION, SOLUTION INFILTRATION; PERINEURAL
Status: DISPENSED
Start: 2024-05-20

## (undated) RX ORDER — LIDOCAINE HYDROCHLORIDE 10 MG/ML
INJECTION, SOLUTION EPIDURAL; INFILTRATION; INTRACAUDAL; PERINEURAL
Status: DISPENSED
Start: 2022-11-01

## (undated) RX ORDER — DEXAMETHASONE SODIUM PHOSPHATE 10 MG/ML
INJECTION, SOLUTION INTRAMUSCULAR; INTRAVENOUS
Status: DISPENSED
Start: 2022-10-25

## (undated) RX ORDER — LIDOCAINE HYDROCHLORIDE 10 MG/ML
INJECTION, SOLUTION INFILTRATION; PERINEURAL
Status: DISPENSED
Start: 2022-10-26